# Patient Record
Sex: MALE | Race: ASIAN | NOT HISPANIC OR LATINO | URBAN - METROPOLITAN AREA
[De-identification: names, ages, dates, MRNs, and addresses within clinical notes are randomized per-mention and may not be internally consistent; named-entity substitution may affect disease eponyms.]

---

## 2016-04-06 RX ORDER — METOPROLOL TARTRATE 50 MG
1 TABLET ORAL
Qty: 0 | Refills: 0 | COMMUNITY
Start: 2016-04-06 | End: 2016-05-06

## 2016-04-06 RX ORDER — CLOPIDOGREL BISULFATE 75 MG/1
1 TABLET, FILM COATED ORAL
Qty: 30 | Refills: 0 | COMMUNITY
Start: 2016-04-06 | End: 2016-05-06

## 2018-02-10 ENCOUNTER — INPATIENT (INPATIENT)
Facility: HOSPITAL | Age: 53
LOS: 3 days | Discharge: HOME | End: 2018-02-14
Attending: INTERNAL MEDICINE | Admitting: INTERNAL MEDICINE
Payer: SELF-PAY

## 2018-02-10 VITALS
HEART RATE: 77 BPM | SYSTOLIC BLOOD PRESSURE: 132 MMHG | OXYGEN SATURATION: 99 % | TEMPERATURE: 98 F | RESPIRATION RATE: 16 BRPM | DIASTOLIC BLOOD PRESSURE: 72 MMHG

## 2018-02-10 DIAGNOSIS — N17.9 ACUTE KIDNEY FAILURE, UNSPECIFIED: ICD-10-CM

## 2018-02-10 DIAGNOSIS — T82.868A THROMBOSIS DUE TO VASCULAR PROSTHETIC DEVICES, IMPLANTS AND GRAFTS, INITIAL ENCOUNTER: Chronic | ICD-10-CM

## 2018-02-10 DIAGNOSIS — I25.10 ATHEROSCLEROTIC HEART DISEASE OF NATIVE CORONARY ARTERY WITHOUT ANGINA PECTORIS: ICD-10-CM

## 2018-02-10 DIAGNOSIS — E11.9 TYPE 2 DIABETES MELLITUS WITHOUT COMPLICATIONS: ICD-10-CM

## 2018-02-10 DIAGNOSIS — R06.09 OTHER FORMS OF DYSPNEA: ICD-10-CM

## 2018-02-10 DIAGNOSIS — Z95.5 PRESENCE OF CORONARY ANGIOPLASTY IMPLANT AND GRAFT: Chronic | ICD-10-CM

## 2018-02-10 DIAGNOSIS — I10 ESSENTIAL (PRIMARY) HYPERTENSION: ICD-10-CM

## 2018-02-10 LAB
ALBUMIN SERPL ELPH-MCNC: 3.3 G/DL — SIGNIFICANT CHANGE UP (ref 3.3–5)
ALP SERPL-CCNC: 118 U/L — SIGNIFICANT CHANGE UP (ref 40–120)
ALT FLD-CCNC: 34 U/L — SIGNIFICANT CHANGE UP (ref 4–41)
APPEARANCE UR: CLEAR — SIGNIFICANT CHANGE UP
AST SERPL-CCNC: 26 U/L — SIGNIFICANT CHANGE UP (ref 4–40)
BASOPHILS # BLD AUTO: 0.05 K/UL — SIGNIFICANT CHANGE UP (ref 0–0.2)
BASOPHILS NFR BLD AUTO: 0.9 % — SIGNIFICANT CHANGE UP (ref 0–2)
BILIRUB SERPL-MCNC: 0.2 MG/DL — SIGNIFICANT CHANGE UP (ref 0.2–1.2)
BILIRUB UR-MCNC: NEGATIVE — SIGNIFICANT CHANGE UP
BLOOD UR QL VISUAL: HIGH
BUN SERPL-MCNC: 79 MG/DL — HIGH (ref 7–23)
CALCIUM SERPL-MCNC: 8.5 MG/DL — SIGNIFICANT CHANGE UP (ref 8.4–10.5)
CHLORIDE SERPL-SCNC: 104 MMOL/L — SIGNIFICANT CHANGE UP (ref 98–107)
CK SERPL-CCNC: 296 U/L — HIGH (ref 30–200)
CO2 SERPL-SCNC: 22 MMOL/L — SIGNIFICANT CHANGE UP (ref 22–31)
COLOR SPEC: SIGNIFICANT CHANGE UP
CREAT ?TM UR-MCNC: 42.36 MG/DL — SIGNIFICANT CHANGE UP
CREAT SERPL-MCNC: 5.06 MG/DL — HIGH (ref 0.5–1.3)
EOSINOPHIL # BLD AUTO: 0.18 K/UL — SIGNIFICANT CHANGE UP (ref 0–0.5)
EOSINOPHIL NFR BLD AUTO: 3.2 % — SIGNIFICANT CHANGE UP (ref 0–6)
GLUCOSE BLDC GLUCOMTR-MCNC: 110 MG/DL — HIGH (ref 70–99)
GLUCOSE BLDC GLUCOMTR-MCNC: 124 MG/DL — HIGH (ref 70–99)
GLUCOSE SERPL-MCNC: 102 MG/DL — HIGH (ref 70–99)
GLUCOSE UR-MCNC: NEGATIVE — SIGNIFICANT CHANGE UP
HCT VFR BLD CALC: 40 % — SIGNIFICANT CHANGE UP (ref 39–50)
HGB BLD-MCNC: 13 G/DL — SIGNIFICANT CHANGE UP (ref 13–17)
HIV1 AG SER QL: SIGNIFICANT CHANGE UP
HIV1+2 AB SPEC QL: SIGNIFICANT CHANGE UP
IMM GRANULOCYTES # BLD AUTO: 0.02 # — SIGNIFICANT CHANGE UP
IMM GRANULOCYTES NFR BLD AUTO: 0.4 % — SIGNIFICANT CHANGE UP (ref 0–1.5)
KETONES UR-MCNC: NEGATIVE — SIGNIFICANT CHANGE UP
LEUKOCYTE ESTERASE UR-ACNC: NEGATIVE — SIGNIFICANT CHANGE UP
LYMPHOCYTES # BLD AUTO: 0.89 K/UL — LOW (ref 1–3.3)
LYMPHOCYTES # BLD AUTO: 15.8 % — SIGNIFICANT CHANGE UP (ref 13–44)
MCHC RBC-ENTMCNC: 30.7 PG — SIGNIFICANT CHANGE UP (ref 27–34)
MCHC RBC-ENTMCNC: 32.5 % — SIGNIFICANT CHANGE UP (ref 32–36)
MCV RBC AUTO: 94.3 FL — SIGNIFICANT CHANGE UP (ref 80–100)
MONOCYTES # BLD AUTO: 0.44 K/UL — SIGNIFICANT CHANGE UP (ref 0–0.9)
MONOCYTES NFR BLD AUTO: 7.8 % — SIGNIFICANT CHANGE UP (ref 2–14)
MUCOUS THREADS # UR AUTO: SIGNIFICANT CHANGE UP
NEUTROPHILS # BLD AUTO: 4.06 K/UL — SIGNIFICANT CHANGE UP (ref 1.8–7.4)
NEUTROPHILS NFR BLD AUTO: 71.9 % — SIGNIFICANT CHANGE UP (ref 43–77)
NITRITE UR-MCNC: NEGATIVE — SIGNIFICANT CHANGE UP
NRBC # FLD: 0 — SIGNIFICANT CHANGE UP
NT-PROBNP SERPL-SCNC: SIGNIFICANT CHANGE UP PG/ML
PH UR: 6 — SIGNIFICANT CHANGE UP (ref 4.6–8)
PLATELET # BLD AUTO: 158 K/UL — SIGNIFICANT CHANGE UP (ref 150–400)
PMV BLD: 11.4 FL — SIGNIFICANT CHANGE UP (ref 7–13)
POTASSIUM SERPL-MCNC: 5.1 MMOL/L — SIGNIFICANT CHANGE UP (ref 3.5–5.3)
POTASSIUM SERPL-SCNC: 5.1 MMOL/L — SIGNIFICANT CHANGE UP (ref 3.5–5.3)
PROT SERPL-MCNC: 6 G/DL — SIGNIFICANT CHANGE UP (ref 6–8.3)
PROT UR-MCNC: 150 MG/DL — HIGH
PROT UR-MCNC: 168.7 MG/DL — SIGNIFICANT CHANGE UP
RBC # BLD: 4.24 M/UL — SIGNIFICANT CHANGE UP (ref 4.2–5.8)
RBC # FLD: 13.6 % — SIGNIFICANT CHANGE UP (ref 10.3–14.5)
RBC CASTS # UR COMP ASSIST: HIGH (ref 0–?)
SODIUM SERPL-SCNC: 141 MMOL/L — SIGNIFICANT CHANGE UP (ref 135–145)
SP GR SPEC: 1.01 — SIGNIFICANT CHANGE UP (ref 1–1.04)
SQUAMOUS # UR AUTO: SIGNIFICANT CHANGE UP
TROPONIN T SERPL-MCNC: < 0.06 NG/ML — SIGNIFICANT CHANGE UP (ref 0–0.06)
TROPONIN T SERPL-MCNC: < 0.06 NG/ML — SIGNIFICANT CHANGE UP (ref 0–0.06)
UROBILINOGEN FLD QL: NORMAL MG/DL — SIGNIFICANT CHANGE UP
WBC # BLD: 5.64 K/UL — SIGNIFICANT CHANGE UP (ref 3.8–10.5)
WBC # FLD AUTO: 5.64 K/UL — SIGNIFICANT CHANGE UP (ref 3.8–10.5)
WBC UR QL: SIGNIFICANT CHANGE UP (ref 0–?)

## 2018-02-10 PROCEDURE — 99223 1ST HOSP IP/OBS HIGH 75: CPT | Mod: GC

## 2018-02-10 PROCEDURE — 71046 X-RAY EXAM CHEST 2 VIEWS: CPT | Mod: 26

## 2018-02-10 RX ORDER — HEPARIN SODIUM 5000 [USP'U]/ML
5000 INJECTION INTRAVENOUS; SUBCUTANEOUS EVERY 8 HOURS
Qty: 0 | Refills: 0 | Status: DISCONTINUED | OUTPATIENT
Start: 2018-02-10 | End: 2018-02-14

## 2018-02-10 RX ORDER — METOPROLOL TARTRATE 50 MG
50 TABLET ORAL DAILY
Qty: 0 | Refills: 0 | Status: DISCONTINUED | OUTPATIENT
Start: 2018-02-10 | End: 2018-02-14

## 2018-02-10 RX ORDER — SODIUM CHLORIDE 9 MG/ML
1000 INJECTION, SOLUTION INTRAVENOUS
Qty: 0 | Refills: 0 | Status: DISCONTINUED | OUTPATIENT
Start: 2018-02-10 | End: 2018-02-14

## 2018-02-10 RX ORDER — ASPIRIN/CALCIUM CARB/MAGNESIUM 324 MG
81 TABLET ORAL DAILY
Qty: 0 | Refills: 0 | Status: DISCONTINUED | OUTPATIENT
Start: 2018-02-10 | End: 2018-02-12

## 2018-02-10 RX ORDER — ASPIRIN/CALCIUM CARB/MAGNESIUM 324 MG
162 TABLET ORAL ONCE
Qty: 0 | Refills: 0 | Status: COMPLETED | OUTPATIENT
Start: 2018-02-10 | End: 2018-02-10

## 2018-02-10 RX ORDER — FUROSEMIDE 40 MG
80 TABLET ORAL EVERY 12 HOURS
Qty: 0 | Refills: 0 | Status: DISCONTINUED | OUTPATIENT
Start: 2018-02-10 | End: 2018-02-14

## 2018-02-10 RX ORDER — INFLUENZA VIRUS VACCINE 15; 15; 15; 15 UG/.5ML; UG/.5ML; UG/.5ML; UG/.5ML
0.5 SUSPENSION INTRAMUSCULAR ONCE
Qty: 0 | Refills: 0 | Status: DISCONTINUED | OUTPATIENT
Start: 2018-02-10 | End: 2018-02-14

## 2018-02-10 RX ORDER — DEXTROSE 50 % IN WATER 50 %
1 SYRINGE (ML) INTRAVENOUS ONCE
Qty: 0 | Refills: 0 | Status: DISCONTINUED | OUTPATIENT
Start: 2018-02-10 | End: 2018-02-14

## 2018-02-10 RX ORDER — GLUCAGON INJECTION, SOLUTION 0.5 MG/.1ML
1 INJECTION, SOLUTION SUBCUTANEOUS ONCE
Qty: 0 | Refills: 0 | Status: DISCONTINUED | OUTPATIENT
Start: 2018-02-10 | End: 2018-02-14

## 2018-02-10 RX ORDER — DEXTROSE 50 % IN WATER 50 %
12.5 SYRINGE (ML) INTRAVENOUS ONCE
Qty: 0 | Refills: 0 | Status: DISCONTINUED | OUTPATIENT
Start: 2018-02-10 | End: 2018-02-14

## 2018-02-10 RX ORDER — DEXTROSE 50 % IN WATER 50 %
25 SYRINGE (ML) INTRAVENOUS ONCE
Qty: 0 | Refills: 0 | Status: DISCONTINUED | OUTPATIENT
Start: 2018-02-10 | End: 2018-02-14

## 2018-02-10 RX ORDER — FUROSEMIDE 40 MG
80 TABLET ORAL ONCE
Qty: 0 | Refills: 0 | Status: COMPLETED | OUTPATIENT
Start: 2018-02-10 | End: 2018-02-10

## 2018-02-10 RX ORDER — FUROSEMIDE 40 MG
80 TABLET ORAL EVERY 12 HOURS
Qty: 0 | Refills: 0 | Status: DISCONTINUED | OUTPATIENT
Start: 2018-02-10 | End: 2018-02-10

## 2018-02-10 RX ORDER — INSULIN LISPRO 100/ML
VIAL (ML) SUBCUTANEOUS
Qty: 0 | Refills: 0 | Status: DISCONTINUED | OUTPATIENT
Start: 2018-02-10 | End: 2018-02-14

## 2018-02-10 RX ADMIN — Medication 80 MILLIGRAM(S): at 21:47

## 2018-02-10 RX ADMIN — Medication 162 MILLIGRAM(S): at 10:55

## 2018-02-10 RX ADMIN — HEPARIN SODIUM 5000 UNIT(S): 5000 INJECTION INTRAVENOUS; SUBCUTANEOUS at 21:47

## 2018-02-10 RX ADMIN — Medication 80 MILLIGRAM(S): at 10:55

## 2018-02-10 NOTE — ED PROVIDER NOTE - OBJECTIVE STATEMENT
Attendinyo male presents with shortness of breath and bilateral LE edema.  Pt states he has dyspnea on exertion.  cannot walk 1/2 block without stopping to catch his breath.  has mild chest pain with this exertion.  Also with shortness of breath lying flat.  Has had these symptoms progressively worsening over the past 2 months.  No fever.  Had cough but not now.  No nausea or vomiting.  He sometimes feels 'dizzy' when he walks.  No PCP..pt does not have health insurance.

## 2018-02-10 NOTE — ED PROVIDER NOTE - PROGRESS NOTE DETAILS
Payal:  Discussed results with patient.  he does not know about any renal dysfunction.  discussed case with PA on call for Dr. Velazco, tele doc of the day.  Will admit for further cardiac evaluation, including cardiac echo and renal evaluation.  will admit to dr. sagastume Payal:  called by dr. sagastume.  would prefer for patient to be admitted to hospitalist service because of the renal failure.

## 2018-02-10 NOTE — H&P ADULT - ASSESSMENT
53 yo M c/o B/L LE edema for the last 2 months and SIERRA for the last 1 week 53 yo M c/o B/L LE edema for the last 2 months and SIERRA for the last 1 week  EKG- EKG- NSR @ 75 LAFB, Flat T waves v6, I, L

## 2018-02-10 NOTE — H&P ADULT - GEN GEN HX ROS MEA POS PC
fatigue/fever/anorexia/See HPI, admits to 100 lb weight loss since his divorce 2 years ago, h/o fatigue anna in PM time for the last 3-4 months

## 2018-02-10 NOTE — H&P ADULT - NEGATIVE ENMT SYMPTOMS
no vertigo/no sinus symptoms/no throat pain/no dysphagia/no tinnitus/no ear pain/no hearing difficulty

## 2018-02-10 NOTE — ED PROVIDER NOTE - PMH
CAD (coronary artery disease)    DM (diabetes mellitus)    HTN (hypertension)    Hyperlipidemia    Myocardial infarction  Jan 2016

## 2018-02-10 NOTE — H&P ADULT - HISTORY OF PRESENT ILLNESS
53 yo M p/w SOB & b/l LE edema. Pt has been experiencing SIERRA if trying to walk more than 1/2 block for the last 1 week or so. Pt also feels weak and like he may lose his balance when he over exerts himself. He has also been experiencing b/l LE edema to below his knees for the last 2 months. Pt also reports orthopnea for the last, sleeps with 2 pillows.   Pt also c/o intermittent mid- lower sternal CP, described as burning, 6-7/10 severity, non-radiating, lasting ~15 min intervals, for the last 2-3 months, states this CP is made worse after eating hot peppers from [facility]. No exertional, pleuritic, reproducible components to CP. This is different then when he syncopized  and was taken to the hospital and found to have had an MI, requiring stent placement.      Pt took Z-pack & cough syrup for a cold, subj fevers with dry cough he had ~1 week ago

## 2018-02-10 NOTE — H&P ADULT - PROBLEM SELECTOR PLAN 2
GFR 20%. Monitor BMP. Consider Nephrology eval GFR 20%. Monitor BMP. Check UA, Urine creat & protein. [facility]phrology [facility] called GFR 20%. Monitor BMP. Check Renal sono, UA, Urine creat & protein. Nephrology [facility] called. Call pt's PCP for last blood test results (done ~1 month ago)

## 2018-02-10 NOTE — ED ADULT NURSE NOTE - OBJECTIVE STATEMENT
Pt received a&ox3, c/o increasing dyspnea on exertion and swelling to bilateral lower extremities x1 month, pt denies hx of CHF, on Lasix at home, pt denies chest pain/sob/nvd/urinary symptoms, lower extremities noted to be swollen bilaterally, breathing even and unlabored, speaking in full sentences, pt appears comfortable, NAD, pt placed on monitor, 20 gauge IV placed in left ac, labs drawn and sent.

## 2018-02-10 NOTE — ED PROVIDER NOTE - MEDICAL DECISION MAKING DETAILS
Dyspnea with lower extremity edema.  likely CHF exacerbation.  Will get CXR, give diuretic.  will reassess.  check troponin and ekg

## 2018-02-10 NOTE — PATIENT PROFILE ADULT. - NS TRANSFER RESPONSE BELONGING
yes Consent was obtained from the patient. The risks and benefits to therapy were discussed in detail. Specifically, the risks of infection, scarring, bleeding, prolonged wound healing, incomplete removal, allergy to anesthesia, nerve injury and recurrence were addressed. Prior to the procedure, the treatment site was clearly identified and confirmed by the patient. All components of Universal Protocol/PAUSE Rule completed.

## 2018-02-10 NOTE — ED ADULT TRIAGE NOTE - CHIEF COMPLAINT QUOTE
pt states that he has had swelling in his feet bilaterally (with difficulty breathing especially at night or when walking for a while) for about three months, worsening in past week   pt states that he gets dizzy and experiences mild chest pain on occasion when walking  in nad at triage  will get ekg at triage  pt history of one cardiac stent x 2 years

## 2018-02-10 NOTE — H&P ADULT - PROBLEM SELECTOR PLAN 1
Admit to Telemetry, serial CE's, FLP, IV Lasix, check Echo, monitor daily weighs, I's & O's. F/U MD note

## 2018-02-10 NOTE — H&P ADULT - ATTENDING COMMENTS
Pt is a poor historian and has not followed closely for medical care.  Pt has been having symptoms consistent with congestive heart failure exacerbation for several days - b/l LE edema, garber, orthopnea - would monitor on tele, check echo, serial cardiac enzymes, consider heart failure eval.  Pt with no known history of ckd - would treat as acute kidney injury at this time - check ua, spot protein/creatinine ratio, renal US.  would check lipid profile and a1c.  renal eval.

## 2018-02-11 DIAGNOSIS — E87.5 HYPERKALEMIA: ICD-10-CM

## 2018-02-11 DIAGNOSIS — R79.89 OTHER SPECIFIED ABNORMAL FINDINGS OF BLOOD CHEMISTRY: ICD-10-CM

## 2018-02-11 LAB
ALBUMIN SERPL ELPH-MCNC: 3 G/DL — LOW (ref 3.3–5)
ALP SERPL-CCNC: 106 U/L — SIGNIFICANT CHANGE UP (ref 40–120)
ALT FLD-CCNC: 27 U/L — SIGNIFICANT CHANGE UP (ref 4–41)
AST SERPL-CCNC: 20 U/L — SIGNIFICANT CHANGE UP (ref 4–40)
BASOPHILS # BLD AUTO: 0.04 K/UL — SIGNIFICANT CHANGE UP (ref 0–0.2)
BASOPHILS NFR BLD AUTO: 0.6 % — SIGNIFICANT CHANGE UP (ref 0–2)
BILIRUB SERPL-MCNC: 0.5 MG/DL — SIGNIFICANT CHANGE UP (ref 0.2–1.2)
BUN SERPL-MCNC: 81 MG/DL — HIGH (ref 7–23)
BUN SERPL-MCNC: 84 MG/DL — HIGH (ref 7–23)
C3 SERPL-MCNC: 99.5 MG/DL — SIGNIFICANT CHANGE UP (ref 90–180)
C4 SERPL-MCNC: 27.7 MG/DL — SIGNIFICANT CHANGE UP (ref 10–40)
CALCIUM SERPL-MCNC: 7.6 MG/DL — LOW (ref 8.4–10.5)
CALCIUM SERPL-MCNC: 8.1 MG/DL — LOW (ref 8.4–10.5)
CHLORIDE SERPL-SCNC: 102 MMOL/L — SIGNIFICANT CHANGE UP (ref 98–107)
CHLORIDE SERPL-SCNC: 103 MMOL/L — SIGNIFICANT CHANGE UP (ref 98–107)
CHOLEST SERPL-MCNC: 165 MG/DL — SIGNIFICANT CHANGE UP (ref 120–199)
CO2 SERPL-SCNC: 18 MMOL/L — LOW (ref 22–31)
CO2 SERPL-SCNC: 21 MMOL/L — LOW (ref 22–31)
CREAT SERPL-MCNC: 4.26 MG/DL — HIGH (ref 0.5–1.3)
CREAT SERPL-MCNC: 4.74 MG/DL — HIGH (ref 0.5–1.3)
EOSINOPHIL # BLD AUTO: 0.19 K/UL — SIGNIFICANT CHANGE UP (ref 0–0.5)
EOSINOPHIL NFR BLD AUTO: 2.9 % — SIGNIFICANT CHANGE UP (ref 0–6)
GLUCOSE BLDC GLUCOMTR-MCNC: 100 MG/DL — HIGH (ref 70–99)
GLUCOSE BLDC GLUCOMTR-MCNC: 144 MG/DL — HIGH (ref 70–99)
GLUCOSE BLDC GLUCOMTR-MCNC: 153 MG/DL — HIGH (ref 70–99)
GLUCOSE BLDC GLUCOMTR-MCNC: 80 MG/DL — SIGNIFICANT CHANGE UP (ref 70–99)
GLUCOSE SERPL-MCNC: 138 MG/DL — HIGH (ref 70–99)
GLUCOSE SERPL-MCNC: 75 MG/DL — SIGNIFICANT CHANGE UP (ref 70–99)
HBA1C BLD-MCNC: 6.2 % — HIGH (ref 4–5.6)
HBV SURFACE AG SER-ACNC: NEGATIVE — SIGNIFICANT CHANGE UP
HCT VFR BLD CALC: 39.8 % — SIGNIFICANT CHANGE UP (ref 39–50)
HCV AB S/CO SERPL IA: 0.06 S/CO — SIGNIFICANT CHANGE UP
HCV AB SERPL-IMP: SIGNIFICANT CHANGE UP
HDLC SERPL-MCNC: 47 MG/DL — SIGNIFICANT CHANGE UP (ref 35–55)
HGB BLD-MCNC: 12.7 G/DL — LOW (ref 13–17)
IMM GRANULOCYTES # BLD AUTO: 0.01 # — SIGNIFICANT CHANGE UP
IMM GRANULOCYTES NFR BLD AUTO: 0.2 % — SIGNIFICANT CHANGE UP (ref 0–1.5)
LIPID PNL WITH DIRECT LDL SERPL: 115 MG/DL — SIGNIFICANT CHANGE UP
LYMPHOCYTES # BLD AUTO: 0.69 K/UL — LOW (ref 1–3.3)
LYMPHOCYTES # BLD AUTO: 10.7 % — LOW (ref 13–44)
MAGNESIUM SERPL-MCNC: 2 MG/DL — SIGNIFICANT CHANGE UP (ref 1.6–2.6)
MCHC RBC-ENTMCNC: 29.6 PG — SIGNIFICANT CHANGE UP (ref 27–34)
MCHC RBC-ENTMCNC: 31.9 % — LOW (ref 32–36)
MCV RBC AUTO: 92.8 FL — SIGNIFICANT CHANGE UP (ref 80–100)
MONOCYTES # BLD AUTO: 0.55 K/UL — SIGNIFICANT CHANGE UP (ref 0–0.9)
MONOCYTES NFR BLD AUTO: 8.5 % — SIGNIFICANT CHANGE UP (ref 2–14)
NEUTROPHILS # BLD AUTO: 4.97 K/UL — SIGNIFICANT CHANGE UP (ref 1.8–7.4)
NEUTROPHILS NFR BLD AUTO: 77.1 % — HIGH (ref 43–77)
NRBC # FLD: 0 — SIGNIFICANT CHANGE UP
PHOSPHATE SERPL-MCNC: 5.7 MG/DL — HIGH (ref 2.5–4.5)
PLATELET # BLD AUTO: 149 K/UL — LOW (ref 150–400)
PMV BLD: 11.4 FL — SIGNIFICANT CHANGE UP (ref 7–13)
POTASSIUM SERPL-MCNC: 4.8 MMOL/L — SIGNIFICANT CHANGE UP (ref 3.5–5.3)
POTASSIUM SERPL-MCNC: 5.7 MMOL/L — HIGH (ref 3.5–5.3)
POTASSIUM SERPL-SCNC: 4.8 MMOL/L — SIGNIFICANT CHANGE UP (ref 3.5–5.3)
POTASSIUM SERPL-SCNC: 5.7 MMOL/L — HIGH (ref 3.5–5.3)
PROT SERPL-MCNC: 5.9 G/DL — LOW (ref 6–8.3)
RBC # BLD: 4.29 M/UL — SIGNIFICANT CHANGE UP (ref 4.2–5.8)
RBC # FLD: 13.4 % — SIGNIFICANT CHANGE UP (ref 10.3–14.5)
SODIUM SERPL-SCNC: 137 MMOL/L — SIGNIFICANT CHANGE UP (ref 135–145)
SODIUM SERPL-SCNC: 141 MMOL/L — SIGNIFICANT CHANGE UP (ref 135–145)
TRIGL SERPL-MCNC: 80 MG/DL — SIGNIFICANT CHANGE UP (ref 10–149)
WBC # BLD: 6.45 K/UL — SIGNIFICANT CHANGE UP (ref 3.8–10.5)
WBC # FLD AUTO: 6.45 K/UL — SIGNIFICANT CHANGE UP (ref 3.8–10.5)

## 2018-02-11 PROCEDURE — 99254 IP/OBS CNSLTJ NEW/EST MOD 60: CPT | Mod: GC

## 2018-02-11 PROCEDURE — 86334 IMMUNOFIX E-PHORESIS SERUM: CPT | Mod: 26

## 2018-02-11 RX ORDER — SODIUM POLYSTYRENE SULFONATE 4.1 MEQ/G
30 POWDER, FOR SUSPENSION ORAL ONCE
Qty: 0 | Refills: 0 | Status: COMPLETED | OUTPATIENT
Start: 2018-02-11 | End: 2018-02-11

## 2018-02-11 RX ADMIN — HEPARIN SODIUM 5000 UNIT(S): 5000 INJECTION INTRAVENOUS; SUBCUTANEOUS at 21:59

## 2018-02-11 RX ADMIN — Medication 80 MILLIGRAM(S): at 17:26

## 2018-02-11 RX ADMIN — Medication 1: at 18:06

## 2018-02-11 RX ADMIN — HEPARIN SODIUM 5000 UNIT(S): 5000 INJECTION INTRAVENOUS; SUBCUTANEOUS at 12:16

## 2018-02-11 RX ADMIN — Medication 81 MILLIGRAM(S): at 12:16

## 2018-02-11 RX ADMIN — Medication 80 MILLIGRAM(S): at 05:33

## 2018-02-11 RX ADMIN — Medication 50 MILLIGRAM(S): at 05:33

## 2018-02-11 RX ADMIN — HEPARIN SODIUM 5000 UNIT(S): 5000 INJECTION INTRAVENOUS; SUBCUTANEOUS at 05:33

## 2018-02-11 RX ADMIN — SODIUM POLYSTYRENE SULFONATE 30 GRAM(S): 4.1 POWDER, FOR SUSPENSION ORAL at 12:15

## 2018-02-11 NOTE — CONSULT NOTE ADULT - PROBLEM SELECTOR RECOMMENDATION 9
Elevated Scr of unknown duration/etiology. Pt. with no known history of kidney disease in the past. ? SMITA on CKD. CKD in the setting DM/HTN. SMITA from low cardiac output/CHF/ACE-I  vs ?acute GN process. Pt with ? nephrotic syndrome (LE swelling, serum albumin low at 3, pt. with blood, RBCs and significant proteinuria on U/A. Spot TP/CR was elevated at 3.9). Check renal sonogram. Check serological work up including hepatits b surface antigen, hep c surface antibody, CELSO, dsDNA, complement C3/C4, ANCA, anti-GBM and serum PETROS. As per review on labs from [place], pt. with elevated SCr of 1.6 seen in 4/2016. Scr on admission was elevated at 5 and is 4.7 today. Advise to continue with lasix 80 mg IV BID. Will follow up serological work up. Pt. may need CT-guided kidney biopsy by IR to determine etiology/underlying cause of kidney disease. Discussed with pt. the benefits and risks of kidney biopsy. Pt. states he is willing to under-go kidney biopsy if needed. Monitor BMP, urine output, I/Os. Avoid nephrotoxins, RCA, NSAIDs and ACE-I/ARBs

## 2018-02-11 NOTE — CONSULT NOTE ADULT - ASSESSMENT
This is a 52 year old male with a PMHx of long-standing DM (~10years), HTN (~10 years) CAD with stenting, HLD is admitted for SOB and LE swelling. Pt. was found to have SMITA

## 2018-02-11 NOTE — CONSULT NOTE ADULT - ATTENDING COMMENTS
SMITA/CKD  and nephrotic range proteienuria and/ or Progressive ckd secondary to HTn/DM. on metformin as outpatient as well. urine microscopy done today and I personally examined the specimen with the fellow. there are lots of RBCs and some dysmorphic raising suspicion of GN related process. serologies sims as above and if renal sonogram does not show small kidneys then will move on with biopsy. a call was made to outpatient doctor for available baseline kidney function.,

## 2018-02-11 NOTE — PROGRESS NOTE ADULT - SUBJECTIVE AND OBJECTIVE BOX
Patient is a 52y old  Male who presents with a chief complaint of SIERRA (10 Feb 2018 14:34)      SUBJECTIVE / OVERNIGHT EVENTS:   Feels better.  Denies CP/SOB/Palpitation/HA.    MEDICATIONS  (STANDING):  aspirin enteric coated 81 milliGRAM(s) Oral daily  dextrose 5%. 1000 milliLiter(s) (50 mL/Hr) IV Continuous <Continuous>  dextrose 50% Injectable 12.5 Gram(s) IV Push once  dextrose 50% Injectable 25 Gram(s) IV Push once  dextrose 50% Injectable 25 Gram(s) IV Push once  furosemide   Injectable 80 milliGRAM(s) IV Push every 12 hours  heparin  Injectable 5000 Unit(s) SubCutaneous every 8 hours  influenza   Vaccine 0.5 milliLiter(s) IntraMuscular once  insulin lispro (HumaLOG) corrective regimen sliding scale   SubCutaneous three times a day before meals  metoprolol succinate ER 50 milliGRAM(s) Oral daily    MEDICATIONS  (PRN):  dextrose Gel 1 Dose(s) Oral once PRN Blood Glucose LESS THAN 70 milliGRAM(s)/deciliter  glucagon  Injectable 1 milliGRAM(s) IntraMuscular once PRN Glucose LESS THAN 70 milligrams/deciliter        CAPILLARY BLOOD GLUCOSE      POCT Blood Glucose.: 100 mg/dL (2018 22:03)  POCT Blood Glucose.: 153 mg/dL (2018 17:40)  POCT Blood Glucose.: 144 mg/dL (2018 12:58)  POCT Blood Glucose.: 80 mg/dL (2018 08:50)    I&O's Summary    10 Feb 2018 07:  -  2018 07:00  --------------------------------------------------------  IN: 250 mL / OUT: 1000 mL / NET: -750 mL    2018 07:  -  2018 23:04  --------------------------------------------------------  IN: 460 mL / OUT: 2000 mL / NET: -1540 mL        PHYSICAL EXAM:  GENERAL: NAD, well-developed  HEAD:  Atraumatic, Normocephalic  NECK: Supple, No JVD  CHEST/LUNG: Clear to auscultation bilaterally; No wheezing.  HEART: Regular rate and rhythm; No murmurs, rubs, or gallops  ABDOMEN: Soft, Nontender, Nondistended; Bowel sounds present  EXTREMITIES:   No clubbing, cyanosis, or edema  NEUROLOGY: AAO X 3  SKIN: No rashes    LABS:                        12.7   6.45  )-----------( 149      ( 2018 05:20 )             39.8     02-11    141  |  103  |  81<H>  ----------------------------<  138<H>  4.8   |  21<L>  |  4.26<H>    Ca    7.6<L>      2018 15:00  Phos  5.7     02-  Mg     2.0         TPro  5.9<L>  /  Alb  3.0<L>  /  TBili  0.5  /  DBili  x   /  AST  20  /  ALT  27  /  AlkPhos  106  02-11      CARDIAC MARKERS ( 10 Feb 2018 17:25 )  x     / < 0.06 ng/mL / 296 u/L / x     / x      CARDIAC MARKERS ( 10 Feb 2018 11:37 )  x     / < 0.06 ng/mL / x     / x     / x          Urinalysis Basic - ( 10 Feb 2018 22:08 )    Color: PLYEL / Appearance: CLEAR / S.011 / pH: 6.0  Gluc: NEGATIVE / Ketone: NEGATIVE  / Bili: NEGATIVE / Urobili: NORMAL mg/dL   Blood: TRACE / Protein: 150 mg/dL / Nitrite: NEGATIVE   Leuk Esterase: NEGATIVE / RBC: 5-10 / WBC 0-2   Sq Epi: OCC / Non Sq Epi: x / Bacteria: x      CAPILLARY BLOOD GLUCOSE      POCT Blood Glucose.: 100 mg/dL (2018 22:03)  POCT Blood Glucose.: 153 mg/dL (2018 17:40)  POCT Blood Glucose.: 144 mg/dL (2018 12:58)  POCT Blood Glucose.: 80 mg/dL (2018 08:50)                RADIOLOGY & ADDITIONAL TESTS:    Imaging Personally Reviewed:    Consultant(s) Notes Reviewed:      Care Discussed with Consultants/Other Providers:

## 2018-02-11 NOTE — CONSULT NOTE ADULT - SUBJECTIVE AND OBJECTIVE BOX
[facility] DIVISION OF KIDNEY DISEASES AND HYPERTENSION -- INITIAL CONSULT NOTE  --------------------------------------------------------------------------------  HPI: This is a 52 year old male with a PMHx of long-standing DM (~10years), HTN (~10 years) CAD with stenting, HLD is admitted for SOB and LE swelling. As per pt., he was never told he had a problem with his kidneys. As per review on labs on [place], pt. with elevated Scr of 1.5 seen in 4/2016. Scr on admission (2/10) was elevated at 5 and is 4.7 today. Pt. denies any family history of kidney disease. Pt. denies the use of IV drugs, herbal medications of NSAIDs. Pt. states he takes lisinopril for management of his BP at home. Pt. denies any fever, chills, hematuria, CP, N,V. Pt. c/o SIERRA and worsening LE edema over the past few days.       PAST HISTORY  --------------------------------------------------------------------------------  PAST MEDICAL & SURGICAL HISTORY:  Myocardial infarction: Jan 2016  Hyperlipidemia  CAD (coronary artery disease)  DM (diabetes mellitus)  HTN (hypertension)  Stented coronary artery: x2  Arterial stent thrombosis    FAMILY HISTORY:  Family history of diabetes mellitus (DM)  Family history of MI (myocardial infarction)    PAST SOCIAL HISTORY: No history of alcohol, drugs or tobacco use.     ALLERGIES & MEDICATIONS  --------------------------------------------------------------------------------  Allergies    No Known Allergies    Intolerances      Standing Inpatient Medications  aspirin enteric coated 81 milliGRAM(s) Oral daily  dextrose 5%. 1000 milliLiter(s) IV Continuous <Continuous>  dextrose 50% Injectable 12.5 Gram(s) IV Push once  dextrose 50% Injectable 25 Gram(s) IV Push once  dextrose 50% Injectable 25 Gram(s) IV Push once  furosemide   Injectable 80 milliGRAM(s) IV Push every 12 hours  heparin  Injectable 5000 Unit(s) SubCutaneous every 8 hours  influenza   Vaccine 0.5 milliLiter(s) IntraMuscular once  insulin lispro (HumaLOG) corrective regimen sliding scale   SubCutaneous three times a day before meals  metoprolol succinate ER 50 milliGRAM(s) Oral daily    PRN Inpatient Medications  dextrose Gel 1 Dose(s) Oral once PRN  glucagon  Injectable 1 milliGRAM(s) IntraMuscular once PRN      REVIEW OF SYSTEMS  --------------------------------------------------------------------------------  Gen: No fevers/chills,   Skin: No rashes  Head/Eyes/Ears/Mouth: No headache;   Respiratory: + dyspnea and SOB   CV: No chest pain,  GI: No abdominal pain,   : No dysuria,   MSK: No edema      All other systems were reviewed and are negative, except as noted.    VITALS/PHYSICAL EXAM  --------------------------------------------------------------------------------  T(C): 36.9 (02-11-18 @ 05:31), Max: 36.9 (02-11-18 @ 05:31)  HR: 74 (02-11-18 @ 05:31) (68 - 74)  BP: 111/73 (02-11-18 @ 05:31) (104/60 - 155/99)  RR: 17 (02-11-18 @ 05:31) (16 - 68)  SpO2: 97% (02-11-18 @ 05:31) (97% - 100%)  Wt(kg): --  Height (cm): 165.1 (02-10-18 @ 15:22)  Weight (kg): 95 (02-10-18 @ 15:22)  BMI (kg/m2): 34.9 (02-10-18 @ 15:22)  BSA (m2): 2.02 (02-10-18 @ 15:22)      02-10-18 @ 07:01  -  02-11-18 @ 07:00  --------------------------------------------------------  IN: 250 mL / OUT: 1000 mL / NET: -750 mL      Physical Exam:  	Gen: NAD,   	HEENT:  supple neck,   	Pulm: CTA B/L  	CV: RRR,   	Abd: soft,  	: No suprapubic tenderness  	LE: Warm, b/l LE edema  	Neuro: Awake and alert   	Psych: Normal affect and mood  	Skin: Warm, without rashes  	    LABS/STUDIES  --------------------------------------------------------------------------------              12.7   6.45  >-----------<  149      [02-11-18 @ 05:20]              39.8     137  |  102  |  84  ----------------------------<  75      [02-11-18 @ 05:20]  5.7   |  18  |  4.74        Ca     8.1     [02-11-18 @ 05:20]      Mg     2.0     [02-11-18 @ 05:20]      Phos  5.7     [02-11-18 @ 05:20]    TPro  5.9  /  Alb  3.0  /  TBili  0.5  /  DBili  x   /  AST  20  /  ALT  27  /  AlkPhos  106  [02-11-18 @ 05:20]        Troponin < 0.06      [02-10-18 @ 17:25]        [02-10-18 @ 17:25]    Creatinine Trend:  SCr 4.74 [02-11 @ 05:20]  SCr 5.06 [02-10 @ 11:37]    Urinalysis - [02-10-18 @ 22:08]      Color PLYEL / Appearance CLEAR / SG 1.011 / pH 6.0      Gluc NEGATIVE / Ketone NEGATIVE  / Bili NEGATIVE / Urobili NORMAL       Blood TRACE / Protein 150 / Leuk Est NEGATIVE / Nitrite NEGATIVE      RBC 5-10 / WBC 0-2 / Hyaline  / Gran  / Sq Epi OCC / Non Sq Epi  / Bacteria     Urine Creatinine 42.36      [02-10-18 @ 22:08]  Urine Protein 168.7      [02-10-18 @ 22:08]    HbA1c 6.2      [02-11-18 @ 05:20]  Lipid: chol 165, TG 80, HDL 47,       [02-11-18 @ 05:20] [facility] DIVISION OF KIDNEY DISEASES AND HYPERTENSION -- INITIAL CONSULT NOTE  --------------------------------------------------------------------------------  HPI: This is a 52 year old male with a PMHx of long-standing DM1 since age 21, HTN (~10 years) CAD with stenting, HLD is admitted for SOB and LE swelling. Pt. states he started to have notice LE swelling over the past month. As per pt., he was never told he had a problem with his kidneys. As per review on labs on [place], pt. with elevated Scr of 1.5 seen in 4/2016. Scr on admission (2/10) was elevated at 5 and is 4.7 today. Pt. denies any family history of kidney disease. Pt. denies the use of IV drugs, herbal medications of NSAIDs. Pt. states he takes lisinopril for management of his BP at home. Pt. states he does not follow up regularly with a doctor due to insurance issues, however saw an urgent care PCP a few times over the last month. Pt. states he was recently prescribed lasix last week due to his swelling by that PCP and was advised to go to the ED for further evaluation. Pt. denies any fever, chills, hematuria, CP, N,V. Pt. c/o SIERRA and worsening LE edema over the past few days.       PAST HISTORY  --------------------------------------------------------------------------------  PAST MEDICAL & SURGICAL HISTORY:  Myocardial infarction: Jan 2016  Hyperlipidemia  CAD (coronary artery disease)  DM (diabetes mellitus)  HTN (hypertension)  Stented coronary artery: x2  Arterial stent thrombosis    FAMILY HISTORY:  Family history of diabetes mellitus (DM)  Family history of MI (myocardial infarction)    PAST SOCIAL HISTORY: No history of alcohol, drugs or tobacco use.     ALLERGIES & MEDICATIONS  --------------------------------------------------------------------------------  Allergies    No Known Allergies    Intolerances      Standing Inpatient Medications  aspirin enteric coated 81 milliGRAM(s) Oral daily  dextrose 5%. 1000 milliLiter(s) IV Continuous <Continuous>  dextrose 50% Injectable 12.5 Gram(s) IV Push once  dextrose 50% Injectable 25 Gram(s) IV Push once  dextrose 50% Injectable 25 Gram(s) IV Push once  furosemide   Injectable 80 milliGRAM(s) IV Push every 12 hours  heparin  Injectable 5000 Unit(s) SubCutaneous every 8 hours  influenza   Vaccine 0.5 milliLiter(s) IntraMuscular once  insulin lispro (HumaLOG) corrective regimen sliding scale   SubCutaneous three times a day before meals  metoprolol succinate ER 50 milliGRAM(s) Oral daily    PRN Inpatient Medications  dextrose Gel 1 Dose(s) Oral once PRN  glucagon  Injectable 1 milliGRAM(s) IntraMuscular once PRN      REVIEW OF SYSTEMS  --------------------------------------------------------------------------------  Gen: No fevers/chills,   Skin: No rashes  Head/Eyes/Ears/Mouth: No headache;   Respiratory: + dyspnea and SOB   CV: No chest pain,  GI: No abdominal pain,   : No dysuria,   MSK: b/l LE edema       All other systems were reviewed and are negative, except as noted.    VITALS/PHYSICAL EXAM  --------------------------------------------------------------------------------  T(C): 36.9 (02-11-18 @ 05:31), Max: 36.9 (02-11-18 @ 05:31)  HR: 74 (02-11-18 @ 05:31) (68 - 74)  BP: 111/73 (02-11-18 @ 05:31) (104/60 - 155/99)  RR: 17 (02-11-18 @ 05:31) (16 - 68)  SpO2: 97% (02-11-18 @ 05:31) (97% - 100%)  Wt(kg): --  Height (cm): 165.1 (02-10-18 @ 15:22)  Weight (kg): 95 (02-10-18 @ 15:22)  BMI (kg/m2): 34.9 (02-10-18 @ 15:22)  BSA (m2): 2.02 (02-10-18 @ 15:22)      02-10-18 @ 07:01  -  02-11-18 @ 07:00  --------------------------------------------------------  IN: 250 mL / OUT: 1000 mL / NET: -750 mL      Physical Exam:  	Gen: NAD,   	HEENT:  supple neck,   	Pulm: CTA B/L  	CV: RRR,   	Abd: soft,  	: No suprapubic tenderness  	LE: Warm, significant b/l LE edema  	Neuro: Awake and alert   	Psych: Normal affect and mood  	Skin: Warm, without rashes  	    LABS/STUDIES  --------------------------------------------------------------------------------              12.7   6.45  >-----------<  149      [02-11-18 @ 05:20]              39.8     137  |  102  |  84  ----------------------------<  75      [02-11-18 @ 05:20]  5.7   |  18  |  4.74        Ca     8.1     [02-11-18 @ 05:20]      Mg     2.0     [02-11-18 @ 05:20]      Phos  5.7     [02-11-18 @ 05:20]    TPro  5.9  /  Alb  3.0  /  TBili  0.5  /  DBili  x   /  AST  20  /  ALT  27  /  AlkPhos  106  [02-11-18 @ 05:20]        Troponin < 0.06      [02-10-18 @ 17:25]        [02-10-18 @ 17:25]    Creatinine Trend:  SCr 4.74 [02-11 @ 05:20]  SCr 5.06 [02-10 @ 11:37]    Urinalysis - [02-10-18 @ 22:08]      Color PLYEL / Appearance CLEAR / SG 1.011 / pH 6.0      Gluc NEGATIVE / Ketone NEGATIVE  / Bili NEGATIVE / Urobili NORMAL       Blood TRACE / Protein 150 / Leuk Est NEGATIVE / Nitrite NEGATIVE      RBC 5-10 / WBC 0-2 / Hyaline  / Gran  / Sq Epi OCC / Non Sq Epi  / Bacteria     Urine Creatinine 42.36      [02-10-18 @ 22:08]  Urine Protein 168.7      [02-10-18 @ 22:08]    HbA1c 6.2      [02-11-18 @ 05:20]  Lipid: chol 165, TG 80, HDL 47,       [02-11-18 @ 05:20] [facility] DIVISION OF KIDNEY DISEASES AND HYPERTENSION -- INITIAL CONSULT NOTE  --------------------------------------------------------------------------------  HPI: This is a 52 year old male with a PMHx of long-standing DM1 since age 21, HTN (~10 years) CAD with stenting, HLD is admitted for SOB and LE swelling. Pt. states he started to have notice LE swelling over the past 2 months. As per pt., he was never told he had a problem with his kidneys. As per review on labs on [place], pt. with elevated Scr of 1.5 seen in 4/2016. Scr on admission (2/10) was elevated at 5 and is 4.7 today. Pt. denies any family history of kidney disease. Pt. denies the use of IV drugs, herbal medications of NSAIDs. Pt. states he takes lisinopril for management of his BP at home. Pt. states he does not follow up regularly with a doctor due to insurance issues, however saw an urgent care PCP a few times over the last month. Pt. states he was recently prescribed lasix last week due to his swelling by that PCP and was advised to go to the ED for further evaluation. Pt. denies any fever, chills, hematuria, CP, N,V. Pt. c/o SIERRA and worsening LE edema over the past few days.       PAST HISTORY  --------------------------------------------------------------------------------  PAST MEDICAL & SURGICAL HISTORY:  Myocardial infarction: Jan 2016  Hyperlipidemia  CAD (coronary artery disease)  DM (diabetes mellitus)  HTN (hypertension)  Stented coronary artery: x2  Arterial stent thrombosis    FAMILY HISTORY:  Family history of diabetes mellitus (DM)  Family history of MI (myocardial infarction)    PAST SOCIAL HISTORY: No history of alcohol, drugs or tobacco use.     ALLERGIES & MEDICATIONS  --------------------------------------------------------------------------------  Allergies    No Known Allergies    Intolerances      Standing Inpatient Medications  aspirin enteric coated 81 milliGRAM(s) Oral daily  dextrose 5%. 1000 milliLiter(s) IV Continuous <Continuous>  dextrose 50% Injectable 12.5 Gram(s) IV Push once  dextrose 50% Injectable 25 Gram(s) IV Push once  dextrose 50% Injectable 25 Gram(s) IV Push once  furosemide   Injectable 80 milliGRAM(s) IV Push every 12 hours  heparin  Injectable 5000 Unit(s) SubCutaneous every 8 hours  influenza   Vaccine 0.5 milliLiter(s) IntraMuscular once  insulin lispro (HumaLOG) corrective regimen sliding scale   SubCutaneous three times a day before meals  metoprolol succinate ER 50 milliGRAM(s) Oral daily    PRN Inpatient Medications  dextrose Gel 1 Dose(s) Oral once PRN  glucagon  Injectable 1 milliGRAM(s) IntraMuscular once PRN      REVIEW OF SYSTEMS  --------------------------------------------------------------------------------  Gen: No fevers/chills,   Skin: No rashes  Head/Eyes/Ears/Mouth: No headache;   Respiratory: + dyspnea and SOB   CV: No chest pain,  GI: No abdominal pain,   : No dysuria,   MSK: b/l LE edema       All other systems were reviewed and are negative, except as noted.    VITALS/PHYSICAL EXAM  --------------------------------------------------------------------------------  T(C): 36.9 (02-11-18 @ 05:31), Max: 36.9 (02-11-18 @ 05:31)  HR: 74 (02-11-18 @ 05:31) (68 - 74)  BP: 111/73 (02-11-18 @ 05:31) (104/60 - 155/99)  RR: 17 (02-11-18 @ 05:31) (16 - 68)  SpO2: 97% (02-11-18 @ 05:31) (97% - 100%)  Wt(kg): --  Height (cm): 165.1 (02-10-18 @ 15:22)  Weight (kg): 95 (02-10-18 @ 15:22)  BMI (kg/m2): 34.9 (02-10-18 @ 15:22)  BSA (m2): 2.02 (02-10-18 @ 15:22)      02-10-18 @ 07:01  -  02-11-18 @ 07:00  --------------------------------------------------------  IN: 250 mL / OUT: 1000 mL / NET: -750 mL      Physical Exam:  	Gen: NAD,   	HEENT:  supple neck,   	Pulm: CTA B/L  	CV: RRR,   	Abd: soft,  	: No suprapubic tenderness  	LE: Warm, significant b/l LE edema  	Neuro: Awake and alert   	Psych: Normal affect and mood  	Skin: Warm, without rashes  	    LABS/STUDIES  --------------------------------------------------------------------------------              12.7   6.45  >-----------<  149      [02-11-18 @ 05:20]              39.8     137  |  102  |  84  ----------------------------<  75      [02-11-18 @ 05:20]  5.7   |  18  |  4.74        Ca     8.1     [02-11-18 @ 05:20]      Mg     2.0     [02-11-18 @ 05:20]      Phos  5.7     [02-11-18 @ 05:20]    TPro  5.9  /  Alb  3.0  /  TBili  0.5  /  DBili  x   /  AST  20  /  ALT  27  /  AlkPhos  106  [02-11-18 @ 05:20]        Troponin < 0.06      [02-10-18 @ 17:25]        [02-10-18 @ 17:25]    Creatinine Trend:  SCr 4.74 [02-11 @ 05:20]  SCr 5.06 [02-10 @ 11:37]    Urinalysis - [02-10-18 @ 22:08]      Color PLYEL / Appearance CLEAR / SG 1.011 / pH 6.0      Gluc NEGATIVE / Ketone NEGATIVE  / Bili NEGATIVE / Urobili NORMAL       Blood TRACE / Protein 150 / Leuk Est NEGATIVE / Nitrite NEGATIVE      RBC 5-10 / WBC 0-2 / Hyaline  / Gran  / Sq Epi OCC / Non Sq Epi  / Bacteria     Urine Creatinine 42.36      [02-10-18 @ 22:08]  Urine Protein 168.7      [02-10-18 @ 22:08]    HbA1c 6.2      [02-11-18 @ 05:20]  Lipid: chol 165, TG 80, HDL 47,       [02-11-18 @ 05:20]

## 2018-02-12 LAB
ANA TITR SER: NEGATIVE — SIGNIFICANT CHANGE UP
B PERT DNA SPEC QL NAA+PROBE: SIGNIFICANT CHANGE UP
BUN SERPL-MCNC: 86 MG/DL — HIGH (ref 7–23)
C PNEUM DNA SPEC QL NAA+PROBE: NOT DETECTED — SIGNIFICANT CHANGE UP
CALCIUM SERPL-MCNC: 8.1 MG/DL — LOW (ref 8.4–10.5)
CHLORIDE SERPL-SCNC: 99 MMOL/L — SIGNIFICANT CHANGE UP (ref 98–107)
CO2 SERPL-SCNC: 23 MMOL/L — SIGNIFICANT CHANGE UP (ref 22–31)
CREAT SERPL-MCNC: 4.74 MG/DL — HIGH (ref 0.5–1.3)
FLUAV H1 2009 PAND RNA SPEC QL NAA+PROBE: NOT DETECTED — SIGNIFICANT CHANGE UP
FLUAV H1 RNA SPEC QL NAA+PROBE: NOT DETECTED — SIGNIFICANT CHANGE UP
FLUAV H3 RNA SPEC QL NAA+PROBE: NOT DETECTED — SIGNIFICANT CHANGE UP
FLUAV SUBTYP SPEC NAA+PROBE: SIGNIFICANT CHANGE UP
FLUBV RNA SPEC QL NAA+PROBE: NOT DETECTED — SIGNIFICANT CHANGE UP
GBM IGG SER-ACNC: <0.2 U — SIGNIFICANT CHANGE UP
GLUCOSE BLDC GLUCOMTR-MCNC: 120 MG/DL — HIGH (ref 70–99)
GLUCOSE BLDC GLUCOMTR-MCNC: 138 MG/DL — HIGH (ref 70–99)
GLUCOSE BLDC GLUCOMTR-MCNC: 170 MG/DL — HIGH (ref 70–99)
GLUCOSE BLDC GLUCOMTR-MCNC: 98 MG/DL — SIGNIFICANT CHANGE UP (ref 70–99)
GLUCOSE SERPL-MCNC: 140 MG/DL — HIGH (ref 70–99)
HADV DNA SPEC QL NAA+PROBE: NOT DETECTED — SIGNIFICANT CHANGE UP
HCOV 229E RNA SPEC QL NAA+PROBE: NOT DETECTED — SIGNIFICANT CHANGE UP
HCOV HKU1 RNA SPEC QL NAA+PROBE: NOT DETECTED — SIGNIFICANT CHANGE UP
HCOV NL63 RNA SPEC QL NAA+PROBE: NOT DETECTED — SIGNIFICANT CHANGE UP
HCOV OC43 RNA SPEC QL NAA+PROBE: NOT DETECTED — SIGNIFICANT CHANGE UP
HCT VFR BLD CALC: 39.3 % — SIGNIFICANT CHANGE UP (ref 39–50)
HGB BLD-MCNC: 12.7 G/DL — LOW (ref 13–17)
HMPV RNA SPEC QL NAA+PROBE: NOT DETECTED — SIGNIFICANT CHANGE UP
HPIV1 RNA SPEC QL NAA+PROBE: NOT DETECTED — SIGNIFICANT CHANGE UP
HPIV2 RNA SPEC QL NAA+PROBE: NOT DETECTED — SIGNIFICANT CHANGE UP
HPIV3 RNA SPEC QL NAA+PROBE: NOT DETECTED — SIGNIFICANT CHANGE UP
HPIV4 RNA SPEC QL NAA+PROBE: NOT DETECTED — SIGNIFICANT CHANGE UP
M PNEUMO DNA SPEC QL NAA+PROBE: NOT DETECTED — SIGNIFICANT CHANGE UP
MCHC RBC-ENTMCNC: 29.5 PG — SIGNIFICANT CHANGE UP (ref 27–34)
MCHC RBC-ENTMCNC: 32.3 % — SIGNIFICANT CHANGE UP (ref 32–36)
MCV RBC AUTO: 91.4 FL — SIGNIFICANT CHANGE UP (ref 80–100)
NRBC # FLD: 0 — SIGNIFICANT CHANGE UP
PLATELET # BLD AUTO: 128 K/UL — LOW (ref 150–400)
PMV BLD: 10.9 FL — SIGNIFICANT CHANGE UP (ref 7–13)
POTASSIUM SERPL-MCNC: 4.8 MMOL/L — SIGNIFICANT CHANGE UP (ref 3.5–5.3)
POTASSIUM SERPL-SCNC: 4.8 MMOL/L — SIGNIFICANT CHANGE UP (ref 3.5–5.3)
PROTEINASE3 AB FLD-ACNC: <5 UNITS — SIGNIFICANT CHANGE UP
RBC # BLD: 4.3 M/UL — SIGNIFICANT CHANGE UP (ref 4.2–5.8)
RBC # FLD: 13.3 % — SIGNIFICANT CHANGE UP (ref 10.3–14.5)
RSV RNA SPEC QL NAA+PROBE: NOT DETECTED — SIGNIFICANT CHANGE UP
RV+EV RNA SPEC QL NAA+PROBE: NOT DETECTED — SIGNIFICANT CHANGE UP
SODIUM SERPL-SCNC: 137 MMOL/L — SIGNIFICANT CHANGE UP (ref 135–145)
WBC # BLD: 4 K/UL — SIGNIFICANT CHANGE UP (ref 3.8–10.5)
WBC # FLD AUTO: 4 K/UL — SIGNIFICANT CHANGE UP (ref 3.8–10.5)

## 2018-02-12 PROCEDURE — 76775 US EXAM ABDO BACK WALL LIM: CPT | Mod: 26

## 2018-02-12 PROCEDURE — 99233 SBSQ HOSP IP/OBS HIGH 50: CPT | Mod: GC

## 2018-02-12 RX ADMIN — HEPARIN SODIUM 5000 UNIT(S): 5000 INJECTION INTRAVENOUS; SUBCUTANEOUS at 05:34

## 2018-02-12 RX ADMIN — HEPARIN SODIUM 5000 UNIT(S): 5000 INJECTION INTRAVENOUS; SUBCUTANEOUS at 21:52

## 2018-02-12 RX ADMIN — HEPARIN SODIUM 5000 UNIT(S): 5000 INJECTION INTRAVENOUS; SUBCUTANEOUS at 13:23

## 2018-02-12 RX ADMIN — Medication 80 MILLIGRAM(S): at 05:33

## 2018-02-12 RX ADMIN — Medication 81 MILLIGRAM(S): at 11:28

## 2018-02-12 RX ADMIN — Medication 80 MILLIGRAM(S): at 18:19

## 2018-02-12 RX ADMIN — Medication 50 MILLIGRAM(S): at 05:34

## 2018-02-12 NOTE — DIETITIAN INITIAL EVALUATION ADULT. - PROBLEM SELECTOR PLAN 2
GFR 20%. Monitor BMP. Check Renal sono, UA, Urine creat & protein. Nephrology c/s House called. Call pt's PCP for last blood test results (done ~1 month ago)

## 2018-02-12 NOTE — PROGRESS NOTE ADULT - PROBLEM SELECTOR PLAN 1
Elevated Scr of unknown duration/etiology. Pt. with no known history of kidney disease in the past. ? SMITA on CKD. CKD in the setting DM/HTN. SMITA from low cardiac output/CHF/ACE-I  vs ?acute GN process. Pt with ? nephrotic syndrome (LE swelling, serum albumin low at 3, pt. with blood, RBCs and significant proteinuria on U/A and spot TP/CR was elevated at 3.9). Pt. with many dysmorphic RBCs and casts seen on urine microscopy. Check renal sonogram. Serological work up including hepatits b surface antigen, hep c surface antibody, HIV and complement C3/C4 were negative/non-reactive. Follow up  CELSO, dsDNA, ANCA, anti-GBM and serum PETROS. As per review on labs from [place], pt. with elevated SCr of 1.6 seen in 4/2016. Scr on admission was elevated at 5 and is 4.26 today. Advise to continue with lasix 80 mg IV BID. Pt. may need CT-guided kidney biopsy by IR to determine etiology/underlying cause of kidney disease. Discussed with pt. the benefits and risks of kidney biopsy. Pt. states he is willing to under-go kidney biopsy if needed. Monitor BMP, urine output, I/Os. Avoid nephrotoxins, RCA, NSAIDs and ACE-I/ARBs

## 2018-02-12 NOTE — PROGRESS NOTE ADULT - PROBLEM SELECTOR PLAN 3
Hyperkalemia in the setting of SMITA. Serum potassium improved. Monitor serum potassium
continue ASA, BB

## 2018-02-12 NOTE — PROGRESS NOTE ADULT - SUBJECTIVE AND OBJECTIVE BOX
Patient is a 52y old  Male who presents with a chief complaint of SIERRA (10 Feb 2018 14:34)      SUBJECTIVE / OVERNIGHT EVENTS:   Feels better.  Denies CP/SOB/Palpitation/HA.    MEDICATIONS  (STANDING):  dextrose 5%. 1000 milliLiter(s) (50 mL/Hr) IV Continuous <Continuous>  dextrose 50% Injectable 12.5 Gram(s) IV Push once  dextrose 50% Injectable 25 Gram(s) IV Push once  dextrose 50% Injectable 25 Gram(s) IV Push once  furosemide   Injectable 80 milliGRAM(s) IV Push every 12 hours  heparin  Injectable 5000 Unit(s) SubCutaneous every 8 hours  influenza   Vaccine 0.5 milliLiter(s) IntraMuscular once  insulin lispro (HumaLOG) corrective regimen sliding scale   SubCutaneous three times a day before meals  metoprolol succinate ER 50 milliGRAM(s) Oral daily    MEDICATIONS  (PRN):  dextrose Gel 1 Dose(s) Oral once PRN Blood Glucose LESS THAN 70 milliGRAM(s)/deciliter  glucagon  Injectable 1 milliGRAM(s) IntraMuscular once PRN Glucose LESS THAN 70 milligrams/deciliter        CAPILLARY BLOOD GLUCOSE      POCT Blood Glucose.: 138 mg/dL (12 Feb 2018 21:23)  POCT Blood Glucose.: 120 mg/dL (12 Feb 2018 17:26)  POCT Blood Glucose.: 170 mg/dL (12 Feb 2018 12:50)  POCT Blood Glucose.: 98 mg/dL (12 Feb 2018 09:58)    I&O's Summary    11 Feb 2018 07:01  -  12 Feb 2018 07:00  --------------------------------------------------------  IN: 860 mL / OUT: 2900 mL / NET: -2040 mL    12 Feb 2018 07:01  -  12 Feb 2018 23:38  --------------------------------------------------------  IN: 240 mL / OUT: 600 mL / NET: -360 mL        PHYSICAL EXAM:  GENERAL: NAD, well-developed  HEAD:  Atraumatic, Normocephalic  NECK: Supple, No JVD  CHEST/LUNG: Clear to auscultation bilaterally; No wheezing.  HEART: Regular rate and rhythm; No murmurs, rubs, or gallops  ABDOMEN: Soft, Nontender, Nondistended; Bowel sounds present  EXTREMITIES:   No clubbing, cyanosis, or edema  NEUROLOGY: AAO X 3  SKIN: No rashes    LABS:                        12.7   4.00  )-----------( 128      ( 12 Feb 2018 10:30 )             39.3     02-12    137  |  99  |  86<H>  ----------------------------<  140<H>  4.8   |  23  |  4.74<H>    Ca    8.1<L>      12 Feb 2018 10:30  Phos  5.7     02-11  Mg     2.0     02-11    TPro  5.9<L>  /  Alb  3.0<L>  /  TBili  0.5  /  DBili  x   /  AST  20  /  ALT  27  /  AlkPhos  106  02-11            CAPILLARY BLOOD GLUCOSE      POCT Blood Glucose.: 138 mg/dL (12 Feb 2018 21:23)  POCT Blood Glucose.: 120 mg/dL (12 Feb 2018 17:26)  POCT Blood Glucose.: 170 mg/dL (12 Feb 2018 12:50)  POCT Blood Glucose.: 98 mg/dL (12 Feb 2018 09:58)                RADIOLOGY & ADDITIONAL TESTS:    Imaging Personally Reviewed:    Consultant(s) Notes Reviewed:      Care Discussed with Consultants/Other Providers:

## 2018-02-12 NOTE — DIETITIAN INITIAL EVALUATION ADULT. - OTHER INFO
Nutrition consult X Registered Dietitian. Pt 53 yo male appears alert, oriented. Per Pt his appetite good now; No chew/swallow problem voiced; no nausea/vomiting/diarrhea reported @ present. Per Pt his current body weight: ~200#; Pt also stated he lost weight: ~65# in 1 year 2/2 stress. Pt's diet order includes Consistent Carbohydrate, Low Na, no concentrated potassium. Prescribed diet discussed with Pt including better food choices, foods to avoid; written materials on diet also provided. RDN remains available, Pt made aware. Nutrition consult X Registered Dietitian. Pt 51 yo male appears alert, oriented. Per Pt his appetite good now; No chew/swallow problem voiced; no nausea/vomiting/diarrhea reported @ present. Per Pt his current body weight: ~200#; Pt also stated he lost weight: ~65# in 1 year 2/2 stress. Food preferences discussed with Pt. Pt's diet order includes Consistent Carbohydrate, Low Na, no concentrated potassium. Prescribed diet discussed with Pt including better food choices, foods to avoid; written materials on diet also provided. RDN remains available, Pt made aware.

## 2018-02-12 NOTE — DIETITIAN INITIAL EVALUATION ADULT. - NS AS NUTRI INTERV MEALS SNACK
1. Suggest: Continue current PO diet order: Regular, Consistent Carbohydrate (no snacks), No Concentrated Potassium, Low Sodium;                    2. Monitor PO diet tolerance;               3. Monitor labs, weights, hydration status;/Other (specify)

## 2018-02-12 NOTE — PROGRESS NOTE ADULT - SUBJECTIVE AND OBJECTIVE BOX
[facility] DIVISION OF KIDNEY DISEASES AND HYPERTENSION -- FOLLOW UP NOTE  --------------------------------------------------------------------------------  HPI: This is a 53 y/o M with a PMHx of long-standing DM1 since age 21, HTN (~10 years) CAD with stenting, HLD is admitted for SOB and LE swelling. Pt. states he started to have notice LE swelling over the past 2 months. As per pt, he was never told he had a problem with his kidneys. As per review on labs on [place], pt. with elevated Scr of 1.5 seen in 4/2016. Scr on admission (2/10) was elevated at 5 and is 4.2 today. Pt. denies any family history of kidney disease. Pt. denies the use of IV drugs, herbal medications of NSAIDs. Pt. states he takes lisinopril for management of his BP at home. Pt. states he does not follow up regularly with a doctor due to insurance issues, however saw an urgent care PCP a few times over the last month. Pt. states he was recently prescribed lasix last week due to his swelling by that PCP and was advised to go to the ED for further evaluation. Pt. denies any fever, chills, hematuria, CP, N,V. Pt. states SOB and edema have improved.         PAST HISTORY  --------------------------------------------------------------------------------  No significant changes to PMH, PSH, FHx, SHx, unless otherwise noted    ALLERGIES & MEDICATIONS  --------------------------------------------------------------------------------  Allergies    No Known Allergies    Intolerances      Standing Inpatient Medications  aspirin enteric coated 81 milliGRAM(s) Oral daily  dextrose 5%. 1000 milliLiter(s) IV Continuous <Continuous>  dextrose 50% Injectable 12.5 Gram(s) IV Push once  dextrose 50% Injectable 25 Gram(s) IV Push once  dextrose 50% Injectable 25 Gram(s) IV Push once  furosemide   Injectable 80 milliGRAM(s) IV Push every 12 hours  heparin  Injectable 5000 Unit(s) SubCutaneous every 8 hours  influenza   Vaccine 0.5 milliLiter(s) IntraMuscular once  insulin lispro (HumaLOG) corrective regimen sliding scale   SubCutaneous three times a day before meals  metoprolol succinate ER 50 milliGRAM(s) Oral daily    PRN Inpatient Medications  dextrose Gel 1 Dose(s) Oral once PRN  glucagon  Injectable 1 milliGRAM(s) IntraMuscular once PRN      REVIEW OF SYSTEMS  --------------------------------------------------------------------------------  Gen: No fevers/chills,   Skin: No rashes  Head/Eyes/Ears/Mouth: No headache;   Respiratory: + dyspnea and SOB   CV: No chest pain,  GI: No abdominal pain,   : No dysuria,   MSK: b/l LE edema -improved      All other systems were reviewed and are negative, except as noted.  VITALS/PHYSICAL EXAM  --------------------------------------------------------------------------------  T(C): 37.1 (02-12-18 @ 05:25), Max: 37.4 (02-11-18 @ 21:55)  HR: 84 (02-12-18 @ 05:25) (75 - 86)  BP: 140/98 (02-12-18 @ 05:25) (120/71 - 151/96)  RR: 17 (02-12-18 @ 05:25) (16 - 18)  SpO2: 98% (02-12-18 @ 05:25) (98% - 99%)  Wt(kg): --  Height (cm): 165.1 (02-10-18 @ 15:22)  Weight (kg): 95 (02-10-18 @ 15:22)  BMI (kg/m2): 34.9 (02-10-18 @ 15:22)  BSA (m2): 2.02 (02-10-18 @ 15:22)      02-11-18 @ 07:01  -  02-12-18 @ 07:00  --------------------------------------------------------  IN: 860 mL / OUT: 2900 mL / NET: -2040 mL      Physical Exam:  	Gen: NAD,   	HEENT:  supple neck,   	Pulm: CTA B/L  	CV: RRR,   	Abd: soft,  	: No suprapubic tenderness  	LE: Warm, significant b/l LE edema  	Neuro: Awake and alert   	Psych: Normal affect and mood  	Skin: Warm, without rashes    LABS/STUDIES  --------------------------------------------------------------------------------              12.7   6.45  >-----------<  149      [02-11-18 @ 05:20]              39.8     141  |  103  |  81  ----------------------------<  138      [02-11-18 @ 15:00]  4.8   |  21  |  4.26        Ca     7.6     [02-11-18 @ 15:00]      Mg     2.0     [02-11-18 @ 05:20]      Phos  5.7     [02-11-18 @ 05:20]    TPro  5.9  /  Alb  3.0  /  TBili  0.5  /  DBili  x   /  AST  20  /  ALT  27  /  AlkPhos  106  [02-11-18 @ 05:20]        Troponin < 0.06      [02-10-18 @ 17:25]        [02-10-18 @ 17:25]    Creatinine Trend:  SCr 4.26 [02-11 @ 15:00]  SCr 4.74 [02-11 @ 05:20]  SCr 5.06 [02-10 @ 11:37]    Urinalysis - [02-10-18 @ 22:08]      Color PLYEL / Appearance CLEAR / SG 1.011 / pH 6.0      Gluc NEGATIVE / Ketone NEGATIVE  / Bili NEGATIVE / Urobili NORMAL       Blood TRACE / Protein 150 / Leuk Est NEGATIVE / Nitrite NEGATIVE      RBC 5-10 / WBC 0-2 / Hyaline  / Gran  / Sq Epi OCC / Non Sq Epi  / Bacteria     Urine Creatinine 42.36      [02-10-18 @ 22:08]  Urine Protein 168.7      [02-10-18 @ 22:08]    HbA1c 6.2      [02-11-18 @ 05:20]  Lipid: chol 165, TG 80, HDL 47,       [02-11-18 @ 05:20]    HBsAg NEGATIVE      [02-11-18 @ 11:40]  HCV 0.06, Nonreactive Hepatitis C AB  S/CO Ratio                        Interpretation  < 1.0                                     Non-Reactive  1.0 - 4.9                           Weakly-Reactive  > 5.0                                 Reactive  Non-Reactive: Aperson with a non-reactive HCV antibody  result is considered uninfected.  No further action is  needed unless recent infection is suspected.  In these  cases, consider repeat testing later to detect  seroconversion..  Weakly-Reactive: HCV antibody test is abnormal, HCV RNA  Qualitative test will follow.  Reactive: HCV antibody test is abnormal, HCV RNA  Qualitative test will follow.  Note: HCV antibody testing is performed on the Abbott   system.      [02-11-18 @ 11:40]    C3 Complement 99.5      [02-11-18 @ 11:40]  C4 Complement 27.7      [02-11-18 @ 11:40]

## 2018-02-13 LAB
APTT BLD: 38.1 SEC — HIGH (ref 27.5–37.4)
BUN SERPL-MCNC: 84 MG/DL — HIGH (ref 7–23)
CALCIUM SERPL-MCNC: 7.9 MG/DL — LOW (ref 8.4–10.5)
CHLORIDE SERPL-SCNC: 97 MMOL/L — LOW (ref 98–107)
CO2 SERPL-SCNC: 17 MMOL/L — LOW (ref 22–31)
CREAT SERPL-MCNC: 4.49 MG/DL — HIGH (ref 0.5–1.3)
DSDNA AB SER-ACNC: <12 IU/ML — SIGNIFICANT CHANGE UP
GAS PNL BLDMV: SIGNIFICANT CHANGE UP
GLUCOSE BLDC GLUCOMTR-MCNC: 171 MG/DL — HIGH (ref 70–99)
GLUCOSE BLDC GLUCOMTR-MCNC: 175 MG/DL — HIGH (ref 70–99)
GLUCOSE BLDC GLUCOMTR-MCNC: 214 MG/DL — HIGH (ref 70–99)
GLUCOSE BLDC GLUCOMTR-MCNC: 87 MG/DL — SIGNIFICANT CHANGE UP (ref 70–99)
GLUCOSE SERPL-MCNC: 113 MG/DL — HIGH (ref 70–99)
INR BLD: 1.12 — SIGNIFICANT CHANGE UP (ref 0.88–1.17)
POTASSIUM SERPL-MCNC: 4.7 MMOL/L — SIGNIFICANT CHANGE UP (ref 3.5–5.3)
POTASSIUM SERPL-SCNC: 4.7 MMOL/L — SIGNIFICANT CHANGE UP (ref 3.5–5.3)
PROTHROM AB SERPL-ACNC: 12.9 SEC — SIGNIFICANT CHANGE UP (ref 9.8–13.1)
SODIUM SERPL-SCNC: 134 MMOL/L — LOW (ref 135–145)

## 2018-02-13 PROCEDURE — 93306 TTE W/DOPPLER COMPLETE: CPT | Mod: 26

## 2018-02-13 PROCEDURE — 99233 SBSQ HOSP IP/OBS HIGH 50: CPT | Mod: GC

## 2018-02-13 RX ADMIN — Medication 100 MILLIGRAM(S): at 17:57

## 2018-02-13 RX ADMIN — Medication 80 MILLIGRAM(S): at 17:57

## 2018-02-13 RX ADMIN — Medication 2: at 15:21

## 2018-02-13 RX ADMIN — Medication 80 MILLIGRAM(S): at 05:05

## 2018-02-13 RX ADMIN — Medication 50 MILLIGRAM(S): at 05:04

## 2018-02-13 RX ADMIN — Medication 1: at 17:57

## 2018-02-13 RX ADMIN — HEPARIN SODIUM 5000 UNIT(S): 5000 INJECTION INTRAVENOUS; SUBCUTANEOUS at 15:22

## 2018-02-13 RX ADMIN — HEPARIN SODIUM 5000 UNIT(S): 5000 INJECTION INTRAVENOUS; SUBCUTANEOUS at 05:04

## 2018-02-13 RX ADMIN — HEPARIN SODIUM 5000 UNIT(S): 5000 INJECTION INTRAVENOUS; SUBCUTANEOUS at 21:20

## 2018-02-13 NOTE — PROGRESS NOTE ADULT - PROBLEM SELECTOR PLAN 1
Elevated Scr of unknown duration/etiology. Pt. with no known history of kidney disease in the past. ? SMITA on CKD. CKD in the setting DM/HTN. SMITA from low cardiac output/CHF/ACE-I  vs ?acute GN process. Pt with ? nephrotic syndrome (LE swelling, serum albumin low at 3, pt. with blood, RBCs and significant proteinuria on U/A and spot TP/CR was elevated at 3.9). Pt. with many dysmorphic RBCs and casts seen on urine microscopy. Renal sonogram without evidence of obstruction/hydronephrosis. Serological work up including hepatits b surface antigen, hep c surface antibody, HIV, anti-GBM, CELSO were negative/non-reactive. Complement C3/C4 were not low. Follow up ANCA and serum PETROS. As per review on labs from [place], pt. with elevated SCr of 1.6 seen in 4/2016. Scr on admission was elevated at 5 and is 4.4 today. Advise to continue with lasix 80 mg IV BID. Pt. pending  CT-guided kidney biopsy by IR to determine etiology/underlying cause of kidney disease. Discussed with pt. the benefits and risks of kidney biopsy. Pt. agreeable to under-go kidney biopsy if needed. Monitor BMP, urine output, I/Os. Avoid nephrotoxins, RCA, NSAIDs and ACE-I/ARBs

## 2018-02-13 NOTE — PROGRESS NOTE ADULT - SUBJECTIVE AND OBJECTIVE BOX
[facility] DIVISION OF KIDNEY DISEASES AND HYPERTENSION -- FOLLOW UP NOTE  --------------------------------------------------------------------------------  HPI: This is a 53 y/o M with a PMHx of long-standing DM1 since age 21, HTN (~10 years) CAD with stenting, HLD is admitted for SOB and LE swelling. Pt. states he started to have notice LE swelling over the past 2 months. As per pt, he was never told he had a problem with his kidneys. As per review on labs on [place], pt. with elevated Scr of 1.5 seen in 4/2016. Scr on admission (2/10) was elevated at 5 and is 4.4 today. Pt. denies any family history of kidney disease. Pt. denies the use of IV drugs, herbal medications of NSAIDs. Pt. states he takes lisinopril for management of his BP at home. Pt. denies any fever, chills, hematuria, CP, N,V. Pt. states SOB and edema have improved.       PAST HISTORY  --------------------------------------------------------------------------------  No significant changes to PMH, PSH, FHx, SHx, unless otherwise noted    ALLERGIES & MEDICATIONS  --------------------------------------------------------------------------------  Allergies    No Known Allergies    Intolerances      Standing Inpatient Medications  dextrose 5%. 1000 milliLiter(s) IV Continuous <Continuous>  dextrose 50% Injectable 12.5 Gram(s) IV Push once  dextrose 50% Injectable 25 Gram(s) IV Push once  dextrose 50% Injectable 25 Gram(s) IV Push once  furosemide   Injectable 80 milliGRAM(s) IV Push every 12 hours  heparin  Injectable 5000 Unit(s) SubCutaneous every 8 hours  influenza   Vaccine 0.5 milliLiter(s) IntraMuscular once  insulin lispro (HumaLOG) corrective regimen sliding scale   SubCutaneous three times a day before meals  metoprolol succinate ER 50 milliGRAM(s) Oral daily    PRN Inpatient Medications  dextrose Gel 1 Dose(s) Oral once PRN  glucagon  Injectable 1 milliGRAM(s) IntraMuscular once PRN      REVIEW OF SYSTEMS  --------------------------------------------------------------------------------  Gen: No fevers/chills,   Skin: No rashes  Head/Eyes/Ears/Mouth: No headache;   Respiratory: SOB improved  CV: No chest pain,  GI: No abdominal pain,   : No dysuria,   MSK: b/l LE edema -improved      All other systems were reviewed and are negative, except as noted.    VITALS/PHYSICAL EXAM  --------------------------------------------------------------------------------  T(C): 37.4 (02-13-18 @ 05:03), Max: 37.4 (02-12-18 @ 21:51)  HR: 69 (02-13-18 @ 05:03) (69 - 78)  BP: 130/87 (02-13-18 @ 05:03) (118/75 - 146/97)  RR: 16 (02-13-18 @ 05:03) (16 - 19)  SpO2: 100% (02-13-18 @ 05:03) (100% - 100%)  Wt(kg): --        02-12-18 @ 07:01  -  02-13-18 @ 07:00  --------------------------------------------------------  IN: 440 mL / OUT: 600 mL / NET: -160 mL      Physical Exam:  	Gen: NAD,   	HEENT:  supple neck,   	Pulm: CTA B/L  	CV: RRR,   	Abd: soft,  	: No suprapubic tenderness  	LE: Warm, significant b/l LE edema  	Neuro: Awake and alert   	Psych: Normal affect and mood  	Skin: Warm, without rashes    LABS/STUDIES  --------------------------------------------------------------------------------              12.7   4.00  >-----------<  128      [02-12-18 @ 10:30]              39.3     134  |  97  |  84  ----------------------------<  113      [02-13-18 @ 06:50]  4.7   |  17  |  4.49        Ca     7.9     [02-13-18 @ 06:50]      PT/INR: PT 12.9 , INR 1.12       [02-13-18 @ 06:50]  PTT: 38.1       [02-13-18 @ 06:50]      Creatinine Trend:  SCr 4.49 [02-13 @ 06:50]  SCr 4.74 [02-12 @ 10:30]  SCr 4.26 [02-11 @ 15:00]  SCr 4.74 [02-11 @ 05:20]  SCr 5.06 [02-10 @ 11:37]    Urinalysis - [02-10-18 @ 22:08]      Color PLYEL / Appearance CLEAR / SG 1.011 / pH 6.0      Gluc NEGATIVE / Ketone NEGATIVE  / Bili NEGATIVE / Urobili NORMAL       Blood TRACE / Protein 150 / Leuk Est NEGATIVE / Nitrite NEGATIVE      RBC 5-10 / WBC 0-2 / Hyaline  / Gran  / Sq Epi OCC / Non Sq Epi  / Bacteria     Urine Creatinine 42.36      [02-10-18 @ 22:08]  Urine Protein 168.7      [02-10-18 @ 22:08]    HbA1c 6.2      [02-11-18 @ 05:20]  Lipid: chol 165, TG 80, HDL 47,       [02-11-18 @ 05:20]    HBsAg NEGATIVE      [02-11-18 @ 11:40]  HCV 0.06, Nonreactive Hepatitis C AB  S/CO Ratio                        Interpretation  < 1.0                                     Non-Reactive  1.0 - 4.9                           Weakly-Reactive  > 5.0                                 Reactive  Non-Reactive: Aperson with a non-reactive HCV antibody  result is considered uninfected.  No further action is  needed unless recent infection is suspected.  In these  cases, consider repeat testing later to detect  seroconversion..  Weakly-Reactive: HCV antibody test is abnormal, HCV RNA  Qualitative test will follow.  Reactive: HCV antibody test is abnormal, HCV RNA  Qualitative test will follow.  Note: HCV antibody testing is performed on the Abbott   system.      [02-11-18 @ 11:40]    CELSO: titer Negative, pattern --      [02-11-18 @ 11:40]  C3 Complement 99.5      [02-11-18 @ 11:40]  C4 Complement 27.7      [02-11-18 @ 11:40]  PR3-ANCA <5.0, interpretation: --      [02-11-18 @ 11:47]  anti-GBM <0.2      [02-11-18 @ 11:40]

## 2018-02-13 NOTE — PROGRESS NOTE ADULT - SUBJECTIVE AND OBJECTIVE BOX
Patient is a 52y old  Male who presents with a chief complaint of [place] (10 Feb 2018 14:34)      SUBJECTIVE / OVERNIGHT EVENTS:   Feels better.  Denies CP/SOB/Palpitation/HA.    MEDICATIONS  (STANDING):  dextrose 5%. 1000 milliLiter(s) (50 mL/Hr) IV Continuous <Continuous>  dextrose 50% Injectable 12.5 Gram(s) IV Push once  dextrose 50% Injectable 25 Gram(s) IV Push once  dextrose 50% Injectable 25 Gram(s) IV Push once  furosemide   Injectable 80 milliGRAM(s) IV Push every 12 hours  heparin  Injectable 5000 Unit(s) SubCutaneous every 8 hours  influenza   Vaccine 0.5 milliLiter(s) IntraMuscular once  insulin lispro (HumaLOG) corrective regimen sliding scale   SubCutaneous three times a day before meals  metoprolol succinate ER 50 milliGRAM(s) Oral daily    MEDICATIONS  (PRN):  dextrose Gel 1 Dose(s) Oral once PRN Blood Glucose LESS THAN 70 milliGRAM(s)/deciliter  glucagon  Injectable 1 milliGRAM(s) IntraMuscular once PRN Glucose LESS THAN 70 milligrams/deciliter        CAPILLARY BLOOD GLUCOSE      POCT Blood Glucose.: 87 mg/dL (13 Feb 2018 09:10)  POCT Blood Glucose.: 138 mg/dL (12 Feb 2018 21:23)  POCT Blood Glucose.: 120 mg/dL (12 Feb 2018 17:26)  POCT Blood Glucose.: 170 mg/dL (12 Feb 2018 12:50)    I&O's Summary    12 Feb 2018 07:01  -  13 Feb 2018 07:00  --------------------------------------------------------  IN: 440 mL / OUT: 600 mL / NET: -160 mL        PHYSICAL EXAM:  GENERAL: NAD, well-developed  HEAD:  Atraumatic, Normocephalic  NECK: Supple, No JVD  CHEST/LUNG: Clear to auscultation bilaterally; No wheezing.  HEART: Regular rate and rhythm; No murmurs, rubs, or gallops  ABDOMEN: Soft, Nontender, Nondistended; Bowel sounds present  EXTREMITIES:   No clubbing, cyanosis, or edema  NEUROLOGY: AAO X 3  SKIN: No rashes    LABS:                        12.7   4.00  )-----------( 128      ( 12 Feb 2018 10:30 )             39.3     02-13    134<L>  |  97<L>  |  84<H>  ----------------------------<  113<H>  4.7   |  17<L>  |  4.49<H>    Ca    7.9<L>      13 Feb 2018 06:50      PT/INR - ( 13 Feb 2018 06:50 )   PT: 12.9 SEC;   INR: 1.12          PTT - ( 13 Feb 2018 06:50 )  PTT:38.1 SEC        CAPILLARY BLOOD GLUCOSE      POCT Blood Glucose.: 87 mg/dL (13 Feb 2018 09:10)  POCT Blood Glucose.: 138 mg/dL (12 Feb 2018 21:23)  POCT Blood Glucose.: 120 mg/dL (12 Feb 2018 17:26)  POCT Blood Glucose.: 170 mg/dL (12 Feb 2018 12:50)                RADIOLOGY & ADDITIONAL TESTS:    Imaging Personally Reviewed:    Consultant(s) Notes Reviewed:      Care Discussed with Consultants/Other Providers:

## 2018-02-14 ENCOUNTER — TRANSCRIPTION ENCOUNTER (OUTPATIENT)
Age: 53
End: 2018-02-14

## 2018-02-14 VITALS — WEIGHT: 190.7 LBS

## 2018-02-14 LAB
BUN SERPL-MCNC: 84 MG/DL — HIGH (ref 7–23)
CALCIUM SERPL-MCNC: 8 MG/DL — LOW (ref 8.4–10.5)
CHLORIDE SERPL-SCNC: 97 MMOL/L — LOW (ref 98–107)
CO2 SERPL-SCNC: 20 MMOL/L — LOW (ref 22–31)
CREAT SERPL-MCNC: 4.43 MG/DL — HIGH (ref 0.5–1.3)
GLUCOSE BLDC GLUCOMTR-MCNC: 114 MG/DL — HIGH (ref 70–99)
GLUCOSE BLDC GLUCOMTR-MCNC: 126 MG/DL — HIGH (ref 70–99)
GLUCOSE BLDC GLUCOMTR-MCNC: 140 MG/DL — HIGH (ref 70–99)
GLUCOSE SERPL-MCNC: 87 MG/DL — SIGNIFICANT CHANGE UP (ref 70–99)
HCT VFR BLD CALC: 40.2 % — SIGNIFICANT CHANGE UP (ref 39–50)
HGB BLD-MCNC: 13.6 G/DL — SIGNIFICANT CHANGE UP (ref 13–17)
MAGNESIUM SERPL-MCNC: 1.9 MG/DL — SIGNIFICANT CHANGE UP (ref 1.6–2.6)
MCHC RBC-ENTMCNC: 30.9 PG — SIGNIFICANT CHANGE UP (ref 27–34)
MCHC RBC-ENTMCNC: 33.8 % — SIGNIFICANT CHANGE UP (ref 32–36)
MCV RBC AUTO: 91.4 FL — SIGNIFICANT CHANGE UP (ref 80–100)
NRBC # FLD: 0 — SIGNIFICANT CHANGE UP
PLATELET # BLD AUTO: 123 K/UL — LOW (ref 150–400)
PMV BLD: 11.1 FL — SIGNIFICANT CHANGE UP (ref 7–13)
POTASSIUM SERPL-MCNC: 4.3 MMOL/L — SIGNIFICANT CHANGE UP (ref 3.5–5.3)
POTASSIUM SERPL-SCNC: 4.3 MMOL/L — SIGNIFICANT CHANGE UP (ref 3.5–5.3)
RBC # BLD: 4.4 M/UL — SIGNIFICANT CHANGE UP (ref 4.2–5.8)
RBC # FLD: 12.9 % — SIGNIFICANT CHANGE UP (ref 10.3–14.5)
SODIUM SERPL-SCNC: 138 MMOL/L — SIGNIFICANT CHANGE UP (ref 135–145)
WBC # BLD: 4.66 K/UL — SIGNIFICANT CHANGE UP (ref 3.8–10.5)
WBC # FLD AUTO: 4.66 K/UL — SIGNIFICANT CHANGE UP (ref 3.8–10.5)

## 2018-02-14 PROCEDURE — 99233 SBSQ HOSP IP/OBS HIGH 50: CPT | Mod: GC

## 2018-02-14 RX ORDER — METOPROLOL TARTRATE 50 MG
1 TABLET ORAL
Qty: 0 | Refills: 0 | COMMUNITY
Start: 2018-02-14

## 2018-02-14 RX ORDER — FUROSEMIDE 40 MG
1 TABLET ORAL
Qty: 60 | Refills: 0 | OUTPATIENT
Start: 2018-02-14 | End: 2018-03-15

## 2018-02-14 RX ORDER — FUROSEMIDE 40 MG
80 TABLET ORAL EVERY 12 HOURS
Qty: 0 | Refills: 0 | Status: DISCONTINUED | OUTPATIENT
Start: 2018-02-14 | End: 2018-02-14

## 2018-02-14 RX ORDER — LISINOPRIL 2.5 MG/1
1 TABLET ORAL
Qty: 0 | Refills: 0 | COMMUNITY

## 2018-02-14 RX ADMIN — Medication 80 MILLIGRAM(S): at 17:17

## 2018-02-14 RX ADMIN — Medication 80 MILLIGRAM(S): at 05:57

## 2018-02-14 RX ADMIN — Medication 50 MILLIGRAM(S): at 05:57

## 2018-02-14 RX ADMIN — HEPARIN SODIUM 5000 UNIT(S): 5000 INJECTION INTRAVENOUS; SUBCUTANEOUS at 05:57

## 2018-02-14 RX ADMIN — HEPARIN SODIUM 5000 UNIT(S): 5000 INJECTION INTRAVENOUS; SUBCUTANEOUS at 13:15

## 2018-02-14 NOTE — PROGRESS NOTE ADULT - ATTENDING COMMENTS
he can not have the biopsy for few days as was on ASA. discussed with the primary team to Dc on Lasix 80 mg p BID and have him come back for a biopsy next week
patient with improving dyspnea and edema. stable cr. needs biopsy but high risk given that he had been on ASA. plan to Dc pt and have him f/u with renal clinic and biopsy as outpatient.
nephrotic and nephritic picture. needs to r/o MPGN( secondary to hep c, paraprotein, C3 nephropathy). otherwise can happen with DM but this much hematuria. needs a kidney biopsy to determine etiology and chronicity. continue with diuretics..

## 2018-02-14 NOTE — DISCHARGE NOTE ADULT - PLAN OF CARE
Your potassium level was high  Avoid high potassium food s such as bananas and citris fruit Warm soaks to right eye every hour for 1 minutes at a time breath comfortably continue medications as prescribed  Seek medical attention for chest pain, shortness of breath, swelling or palpitations Your kidney function has gotten worse  Seek medical attention for shortness of breath, swelling or palpitations or fatigue out of the ordinary  Follow up with Nephrology Clinic on Wednesday, 2/21 at 9:00 AM with Dr. Rodriguez Continue medications and monitor you blood sugars  Follow up with your Endocrinologist/PMD as scheduled

## 2018-02-14 NOTE — DISCHARGE NOTE ADULT - MEDICATION SUMMARY - MEDICATIONS TO STOP TAKING
I will STOP taking the medications listed below when I get home from the hospital:    ramipril  -- 2.5 milligram(s) by mouth once a day    aspirin 81 mg oral delayed release tablet  -- 1 tab(s) by mouth once a day    atorvastatin 80 mg oral tablet  -- 1 tab(s) by mouth once a day (at bedtime)    amLODIPine 5 mg oral tablet  -- 1 tab(s) by mouth once a day    clopidogrel 75 mg oral tablet  -- 1 tab(s) by mouth once a day    Lyrica 75 mg oral capsule  -- 1 cap(s) by mouth 2 times a day, As Needed    lisinopril 10 mg oral tablet  -- 1 tab(s) by mouth once a day

## 2018-02-14 NOTE — PROGRESS NOTE ADULT - SUBJECTIVE AND OBJECTIVE BOX
[facility] DIVISION OF KIDNEY DISEASES AND HYPERTENSION -- FOLLOW UP NOTE  --------------------------------------------------------------------------------  HPI: This is a 53 y/o M with a PMHx of long-standing DM1 since age 21, HTN (~10 years) CAD with stenting, HLD is admitted for SOB and LE swelling. Pt. states he started to have notice LE swelling over the past 2 months. As per pt, he was never told he had a problem with his kidneys. As per review on labs on [place], pt. with elevated Scr of 1.5 seen in 4/2016. Scr on admission (2/10) was elevated at 5 and was 4.4 yesterday. Pt. denies any family history of kidney disease. Pt. denies the use of IV drugs, herbal medications of NSAIDs.  Pt. denies any fever, chills, hematuria, CP, N,V. Pt. states SOB and edema have improved.       PAST HISTORY  --------------------------------------------------------------------------------  No significant changes to PMH, PSH, FHx, SHx, unless otherwise noted    ALLERGIES & MEDICATIONS  --------------------------------------------------------------------------------  Allergies    No Known Allergies    Intolerances      Standing Inpatient Medications  dextrose 5%. 1000 milliLiter(s) IV Continuous <Continuous>  dextrose 50% Injectable 12.5 Gram(s) IV Push once  dextrose 50% Injectable 25 Gram(s) IV Push once  dextrose 50% Injectable 25 Gram(s) IV Push once  furosemide   Injectable 80 milliGRAM(s) IV Push every 12 hours  heparin  Injectable 5000 Unit(s) SubCutaneous every 8 hours  influenza   Vaccine 0.5 milliLiter(s) IntraMuscular once  insulin lispro (HumaLOG) corrective regimen sliding scale   SubCutaneous three times a day before meals  metoprolol succinate ER 50 milliGRAM(s) Oral daily    PRN Inpatient Medications  dextrose Gel 1 Dose(s) Oral once PRN  glucagon  Injectable 1 milliGRAM(s) IntraMuscular once PRN  guaiFENesin   Syrup  (Sugar-Free) 100 milliGRAM(s) Oral every 6 hours PRN      REVIEW OF SYSTEMS  --------------------------------------------------------------------------------  Gen: No fevers/chills,   Skin: No rashes  Head/Eyes/Ears/Mouth: No headache;   Respiratory: No SOB   CV: No chest pain,  GI: No abdominal pain,   : No dysuria,   MSK: b/l LE edema -improved      All other systems were reviewed and are negative, except as noted.    VITALS/PHYSICAL EXAM  --------------------------------------------------------------------------------  T(C): 36.8 (02-14-18 @ 05:56), Max: 37.4 (02-13-18 @ 21:19)  HR: 72 (02-14-18 @ 05:56) (72 - 80)  BP: 128/88 (02-14-18 @ 05:56) (110/72 - 137/95)  RR: 18 (02-14-18 @ 05:56) (18 - 18)  SpO2: 100% (02-14-18 @ 05:56) (97% - 100%)  Wt(kg): --        02-13-18 @ 07:01  -  02-14-18 @ 07:00  --------------------------------------------------------  IN: 300 mL / OUT: 0 mL / NET: 300 mL      Physical Exam:  	Gen: NAD,   	HEENT:  supple neck,   	Pulm: CTA B/L  	CV: RRR,   	Abd: soft,  	: No suprapubic tenderness  	LE: Warm, significant b/l LE edema  	Neuro: Awake and alert   	Psych: Normal affect and mood  	Skin: Warm, without rashes      LABS/STUDIES  --------------------------------------------------------------------------------              12.7   4.00  >-----------<  128      [02-12-18 @ 10:30]              39.3     134  |  97  |  84  ----------------------------<  113      [02-13-18 @ 06:50]  4.7   |  17  |  4.49        Ca     7.9     [02-13-18 @ 06:50]      PT/INR: PT 12.9 , INR 1.12       [02-13-18 @ 06:50]  PTT: 38.1       [02-13-18 @ 06:50]      Creatinine Trend:  SCr 4.49 [02-13 @ 06:50]  SCr 4.74 [02-12 @ 10:30]  SCr 4.26 [02-11 @ 15:00]  SCr 4.74 [02-11 @ 05:20]  SCr 5.06 [02-10 @ 11:37]    Urinalysis - [02-10-18 @ 22:08]      Color PLYEL / Appearance CLEAR / SG 1.011 / pH 6.0      Gluc NEGATIVE / Ketone NEGATIVE  / Bili NEGATIVE / Urobili NORMAL       Blood TRACE / Protein 150 / Leuk Est NEGATIVE / Nitrite NEGATIVE      RBC 5-10 / WBC 0-2 / Hyaline  / Gran  / Sq Epi OCC / Non Sq Epi  / Bacteria     Urine Creatinine 42.36      [02-10-18 @ 22:08]  Urine Protein 168.7      [02-10-18 @ 22:08]    HbA1c 6.2      [02-11-18 @ 05:20]  Lipid: chol 165, TG 80, HDL 47,       [02-11-18 @ 05:20]    HBsAg NEGATIVE      [02-11-18 @ 11:40]  HCV 0.06, Nonreactive Hepatitis C AB  S/CO Ratio                        Interpretation  < 1.0                                     Non-Reactive  1.0 - 4.9                           Weakly-Reactive  > 5.0                                 Reactive  Non-Reactive: Aperson with a non-reactive HCV antibody  result is considered uninfected.  No further action is  needed unless recent infection is suspected.  In these  cases, consider repeat testing later to detect  seroconversion..  Weakly-Reactive: HCV antibody test is abnormal, HCV RNA  Qualitative test will follow.  Reactive: HCV antibody test is abnormal, HCV RNA  Qualitative test will follow.  Note: HCV antibody testing is performed on the [employer] system.      [02-11-18 @ 11:40]    CELSO: titer Negative, pattern --      [02-11-18 @ 11:40]  dsDNA <12      [02-11-18 @ 11:40]  C3 Complement 99.5      [02-11-18 @ 11:40]  C4 Complement 27.7      [02-11-18 @ 11:40]  PR3-ANCA <5.0, interpretation: --      [02-11-18 @ 11:47]  anti-GBM <0.2      [02-11-18 @ 11:40] [facility] DIVISION OF KIDNEY DISEASES AND HYPERTENSION -- FOLLOW UP NOTE  --------------------------------------------------------------------------------  HPI: This is a 51 y/o M with a PMHx of long-standing DM1 since age 21, HTN (~10 years) CAD with stenting, HLD is admitted for SOB and LE swelling. Pt. states he started to have notice LE swelling over the past 2 months. As per pt, he was never told he had a problem with his kidneys. As per review on labs on [place], pt. with elevated Scr of 1.5 seen in 4/2016. Scr on admission (2/10) was elevated at 5 and was 4.4 yesterday. Pt. denies any family history of kidney disease. Pt. denies the use of IV drugs, herbal medications of NSAIDs.  Pt. denies any fever, chills, hematuria, CP, N,V. Pt. states SOB and edema have improved.       PAST HISTORY  --------------------------------------------------------------------------------  No significant changes to PMH, PSH, FHx, SHx, unless otherwise noted    ALLERGIES & MEDICATIONS  --------------------------------------------------------------------------------  Allergies    No Known Allergies    Intolerances      Standing Inpatient Medications  dextrose 5%. 1000 milliLiter(s) IV Continuous <Continuous>  dextrose 50% Injectable 12.5 Gram(s) IV Push once  dextrose 50% Injectable 25 Gram(s) IV Push once  dextrose 50% Injectable 25 Gram(s) IV Push once  furosemide   Injectable 80 milliGRAM(s) IV Push every 12 hours  heparin  Injectable 5000 Unit(s) SubCutaneous every 8 hours  influenza   Vaccine 0.5 milliLiter(s) IntraMuscular once  insulin lispro (HumaLOG) corrective regimen sliding scale   SubCutaneous three times a day before meals  metoprolol succinate ER 50 milliGRAM(s) Oral daily    PRN Inpatient Medications  dextrose Gel 1 Dose(s) Oral once PRN  glucagon  Injectable 1 milliGRAM(s) IntraMuscular once PRN  guaiFENesin   Syrup  (Sugar-Free) 100 milliGRAM(s) Oral every 6 hours PRN      REVIEW OF SYSTEMS  --------------------------------------------------------------------------------Skin: No rashes  Respiratory: No SOB   CV: No chest pain,  GI: No abdominal pain,   : No dysuria,   MSK: b/l LE edema -improved      All other systems were reviewed and are negative, except as noted.    VITALS/PHYSICAL EXAM  --------------------------------------------------------------------------------  T(C): 36.8 (02-14-18 @ 05:56), Max: 37.4 (02-13-18 @ 21:19)  HR: 72 (02-14-18 @ 05:56) (72 - 80)  BP: 128/88 (02-14-18 @ 05:56) (110/72 - 137/95)  RR: 18 (02-14-18 @ 05:56) (18 - 18)  SpO2: 100% (02-14-18 @ 05:56) (97% - 100%)  Wt(kg): --        02-13-18 @ 07:01  -  02-14-18 @ 07:00  --------------------------------------------------------  IN: 300 mL / OUT: 0 mL / NET: 300 mL      Physical Exam:  	Gen: NAD,   	HEENT:  supple neck, no JVD  	Pulm: CTA B/L  	CV: RRR,   	Abd: soft,  	: No suprapubic tenderness  	LE: Warm, significant b/l LE edema  	Neuro: Awake and alert   	Psych: Normal affect and mood  	Skin: Warm, without rashes      LABS/STUDIES  --------------------------------------------------------------------------------              12.7   4.00  >-----------<  128      [02-12-18 @ 10:30]              39.3     134  |  97  |  84  ----------------------------<  113      [02-13-18 @ 06:50]  4.7   |  17  |  4.49        Ca     7.9     [02-13-18 @ 06:50]      PT/INR: PT 12.9 , INR 1.12       [02-13-18 @ 06:50]  PTT: 38.1       [02-13-18 @ 06:50]      Creatinine Trend:  SCr 4.49 [02-13 @ 06:50]  SCr 4.74 [02-12 @ 10:30]  SCr 4.26 [02-11 @ 15:00]  SCr 4.74 [02-11 @ 05:20]  SCr 5.06 [02-10 @ 11:37]    Urinalysis - [02-10-18 @ 22:08]      Color PLYEL / Appearance CLEAR / SG 1.011 / pH 6.0      Gluc NEGATIVE / Ketone NEGATIVE  / Bili NEGATIVE / Urobili NORMAL       Blood TRACE / Protein 150 / Leuk Est NEGATIVE / Nitrite NEGATIVE      RBC 5-10 / WBC 0-2 / Hyaline  / Gran  / Sq Epi OCC / Non Sq Epi  / Bacteria     Urine Creatinine 42.36      [02-10-18 @ 22:08]  Urine Protein 168.7      [02-10-18 @ 22:08]    HbA1c 6.2      [02-11-18 @ 05:20]  Lipid: chol 165, TG 80, HDL 47,       [02-11-18 @ 05:20]    HBsAg NEGATIVE      [02-11-18 @ 11:40]  HCV 0.06, Nonreactive Hepatitis C AB  S/CO Ratio                        Interpretation  < 1.0                                     Non-Reactive  1.0 - 4.9                           Weakly-Reactive  > 5.0                                 Reactive  Non-Reactive: Aperson with a non-reactive HCV antibody  result is considered uninfected.  No further action is  needed unless recent infection is suspected.  In these  cases, consider repeat testing later to detect  seroconversion..  Weakly-Reactive: HCV antibody test is abnormal, HCV RNA  Qualitative test will follow.  Reactive: HCV antibody test is abnormal, HCV RNA  Qualitative test will follow.  Note: HCV antibody testing is performed on the [employer]   system.      [02-11-18 @ 11:40]    CELSO: titer Negative, pattern --      [02-11-18 @ 11:40]  dsDNA <12      [02-11-18 @ 11:40]  C3 Complement 99.5      [02-11-18 @ 11:40]  C4 Complement 27.7      [02-11-18 @ 11:40]  PR3-ANCA <5.0, interpretation: --      [02-11-18 @ 11:47]  anti-GBM <0.2      [02-11-18 @ 11:40]

## 2018-02-14 NOTE — DISCHARGE NOTE ADULT - PATIENT PORTAL LINK FT
You can access the [facility] Patient Portal, offered by [facility], by registering with the following website: [email or web address]

## 2018-02-14 NOTE — CHART NOTE - NSCHARTNOTEFT_GEN_A_CORE
Pt complains of right eye pain on upper eye lid, denies difficult y with vision    EXAM: Swelling noted on upper eyelid at middle of lash line, no erythema, tender to touch, sclera white, pupil round, EOMI, eyelid moving appropriately    Plan: Probable Stye  Continue warm soaks to eye, monitor for worsening condition, Opthalmology consult if condition worsens and is needed

## 2018-02-14 NOTE — DISCHARGE NOTE ADULT - PROVIDER TOKENS
FREE:[LAST:[Follow up],PHONE:[(   )    -],FAX:[(   )    -],ADDRESS:[Follow up with Nephrology Clinic on Wednesday, 2/21 at 9:00 AM with Dr. Rodriguez]],FREE:[LAST:[Follow up],PHONE:[(   )    -],FAX:[(   )    -],ADDRESS:[with Cardiology and  Endocrinology]],TOKEN:'[id number]'

## 2018-02-14 NOTE — PROGRESS NOTE ADULT - ASSESSMENT
51 yo M c/o B/L LE edema for the last 2 months and SIERRA for the last 1 week  EKG- EKG- NSR @ 75 LAFB, Flat T waves v6, I, L    Acute on chronic sys HF:    IV Lasix     Problem/Plan - 1:  ·  Problem: Dyspnea on exertion.  Plan: TELE  IV Lasix.      Problem/Plan - 2:  ·  Problem: Acute on chronic renal failure.  Plan: Nephro f/up noted.  BMP. For Kidney biopsy.     Problem/Plan - 3:  ·  Problem: CAD (coronary artery disease).  Plan: continue ASA, BB.      Problem/Plan - 4:  ·  Problem: DM (diabetes mellitus).  Plan: FSSS.      Problem/Plan - 5:  ·  Problem: HTN (hypertension).  Plan: continue metoprolol.
53 yo M c/o B/L LE edema for the last 2 months and SIERRA for the last 1 week  EKG- EKG- NSR @ 75 LAFB, Flat T waves v6, I, L    Acute on chronic sys HF:    IV Lasix     Problem/Plan - 1:  ·  Problem: Dyspnea on exertion.  Plan: TELE  IV Lasix.      Problem/Plan - 2:  ·  Problem: Acute on chronic renal failure.  Plan: Nephro f/up noted.  BMP. For Kidney biopsy.     Problem/Plan - 3:  ·  Problem: CAD (coronary artery disease).  Plan: continue ASA, BB.      Problem/Plan - 4:  ·  Problem: DM (diabetes mellitus).  Plan: FSSS.      Problem/Plan - 5:  ·  Problem: HTN (hypertension).  Plan: continue metoprolol.
This is a 52 year old male with a PMHx of long-standing DM (~10years), HTN (~10 years) CAD with stenting, HLD is admitted for SOB and LE swelling. Pt. was found to have SMITA
This is a 52 year old male with a PMHx of long-standing DM (~10years), HTN (~10 years) CAD with stenting, HLD is admitted for SOB and LE swelling. Pt. was found to have SMITA
53 yo M c/o B/L LE edema for the last 2 months and SIERRA for the last 1 week  EKG- EKG- NSR @ 75 LAFB, Flat T waves v6, I, L    Acute on chronic sys HF:    IV Lasix
This is a 52 year old male with a PMHx of long-standing DM (~10years), HTN (~10 years) CAD with stenting, HLD is admitted for SOB and LE swelling. Pt. was found to have SMITA

## 2018-02-14 NOTE — DISCHARGE NOTE ADULT - CARE PROVIDER_API CALL
Follow up,   Follow up with Nephrology Clinic on Wednesday, 2/21 at 9:00 AM with Dr. Rodriguez  Phone: (   )    -  Fax: (   )    -    Follow up,   with Cardiology and  Endocrinology  Phone: (   )    -  Fax: (   )    -    Randal Dotson), Internal Medicine  [address] [place] [address]  Phone: [phone]  Fax: [phone]

## 2018-02-14 NOTE — DISCHARGE NOTE ADULT - MEDICATION SUMMARY - MEDICATIONS TO TAKE
I will START or STAY ON the medications listed below when I get home from the hospital:    metFORMIN 500 mg oral tablet, extended release  -- 1 tab(s) by mouth once a day  -- Indication: For Diabetes    metoprolol succinate 50 mg oral tablet, extended release  -- 1 tab(s) by mouth once a day  -- Indication: For Heart rate I will START or STAY ON the medications listed below when I get home from the hospital:    metFORMIN 500 mg oral tablet, extended release  -- 1 tab(s) by mouth once a day  -- Indication: For Diabetes    metoprolol succinate 50 mg oral tablet, extended release  -- 1 tab(s) by mouth once a day  -- Indication: For Heart rate    furosemide 80 mg oral tablet  -- 1 tab(s) by mouth every 12 hours  -- Indication: For fluid balance

## 2018-02-14 NOTE — DISCHARGE NOTE ADULT - CARE PROVIDERS DIRECT ADDRESSES
,DirectAddress_Unknown,DirectAddress_Unknown,[email or web address]

## 2018-02-14 NOTE — PROGRESS NOTE ADULT - PROBLEM SELECTOR PLAN 2
BP stable. Continue with current meds and monitor BP. Low salt diet advised
Nephro f/up noted.  BMP

## 2018-02-14 NOTE — DISCHARGE NOTE ADULT - CARE PLAN
Principal Discharge DX:	Dyspnea on exertion  Goal:	breath comfortably  Assessment and plan of treatment:	continue medications as prescribed  Seek medical attention for chest pain, shortness of breath, swelling or palpitations  Secondary Diagnosis:	Acute on chronic renal failure  Assessment and plan of treatment:	Your kidney function has gotten worse  Seek medical attention for shortness of breath, swelling or palpitations or fatigue out of the ordinary  Follow up with Nephrology Clinic on Wednesday, 2/21 at 9:00 AM with Dr. Rodriguez  Secondary Diagnosis:	DM (diabetes mellitus)  Assessment and plan of treatment:	Continue medications and monitor you blood sugars  Follow up with your Endocrinologist/PMD as scheduled  Secondary Diagnosis:	Hyperkalemia  Assessment and plan of treatment:	Your potassium level was high  Avoid high potassium food s such as bananas and citris fruit  Secondary Diagnosis:	Stye external  Assessment and plan of treatment:	Warm soaks to right eye every hour for 1 minutes at a time

## 2018-02-14 NOTE — DISCHARGE NOTE ADULT - HOSPITAL COURSE
53 yo M p/w SIERRA x 1week & LE edema x 2 months and intermittent CP after eating hot peppers for 2-3 mos.      He was treated for:  +Acute on chronic CHF - on IV Lasix and transitioned to oral dosing  +SMITA/CKD - unknown duration, +nephritic/nephrotic syndrome with continued management and possible biopsy recommended  +T 99.3, cough, aches - RPV neg  +R eye pain - probably a  Stye and treated with local warm soaks  +Hyperkalemia, monitored and low potassium diet recommended    Testing showed:  EKG- NSR @ 75 LAFB, Flat T waves v6, I, L  CXR- (prelim) Increased size of the cardiac silhouette which may be   secondary to a pericardial effusion  CE neg x 2 BNP- 17,339  BUN/ Creat- 79/5.06  Renal sono: left pleural effusion   2/13 Echo: EF 26%  1. Mitral annular calcification, otherwise normal mitral valve. Moderate-severe mitral regurgitation with an eccentric, posteriorly directed jet.  2. Mildly dilated left atrium.  LA volume index = 35 cc/m2.  3. Moderate left ventricular enlargement.  4. Endocardium not well visualized; grossly severe global left ventricular systolic dysfunction.  Endocardial visualization enhanced with intravenous injection of echo contrast (Definity).  5. Right ventricular enlargement with decreased right ventricular systolic function.  6. Estimated right ventricular systolic pressure equals 59mm Hg, assuming right atrial pressure equals 10 mm Hg, consistent with moderate pulmonary hypertension.    Pt. may need CT-guided kidney biopsy by IR to determine etiology/underlying cause of kidney disease. However he was taking Aspirin which would need to be stopped for 5 days prior to a biopsy being performed.  Aspirin was stopped on 2/13/18.    Lasix 80 mg IV BID changed to Lasix 80mg PO BID on discharge.      Discharge home with follow up for monitoring of kidney function in Nephrology clinic with Dr. Rodriguez Wednesday, 2/21 at 09:00.

## 2018-02-14 NOTE — PROGRESS NOTE ADULT - PROBLEM SELECTOR PLAN 1
Elevated Scr of unknown duration/etiology. Pt. with no known history of kidney disease in the past. ? SMITA on CKD. CKD in the setting DM/HTN. SMITA from low cardiac output/CHF/ACE-I  vs ?acute GN process. Pt with ? nephrotic syndrome (LE swelling, serum albumin low at 3, pt. with blood, RBCs and significant proteinuria on U/A and spot TP/CR was elevated at 3.9). Pt. with many dysmorphic RBCs and casts seen on urine microscopy. Renal sonogram without evidence of obstruction/hydronephrosis. Serological work up including hepatits b surface antigen, hep c surface antibody, HIV, anti-GBM, CELSO were negative/non-reactive. Complement C3/C4 were not low. Serum PETROS without evidence of monoclonal gammopathy. Follow up ANCA. As per review on labs from [place], pt. with elevated SCr of 1.6 seen in 4/2016. Scr on admission was elevated at 5 and was 4.4 yesterday. Advise to continue with lasix 80 mg IV BID. Pt. pending  CT-guided kidney biopsy by IR to determine etiology/underlying cause of kidney disease. Discussed with pt. the benefits and risks of kidney biopsy. Pt. agreeable to under-go kidney biopsy if needed. Pt. ok from nephrology stand point to be discharge and re-admitted for kidney biopsy.  Monitor BMP, urine output, I/Os. Avoid nephrotoxins, RCA, NSAIDs and ACE-I/ARBs Elevated Scr of unknown duration/etiology. Pt. with no known history of kidney disease in the past. ? SMITA on CKD. CKD in the setting DM/HTN. SMITA from low cardiac output/CHF/ACE-I  . Pt with  nephrotic syndrome (LE swelling, serum albumin low at 3, pt. with blood, RBCs and significant proteinuria on U/A and spot TP/CR was elevated at 3.9). Pt. with spme dysmorphic RBCs and waxy casts seen on urine microscopy. Renal sonogram without evidence of obstruction/hydronephrosis. Serological work up including hepatits b surface antigen, hep c surface antibody, HIV, anti-GBM, CELSO were negative/non-reactive. Complement C3/C4 were not low. Serum PETROS without evidence of monoclonal gammopathy. Follow up ANCA. As per review on labs from [place], pt. with elevated SCr of 1.6 seen in 4/2016. Scr on admission was elevated at 5 and was 4.4 yesterday. Advise to continue with lasix 80 mg IV BID. Pt. pending  CT-guided kidney biopsy by IR to determine etiology/underlying cause of kidney disease. Discussed with pt. the benefits and risks of kidney biopsy. Pt. agreeable to under-go kidney biopsy if needed. Pt. ok from nephrology stand point to be discharge and re-admitted for kidney biopsy.  Monitor BMP, urine output, I/Os. Avoid nephrotoxins, RCA, NSAIDs and ACE-I/ARBs

## 2018-02-21 ENCOUNTER — APPOINTMENT (OUTPATIENT)
Dept: NEPHROLOGY | Facility: HOSPITAL | Age: 53
End: 2018-02-21

## 2018-03-14 ENCOUNTER — LABORATORY RESULT (OUTPATIENT)
Age: 53
End: 2018-03-14

## 2018-03-14 ENCOUNTER — APPOINTMENT (OUTPATIENT)
Dept: NEPHROLOGY | Facility: HOSPITAL | Age: 53
End: 2018-03-14
Payer: MEDICAID

## 2018-03-14 ENCOUNTER — OUTPATIENT (OUTPATIENT)
Dept: OUTPATIENT SERVICES | Facility: HOSPITAL | Age: 53
LOS: 1 days | End: 2018-03-14

## 2018-03-14 VITALS
SYSTOLIC BLOOD PRESSURE: 107 MMHG | DIASTOLIC BLOOD PRESSURE: 66 MMHG | HEART RATE: 59 BPM | WEIGHT: 168 LBS | BODY MASS INDEX: 25.46 KG/M2 | HEIGHT: 68 IN

## 2018-03-14 VITALS — SYSTOLIC BLOOD PRESSURE: 138 MMHG | DIASTOLIC BLOOD PRESSURE: 92 MMHG

## 2018-03-14 DIAGNOSIS — T82.868A THROMBOSIS DUE TO VASCULAR PROSTHETIC DEVICES, IMPLANTS AND GRAFTS, INITIAL ENCOUNTER: Chronic | ICD-10-CM

## 2018-03-14 DIAGNOSIS — Z95.5 PRESENCE OF CORONARY ANGIOPLASTY IMPLANT AND GRAFT: Chronic | ICD-10-CM

## 2018-03-14 DIAGNOSIS — Z86.79 PERSONAL HISTORY OF OTHER DISEASES OF THE CIRCULATORY SYSTEM: ICD-10-CM

## 2018-03-14 LAB
ALBUMIN SERPL ELPH-MCNC: 3.5 G/DL — SIGNIFICANT CHANGE UP (ref 3.3–5)
BUN SERPL-MCNC: 62 MG/DL — HIGH (ref 7–23)
CALCIUM SERPL-MCNC: 8.7 MG/DL — SIGNIFICANT CHANGE UP (ref 8.4–10.5)
CHLORIDE SERPL-SCNC: 102 MMOL/L — SIGNIFICANT CHANGE UP (ref 98–107)
CO2 SERPL-SCNC: 24 MMOL/L — SIGNIFICANT CHANGE UP (ref 22–31)
CREAT SERPL-MCNC: 4.07 MG/DL — HIGH (ref 0.5–1.3)
GLUCOSE SERPL-MCNC: 89 MG/DL — SIGNIFICANT CHANGE UP (ref 70–99)
PHOSPHATE SERPL-MCNC: 5.3 MG/DL — HIGH (ref 2.5–4.5)
POTASSIUM SERPL-MCNC: 5.4 MMOL/L — HIGH (ref 3.5–5.3)
POTASSIUM SERPL-SCNC: 5.4 MMOL/L — HIGH (ref 3.5–5.3)
SODIUM SERPL-SCNC: 137 MMOL/L — SIGNIFICANT CHANGE UP (ref 135–145)

## 2018-03-14 PROCEDURE — 99214 OFFICE O/P EST MOD 30 MIN: CPT | Mod: GC

## 2018-03-14 RX ORDER — METOPROLOL TARTRATE 50 MG/1
50 TABLET, FILM COATED ORAL
Qty: 60 | Refills: 0 | Status: DISCONTINUED | COMMUNITY
Start: 2016-12-23

## 2018-03-16 DIAGNOSIS — N18.9 CHRONIC KIDNEY DISEASE, UNSPECIFIED: ICD-10-CM

## 2018-03-16 DIAGNOSIS — R60.0 LOCALIZED EDEMA: ICD-10-CM

## 2018-03-16 DIAGNOSIS — I10 ESSENTIAL (PRIMARY) HYPERTENSION: ICD-10-CM

## 2018-03-19 ENCOUNTER — LABORATORY RESULT (OUTPATIENT)
Age: 53
End: 2018-03-19

## 2018-03-19 LAB — CREAT ?TM UR-MCNC: 88.42 MG/DL — SIGNIFICANT CHANGE UP

## 2018-03-22 LAB — MISCELLANEOUS - CHEM: SIGNIFICANT CHANGE UP

## 2018-04-03 ENCOUNTER — MED ADMIN CHARGE (OUTPATIENT)
Age: 53
End: 2018-04-03

## 2018-04-03 ENCOUNTER — LABORATORY RESULT (OUTPATIENT)
Age: 53
End: 2018-04-03

## 2018-04-03 ENCOUNTER — APPOINTMENT (OUTPATIENT)
Dept: INTERNAL MEDICINE | Facility: HOSPITAL | Age: 53
End: 2018-04-03

## 2018-04-03 ENCOUNTER — OUTPATIENT (OUTPATIENT)
Dept: OUTPATIENT SERVICES | Facility: HOSPITAL | Age: 53
LOS: 1 days | End: 2018-04-03

## 2018-04-03 ENCOUNTER — OTHER (OUTPATIENT)
Age: 53
End: 2018-04-03

## 2018-04-03 VITALS
SYSTOLIC BLOOD PRESSURE: 116 MMHG | HEART RATE: 57 BPM | HEIGHT: 68 IN | BODY MASS INDEX: 28.07 KG/M2 | DIASTOLIC BLOOD PRESSURE: 81 MMHG | WEIGHT: 185.19 LBS

## 2018-04-03 DIAGNOSIS — Z82.49 FAMILY HISTORY OF ISCHEMIC HEART DISEASE AND OTHER DISEASES OF THE CIRCULATORY SYSTEM: ICD-10-CM

## 2018-04-03 DIAGNOSIS — Z83.3 FAMILY HISTORY OF DIABETES MELLITUS: ICD-10-CM

## 2018-04-03 DIAGNOSIS — Z87.891 PERSONAL HISTORY OF NICOTINE DEPENDENCE: ICD-10-CM

## 2018-04-03 DIAGNOSIS — Z78.9 OTHER SPECIFIED HEALTH STATUS: ICD-10-CM

## 2018-04-03 DIAGNOSIS — Z95.5 PRESENCE OF CORONARY ANGIOPLASTY IMPLANT AND GRAFT: Chronic | ICD-10-CM

## 2018-04-03 DIAGNOSIS — Z00.00 ENCOUNTER FOR GENERAL ADULT MEDICAL EXAMINATION W/OUT ABNORMAL FINDINGS: ICD-10-CM

## 2018-04-03 DIAGNOSIS — T82.868A THROMBOSIS DUE TO VASCULAR PROSTHETIC DEVICES, IMPLANTS AND GRAFTS, INITIAL ENCOUNTER: Chronic | ICD-10-CM

## 2018-04-03 DIAGNOSIS — Z23 ENCOUNTER FOR IMMUNIZATION: ICD-10-CM

## 2018-04-03 DIAGNOSIS — Z83.49 FAMILY HISTORY OF OTHER ENDOCRINE, NUTRITIONAL AND METABOLIC DISEASES: ICD-10-CM

## 2018-04-03 LAB
BUN SERPL-MCNC: 57 MG/DL — HIGH (ref 7–23)
CALCIUM SERPL-MCNC: 8.2 MG/DL — LOW (ref 8.4–10.5)
CHLORIDE SERPL-SCNC: 107 MMOL/L — SIGNIFICANT CHANGE UP (ref 98–107)
CO2 SERPL-SCNC: 23 MMOL/L — SIGNIFICANT CHANGE UP (ref 22–31)
CREAT SERPL-MCNC: 3.89 MG/DL — HIGH (ref 0.5–1.3)
GLUCOSE SERPL-MCNC: 97 MG/DL — SIGNIFICANT CHANGE UP (ref 70–99)
POTASSIUM SERPL-MCNC: 5.4 MMOL/L — HIGH (ref 3.5–5.3)
POTASSIUM SERPL-SCNC: 5.4 MMOL/L — HIGH (ref 3.5–5.3)
SODIUM SERPL-SCNC: 139 MMOL/L — SIGNIFICANT CHANGE UP (ref 135–145)

## 2018-04-03 RX ORDER — CLOPIDOGREL BISULFATE 75 MG/1
75 TABLET, FILM COATED ORAL
Qty: 30 | Refills: 0 | Status: DISCONTINUED | COMMUNITY
Start: 2016-12-23 | End: 2018-04-03

## 2018-04-04 DIAGNOSIS — E78.5 HYPERLIPIDEMIA, UNSPECIFIED: ICD-10-CM

## 2018-04-04 DIAGNOSIS — E11.9 TYPE 2 DIABETES MELLITUS WITHOUT COMPLICATIONS: ICD-10-CM

## 2018-04-04 DIAGNOSIS — N18.9 CHRONIC KIDNEY DISEASE, UNSPECIFIED: ICD-10-CM

## 2018-04-04 DIAGNOSIS — I25.10 ATHEROSCLEROTIC HEART DISEASE OF NATIVE CORONARY ARTERY WITHOUT ANGINA PECTORIS: ICD-10-CM

## 2018-04-04 DIAGNOSIS — I50.9 HEART FAILURE, UNSPECIFIED: ICD-10-CM

## 2018-04-04 DIAGNOSIS — R60.0 LOCALIZED EDEMA: ICD-10-CM

## 2018-04-04 DIAGNOSIS — I10 ESSENTIAL (PRIMARY) HYPERTENSION: ICD-10-CM

## 2018-04-04 LAB — GLUCOSE BLDC GLUCOMTR-MCNC: 102

## 2018-04-18 ENCOUNTER — APPOINTMENT (OUTPATIENT)
Dept: NEPHROLOGY | Facility: HOSPITAL | Age: 53
End: 2018-04-18

## 2018-04-20 ENCOUNTER — APPOINTMENT (OUTPATIENT)
Dept: ENDOCRINOLOGY | Facility: HOSPITAL | Age: 53
End: 2018-04-20

## 2018-05-30 ENCOUNTER — APPOINTMENT (OUTPATIENT)
Dept: CARDIOLOGY | Facility: HOSPITAL | Age: 53
End: 2018-05-30

## 2018-05-30 VITALS
BODY MASS INDEX: 26.76 KG/M2 | SYSTOLIC BLOOD PRESSURE: 99 MMHG | DIASTOLIC BLOOD PRESSURE: 54 MMHG | RESPIRATION RATE: 14 BRPM | HEART RATE: 57 BPM | WEIGHT: 176 LBS | OXYGEN SATURATION: 100 %

## 2018-05-30 RX ORDER — AZITHROMYCIN 250 MG/1
250 TABLET, FILM COATED ORAL
Qty: 6 | Refills: 0 | Status: COMPLETED | COMMUNITY
Start: 2018-02-05

## 2018-05-30 RX ORDER — FUROSEMIDE 80 MG/1
80 TABLET ORAL
Qty: 60 | Refills: 0 | Status: COMPLETED | COMMUNITY
Start: 2018-02-14

## 2018-05-30 RX ORDER — METFORMIN ER 500 MG 500 MG/1
500 TABLET ORAL
Qty: 30 | Refills: 0 | Status: COMPLETED | COMMUNITY
Start: 2017-08-26

## 2018-05-30 RX ORDER — PROMETHAZINE HYDROCHLORIDE AND CODEINE PHOSPHATE 6.25; 1 MG/5ML; MG/5ML
6.25-1 SOLUTION ORAL
Qty: 70 | Refills: 0 | Status: COMPLETED | COMMUNITY
Start: 2018-02-05

## 2018-05-30 RX ORDER — LISINOPRIL 10 MG/1
10 TABLET ORAL
Qty: 30 | Refills: 0 | Status: COMPLETED | COMMUNITY
Start: 2017-08-26

## 2018-05-31 ENCOUNTER — APPOINTMENT (OUTPATIENT)
Age: 53
End: 2018-05-31

## 2018-06-13 ENCOUNTER — APPOINTMENT (OUTPATIENT)
Dept: PODIATRY | Facility: HOSPITAL | Age: 53
End: 2018-06-13

## 2018-06-18 ENCOUNTER — APPOINTMENT (OUTPATIENT)
Dept: CV DIAGNOSITCS | Facility: HOSPITAL | Age: 53
End: 2018-06-18

## 2018-06-20 ENCOUNTER — NON-APPOINTMENT (OUTPATIENT)
Age: 53
End: 2018-06-20

## 2018-07-17 ENCOUNTER — APPOINTMENT (OUTPATIENT)
Dept: INTERNAL MEDICINE | Facility: HOSPITAL | Age: 53
End: 2018-07-17

## 2018-07-18 ENCOUNTER — APPOINTMENT (OUTPATIENT)
Dept: CARDIOLOGY | Facility: HOSPITAL | Age: 53
End: 2018-07-18

## 2018-07-18 VITALS
WEIGHT: 190 LBS | SYSTOLIC BLOOD PRESSURE: 111 MMHG | HEART RATE: 56 BPM | DIASTOLIC BLOOD PRESSURE: 67 MMHG | BODY MASS INDEX: 28.89 KG/M2 | OXYGEN SATURATION: 100 % | RESPIRATION RATE: 14 BRPM

## 2018-07-18 DIAGNOSIS — I34.0 NONRHEUMATIC MITRAL (VALVE) INSUFFICIENCY: ICD-10-CM

## 2018-07-20 ENCOUNTER — APPOINTMENT (OUTPATIENT)
Dept: ENDOCRINOLOGY | Facility: HOSPITAL | Age: 53
End: 2018-07-20

## 2018-07-23 ENCOUNTER — INPATIENT (INPATIENT)
Facility: HOSPITAL | Age: 53
LOS: 5 days | Discharge: HOME | DRG: 291 | End: 2018-07-29
Attending: HOSPITALIST | Admitting: HOSPITALIST
Payer: MEDICAID

## 2018-07-23 VITALS
OXYGEN SATURATION: 100 % | HEIGHT: 65 IN | SYSTOLIC BLOOD PRESSURE: 153 MMHG | RESPIRATION RATE: 18 BRPM | HEART RATE: 68 BPM | DIASTOLIC BLOOD PRESSURE: 94 MMHG | TEMPERATURE: 98 F | WEIGHT: 188.72 LBS

## 2018-07-23 DIAGNOSIS — Z29.9 ENCOUNTER FOR PROPHYLACTIC MEASURES, UNSPECIFIED: ICD-10-CM

## 2018-07-23 DIAGNOSIS — I25.10 ATHEROSCLEROTIC HEART DISEASE OF NATIVE CORONARY ARTERY WITHOUT ANGINA PECTORIS: ICD-10-CM

## 2018-07-23 DIAGNOSIS — E11.9 TYPE 2 DIABETES MELLITUS WITHOUT COMPLICATIONS: ICD-10-CM

## 2018-07-23 DIAGNOSIS — I10 ESSENTIAL (PRIMARY) HYPERTENSION: ICD-10-CM

## 2018-07-23 DIAGNOSIS — E78.5 HYPERLIPIDEMIA, UNSPECIFIED: ICD-10-CM

## 2018-07-23 DIAGNOSIS — N18.4 CHRONIC KIDNEY DISEASE, STAGE 4 (SEVERE): ICD-10-CM

## 2018-07-23 DIAGNOSIS — Z95.5 PRESENCE OF CORONARY ANGIOPLASTY IMPLANT AND GRAFT: Chronic | ICD-10-CM

## 2018-07-23 DIAGNOSIS — T82.868A THROMBOSIS DUE TO VASCULAR PROSTHETIC DEVICES, IMPLANTS AND GRAFTS, INITIAL ENCOUNTER: Chronic | ICD-10-CM

## 2018-07-23 DIAGNOSIS — I50.20 UNSPECIFIED SYSTOLIC (CONGESTIVE) HEART FAILURE: ICD-10-CM

## 2018-07-23 DIAGNOSIS — I50.22 CHRONIC SYSTOLIC (CONGESTIVE) HEART FAILURE: ICD-10-CM

## 2018-07-23 LAB
ALBUMIN SERPL ELPH-MCNC: 3.4 G/DL — SIGNIFICANT CHANGE UP (ref 3.3–5)
ALP SERPL-CCNC: 130 U/L — HIGH (ref 40–120)
ALT FLD-CCNC: 35 U/L — SIGNIFICANT CHANGE UP (ref 10–45)
ANION GAP SERPL CALC-SCNC: 13 MMOL/L — SIGNIFICANT CHANGE UP (ref 5–17)
AST SERPL-CCNC: 26 U/L — SIGNIFICANT CHANGE UP (ref 10–40)
BASOPHILS # BLD AUTO: 0 K/UL — SIGNIFICANT CHANGE UP (ref 0–0.2)
BASOPHILS NFR BLD AUTO: 0.4 % — SIGNIFICANT CHANGE UP (ref 0–2)
BILIRUB SERPL-MCNC: 0.3 MG/DL — SIGNIFICANT CHANGE UP (ref 0.2–1.2)
BUN SERPL-MCNC: 75 MG/DL — HIGH (ref 7–23)
CALCIUM SERPL-MCNC: 8.5 MG/DL — SIGNIFICANT CHANGE UP (ref 8.4–10.5)
CHLORIDE SERPL-SCNC: 106 MMOL/L — SIGNIFICANT CHANGE UP (ref 96–108)
CK MB BLD-MCNC: 2.6 % — SIGNIFICANT CHANGE UP (ref 0–3.5)
CK MB CFR SERPL CALC: 5.8 NG/ML — SIGNIFICANT CHANGE UP (ref 0–6.7)
CK SERPL-CCNC: 227 U/L — HIGH (ref 30–200)
CO2 SERPL-SCNC: 23 MMOL/L — SIGNIFICANT CHANGE UP (ref 22–31)
CREAT SERPL-MCNC: 4.74 MG/DL — HIGH (ref 0.5–1.3)
EOSINOPHIL # BLD AUTO: 0.2 K/UL — SIGNIFICANT CHANGE UP (ref 0–0.5)
EOSINOPHIL NFR BLD AUTO: 3.7 % — SIGNIFICANT CHANGE UP (ref 0–6)
GLUCOSE SERPL-MCNC: 115 MG/DL — HIGH (ref 70–99)
HCT VFR BLD CALC: 32.8 % — LOW (ref 39–50)
HGB BLD-MCNC: 10.8 G/DL — LOW (ref 13–17)
LYMPHOCYTES # BLD AUTO: 1.2 K/UL — SIGNIFICANT CHANGE UP (ref 1–3.3)
LYMPHOCYTES # BLD AUTO: 20.5 % — SIGNIFICANT CHANGE UP (ref 13–44)
MCHC RBC-ENTMCNC: 30 PG — SIGNIFICANT CHANGE UP (ref 27–34)
MCHC RBC-ENTMCNC: 32.9 GM/DL — SIGNIFICANT CHANGE UP (ref 32–36)
MCV RBC AUTO: 91.4 FL — SIGNIFICANT CHANGE UP (ref 80–100)
MONOCYTES # BLD AUTO: 0.5 K/UL — SIGNIFICANT CHANGE UP (ref 0–0.9)
MONOCYTES NFR BLD AUTO: 8.2 % — SIGNIFICANT CHANGE UP (ref 2–14)
NEUTROPHILS # BLD AUTO: 3.8 K/UL — SIGNIFICANT CHANGE UP (ref 1.8–7.4)
NEUTROPHILS NFR BLD AUTO: 67.2 % — SIGNIFICANT CHANGE UP (ref 43–77)
NT-PROBNP SERPL-SCNC: HIGH PG/ML (ref 0–300)
PLATELET # BLD AUTO: 141 K/UL — LOW (ref 150–400)
POTASSIUM SERPL-MCNC: 4.5 MMOL/L — SIGNIFICANT CHANGE UP (ref 3.5–5.3)
POTASSIUM SERPL-SCNC: 4.5 MMOL/L — SIGNIFICANT CHANGE UP (ref 3.5–5.3)
PROT SERPL-MCNC: 6.5 G/DL — SIGNIFICANT CHANGE UP (ref 6–8.3)
RBC # BLD: 3.59 M/UL — LOW (ref 4.2–5.8)
RBC # FLD: 13.4 % — SIGNIFICANT CHANGE UP (ref 10.3–14.5)
SODIUM SERPL-SCNC: 142 MMOL/L — SIGNIFICANT CHANGE UP (ref 135–145)
TROPONIN T, HIGH SENSITIVITY RESULT: 79 NG/L — HIGH (ref 0–51)
WBC # BLD: 5.7 K/UL — SIGNIFICANT CHANGE UP (ref 3.8–10.5)
WBC # FLD AUTO: 5.7 K/UL — SIGNIFICANT CHANGE UP (ref 3.8–10.5)

## 2018-07-23 PROCEDURE — 71045 X-RAY EXAM CHEST 1 VIEW: CPT | Mod: 26

## 2018-07-23 PROCEDURE — 93010 ELECTROCARDIOGRAM REPORT: CPT

## 2018-07-23 PROCEDURE — 99223 1ST HOSP IP/OBS HIGH 75: CPT | Mod: GC

## 2018-07-23 RX ORDER — ATORVASTATIN CALCIUM 80 MG/1
80 TABLET, FILM COATED ORAL AT BEDTIME
Qty: 0 | Refills: 0 | Status: DISCONTINUED | OUTPATIENT
Start: 2018-07-23 | End: 2018-07-29

## 2018-07-23 RX ORDER — HEPARIN SODIUM 5000 [USP'U]/ML
5000 INJECTION INTRAVENOUS; SUBCUTANEOUS EVERY 8 HOURS
Qty: 0 | Refills: 0 | Status: DISCONTINUED | OUTPATIENT
Start: 2018-07-23 | End: 2018-07-29

## 2018-07-23 RX ORDER — METFORMIN HYDROCHLORIDE 850 MG/1
1 TABLET ORAL
Qty: 0 | Refills: 0 | COMMUNITY

## 2018-07-23 RX ORDER — BUMETANIDE 0.25 MG/ML
1 INJECTION INTRAMUSCULAR; INTRAVENOUS DAILY
Qty: 0 | Refills: 0 | Status: DISCONTINUED | OUTPATIENT
Start: 2018-07-24 | End: 2018-07-24

## 2018-07-23 RX ORDER — HYDRALAZINE HCL 50 MG
10 TABLET ORAL THREE TIMES A DAY
Qty: 0 | Refills: 0 | Status: DISCONTINUED | OUTPATIENT
Start: 2018-07-23 | End: 2018-07-24

## 2018-07-23 RX ORDER — METOPROLOL TARTRATE 50 MG
25 TABLET ORAL DAILY
Qty: 0 | Refills: 0 | Status: DISCONTINUED | OUTPATIENT
Start: 2018-07-24 | End: 2018-07-28

## 2018-07-23 RX ORDER — ASPIRIN/CALCIUM CARB/MAGNESIUM 324 MG
81 TABLET ORAL DAILY
Qty: 0 | Refills: 0 | Status: DISCONTINUED | OUTPATIENT
Start: 2018-07-23 | End: 2018-07-29

## 2018-07-23 RX ADMIN — Medication 10 MILLIGRAM(S): at 23:13

## 2018-07-23 RX ADMIN — ATORVASTATIN CALCIUM 80 MILLIGRAM(S): 80 TABLET, FILM COATED ORAL at 23:13

## 2018-07-23 RX ADMIN — HEPARIN SODIUM 5000 UNIT(S): 5000 INJECTION INTRAVENOUS; SUBCUTANEOUS at 23:14

## 2018-07-23 RX ADMIN — Medication 81 MILLIGRAM(S): at 23:13

## 2018-07-23 NOTE — H&P ADULT - PMH
CAD (coronary artery disease)    DM (diabetes mellitus)    HFrEF (heart failure with reduced ejection fraction)    HTN (hypertension)    Hyperlipidemia    Myocardial infarction  Jan 2016

## 2018-07-23 NOTE — H&P ADULT - PROBLEM SELECTOR PLAN 5
- s/p stent in 2016 with unknown coronary intervention  - c/w Toprol, lipitor and ASA   - Troponemia on labs likely in the setting of advanced CKD, with an otherwise mildly elevated CK. Will trend one more set to ensure no ischemic process is going on, although low suspicion

## 2018-07-23 NOTE — H&P ADULT - PROBLEM SELECTOR PLAN 1
- EF 25% by ECHO in 2/2018 with severe global LV dsyfunction on LUCIANO in June   - documented CAD with stent placed in 2016 at [facility] (unknown anatomy)  - elevated BNP at 33K <--27K at outside lab work   - clinically does not appear to be decompensated. Not on ACEi 2/2 advanced kidney disease, c/w hydralazine 10 mg TID as per home regimen and with BB  - has room to go up on hydralazine and can add isordil (A-HEFT) for preload and afterload reduction.   - continue with Bumex 2 mg PO QD in the interim as patient appears to be responding. Will get strict I/Os and monitor UOP. daily weights   - Cardiology consult in the morning - EF 25% by ECHO in 2/2018 with severe global LV dsyfunction on LUCIANO in June with severe MR noted   - documented CAD with stent placed in 2016 at [facility] (unknown anatomy)  - elevated BNP at 33K <--27K at outside lab work   - clinically does not appear to be decompensated. Not on ACEi 2/2 advanced kidney disease, c/w hydralazine 10 mg TID as per home regimen and with BB  - has room to go up on hydralazine and can add isordil (A-HEFT) for preload and afterload reduction.   - continue with Bumex 2 mg PO QD in the interim as patient appears to be responding. Will get strict I/Os and monitor UOP. daily weights   - Cardiology consult in the morning - EF 25% by ECHO in 2/2018 with severe global LV dysfunction on LUCIANO in June with severe MR noted   - documented CAD with stent placed in 2016 at [facility] (unknown anatomy)  - elevated BNP at 33K <--27K at outside lab work   - clinically does not appear to be decompensated. Not on ACEi 2/2 advanced kidney disease, c/w hydralazine 10 mg TID as per home regimen and with BB  - has room to go up on hydralazine and can add isordil (A-HEFT) for preload and afterload reduction.   - continue with Bumex 2 mg PO QD in the interim as patient appears to be responding. Will get strict I/Os and monitor UOP. daily weights   - Cardiology consult in the morning

## 2018-07-23 NOTE — H&P ADULT - ASSESSMENT
53 year old male with hx of HTN, HLD, CAD (s/p stent in 2016), T2DM c/b diabetic neuropathy, HFrEF (LVEF 25%) who presents 1 week later for decompensated heart failure

## 2018-07-23 NOTE — H&P ADULT - PROBLEM SELECTOR PLAN 3
- previously reported weight was 260 lbs with FS in the 400s-500s, was on metformin which was discontinued 2/2 kidney function  - reported 100 lb weight loss in the past 7 to 8 months with FS in the  range  - AIC 5.6, does not require any insulin coverage at this time

## 2018-07-23 NOTE — H&P ADULT - ATTENDING COMMENTS
53 year old male hx of T2DM with compounding neuropathy, HTN, CAD (s/p stent in [place] in 2016, unknown coronary anatomy), CHF (EF 25%), severe MR present per direction of cardiology in setting of worsening LE edema. Patient states that when he was on a higher dose of Lasix in the past he has had LE edema, as Lasix dose was tapered down, increased LE swelling occurred. Feels that Bumex may have helped with LE swelling. Denies orthopnea or increased amount of pillows utilized at night. Patient is compliant with medication. However patient struggles with diet and sometimes have to order take out. Confirmed  remainder of HPI and ROS with patient.   Vitals reviewed and physically examined patient. Agree with resident's findings above. GEN: NAD, well appearing. Heart: RRR + S1 S2 + JVD Lung: respirations non-labored, CTAB. Extremities +2LE edema up to knees Abd: Soft, NTND. Skin: warm and dry  Labs, imaging and EKG personally reviewed.   Acute systolic heart failure: Patient with elevated BNP, LE edema and JVD without acute respiratory symptoms. Continue with Bumex, Monitor I/O daily weights. Monitor on tele. Trend CE. Cardiology/Heart failure consult in AM  Remainder of plan as detailed above.  Patient previously unknown to me and I was assigned to precept this case with housestaff resident, Dr. Gimenez. Primary day team to assume care in AM.

## 2018-07-23 NOTE — H&P ADULT - HISTORY OF PRESENT ILLNESS
53 year old male hx of T2DM with compounding neuropathy, HTN, CAD (s/p stent in [place] in 2016, unknown coronary anatomy), CHF (EF 25%) who presents per cardiologist's recommendations. Patient was last seen in Cardiology Clinic on 7/18 -- with worsening leg swelling reported with recommendation to go to the hospital for volume diuresis, however patient declined. Was switched over from lasix 40 mg QD to bumex 2 mg QD with reported medication compliance. Reported that he has been urinating (10-12 times during the day and 4 to 5 time during the night with reported large volume quantities) and reportedly has been noting improvement in his LEs. Currently not endorsing any shortness of breath, chest pain, palpitations, nausea/vomiting, abdominal fullness. Does have some back pain with walking (with reported pain in the lower extremities), denies any weakness, urinary or stool incontinence.     In the ED:  Afebrile, /94 RR 18 O2: 100 53 year old male hx of T2DM with compounding neuropathy, HTN, CAD (s/p stent in [place] in 2016, unknown coronary anatomy), CHF (EF 25%) who presents per cardiologist's recommendations. Patient was last seen in Cardiology Clinic on 7/18 -- with worsening leg swelling reported with recommendation to go to the hospital for volume diuresis, however patient declined. Was switched over from lasix 40 mg QD to bumex 2 mg QD with reported medication compliance. Reported that he has been urinating (10-12 times during the day and 4 to 5 time during the night with reported large volume quantities) and reportedly has been noting improvement in his LEs. Currently not endorsing any shortness of breath, chest pain, palpitations, nausea/vomiting, abdominal fullness. Does have some back pain with walking (with reported pain in the lower extremities), denies any weakness, urinary or stool incontinence.     Of note, patient was in the hospital in February with worsening leg swelling and concern for advanced kidney injury which was worked up and attributed to be due to diabetic nephropathy. Pt has since then been following with cardiology for management and optimization of his heart failure.     In the ED:  Afebrile, /94 RR 18 O2: 100

## 2018-07-23 NOTE — H&P ADULT - FAMILY HISTORY
Father  Still living? Unknown  Family history of MI (myocardial infarction), Age at diagnosis: Age Unknown  Family history of diabetes mellitus (DM), Age at diagnosis: Age Unknown     Mother  Still living? Unknown  Family history of MI (myocardial infarction), Age at diagnosis: Age Unknown  Family history of diabetes mellitus (DM), Age at diagnosis: Age Unknown     Sibling  Still living? Unknown  Family history of MI (myocardial infarction), Age at diagnosis: Age Unknown  Family history of diabetes mellitus (DM), Age at diagnosis: Age Unknown

## 2018-07-23 NOTE — H&P ADULT - NSHPPHYSICALEXAM_GEN_ALL_CORE
General: middle aged male, NAD, speaking in full sentences   HEENT: PERRL, EOMI, mucous membranes appear dry  Neck: Supple, JVD to middle of the neck with bed at 30 degress  Cardiac: RRR, normal s1 and s2, no murmurs  Lungs: CTAB, no wheezes, rales or rhonchi   Abdomen: Soft, nondistended, not TTP. no organomegaly, no CVA tenderness   Extremities: 2+ pitting edema with 1+ to the mid shin. No cyanosis  Neuro: AAO x 3, Cr II - XII intact, 5/5 strength in UE and LE. decreased sensation in both lower extremities   Psych: Denies SI/HI Vital Signs Last 24 Hrs  T(C): 36.9 (23 Jul 2018 19:45), Max: 36.9 (23 Jul 2018 19:45)  T(F): 98.4 (23 Jul 2018 19:45), Max: 98.4 (23 Jul 2018 19:45)  HR: 70 (23 Jul 2018 22:40) (68 - 70)  BP: 145/84 (23 Jul 2018 22:40) (145/84 - 153/94)  BP(mean): --  RR: 18 (23 Jul 2018 19:45) (18 - 18)  SpO2: 100% (23 Jul 2018 19:45) (100% - 100%)    On Tele: SR 60s-80s    General: middle aged male, NAD, speaking in full sentences   HEENT: PERRL, EOMI, mucous membranes appear dry  Neck: Supple, JVD to middle of the neck with bed at 30 degrees  Cardiac: RRR, normal s1 and s2, no murmurs  Lungs: CTAB, no wheezes, rales or rhonchi   Abdomen: Soft, nondistended, not TTP. no organomegaly, no CVA tenderness   Extremities: 2+ pitting edema with 1+ to the mid shin. No cyanosis  Neuro: AAO x 3, Cr II - XII intact, 5/5 strength in UE and LE. decreased sensation in both lower extremities   Psych: appropriate affect

## 2018-07-23 NOTE — H&P ADULT - NSHPLABSRESULTS_GEN_ALL_CORE
Results, Imaging and EKG reviewed by me   EKG:                         10.8   5.7   )-----------( 141      ( 23 Jul 2018 21:08 )             32.8     07-23    142  |  106  |  75<H>  ----------------------------<  115<H>  4.5   |  23  |  4.74<H>    Ca    8.5      23 Jul 2018 21:08    TPro  6.5  /  Alb  3.4  /  TBili  0.3  /  DBili  x   /  AST  26  /  ALT  35  /  AlkPhos  130<H>  07-23    A1c 5.7  BNP 04667 <---87160 before   Lipid Panel: cholesterol well managed Results, Imaging and EKG reviewed by me   EKG:                         10.8   5.7   )-----------( 141      ( 23 Jul 2018 21:08 )             32.8     07-23    142  |  106  |  75<H>  ----------------------------<  115<H>  4.5   |  23  |  4.74<H>    Ca    8.5      23 Jul 2018 21:08    TPro  6.5  /  Alb  3.4  /  TBili  0.3  /  DBili  x   /  AST  26  /  ALT  35  /  AlkPhos  130<H>  07-23    A1c 5.7  BNP 43520 <---82888 before   Lipid Panel: cholesterol well managed    < from: [facility] w/o TTE (w/3D Echo) (06.18.18 @ 07:54) >    CONCLUSIONS:  1. Normal mitral valve with tethered leaflets. Severe  mitral regurgitation.  Vena contracta width about  1 cm.  Effective regurgitant orifice area (EROA) about  0.46 cm2  (by the PISA method).  Estimated regurgitant volume > 100  mL (by the volumetric method).  Systolic reversal of flow  seen in the right superior and right inferior pulmonary  veins.  2. Normal trileaflet aortic valve. No aortic valve  regurgitation seen.  3. Normal aortic root.  Mild non-mobile atherosclerotic  plaque in the aortic arch and descending thoracic aorta.  4. Normal left atrium.  No left atrium or left atrial  appendage thrombus.  5. Severe global left ventricular systolic dysfunction.  6. Right ventricular enlargement with decreased right  ventricular systolic function.  7. Contrast injection demonstrates no evidence of a patent  foramen ovale.    < end of copied text > Results, Imaging and EKG reviewed by me   EKG: NSR, no ST - T wave changes, compared to prior EKG no significant change                        10.8   5.7   )-----------( 141      ( 23 Jul 2018 21:08 )             32.8     07-23    142  |  106  |  75<H>  ----------------------------<  115<H>  4.5   |  23  |  4.74<H>    Ca    8.5      23 Jul 2018 21:08    TPro  6.5  /  Alb  3.4  /  TBili  0.3  /  DBili  x   /  AST  26  /  ALT  35  /  AlkPhos  130<H>  07-23    A1c 5.7  BNP 34341 <---47176 before   Lipid Panel: cholesterol well managed    < from: [facility] w/o TTE (w/3D Echo) (06.18.18 @ 07:54) >    CONCLUSIONS:  1. Normal mitral valve with tethered leaflets. Severe  mitral regurgitation.  Vena contracta width about  1 cm.  Effective regurgitant orifice area (EROA) about  0.46 cm2  (by the PISA method).  Estimated regurgitant volume > 100  mL (by the volumetric method).  Systolic reversal of flow  seen in the right superior and right inferior pulmonary  veins.  2. Normal trileaflet aortic valve. No aortic valve  regurgitation seen.  3. Normal aortic root.  Mild non-mobile atherosclerotic  plaque in the aortic arch and descending thoracic aorta.  4. Normal left atrium.  No left atrium or left atrial  appendage thrombus.  5. Severe global left ventricular systolic dysfunction.  6. Right ventricular enlargement with decreased right  ventricular systolic function.  7. Contrast injection demonstrates no evidence of a patent  foramen ovale.    < end of copied text > Results, Imaging and EKG reviewed by me   EKG: NSR, no ST - T wave changes, compared to prior EKG no significant change                        10.8   5.7   )-----------( 141      ( 23 Jul 2018 21:08 )             32.8     07-23    142  |  106  |  75<H>  ----------------------------<  115<H>  4.5   |  23  |  4.74<H>    Ca    8.5      23 Jul 2018 21:08    TPro  6.5  /  Alb  3.4  /  TBili  0.3  /  DBili  x   /  AST  26  /  ALT  35  /  AlkPhos  130<H>  07-23    CARDIAC MARKERS ( 23 Jul 2018 21:08 )  x     / x     / 227 U/L / x     / 5.8 ng/mL    Troponin T: 79  A1c 5.7  BNP 33.5K<--28K  Lipid Panel: cholesterol well managed    < from: [facility] w/o TTE (w/3D Echo) (06.18.18 @ 07:54) >    CONCLUSIONS:  1. Normal mitral valve with tethered leaflets. Severe  mitral regurgitation.  Vena contracta width about  1 cm.  Effective regurgitant orifice area (EROA) about  0.46 cm2  (by the PISA method).  Estimated regurgitant volume > 100  mL (by the volumetric method).  Systolic reversal of flow  seen in the right superior and right inferior pulmonary  veins.  2. Normal trileaflet aortic valve. No aortic valve  regurgitation seen.  3. Normal aortic root.  Mild non-mobile atherosclerotic  plaque in the aortic arch and descending thoracic aorta.  4. Normal left atrium.  No left atrium or left atrial  appendage thrombus.  5. Severe global left ventricular systolic dysfunction.  6. Right ventricular enlargement with decreased right  ventricular systolic function.  7. Contrast injection demonstrates no evidence of a patent  foramen ovale.    < end of copied text > Results, Imaging and EKG reviewed by me     CXR: no appreciable focal consolidations or pulm vascular congestion    EKG: NSR, no ST - T wave changes, compared to prior EKG no significant change                        10.8   5.7   )-----------( 141      ( 23 Jul 2018 21:08 )             32.8     07-23    142  |  106  |  75<H>  ----------------------------<  115<H>  4.5   |  23  |  4.74<H>    Ca    8.5      23 Jul 2018 21:08    TPro  6.5  /  Alb  3.4  /  TBili  0.3  /  DBili  x   /  AST  26  /  ALT  35  /  AlkPhos  130<H>  07-23    CARDIAC MARKERS ( 23 Jul 2018 21:08 )  x     / x     / 227 U/L / x     / 5.8 ng/mL    Troponin T: 79  A1c 5.7  BNP 33.5K<--28K  Lipid Panel: cholesterol well managed    < from: [facility] w/o TTE (w/3D Echo) (06.18.18 @ 07:54) >    CONCLUSIONS:  1. Normal mitral valve with tethered leaflets. Severe  mitral regurgitation.  Vena contracta width about  1 cm.  Effective regurgitant orifice area (EROA) about  0.46 cm2  (by the PISA method).  Estimated regurgitant volume > 100  mL (by the volumetric method).  Systolic reversal of flow  seen in the right superior and right inferior pulmonary  veins.  2. Normal trileaflet aortic valve. No aortic valve  regurgitation seen.  3. Normal aortic root.  Mild non-mobile atherosclerotic  plaque in the aortic arch and descending thoracic aorta.  4. Normal left atrium.  No left atrium or left atrial  appendage thrombus.  5. Severe global left ventricular systolic dysfunction.  6. Right ventricular enlargement with decreased right  ventricular systolic function.  7. Contrast injection demonstrates no evidence of a patent  foramen ovale.    < end of copied text >

## 2018-07-23 NOTE — H&P ADULT - PROBLEM SELECTOR PLAN 2
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 daily weights and avoid nephrotoxic agents   - on prior admission, concern for nephrotic syndrome with work up unremarkable (HIV, HCV negative) dsDna, anti-GBM, PR3 ANCA unremarkable, C3, c4 unremarkable. Attributed to long standing diabetic nephropathy and enrolled into CKD program. Discuss by o/p nephrology that long term RRT might be necessary

## 2018-07-23 NOTE — H&P ADULT - NSHPSOCIALHISTORY_GEN_ALL_CORE
Lives at home by himself. Smokes cigarettes but reportedly has been cutting back (1 pack lasts two to three weeks down from 2 packs a day) Has not had a drink in 5 months. Denies recreational drug use

## 2018-07-23 NOTE — H&P ADULT - NSHPREVIEWOFSYSTEMS_GEN_ALL_CORE
General: denies fevers, chills (reported 100 lb weight loss in the past 6 to 7 months with decreased appetite since being in the hospital)   HEENT: denies any changes in vision, hearing, trouble or pain with swallowing  Cardiac: denies any chest pain, palpitations, shortness of breath   Lungs: denies any cough, pleuritic chest pain, hemoptysis, + some shortness of breath on exertion, denies shortness of breath at rest  Abdomen: denies abdominal fullness, constipation or diarrhea  Extremities: + b/l lower extremity weakness (2/2 weight in lower extremities), + numbness (chronic), no focal pain or tenderness  Skin: denies any new skin lesions, rashes or bruises  Neuro: denies any episodes of confusion, focal weakness, new numbness or tingling   Psych: denies SI/HI General: denies fevers, or chills. (reported 100 lb weight loss in the past 6 to 7 months with decreased appetite since being in the hospital)   HEENT: denies any changes in vision, hearing, trouble or pain with swallowing  Cardiac: denies any chest pain, palpitations, shortness of breath   Lungs: denies any cough, pleuritic chest pain, hemoptysis, + some shortness of breath on exertion, denies shortness of breath at rest  Abdomen: denies abdominal fullness, constipation or diarrhea  Extremities: + b/l lower extremity weakness (2/2 weight in lower extremities), + numbness (chronic), no focal pain or tenderness  Skin: denies any new skin lesions, rashes or bruises  Neuro: denies any episodes of confusion, focal weakness, new numbness or tingling   Psych: denies SI/HI

## 2018-07-24 ENCOUNTER — TRANSCRIPTION ENCOUNTER (OUTPATIENT)
Age: 53
End: 2018-07-24

## 2018-07-24 DIAGNOSIS — I50.23 ACUTE ON CHRONIC SYSTOLIC (CONGESTIVE) HEART FAILURE: ICD-10-CM

## 2018-07-24 DIAGNOSIS — E11.22 TYPE 2 DIABETES MELLITUS WITH DIABETIC CHRONIC KIDNEY DISEASE: ICD-10-CM

## 2018-07-24 DIAGNOSIS — I50.21 ACUTE SYSTOLIC (CONGESTIVE) HEART FAILURE: ICD-10-CM

## 2018-07-24 DIAGNOSIS — N18.5 CHRONIC KIDNEY DISEASE, STAGE 5: ICD-10-CM

## 2018-07-24 DIAGNOSIS — N17.9 ACUTE KIDNEY FAILURE, UNSPECIFIED: ICD-10-CM

## 2018-07-24 LAB
ANION GAP SERPL CALC-SCNC: 13 MMOL/L — SIGNIFICANT CHANGE UP (ref 5–17)
APPEARANCE UR: CLEAR — SIGNIFICANT CHANGE UP
BILIRUB UR-MCNC: NEGATIVE — SIGNIFICANT CHANGE UP
BUN SERPL-MCNC: 74 MG/DL — HIGH (ref 7–23)
CALCIUM SERPL-MCNC: 8.3 MG/DL — LOW (ref 8.4–10.5)
CHLORIDE SERPL-SCNC: 108 MMOL/L — SIGNIFICANT CHANGE UP (ref 96–108)
CHLORIDE UR-SCNC: 64 MMOL/L — SIGNIFICANT CHANGE UP
CK MB BLD-MCNC: 2.8 % — SIGNIFICANT CHANGE UP (ref 0–3.5)
CK MB CFR SERPL CALC: 5.4 NG/ML — SIGNIFICANT CHANGE UP (ref 0–6.7)
CK SERPL-CCNC: 193 U/L — SIGNIFICANT CHANGE UP (ref 30–200)
CO2 SERPL-SCNC: 21 MMOL/L — LOW (ref 22–31)
COLOR SPEC: SIGNIFICANT CHANGE UP
CREAT SERPL-MCNC: 4.47 MG/DL — HIGH (ref 0.5–1.3)
DIFF PNL FLD: ABNORMAL
EPI CELLS # UR: SIGNIFICANT CHANGE UP /HPF
GLUCOSE BLDC GLUCOMTR-MCNC: 113 MG/DL — HIGH (ref 70–99)
GLUCOSE SERPL-MCNC: 104 MG/DL — HIGH (ref 70–99)
GLUCOSE UR QL: ABNORMAL
HCT VFR BLD CALC: 30.2 % — LOW (ref 39–50)
HGB BLD-MCNC: 10.1 G/DL — LOW (ref 13–17)
HYALINE CASTS # UR AUTO: ABNORMAL
KETONES UR-MCNC: NEGATIVE — SIGNIFICANT CHANGE UP
LEUKOCYTE ESTERASE UR-ACNC: NEGATIVE — SIGNIFICANT CHANGE UP
MAGNESIUM SERPL-MCNC: 1.9 MG/DL — SIGNIFICANT CHANGE UP (ref 1.6–2.6)
MCHC RBC-ENTMCNC: 29.9 PG — SIGNIFICANT CHANGE UP (ref 27–34)
MCHC RBC-ENTMCNC: 33.4 GM/DL — SIGNIFICANT CHANGE UP (ref 32–36)
MCV RBC AUTO: 89.3 FL — SIGNIFICANT CHANGE UP (ref 80–100)
NITRITE UR-MCNC: NEGATIVE — SIGNIFICANT CHANGE UP
PH UR: 6 — SIGNIFICANT CHANGE UP (ref 5–8)
PHOSPHATE SERPL-MCNC: 4.9 MG/DL — HIGH (ref 2.5–4.5)
PLATELET # BLD AUTO: 150 K/UL — SIGNIFICANT CHANGE UP (ref 150–400)
POTASSIUM SERPL-MCNC: 4.4 MMOL/L — SIGNIFICANT CHANGE UP (ref 3.5–5.3)
POTASSIUM SERPL-SCNC: 4.4 MMOL/L — SIGNIFICANT CHANGE UP (ref 3.5–5.3)
PROT UR-MCNC: 150 MG/DL
RBC # BLD: 3.38 M/UL — LOW (ref 4.2–5.8)
RBC # FLD: 14.7 % — HIGH (ref 10.3–14.5)
RBC CASTS # UR COMP ASSIST: SIGNIFICANT CHANGE UP /HPF (ref 0–2)
SODIUM SERPL-SCNC: 142 MMOL/L — SIGNIFICANT CHANGE UP (ref 135–145)
SODIUM UR-SCNC: 79 MMOL/L — SIGNIFICANT CHANGE UP
SP GR SPEC: 1.01 — SIGNIFICANT CHANGE UP (ref 1.01–1.02)
TROPONIN T, HIGH SENSITIVITY RESULT: 76 NG/L — HIGH (ref 0–51)
UROBILINOGEN FLD QL: NEGATIVE — SIGNIFICANT CHANGE UP
UUN UR-MCNC: 409 MG/DL — SIGNIFICANT CHANGE UP
WBC # BLD: 4.8 K/UL — SIGNIFICANT CHANGE UP (ref 3.8–10.5)
WBC # FLD AUTO: 4.8 K/UL — SIGNIFICANT CHANGE UP (ref 3.8–10.5)
WBC UR QL: SIGNIFICANT CHANGE UP /HPF (ref 0–5)

## 2018-07-24 PROCEDURE — 99222 1ST HOSP IP/OBS MODERATE 55: CPT | Mod: GC

## 2018-07-24 PROCEDURE — 99233 SBSQ HOSP IP/OBS HIGH 50: CPT

## 2018-07-24 RX ORDER — FUROSEMIDE 40 MG
80 TABLET ORAL ONCE
Qty: 0 | Refills: 0 | Status: COMPLETED | OUTPATIENT
Start: 2018-07-24 | End: 2018-07-24

## 2018-07-24 RX ORDER — FUROSEMIDE 40 MG
80 TABLET ORAL ONCE
Qty: 0 | Refills: 0 | Status: DISCONTINUED | OUTPATIENT
Start: 2018-07-24 | End: 2018-07-24

## 2018-07-24 RX ORDER — HYDRALAZINE HCL 50 MG
25 TABLET ORAL EVERY 8 HOURS
Qty: 0 | Refills: 0 | Status: DISCONTINUED | OUTPATIENT
Start: 2018-07-24 | End: 2018-07-25

## 2018-07-24 RX ORDER — FUROSEMIDE 40 MG
10 TABLET ORAL
Qty: 500 | Refills: 0 | Status: DISCONTINUED | OUTPATIENT
Start: 2018-07-24 | End: 2018-07-26

## 2018-07-24 RX ADMIN — ATORVASTATIN CALCIUM 80 MILLIGRAM(S): 80 TABLET, FILM COATED ORAL at 22:57

## 2018-07-24 RX ADMIN — Medication 10 MILLIGRAM(S): at 13:28

## 2018-07-24 RX ADMIN — Medication 5 MG/HR: at 18:42

## 2018-07-24 RX ADMIN — Medication 80 MILLIGRAM(S): at 17:40

## 2018-07-24 RX ADMIN — Medication 25 MILLIGRAM(S): at 05:31

## 2018-07-24 RX ADMIN — HEPARIN SODIUM 5000 UNIT(S): 5000 INJECTION INTRAVENOUS; SUBCUTANEOUS at 05:31

## 2018-07-24 RX ADMIN — Medication 81 MILLIGRAM(S): at 13:28

## 2018-07-24 RX ADMIN — BUMETANIDE 1 MILLIGRAM(S): 0.25 INJECTION INTRAMUSCULAR; INTRAVENOUS at 05:31

## 2018-07-24 RX ADMIN — Medication 25 MILLIGRAM(S): at 22:57

## 2018-07-24 RX ADMIN — Medication 10 MILLIGRAM(S): at 05:31

## 2018-07-24 RX ADMIN — HEPARIN SODIUM 5000 UNIT(S): 5000 INJECTION INTRAVENOUS; SUBCUTANEOUS at 22:57

## 2018-07-24 RX ADMIN — HEPARIN SODIUM 5000 UNIT(S): 5000 INJECTION INTRAVENOUS; SUBCUTANEOUS at 13:28

## 2018-07-24 NOTE — DISCHARGE NOTE ADULT - MEDICATION SUMMARY - MEDICATIONS TO CHANGE
I will SWITCH the dose or number of times a day I take the medications listed below when I get home from the hospital:    bumetanide 2 mg oral tablet  -- 1 tab(s) by mouth once a day    hydrALAZINE 10 mg oral tablet  -- 1 tab(s) by mouth 3 times a day

## 2018-07-24 NOTE — CONSULT NOTE ADULT - SUBJECTIVE AND OBJECTIVE BOX
[facility] DIVISION OF KIDNEY DISEASES AND HYPERTENSION -- INITIAL CONSULT NOTE  --------------------------------------------------------------------------------  HPI:  53-yo M with PMH of Stage 5 CKD, CAD s/p stent (2016), DM2 c/b neuropathy, and HFrEF (LVEF 25%), presenting 1 week later by cardiologist referral for decompensated heart failure, admitted for decompensated HF, found to have SMITA on CKD.       Subjective:  Patient was seen and examined at bedside. Awake, alert, pleasant. No acute issues overnight. Leg swelling improved compared to yesterday, although they still feel swollen. Denies SOB, chest pain, headache, dizziness, palpitation, or diaphoresis. Otherwise ROS is as below.    PAST HISTORY  --------------------------------------------------------------------------------  PAST MEDICAL & SURGICAL HISTORY:  HFrEF (heart failure with reduced ejection fraction)  Myocardial infarction: Jan 2016  Hyperlipidemia  CAD (coronary artery disease)  DM (diabetes mellitus)  HTN (hypertension)  Stented coronary artery: x2  Arterial stent thrombosis    FAMILY HISTORY:  Family history of diabetes mellitus (DM) (Father, Mother, Sibling)  Family history of MI (myocardial infarction) (Father, Mother, Sibling)    PAST SOCIAL HISTORY:    ALLERGIES & MEDICATIONS  --------------------------------------------------------------------------------  Allergies    No Known Allergies    Intolerances      Standing Inpatient Medications  aspirin  chewable 81 milliGRAM(s) Oral daily  atorvastatin 80 milliGRAM(s) Oral at bedtime  buMETAnide 1 milliGRAM(s) Oral daily  heparin  Injectable 5000 Unit(s) SubCutaneous every 8 hours  hydrALAZINE 10 milliGRAM(s) Oral three times a day  metoprolol succinate ER 25 milliGRAM(s) Oral daily    PRN Inpatient Medications      REVIEW OF SYSTEMS  --------------------------------------------------------------------------------  Gen: No fatigue, fevers/chills, weakness  Skin: Several patches of work-related burn over bilateral anterior LE.  Head/Eyes/Ears/Mouth: No headache; Normal hearing; Normal vision w/o blurriness; No sinus pain/discomfort, sore throat  Respiratory: No dyspnea, cough, wheezing  CV: No chest pain, PND, orthopnea  GI: No abdominal pain, diarrhea, constipation, nausea, vomiting  : No increased frequency, dysuria, hematuria, nocturia  Neuro: No dizziness/lightheadedness or weakness; Numbness of both legs; Legs feel heavy upon exertion  Heme: No easy bruising or bleeding  Endo: No heat/cold intolerance  Psych: No significant nervousness, anxiety, stress, depression    All other systems were reviewed and are negative, except as noted.    VITALS/PHYSICAL EXAM  --------------------------------------------------------------------------------  T(C): 36.5 (07-24-18 @ 05:24), Max: 36.9 (07-23-18 @ 19:45)  HR: 62 (07-24-18 @ 05:24) (62 - 70)  BP: 150/85 (07-24-18 @ 05:24) (145/84 - 153/94)  RR: 18 (07-24-18 @ 05:24) (18 - 18)  SpO2: 98% (07-24-18 @ 05:24) (98% - 100%)  Wt(kg): --  Height (cm): 165.1 (07-23-18 @ 19:45)  Weight (kg): 85.6 (07-23-18 @ 19:45)  BMI (kg/m2): 31.4 (07-23-18 @ 19:45)  BSA (m2): 1.93 (07-23-18 @ 19:45)      07-23-18 @ 07:01  -  07-24-18 @ 07:00  --------------------------------------------------------  IN: 300 mL / OUT: 975 mL / NET: -675 mL      Physical Exam:  	Gen: NAD, well-appearing, well-developed  	HEENT: PERRL, EOMI, supple neck, clear oropharynx  	Pulm: CTA B/L; no wheezes or crackles  	CV: RRR, S1S2; no rub, gallops, or murmurs  	Back: No spinal or CVA tenderness; no sacral edema  	Abd: +BS, soft, nontender/nondistended  	: No suprapubic tenderness  	Extremities: No clubbing, cyanosis; Nontender bilateral LE 3+ pitting edema; faint dorsalis pedis pulses present bilaterally; feet cold bilaterally  	Neuro: Sharp sensation intact plantar feet bilaterally.  	Psych: Normal affect and mood; A&O x3  	Skin: Warm, without rashes    LABS/STUDIES  --------------------------------------------------------------------------------              10.1   4.80  >-----------<  150      [07-24-18 @ 08:28]              30.2     142  |  108  |  74  ----------------------------<  104      [07-24-18 @ 07:10]  4.4   |  21  |  4.47        Ca     8.3     [07-24-18 @ 07:10]      Mg     1.9     [07-24-18 @ 07:10]      Phos  4.9     [07-24-18 @ 07:10]    TPro  6.5  /  Alb  3.4  /  TBili  0.3  /  DBili  x   /  AST  26  /  ALT  35  /  AlkPhos  130  [07-23-18 @ 21:08]              [07-24-18 @ 00:03]    Creatinine Trend:  SCr 4.47 [07-24 @ 07:10]  SCr 4.74 [07-23 @ 21:08]  SCr 4.48 [07-18 @ 20:46]    Urinalysis - [07-24-18 @ 01:10]      Color PL Yellow / Appearance Clear / SG 1.010 / pH 6.0      Gluc Trace / Ketone Negative  / Bili Negative / Urobili Negative       Blood Small / Protein 150 / Leuk Est Negative / Nitrite Negative      RBC 3-5 / WBC 0-2 / Hyaline 2-5 / Gran  / Sq Epi  / Non Sq Epi OCC / Bacteria     Urine Sodium 79      [07-24-18 @ 01:10]  Urine Chloride 64      [07-24-18 @ 01:10]    HbA1c 5.7      [07-18-18 @ 20:46]  Lipid: chol 109, TG 64, HDL 48, LDL 53      [07-18-18 @ 20:46]    HBsAg NEGATIVE      [02-11-18 @ 11:40]  HCV 0.06, Nonreactive Hepatitis C AB  S/CO Ratio                        Interpretation  < 1.0                                     Non-Reactive  1.0 - 4.9                           Weakly-Reactive  > 5.0                                 Reactive  Non-Reactive: Aperson with a non-reactive HCV antibody  result is considered uninfected.  No further action is  needed unless recent infection is suspected.  In these  cases, consider repeat testing later to detect  seroconversion..  Weakly-Reactive: HCV antibody test is abnormal, HCV RNA  Qualitative test will follow.  Reactive: HCV antibody test is abnormal, HCV RNA  Qualitative test will follow.  Note: HCV antibody testing is performed on the Abbott   system.      [02-11-18 @ 11:40]    CELSO: titer Negative, pattern --      [02-11-18 @ 11:40]  dsDNA <12      [02-11-18 @ 11:40]  C3 Complement 99.5      [02-11-18 @ 11:40]  C4 Complement 27.7      [02-11-18 @ 11:40]  PR3-ANCA <5.0, interpretation: --      [02-11-18 @ 11:47]  anti-GBM <0.2      [02-11-18 @ 11:40] [facility] DIVISION OF KIDNEY DISEASES AND HYPERTENSION -- INITIAL CONSULT NOTE  --------------------------------------------------------------------------------  HPI:  53-yo M with PMH of Stage 5 CKD, CAD s/p stent (2016), DM2 c/b neuropathy, and HFrEF (LVEF 25%), presenting 1 week later by cardiologist referral for decompensated heart failure, admitted for decompensated HF, found to have SMITA on CKD. He presented to cardiology clinic on 7/18 with leg swelling exacerbation that interrupted with ambulation. Cardiologist referred patient to go to the hospital for volume diuresis. Patient decided to postpone his ER presentation until this Monday (7/23). He was switched over from Lasix 40 mg PO QD to Bumex 2 mg PO QD.     In ED, he was found to be afebrile, /94, RR 18, and O2 sat 100%. He was started on hydralazine 10mg PO TID, metoprolol 25mg PO QD, Bumex 1mg PO QD, and atorvastatin 80 mg PO QHS. He was transferred to medicine floor further care.    Subjective:  Patient was seen and examined at bedside. Awake, alert, pleasant. No acute issues overnight. Leg swelling improved compared to yesterday, although they still feel swollen. Denies SOB, chest pain, headache, dizziness, palpitation, dysuria, or diaphoresis. Otherwise ROS is as below.    PAST HISTORY  --------------------------------------------------------------------------------  PAST MEDICAL & SURGICAL HISTORY:  HFrEF (heart failure with reduced ejection fraction)  Myocardial infarction: Jan 2016  Hyperlipidemia  CAD (coronary artery disease)  DM (diabetes mellitus)  HTN (hypertension)  Stented coronary artery: x2  Arterial stent thrombosis    FAMILY HISTORY:  Family history of diabetes mellitus (DM) (Father, Mother, Sibling)  Family history of MI (myocardial infarction) (Father, Mother, Sibling)    PAST SOCIAL HISTORY:    ALLERGIES & MEDICATIONS  --------------------------------------------------------------------------------  Allergies    No Known Allergies    Intolerances      Standing Inpatient Medications  aspirin  chewable 81 milliGRAM(s) Oral daily  atorvastatin 80 milliGRAM(s) Oral at bedtime  buMETAnide 1 milliGRAM(s) Oral daily  heparin  Injectable 5000 Unit(s) SubCutaneous every 8 hours  hydrALAZINE 10 milliGRAM(s) Oral three times a day  metoprolol succinate ER 25 milliGRAM(s) Oral daily    PRN Inpatient Medications      REVIEW OF SYSTEMS  --------------------------------------------------------------------------------  Gen: No fatigue, fevers/chills, weakness  Skin: Several patches of work-related burn over bilateral anterior LE.  Head/Eyes/Ears/Mouth: No headache; Normal hearing; Normal vision w/o blurriness; No sinus pain/discomfort, sore throat  Respiratory: No dyspnea, cough, wheezing  CV: No chest pain, PND, orthopnea  GI: No abdominal pain, diarrhea, constipation, nausea, vomiting  : No increased frequency, dysuria, hematuria, nocturia  Neuro: No dizziness/lightheadedness or weakness; Numbness of both legs; Legs feel heavy upon exertion  Heme: No easy bruising or bleeding  Endo: No heat/cold intolerance  Psych: No significant nervousness, anxiety, stress, depression    All other systems were reviewed and are negative, except as noted.    VITALS/PHYSICAL EXAM  --------------------------------------------------------------------------------  T(C): 36.5 (07-24-18 @ 05:24), Max: 36.9 (07-23-18 @ 19:45)  HR: 62 (07-24-18 @ 05:24) (62 - 70)  BP: 150/85 (07-24-18 @ 05:24) (145/84 - 153/94)  RR: 18 (07-24-18 @ 05:24) (18 - 18)  SpO2: 98% (07-24-18 @ 05:24) (98% - 100%)  Wt(kg): --  Height (cm): 165.1 (07-23-18 @ 19:45)  Weight (kg): 85.6 (07-23-18 @ 19:45)  BMI (kg/m2): 31.4 (07-23-18 @ 19:45)  BSA (m2): 1.93 (07-23-18 @ 19:45)      07-23-18 @ 07:01  -  07-24-18 @ 07:00  --------------------------------------------------------  IN: 300 mL / OUT: 975 mL / NET: -675 mL      Physical Exam:  	Gen: NAD, well-appearing, well-developed  	HEENT: PERRL, EOMI, supple neck, clear oropharynx  	Pulm: CTA B/L; no wheezes or crackles  	CV: RRR, S1S2; no rub, gallops, or murmurs  	Back: No spinal or CVA tenderness; no sacral edema  	Abd: +BS, soft, nontender/nondistended  	: No suprapubic tenderness  	Extremities: No clubbing, cyanosis; Nontender bilateral LE 3+ pitting edema; faint dorsalis pedis pulses present bilaterally; feet cold bilaterally  	Neuro: Sharp sensation intact plantar feet bilaterally.  	Psych: Normal affect and mood; A&O x3  	Skin: Warm, without rashes    LABS/STUDIES  --------------------------------------------------------------------------------              10.1   4.80  >-----------<  150      [07-24-18 @ 08:28]              30.2     142  |  108  |  74  ----------------------------<  104      [07-24-18 @ 07:10]  4.4   |  21  |  4.47        Ca     8.3     [07-24-18 @ 07:10]      Mg     1.9     [07-24-18 @ 07:10]      Phos  4.9     [07-24-18 @ 07:10]    TPro  6.5  /  Alb  3.4  /  TBili  0.3  /  DBili  x   /  AST  26  /  ALT  35  /  AlkPhos  130  [07-23-18 @ 21:08]              [07-24-18 @ 00:03]    Creatinine Trend:  SCr 4.47 [07-24 @ 07:10]  SCr 4.74 [07-23 @ 21:08]  SCr 4.48 [07-18 @ 20:46]    Urinalysis - [07-24-18 @ 01:10]      Color PL Yellow / Appearance Clear / SG 1.010 / pH 6.0      Gluc Trace / Ketone Negative  / Bili Negative / Urobili Negative       Blood Small / Protein 150 / Leuk Est Negative / Nitrite Negative      RBC 3-5 / WBC 0-2 / Hyaline 2-5 / Gran  / Sq Epi  / Non Sq Epi OCC / Bacteria     Urine Sodium 79      [07-24-18 @ 01:10]  Urine Chloride 64      [07-24-18 @ 01:10]    HbA1c 5.7      [07-18-18 @ 20:46]  Lipid: chol 109, TG 64, HDL 48, LDL 53      [07-18-18 @ 20:46]    HBsAg NEGATIVE      [02-11-18 @ 11:40]  HCV 0.06, Nonreactive Hepatitis C AB  S/CO Ratio                        Interpretation  < 1.0                                     Non-Reactive  1.0 - 4.9                           Weakly-Reactive  > 5.0                                 Reactive  Non-Reactive: Aperson with a non-reactive HCV antibody  result is considered uninfected.  No further action is  needed unless recent infection is suspected.  In these  cases, consider repeat testing later to detect  seroconversion..  Weakly-Reactive: HCV antibody test is abnormal, HCV RNA  Qualitative test will follow.  Reactive: HCV antibody test is abnormal, HCV RNA  Qualitative test will follow.  Note: HCV antibody testing is performed on the Abbott   system.      [02-11-18 @ 11:40]    CELSO: titer Negative, pattern --      [02-11-18 @ 11:40]  dsDNA <12      [02-11-18 @ 11:40]  C3 Complement 99.5      [02-11-18 @ 11:40]  C4 Complement 27.7      [02-11-18 @ 11:40]  PR3-ANCA <5.0, interpretation: --      [02-11-18 @ 11:47]  anti-GBM <0.2      [02-11-18 @ 11:40] [facility] DIVISION OF KIDNEY DISEASES AND HYPERTENSION -- INITIAL CONSULT NOTE  --------------------------------------------------------------------------------  HPI:  53-yo M with PMH of Stage 5 CKD, CAD s/p stent (2016), DM2 c/b neuropathy, and HFrEF (LVEF 25%), presenting 1 week later by cardiologist referral for decompensated heart failure, admitted for decompensated HF, found to have SMITA on CKD. He presented to cardiology clinic on 7/18 with leg swelling exacerbation that interrupted with ambulation. Cardiologist referred patient to go to the hospital for volume diuresis. Patient decided to postpone his ER presentation until this Monday (7/23). He was switched over from Lasix 40 mg PO QD to Bumex 2 mg PO QD.     In ED, he was found to be afebrile, /94, RR 18, and O2 sat 100%. He was started on hydralazine 10mg PO TID, metoprolol 25mg PO QD, Bumex 1mg PO QD, and atorvastatin 80 mg PO QHS. He was transferred to medicine floor for further care.    Subjective:  Patient was seen and examined at bedside. Awake, alert, pleasant. No acute issues overnight. Leg swelling improved compared to yesterday, although they still feel swollen. Denies SOB, chest pain, headache, dizziness, palpitation, dysuria, or diaphoresis. Otherwise ROS is as below.    PAST HISTORY  --------------------------------------------------------------------------------  PAST MEDICAL & SURGICAL HISTORY:  HFrEF (heart failure with reduced ejection fraction)  Myocardial infarction: Jan 2016  Hyperlipidemia  CAD (coronary artery disease)  DM (diabetes mellitus)  HTN (hypertension)  Stented coronary artery: x2  Arterial stent thrombosis    FAMILY HISTORY:  Family history of diabetes mellitus (DM) (Father, Mother, Sibling)  Family history of MI (myocardial infarction) (Father, Mother, Sibling)    PAST SOCIAL HISTORY:    ALLERGIES & MEDICATIONS  --------------------------------------------------------------------------------  Allergies    No Known Allergies    Intolerances      Standing Inpatient Medications  aspirin  chewable 81 milliGRAM(s) Oral daily  atorvastatin 80 milliGRAM(s) Oral at bedtime  buMETAnide 1 milliGRAM(s) Oral daily  heparin  Injectable 5000 Unit(s) SubCutaneous every 8 hours  hydrALAZINE 10 milliGRAM(s) Oral three times a day  metoprolol succinate ER 25 milliGRAM(s) Oral daily    PRN Inpatient Medications      REVIEW OF SYSTEMS  --------------------------------------------------------------------------------  Gen: No fatigue, fevers/chills, weakness  Skin: Several patches of work-related burn over bilateral anterior LE.  Head/Eyes/Ears/Mouth: No headache; Normal hearing; Normal vision w/o blurriness; No sinus pain/discomfort, sore throat  Respiratory: No dyspnea, cough, wheezing  CV: No chest pain, PND, orthopnea  GI: No abdominal pain, diarrhea, constipation, nausea, vomiting  : No increased frequency, dysuria, hematuria, nocturia  Neuro: No dizziness/lightheadedness or weakness; Numbness of both legs; Legs feel heavy upon exertion  Heme: No easy bruising or bleeding  Endo: No heat/cold intolerance  Psych: No significant nervousness, anxiety, stress, depression    All other systems were reviewed and are negative, except as noted.    VITALS/PHYSICAL EXAM  --------------------------------------------------------------------------------  T(C): 36.5 (07-24-18 @ 05:24), Max: 36.9 (07-23-18 @ 19:45)  HR: 62 (07-24-18 @ 05:24) (62 - 70)  BP: 150/85 (07-24-18 @ 05:24) (145/84 - 153/94)  RR: 18 (07-24-18 @ 05:24) (18 - 18)  SpO2: 98% (07-24-18 @ 05:24) (98% - 100%)  Wt(kg): --  Height (cm): 165.1 (07-23-18 @ 19:45)  Weight (kg): 85.6 (07-23-18 @ 19:45)  BMI (kg/m2): 31.4 (07-23-18 @ 19:45)  BSA (m2): 1.93 (07-23-18 @ 19:45)      07-23-18 @ 07:01  -  07-24-18 @ 07:00  --------------------------------------------------------  IN: 300 mL / OUT: 975 mL / NET: -675 mL      Physical Exam:  	Gen: NAD, well-appearing, well-developed  	HEENT: PERRL, EOMI, supple neck, clear oropharynx  	Pulm: CTA B/L; no wheezes or crackles  	CV: RRR, S1S2; no rub, gallops, or murmurs  	Back: No spinal or CVA tenderness; no sacral edema  	Abd: +BS, soft, nontender/nondistended  	: No suprapubic tenderness  	Extremities: No clubbing, cyanosis; Nontender bilateral LE 3+ pitting edema; faint dorsalis pedis pulses present bilaterally; feet cold bilaterally  	Neuro: Sharp sensation intact plantar feet bilaterally.  	Psych: Normal affect and mood; A&O x3  	Skin: Warm, without rashes    LABS/STUDIES  --------------------------------------------------------------------------------              10.1   4.80  >-----------<  150      [07-24-18 @ 08:28]              30.2     142  |  108  |  74  ----------------------------<  104      [07-24-18 @ 07:10]  4.4   |  21  |  4.47        Ca     8.3     [07-24-18 @ 07:10]      Mg     1.9     [07-24-18 @ 07:10]      Phos  4.9     [07-24-18 @ 07:10]    TPro  6.5  /  Alb  3.4  /  TBili  0.3  /  DBili  x   /  AST  26  /  ALT  35  /  AlkPhos  130  [07-23-18 @ 21:08]              [07-24-18 @ 00:03]    Creatinine Trend:  SCr 4.47 [07-24 @ 07:10]  SCr 4.74 [07-23 @ 21:08]  SCr 4.48 [07-18 @ 20:46]    Urinalysis - [07-24-18 @ 01:10]      Color PL Yellow / Appearance Clear / SG 1.010 / pH 6.0      Gluc Trace / Ketone Negative  / Bili Negative / Urobili Negative       Blood Small / Protein 150 / Leuk Est Negative / Nitrite Negative      RBC 3-5 / WBC 0-2 / Hyaline 2-5 / Gran  / Sq Epi  / Non Sq Epi OCC / Bacteria     Urine Sodium 79      [07-24-18 @ 01:10]  Urine Chloride 64      [07-24-18 @ 01:10]    HbA1c 5.7      [07-18-18 @ 20:46]  Lipid: chol 109, TG 64, HDL 48, LDL 53      [07-18-18 @ 20:46]    HBsAg NEGATIVE      [02-11-18 @ 11:40]  HCV 0.06, Nonreactive Hepatitis C AB  S/CO Ratio                        Interpretation  < 1.0                                     Non-Reactive  1.0 - 4.9                           Weakly-Reactive  > 5.0                                 Reactive  Non-Reactive: Aperson with a non-reactive HCV antibody  result is considered uninfected.  No further action is  needed unless recent infection is suspected.  In these  cases, consider repeat testing later to detect  seroconversion..  Weakly-Reactive: HCV antibody test is abnormal, HCV RNA  Qualitative test will follow.  Reactive: HCV antibody test is abnormal, HCV RNA  Qualitative test will follow.  Note: HCV antibody testing is performed on the Abbott   system.      [02-11-18 @ 11:40]    CELSO: titer Negative, pattern --      [02-11-18 @ 11:40]  dsDNA <12      [02-11-18 @ 11:40]  C3 Complement 99.5      [02-11-18 @ 11:40]  C4 Complement 27.7      [02-11-18 @ 11:40]  PR3-ANCA <5.0, interpretation: --      [02-11-18 @ 11:47]  anti-GBM <0.2      [02-11-18 @ 11:40] [facility] DIVISION OF KIDNEY DISEASES AND HYPERTENSION -- INITIAL CONSULT NOTE  --------------------------------------------------------------------------------  HPI:  53-yo M with PMH of Stage 5 CKD, CAD s/p stent (2016), DM2 c/b neuropathy, and HFrEF (LVEF 25%), presenting 1 week later by cardiologist referral for decompensated heart failure, admitted for decompensated HF, found to have SMITA on CKD. He presented to cardiology clinic on 7/18 with leg swelling exacerbation that interrupted with ambulation. Cardiologist referred patient to go to the hospital for volume diuresis. Patient decided to postpone his ER presentation until this Monday (7/23). He was switched over from Lasix 40 mg PO QD to Bumex 2 mg PO QD.     In ED, he was found to be afebrile, /94, RR 18, and O2 sat 100%. He was found to have BUN/Cr 75/4.74 and eGFR 13 in the ED.He was started on hydralazine 10mg PO TID, metoprolol 25mg PO QD, Bumex 1mg PO QD, and atorvastatin 80 mg PO QHS. He was transferred to medicine floor for further care.    Subjective:  Patient was seen and examined at bedside. Awake, alert, pleasant. No acute issues overnight. Leg swelling improved compared to yesterday, although they still feel swollen. Denies SOB, chest pain, headache, dizziness, palpitation, dysuria, or diaphoresis. Otherwise ROS is as below.    PAST HISTORY  --------------------------------------------------------------------------------  PAST MEDICAL & SURGICAL HISTORY:  HFrEF (heart failure with reduced ejection fraction)  Myocardial infarction: Jan 2016  Hyperlipidemia  CAD (coronary artery disease)  DM (diabetes mellitus)  HTN (hypertension)  Stented coronary artery: x2  Arterial stent thrombosis    FAMILY HISTORY:  Family history of diabetes mellitus (DM) (Father, Mother, Sibling)  Family history of MI (myocardial infarction) (Father, Mother, Sibling)    PAST SOCIAL HISTORY:    ALLERGIES & MEDICATIONS  --------------------------------------------------------------------------------  Allergies    No Known Allergies    Intolerances      Standing Inpatient Medications  aspirin  chewable 81 milliGRAM(s) Oral daily  atorvastatin 80 milliGRAM(s) Oral at bedtime  buMETAnide 1 milliGRAM(s) Oral daily  heparin  Injectable 5000 Unit(s) SubCutaneous every 8 hours  hydrALAZINE 10 milliGRAM(s) Oral three times a day  metoprolol succinate ER 25 milliGRAM(s) Oral daily    PRN Inpatient Medications      REVIEW OF SYSTEMS  --------------------------------------------------------------------------------  Gen: No fatigue, fevers/chills, weakness  Skin: Several patches of work-related burn over bilateral anterior LE.  Head/Eyes/Ears/Mouth: No headache; Normal hearing; Normal vision w/o blurriness; No sinus pain/discomfort, sore throat  Respiratory: No dyspnea, cough, wheezing  CV: No chest pain, PND, orthopnea  GI: No abdominal pain, diarrhea, constipation, nausea, vomiting  : No increased frequency, dysuria, hematuria, nocturia  Neuro: No dizziness/lightheadedness or weakness; Numbness of both legs; Legs feel heavy upon exertion  Heme: No easy bruising or bleeding  Endo: No heat/cold intolerance  Psych: No significant nervousness, anxiety, stress, depression    All other systems were reviewed and are negative, except as noted.    VITALS/PHYSICAL EXAM  --------------------------------------------------------------------------------  T(C): 36.5 (07-24-18 @ 05:24), Max: 36.9 (07-23-18 @ 19:45)  HR: 62 (07-24-18 @ 05:24) (62 - 70)  BP: 150/85 (07-24-18 @ 05:24) (145/84 - 153/94)  RR: 18 (07-24-18 @ 05:24) (18 - 18)  SpO2: 98% (07-24-18 @ 05:24) (98% - 100%)  Wt(kg): --  Height (cm): 165.1 (07-23-18 @ 19:45)  Weight (kg): 85.6 (07-23-18 @ 19:45)  BMI (kg/m2): 31.4 (07-23-18 @ 19:45)  BSA (m2): 1.93 (07-23-18 @ 19:45)      07-23-18 @ 07:01  -  07-24-18 @ 07:00  --------------------------------------------------------  IN: 300 mL / OUT: 975 mL / NET: -675 mL      Physical Exam:  	Gen: NAD, well-appearing, well-developed  	HEENT: PERRL, EOMI, supple neck, clear oropharynx  	Pulm: CTA B/L; no wheezes or crackles  	CV: RRR, S1S2; no rub, gallops, or murmurs  	Back: No spinal or CVA tenderness; no sacral edema  	Abd: +BS, soft, nontender/nondistended  	: No suprapubic tenderness  	Extremities: No clubbing, cyanosis; Nontender bilateral LE 3+ pitting edema; faint dorsalis pedis pulses present bilaterally; feet cold bilaterally  	Neuro: Sharp sensation intact plantar feet bilaterally.  	Psych: Normal affect and mood; A&O x3  	Skin: Warm, without rashes    LABS/STUDIES  --------------------------------------------------------------------------------              10.1   4.80  >-----------<  150      [07-24-18 @ 08:28]              30.2     142  |  108  |  74  ----------------------------<  104      [07-24-18 @ 07:10]  4.4   |  21  |  4.47        Ca     8.3     [07-24-18 @ 07:10]      Mg     1.9     [07-24-18 @ 07:10]      Phos  4.9     [07-24-18 @ 07:10]    TPro  6.5  /  Alb  3.4  /  TBili  0.3  /  DBili  x   /  AST  26  /  ALT  35  /  AlkPhos  130  [07-23-18 @ 21:08]              [07-24-18 @ 00:03]    Creatinine Trend:  SCr 4.47 [07-24 @ 07:10]  SCr 4.74 [07-23 @ 21:08]  SCr 4.48 [07-18 @ 20:46]    Urinalysis - [07-24-18 @ 01:10]      Color PL Yellow / Appearance Clear / SG 1.010 / pH 6.0      Gluc Trace / Ketone Negative  / Bili Negative / Urobili Negative       Blood Small / Protein 150 / Leuk Est Negative / Nitrite Negative      RBC 3-5 / WBC 0-2 / Hyaline 2-5 / Gran  / Sq Epi  / Non Sq Epi OCC / Bacteria     Urine Sodium 79      [07-24-18 @ 01:10]  Urine Chloride 64      [07-24-18 @ 01:10]    HbA1c 5.7      [07-18-18 @ 20:46]  Lipid: chol 109, TG 64, HDL 48, LDL 53      [07-18-18 @ 20:46]    HBsAg NEGATIVE      [02-11-18 @ 11:40]  HCV 0.06, Nonreactive Hepatitis C AB  S/CO Ratio                        Interpretation  < 1.0                                     Non-Reactive  1.0 - 4.9                           Weakly-Reactive  > 5.0                                 Reactive  Non-Reactive: Aperson with a non-reactive HCV antibody  result is considered uninfected.  No further action is  needed unless recent infection is suspected.  In these  cases, consider repeat testing later to detect  seroconversion..  Weakly-Reactive: HCV antibody test is abnormal, HCV RNA  Qualitative test will follow.  Reactive: HCV antibody test is abnormal, HCV RNA  Qualitative test will follow.  Note: HCV antibody testing is performed on the [employer]   system.      [02-11-18 @ 11:40]    CELSO: titer Negative, pattern --      [02-11-18 @ 11:40]  dsDNA <12      [02-11-18 @ 11:40]  C3 Complement 99.5      [02-11-18 @ 11:40]  C4 Complement 27.7      [02-11-18 @ 11:40]  PR3-ANCA <5.0, interpretation: --      [02-11-18 @ 11:47]  anti-GBM <0.2      [02-11-18 @ 11:40]

## 2018-07-24 NOTE — DISCHARGE NOTE ADULT - HOME CARE AGENCY
[facility] [phone]. Visiting nurse. They will call you to set up 1st appointment, requested for 7/28. [facility] [phone]. Visiting nurse. They will call you to set up 1st appointment, requested for 7/29.

## 2018-07-24 NOTE — CONSULT NOTE ADULT - PROBLEM SELECTOR RECOMMENDATION 9
eGFR <15 this admission. Stage 5 CKD  - Strict I&O, watch UOP  - Trend SCr  - Refrain from nephrotoxic meds, ACEI, or ARBs  - C/w Bumex 1 mg PO QD for diuresis  - On prior admission, unremarkable HIV, HCV, dsDNA, anti-GBM, ANCA, C3, and C4.  - Close follow-up with nephrologist after discharge. May require long term RRT.  - DASH diet eGFR <15 this admission. Stage 5 CKD  - Trend SCr  - Refrain from nephrotoxic meds, ACEI, or ARBs  - C/w Bumex 1 mg PO QD for diuresis  - On prior admission, unremarkable HIV, HCV, dsDNA, anti-GBM, ANCA, C3, and C4.  - Close follow-up with nephrologist after discharge. May require long term RRT if needs major cardiac procedure  - DASH diet

## 2018-07-24 NOTE — PROGRESS NOTE ADULT - PROBLEM SELECTOR PLAN 2
- pt appears to be on the borderline stage IV to V range  - will continue with diuresis, monitor creatinine and will obtain urine lytes  - monitor urine ouput  - on prior admission, concern for nephrotic syndrome with work up unremarkable (HIV, HCV negative) dsDna, anti-GBM, PR3 ANCA unremarkable, C3, c4 unremarkable. Attributed to long standing diabetic nephropathy and enrolled into CKD program.   - Discuss by o/p nephrology that long term RRT might be necessary, renal consulted

## 2018-07-24 NOTE — DISCHARGE NOTE ADULT - PATIENT PORTAL LINK FT
You can access the [facility] Patient Portal, offered by [facility], by registering with the following website: [email or web address]

## 2018-07-24 NOTE — PROGRESS NOTE ADULT - PROBLEM SELECTOR PLAN 1
- EF 25% by ECHO in 2/2018 with severe global LV dysfunction on LUCIANO in June with severe MR noted   - currently on Bumex 1 mg PO QD, will f/up with CHF team regarding diuretic therapy   - c/w hydralazine 10 mg TID as per home regimen and with BB  - strict I/Os, daily weights   - monitoring lytes

## 2018-07-24 NOTE — DISCHARGE NOTE ADULT - CARE PROVIDERS DIRECT ADDRESSES
[email or web address]

## 2018-07-24 NOTE — DISCHARGE NOTE ADULT - SECONDARY DIAGNOSIS.
Coronary artery disease involving native coronary artery of native heart without angina pectoris CKD (chronic kidney disease), stage IV Type 2 diabetes mellitus with stage 4 chronic kidney disease, unspecified whether long term insulin use

## 2018-07-24 NOTE — CONSULT NOTE ADULT - PROBLEM SELECTOR RECOMMENDATION 2
- BUN/Cr 74/4.47 and Mary Ann 79 on floor. BUN/CR <20 with Mary Ann >20, non-pre-renal.  - Check UCr. Needed to calculate FEurea  - Recheck urine nitrogen with UCr. Check Uosm  - Urine cast - BUN/Cr 74/4.47 and Mary Ann 79 on floor. BUN/CR <20 with Mary Ann >20, non-pre-renal.  - Check UCr. Needed to calculate FEurea  - Recheck urine nitrogen with UCr. Check Uosm  - Urine sedimentation - BUN/Cr 74/4.47 and Mary Ann 79 on floor. BUN/CR <20 with Mary Ann >20, non-pre-renal.  -cardiorenal component possible given severe MR and CHF.

## 2018-07-24 NOTE — PROGRESS NOTE ADULT - SUBJECTIVE AND OBJECTIVE BOX
Patient is a 53y old  Male who presents with a chief complaint of Patient was told to come in last week for decompensated HF when seen by cardiology in clinic , but came in today (2018 10:56)      SUBJECTIVE / OVERNIGHT EVENTS: No complaints     MEDICATIONS  (STANDING):  aspirin  chewable 81 milliGRAM(s) Oral daily  atorvastatin 80 milliGRAM(s) Oral at bedtime  buMETAnide 1 milliGRAM(s) Oral daily  heparin  Injectable 5000 Unit(s) SubCutaneous every 8 hours  hydrALAZINE 10 milliGRAM(s) Oral three times a day  metoprolol succinate ER 25 milliGRAM(s) Oral daily    MEDICATIONS  (PRN):      Vital Signs Last 24 Hrs  T(C): 36.5 (2018 05:24), Max: 36.9 (2018 19:45)  T(F): 97.7 (2018 05:24), Max: 98.4 (2018 19:45)  HR: 63 (2018 13:30) (62 - 70)  BP: 114/84 (2018 13:30) (114/84 - 153/94)  BP(mean): --  RR: 18 (2018 05:24) (18 - 18)  SpO2: 98% (2018 05:24) (98% - 100%)  CAPILLARY BLOOD GLUCOSE      POCT Blood Glucose.: 113 mg/dL (2018 11:48)  POCT Blood Glucose.: 129 mg/dL (2018 07:16)  POCT Blood Glucose.: 105 mg/dL (2018 21:55)    I&O's Summary    2018 07:01  -  2018 07:00  --------------------------------------------------------  IN: 300 mL / OUT: 975 mL / NET: -675 mL        PHYSICAL EXAM:  GENERAL: NAD, well-developed  HEAD:  Atraumatic, Normocephalic  EYES: EOMI, PERRLA, conjunctiva and sclera clear  NECK: Supple, +JVD  CHEST/LUNG: Clear to auscultation bilaterally; No wheeze  HEART: Regular rate and rhythm; No murmurs, rubs, or gallops  ABDOMEN: Soft, Nontender, Nondistended; Bowel sounds present  EXTREMITIES:  2+ Peripheral Pulses, No clubbing, cyanosis, +LE edema  PSYCH: AAOx3  NEUROLOGY: non-focal  SKIN: No rashes or lesions    LABS:                        10.1   4.80  )-----------( 150      ( 2018 08:28 )             30.2         142  |  108  |  74<H>  ----------------------------<  104<H>  4.4   |  21<L>  |  4.47<H>    Ca    8.3<L>      2018 07:10  Phos  4.9       Mg     1.9         TPro  6.5  /  Alb  3.4  /  TBili  0.3  /  DBili  x   /  AST  26  /  ALT  35  /  AlkPhos  130<H>        CARDIAC MARKERS ( 2018 00:03 )  x     / x     / 193 U/L / x     / 5.4 ng/mL  CARDIAC MARKERS ( 2018 21:08 )  x     / x     / 227 U/L / x     / 5.8 ng/mL      Urinalysis Basic - ( 2018 01:10 )    Color: PL Yellow / Appearance: Clear / S.010 / pH: x  Gluc: x / Ketone: Negative  / Bili: Negative / Urobili: Negative   Blood: x / Protein: 150 mg/dL / Nitrite: Negative   Leuk Esterase: Negative / RBC: 3-5 /HPF / WBC 0-2 /HPF   Sq Epi: x / Non Sq Epi: OCC /HPF / Bacteria: x        RADIOLOGY & ADDITIONAL TESTS:    Imaging Personally Reviewed:  Tele reviewed: SR     Consultant(s) Notes Reviewed:      Care Discussed with Consultants/Other Providers:

## 2018-07-24 NOTE — DISCHARGE NOTE ADULT - HOSPITAL COURSE
53M h/o HTN, HLD, CAD (s/p stent in 2016), T2DM c/b diabetic neuropathy, chronic HFrEF (LVEF 25%), a/w acute on chronic systolic heart failure. Pt was diuresed w/ Lasix gtt, diuresed well. Heart failure medications were optimized, metoprolol was switched to Coreg, Hydralazine was increased to 100mg q8h, and Isordil was added. Pt was transitioned to Bumex 3mg PO q12h and was relatively euvolemic at time of discharge. Pt w/ recent echo showing severe MR, likely functional, patient was diuresed and this will be followed up as outpatient. Course complicated by SMITA on CKD IV likely related to diuresis, diuretics were switched to PO and renal function improved.

## 2018-07-24 NOTE — DISCHARGE NOTE ADULT - MEDICATION SUMMARY - MEDICATIONS TO TAKE
I will START or STAY ON the medications listed below when I get home from the hospital:    aspirin 81 mg oral tablet, chewable  -- 1 tab(s) by mouth once a day  -- Indication: For Heart disease    isosorbide dinitrate 20 mg oral tablet  -- 1 tab(s) by mouth 3 times a day  -- Indication: For Heart failure    atorvastatin 80 mg oral tablet  -- 1 tab(s) by mouth once a day  -- Indication: For High cholesterol    carvedilol 6.25 mg oral tablet  -- 1 tab(s) by mouth every 12 hours  -- Indication: For Heart failure    bumetanide 1 mg oral tablet  -- 3 tab(s) by mouth 2 times a day   -- Avoid prolonged or excessive exposure to direct and/or artificial sunlight while taking this medication.  It is very important that you take or use this exactly as directed.  Do not skip doses or discontinue unless directed by your doctor.  It may be advisable to drink a full glass orange juice or eat a banana daily while taking this medication.    -- Indication: For Heart failure    calcium acetate 667 mg oral tablet  -- 1 tab(s) by mouth 3 times a day   -- Indication: For High phosphorus     hydrALAZINE 100 mg oral tablet  -- 1 tab(s) by mouth every 8 hours   -- It is very important that you take or use this exactly as directed.  Do not skip doses or discontinue unless directed by your doctor.  Some non-prescription drugs may aggravate your condition.  Read all labels carefully.  If a warning appears, check with your doctor before taking.    -- Indication: For Heart failure

## 2018-07-24 NOTE — DISCHARGE NOTE ADULT - CARE PLAN
Principal Discharge DX:	Acute on chronic systolic heart failure  Goal:	prevent fluid retention  Assessment and plan of treatment:	Continue Bumex 3mg oral two times a day, weigh yourself daily and notify your cardiologist if your weight increased rapidly by more than 3 pounds.   Continue Isordil, Hydralazine, Coreg.   Heart failure team will contact you for follow up appointment.  Secondary Diagnosis:	Coronary artery disease involving native coronary artery of native heart without angina pectoris  Assessment and plan of treatment:	Continue Aspirin and Lipitor  Secondary Diagnosis:	CKD (chronic kidney disease), stage IV  Assessment and plan of treatment:	Monitor renal function as outpatient.   Avoid nephrotoxic agents.  Secondary Diagnosis:	Type 2 diabetes mellitus with stage 4 chronic kidney disease, unspecified whether long term insulin use  Assessment and plan of treatment:	Hgb A1c 5.7   Continue diet and lifestyle changes

## 2018-07-24 NOTE — DISCHARGE NOTE ADULT - CARE PROVIDER_API CALL
Giovanny Pavon), Nephrology  [address]  [address] [place] [address]  Phone: [phone]  Fax: [phone]    Enio Pavon), Cardiovascular Disease; Internal Medicine  [address] [place] [address]  Phone: [phone]  Fax: [phone]

## 2018-07-24 NOTE — DISCHARGE NOTE ADULT - PLAN OF CARE
prevent fluid retention Continue Bumex 3mg oral two times a day, weigh yourself daily and notify your cardiologist if your weight increased rapidly by more than 3 pounds.   Continue Isordil, Hydralazine, Coreg.   Heart failure team will contact you for follow up appointment. Continue Aspirin and Lipitor Monitor renal function as outpatient.   Avoid nephrotoxic agents. Hgb A1c 5.7   Continue diet and lifestyle changes

## 2018-07-24 NOTE — DISCHARGE NOTE ADULT - REASON FOR ADMISSION
Patient was told to come in last week for decompensated HF when seen by cardiology in clinic , but came in today decompensated HF

## 2018-07-25 ENCOUNTER — APPOINTMENT (OUTPATIENT)
Dept: NEPHROLOGY | Facility: HOSPITAL | Age: 53
End: 2018-07-25

## 2018-07-25 DIAGNOSIS — I34.0 NONRHEUMATIC MITRAL (VALVE) INSUFFICIENCY: ICD-10-CM

## 2018-07-25 DIAGNOSIS — I50.23 ACUTE ON CHRONIC SYSTOLIC (CONGESTIVE) HEART FAILURE: ICD-10-CM

## 2018-07-25 LAB
ANION GAP SERPL CALC-SCNC: 15 MMOL/L — SIGNIFICANT CHANGE UP (ref 5–17)
BUN SERPL-MCNC: 78 MG/DL — HIGH (ref 7–23)
CALCIUM SERPL-MCNC: 8.4 MG/DL — SIGNIFICANT CHANGE UP (ref 8.4–10.5)
CHLORIDE SERPL-SCNC: 104 MMOL/L — SIGNIFICANT CHANGE UP (ref 96–108)
CO2 SERPL-SCNC: 21 MMOL/L — LOW (ref 22–31)
CREAT SERPL-MCNC: 4.43 MG/DL — HIGH (ref 0.5–1.3)
GLUCOSE SERPL-MCNC: 105 MG/DL — HIGH (ref 70–99)
HCT VFR BLD CALC: 33.1 % — LOW (ref 39–50)
HGB BLD-MCNC: 11 G/DL — LOW (ref 13–17)
MAGNESIUM SERPL-MCNC: 1.9 MG/DL — SIGNIFICANT CHANGE UP (ref 1.6–2.6)
MCHC RBC-ENTMCNC: 28.9 PG — SIGNIFICANT CHANGE UP (ref 27–34)
MCHC RBC-ENTMCNC: 33.2 GM/DL — SIGNIFICANT CHANGE UP (ref 32–36)
MCV RBC AUTO: 87.1 FL — SIGNIFICANT CHANGE UP (ref 80–100)
PHOSPHATE SERPL-MCNC: 5.7 MG/DL — HIGH (ref 2.5–4.5)
PLATELET # BLD AUTO: 146 K/UL — LOW (ref 150–400)
POTASSIUM SERPL-MCNC: 4.1 MMOL/L — SIGNIFICANT CHANGE UP (ref 3.5–5.3)
POTASSIUM SERPL-SCNC: 4.1 MMOL/L — SIGNIFICANT CHANGE UP (ref 3.5–5.3)
RBC # BLD: 3.8 M/UL — LOW (ref 4.2–5.8)
RBC # FLD: 14.6 % — HIGH (ref 10.3–14.5)
SODIUM SERPL-SCNC: 140 MMOL/L — SIGNIFICANT CHANGE UP (ref 135–145)
WBC # BLD: 5.37 K/UL — SIGNIFICANT CHANGE UP (ref 3.8–10.5)
WBC # FLD AUTO: 5.37 K/UL — SIGNIFICANT CHANGE UP (ref 3.8–10.5)

## 2018-07-25 PROCEDURE — 99223 1ST HOSP IP/OBS HIGH 75: CPT | Mod: GC

## 2018-07-25 PROCEDURE — 99232 SBSQ HOSP IP/OBS MODERATE 35: CPT | Mod: GC

## 2018-07-25 PROCEDURE — 99233 SBSQ HOSP IP/OBS HIGH 50: CPT

## 2018-07-25 RX ORDER — HYDRALAZINE HCL 50 MG
37.5 TABLET ORAL EVERY 8 HOURS
Qty: 0 | Refills: 0 | Status: DISCONTINUED | OUTPATIENT
Start: 2018-07-25 | End: 2018-07-25

## 2018-07-25 RX ORDER — HYDRALAZINE HCL 50 MG
50 TABLET ORAL EVERY 8 HOURS
Qty: 0 | Refills: 0 | Status: DISCONTINUED | OUTPATIENT
Start: 2018-07-25 | End: 2018-07-27

## 2018-07-25 RX ADMIN — HEPARIN SODIUM 5000 UNIT(S): 5000 INJECTION INTRAVENOUS; SUBCUTANEOUS at 05:38

## 2018-07-25 RX ADMIN — HEPARIN SODIUM 5000 UNIT(S): 5000 INJECTION INTRAVENOUS; SUBCUTANEOUS at 13:08

## 2018-07-25 RX ADMIN — Medication 25 MILLIGRAM(S): at 05:38

## 2018-07-25 RX ADMIN — Medication 81 MILLIGRAM(S): at 13:08

## 2018-07-25 RX ADMIN — Medication 50 MILLIGRAM(S): at 21:27

## 2018-07-25 RX ADMIN — HEPARIN SODIUM 5000 UNIT(S): 5000 INJECTION INTRAVENOUS; SUBCUTANEOUS at 21:27

## 2018-07-25 RX ADMIN — ATORVASTATIN CALCIUM 80 MILLIGRAM(S): 80 TABLET, FILM COATED ORAL at 21:27

## 2018-07-25 NOTE — CONSULT NOTE ADULT - ATTENDING COMMENTS
53 year old M w/ h/o IDDM c/b neuropathy, HTN, CAD (s/p stent in [place] in 2016, unknown coronary anatomy), CHF (EF 25%) who was seen in cardiology clinic 1 week prior and was notably overloaded and recommended to come to ER but he refused; lasix was switched to bumex with some improvement. Since admission, has been diuresing well to lasix gtt. Currently asymptomatic. On exam, JVD approx 16 cm, RRR, no m/r/g, no driveline site issues, 1+ pitting edema b/l. Labs reviewed - BUn/Cr 78/4.43 (improving), A1c 6.6, BNP 33k. TTE/LUCIANO reviewed - dilated LV, severe MR (possibly functional). Overall stage C HF, NYHA class IV with persistent volume overload.  - continue lasix gtt as is  - increase hydral to 50 mg q8h  - appreciate renal input; appears to be chronic renal failure  - continue other home meds
I have seen this patient with the fellow and agree with their assessment and plan. In addition, known CKD and follows with Dr Giovanny Pavon and here with decompensated CHF requiring diuresis and perhaps leading to SMITA.    For his SMITA, he needs continue diuresis and monitor. if cardiac cath is needed, ok to proceed with caution with risk of contrast nephropathy high and need for dialysis moderate. He can hold his diuretic the day before cardiac cath and no fluids needed  He might need to be evaluated by the CHF team or the structural heart team for ?mitral valve eval and potential non invasive procedures given his CKD.   If needs cardiac surgery, there is a very high chance of needing dialysis    Daan Mckeon MD  Cell   Pager   Office

## 2018-07-25 NOTE — PROGRESS NOTE ADULT - PROBLEM SELECTOR PLAN 1
- EF 25% by ECHO in 2/2018 with severe global LV dysfunction on LUCIANO in June with severe MR noted   - continue Lasix gtt at 10mg/hr   - c/w Toprol, increase Hydralazine to 50mg q8h   - strict I/Os, daily weights   - monitoring lytes

## 2018-07-25 NOTE — PROGRESS NOTE ADULT - SUBJECTIVE AND OBJECTIVE BOX
[facility] DIVISION OF KIDNEY DISEASES AND HYPERTENSION -- FOLLOW UP NOTE  --------------------------------------------------------------------------------  Chief Complaint: SMITA on CKD    24 hour events/subjective:  Patient was seen and examined at bedside. Sitting in bed comfortably. No acute event noted overnight. Frequent urination in large volumes overnight. Feeling well. No fever, chills, chest pain, SOB, or abdominal discomfort. Otherwise ROS as below.      PAST HISTORY  --------------------------------------------------------------------------------  No significant changes to PMH, PSH, FHx, SHx, unless otherwise noted    ALLERGIES & MEDICATIONS  --------------------------------------------------------------------------------  Allergies    No Known Allergies    Intolerances      Standing Inpatient Medications  aspirin  chewable 81 milliGRAM(s) Oral daily  atorvastatin 80 milliGRAM(s) Oral at bedtime  furosemide Infusion 10 mG/Hr IV Continuous <Continuous>  heparin  Injectable 5000 Unit(s) SubCutaneous every 8 hours  hydrALAZINE 25 milliGRAM(s) Oral every 8 hours  metoprolol succinate ER 25 milliGRAM(s) Oral daily    PRN Inpatient Medications      REVIEW OF SYSTEMS  --------------------------------------------------------------------------------  Gen: No fatigue, fevers/chills, weakness  Skin: Several patches of work-related burn over bilateral anterior LE.  Head/Eyes/Ears/Mouth: No headache; Normal hearing; Normal vision w/o blurriness; No sinus pain/discomfort, sore throat  Respiratory: No dyspnea, cough, wheezing  CV: No chest pain, PND, orthopnea  GI: No abdominal pain, diarrhea, constipation, nausea, vomiting  : No increased frequency, dysuria, hematuria, nocturia  Neuro: No dizziness/lightheadedness or weakness; Numbness of both legs; Legs feel heavy upon exertion  Heme: No easy bruising or bleeding  Endo: No heat/cold intolerance  Psych: No significant nervousness, anxiety, stress, depression    All other systems were reviewed and are negative, except as noted.    VITALS/PHYSICAL EXAM  --------------------------------------------------------------------------------  T(C): 36.4 (07-25-18 @ 05:29), Max: 36.8 (07-24-18 @ 22:31)  HR: 56 (07-25-18 @ 05:29) (56 - 68)  BP: 149/88 (07-25-18 @ 05:29) (114/84 - 161/97)  RR: 18 (07-25-18 @ 05:29) (18 - 18)  SpO2: 97% (07-25-18 @ 05:29) (97% - 97%)  Wt(kg): --  Height (cm): 165.1 (07-23-18 @ 19:45)  Weight (kg): 85.6 (07-23-18 @ 19:45)  BMI (kg/m2): 31.4 (07-23-18 @ 19:45)  BSA (m2): 1.93 (07-23-18 @ 19:45)      07-24-18 @ 07:01  -  07-25-18 @ 07:00  --------------------------------------------------------  IN: 360 mL / OUT: 1500 mL / NET: -1140 mL    07-25-18 @ 07:01  -  07-25-18 @ 09:13  --------------------------------------------------------  IN: 0 mL / OUT: 900 mL / NET: -900 mL      Physical Exam:  	Gen: NAD, well-appearing, well-developed  	HEENT: PERRL, EOMI, supple neck, clear oropharynx  	Pulm: CTA B/L; no wheezes or crackles  	CV: RRR, S1S2; no rub, gallops, or murmurs  	Back: No spinal or CVA tenderness; no sacral edema  	Abd: +BS, soft, nontender/nondistended  	: No suprapubic tenderness  	Extremities: No clubbing, cyanosis; Nontender minimal bilateral LE pitting edema significantly improved from yesterday; faint dorsalis pedis pulses present bilaterally; feet cold bilaterally  	Neuro: Sharp sensation intact plantar feet bilaterally.  	Psych: Normal affect and mood; A&O x3  	Skin: Warm, without rashes    LABS/STUDIES  --------------------------------------------------------------------------------              11.0   5.37  >-----------<  146      [07-25-18 @ 07:58]              33.1     140  |  104  |  78  ----------------------------<  105      [07-25-18 @ 07:06]  4.1   |  21  |  4.43        Ca     8.4     [07-25-18 @ 07:06]      Mg     1.9     [07-25-18 @ 07:06]      Phos  5.7     [07-25-18 @ 07:06]    TPro  6.5  /  Alb  3.4  /  TBili  0.3  /  DBili  x   /  AST  26  /  ALT  35  /  AlkPhos  130  [07-23-18 @ 21:08]              [07-24-18 @ 00:03]    Creatinine Trend:  SCr 4.43 [07-25 @ 07:06]  SCr 4.47 [07-24 @ 07:10]  SCr 4.74 [07-23 @ 21:08]  SCr 4.48 [07-18 @ 20:46]    Urinalysis - [07-24-18 @ 01:10]      Color PL Yellow / Appearance Clear / SG 1.010 / pH 6.0      Gluc Trace / Ketone Negative  / Bili Negative / Urobili Negative       Blood Small / Protein 150 / Leuk Est Negative / Nitrite Negative      RBC 3-5 / WBC 0-2 / Hyaline 2-5 / Gran  / Sq Epi  / Non Sq Epi OCC / Bacteria     Urine Sodium 79      [07-24-18 @ 01:10]  Urine Urea Nitrogen 409      [07-24-18 @ 06:33]  Urine Chloride 64      [07-24-18 @ 01:10]    HbA1c 5.7      [07-18-18 @ 20:46]  Lipid: chol 109, TG 64, HDL 48, LDL 53      [07-18-18 @ 20:46] [facility] DIVISION OF KIDNEY DISEASES AND HYPERTENSION -- FOLLOW UP NOTE  --------------------------------------------------------------------------------  Chief Complaint: Stage 5 CKD    24 hour events/subjective:  Patient was seen and examined at bedside. Sitting in bed comfortably. No acute event noted overnight. Frequent urination in large volumes overnight. Feeling well. No fever, chills, chest pain, SOB, or abdominal discomfort. Otherwise ROS as below.      PAST HISTORY  --------------------------------------------------------------------------------  No significant changes to PMH, PSH, FHx, SHx, unless otherwise noted    ALLERGIES & MEDICATIONS  --------------------------------------------------------------------------------  Allergies    No Known Allergies    Intolerances      Standing Inpatient Medications  aspirin  chewable 81 milliGRAM(s) Oral daily  atorvastatin 80 milliGRAM(s) Oral at bedtime  furosemide Infusion 10 mG/Hr IV Continuous <Continuous>  heparin  Injectable 5000 Unit(s) SubCutaneous every 8 hours  hydrALAZINE 25 milliGRAM(s) Oral every 8 hours  metoprolol succinate ER 25 milliGRAM(s) Oral daily    PRN Inpatient Medications      REVIEW OF SYSTEMS  --------------------------------------------------------------------------------  Gen: No fatigue, fevers/chills, weakness  Skin: Several patches of work-related burn over bilateral anterior LE.  Head/Eyes/Ears/Mouth: No headache; Normal hearing; Normal vision w/o blurriness; No sinus pain/discomfort, sore throat  Respiratory: No dyspnea, cough, wheezing  CV: No chest pain, PND, orthopnea  GI: No abdominal pain, diarrhea, constipation, nausea, vomiting  : No increased frequency, dysuria, hematuria, nocturia  Neuro: No dizziness/lightheadedness or weakness; Numbness of both legs; Legs feel heavy upon exertion  Heme: No easy bruising or bleeding  Endo: No heat/cold intolerance  Psych: No significant nervousness, anxiety, stress, depression    All other systems were reviewed and are negative, except as noted.    VITALS/PHYSICAL EXAM  --------------------------------------------------------------------------------  T(C): 36.4 (07-25-18 @ 05:29), Max: 36.8 (07-24-18 @ 22:31)  HR: 56 (07-25-18 @ 05:29) (56 - 68)  BP: 149/88 (07-25-18 @ 05:29) (114/84 - 161/97)  RR: 18 (07-25-18 @ 05:29) (18 - 18)  SpO2: 97% (07-25-18 @ 05:29) (97% - 97%)  Wt(kg): --  Height (cm): 165.1 (07-23-18 @ 19:45)  Weight (kg): 85.6 (07-23-18 @ 19:45)  BMI (kg/m2): 31.4 (07-23-18 @ 19:45)  BSA (m2): 1.93 (07-23-18 @ 19:45)      07-24-18 @ 07:01  -  07-25-18 @ 07:00  --------------------------------------------------------  IN: 360 mL / OUT: 1500 mL / NET: -1140 mL    07-25-18 @ 07:01  -  07-25-18 @ 09:13  --------------------------------------------------------  IN: 0 mL / OUT: 900 mL / NET: -900 mL      Physical Exam:  	Gen: NAD, well-appearing, well-developed  	HEENT: PERRL, EOMI, supple neck, clear oropharynx  	Pulm: CTA B/L; no wheezes or crackles  	CV: RRR, S1S2; no rub, gallops, or murmurs  	Back: No spinal or CVA tenderness; no sacral edema  	Abd: +BS, soft, nontender/nondistended  	: No suprapubic tenderness  	Extremities: No clubbing, cyanosis; Nontender minimal bilateral LE pitting edema significantly improved from yesterday; faint dorsalis pedis pulses present bilaterally; feet cold bilaterally  	Neuro: Sharp sensation intact plantar feet bilaterally.  	Psych: Normal affect and mood; A&O x3  	Skin: Warm, without rashes    LABS/STUDIES  --------------------------------------------------------------------------------              11.0   5.37  >-----------<  146      [07-25-18 @ 07:58]              33.1     140  |  104  |  78  ----------------------------<  105      [07-25-18 @ 07:06]  4.1   |  21  |  4.43        Ca     8.4     [07-25-18 @ 07:06]      Mg     1.9     [07-25-18 @ 07:06]      Phos  5.7     [07-25-18 @ 07:06]    TPro  6.5  /  Alb  3.4  /  TBili  0.3  /  DBili  x   /  AST  26  /  ALT  35  /  AlkPhos  130  [07-23-18 @ 21:08]              [07-24-18 @ 00:03]    Creatinine Trend:  SCr 4.43 [07-25 @ 07:06]  SCr 4.47 [07-24 @ 07:10]  SCr 4.74 [07-23 @ 21:08]  SCr 4.48 [07-18 @ 20:46]    Urinalysis - [07-24-18 @ 01:10]      Color PL Yellow / Appearance Clear / SG 1.010 / pH 6.0      Gluc Trace / Ketone Negative  / Bili Negative / Urobili Negative       Blood Small / Protein 150 / Leuk Est Negative / Nitrite Negative      RBC 3-5 / WBC 0-2 / Hyaline 2-5 / Gran  / Sq Epi  / Non Sq Epi OCC / Bacteria     Urine Sodium 79      [07-24-18 @ 01:10]  Urine Urea Nitrogen 409      [07-24-18 @ 06:33]  Urine Chloride 64      [07-24-18 @ 01:10]    HbA1c 5.7      [07-18-18 @ 20:46]  Lipid: chol 109, TG 64, HDL 48, LDL 53      [07-18-18 @ 20:46] [facility] DIVISION OF KIDNEY DISEASES AND HYPERTENSION -- FOLLOW UP NOTE  --------------------------------------------------------------------------------  Chief Complaint: SMITA on Stage 5 CKD    24 hour events/subjective:  Patient was seen and examined at bedside. Sitting in bed comfortably. No acute event noted overnight. Frequent urination in large volumes overnight. Feeling well. No fever, chills, chest pain, SOB, or abdominal discomfort. Otherwise ROS as below.      PAST HISTORY  --------------------------------------------------------------------------------  No significant changes to PMH, PSH, FHx, SHx, unless otherwise noted    ALLERGIES & MEDICATIONS  --------------------------------------------------------------------------------  Allergies    No Known Allergies    Intolerances      Standing Inpatient Medications  aspirin  chewable 81 milliGRAM(s) Oral daily  atorvastatin 80 milliGRAM(s) Oral at bedtime  furosemide Infusion 10 mG/Hr IV Continuous <Continuous>  heparin  Injectable 5000 Unit(s) SubCutaneous every 8 hours  hydrALAZINE 25 milliGRAM(s) Oral every 8 hours  metoprolol succinate ER 25 milliGRAM(s) Oral daily    PRN Inpatient Medications      REVIEW OF SYSTEMS  --------------------------------------------------------------------------------  Gen: No fatigue, fevers/chills, weakness  Skin: Several patches of work-related burn over bilateral anterior LE.  Head/Eyes/Ears/Mouth: No headache; Normal hearing; Normal vision w/o blurriness; No sinus pain/discomfort, sore throat  Respiratory: No dyspnea, cough, wheezing  CV: No chest pain, PND, orthopnea  GI: No abdominal pain, diarrhea, constipation, nausea, vomiting  : No increased frequency, dysuria, hematuria, nocturia  Neuro: No dizziness/lightheadedness or weakness; Numbness of both legs; Legs feel heavy upon exertion  Heme: No easy bruising or bleeding  Endo: No heat/cold intolerance  Psych: No significant nervousness, anxiety, stress, depression    All other systems were reviewed and are negative, except as noted.    VITALS/PHYSICAL EXAM  --------------------------------------------------------------------------------  T(C): 36.4 (07-25-18 @ 05:29), Max: 36.8 (07-24-18 @ 22:31)  HR: 56 (07-25-18 @ 05:29) (56 - 68)  BP: 149/88 (07-25-18 @ 05:29) (114/84 - 161/97)  RR: 18 (07-25-18 @ 05:29) (18 - 18)  SpO2: 97% (07-25-18 @ 05:29) (97% - 97%)  Wt(kg): --  Height (cm): 165.1 (07-23-18 @ 19:45)  Weight (kg): 85.6 (07-23-18 @ 19:45)  BMI (kg/m2): 31.4 (07-23-18 @ 19:45)  BSA (m2): 1.93 (07-23-18 @ 19:45)      07-24-18 @ 07:01  -  07-25-18 @ 07:00  --------------------------------------------------------  IN: 360 mL / OUT: 1500 mL / NET: -1140 mL    07-25-18 @ 07:01  -  07-25-18 @ 09:13  --------------------------------------------------------  IN: 0 mL / OUT: 900 mL / NET: -900 mL      Physical Exam:  	Gen: NAD, well-appearing, well-developed  	HEENT: PERRL, EOMI, supple neck, clear oropharynx  	Pulm: CTA B/L; no wheezes or crackles  	CV: RRR, S1S2; no rub, gallops, or murmurs  	Back: No spinal or CVA tenderness; no sacral edema  	Abd: +BS, soft, nontender/nondistended  	: No suprapubic tenderness  	Extremities: No clubbing, cyanosis; Nontender minimal bilateral LE pitting edema significantly improved from yesterday; faint dorsalis pedis pulses present bilaterally; feet cold bilaterally  	Neuro: Sharp sensation intact plantar feet bilaterally.  	Psych: Normal affect and mood; A&O x3  	Skin: Warm, without rashes    LABS/STUDIES  --------------------------------------------------------------------------------              11.0   5.37  >-----------<  146      [07-25-18 @ 07:58]              33.1     140  |  104  |  78  ----------------------------<  105      [07-25-18 @ 07:06]  4.1   |  21  |  4.43        Ca     8.4     [07-25-18 @ 07:06]      Mg     1.9     [07-25-18 @ 07:06]      Phos  5.7     [07-25-18 @ 07:06]    TPro  6.5  /  Alb  3.4  /  TBili  0.3  /  DBili  x   /  AST  26  /  ALT  35  /  AlkPhos  130  [07-23-18 @ 21:08]              [07-24-18 @ 00:03]    Creatinine Trend:  SCr 4.43 [07-25 @ 07:06]  SCr 4.47 [07-24 @ 07:10]  SCr 4.74 [07-23 @ 21:08]  SCr 4.48 [07-18 @ 20:46]    Urinalysis - [07-24-18 @ 01:10]      Color PL Yellow / Appearance Clear / SG 1.010 / pH 6.0      Gluc Trace / Ketone Negative  / Bili Negative / Urobili Negative       Blood Small / Protein 150 / Leuk Est Negative / Nitrite Negative      RBC 3-5 / WBC 0-2 / Hyaline 2-5 / Gran  / Sq Epi  / Non Sq Epi OCC / Bacteria     Urine Sodium 79      [07-24-18 @ 01:10]  Urine Urea Nitrogen 409      [07-24-18 @ 06:33]  Urine Chloride 64      [07-24-18 @ 01:10]    HbA1c 5.7      [07-18-18 @ 20:46]  Lipid: chol 109, TG 64, HDL 48, LDL 53      [07-18-18 @ 20:46]

## 2018-07-25 NOTE — PROGRESS NOTE ADULT - PROBLEM SELECTOR PLAN 2
- BUN/Cr 74/4.47 and Mary Ann 79 on floor. BUN/CR <20 with Mary Ann >20, non-pre-renal.  - Refrain from nephrotoxic meds, ACEI, or ARBs  - Cardiorenal component possible given severe MR and CHF.

## 2018-07-25 NOTE — CONSULT NOTE ADULT - ASSESSMENT
53 year old male with hx of HTN, HLD, CAD (s/p stent in 2016), T2DM c/b diabetic neuropathy, HFrEF (LVEF 25%) presenting with acute decompensated heart failure. Overall, stage C NYHA III-IV
53-yo M with PMH of Stage 5 CKD, CAD s/p stent (2016), DM2 c/b neuropathy, and HFrEF (LVEF 25%), presenting 1 week later by cardiologist referral for decompensated heart failure, admitted for decompensated HF, found to have SMITA on CKD.

## 2018-07-25 NOTE — PROGRESS NOTE ADULT - SUBJECTIVE AND OBJECTIVE BOX
Patient is a 53y old  Male who presents with a chief complaint of Patient was told to come in last week for decompensated HF when seen by cardiology in clinic , but came in today (2018 10:56)      SUBJECTIVE / OVERNIGHT EVENTS: No complaints.     MEDICATIONS  (STANDING):  aspirin  chewable 81 milliGRAM(s) Oral daily  atorvastatin 80 milliGRAM(s) Oral at bedtime  furosemide Infusion 10 mG/Hr (5 mL/Hr) IV Continuous <Continuous>  heparin  Injectable 5000 Unit(s) SubCutaneous every 8 hours  hydrALAZINE 50 milliGRAM(s) Oral every 8 hours  metoprolol succinate ER 25 milliGRAM(s) Oral daily    MEDICATIONS  (PRN):      Vital Signs Last 24 Hrs  T(C): 36.5 (2018 13:50), Max: 36.8 (2018 22:31)  T(F): 97.7 (2018 13:50), Max: 98.3 (2018 22:31)  HR: 60 (2018 13:50) (56 - 68)  BP: 142/90 (2018 13:50) (142/90 - 161/97)  BP(mean): --  RR: 18 (2018 13:50) (18 - 18)  SpO2: 99% (2018 13:50) (97% - 99%)  CAPILLARY BLOOD GLUCOSE        I&O's Summary    2018 07:01  -  2018 07:00  --------------------------------------------------------  IN: 360 mL / OUT: 1500 mL / NET: -1140 mL    2018 07:01  -  2018 14:25  --------------------------------------------------------  IN: 480 mL / OUT: 1850 mL / NET: -1370 mL        PHYSICAL EXAM:  GENERAL: NAD, well-developed  HEAD:  Atraumatic, Normocephalic  EYES: EOMI, PERRLA, conjunctiva and sclera clear  NECK: Supple, +JVD  CHEST/LUNG: Clear to auscultation bilaterally; No wheeze  HEART: Regular rate and rhythm; No murmurs, rubs, or gallops  ABDOMEN: Soft, Nontender, Nondistended; Bowel sounds present  EXTREMITIES:  2+ Peripheral Pulses, No clubbing, cyanosis, +LE edema  PSYCH: AAOx3  NEUROLOGY: non-focal  SKIN: No rashes or lesions    LABS:                        11.0   5.37  )-----------( 146      ( 2018 07:58 )             33.1     07-    140  |  104  |  78<H>  ----------------------------<  105<H>  4.1   |  21<L>  |  4.43<H>    Ca    8.4      2018 07:06  Phos  5.7     07  Mg     1.9         TPro  6.5  /  Alb  3.4  /  TBili  0.3  /  DBili  x   /  AST  26  /  ALT  35  /  AlkPhos  130<H>  07-23      CARDIAC MARKERS ( 2018 00:03 )  x     / x     / 193 U/L / x     / 5.4 ng/mL  CARDIAC MARKERS ( 2018 21:08 )  x     / x     / 227 U/L / x     / 5.8 ng/mL      Urinalysis Basic - ( 2018 01:10 )    Color: PL Yellow / Appearance: Clear / S.010 / pH: x  Gluc: x / Ketone: Negative  / Bili: Negative / Urobili: Negative   Blood: x / Protein: 150 mg/dL / Nitrite: Negative   Leuk Esterase: Negative / RBC: 3-5 /HPF / WBC 0-2 /HPF   Sq Epi: x / Non Sq Epi: OCC /HPF / Bacteria: x        RADIOLOGY & ADDITIONAL TESTS:    Imaging Personally Reviewed:  Tele reviewed: ROGER ALEJANDRA's     Consultant(s) Notes Reviewed:      Care Discussed with Consultants/Other Providers:

## 2018-07-25 NOTE — CONSULT NOTE ADULT - PROBLEM SELECTOR RECOMMENDATION 9
Remains fluid overloaded w/ good response to lasix gtt.   - c/w lasix gtt, toprol at current doses  - increase hydralazine to 50mg TID

## 2018-07-25 NOTE — CONSULT NOTE ADULT - PROBLEM SELECTOR RECOMMENDATION 2
Previously evaluated on LUCIANO and is severe.  - cont with diuresis for now  - will review images to guide further management (unlikely to be a surgical candidate).

## 2018-07-25 NOTE — PROGRESS NOTE ADULT - ASSESSMENT
53-yo M with PMH of Stage 5 CKD, CAD s/p stent (2016), DM2 c/b neuropathy, and HFrEF (LVEF 25%), presenting 1 week later by cardiologist referral for decompensated heart failure, admitted for decompensated HF, found to have SMITA on CKD.

## 2018-07-25 NOTE — PROGRESS NOTE ADULT - PROBLEM SELECTOR PLAN 2
- pt appears to be on the borderline stage IV to V range  - monitor urine output   - avoid nephrotoxic agents   - continue w/ diuresis   - monitor Cr

## 2018-07-25 NOTE — PROGRESS NOTE ADULT - ATTENDING COMMENTS
I have seen this patient with the fellow and agree with their assessment and plan. In addition, SMITA on CKD and acute crs with need for diuresis for renal venous congestion.   Cont IV drip lasix  Monitor wt daily and JVP, lung US for B lines and assess volume  Allow for crt to increase up to 5-6 and Hct and C02 as tolerated  if pt goes for surgery at this state, high risk for needing dialysis    Dana Mckeon MD  Cell   Pager   Office

## 2018-07-25 NOTE — CONSULT NOTE ADULT - SUBJECTIVE AND OBJECTIVE BOX
Patient seen and evaluated @ 2 [facility]  Chief Complaint: Lower Ext Swelling    HPI:  53 year old male hx of T2DM with compounding neuropathy, HTN, CAD (s/p stent in [place] in 2016, unknown coronary anatomy), CHF (EF 25%) who presents per cardiologist's recommendations. Patient was last seen in Cardiology Clinic on  -- with worsening leg swelling reported with recommendation to go to the hospital for volume diuresis, however patient declined. Was switched over from lasix 40 mg QD to bumex 2 mg QD with reported medication compliance. Reported that he has been urinating (10-12 times during the day and 4 to 5 time during the night with reported large volume quantities) and reportedly has been noting improvement in his LEs. Currently not endorsing any shortness of breath, chest pain, palpitations, nausea/vomiting, abdominal fullness. Does have some back pain with walking (with reported pain in the lower extremities), denies any weakness, urinary or stool incontinence.     Of note, patient was in the hospital in February with worsening leg swelling and concern for advanced kidney injury which was worked up and attributed to be due to diabetic nephropathy. Pt has since then been following with cardiology for management and optimization of his heart failure.     In the ED:  Afebrile, /94 RR 18 O2: 100 (2018 21:36)    PMH:   HFrEF (heart failure with reduced ejection fraction)  Congestive heart failure (CHF)  Myocardial infarction  Hyperlipidemia  CAD (coronary artery disease)  DM (diabetes mellitus)  HTN (hypertension)    PSH:   Stented coronary artery  Arterial stent thrombosis    Medications:   aspirin  chewable 81 milliGRAM(s) Oral daily  atorvastatin 80 milliGRAM(s) Oral at bedtime  furosemide Infusion 10 mG/Hr IV Continuous <Continuous>  heparin  Injectable 5000 Unit(s) SubCutaneous every 8 hours  hydrALAZINE 50 milliGRAM(s) Oral every 8 hours  metoprolol succinate ER 25 milliGRAM(s) Oral daily    Allergies:  No Known Allergies    FAMILY HISTORY:  Family history of diabetes mellitus (DM) (Father, Mother, Sibling)  Family history of MI (myocardial infarction) (Father, Mother, Sibling)    Social History:  Smoking: Former  Alcohol: Social  Drugs: no    Review of Systems:  REVIEW OF SYSTEMS:    CONSTITUTIONAL: No weakness, fevers or chills  EYES/ENT: No visual changes;  No dysphagia  NECK: No pain or stiffness  RESPIRATORY: No cough, wheezing, hemoptysis; No shortness of breath  CARDIOVASCULAR: No chest pain or palpitations; No lower extremity edema  GASTROINTESTINAL: No abdominal or epigastric pain. No nausea, vomiting, or hematemesis; No diarrhea or constipation. No melena or hematochezia.  BACK: No back pain  GENITOURINARY: No dysuria, frequency or hematuria  NEUROLOGICAL: No numbness or weakness  SKIN: No itching, burning, rashes, or lesions   All other review of systems is negative unless indicated above.  [ ] 10 point review of systems is otherwise negative except as mentioned above            [ ]Unable to obtain    Physical Exam:  T(C): 36.5 (18 @ 13:50), Max: 36.8 (18 @ 22:31)  HR: 60 (18 @ 13:50) (56 - 68)  BP: 142/90 (18 @ 13:50) (142/90 - 161/97)  RR: 18 (18 @ 13:50) (18 - 18)  SpO2: 99% (18 @ 13:50) (97% - 99%)  Wt(kg): --  GENERAL: No acute distress, well-developed  HEAD:  Atraumatic, Normocephalic  ENT: EOMI, PERRLA, conjunctiva and sclera clear, Neck supple, + JVD, moist mucosa  CHEST/LUNG: Clear to auscultation bilaterally; No wheeze, equal breath sounds bilaterally   BACK: No spinal tenderness  HEART: Regular rate and rhythm; No murmurs, rubs, or gallops  ABDOMEN: Soft, Nontender, Nondistended; Bowel sounds present  EXTREMITIES: + edema  PSYCH: Nl behavior, nl affect  NEUROLOGY: AAOx3, non-focal, cranial nerves intact  SKIN: Normal color, No rashes or lesions       @ 07:  -   @ 07:00  --------------------------------------------------------  IN: 360 mL / OUT: 1500 mL / NET: -1140 mL     @ :  -   @ 15:12  --------------------------------------------------------  IN: 480 mL / OUT: 1850 mL / NET: -1370 mL      Daily     Daily Weight in k.7 (2018 10:26)    Cardiovascular Diagnostic Testing:  ECG:    < from: TTE with Doppler (w/Cont) (18 @ 14:28) >  atient name: DEMETRIO VILLALTA  YOB: 1965   Age: 52 (M)   MR#: [id number]  Study Date: 2018  [facility] Fore[facility]  Study quality: Technically Fair  Referring Physician: Sean Regalado MD  Blood Pressure: 124/81 mmHg  Height: 165 cm  Weight: 95 kg  BSA: 2 m2  ------------------------------------------------------------------------  PROCEDURE: Transthoracic echocardiogram with 2-D, M-Mode  and complete spectral and color flow Doppler.  Verbal consentwas obtained for injection of echo contrast.  Following  intravenous injection of contrast, harmonic  imaging was performed.  INDICATION: Heart failure, unspecified (I50.9)  ------------------------------------------------------------------------  DIMENSIONS:  Dimensions:     Normal Values:  LA:     3.8 cm    2.0 - 4.0 cm  Ao:     3.0 cm    2.0 - 3.8 cm  SEPTUM: 0.8 cm    0.6 - 1.2 cm  PWT:    0.8 cm    0.6 - 1.1 cm  LVIDd:  6.6 cm    3.0 - 5.6 cm  LVIDs:  5.8 cm    1.8 - 4.0 cm  Derived Variables:  LVMI: 109 g/m2  RWT: 0.24  Fractional short: 12 %  Ejection Fraction (Teicholtz): 26 %  ------------------------------------------------------------------------  OBSERVATIONS:  Mitral Valve: Mitral annular calcification, otherwise  normal mitral valve. Moderate-severe mitral regurgitation  with an eccentric, posteriorly directed jet.  Aortic Root: Normal aortic root.  Aortic Valve: Calcified trileaflet aortic valve with normal  opening.  Left Atrium: Mildly dilated left atrium.  LA volume index =  35 cc/m2.  Left Ventricle: Endocardium not well visualized; grossly  severe global left ventricular systolic dysfunction.  Endocardial visualization enhanced with intravenous  injection of echo contrast (Definity). Moderate left  ventricular enlargement.  Right Heart: Normal right atrium. Right ventricular  enlargement with decreased right ventricular systolic  function. Normal tricuspid valve.  Mild tricuspid  regurgitation. Normal pulmonic valve.  Pericardium/PleuraSmall pericardial effusion superior to  the right atrium.  Hemodynamic: Estimated right ventricular systolic pressure  equals 59 mm Hg, assuming right atrial pressure equals 10  mm Hg, consistent with moderate pulmonary hypertension.  ------------------------------------------------------------------------  CONCLUSIONS:  1. Mitral annular calcification, otherwise normal mitral  valve. Moderate-severe mitral regurgitation with an  eccentric, posteriorly directed jet.  2. Mildly dilated left atrium.  LA volume index = 35 cc/m2.  3. Moderate left ventricular enlargement.  4. Endocardium not well visualized; grossly severe global  left ventricular systolic dysfunction.  Endocardial  visualization enhanced with intravenous injection of echo  contrast (Definity).  5. Right ventricular enlargement with decreased right  ventricular systolic function.  6. Estimated right ventricular systolic pressure equals 59  mm Hg, assuming right atrial pressure equals 10 mm Hg,  consistent with moderate pulmonary hypertension.  ------------------------------------------------------------------------  Confirmed on  2018 - 16:01:45 by Maximiliano Monzon M.D.  ------------------------------------------------------------------------    < end of copied text >    Echo:< from: LUCIANO w/o TTE (w/3D Echo) (18 @ 07:54) >  Patient name: DEMETRIO VILLALTA  YOB: 1965   Age: 53 (M)   MR#: [id number]  Study Date: 2018  Location: [facility]ographer: Patel Kenney M.D.  Study quality: Technically good  Referring Physician: KUMAR CONNELLY MD  Blood Pressure: 144/81 mmHg  Height: 165 cm  Weight: 77 kg  BSA: 1.9 m2  ------------------------------------------------------------------------  PROCEDURE: Transesophageal (LUCIANO) was performed in the  echocardiography laboratory.  Informed consent was first  obtained for LUCIANO.  The patient was sedated by Anesthesia.  The procedure was monitored with automatic blood pressure  monitoring, ECG tracings and pulse oximetry.  Gag reflex  was abolished with topical Cetacaine and the  transesophageal probe was placed in theesophagus posterior  to the heart without complications.  The patient tolerated  the procedure well.   Real-time and reconstructed  3-dimensional imaging was performed.  Color Doppler  analysis was carried out using both 2D and 3D mapping.  Patient was injected with 10 cc's of aerosolized saline.  Patient was injected with 10 cc's of aerosolized saline.  INDICATION: Nonrheumatic mitral (valve) insufficiency  (I34.0)  ------------------------------------------------------------------------  OBSERVATIONS:  Mitral Valve: Normal mitral valve with tethered leaflets.  Severe mitral regurgitation.  Vena contracta width about  1  cm.  Effective regurgitant orifice area (EROA) about  0.46  cm2 (by the PISA method).  Estimated regurgitant volume >  100mL (by the volumetric method).  Systolic reversal of  flow seen in the right superior and right inferior  pulmonary veins.  Aortic Root: Normal aortic root.  Mild non-mobile  atherosclerotic plaque in the aortic arch and descending  thoracic aorta.  Aortic Valve: Normal trileaflet aortic valve. No aortic  valve regurgitation seen.  Left Atrium: Normal left atrium.  No left atrium or left  atrial appendage thrombus.  Left Ventricle: Severe global left ventricular systolic  dysfunction.  Right Heart: Normal right atrium. Right ventricular  enlargement with decreased right ventricular systolic  function. Normal tricuspid valve.  Mild tricuspid  regurgitation. Normal pulmonic valve.  Minimal pulmonic  regurgitation.  Pericardium/PleuraNormal pericardium with no pericardial  effusion.  ------------------------------------------------------------------------  CONCLUSIONS:  1. Normal mitral valve with tethered leaflets. Severe  mitral regurgitation.  Vena contracta width about  1 cm.  Effective regurgitant orifice area (EROA) about  0.46 cm2  (by the PISA method).  Estimated regurgitant volume > 100  mL (by the volumetric method).  Systolic reversal of flow  seen in the right superior and right inferior pulmonary  veins.  2. Normal trileaflet aortic valve. No aortic valve  regurgitation seen.  3. Normal aortic root.  Mild non-mobile atherosclerotic  plaque in the aortic arch and descending thoracic aorta.  4. Normal left atrium.  No left atrium or left atrial  appendage thrombus.  5. Severe global left ventricular systolic dysfunction.  6. Right ventricular enlargement with decreased right  ventricular systolic function.  7. Contrast injection demonstrates no evidence of a patent  foramen ovale.  ------------------------------------------------------------------------  Confirmed on  2018 - 10:57:31 by Maximiliano Monzon M.D.  ------------------------------------------------------------------------    < end of copied text >      Stress Testing:    Cath:    Interpretation of Telemetry:    Imaging:    Labs:                        11.0   5.37  )-----------( 146      ( 2018 07:58 )             33.1         140  |  104  |  78<H>  ----------------------------<  105<H>  4.1   |  21<L>  |  4.43<H>    Ca    8.4      2018 07:06  Phos  5.7       Mg     1.9         TPro  6.5  /  Alb  3.4  /  TBili  0.3  /  DBili  x   /  AST  26  /  ALT  35  /  AlkPhos  130<H>        CARDIAC MARKERS ( 2018 00:03 )  x     / x     / 193 U/L / x     / 5.4 ng/mL  CARDIAC MARKERS ( 2018 21:08 )  x     / x     / 227 U/L / x     / 5.8 ng/mL      Serum Pro-Brain Natriuretic Peptide: 63130 pg/mL ( @ 21:08)  Serum Pro-Brain Natriuretic Peptide: 22755 pg/mL ( @ 20:46)      Hemoglobin A1C, Whole Blood: 5.7 % ( @ 20:46)

## 2018-07-26 LAB
ANION GAP SERPL CALC-SCNC: 15 MMOL/L — SIGNIFICANT CHANGE UP (ref 5–17)
BUN SERPL-MCNC: 83 MG/DL — HIGH (ref 7–23)
CALCIUM SERPL-MCNC: 8.2 MG/DL — LOW (ref 8.4–10.5)
CHLORIDE SERPL-SCNC: 101 MMOL/L — SIGNIFICANT CHANGE UP (ref 96–108)
CO2 SERPL-SCNC: 24 MMOL/L — SIGNIFICANT CHANGE UP (ref 22–31)
CREAT SERPL-MCNC: 4.63 MG/DL — HIGH (ref 0.5–1.3)
GLUCOSE SERPL-MCNC: 134 MG/DL — HIGH (ref 70–99)
HCT VFR BLD CALC: 32.7 % — LOW (ref 39–50)
HGB BLD-MCNC: 10.8 G/DL — LOW (ref 13–17)
MAGNESIUM SERPL-MCNC: 1.8 MG/DL — SIGNIFICANT CHANGE UP (ref 1.6–2.6)
MCHC RBC-ENTMCNC: 29.2 PG — SIGNIFICANT CHANGE UP (ref 27–34)
MCHC RBC-ENTMCNC: 33 GM/DL — SIGNIFICANT CHANGE UP (ref 32–36)
MCV RBC AUTO: 88.4 FL — SIGNIFICANT CHANGE UP (ref 80–100)
PHOSPHATE SERPL-MCNC: 6.1 MG/DL — HIGH (ref 2.5–4.5)
PLATELET # BLD AUTO: 174 K/UL — SIGNIFICANT CHANGE UP (ref 150–400)
POTASSIUM SERPL-MCNC: 4.3 MMOL/L — SIGNIFICANT CHANGE UP (ref 3.5–5.3)
POTASSIUM SERPL-SCNC: 4.3 MMOL/L — SIGNIFICANT CHANGE UP (ref 3.5–5.3)
RBC # BLD: 3.7 M/UL — LOW (ref 4.2–5.8)
RBC # FLD: 14.3 % — SIGNIFICANT CHANGE UP (ref 10.3–14.5)
SODIUM SERPL-SCNC: 140 MMOL/L — SIGNIFICANT CHANGE UP (ref 135–145)
WBC # BLD: 5.5 K/UL — SIGNIFICANT CHANGE UP (ref 3.8–10.5)
WBC # FLD AUTO: 5.5 K/UL — SIGNIFICANT CHANGE UP (ref 3.8–10.5)

## 2018-07-26 PROCEDURE — 99233 SBSQ HOSP IP/OBS HIGH 50: CPT | Mod: GC

## 2018-07-26 PROCEDURE — 99232 SBSQ HOSP IP/OBS MODERATE 35: CPT

## 2018-07-26 PROCEDURE — 99233 SBSQ HOSP IP/OBS HIGH 50: CPT

## 2018-07-26 RX ORDER — CALCIUM ACETATE 667 MG
667 TABLET ORAL
Qty: 0 | Refills: 0 | Status: DISCONTINUED | OUTPATIENT
Start: 2018-07-26 | End: 2018-07-29

## 2018-07-26 RX ORDER — ISOSORBIDE DINITRATE 5 MG/1
10 TABLET ORAL THREE TIMES A DAY
Qty: 0 | Refills: 0 | Status: DISCONTINUED | OUTPATIENT
Start: 2018-07-26 | End: 2018-07-29

## 2018-07-26 RX ADMIN — Medication 5 MG/HR: at 12:00

## 2018-07-26 RX ADMIN — HEPARIN SODIUM 5000 UNIT(S): 5000 INJECTION INTRAVENOUS; SUBCUTANEOUS at 14:43

## 2018-07-26 RX ADMIN — ISOSORBIDE DINITRATE 10 MILLIGRAM(S): 5 TABLET ORAL at 14:43

## 2018-07-26 RX ADMIN — Medication 667 MILLIGRAM(S): at 18:41

## 2018-07-26 RX ADMIN — Medication 5 MG/HR: at 14:45

## 2018-07-26 RX ADMIN — Medication 81 MILLIGRAM(S): at 12:00

## 2018-07-26 RX ADMIN — HEPARIN SODIUM 5000 UNIT(S): 5000 INJECTION INTRAVENOUS; SUBCUTANEOUS at 20:59

## 2018-07-26 RX ADMIN — Medication 50 MILLIGRAM(S): at 05:01

## 2018-07-26 RX ADMIN — Medication 25 MILLIGRAM(S): at 05:01

## 2018-07-26 RX ADMIN — Medication 50 MILLIGRAM(S): at 20:58

## 2018-07-26 RX ADMIN — Medication 50 MILLIGRAM(S): at 14:45

## 2018-07-26 RX ADMIN — ISOSORBIDE DINITRATE 10 MILLIGRAM(S): 5 TABLET ORAL at 20:59

## 2018-07-26 RX ADMIN — ATORVASTATIN CALCIUM 80 MILLIGRAM(S): 80 TABLET, FILM COATED ORAL at 20:59

## 2018-07-26 NOTE — PROGRESS NOTE ADULT - PROBLEM SELECTOR PLAN 1
ICM w/ dilated LV, severe MR (possibly functional). Remains volume overloaded.  Currently asymptomatic. C  - continue lasix gtt as is  - increase hydral to 50 mg q8h  - appreciate renal input; appears to be chronic renal failure  - continue other home meds ICM w/ dilated LV, severe MR - likely functional 2/2 to dilated LV. Clinically remains volume overloaded. JVP improved but still remains elevated. + HJR. minimal pretibial edema. Currently asymptomatic. Blood pressure allowing for further medication titration.  - continue lasix gtt as is  - c/w hydralazine 50mg TID. Start Isordil 10mg TID  - appreciate renal input  - continue other home meds ICM w/ dilated LV, severe MR - likely functional 2/2 to dilated LV. Clinically remains volume overloaded. JVP improved but still remains elevated. + HJR. minimal pretibial edema. Standing weight has improved: 182 down to 177lbs. Currently asymptomatic. Blood pressure allowing for further medication titration.  - continue lasix gtt as is  - c/w hydralazine 50mg TID. Start Isordil 10mg TID  - appreciate renal input  - continue other home meds ICM w/ dilated LV, severe MR - likely functional 2/2 to dilated LV. Clinically remains volume overloaded. JVP improved but still remains elevated. + HJR. minimal pretibial edema. Standing weight has improved: 182 down to 177lbs. Currently asymptomatic. Blood pressure allowing for further medication titration.  - d/c Lasix gtt for now. Will re-evaluate tomorrow. JVD / HJR significantly improved.  - c/w hydralazine 50mg TID. Start Isordil 10mg TID  - appreciate renal input  - continue other home meds

## 2018-07-26 NOTE — PROGRESS NOTE ADULT - PROBLEM SELECTOR PLAN 1
- EF 25% by ECHO in 2/2018 with severe global LV dysfunction on LUCIANO in June with severe MR noted   - continue Lasix gtt at 10mg/hr   - c/w Toprol/Hydralazine   - strict I/Os, daily weights   - monitoring lytes

## 2018-07-26 NOTE — PROGRESS NOTE ADULT - SUBJECTIVE AND OBJECTIVE BOX
Patient is a 53y old  Male who presents with a chief complaint of Patient was told to come in last week for decompensated HF when seen by cardiology in clinic , but came in today (24 Jul 2018 10:56)      SUBJECTIVE / OVERNIGHT EVENTS: No new complaints.     MEDICATIONS  (STANDING):  aspirin  chewable 81 milliGRAM(s) Oral daily  atorvastatin 80 milliGRAM(s) Oral at bedtime  furosemide Infusion 10 mG/Hr (5 mL/Hr) IV Continuous <Continuous>  heparin  Injectable 5000 Unit(s) SubCutaneous every 8 hours  hydrALAZINE 50 milliGRAM(s) Oral every 8 hours  metoprolol succinate ER 25 milliGRAM(s) Oral daily    MEDICATIONS  (PRN):      Vital Signs Last 24 Hrs  T(C): 36.4 (26 Jul 2018 04:34), Max: 36.5 (25 Jul 2018 13:50)  T(F): 97.6 (26 Jul 2018 04:34), Max: 97.7 (25 Jul 2018 13:50)  HR: 61 (26 Jul 2018 04:34) (60 - 61)  BP: 146/85 (26 Jul 2018 04:34) (142/90 - 147/83)  BP(mean): --  RR: 20 (26 Jul 2018 04:34) (18 - 20)  SpO2: 99% (26 Jul 2018 04:34) (97% - 99%)  CAPILLARY BLOOD GLUCOSE        I&O's Summary    25 Jul 2018 07:01  -  26 Jul 2018 07:00  --------------------------------------------------------  IN: 780 mL / OUT: 4050 mL / NET: -3270 mL    26 Jul 2018 07:01  -  26 Jul 2018 11:18  --------------------------------------------------------  IN: 240 mL / OUT: 0 mL / NET: 240 mL        PHYSICAL EXAM:  GENERAL: NAD, well-developed  HEAD:  Atraumatic, Normocephalic  EYES: EOMI, PERRLA, conjunctiva and sclera clear  NECK: Supple, +JVD  CHEST/LUNG: Clear to auscultation bilaterally; No wheeze  HEART: Regular rate and rhythm; No murmurs, rubs, or gallops  ABDOMEN: Soft, Nontender, Nondistended; Bowel sounds present  EXTREMITIES:  2+ Peripheral Pulses, No clubbing, cyanosis, +LE edema  PSYCH: AAOx3  NEUROLOGY: non-focal  SKIN: No rashes or lesions    LABS:                        10.8   5.50  )-----------( 174      ( 26 Jul 2018 08:01 )             32.7     07-26    140  |  101  |  83<H>  ----------------------------<  134<H>  4.3   |  24  |  4.63<H>    Ca    8.2<L>      26 Jul 2018 06:19  Phos  6.1     07-26  Mg     1.8     07-26                RADIOLOGY & ADDITIONAL TESTS:    Imaging Personally Reviewed:  Tele reviewed: SR    Consultant(s) Notes Reviewed:      Care Discussed with Consultants/Other Providers:

## 2018-07-26 NOTE — PROGRESS NOTE ADULT - ATTENDING COMMENTS
53 year old M w/ h/o IDDM c/b neuropathy, HTN, CAD (s/p stent in [place] in 2016, unknown coronary anatomy), CHF (EF 25%) who was seen in cardiology clinic 1 week prior and was notably overloaded and recommended to come to ER but he refused; lasix was switched to bumex with some improvement. Since admission, has been diuresing well to lasix gtt. Currently asymptomatic. On exam, JVD approx 16 cm, RRR, no m/r/g, no driveline site issues, 1+ pitting edema b/l. Labs reviewed - BUn/Cr 78/4.43 (improving), A1c 6.6, BNP 33k. TTE/LUCIANO reviewed - dilated LV, severe MR (possibly functional). Overall stage C HF, NYHA class IV with persistent volume overload.  - continue lasix gtt as is  - increase hydral to 50 mg q8h  - appreciate renal input; appears to be chronic renal failure  - continue other home meds 53 year old M w/ h/o IDDM c/b neuropathy, HTN, CAD (s/p stent in [place] in 2016, unknown coronary anatomy), CHF (EF 25%) who was seen in cardiology clinic 1 week prior and was notably overloaded and recommended to come to ER but he refused; lasix was switched to bumex with some improvement. Since admission, has been diuresing well to lasix gtt. Currently asymptomatic. Improved significantly to lasix gtt. On exam, JVD approx 8 cm, RRR, no m/r/g, no driveline site issues, trace pitting edema b/l. Labs reviewed - BUN/Cr 83/4.63 (uptrend), A1c 6.6, BNP 33k. TTE/LUCIANO reviewed - dilated LV, severe MR (possibly functional). Overall stage C HF, NYHA class IV with persistent volume overload.  - d/c lasix gtt; will start on bumex 4 q12 tomorrow possibly  - hypertensive; increase hydral to 75 mg q8h  - appreciate renal input; appears to be chronic renal failure  - continue other home meds

## 2018-07-26 NOTE — PROGRESS NOTE ADULT - SUBJECTIVE AND OBJECTIVE BOX
[facility] DIVISION OF KIDNEY DISEASES AND HYPERTENSION -- FOLLOW UP NOTE  --------------------------------------------------------------------------------  Chief Complaint: SMITA on Stage 5 CKD    24 hour events/subjective:        PAST HISTORY  --------------------------------------------------------------------------------  No significant changes to PMH, PSH, FHx, SHx, unless otherwise noted    ALLERGIES & MEDICATIONS  --------------------------------------------------------------------------------  Allergies    No Known Allergies    Intolerances      Standing Inpatient Medications  aspirin  chewable 81 milliGRAM(s) Oral daily  atorvastatin 80 milliGRAM(s) Oral at bedtime  furosemide Infusion 10 mG/Hr IV Continuous <Continuous>  heparin  Injectable 5000 Unit(s) SubCutaneous every 8 hours  hydrALAZINE 50 milliGRAM(s) Oral every 8 hours  metoprolol succinate ER 25 milliGRAM(s) Oral daily    PRN Inpatient Medications    REVIEW OF SYSTEMS  --------------------------------------------------------------------------------  Gen: No fatigue, fevers/chills, weakness  Skin: Several patches of work-related burn over bilateral anterior LE.  Head/Eyes/Ears/Mouth: No headache; Normal hearing; Normal vision w/o blurriness; No sinus pain/discomfort, sore throat  Respiratory: No dyspnea, cough, wheezing  CV: No chest pain, PND, orthopnea  GI: No abdominal pain, diarrhea, constipation, nausea, vomiting  : No increased frequency, dysuria, hematuria, nocturia  Neuro: No dizziness/lightheadedness or weakness; Numbness of both legs; Legs feel heavy upon exertion  Heme: No easy bruising or bleeding  Endo: No heat/cold intolerance  Psych: No significant nervousness, anxiety, stress, depression    All other systems were reviewed and are negative, except as noted.    VITALS/PHYSICAL EXAM  --------------------------------------------------------------------------------  T(C): 36.4 (07-26-18 @ 04:34), Max: 36.5 (07-25-18 @ 13:50)  HR: 61 (07-26-18 @ 04:34) (60 - 61)  BP: 146/85 (07-26-18 @ 04:34) (142/90 - 147/83)  RR: 20 (07-26-18 @ 04:34) (18 - 20)  SpO2: 99% (07-26-18 @ 04:34) (97% - 99%)  Wt(kg): --        07-25-18 @ 07:01  -  07-26-18 @ 07:00  --------------------------------------------------------  IN: 780 mL / OUT: 4050 mL / NET: -3270 mL      Physical Exam:  	Gen: NAD, well-appearing, well-developed  	HEENT: PERRL, EOMI, supple neck, clear oropharynx  	Pulm: CTA B/L; no wheezes or crackles  	CV: RRR, S1S2; no rub, gallops, or murmurs  	Back: No spinal or CVA tenderness; no sacral edema  	Abd: +BS, soft, nontender/nondistended  	: No suprapubic tenderness  	Extremities: No clubbing, cyanosis; Nontender minimal bilateral LE pitting edema significantly improved from initial presentation; faint dorsalis pedis pulses present bilaterally; feet cold bilaterally  	Neuro: Sharp sensation intact plantar feet bilaterally.  	Psych: Normal affect and mood; A&O x3  	Skin: Warm, without rashes    LABS/STUDIES  --------------------------------------------------------------------------------              10.8   5.50  >-----------<  174      [07-26-18 @ 08:01]              32.7     140  |  101  |  83  ----------------------------<  134      [07-26-18 @ 06:19]  4.3   |  24  |  4.63        Ca     8.2     [07-26-18 @ 06:19]      Mg     1.8     [07-26-18 @ 06:19]      Phos  6.1     [07-26-18 @ 06:19]            Creatinine Trend:  SCr 4.63 [07-26 @ 06:19]  SCr 4.43 [07-25 @ 07:06]  SCr 4.47 [07-24 @ 07:10]  SCr 4.74 [07-23 @ 21:08]  SCr 4.48 [07-18 @ 20:46]    Urinalysis - [07-24-18 @ 01:10]      Color PL Yellow / Appearance Clear / SG 1.010 / pH 6.0      Gluc Trace / Ketone Negative  / Bili Negative / Urobili Negative       Blood Small / Protein 150 / Leuk Est Negative / Nitrite Negative      RBC 3-5 / WBC 0-2 / Hyaline 2-5 / Gran  / Sq Epi  / Non Sq Epi OCC / Bacteria     Urine Sodium 79      [07-24-18 @ 01:10]  Urine Urea Nitrogen 409      [07-24-18 @ 06:33]  Urine Chloride 64      [07-24-18 @ 01:10]    HbA1c 5.7      [07-18-18 @ 20:46]  Lipid: chol 109, TG 64, HDL 48, LDL 53      [07-18-18 @ 20:46] [facility] DIVISION OF KIDNEY DISEASES AND HYPERTENSION -- FOLLOW UP NOTE  --------------------------------------------------------------------------------  Chief Complaint: SMITA on Stage 5 CKD    24 hour events/subjective:  Patient was seen and examined at bedside. Sitting in bed comfortably. Pleasant and conversant. No acute event overnight. States he was able to ambulate without discomfort from LE edema. Frequent urination in large volumes overnight again with diuretics. Denies fevers, nausea, vomiting, abdominal discomfort, SOB, or headache. ROS as below otherwise.      PAST HISTORY  --------------------------------------------------------------------------------  No significant changes to PMH, PSH, FHx, SHx, unless otherwise noted    ALLERGIES & MEDICATIONS  --------------------------------------------------------------------------------  Allergies    No Known Allergies    Intolerances      Standing Inpatient Medications  aspirin  chewable 81 milliGRAM(s) Oral daily  atorvastatin 80 milliGRAM(s) Oral at bedtime  furosemide Infusion 10 mG/Hr IV Continuous <Continuous>  heparin  Injectable 5000 Unit(s) SubCutaneous every 8 hours  hydrALAZINE 50 milliGRAM(s) Oral every 8 hours  metoprolol succinate ER 25 milliGRAM(s) Oral daily    PRN Inpatient Medications    REVIEW OF SYSTEMS  --------------------------------------------------------------------------------  Gen: No fatigue, fevers/chills, weakness  Skin: Several patches of work-related burn over bilateral anterior LE.  Head/Eyes/Ears/Mouth: No headache; Normal hearing; Normal vision w/o blurriness; No sinus pain/discomfort, sore throat  Respiratory: No dyspnea, cough, wheezing  CV: No chest pain, PND, orthopnea  GI: No abdominal pain, diarrhea, constipation, nausea, vomiting  : No increased frequency, dysuria, hematuria, nocturia  Neuro: No dizziness/lightheadedness or weakness; Numbness of both legs  Heme: No easy bruising or bleeding  Endo: No heat/cold intolerance  Psych: No significant nervousness, anxiety, stress, depression    All other systems were reviewed and are negative, except as noted.    VITALS/PHYSICAL EXAM  --------------------------------------------------------------------------------  T(C): 36.4 (07-26-18 @ 04:34), Max: 36.5 (07-25-18 @ 13:50)  HR: 61 (07-26-18 @ 04:34) (60 - 61)  BP: 146/85 (07-26-18 @ 04:34) (142/90 - 147/83)  RR: 20 (07-26-18 @ 04:34) (18 - 20)  SpO2: 99% (07-26-18 @ 04:34) (97% - 99%)  Wt(kg): --        07-25-18 @ 07:01  -  07-26-18 @ 07:00  --------------------------------------------------------  IN: 780 mL / OUT: 4050 mL / NET: -3270 mL      Physical Exam:  	Gen: NAD, well-appearing, well-developed  	HEENT: PERRL, EOMI, supple neck, clear oropharynx  	Pulm: CTA B/L; no wheezes or crackles  	CV: RRR, S1S2; no rub, gallops, or murmurs  	Back: No spinal or CVA tenderness; no sacral edema  	Abd: +BS, soft, nontender/nondistended  	: No suprapubic tenderness  	Extremities: No clubbing, cyanosis; Nontender minimal bilateral LE pitting edema significantly improved from initial presentation  	Neuro: Sharp sensation intact plantar feet bilaterally.  	Psych: Normal affect and mood; A&O x3  	Skin: Warm, without rashes    LABS/STUDIES  --------------------------------------------------------------------------------              10.8   5.50  >-----------<  174      [07-26-18 @ 08:01]              32.7     140  |  101  |  83  ----------------------------<  134      [07-26-18 @ 06:19]  4.3   |  24  |  4.63        Ca     8.2     [07-26-18 @ 06:19]      Mg     1.8     [07-26-18 @ 06:19]      Phos  6.1     [07-26-18 @ 06:19]            Creatinine Trend:  SCr 4.63 [07-26 @ 06:19]  SCr 4.43 [07-25 @ 07:06]  SCr 4.47 [07-24 @ 07:10]  SCr 4.74 [07-23 @ 21:08]  SCr 4.48 [07-18 @ 20:46]    Urinalysis - [07-24-18 @ 01:10]      Color PL Yellow / Appearance Clear / SG 1.010 / pH 6.0      Gluc Trace / Ketone Negative  / Bili Negative / Urobili Negative       Blood Small / Protein 150 / Leuk Est Negative / Nitrite Negative      RBC 3-5 / WBC 0-2 / Hyaline 2-5 / Gran  / Sq Epi  / Non Sq Epi OCC / Bacteria     Urine Sodium 79      [07-24-18 @ 01:10]  Urine Urea Nitrogen 409      [07-24-18 @ 06:33]  Urine Chloride 64      [07-24-18 @ 01:10]    HbA1c 5.7      [07-18-18 @ 20:46]  Lipid: chol 109, TG 64, HDL 48, LDL 53      [07-18-18 @ 20:46]

## 2018-07-26 NOTE — PROGRESS NOTE ADULT - ATTENDING COMMENTS
I have seen this patient with the fellow and agree with their assessment and plan.    Dana Mckeon MD  Cell   Pager   Office

## 2018-07-26 NOTE — PROGRESS NOTE ADULT - ASSESSMENT
53 year old male with hx of HTN, HLD, CAD (s/p stent in 2016), T2DM c/b diabetic neuropathy, HFrEF (LVEF 25%) presenting with acute decompensated heart failure. Overall, stage C NYHA III-IV

## 2018-07-26 NOTE — PROGRESS NOTE ADULT - SUBJECTIVE AND OBJECTIVE BOX
Interval History: No acute issues overnight    Review Of Systems:  Constitutional: [ ] Fever [ ] Chills [ ] Fatigue [ ] Weight change   HEENT: [ ] Blurred vision [ ] Eye Pain [ ] Headache [ ] Runny nose [ ] Sore Throat   Respiratory: [ ] Cough [ ] Wheezing [ ] Shortness of breath  Cardiovascular: [ ] Chest Pain [ ] Palpitations [ ] SIERRA [ ] PND [ ] Orthopnea  Gastrointestinal: [ ] Abdominal Pain [ ] Diarrhea [ ] Constipation [ ] Hemorrhoids [ ] Nausea [ ] Vomiting  Genitourinary: [ ] Nocturia [ ] Dysuria [ ] Incontinence  Extremities: [ ] Swelling [ ] Joint Pain  Neurologic: [ ] Focal deficit [ ] Paresthesias [ ] Syncope  Lymphatic: [ ] Swelling [ ] Lymphadenopathy   Skin: [ ] Rash [ ] Ecchymoses [ ] Wounds [ ] Lesions  Psychiatry: [ ] Depression [ ] Suicidal/Homicidal Ideation [ ] Anxiety [ ] Sleep Disturbances  [x] 10 point review of systems is otherwise negative except as mentioned above            [ ]Unable to obtain    Medications:  aspirin  chewable 81 milliGRAM(s) Oral daily  atorvastatin 80 milliGRAM(s) Oral at bedtime  calcium acetate 667 milliGRAM(s) Oral three times a day with meals  furosemide Infusion 10 mG/Hr IV Continuous <Continuous>  heparin  Injectable 5000 Unit(s) SubCutaneous every 8 hours  hydrALAZINE 50 milliGRAM(s) Oral every 8 hours  metoprolol succinate ER 25 milliGRAM(s) Oral daily      Vitals:  ICU Vital Signs Last 24 Hrs  T(C): 36.6 (2018 13:02), Max: 36.6 (2018 13:02)  T(F): 97.8 (2018 13:02), Max: 97.8 (2018 13:02)  HR: 58 (2018 13:02) (58 - 61)  BP: 120/71 (2018 13:02) (120/71 - 147/83)  BP(mean): --  ABP: --  ABP(mean): --  RR: 19 (2018 13:02) (18 - 20)  SpO2: 100% (2018 13:02) (97% - 100%)    Daily     Daily Weight in k.5 (2018 08:54)  I&O's Summary    2018 07:01  -  2018 07:00  --------------------------------------------------------  IN: 780 mL / OUT: 4050 mL / NET: -3270 mL    2018 07:01  -  2018 13:19  --------------------------------------------------------  IN: 240 mL / OUT: 340 mL / NET: -100 mL        Physical Exam:  Appearance:  NAD [  ] Intubated [  ] Sedated  HENT: NC/AT  Cardiovascular: S1, S2, [ x ] LE Edema Present, [  ] JVD Present  Respiratory: Clear to auscultation bilaterally,  [   ] Transmitted Breath Sounds From Vent/Trach  Gastrointestinal: Soft, Non-tender, Non-distended, BS+  Psychiatry: [  ] AAOx3  [   ] Follows Commands  [   ] Unable to assess  Skin: Intact,  [  ] Line Sites CDI, [  ] Wound sites CDI    Labs:                        10.8   5.50  )-----------( 174      ( 2018 08:01 )             32.7     07-26    140  |  101  |  83<H>  ----------------------------<  134<H>  4.3   |  24  |  4.63<H>    Ca    8.2<L>      2018 06:19  Phos  6.1       Mg     1.8           Serum Pro-Brain Natriuretic Peptide: [id number] pg/mL ( @ 21:08)    Interpretation of Telemetry: Interval History: No acute issues overnight    Review Of Systems:  Constitutional: [ ] Fever [ ] Chills [ ] Fatigue [ ] Weight change   HEENT: [ ] Blurred vision [ ] Eye Pain [ ] Headache [ ] Runny nose [ ] Sore Throat   Respiratory: [ ] Cough [ ] Wheezing [ ] Shortness of breath  Cardiovascular: [ ] Chest Pain [ ] Palpitations [ ] SIERRA [ ] PND [ ] Orthopnea  Gastrointestinal: [ ] Abdominal Pain [ ] Diarrhea [ ] Constipation [ ] Hemorrhoids [ ] Nausea [ ] Vomiting  Genitourinary: [ ] Nocturia [ ] Dysuria [ ] Incontinence  Extremities: [ ] Swelling [ ] Joint Pain  Neurologic: [ ] Focal deficit [ ] Paresthesias [ ] Syncope  Lymphatic: [ ] Swelling [ ] Lymphadenopathy   Skin: [ ] Rash [ ] Ecchymoses [ ] Wounds [ ] Lesions  Psychiatry: [ ] Depression [ ] Suicidal/Homicidal Ideation [ ] Anxiety [ ] Sleep Disturbances  [x] 10 point review of systems is otherwise negative except as mentioned above            [ ]Unable to obtain    Medications:  aspirin  chewable 81 milliGRAM(s) Oral daily  atorvastatin 80 milliGRAM(s) Oral at bedtime  calcium acetate 667 milliGRAM(s) Oral three times a day with meals  furosemide Infusion 10 mG/Hr IV Continuous <Continuous>  heparin  Injectable 5000 Unit(s) SubCutaneous every 8 hours  hydrALAZINE 50 milliGRAM(s) Oral every 8 hours  metoprolol succinate ER 25 milliGRAM(s) Oral daily      Vitals:  ICU Vital Signs Last 24 Hrs  T(C): 36.6 (2018 13:02), Max: 36.6 (2018 13:02)  T(F): 97.8 (2018 13:02), Max: 97.8 (2018 13:02)  HR: 58 (2018 13:02) (58 - 61)  BP: 120/71 (2018 13:02) (120/71 - 147/83)  BP(mean): --  ABP: --  ABP(mean): --  RR: 19 (2018 13:02) (18 - 20)  SpO2: 100% (2018 13:02) (97% - 100%)    Daily     Daily Weight in k.5 (2018 08:54)  I&O's Summary    2018 07:01  -  2018 07:00  --------------------------------------------------------  IN: 780 mL / OUT: 4050 mL / NET: -3270 mL    2018 07:01  -  2018 13:19  --------------------------------------------------------  IN: 240 mL / OUT: 340 mL / NET: -100 mL        Physical Exam:  Appearance:  NAD [  ] Intubated [  ] Sedated  HENT: NC/AT  Cardiovascular: +S1, S2, [ x ] LE Edema Present, [ x ] JVD Present  Respiratory: Clear to auscultation bilaterally,  [   ] Transmitted Breath Sounds From Vent/Trach  Gastrointestinal: Soft, Non-tender, Non-distended, BS+  Psychiatry: [ x ] AAOx3  [ x  ] Follows Commands  [   ] Unable to assess  Skin: Intact,  [  ] Line Sites CDI, [  ] Wound sites CDI    Labs:                        10.8   5.50  )-----------( 174      ( 2018 08:01 )             32.7     -26    140  |  101  |  83<H>  ----------------------------<  134<H>  4.3   |  24  |  4.63<H>    Ca    8.2<L>      2018 06:19  Phos  6.1       Mg     1.8           Serum Pro-Brain Natriuretic Peptide: 79148 pg/mL ( @ 21:08)    Interpretation of Telemetry:

## 2018-07-26 NOTE — PROGRESS NOTE ADULT - PROBLEM SELECTOR PLAN 1
eGFR <15 this admission. Stage 5 CKD  - Trend SCr, currently stable at low 4s.  - Currently on Lasix 500 mg IV drip. To be continued for now in light of multiple B lines upon point-of-care lung sono, allowing SCr of 5.5-6 as necessary.  - On prior admission, unremarkable HIV, HCV, dsDNA, anti-GBM, ANCA, C3, and C4.  - Close follow-up with nephrologist after discharge. May require long term RRT if needs major cardiac procedure  - DASH diet eGFR <15 this admission. Stage 5 CKD  - Trend SCr, currently stable at low 4s.  - Currently on Lasix  IV drip. To be continued for now in light of multiple B lines upon point-of-care lung sono, allowing SCr of 5.5-6 as necessary.  - On prior admission, unremarkable HIV, HCV, dsDNA, anti-GBM, ANCA, C3, and C4.  - Close follow-up with nephrologist after discharge. May require long term RRT if needs major cardiac procedure

## 2018-07-26 NOTE — PROGRESS NOTE ADULT - ASSESSMENT
53-yo M with PMH of Stage 5 CKD, CAD s/p stent (2016), DM2 c/b neuropathy, and HFrEF (LVEF 25%), presenting 1 week later by cardiologist referral for decompensated heart failure, admitted for decompensated HF, found to have SMITA on CKD, whose edema now resolving.

## 2018-07-26 NOTE — PROGRESS NOTE ADULT - PROBLEM SELECTOR PLAN 2
Continue with diuresis. Hopeful for some reduction in MR as volume status improves. With diuresis, hopeful, for some reduction in MR as volume status improved.  - outpatient follow up for further management.   - appreciate renal input

## 2018-07-27 LAB
ANION GAP SERPL CALC-SCNC: 15 MMOL/L — SIGNIFICANT CHANGE UP (ref 5–17)
BUN SERPL-MCNC: 92 MG/DL — HIGH (ref 7–23)
CALCIUM SERPL-MCNC: 8.5 MG/DL — SIGNIFICANT CHANGE UP (ref 8.4–10.5)
CHLORIDE SERPL-SCNC: 98 MMOL/L — SIGNIFICANT CHANGE UP (ref 96–108)
CO2 SERPL-SCNC: 26 MMOL/L — SIGNIFICANT CHANGE UP (ref 22–31)
CREAT SERPL-MCNC: 5.34 MG/DL — HIGH (ref 0.5–1.3)
GLUCOSE SERPL-MCNC: 113 MG/DL — HIGH (ref 70–99)
MAGNESIUM SERPL-MCNC: 1.8 MG/DL — SIGNIFICANT CHANGE UP (ref 1.6–2.6)
POTASSIUM SERPL-MCNC: 4.3 MMOL/L — SIGNIFICANT CHANGE UP (ref 3.5–5.3)
POTASSIUM SERPL-SCNC: 4.3 MMOL/L — SIGNIFICANT CHANGE UP (ref 3.5–5.3)
SODIUM SERPL-SCNC: 139 MMOL/L — SIGNIFICANT CHANGE UP (ref 135–145)

## 2018-07-27 PROCEDURE — 99233 SBSQ HOSP IP/OBS HIGH 50: CPT

## 2018-07-27 PROCEDURE — 99233 SBSQ HOSP IP/OBS HIGH 50: CPT | Mod: GC

## 2018-07-27 PROCEDURE — 99232 SBSQ HOSP IP/OBS MODERATE 35: CPT | Mod: GC

## 2018-07-27 RX ORDER — BUMETANIDE 0.25 MG/ML
3 INJECTION INTRAMUSCULAR; INTRAVENOUS ONCE
Qty: 0 | Refills: 0 | Status: COMPLETED | OUTPATIENT
Start: 2018-07-27 | End: 2018-07-27

## 2018-07-27 RX ORDER — BUMETANIDE 0.25 MG/ML
3 INJECTION INTRAMUSCULAR; INTRAVENOUS
Qty: 0 | Refills: 0 | Status: DISCONTINUED | OUTPATIENT
Start: 2018-07-28 | End: 2018-07-29

## 2018-07-27 RX ORDER — HYDRALAZINE HCL 50 MG
75 TABLET ORAL EVERY 8 HOURS
Qty: 0 | Refills: 0 | Status: DISCONTINUED | OUTPATIENT
Start: 2018-07-27 | End: 2018-07-28

## 2018-07-27 RX ADMIN — ISOSORBIDE DINITRATE 10 MILLIGRAM(S): 5 TABLET ORAL at 21:38

## 2018-07-27 RX ADMIN — HEPARIN SODIUM 5000 UNIT(S): 5000 INJECTION INTRAVENOUS; SUBCUTANEOUS at 05:04

## 2018-07-27 RX ADMIN — Medication 75 MILLIGRAM(S): at 21:38

## 2018-07-27 RX ADMIN — HEPARIN SODIUM 5000 UNIT(S): 5000 INJECTION INTRAVENOUS; SUBCUTANEOUS at 21:38

## 2018-07-27 RX ADMIN — Medication 25 MILLIGRAM(S): at 05:04

## 2018-07-27 RX ADMIN — Medication 667 MILLIGRAM(S): at 12:02

## 2018-07-27 RX ADMIN — BUMETANIDE 3 MILLIGRAM(S): 0.25 INJECTION INTRAMUSCULAR; INTRAVENOUS at 15:30

## 2018-07-27 RX ADMIN — Medication 667 MILLIGRAM(S): at 08:45

## 2018-07-27 RX ADMIN — HEPARIN SODIUM 5000 UNIT(S): 5000 INJECTION INTRAVENOUS; SUBCUTANEOUS at 15:31

## 2018-07-27 RX ADMIN — ATORVASTATIN CALCIUM 80 MILLIGRAM(S): 80 TABLET, FILM COATED ORAL at 21:38

## 2018-07-27 RX ADMIN — Medication 75 MILLIGRAM(S): at 15:33

## 2018-07-27 RX ADMIN — Medication 81 MILLIGRAM(S): at 12:02

## 2018-07-27 RX ADMIN — ISOSORBIDE DINITRATE 10 MILLIGRAM(S): 5 TABLET ORAL at 15:32

## 2018-07-27 RX ADMIN — ISOSORBIDE DINITRATE 10 MILLIGRAM(S): 5 TABLET ORAL at 05:04

## 2018-07-27 RX ADMIN — Medication 50 MILLIGRAM(S): at 05:04

## 2018-07-27 RX ADMIN — Medication 667 MILLIGRAM(S): at 18:32

## 2018-07-27 NOTE — PROGRESS NOTE ADULT - ATTENDING COMMENTS
53 year old M w/ h/o IDDM c/b neuropathy, HTN, CAD (s/p stent in [place] in 2016, unknown coronary anatomy), CHF (EF 25%) who was seen in cardiology clinic 1 week prior and was notably overloaded and recommended to come to ER but he refused; lasix was switched to bumex with some improvement. Since admission, has been diuresing well to lasix gtt. Currently asymptomatic. Improved significantly to lasix gtt. On exam, JVD approx 8 cm with HJR to 12 cm, RRR, no m/r/g, no pitting edema b/l. Labs reviewed - BUN/Cr 92/5.3 (uptrend), A1c 6.6, BNP 33k. TTE/LUCIANO reviewed - dilated LV, severe MR (possibly functional). Overall stage C HF, NYHA class IV with persistent volume overload.  - start bumex 3 mg PO q12; if tolerates well, possibly discharge Sunday after observing response and renal function  - hypertensive; increase hydral to 75 mg q8h; continue isordil 10 mg q8h  - check BNP  - appreciate renal input; appears to be chronic renal failure and will likely need HD in near future  - continue other home meds

## 2018-07-27 NOTE — PROGRESS NOTE ADULT - SUBJECTIVE AND OBJECTIVE BOX
[facility] DIVISION OF KIDNEY DISEASES AND HYPERTENSION -- FOLLOW UP NOTE  --------------------------------------------------------------------------------  Chief Complaint: SMITA on Stage 5 CKD    24 hour events/subjective:        PAST HISTORY  --------------------------------------------------------------------------------  No significant changes to PMH, PSH, FHx, SHx, unless otherwise noted    ALLERGIES & MEDICATIONS  --------------------------------------------------------------------------------  Allergies    No Known Allergies    Intolerances      Standing Inpatient Medications  aspirin  chewable 81 milliGRAM(s) Oral daily  atorvastatin 80 milliGRAM(s) Oral at bedtime  calcium acetate 667 milliGRAM(s) Oral three times a day with meals  heparin  Injectable 5000 Unit(s) SubCutaneous every 8 hours  hydrALAZINE 50 milliGRAM(s) Oral every 8 hours  isosorbide   dinitrate Tablet (ISORDIL) 10 milliGRAM(s) Oral three times a day  metoprolol succinate ER 25 milliGRAM(s) Oral daily    PRN Inpatient Medications      REVIEW OF SYSTEMS  --------------------------------------------------------------------------------  Gen: No fatigue, fevers/chills, weakness  Skin: Several patches of work-related burn over bilateral anterior LE.  Head/Eyes/Ears/Mouth: No headache; Normal hearing; Normal vision w/o blurriness; No sinus pain/discomfort, sore throat  Respiratory: No dyspnea, cough, wheezing  CV: No chest pain, PND, orthopnea  GI: No abdominal pain, diarrhea, constipation, nausea, vomiting  : No increased frequency, dysuria, hematuria, nocturia  Neuro: No dizziness/lightheadedness or weakness; Numbness of both legs  Heme: No easy bruising or bleeding  Endo: No heat/cold intolerance  Psych: No significant nervousness, anxiety, stress, depression    All other systems were reviewed and are negative, except as noted.    VITALS/PHYSICAL EXAM  --------------------------------------------------------------------------------  T(C): 36.4 (07-27-18 @ 04:20), Max: 36.9 (07-26-18 @ 20:41)  HR: 58 (07-27-18 @ 04:20) (58 - 61)  BP: 135/75 (07-27-18 @ 04:20) (120/71 - 150/85)  RR: 18 (07-27-18 @ 04:20) (18 - 99)  SpO2: 100% (07-27-18 @ 04:20) (100% - 100%)  Wt(kg): --        07-26-18 @ 07:01  -  07-27-18 @ 07:00  --------------------------------------------------------  IN: 1020 mL / OUT: 2600 mL / NET: -1580 mL      Physical Exam:  	Gen: NAD, well-appearing, well-developed  	HEENT: PERRL, EOMI, supple neck, clear oropharynx  	Pulm: CTA B/L; no wheezes or crackles  	CV: RRR, S1S2; no rub, gallops, or murmurs  	Back: No spinal or CVA tenderness; no sacral edema  	Abd: +BS, soft, nontender/nondistended  	: No suprapubic tenderness  	Extremities: No clubbing, cyanosis; Nontender minimal bilateral LE pitting edema significantly improved from initial presentation  	Neuro: Sharp sensation intact plantar feet bilaterally.  	Psych: Normal affect and mood; A&O x3  	Skin: Warm, without rashes    LABS/STUDIES  --------------------------------------------------------------------------------              10.8   5.50  >-----------<  174      [07-26-18 @ 08:01]              32.7     139  |  98  |  92  ----------------------------<  113      [07-27-18 @ 06:10]  4.3   |  26  |  5.34        Ca     8.5     [07-27-18 @ 06:10]      Mg     1.8     [07-27-18 @ 06:10]      Phos  6.1     [07-26-18 @ 06:19]            Creatinine Trend:  SCr 5.34 [07-27 @ 06:10]  SCr 4.63 [07-26 @ 06:19]  SCr 4.43 [07-25 @ 07:06]  SCr 4.47 [07-24 @ 07:10]  SCr 4.74 [07-23 @ 21:08]    Urinalysis - [07-24-18 @ 01:10]      Color PL Yellow / Appearance Clear / SG 1.010 / pH 6.0      Gluc Trace / Ketone Negative  / Bili Negative / Urobili Negative       Blood Small / Protein 150 / Leuk Est Negative / Nitrite Negative      RBC 3-5 / WBC 0-2 / Hyaline 2-5 / Gran  / Sq Epi  / Non Sq Epi OCC / Bacteria     Urine Sodium 79      [07-24-18 @ 01:10]  Urine Urea Nitrogen 409      [07-24-18 @ 06:33]  Urine Chloride 64      [07-24-18 @ 01:10]    HbA1c 5.7      [07-18-18 @ 20:46]  Lipid: chol 109, TG 64, HDL 48, LDL 53      [07-18-18 @ 20:46] [facility] DIVISION OF KIDNEY DISEASES AND HYPERTENSION -- FOLLOW UP NOTE  --------------------------------------------------------------------------------  Chief Complaint: SMITA on Stage 5 CKD    24 hour events/subjective: Patient was seen and examined at bedside. Pleasantly awake and alert. No acute events overnight. Urinating well s/p d/c Lasix. Denies dysuria, fever, chills, SOB, headache, or chest pain.        PAST HISTORY  --------------------------------------------------------------------------------  No significant changes to PMH, PSH, FHx, SHx, unless otherwise noted    ALLERGIES & MEDICATIONS  --------------------------------------------------------------------------------  Allergies    No Known Allergies    Intolerances      Standing Inpatient Medications  aspirin  chewable 81 milliGRAM(s) Oral daily  atorvastatin 80 milliGRAM(s) Oral at bedtime  calcium acetate 667 milliGRAM(s) Oral three times a day with meals  heparin  Injectable 5000 Unit(s) SubCutaneous every 8 hours  hydrALAZINE 50 milliGRAM(s) Oral every 8 hours  isosorbide   dinitrate Tablet (ISORDIL) 10 milliGRAM(s) Oral three times a day  metoprolol succinate ER 25 milliGRAM(s) Oral daily    PRN Inpatient Medications      REVIEW OF SYSTEMS  --------------------------------------------------------------------------------  Gen: No fatigue, fevers/chills, weakness  Skin: Several patches of work-related burn over bilateral anterior LE.  Head/Eyes/Ears/Mouth: No headache; Normal hearing; Normal vision w/o blurriness; No sinus pain/discomfort, sore throat  Respiratory: No dyspnea, cough, wheezing  CV: No chest pain, PND, orthopnea  GI: No abdominal pain, diarrhea, constipation, nausea, vomiting  : No increased frequency, dysuria, hematuria, nocturia  Neuro: No dizziness/lightheadedness or weakness; Numbness of both legs  Heme: No easy bruising or bleeding  Endo: No heat/cold intolerance  Psych: No significant nervousness, anxiety, stress, depression    All other systems were reviewed and are negative, except as noted.    VITALS/PHYSICAL EXAM  --------------------------------------------------------------------------------  T(C): 36.4 (07-27-18 @ 04:20), Max: 36.9 (07-26-18 @ 20:41)  HR: 58 (07-27-18 @ 04:20) (58 - 61)  BP: 135/75 (07-27-18 @ 04:20) (120/71 - 150/85)  RR: 18 (07-27-18 @ 04:20) (18 - 99)  SpO2: 100% (07-27-18 @ 04:20) (100% - 100%)  Wt(kg): --        07-26-18 @ 07:01  -  07-27-18 @ 07:00  --------------------------------------------------------  IN: 1020 mL / OUT: 2600 mL / NET: -1580 mL      Physical Exam:  	Gen: NAD, well-appearing, well-developed  	HEENT: PERRL, EOMI, supple neck, clear oropharynx  	Pulm: CTA B/L; no wheezes or crackles  	CV: RRR, S1S2; no rub, gallops, or murmurs  	Back: No spinal or CVA tenderness; no sacral edema  	Abd: +BS, soft, nontender/nondistended  	: No suprapubic tenderness  	Extremities: No clubbing, cyanosis; No LE edema bilaterally  	Neuro: Sharp sensation intact plantar feet bilaterally.  	Psych: Normal affect and mood; A&O x3  	Skin: Warm, without rashes    LABS/STUDIES  --------------------------------------------------------------------------------              10.8   5.50  >-----------<  174      [07-26-18 @ 08:01]              32.7     139  |  98  |  92  ----------------------------<  113      [07-27-18 @ 06:10]  4.3   |  26  |  5.34        Ca     8.5     [07-27-18 @ 06:10]      Mg     1.8     [07-27-18 @ 06:10]      Phos  6.1     [07-26-18 @ 06:19]            Creatinine Trend:  SCr 5.34 [07-27 @ 06:10]  SCr 4.63 [07-26 @ 06:19]  SCr 4.43 [07-25 @ 07:06]  SCr 4.47 [07-24 @ 07:10]  SCr 4.74 [07-23 @ 21:08]    Urinalysis - [07-24-18 @ 01:10]      Color PL Yellow / Appearance Clear / SG 1.010 / pH 6.0      Gluc Trace / Ketone Negative  / Bili Negative / Urobili Negative       Blood Small / Protein 150 / Leuk Est Negative / Nitrite Negative      RBC 3-5 / WBC 0-2 / Hyaline 2-5 / Gran  / Sq Epi  / Non Sq Epi OCC / Bacteria     Urine Sodium 79      [07-24-18 @ 01:10]  Urine Urea Nitrogen 409      [07-24-18 @ 06:33]  Urine Chloride 64      [07-24-18 @ 01:10]    HbA1c 5.7      [07-18-18 @ 20:46]  Lipid: chol 109, TG 64, HDL 48, LDL 53      [07-18-18 @ 20:46]

## 2018-07-27 NOTE — PROGRESS NOTE ADULT - ATTENDING COMMENTS
I have seen this patient with the fellow and agree with their assessment and plan. In addition, he has CKD stage v and CHF with cyclical cardio-renal syndrome with SMITA and CHF. He gets diruesis and then renal function worsens and stopping diuresis can worsen his CHF.     For now, ok to start bumex 3mg BID and even TID is okay and monitor volume exam  Would monitor for 48 hours on PO diurectics and see which direction his renal function goes. it is possible that he might get worse despite holding diuretics or get worse due to cardio-renal syndrome. He might be best placed on dialysis if this continues to allow for better volume control given his CKD is advanced anyway. Pt agrees on this and will monitor for weekend.    Hyperphosphatemia- cont meds but increase to 2 tabs TID with each meal    Dana Mckeon MD  Cell   Pager   Office     For weekend coverage, call Dr Evelyn Mejia( Fellow) and Dr. Yadira Sage( attending)

## 2018-07-27 NOTE — PROGRESS NOTE ADULT - PROBLEM SELECTOR PLAN 2
With diuresis, hopeful, for some reduction in MR as volume status improved.  - outpatient follow up for further management.   - appreciate renal input

## 2018-07-27 NOTE — PROGRESS NOTE ADULT - PROBLEM SELECTOR PLAN 1
- EF 25% by ECHO in 2/2018 with severe global LV dysfunction on LUCIANO in June with severe MR noted   - s/p Lasix gtt, now off   - c/w Toprol/Hydralazine/Isordil    - strict I/Os, daily weights   - monitoring lytes - EF 25% by ECHO in 2/2018 with severe global LV dysfunction on LUCIANO in June with severe MR noted   - s/p Lasix gtt, start Bumex 3mg PO q12h   - c/w Toprol/Hydralazine/Isordil    - strict I/Os, daily weights   - monitoring lytes

## 2018-07-27 NOTE — PROGRESS NOTE ADULT - PROBLEM SELECTOR PLAN 1
eGFR <15 this admission. Stage 5 CKD  - Trend SCr, currently at 5.34.  - S/p Lasix gtt.  - On prior admission, unremarkable HIV, HCV, dsDNA, anti-GBM, ANCA, C3, and C4.  - Close follow-up with nephrologist after discharge. May require long term RRT if needs major cardiac procedure eGFR <15 this admission. Stage 5 CKD  - Trend SCr, currently at 5.34.  - S/p Lasix gtt. LE edema resolved.  - On prior admission, unremarkable HIV, HCV, dsDNA, anti-GBM, ANCA, C3, and C4.  - Close follow-up with nephrologist after discharge. May require long term RRT if needs major cardiac procedure eGFR <15 this admission. Stage 5 CKD  - Trend SCr, currently at 5.34.  - S/p Lasix gtt. LE edema resolved.

## 2018-07-27 NOTE — PROGRESS NOTE ADULT - PROBLEM SELECTOR PLAN 2
- SMITA on CKD IV   - monitor urine output   - avoid nephrotoxic agents   - monitor Cr - SMITA on CKD IV   - monitor urine output   - avoid nephrotoxic agents   - decreased diuretics   - monitor Cr

## 2018-07-27 NOTE — PROGRESS NOTE ADULT - PROBLEM SELECTOR PLAN 2
- BUN/Cr 74/4.47 and Mary Ann 79 on floor admission. BUN/CR <20 with Mary Ann >20, non-pre-renal.  - Refrain from nephrotoxic meds, ACEI, or ARBs  - Cardiorenal component possible given severe MR and CHF.  - Allow SCr to be within 5 to 5.5 range - BUN/Cr 74/4.47 and Mary Ann 79 on floor admission. BUN/CR <20 with Mary Ann >20, non-pre-renal.  - Cardiorenal component possible given severe MR and CHF.  - Allow SCr to be within 5 to 5.5 range

## 2018-07-27 NOTE — PROGRESS NOTE ADULT - SUBJECTIVE AND OBJECTIVE BOX
Interval History: Denies any chest pain, palpitations, dyspnea overnight.     Review Of Systems:  Constitutional: [ ] Fever [ ] Chills [ ] Fatigue [ ] Weight change   HEENT: [ ] Blurred vision [ ] Eye Pain [ ] Headache [ ] Runny nose [ ] Sore Throat   Respiratory: [ ] Cough [ ] Wheezing [ ] Shortness of breath  Cardiovascular: [ ] Chest Pain [ ] Palpitations [ ] SIERRA [ ] PND [ ] Orthopnea  Gastrointestinal: [ ] Abdominal Pain [ ] Diarrhea [ ] Constipation [ ] Hemorrhoids [ ] Nausea [ ] Vomiting  Genitourinary: [ ] Nocturia [ ] Dysuria [ ] Incontinence  Extremities: [ ] Swelling [ ] Joint Pain  Neurologic: [ ] Focal deficit [ ] Paresthesias [ ] Syncope  Lymphatic: [ ] Swelling [ ] Lymphadenopathy   Skin: [ ] Rash [ ] Ecchymoses [ ] Wounds [ ] Lesions  Psychiatry: [ ] Depression [ ] Suicidal/Homicidal Ideation [ ] Anxiety [ ] Sleep Disturbances  [ x] 10 point review of systems is otherwise negative except as mentioned above            [ ]Unable to obtain    Medications:  aspirin  chewable 81 milliGRAM(s) Oral daily  atorvastatin 80 milliGRAM(s) Oral at bedtime  buMETAnide 3 milliGRAM(s) Oral once  calcium acetate 667 milliGRAM(s) Oral three times a day with meals  heparin  Injectable 5000 Unit(s) SubCutaneous every 8 hours  hydrALAZINE 75 milliGRAM(s) Oral every 8 hours  isosorbide   dinitrate Tablet (ISORDIL) 10 milliGRAM(s) Oral three times a day  metoprolol succinate ER 25 milliGRAM(s) Oral daily      Vitals:  ICU Vital Signs Last 24 Hrs  T(C): 36.7 (2018 13:14), Max: 36.9 (2018 20:41)  T(F): 98 (2018 13:14), Max: 98.4 (2018 20:41)  HR: 66 (2018 13:14) (58 - 66)  BP: 135/75 (2018 13:14) (135/75 - 150/85)  BP(mean): --  ABP: --  ABP(mean): --  RR: 18 (2018 13:14) (18 - 99)  SpO2: 99% (2018 13:14) (99% - 100%)    Daily     Daily Weight in k.2 (2018 07:38)  I&O's Summary    2018 07:  -  2018 07:00  --------------------------------------------------------  IN: 1020 mL / OUT: 2600 mL / NET: -1580 mL    2018 07:  -  2018 14:12  --------------------------------------------------------  IN: 480 mL / OUT: 200 mL / NET: 280 mL        Physical Exam:  Appearance:  NAD [  ] Intubated [  ] Sedated  HENT: NC/AT  Cardiovascular: S1, S2, [  ] LE Edema Present, [  ] JVD Present  Respiratory: Clear to auscultation bilaterally,  [   ] Transmitted Breath Sounds From Vent/Trach  Gastrointestinal: Soft, Non-tender, Non-distended, BS+  Psychiatry: [  ] AAOx3  [   ] Follows Commands  [   ] Unable to assess  Skin: Intact,  [  ] Line Sites CDI, [  ] Wound sites CDI    Labs:                        10.8   5.50  )-----------( 174      ( 2018 08:01 )             32.7     07    139  |  98  |  92<H>  ----------------------------<  113<H>  4.3   |  26  |  5.34<H>    Ca    8.5      2018 06:10  Phos  6.1     -  Mg     1.8           Serum Pro-Brain Natriuretic Peptide: [id number] pg/mL ( @ 21:08)  Interpretation of Telemetry: SR 50-70's

## 2018-07-27 NOTE — PROGRESS NOTE ADULT - SUBJECTIVE AND OBJECTIVE BOX
Patient is a 53y old  Male who presents with a chief complaint of Patient was told to come in last week for decompensated HF when seen by cardiology in clinic , but came in today (24 Jul 2018 10:56)    SUBJECTIVE / OVERNIGHT EVENTS: No complaints.     MEDICATIONS  (STANDING):  aspirin  chewable 81 milliGRAM(s) Oral daily  atorvastatin 80 milliGRAM(s) Oral at bedtime  buMETAnide 3 milliGRAM(s) Oral once  calcium acetate 667 milliGRAM(s) Oral three times a day with meals  heparin  Injectable 5000 Unit(s) SubCutaneous every 8 hours  hydrALAZINE 75 milliGRAM(s) Oral every 8 hours  isosorbide   dinitrate Tablet (ISORDIL) 10 milliGRAM(s) Oral three times a day  metoprolol succinate ER 25 milliGRAM(s) Oral daily    MEDICATIONS  (PRN):      Vital Signs Last 24 Hrs  T(C): 36.4 (27 Jul 2018 04:20), Max: 36.9 (26 Jul 2018 20:41)  T(F): 97.6 (27 Jul 2018 04:20), Max: 98.4 (26 Jul 2018 20:41)  HR: 58 (27 Jul 2018 04:20) (58 - 61)  BP: 135/75 (27 Jul 2018 04:20) (120/71 - 150/85)  BP(mean): --  RR: 18 (27 Jul 2018 04:20) (18 - 99)  SpO2: 100% (27 Jul 2018 04:20) (100% - 100%)  CAPILLARY BLOOD GLUCOSE        I&O's Summary    26 Jul 2018 07:01  -  27 Jul 2018 07:00  --------------------------------------------------------  IN: 1020 mL / OUT: 2600 mL / NET: -1580 mL        PHYSICAL EXAM:  GENERAL: NAD, well-developed  HEAD:  Atraumatic, Normocephalic  EYES: EOMI, PERRLA, conjunctiva and sclera clear  NECK: Supple, no JVD  CHEST/LUNG: Clear to auscultation bilaterally; No wheeze  HEART: Regular rate and rhythm; No murmurs, rubs, or gallops  ABDOMEN: Soft, Nontender, Nondistended; Bowel sounds present  EXTREMITIES:  2+ Peripheral Pulses, No clubbing, cyanosis, trace edema  PSYCH: AAOx3  NEUROLOGY: non-focal  SKIN: No rashes or lesions      LABS:                        10.8   5.50  )-----------( 174      ( 26 Jul 2018 08:01 )             32.7     07-27    139  |  98  |  92<H>  ----------------------------<  113<H>  4.3   |  26  |  5.34<H>    Ca    8.5      27 Jul 2018 06:10  Phos  6.1     07-26  Mg     1.8     07-27                RADIOLOGY & ADDITIONAL TESTS:    Imaging Personally Reviewed:    Consultant(s) Notes Reviewed:      Care Discussed with Consultants/Other Providers:

## 2018-07-27 NOTE — PROGRESS NOTE ADULT - PROBLEM SELECTOR PLAN 1
ICM w/ dilated LV, severe MR - likely functional 2/2 to dilated LV. Clinically improving - less JVD today. Standing weight has improved: 182 down to 172lbs. Currently asymptomatic. Blood pressure allowing for further medication titration.  - Please start Bumex 3mg BID.  - increase hydralazine to 75mg TID  - c/w Isordil 10mg TID  - appreciate renal input  - no objections to discharge tomorrow (PLEASE CHECK A PRO-BNP LEVEL PRIOR WITH AM LABS)

## 2018-07-28 LAB
ANION GAP SERPL CALC-SCNC: 16 MMOL/L — SIGNIFICANT CHANGE UP (ref 5–17)
BUN SERPL-MCNC: 94 MG/DL — HIGH (ref 7–23)
CALCIUM SERPL-MCNC: 8.7 MG/DL — SIGNIFICANT CHANGE UP (ref 8.4–10.5)
CHLORIDE SERPL-SCNC: 99 MMOL/L — SIGNIFICANT CHANGE UP (ref 96–108)
CO2 SERPL-SCNC: 23 MMOL/L — SIGNIFICANT CHANGE UP (ref 22–31)
CREAT SERPL-MCNC: 5.12 MG/DL — HIGH (ref 0.5–1.3)
GLUCOSE SERPL-MCNC: 105 MG/DL — HIGH (ref 70–99)
HCT VFR BLD CALC: 35 % — LOW (ref 39–50)
HGB BLD-MCNC: 11.9 G/DL — LOW (ref 13–17)
MCHC RBC-ENTMCNC: 29.6 PG — SIGNIFICANT CHANGE UP (ref 27–34)
MCHC RBC-ENTMCNC: 34 GM/DL — SIGNIFICANT CHANGE UP (ref 32–36)
MCV RBC AUTO: 87.1 FL — SIGNIFICANT CHANGE UP (ref 80–100)
NT-PROBNP SERPL-SCNC: HIGH PG/ML (ref 0–300)
PLATELET # BLD AUTO: 193 K/UL — SIGNIFICANT CHANGE UP (ref 150–400)
POTASSIUM SERPL-MCNC: 4.3 MMOL/L — SIGNIFICANT CHANGE UP (ref 3.5–5.3)
POTASSIUM SERPL-SCNC: 4.3 MMOL/L — SIGNIFICANT CHANGE UP (ref 3.5–5.3)
RBC # BLD: 4.02 M/UL — LOW (ref 4.2–5.8)
RBC # FLD: 14.1 % — SIGNIFICANT CHANGE UP (ref 10.3–14.5)
SODIUM SERPL-SCNC: 138 MMOL/L — SIGNIFICANT CHANGE UP (ref 135–145)
WBC # BLD: 6.66 K/UL — SIGNIFICANT CHANGE UP (ref 3.8–10.5)
WBC # FLD AUTO: 6.66 K/UL — SIGNIFICANT CHANGE UP (ref 3.8–10.5)

## 2018-07-28 PROCEDURE — 99233 SBSQ HOSP IP/OBS HIGH 50: CPT

## 2018-07-28 RX ORDER — HYDRALAZINE HCL 50 MG
100 TABLET ORAL EVERY 8 HOURS
Qty: 0 | Refills: 0 | Status: DISCONTINUED | OUTPATIENT
Start: 2018-07-28 | End: 2018-07-29

## 2018-07-28 RX ORDER — CARVEDILOL PHOSPHATE 80 MG/1
6.25 CAPSULE, EXTENDED RELEASE ORAL EVERY 12 HOURS
Qty: 0 | Refills: 0 | Status: DISCONTINUED | OUTPATIENT
Start: 2018-07-28 | End: 2018-07-29

## 2018-07-28 RX ADMIN — ATORVASTATIN CALCIUM 80 MILLIGRAM(S): 80 TABLET, FILM COATED ORAL at 21:26

## 2018-07-28 RX ADMIN — Medication 75 MILLIGRAM(S): at 05:29

## 2018-07-28 RX ADMIN — BUMETANIDE 3 MILLIGRAM(S): 0.25 INJECTION INTRAMUSCULAR; INTRAVENOUS at 05:29

## 2018-07-28 RX ADMIN — Medication 25 MILLIGRAM(S): at 05:29

## 2018-07-28 RX ADMIN — HEPARIN SODIUM 5000 UNIT(S): 5000 INJECTION INTRAVENOUS; SUBCUTANEOUS at 12:01

## 2018-07-28 RX ADMIN — Medication 75 MILLIGRAM(S): at 14:11

## 2018-07-28 RX ADMIN — ISOSORBIDE DINITRATE 10 MILLIGRAM(S): 5 TABLET ORAL at 21:26

## 2018-07-28 RX ADMIN — ISOSORBIDE DINITRATE 10 MILLIGRAM(S): 5 TABLET ORAL at 14:11

## 2018-07-28 RX ADMIN — HEPARIN SODIUM 5000 UNIT(S): 5000 INJECTION INTRAVENOUS; SUBCUTANEOUS at 05:29

## 2018-07-28 RX ADMIN — Medication 667 MILLIGRAM(S): at 12:01

## 2018-07-28 RX ADMIN — ISOSORBIDE DINITRATE 10 MILLIGRAM(S): 5 TABLET ORAL at 05:29

## 2018-07-28 RX ADMIN — HEPARIN SODIUM 5000 UNIT(S): 5000 INJECTION INTRAVENOUS; SUBCUTANEOUS at 21:26

## 2018-07-28 RX ADMIN — CARVEDILOL PHOSPHATE 6.25 MILLIGRAM(S): 80 CAPSULE, EXTENDED RELEASE ORAL at 17:19

## 2018-07-28 RX ADMIN — Medication 667 MILLIGRAM(S): at 17:17

## 2018-07-28 RX ADMIN — Medication 81 MILLIGRAM(S): at 12:01

## 2018-07-28 RX ADMIN — BUMETANIDE 3 MILLIGRAM(S): 0.25 INJECTION INTRAMUSCULAR; INTRAVENOUS at 17:17

## 2018-07-28 RX ADMIN — Medication 667 MILLIGRAM(S): at 08:18

## 2018-07-28 RX ADMIN — Medication 100 MILLIGRAM(S): at 21:26

## 2018-07-28 NOTE — PROGRESS NOTE ADULT - PROBLEM SELECTOR PLAN 2
- BUN/Cr 74/4.47 and Mary Ann 79 on floor admission. BUN/CR <20 with Mary Ann >20, non-pre-renal.  - Cardiorenal component possible given severe MR and CHF.  - Allow SCr to be within 5 to 5.5 range

## 2018-07-28 NOTE — PROGRESS NOTE ADULT - PROBLEM SELECTOR PLAN 2
- SMITA on CKD IV d/t diuresis, Cr trending down   - monitor urine output   - avoid nephrotoxic agents   - decreased diuretics   - monitor Cr

## 2018-07-28 NOTE — PROGRESS NOTE ADULT - ASSESSMENT
53 year old M w/ h/o IDDM c/b neuropathy, HTN, CAD (s/p stent in [place] in 2016, unknown coronary anatomy), CHF (EF 25%) who was seen in cardiology clinic 1 week prior and was notably overloaded and recommended to come to ER but he refused; lasix was switched to bumex with some improvement. Since admission, has been diuresing well to lasix gtt, now switched to bumex. Currently asymptomatic. On exam, JVD approx 8 cm with HJR to 10 cm, RRR, no m/r/g, no pitting edema b/l. Labs reviewed - BUN/Cr 94/5.1 (stable), A1c 6.6, BNP 15k from 33k. TTE/LUCIANO reviewed - dilated LV, severe MR (possibly functional). Overall stage C HF, NYHA class IV with inital hypervolemia now improved  - contiue bumex 3 mg PO q12; if tolerates well, possibly discharge Sunday after observing response and renal function  - hypertensive; increase hydral to 100 mg q8h; increase isordil 20 mg q8h  - appreciate renal input; appears to be chronic renal failure and will likely need HD in near future  - continue other home meds 53 year old M w/ h/o IDDM c/b neuropathy, HTN, CAD (s/p stent in [place] in 2016, unknown coronary anatomy), CHF (EF 25%) who was seen in cardiology clinic 1 week prior and was notably overloaded and recommended to come to ER but he refused; lasix was switched to bumex with some improvement. Since admission, has been diuresing well to lasix gtt, now switched to bumex. Currently asymptomatic. On exam, JVD approx 8 cm with HJR to 10 cm, RRR, no m/r/g, no pitting edema b/l. Labs reviewed - BUN/Cr 94/5.1 (stable), A1c 6.6, BNP 15k from 33k. TTE/LUCIANO reviewed - dilated LV, severe MR (possibly functional). Overall stage C HF, NYHA class IV with inital hypervolemia now improved  - contiue bumex 3 mg PO q12; if tolerates well, possibly discharge Sunday after observing response and renal function  - hypertensive; increase hydral to 100 mg q8h; on isordil 10 mg q8h  - switch toprol to coreg 6.25 mg twice daily   - appreciate renal input; appears to be chronic renal failure and will likely need HD in near future  - continue other home meds

## 2018-07-28 NOTE — PROGRESS NOTE ADULT - PROBLEM SELECTOR PLAN 1
- EF 25% by ECHO in 2/2018 with severe global LV dysfunction on LUCIANO in June with severe MR noted   - continue Bumex 3mg PO q12h   - c/w Coreg/Hydralazine/Isordil    - strict I/Os, daily weights   - monitoring lytes

## 2018-07-28 NOTE — PROGRESS NOTE ADULT - SUBJECTIVE AND OBJECTIVE BOX
[facility] DIVISION OF KIDNEY DISEASES AND HYPERTENSION -- FOLLOW UP NOTE  --------------------------------------------------------------------------------  Chief Complaint:    24 hour events/subjective:        PAST HISTORY  --------------------------------------------------------------------------------  No significant changes to PMH, PSH, FHx, SHx, unless otherwise noted    ALLERGIES & MEDICATIONS  --------------------------------------------------------------------------------  Allergies    No Known Allergies    Intolerances      Standing Inpatient Medications  aspirin  chewable 81 milliGRAM(s) Oral daily  atorvastatin 80 milliGRAM(s) Oral at bedtime  buMETAnide 3 milliGRAM(s) Oral two times a day  calcium acetate 667 milliGRAM(s) Oral three times a day with meals  heparin  Injectable 5000 Unit(s) SubCutaneous every 8 hours  hydrALAZINE 75 milliGRAM(s) Oral every 8 hours  isosorbide   dinitrate Tablet (ISORDIL) 10 milliGRAM(s) Oral three times a day  metoprolol succinate ER 25 milliGRAM(s) Oral daily    PRN Inpatient Medications      REVIEW OF SYSTEMS  --------------------------------------------------------------------------------  Gen: No weight changes, fatigue, fevers/chills, weakness  Skin: No rashes  Head/Eyes/Ears/Mouth: No headache; Normal hearing; Normal vision w/o blurriness; No sinus pain/discomfort, sore throat  Respiratory: No dyspnea, cough, wheezing, hemoptysis  CV: No chest pain, PND, orthopnea  GI: No abdominal pain, diarrhea, constipation, nausea, vomiting, melena, hematochezia  : No increased frequency, dysuria, hematuria, nocturia  MSK: No joint pain/swelling; no back pain; no edema  Neuro: No dizziness/lightheadedness, weakness, seizures, numbness, tingling  Heme: No easy bruising or bleeding  Endo: No heat/cold intolerance  Psych: No significant nervousness, anxiety, stress, depression    All other systems were reviewed and are negative, except as noted.    VITALS/PHYSICAL EXAM  --------------------------------------------------------------------------------  T(C): 36.7 (07-28-18 @ 04:36), Max: 36.8 (07-27-18 @ 20:16)  HR: 69 (07-28-18 @ 04:36) (65 - 69)  BP: 128/68 (07-28-18 @ 04:36) (128/68 - 152/90)  RR: 18 (07-28-18 @ 04:36) (18 - 18)  SpO2: 99% (07-28-18 @ 04:36) (99% - 100%)  Wt(kg): --    Weight (kg): 78.2 (07-27-18 @ 13:03)      07-26-18 @ 07:01  -  07-27-18 @ 07:00  --------------------------------------------------------  IN: 1020 mL / OUT: 2600 mL / NET: -1580 mL    07-27-18 @ 07:01  -  07-28-18 @ 06:57  --------------------------------------------------------  IN: 1140 mL / OUT: 2200 mL / NET: -1060 mL      Physical Exam:  	Gen: NAD, well-appearing  	HEENT: PERRL, supple neck, clear oropharynx  	Pulm: CTA B/L  	CV: RRR, S1S2; no rub  	Back: No spinal or CVA tenderness; no sacral edema  	Abd: +BS, soft, nontender/nondistended  	: No suprapubic tenderness  	UE: Warm, FROM, no clubbing, intact strength; no edema; no asterixis  	LE: Warm, FROM, no clubbing, intact strength; no edema  	Neuro: No focal deficits, intact gait  	Psych: Normal affect and mood  	Skin: Warm, without rashes  	Vascular access:    LABS/STUDIES  --------------------------------------------------------------------------------              10.8   5.50  >-----------<  174      [07-26-18 @ 08:01]              32.7     138  |  99  |  94  ----------------------------<  105      [07-28-18 @ 05:57]  4.3   |  23  |  5.12        Ca     8.7     [07-28-18 @ 05:57]      Mg     1.8     [07-27-18 @ 06:10]            Creatinine Trend:  SCr 5.12 [07-28 @ 05:57]  SCr 5.34 [07-27 @ 06:10]  SCr 4.63 [07-26 @ 06:19]  SCr 4.43 [07-25 @ 07:06]  SCr 4.47 [07-24 @ 07:10]    Urinalysis - [07-24-18 @ 01:10]      Color PL Yellow / Appearance Clear / SG 1.010 / pH 6.0      Gluc Trace / Ketone Negative  / Bili Negative / Urobili Negative       Blood Small / Protein 150 / Leuk Est Negative / Nitrite Negative      RBC 3-5 / WBC 0-2 / Hyaline 2-5 / Gran  / Sq Epi  / Non Sq Epi OCC / Bacteria     Urine Sodium 79      [07-24-18 @ 01:10]  Urine Urea Nitrogen 409      [07-24-18 @ 06:33]  Urine Chloride 64      [07-24-18 @ 01:10]    HbA1c 5.7      [07-18-18 @ 20:46]  Lipid: chol 109, TG 64, HDL 48, LDL 53      [07-18-18 @ 20:46] [facility] DIVISION OF KIDNEY DISEASES AND HYPERTENSION -- FOLLOW UP NOTE  --------------------------------------------------------------------------------  Chief Complaint: SMITA on CKD    24 hour events/subjective:  No acute events.  Started on Bumex 3 mg bid  UOP ~ 2.2 L over 24 hours  no acute complaint. states he feels good.      PAST HISTORY  --------------------------------------------------------------------------------  No significant changes to PMH, PSH, FHx, SHx, unless otherwise noted    ALLERGIES & MEDICATIONS  --------------------------------------------------------------------------------  Allergies    No Known Allergies    Intolerances      Standing Inpatient Medications  aspirin  chewable 81 milliGRAM(s) Oral daily  atorvastatin 80 milliGRAM(s) Oral at bedtime  buMETAnide 3 milliGRAM(s) Oral two times a day  calcium acetate 667 milliGRAM(s) Oral three times a day with meals  heparin  Injectable 5000 Unit(s) SubCutaneous every 8 hours  hydrALAZINE 75 milliGRAM(s) Oral every 8 hours  isosorbide   dinitrate Tablet (ISORDIL) 10 milliGRAM(s) Oral three times a day  metoprolol succinate ER 25 milliGRAM(s) Oral daily    PRN Inpatient Medications      REVIEW OF SYSTEMS  --------------------------------------------------------------------------------  Gen: No fatigue, fevers/chills,  Respiratory: No dyspnea, cough, wheezing, hemoptysis  CV: No chest pain, PND, orthopnea  GI: No abdominal pain, diarrhea, constipation, nausea, vomiting  : No dysuria, hematuria, nocturia  MSK: No  edema    VITALS/PHYSICAL EXAM  --------------------------------------------------------------------------------  T(C): 36.7 (07-28-18 @ 04:36), Max: 36.8 (07-27-18 @ 20:16)  HR: 69 (07-28-18 @ 04:36) (65 - 69)  BP: 128/68 (07-28-18 @ 04:36) (128/68 - 152/90)  RR: 18 (07-28-18 @ 04:36) (18 - 18)  SpO2: 99% (07-28-18 @ 04:36) (99% - 100%)  Wt(kg): --    Weight (kg): 78.2 (07-27-18 @ 13:03)      07-26-18 @ 07:01  -  07-27-18 @ 07:00  --------------------------------------------------------  IN: 1020 mL / OUT: 2600 mL / NET: -1580 mL    07-27-18 @ 07:01  -  07-28-18 @ 06:57  --------------------------------------------------------  IN: 1140 mL / OUT: 2200 mL / NET: -1060 mL      Physical Exam:  	Gen: NAD, well-appearing  	Pulm: CTA B/L  	CV: RRR, S1S2; no rub  	Abd: +BS, soft, nontender/nondistended  	: No suprapubic tenderness  	UE: Warm, no edema; no asterixis  	LE: Warm,  no edema  	  LABS/STUDIES  --------------------------------------------------------------------------------              10.8   5.50  >-----------<  174      [07-26-18 @ 08:01]              32.7     138  |  99  |  94  ----------------------------<  105      [07-28-18 @ 05:57]  4.3   |  23  |  5.12        Ca     8.7     [07-28-18 @ 05:57]      Mg     1.8     [07-27-18 @ 06:10]            Creatinine Trend:  SCr 5.12 [07-28 @ 05:57]  SCr 5.34 [07-27 @ 06:10]  SCr 4.63 [07-26 @ 06:19]  SCr 4.43 [07-25 @ 07:06]  SCr 4.47 [07-24 @ 07:10]    Urinalysis - [07-24-18 @ 01:10]      Color PL Yellow / Appearance Clear / SG 1.010 / pH 6.0      Gluc Trace / Ketone Negative  / Bili Negative / Urobili Negative       Blood Small / Protein 150 / Leuk Est Negative / Nitrite Negative      RBC 3-5 / WBC 0-2 / Hyaline 2-5 / Gran  / Sq Epi  / Non Sq Epi OCC / Bacteria     Urine Sodium 79      [07-24-18 @ 01:10]  Urine Urea Nitrogen 409      [07-24-18 @ 06:33]  Urine Chloride 64      [07-24-18 @ 01:10]    HbA1c 5.7      [07-18-18 @ 20:46]  Lipid: chol 109, TG 64, HDL 48, LDL 53      [07-18-18 @ 20:46]

## 2018-07-28 NOTE — PROGRESS NOTE ADULT - SUBJECTIVE AND OBJECTIVE BOX
Interval History:  feels well  reports urinated well to bumex    Medications:  aspirin  chewable 81 milliGRAM(s) Oral daily  atorvastatin 80 milliGRAM(s) Oral at bedtime  buMETAnide 3 milliGRAM(s) Oral two times a day  calcium acetate 667 milliGRAM(s) Oral three times a day with meals  carvedilol 6.25 milliGRAM(s) Oral every 12 hours  heparin  Injectable 5000 Unit(s) SubCutaneous every 8 hours  hydrALAZINE 75 milliGRAM(s) Oral every 8 hours  isosorbide   dinitrate Tablet (ISORDIL) 10 milliGRAM(s) Oral three times a day    Vitals:  T(C): 36.5 (18 @ 12:48), Max: 36.8 (18 @ 20:16)  HR: 71 (18 @ 17:14) (59 - 71)  BP: 143/78 (18 @ 17:14) (128/68 - 154/88)  BP(mean): --  RR: 18 (18 @ 12:48) (18 - 18)  SpO2: 100% (18 @ 12:48) (99% - 100%)    Daily     Daily Weight in k.4 (2018 09:00)    Weight (kg): 78.2 ( @ 13:03)    I&O's Summary    2018 07:  -  2018 07:00  --------------------------------------------------------  IN: 1140 mL / OUT: 2200 mL / NET: -1060 mL    2018 07:  -  2018 17:48  --------------------------------------------------------  IN: 240 mL / OUT: 0 mL / NET: 240 mL    Physical Exam:  Appearance: No Acute Distress  HEENT: PERRL  Neck: JVD approx 6-8 cm  Cardiovascular: Normal S1 S2, No murmurs/rubs/gallops  Respiratory: Clear to auscultation bilaterally  Gastrointestinal: Soft, Non-tender	  Skin: No cyanosis	  Neurologic: Non-focal  Extremities: No LE edema  Psychiatry: A & O x 3, Mood & affect appropriate    Labs:                        11.9   6.66  )-----------( 193      ( 2018 08:35 )             35.0     07-    138  |  99  |  94<H>  ----------------------------<  105<H>  4.3   |  23  |  5.12<H>    Ca    8.7      2018 05:57  Mg     1.8     07-27

## 2018-07-28 NOTE — PROGRESS NOTE ADULT - SUBJECTIVE AND OBJECTIVE BOX
Patient is a 53y old  Male who presents with a chief complaint of Patient was told to come in last week for decompensated HF when seen by cardiology in clinic , but came in today (24 Jul 2018 10:56)      SUBJECTIVE / OVERNIGHT EVENTS: No complaints.     MEDICATIONS  (STANDING):  aspirin  chewable 81 milliGRAM(s) Oral daily  atorvastatin 80 milliGRAM(s) Oral at bedtime  buMETAnide 3 milliGRAM(s) Oral two times a day  calcium acetate 667 milliGRAM(s) Oral three times a day with meals  carvedilol 6.25 milliGRAM(s) Oral every 12 hours  heparin  Injectable 5000 Unit(s) SubCutaneous every 8 hours  hydrALAZINE 75 milliGRAM(s) Oral every 8 hours  isosorbide   dinitrate Tablet (ISORDIL) 10 milliGRAM(s) Oral three times a day    MEDICATIONS  (PRN):      Vital Signs Last 24 Hrs  T(C): 36.7 (28 Jul 2018 04:36), Max: 36.8 (27 Jul 2018 20:16)  T(F): 98 (28 Jul 2018 04:36), Max: 98.3 (27 Jul 2018 20:16)  HR: 69 (28 Jul 2018 04:36) (65 - 69)  BP: 128/68 (28 Jul 2018 04:36) (128/68 - 152/90)  BP(mean): --  RR: 18 (28 Jul 2018 04:36) (18 - 18)  SpO2: 99% (28 Jul 2018 04:36) (99% - 100%)  CAPILLARY BLOOD GLUCOSE        I&O's Summary    27 Jul 2018 07:01  -  28 Jul 2018 07:00  --------------------------------------------------------  IN: 1140 mL / OUT: 2200 mL / NET: -1060 mL    28 Jul 2018 07:01  -  28 Jul 2018 12:27  --------------------------------------------------------  IN: 240 mL / OUT: 0 mL / NET: 240 mL        PHYSICAL EXAM:  GENERAL: NAD, well-developed  HEAD:  Atraumatic, Normocephalic  EYES: EOMI, PERRLA, conjunctiva and sclera clear  NECK: Supple  CHEST/LUNG: Clear to auscultation bilaterally; No wheeze  HEART: Regular rate and rhythm; No murmurs, rubs, or gallops  ABDOMEN: Soft, Nontender, Nondistended; Bowel sounds present  EXTREMITIES:  2+ Peripheral Pulses, No clubbing, cyanosis, or edema  PSYCH: AAOx3  NEUROLOGY: non-focal  SKIN: No rashes or lesions    LABS:                        11.9   6.66  )-----------( 193      ( 28 Jul 2018 08:35 )             35.0     07-28    138  |  99  |  94<H>  ----------------------------<  105<H>  4.3   |  23  |  5.12<H>    Ca    8.7      28 Jul 2018 05:57  Mg     1.8     07-27                RADIOLOGY & ADDITIONAL TESTS:    Imaging Personally Reviewed:  Tele reviewed: SR    Consultant(s) Notes Reviewed:      Care Discussed with Consultants/Other Providers:

## 2018-07-29 ENCOUNTER — INBOUND DOCUMENT (OUTPATIENT)
Age: 53
End: 2018-07-29

## 2018-07-29 VITALS — WEIGHT: 169.09 LBS

## 2018-07-29 LAB
ANION GAP SERPL CALC-SCNC: 15 MMOL/L — SIGNIFICANT CHANGE UP (ref 5–17)
BUN SERPL-MCNC: 92 MG/DL — HIGH (ref 7–23)
CALCIUM SERPL-MCNC: 8.8 MG/DL — SIGNIFICANT CHANGE UP (ref 8.4–10.5)
CHLORIDE SERPL-SCNC: 98 MMOL/L — SIGNIFICANT CHANGE UP (ref 96–108)
CO2 SERPL-SCNC: 23 MMOL/L — SIGNIFICANT CHANGE UP (ref 22–31)
CREAT SERPL-MCNC: 5.04 MG/DL — HIGH (ref 0.5–1.3)
GLUCOSE SERPL-MCNC: 112 MG/DL — HIGH (ref 70–99)
POTASSIUM SERPL-MCNC: 4.1 MMOL/L — SIGNIFICANT CHANGE UP (ref 3.5–5.3)
POTASSIUM SERPL-SCNC: 4.1 MMOL/L — SIGNIFICANT CHANGE UP (ref 3.5–5.3)
SODIUM SERPL-SCNC: 136 MMOL/L — SIGNIFICANT CHANGE UP (ref 135–145)

## 2018-07-29 PROCEDURE — 84540 ASSAY OF URINE/UREA-N: CPT

## 2018-07-29 PROCEDURE — 99233 SBSQ HOSP IP/OBS HIGH 50: CPT

## 2018-07-29 PROCEDURE — 81001 URINALYSIS AUTO W/SCOPE: CPT

## 2018-07-29 PROCEDURE — 85027 COMPLETE CBC AUTOMATED: CPT

## 2018-07-29 PROCEDURE — 99239 HOSP IP/OBS DSCHRG MGMT >30: CPT

## 2018-07-29 PROCEDURE — 82550 ASSAY OF CK (CPK): CPT

## 2018-07-29 PROCEDURE — 82436 ASSAY OF URINE CHLORIDE: CPT

## 2018-07-29 PROCEDURE — 83880 ASSAY OF NATRIURETIC PEPTIDE: CPT

## 2018-07-29 PROCEDURE — 99232 SBSQ HOSP IP/OBS MODERATE 35: CPT | Mod: GC

## 2018-07-29 PROCEDURE — 84484 ASSAY OF TROPONIN QUANT: CPT

## 2018-07-29 PROCEDURE — 80048 BASIC METABOLIC PNL TOTAL CA: CPT

## 2018-07-29 PROCEDURE — 84100 ASSAY OF PHOSPHORUS: CPT

## 2018-07-29 PROCEDURE — 83735 ASSAY OF MAGNESIUM: CPT

## 2018-07-29 PROCEDURE — 82553 CREATINE MB FRACTION: CPT

## 2018-07-29 PROCEDURE — 80053 COMPREHEN METABOLIC PANEL: CPT

## 2018-07-29 PROCEDURE — 93005 ELECTROCARDIOGRAM TRACING: CPT

## 2018-07-29 PROCEDURE — 71045 X-RAY EXAM CHEST 1 VIEW: CPT

## 2018-07-29 PROCEDURE — 82962 GLUCOSE BLOOD TEST: CPT

## 2018-07-29 PROCEDURE — 84300 ASSAY OF URINE SODIUM: CPT

## 2018-07-29 RX ORDER — CALCIUM ACETATE 667 MG
1 TABLET ORAL
Qty: 180 | Refills: 0 | OUTPATIENT
Start: 2018-07-29

## 2018-07-29 RX ORDER — HYDRALAZINE HCL 50 MG
1 TABLET ORAL
Qty: 0 | Refills: 0 | COMMUNITY

## 2018-07-29 RX ORDER — BUMETANIDE 0.25 MG/ML
1 INJECTION INTRAMUSCULAR; INTRAVENOUS
Qty: 0 | Refills: 0 | COMMUNITY

## 2018-07-29 RX ORDER — BUMETANIDE 0.25 MG/ML
1 INJECTION INTRAMUSCULAR; INTRAVENOUS
Qty: 0 | Refills: 0 | COMMUNITY
Start: 2018-07-29

## 2018-07-29 RX ORDER — ISOSORBIDE DINITRATE 5 MG/1
20 TABLET ORAL THREE TIMES A DAY
Qty: 0 | Refills: 0 | Status: DISCONTINUED | OUTPATIENT
Start: 2018-07-29 | End: 2018-07-29

## 2018-07-29 RX ORDER — BUMETANIDE 0.25 MG/ML
2 INJECTION INTRAMUSCULAR; INTRAVENOUS
Qty: 0 | Refills: 0 | COMMUNITY
Start: 2018-07-29

## 2018-07-29 RX ORDER — BUMETANIDE 0.25 MG/ML
3 INJECTION INTRAMUSCULAR; INTRAVENOUS
Qty: 200 | Refills: 0 | OUTPATIENT
Start: 2018-07-29

## 2018-07-29 RX ORDER — ISOSORBIDE DINITRATE 5 MG/1
1 TABLET ORAL
Qty: 180 | Refills: 0 | OUTPATIENT
Start: 2018-07-29

## 2018-07-29 RX ORDER — HYDRALAZINE HCL 50 MG
1 TABLET ORAL
Qty: 180 | Refills: 0 | OUTPATIENT
Start: 2018-07-29

## 2018-07-29 RX ORDER — CARVEDILOL PHOSPHATE 80 MG/1
1 CAPSULE, EXTENDED RELEASE ORAL
Qty: 120 | Refills: 0 | OUTPATIENT
Start: 2018-07-29

## 2018-07-29 RX ADMIN — BUMETANIDE 3 MILLIGRAM(S): 0.25 INJECTION INTRAMUSCULAR; INTRAVENOUS at 05:17

## 2018-07-29 RX ADMIN — Medication 667 MILLIGRAM(S): at 08:28

## 2018-07-29 RX ADMIN — HEPARIN SODIUM 5000 UNIT(S): 5000 INJECTION INTRAVENOUS; SUBCUTANEOUS at 05:18

## 2018-07-29 RX ADMIN — Medication 667 MILLIGRAM(S): at 11:45

## 2018-07-29 RX ADMIN — Medication 100 MILLIGRAM(S): at 05:18

## 2018-07-29 RX ADMIN — CARVEDILOL PHOSPHATE 6.25 MILLIGRAM(S): 80 CAPSULE, EXTENDED RELEASE ORAL at 05:18

## 2018-07-29 RX ADMIN — Medication 81 MILLIGRAM(S): at 11:45

## 2018-07-29 RX ADMIN — ISOSORBIDE DINITRATE 10 MILLIGRAM(S): 5 TABLET ORAL at 05:18

## 2018-07-29 NOTE — PROGRESS NOTE ADULT - SUBJECTIVE AND OBJECTIVE BOX
Patient is a 53y old  Male who presents with a chief complaint of Patient was told to come in last week for decompensated HF when seen by cardiology in clinic , but came in today (24 Jul 2018 10:56)      SUBJECTIVE / OVERNIGHT EVENTS: No complaints.     MEDICATIONS  (STANDING):  aspirin  chewable 81 milliGRAM(s) Oral daily  atorvastatin 80 milliGRAM(s) Oral at bedtime  buMETAnide 3 milliGRAM(s) Oral two times a day  calcium acetate 667 milliGRAM(s) Oral three times a day with meals  carvedilol 6.25 milliGRAM(s) Oral every 12 hours  heparin  Injectable 5000 Unit(s) SubCutaneous every 8 hours  hydrALAZINE 100 milliGRAM(s) Oral every 8 hours  isosorbide   dinitrate Tablet (ISORDIL) 10 milliGRAM(s) Oral three times a day    MEDICATIONS  (PRN):      Vital Signs Last 24 Hrs  T(C): 36.5 (29 Jul 2018 04:56), Max: 36.7 (28 Jul 2018 20:54)  T(F): 97.7 (29 Jul 2018 04:56), Max: 98.1 (28 Jul 2018 20:54)  HR: 68 (29 Jul 2018 04:56) (59 - 71)  BP: 133/69 (29 Jul 2018 04:56) (123/69 - 154/88)  BP(mean): --  RR: 18 (29 Jul 2018 04:56) (18 - 18)  SpO2: 100% (29 Jul 2018 04:56) (99% - 100%)  CAPILLARY BLOOD GLUCOSE        I&O's Summary    28 Jul 2018 07:01  -  29 Jul 2018 07:00  --------------------------------------------------------  IN: 950 mL / OUT: 800 mL / NET: 150 mL    29 Jul 2018 07:01  -  29 Jul 2018 11:40  --------------------------------------------------------  IN: 360 mL / OUT: 0 mL / NET: 360 mL        PHYSICAL EXAM:  GENERAL: NAD, well-developed  HEAD:  Atraumatic, Normocephalic  EYES: EOMI, PERRLA, conjunctiva and sclera clear  NECK: Supple, No JVD  CHEST/LUNG: Clear to auscultation bilaterally; No wheeze  HEART: Regular rate and rhythm; No murmurs, rubs, or gallops  ABDOMEN: Soft, Nontender, Nondistended; Bowel sounds present  EXTREMITIES:  2+ Peripheral Pulses, No clubbing, cyanosis, or edema  PSYCH: AAOx3  NEUROLOGY: non-focal  SKIN: No rashes or lesions    LABS:                        11.9   6.66  )-----------( 193      ( 28 Jul 2018 08:35 )             35.0     07-29    136  |  98  |  92<H>  ----------------------------<  112<H>  4.1   |  23  |  5.04<H>    Ca    8.8      29 Jul 2018 06:06                RADIOLOGY & ADDITIONAL TESTS:    Imaging Personally Reviewed:  Tele reviewed: SR     Consultant(s) Notes Reviewed:      Care Discussed with Consultants/Other Providers:

## 2018-07-29 NOTE — PROGRESS NOTE ADULT - PROBLEM SELECTOR PLAN 5
- Hgb A1c 5.6, was previously on metformin which was discontinued 2/2 kidney function  - does not require any insulin coverage at this time
- s/p stent in 2016   - c/w ASA, Coreg, Lipitor   - elevated troponin likely in setting of CKD
- s/p stent in 2016   - c/w ASA, Toprol, Lipitor   - elevated troponin likely in setting of CKD
- s/p stent in 2016   - c/w ASA, Coreg, Lipitor   - elevated troponin likely in setting of CKD

## 2018-07-29 NOTE — PROGRESS NOTE ADULT - PROBLEM SELECTOR PLAN 3
- probably functional, continue diuresis as above   - reassess once euvolemic, can be done as outpatient
- c/w Toprol, hydralazine and bumetanide  - monitor BP
- diuresis as above   - repeat echo once more euvolemic
- probably functional, continue diuresis as above   - reassess once euvolemic
- probably functional, continue diuresis as above   - repeat echo once euvolemic
- probably functional, continue diuresis as above   - reassess once euvolemic, can be done as outpatient

## 2018-07-29 NOTE — PROGRESS NOTE ADULT - PROBLEM SELECTOR PLAN 8
DVT PPx: HSQ  Diet: Dash/TLC     Rosalind Gimenez  PGY2  [phone]
DVT PPx: HSQ  Diet: Dash/TLC     Rosalind Gimenez  PGY2  [phone]
DVT PPx: HSQ  Diet: Dash/TLC     Rosalind Gimenez  PGY2  [phone]
DVT PPx: HSQ  Diet: Dash/TLC     Rosalind Gimenez  PGY2  [phone]
DVT PPx: HSQ  Diet: Dash/TLC     Rosalind Gimenez  PGY2  [phone]

## 2018-07-29 NOTE — PROGRESS NOTE ADULT - PROBLEM SELECTOR PLAN 7
- c/w lipitor 80mg QHS
DVT PPx: HSQ  Diet: Dash/TLC     Rosalind Gimenez  PGY2  [phone]

## 2018-07-29 NOTE — PROGRESS NOTE ADULT - PROBLEM SELECTOR PROBLEM 6
Hyperlipidemia
Type 2 diabetes mellitus with stage 4 chronic kidney disease, unspecified whether long term insulin use

## 2018-07-29 NOTE — PROGRESS NOTE ADULT - PROBLEM SELECTOR PROBLEM 2
SMITA (acute kidney injury)
Mitral valve insufficiency, unspecified etiology
SMITA (acute kidney injury)
SMITA (acute kidney injury)
CKD (chronic kidney disease), stage IV
Mitral valve insufficiency, unspecified etiology
SMITA (acute kidney injury)

## 2018-07-29 NOTE — PROGRESS NOTE ADULT - PROBLEM SELECTOR PROBLEM 3
Mitral valve insufficiency, unspecified etiology
HTN (hypertension)
Mitral valve insufficiency, unspecified etiology

## 2018-07-29 NOTE — PROGRESS NOTE ADULT - SUBJECTIVE AND OBJECTIVE BOX
Interval History:  doing well  denies complaints    Medications:  aspirin  chewable 81 milliGRAM(s) Oral daily  atorvastatin 80 milliGRAM(s) Oral at bedtime  buMETAnide 3 milliGRAM(s) Oral two times a day  calcium acetate 667 milliGRAM(s) Oral three times a day with meals  carvedilol 6.25 milliGRAM(s) Oral every 12 hours  heparin  Injectable 5000 Unit(s) SubCutaneous every 8 hours  hydrALAZINE 100 milliGRAM(s) Oral every 8 hours  isosorbide   dinitrate Tablet (ISORDIL) 20 milliGRAM(s) Oral three times a day    Vitals:  T(C): 36.5 (18 @ 04:56), Max: 36.7 (18 @ 20:54)  HR: 68 (18 @ 04:56) (66 - 68)  BP: 133/69 (18 @ 04:56) (123/69 - 133/69)  BP(mean): --  RR: 18 (18 @ 04:56) (18 - 18)  SpO2: 100% (18 @ 04:56) (99% - 100%)    Daily     Daily Weight in k.7 (2018 07:50)        I&O's Summary    2018 07:  -  2018 07:00  --------------------------------------------------------  IN: 950 mL / OUT: 800 mL / NET: 150 mL    2018 07:  -  2018 18:22  --------------------------------------------------------  IN: 700 mL / OUT: 300 mL / NET: 400 mL    Physical Exam:  Appearance: No Acute Distress  HEENT: PERRL  Neck: JVD approx 8 cm  Cardiovascular: Normal S1 S2, No murmurs/rubs/gallops  Respiratory: Clear to auscultation bilaterally  Gastrointestinal: Soft, Non-tender	  Skin: No cyanosis	  Neurologic: Non-focal  Extremities: No LE edema  Psychiatry: A & O x 3, Mood & affect appropriate    Labs:                        11.9   6.66  )-----------( 193      ( 2018 08:35 )             35.0     07-29    136  |  98  |  92<H>  ----------------------------<  112<H>  4.1   |  23  |  5.04<H>    Ca    8.8      2018 06:06    TELEMETRY:  no events

## 2018-07-29 NOTE — PROGRESS NOTE ADULT - PROBLEM SELECTOR PROBLEM 1
Acute on chronic systolic congestive heart failure
CKD (chronic kidney disease), stage V
CKD (chronic kidney disease), stage V
Acute on chronic systolic congestive heart failure
Acute on chronic systolic heart failure
CKD (chronic kidney disease), stage V
CKD (chronic kidney disease), stage V
Acute on chronic systolic heart failure

## 2018-07-29 NOTE — PROGRESS NOTE ADULT - PROBLEM SELECTOR PLAN 6
- Hgb A1c 5.6, was previously on metformin which was discontinued 2/2 kidney function  - does not require any insulin coverage at this time
- c/w lipitor 80mg QHS

## 2018-07-29 NOTE — PROGRESS NOTE ADULT - PROBLEM SELECTOR PROBLEM 5
CAD (coronary artery disease)
Type 2 diabetes mellitus with stage 4 chronic kidney disease, unspecified whether long term insulin use
CAD (coronary artery disease)

## 2018-07-29 NOTE — PROGRESS NOTE ADULT - ASSESSMENT
53 year old M w/ h/o IDDM c/b neuropathy, HTN, CAD (s/p stent in [place] in 2016, unknown coronary anatomy), CHF (EF 25%) who was seen in cardiology clinic 1 week prior and was notably overloaded and recommended to come to ER but he refused; lasix was switched to bumex with some improvement. Since admission, has been diuresing well to lasix gtt, now switched to bumex. Currently asymptomatic. On exam, JVD approx 8 cm with HJR to 10 cm, RRR, no m/r/g, no pitting edema b/l. Labs reviewed - BUN/Cr 92/5.04 (stable), A1c 6.6, BNP 15k from 33k. TTE/LUCIANO reviewed - dilated LV, severe MR (possibly functional). Overall stage C HF, NYHA class IV with inital hypervolemia now improved  - contiue bumex 3 mg PO q12; can be d/c'ed today  - hypertensive; continue hydral 100 mg q8h; increase isordil 20 mg q8h  - continue coreg 6.25 mg twice daily   - appreciate renal input; appears to be chronic renal failure and will likely need HD in near future  - continue other home meds  - will arrange outpt f/u with me

## 2018-07-29 NOTE — PROGRESS NOTE ADULT - PROVIDER SPECIALTY LIST ADULT
Heart Failure
Heart Failure
Hospitalist
Nephrology
Nephrology
Heart Failure
Heart Failure
Nephrology
Nephrology
Hospitalist

## 2018-07-29 NOTE — PROGRESS NOTE ADULT - PROBLEM SELECTOR PLAN 4
- c/w Coreg, hydralazine and Isordil   - monitor BP
- c/w Toprol, hydralazine and Isordil   - monitor BP
- c/w Toprol, hydralazine and diuretic   - monitor BP
- c/w Toprol, hydralazine and diuretic   - monitor BP
- s/p stent in 2016   - c/w ASA, Toprol, Lipitor   - elevated troponin likely in setting of CKD
- c/w Coreg, hydralazine and Isordil   - monitor BP

## 2018-08-01 NOTE — DISCUSSION/SUMMARY
[Home] : patient was discharged to home [FreeTextEntry1] : The patient was admitted for decompensated HF. Pt states feeling well and states adhering to medication regimen. He denies changes in weight and has an appointment with Dr. Pavon on 8/8/18.

## 2018-08-08 ENCOUNTER — APPOINTMENT (OUTPATIENT)
Dept: CARDIOLOGY | Facility: CLINIC | Age: 53
End: 2018-08-08
Payer: MEDICAID

## 2018-08-08 VITALS
HEIGHT: 68 IN | RESPIRATION RATE: 14 BRPM | SYSTOLIC BLOOD PRESSURE: 122 MMHG | OXYGEN SATURATION: 100 % | WEIGHT: 190 LBS | DIASTOLIC BLOOD PRESSURE: 74 MMHG | BODY MASS INDEX: 28.79 KG/M2 | HEART RATE: 62 BPM

## 2018-08-08 VITALS — BODY MASS INDEX: 26.61 KG/M2 | WEIGHT: 175 LBS

## 2018-08-08 PROCEDURE — 36415 COLL VENOUS BLD VENIPUNCTURE: CPT

## 2018-08-08 RX ORDER — METOPROLOL SUCCINATE 50 MG/1
50 TABLET, EXTENDED RELEASE ORAL
Qty: 30 | Refills: 0 | Status: DISCONTINUED | COMMUNITY
Start: 2018-04-03 | End: 2018-08-08

## 2018-08-08 RX ORDER — HYDRALAZINE HYDROCHLORIDE 100 MG/1
100 TABLET ORAL
Qty: 90 | Refills: 0 | Status: DISCONTINUED | COMMUNITY
Start: 2018-07-29 | End: 2018-08-08

## 2018-08-08 RX ORDER — FUROSEMIDE 40 MG/1
40 TABLET ORAL DAILY
Qty: 90 | Refills: 3 | Status: DISCONTINUED | COMMUNITY
Start: 2018-03-14 | End: 2018-08-08

## 2018-08-08 RX ORDER — BUMETANIDE 2 MG/1
2 TABLET ORAL DAILY
Qty: 10 | Refills: 0 | Status: DISCONTINUED | COMMUNITY
Start: 2018-07-18 | End: 2018-08-08

## 2018-08-08 RX ORDER — METOPROLOL SUCCINATE 25 MG/1
25 TABLET, EXTENDED RELEASE ORAL DAILY
Qty: 90 | Refills: 3 | Status: DISCONTINUED | COMMUNITY
Start: 2018-03-14 | End: 2018-08-08

## 2018-08-08 RX ORDER — ISOSORBIDE DINITRATE 20 MG/1
20 TABLET ORAL
Qty: 90 | Refills: 0 | Status: DISCONTINUED | COMMUNITY
Start: 2018-07-29 | End: 2018-08-08

## 2018-08-09 ENCOUNTER — NON-APPOINTMENT (OUTPATIENT)
Age: 53
End: 2018-08-09

## 2018-08-10 LAB
ALBUMIN SERPL ELPH-MCNC: 3.6 G/DL
ALP BLD-CCNC: 94 U/L
ALT SERPL-CCNC: 18 U/L
ANION GAP SERPL CALC-SCNC: 22 MMOL/L
AST SERPL-CCNC: 19 U/L
BASOPHILS # BLD AUTO: 0.03 K/UL
BASOPHILS NFR BLD AUTO: 0.5 %
BILIRUB SERPL-MCNC: 0.3 MG/DL
BUN SERPL-MCNC: 96 MG/DL
CALCIUM SERPL-MCNC: 8.8 MG/DL
CHLORIDE SERPL-SCNC: 100 MMOL/L
CO2 SERPL-SCNC: 23 MMOL/L
CREAT SERPL-MCNC: 6.25 MG/DL
EOSINOPHIL # BLD AUTO: 0.23 K/UL
EOSINOPHIL NFR BLD AUTO: 3.9 %
GLUCOSE SERPL-MCNC: 66 MG/DL
HBA1C MFR BLD HPLC: 6.2 %
HCT VFR BLD CALC: 33.2 %
HGB BLD-MCNC: 10.5 G/DL
IMM GRANULOCYTES NFR BLD AUTO: 0.2 %
LYMPHOCYTES # BLD AUTO: 1.13 K/UL
LYMPHOCYTES NFR BLD AUTO: 19.3 %
MAN DIFF?: NORMAL
MCHC RBC-ENTMCNC: 28.7 PG
MCHC RBC-ENTMCNC: 31.6 GM/DL
MCV RBC AUTO: 90.7 FL
MONOCYTES # BLD AUTO: 0.53 K/UL
MONOCYTES NFR BLD AUTO: 9.1 %
NEUTROPHILS # BLD AUTO: 3.91 K/UL
NEUTROPHILS NFR BLD AUTO: 67 %
NT-PROBNP SERPL-MCNC: ABNORMAL PG/ML
PLATELET # BLD AUTO: 213 K/UL
POTASSIUM SERPL-SCNC: 4.8 MMOL/L
PROT SERPL-MCNC: 6.9 G/DL
RBC # BLD: 3.66 M/UL
RBC # FLD: 14.3 %
SODIUM SERPL-SCNC: 145 MMOL/L
WBC # FLD AUTO: 5.84 K/UL

## 2018-08-15 ENCOUNTER — LABORATORY RESULT (OUTPATIENT)
Age: 53
End: 2018-08-15

## 2018-08-15 ENCOUNTER — APPOINTMENT (OUTPATIENT)
Dept: NEPHROLOGY | Facility: HOSPITAL | Age: 53
End: 2018-08-15
Payer: MEDICAID

## 2018-08-15 ENCOUNTER — OUTPATIENT (OUTPATIENT)
Dept: OUTPATIENT SERVICES | Facility: HOSPITAL | Age: 53
LOS: 1 days | End: 2018-08-15

## 2018-08-15 VITALS
SYSTOLIC BLOOD PRESSURE: 123 MMHG | DIASTOLIC BLOOD PRESSURE: 87 MMHG | HEART RATE: 56 BPM | HEIGHT: 64 IN | WEIGHT: 181 LBS | BODY MASS INDEX: 30.9 KG/M2

## 2018-08-15 DIAGNOSIS — N18.5 CHRONIC KIDNEY DISEASE, STAGE 5: ICD-10-CM

## 2018-08-15 DIAGNOSIS — R60.0 LOCALIZED EDEMA: ICD-10-CM

## 2018-08-15 DIAGNOSIS — T82.868A THROMBOSIS DUE TO VASCULAR PROSTHETIC DEVICES, IMPLANTS AND GRAFTS, INITIAL ENCOUNTER: Chronic | ICD-10-CM

## 2018-08-15 DIAGNOSIS — I10 ESSENTIAL (PRIMARY) HYPERTENSION: ICD-10-CM

## 2018-08-15 DIAGNOSIS — Z95.5 PRESENCE OF CORONARY ANGIOPLASTY IMPLANT AND GRAFT: Chronic | ICD-10-CM

## 2018-08-15 LAB
ALBUMIN SERPL ELPH-MCNC: 3.9 G/DL — SIGNIFICANT CHANGE UP (ref 3.3–5)
BUN SERPL-MCNC: 99 MG/DL — HIGH (ref 7–23)
CALCIUM SERPL-MCNC: 9 MG/DL — SIGNIFICANT CHANGE UP (ref 8.4–10.5)
CHLORIDE SERPL-SCNC: 103 MMOL/L — SIGNIFICANT CHANGE UP (ref 98–107)
CO2 SERPL-SCNC: 22 MMOL/L — SIGNIFICANT CHANGE UP (ref 22–31)
CREAT SERPL-MCNC: 5.36 MG/DL — HIGH (ref 0.5–1.3)
GLUCOSE SERPL-MCNC: 113 MG/DL — HIGH (ref 70–99)
PHOSPHATE SERPL-MCNC: 6.4 MG/DL — HIGH (ref 2.5–4.5)
POTASSIUM SERPL-MCNC: 5.4 MMOL/L — HIGH (ref 3.5–5.3)
POTASSIUM SERPL-SCNC: 5.4 MMOL/L — HIGH (ref 3.5–5.3)
SODIUM SERPL-SCNC: 141 MMOL/L — SIGNIFICANT CHANGE UP (ref 135–145)

## 2018-08-15 PROCEDURE — 99214 OFFICE O/P EST MOD 30 MIN: CPT | Mod: GC

## 2018-08-16 ENCOUNTER — RESULT REVIEW (OUTPATIENT)
Age: 53
End: 2018-08-16

## 2018-08-16 ENCOUNTER — APPOINTMENT (OUTPATIENT)
Dept: NEPHROLOGY | Facility: CLINIC | Age: 53
End: 2018-08-16

## 2018-09-05 ENCOUNTER — LABORATORY RESULT (OUTPATIENT)
Age: 53
End: 2018-09-05

## 2018-09-05 ENCOUNTER — APPOINTMENT (OUTPATIENT)
Dept: CARDIOLOGY | Facility: CLINIC | Age: 53
End: 2018-09-05
Payer: MEDICAID

## 2018-09-05 ENCOUNTER — APPOINTMENT (OUTPATIENT)
Dept: NEPHROLOGY | Facility: HOSPITAL | Age: 53
End: 2018-09-05
Payer: MEDICAID

## 2018-09-05 ENCOUNTER — NON-APPOINTMENT (OUTPATIENT)
Age: 53
End: 2018-09-05

## 2018-09-05 ENCOUNTER — OUTPATIENT (OUTPATIENT)
Dept: OUTPATIENT SERVICES | Facility: HOSPITAL | Age: 53
LOS: 1 days | End: 2018-09-05

## 2018-09-05 VITALS
DIASTOLIC BLOOD PRESSURE: 70 MMHG | WEIGHT: 181 LBS | HEART RATE: 62 BPM | BODY MASS INDEX: 31.07 KG/M2 | SYSTOLIC BLOOD PRESSURE: 125 MMHG

## 2018-09-05 VITALS
HEIGHT: 64 IN | BODY MASS INDEX: 30.9 KG/M2 | HEART RATE: 57 BPM | OXYGEN SATURATION: 100 % | SYSTOLIC BLOOD PRESSURE: 145 MMHG | DIASTOLIC BLOOD PRESSURE: 91 MMHG | RESPIRATION RATE: 14 BRPM | WEIGHT: 181 LBS

## 2018-09-05 DIAGNOSIS — Z95.5 PRESENCE OF CORONARY ANGIOPLASTY IMPLANT AND GRAFT: Chronic | ICD-10-CM

## 2018-09-05 DIAGNOSIS — T82.868A THROMBOSIS DUE TO VASCULAR PROSTHETIC DEVICES, IMPLANTS AND GRAFTS, INITIAL ENCOUNTER: Chronic | ICD-10-CM

## 2018-09-05 LAB
ALBUMIN SERPL ELPH-MCNC: 3.5 G/DL — SIGNIFICANT CHANGE UP (ref 3.3–5)
BUN SERPL-MCNC: 54 MG/DL — HIGH (ref 7–23)
CALCIUM SERPL-MCNC: 8.9 MG/DL — SIGNIFICANT CHANGE UP (ref 8.4–10.5)
CHLORIDE SERPL-SCNC: 105 MMOL/L — SIGNIFICANT CHANGE UP (ref 98–107)
CO2 SERPL-SCNC: 23 MMOL/L — SIGNIFICANT CHANGE UP (ref 22–31)
CREAT SERPL-MCNC: 4.73 MG/DL — HIGH (ref 0.5–1.3)
GLUCOSE SERPL-MCNC: 96 MG/DL — SIGNIFICANT CHANGE UP (ref 70–99)
PHOSPHATE SERPL-MCNC: 4.8 MG/DL — HIGH (ref 2.5–4.5)
POTASSIUM SERPL-MCNC: 5.5 MMOL/L — HIGH (ref 3.5–5.3)
POTASSIUM SERPL-SCNC: 5.5 MMOL/L — HIGH (ref 3.5–5.3)
SODIUM SERPL-SCNC: 140 MMOL/L — SIGNIFICANT CHANGE UP (ref 135–145)

## 2018-09-05 PROCEDURE — 99215 OFFICE O/P EST HI 40 MIN: CPT

## 2018-09-05 PROCEDURE — 99214 OFFICE O/P EST MOD 30 MIN: CPT | Mod: GC

## 2018-09-06 ENCOUNTER — RESULT REVIEW (OUTPATIENT)
Age: 53
End: 2018-09-06

## 2018-09-06 DIAGNOSIS — N18.5 CHRONIC KIDNEY DISEASE, STAGE 5: ICD-10-CM

## 2018-09-06 DIAGNOSIS — R60.0 LOCALIZED EDEMA: ICD-10-CM

## 2018-09-06 DIAGNOSIS — I10 ESSENTIAL (PRIMARY) HYPERTENSION: ICD-10-CM

## 2018-09-13 ENCOUNTER — APPOINTMENT (OUTPATIENT)
Dept: VASCULAR SURGERY | Facility: CLINIC | Age: 53
End: 2018-09-13
Payer: MEDICAID

## 2018-09-13 VITALS
WEIGHT: 181 LBS | TEMPERATURE: 98 F | BODY MASS INDEX: 30.9 KG/M2 | HEIGHT: 64 IN | SYSTOLIC BLOOD PRESSURE: 118 MMHG | DIASTOLIC BLOOD PRESSURE: 69 MMHG | HEART RATE: 55 BPM

## 2018-09-13 PROCEDURE — G0365: CPT

## 2018-09-13 PROCEDURE — 99204 OFFICE O/P NEW MOD 45 MIN: CPT

## 2018-10-10 ENCOUNTER — LABORATORY RESULT (OUTPATIENT)
Age: 53
End: 2018-10-10

## 2018-10-10 ENCOUNTER — OUTPATIENT (OUTPATIENT)
Dept: OUTPATIENT SERVICES | Facility: HOSPITAL | Age: 53
LOS: 1 days | End: 2018-10-10

## 2018-10-10 ENCOUNTER — APPOINTMENT (OUTPATIENT)
Dept: NEPHROLOGY | Facility: HOSPITAL | Age: 53
End: 2018-10-10
Payer: MEDICAID

## 2018-10-10 VITALS
WEIGHT: 179.5 LBS | DIASTOLIC BLOOD PRESSURE: 86 MMHG | HEIGHT: 65 IN | HEART RATE: 75 BPM | SYSTOLIC BLOOD PRESSURE: 147 MMHG | BODY MASS INDEX: 29.91 KG/M2

## 2018-10-10 VITALS — SYSTOLIC BLOOD PRESSURE: 135 MMHG | DIASTOLIC BLOOD PRESSURE: 80 MMHG

## 2018-10-10 DIAGNOSIS — Z95.5 PRESENCE OF CORONARY ANGIOPLASTY IMPLANT AND GRAFT: Chronic | ICD-10-CM

## 2018-10-10 DIAGNOSIS — T82.868A THROMBOSIS DUE TO VASCULAR PROSTHETIC DEVICES, IMPLANTS AND GRAFTS, INITIAL ENCOUNTER: Chronic | ICD-10-CM

## 2018-10-10 LAB
ALBUMIN SERPL ELPH-MCNC: 3.5 G/DL — SIGNIFICANT CHANGE UP (ref 3.3–5)
BUN SERPL-MCNC: 78 MG/DL — HIGH (ref 7–23)
CALCIUM SERPL-MCNC: 7.9 MG/DL — LOW (ref 8.4–10.5)
CHLORIDE SERPL-SCNC: 99 MMOL/L — SIGNIFICANT CHANGE UP (ref 98–107)
CO2 SERPL-SCNC: 24 MMOL/L — SIGNIFICANT CHANGE UP (ref 22–31)
CREAT SERPL-MCNC: 5.45 MG/DL — HIGH (ref 0.5–1.3)
GLUCOSE SERPL-MCNC: 181 MG/DL — HIGH (ref 70–99)
PHOSPHATE SERPL-MCNC: 6 MG/DL — HIGH (ref 2.5–4.5)
POTASSIUM SERPL-MCNC: 4.8 MMOL/L — SIGNIFICANT CHANGE UP (ref 3.5–5.3)
POTASSIUM SERPL-SCNC: 4.8 MMOL/L — SIGNIFICANT CHANGE UP (ref 3.5–5.3)
SODIUM SERPL-SCNC: 139 MMOL/L — SIGNIFICANT CHANGE UP (ref 135–145)

## 2018-10-10 PROCEDURE — 99214 OFFICE O/P EST MOD 30 MIN: CPT | Mod: GC

## 2018-10-11 ENCOUNTER — RESULT REVIEW (OUTPATIENT)
Age: 53
End: 2018-10-11

## 2018-10-11 DIAGNOSIS — E87.5 HYPERKALEMIA: ICD-10-CM

## 2018-10-11 DIAGNOSIS — N18.5 CHRONIC KIDNEY DISEASE, STAGE 5: ICD-10-CM

## 2018-10-11 DIAGNOSIS — R60.0 LOCALIZED EDEMA: ICD-10-CM

## 2018-10-11 DIAGNOSIS — I10 ESSENTIAL (PRIMARY) HYPERTENSION: ICD-10-CM

## 2018-10-12 ENCOUNTER — APPOINTMENT (OUTPATIENT)
Dept: CARDIOLOGY | Facility: CLINIC | Age: 53
End: 2018-10-12
Payer: MEDICAID

## 2018-10-12 ENCOUNTER — NON-APPOINTMENT (OUTPATIENT)
Age: 53
End: 2018-10-12

## 2018-10-12 VITALS
BODY MASS INDEX: 29.16 KG/M2 | WEIGHT: 175 LBS | DIASTOLIC BLOOD PRESSURE: 70 MMHG | SYSTOLIC BLOOD PRESSURE: 136 MMHG | HEIGHT: 65 IN | HEART RATE: 78 BPM | OXYGEN SATURATION: 96 %

## 2018-10-12 VITALS — TEMPERATURE: 97.8 F

## 2018-10-12 PROCEDURE — 36415 COLL VENOUS BLD VENIPUNCTURE: CPT

## 2018-10-12 PROCEDURE — 99215 OFFICE O/P EST HI 40 MIN: CPT

## 2018-10-12 PROCEDURE — 93000 ELECTROCARDIOGRAM COMPLETE: CPT

## 2018-10-30 ENCOUNTER — APPOINTMENT (OUTPATIENT)
Dept: TRANSPLANT | Facility: CLINIC | Age: 53
End: 2018-10-30

## 2018-10-30 ENCOUNTER — APPOINTMENT (OUTPATIENT)
Dept: NEPHROLOGY | Facility: CLINIC | Age: 53
End: 2018-10-30

## 2018-11-02 ENCOUNTER — APPOINTMENT (OUTPATIENT)
Dept: CV DIAGNOSITCS | Facility: HOSPITAL | Age: 53
End: 2018-11-02

## 2018-11-02 ENCOUNTER — APPOINTMENT (OUTPATIENT)
Dept: CV DIAGNOSTICS | Facility: HOSPITAL | Age: 53
End: 2018-11-02

## 2018-11-14 ENCOUNTER — OUTPATIENT (OUTPATIENT)
Dept: OUTPATIENT SERVICES | Facility: HOSPITAL | Age: 53
LOS: 1 days | End: 2018-11-14
Payer: MEDICAID

## 2018-11-14 VITALS
HEART RATE: 75 BPM | RESPIRATION RATE: 16 BRPM | DIASTOLIC BLOOD PRESSURE: 82 MMHG | OXYGEN SATURATION: 100 % | HEIGHT: 65 IN | SYSTOLIC BLOOD PRESSURE: 138 MMHG | TEMPERATURE: 98 F | WEIGHT: 180.78 LBS

## 2018-11-14 DIAGNOSIS — N18.5 CHRONIC KIDNEY DISEASE, STAGE 5: ICD-10-CM

## 2018-11-14 DIAGNOSIS — T82.868A THROMBOSIS DUE TO VASCULAR PROSTHETIC DEVICES, IMPLANTS AND GRAFTS, INITIAL ENCOUNTER: Chronic | ICD-10-CM

## 2018-11-14 DIAGNOSIS — Z95.5 PRESENCE OF CORONARY ANGIOPLASTY IMPLANT AND GRAFT: Chronic | ICD-10-CM

## 2018-11-14 DIAGNOSIS — I50.20 UNSPECIFIED SYSTOLIC (CONGESTIVE) HEART FAILURE: ICD-10-CM

## 2018-11-14 DIAGNOSIS — Z01.818 ENCOUNTER FOR OTHER PREPROCEDURAL EXAMINATION: ICD-10-CM

## 2018-11-14 LAB
ANION GAP SERPL CALC-SCNC: 16 MMOL/L — SIGNIFICANT CHANGE UP (ref 5–17)
BUN SERPL-MCNC: 64 MG/DL — HIGH (ref 7–23)
CALCIUM SERPL-MCNC: 9 MG/DL — SIGNIFICANT CHANGE UP (ref 8.4–10.5)
CHLORIDE SERPL-SCNC: 101 MMOL/L — SIGNIFICANT CHANGE UP (ref 96–108)
CO2 SERPL-SCNC: 22 MMOL/L — SIGNIFICANT CHANGE UP (ref 22–31)
CREAT SERPL-MCNC: 4.77 MG/DL — HIGH (ref 0.5–1.3)
GLUCOSE SERPL-MCNC: 225 MG/DL — HIGH (ref 70–99)
HCT VFR BLD CALC: 31.5 % — LOW (ref 39–50)
HGB BLD-MCNC: 10.4 G/DL — LOW (ref 13–17)
MCHC RBC-ENTMCNC: 29 PG — SIGNIFICANT CHANGE UP (ref 27–34)
MCHC RBC-ENTMCNC: 33 GM/DL — SIGNIFICANT CHANGE UP (ref 32–36)
MCV RBC AUTO: 87.7 FL — SIGNIFICANT CHANGE UP (ref 80–100)
PLATELET # BLD AUTO: 158 K/UL — SIGNIFICANT CHANGE UP (ref 150–400)
POTASSIUM SERPL-MCNC: 5 MMOL/L — SIGNIFICANT CHANGE UP (ref 3.5–5.3)
POTASSIUM SERPL-SCNC: 5 MMOL/L — SIGNIFICANT CHANGE UP (ref 3.5–5.3)
RBC # BLD: 3.59 M/UL — LOW (ref 4.2–5.8)
RBC # FLD: 16.4 % — HIGH (ref 10.3–14.5)
SODIUM SERPL-SCNC: 139 MMOL/L — SIGNIFICANT CHANGE UP (ref 135–145)
WBC # BLD: 5.37 K/UL — SIGNIFICANT CHANGE UP (ref 3.8–10.5)
WBC # FLD AUTO: 5.37 K/UL — SIGNIFICANT CHANGE UP (ref 3.8–10.5)

## 2018-11-14 PROCEDURE — 85027 COMPLETE CBC AUTOMATED: CPT

## 2018-11-14 PROCEDURE — G0463: CPT

## 2018-11-14 PROCEDURE — 80048 BASIC METABOLIC PNL TOTAL CA: CPT

## 2018-11-14 NOTE — H&P PST ADULT - PROBLEM SELECTOR PLAN 4
- c/w Toprol, hydralazine and bumetanide Instructed to continue meds &  take with sips of water in AM the day of surgery.

## 2018-11-14 NOTE — H&P PST ADULT - PMH
CAD (coronary artery disease)  2016, coronary artery stentX1  CKD (chronic kidney disease), stage V    DM (diabetes mellitus)  2  HFrEF (heart failure with reduced ejection fraction)  EF 24%  HTN (hypertension)    Hyperlipidemia    Myocardial infarction  Jan 2016 Abnormal echocardiogram  06/2018severe MR  Severe global left ventricular systolic dysfunction.  6. Right ventricular enlargement with decreased right  ventricular systolic function, moderate RV dysfunction  CAD (coronary artery disease)  2016, coronary artery stentX1  CKD (chronic kidney disease), stage V    DM (diabetes mellitus)  2  HFrEF (heart failure with reduced ejection fraction)  EF 24%  HTN (hypertension)    Hyperlipidemia    Myocardial infarction  Jan 2016

## 2018-11-14 NOTE — H&P PST ADULT - PROBLEM SELECTOR PLAN 3
- previously reported weight was 260 lbs with FS in the 400s-500s, was on metformin which was discontinued 2/2 kidney function  - reported 100 lb weight loss in the past 7 to 8 months with FS in the  range  - AIC 5.6, does not require any insulin coverage at this time - s/p stent in 2016 with unknown coronary intervention  - c/w  ASA 81 mg to OR

## 2018-11-14 NOTE — H&P PST ADULT - NSANTHOSAYNRD_GEN_A_CORE
No. KATELIN screening performed.  STOP BANG Legend: 0-2 = LOW Risk; 3-4 = INTERMEDIATE Risk; 5-8 = HIGH Risk

## 2018-11-14 NOTE — H&P PST ADULT - LAST STRESS TEST
11/2018 11/2018-no ischaemia 11/2/2018, Abnormal Study  * There are large, moderate to severe defects in anterior  and apical, inferior walls that are predominantly fixed,  suggestive of infarct with mild ischemia in the mid  anterior wall.  * Post-stress gated wall motion analysis was performed  (LVEF = 29 %;LVEDV = 285 ml.), revealing severe  hypokinesis.

## 2018-11-14 NOTE — H&P PST ADULT - PROBLEM/PLAN-4
Next Visit Date:  Future Appointments  Date Time Provider Olivia Deng   1/24/2018 11:15 AM Geremias Munguia MD [facility] None   2/15/2018 10:00 AM Krishna Hernandez MD [facility]       Health Maintenance   Topic Date Due    DTaP/Tdap/Td vaccine (1 - Tdap) 05/16/1945    Zostavax vaccine  05/16/1988    Pneumococcal high/highest risk (1 of 2 - PCV13) 05/16/1993    Flu vaccine (1) 09/01/2017       Hemoglobin A1C (%)   Date Value   06/16/2017 6.0 (H)   04/01/2017 7.4 (H)             ( goal A1C is < 7)   Microalb/Crt.  Ratio (mcg/mg creat)   Date Value   06/16/2017 55 (H)     LDL Cholesterol (mg/dL)   Date Value   10/24/2016 73       (goal LDL is <100)   AST (U/L)   Date Value   06/23/2017 13     ALT (U/L)   Date Value   06/23/2017 11     BUN (mg/dL)   Date Value   10/02/2017 30 (H)     BP Readings from Last 3 Encounters:   11/07/17 (!) 157/86   09/27/17 (!) 163/81   08/10/17 (!) 146/70          (goal 120/80)    All Future Testing planned in [facility]  Lab Frequency Next Occurrence   Comprehensive Metabolic Panel Once 28/93/1820   CBC Auto Differential Once 05/11/2017   Urinalysis Once 05/11/2017   Microalbumin, Ur Once 05/11/2017   CBC Auto Differential Once 05/03/2017   Comprehensive Metabolic Panel Once 82/90/1973   CBC Auto Differential Once 09/19/2017   Urinalysis Once 09/19/2017   Microalbumin, Ur Once 12/27/2017               Patient Active Problem List:     Diastolic CHF ([facility] [address]     Asthma     Status post coronary artery stent placement     CKD (chronic kidney disease) stage 3, GFR 30-59 ml/min     Renal artery stenosis (HCC)     Essential hypertension     Renovascular hypertension     COPD (chronic obstructive pulmonary disease) (HCC)     Ex-smoker     Atrial fibrillation with rapid ventricular response (HCC)     Panlobular emphysema ([facility] [address]     Acute respiratory failure with hypoxemia (HCC)     COPD with acute exacerbation ([address]
DISPLAY PLAN FREE TEXT

## 2018-11-14 NOTE — H&P PST ADULT - PROBLEM SELECTOR PLAN 2
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 daily weights and avoid nephrotoxic agents   - on prior admission, concern for nephrotic syndrome with work up unremarkable (HIV, HCV negative) dsDna, anti-GBM, PR3 ANCA unremarkable, C3, c4 unremarkable. Attributed to long standing diabetic nephropathy and enrolled into CKD program. Discuss by o/p nephrology that long term RRT might be necessary - c/w med

## 2018-11-14 NOTE — H&P PST ADULT - HISTORY OF PRESENT ILLNESS
53 year old male hx of T2DM with compounding neuropathy, HTN, CAD (s/p stent in [place] in 2016, unknown coronary anatomy), CHF (EF 25%) who presents per cardiologist's recommendations. Patient was last seen in Cardiology Clinic on 7/18 -- with worsening leg swelling reported with recommendation to go to the hospital for volume diuresis, however patient declined. Was switched over from lasix 40 mg QD to bumex 2 mg QD with reported medication compliance. Reported that he has been urinating (10-12 times during the day and 4 to 5 time during the night with reported large volume quantities) and reportedly has been noting improvement in his LEs. Currently not endorsing any shortness of breath, chest pain, palpitations, nausea/vomiting, abdominal fullness. Does have some back pain with walking (with reported pain in the lower extremities), denies any weakness, urinary or stool incontinence.     Of note, patient was in the hospital in February with worsening leg swelling and concern for advanced kidney injury which was worked up and attributed to be due to diabetic nephropathy. Pt has since then been following with cardiology for management and optimization of his heart failure.     In the ED:  Afebrile, /94 RR 18 O2: 100 53 year old male hx of T2DM with diabetic retinopathy,  diabetic nephropathy, CKD 5,HTN, CAD (s/p stent in [place] in 2016, unknown coronary anatomy) s/p hospitalization for decompensating CHF ( EF 24%) who presents with declining renal function, scheduled for  for AV fistula on 11/21/18. 53 year old male Hx  of T2DM(currently not on meds) with diabetic nephropathy, retinopathy, CKD 5, HTN, CAD (s/p stent in [place] in 2016, unknown coronary anatomy) s/p hospitalization for decompensating CHF ( EF 24%) who presents with declining renal function, scheduled  for AV fistula on 11/21/18.

## 2018-11-14 NOTE — H&P PST ADULT - HEALTH CARE MAINTENANCE
Flu vaccine in 2018  On regular physicals every 6 weeks  Last colonoscopy - so far not myles Flu vaccine in 2018  On regular physicals every 6 weeks  Last colonoscopy - so far not done

## 2018-11-14 NOTE — H&P PST ADULT - NEUROLOGICAL DETAILS
responds to verbal commands/sensation intact/cranial nerves intact/normal strength/responds to pain/alert and oriented x 3

## 2018-11-14 NOTE — H&P PST ADULT - MUSCULOSKELETAL
details… detailed exam no joint warmth/no calf tenderness/ROM intact/normal strength/no joint erythema

## 2018-11-14 NOTE — H&P PST ADULT - REASON FOR ADMISSION
Patient was told to come in last week for decompensated HF when seen by cardiology in clinic , but came in today CKD 5

## 2018-11-14 NOTE — H&P PST ADULT - LAST ECHOCARDIOGRAM
06/2018 06/2018 severe MR, SEV LV dysfunction, moderate RV dysfunction, EF 24% 06/2018 severe MR, severe LV dysfunction, moderate RV dysfunction, EF 24%

## 2018-11-14 NOTE — H&P PST ADULT - PROBLEM SELECTOR PLAN 1
- EF 25% by ECHO in 2/2018 with severe global LV dysfunction on LUCIANO in June with severe MR noted   - documented CAD with stent placed in 2016 at [facility] (unknown anatomy)  - elevated BNP at 33K <--27K at outside lab work   - clinically does not appear to be decompensated. Not on ACEi 2/2 advanced kidney disease, c/w hydralazine 10 mg TID as per home regimen and with BB  - has room to go up on hydralazine and can add isordil (A-HEFT) for preload and afterload reduction.   - continue with Bumex 2 mg PO QD in the interim as patient appears to be responding. Will get strict I/Os and monitor UOP. daily weights   - Cardiology consult in the morning LEFT UPPER EXTREMITY RADIOCEPHALIC FISTULA.

## 2018-11-21 ENCOUNTER — APPOINTMENT (OUTPATIENT)
Dept: VASCULAR SURGERY | Facility: HOSPITAL | Age: 53
End: 2018-11-21

## 2018-11-28 ENCOUNTER — OUTPATIENT (OUTPATIENT)
Dept: OUTPATIENT SERVICES | Facility: HOSPITAL | Age: 53
LOS: 1 days | End: 2018-11-28

## 2018-11-28 ENCOUNTER — APPOINTMENT (OUTPATIENT)
Dept: NEPHROLOGY | Facility: HOSPITAL | Age: 53
End: 2018-11-28
Payer: MEDICAID

## 2018-11-28 VITALS
SYSTOLIC BLOOD PRESSURE: 123 MMHG | WEIGHT: 184.13 LBS | BODY MASS INDEX: 30.68 KG/M2 | DIASTOLIC BLOOD PRESSURE: 78 MMHG | HEART RATE: 57 BPM | HEIGHT: 65 IN

## 2018-11-28 DIAGNOSIS — E87.5 HYPERKALEMIA: ICD-10-CM

## 2018-11-28 DIAGNOSIS — Z95.5 PRESENCE OF CORONARY ANGIOPLASTY IMPLANT AND GRAFT: Chronic | ICD-10-CM

## 2018-11-28 DIAGNOSIS — R60.0 LOCALIZED EDEMA: ICD-10-CM

## 2018-11-28 DIAGNOSIS — T82.868A THROMBOSIS DUE TO VASCULAR PROSTHETIC DEVICES, IMPLANTS AND GRAFTS, INITIAL ENCOUNTER: Chronic | ICD-10-CM

## 2018-11-28 PROCEDURE — 99214 OFFICE O/P EST MOD 30 MIN: CPT | Mod: GC

## 2018-11-29 DIAGNOSIS — I10 ESSENTIAL (PRIMARY) HYPERTENSION: ICD-10-CM

## 2018-11-29 DIAGNOSIS — E87.5 HYPERKALEMIA: ICD-10-CM

## 2018-11-29 DIAGNOSIS — N18.5 CHRONIC KIDNEY DISEASE, STAGE 5: ICD-10-CM

## 2018-11-29 DIAGNOSIS — R60.0 LOCALIZED EDEMA: ICD-10-CM

## 2018-12-01 ENCOUNTER — OUTPATIENT (OUTPATIENT)
Dept: OUTPATIENT SERVICES | Facility: HOSPITAL | Age: 53
LOS: 1 days | End: 2018-12-01
Payer: MEDICAID

## 2018-12-01 DIAGNOSIS — T82.868A THROMBOSIS DUE TO VASCULAR PROSTHETIC DEVICES, IMPLANTS AND GRAFTS, INITIAL ENCOUNTER: Chronic | ICD-10-CM

## 2018-12-01 DIAGNOSIS — Z95.5 PRESENCE OF CORONARY ANGIOPLASTY IMPLANT AND GRAFT: Chronic | ICD-10-CM

## 2018-12-04 ENCOUNTER — APPOINTMENT (OUTPATIENT)
Dept: INTERNAL MEDICINE | Facility: HOSPITAL | Age: 53
End: 2018-12-04
Payer: MEDICAID

## 2018-12-04 ENCOUNTER — LABORATORY RESULT (OUTPATIENT)
Age: 53
End: 2018-12-04

## 2018-12-04 ENCOUNTER — OUTPATIENT (OUTPATIENT)
Dept: OUTPATIENT SERVICES | Facility: HOSPITAL | Age: 53
LOS: 1 days | End: 2018-12-04

## 2018-12-04 ENCOUNTER — APPOINTMENT (OUTPATIENT)
Dept: VASCULAR SURGERY | Facility: CLINIC | Age: 53
End: 2018-12-04

## 2018-12-04 VITALS
SYSTOLIC BLOOD PRESSURE: 137 MMHG | HEIGHT: 65 IN | BODY MASS INDEX: 29.24 KG/M2 | WEIGHT: 175.49 LBS | HEART RATE: 71 BPM | DIASTOLIC BLOOD PRESSURE: 76 MMHG

## 2018-12-04 DIAGNOSIS — F32.0 MAJOR DEPRESSIVE DISORDER, SINGLE EPISODE, MILD: ICD-10-CM

## 2018-12-04 DIAGNOSIS — Z95.5 PRESENCE OF CORONARY ANGIOPLASTY IMPLANT AND GRAFT: Chronic | ICD-10-CM

## 2018-12-04 DIAGNOSIS — T82.868A THROMBOSIS DUE TO VASCULAR PROSTHETIC DEVICES, IMPLANTS AND GRAFTS, INITIAL ENCOUNTER: Chronic | ICD-10-CM

## 2018-12-04 LAB
ALBUMIN SERPL ELPH-MCNC: 4.1 G/DL — SIGNIFICANT CHANGE UP (ref 3.3–5)
ALP SERPL-CCNC: 115 U/L — SIGNIFICANT CHANGE UP (ref 40–120)
ALT FLD-CCNC: 19 U/L — SIGNIFICANT CHANGE UP (ref 4–41)
AST SERPL-CCNC: 15 U/L — SIGNIFICANT CHANGE UP (ref 4–40)
BASOPHILS # BLD AUTO: 0.04 K/UL — SIGNIFICANT CHANGE UP (ref 0–0.2)
BASOPHILS NFR BLD AUTO: 0.7 % — SIGNIFICANT CHANGE UP (ref 0–2)
BILIRUB SERPL-MCNC: 0.4 MG/DL — SIGNIFICANT CHANGE UP (ref 0.2–1.2)
BUN SERPL-MCNC: 81 MG/DL — HIGH (ref 7–23)
CALCIUM SERPL-MCNC: 9.1 MG/DL — SIGNIFICANT CHANGE UP (ref 8.4–10.5)
CHLORIDE SERPL-SCNC: 99 MMOL/L — SIGNIFICANT CHANGE UP (ref 98–107)
CO2 SERPL-SCNC: 27 MMOL/L — SIGNIFICANT CHANGE UP (ref 22–31)
CREAT SERPL-MCNC: 5.55 MG/DL — HIGH (ref 0.5–1.3)
EOSINOPHIL # BLD AUTO: 0.29 K/UL — SIGNIFICANT CHANGE UP (ref 0–0.5)
EOSINOPHIL NFR BLD AUTO: 4.8 % — SIGNIFICANT CHANGE UP (ref 0–6)
GLUCOSE SERPL-MCNC: 88 MG/DL — SIGNIFICANT CHANGE UP (ref 70–99)
HBA1C BLD-MCNC: 5.7 % — HIGH (ref 4–5.6)
HCT VFR BLD CALC: 35.5 % — LOW (ref 39–50)
HGB BLD-MCNC: 11.4 G/DL — LOW (ref 13–17)
IMM GRANULOCYTES # BLD AUTO: 0.02 # — SIGNIFICANT CHANGE UP
IMM GRANULOCYTES NFR BLD AUTO: 0.3 % — SIGNIFICANT CHANGE UP (ref 0–1.5)
LYMPHOCYTES # BLD AUTO: 1 K/UL — SIGNIFICANT CHANGE UP (ref 1–3.3)
LYMPHOCYTES # BLD AUTO: 16.6 % — SIGNIFICANT CHANGE UP (ref 13–44)
MAGNESIUM SERPL-MCNC: 2.2 MG/DL — SIGNIFICANT CHANGE UP (ref 1.6–2.6)
MCHC RBC-ENTMCNC: 28.8 PG — SIGNIFICANT CHANGE UP (ref 27–34)
MCHC RBC-ENTMCNC: 32.1 % — SIGNIFICANT CHANGE UP (ref 32–36)
MCV RBC AUTO: 89.6 FL — SIGNIFICANT CHANGE UP (ref 80–100)
MONOCYTES # BLD AUTO: 0.64 K/UL — SIGNIFICANT CHANGE UP (ref 0–0.9)
MONOCYTES NFR BLD AUTO: 10.6 % — SIGNIFICANT CHANGE UP (ref 2–14)
NEUTROPHILS # BLD AUTO: 4.02 K/UL — SIGNIFICANT CHANGE UP (ref 1.8–7.4)
NEUTROPHILS NFR BLD AUTO: 67 % — SIGNIFICANT CHANGE UP (ref 43–77)
NRBC # FLD: 0 — SIGNIFICANT CHANGE UP
PHOSPHATE SERPL-MCNC: 6 MG/DL — HIGH (ref 2.5–4.5)
PLATELET # BLD AUTO: 210 K/UL — SIGNIFICANT CHANGE UP (ref 150–400)
PMV BLD: 10.8 FL — SIGNIFICANT CHANGE UP (ref 7–13)
POTASSIUM SERPL-MCNC: 4.4 MMOL/L — SIGNIFICANT CHANGE UP (ref 3.5–5.3)
POTASSIUM SERPL-SCNC: 4.4 MMOL/L — SIGNIFICANT CHANGE UP (ref 3.5–5.3)
PROT SERPL-MCNC: 7.6 G/DL — SIGNIFICANT CHANGE UP (ref 6–8.3)
RBC # BLD: 3.96 M/UL — LOW (ref 4.2–5.8)
RBC # FLD: 15.3 % — HIGH (ref 10.3–14.5)
SODIUM SERPL-SCNC: 141 MMOL/L — SIGNIFICANT CHANGE UP (ref 135–145)
WBC # BLD: 6.01 K/UL — SIGNIFICANT CHANGE UP (ref 3.8–10.5)
WBC # FLD AUTO: 6.01 K/UL — SIGNIFICANT CHANGE UP (ref 3.8–10.5)

## 2018-12-04 PROCEDURE — 99214 OFFICE O/P EST MOD 30 MIN: CPT | Mod: GC

## 2018-12-04 RX ORDER — BLOOD-GLUCOSE METER
W/DEVICE KIT MISCELLANEOUS
Qty: 1 | Refills: 0 | Status: DISCONTINUED | COMMUNITY
Start: 2018-04-03 | End: 2018-12-04

## 2018-12-04 RX ORDER — LANCETS 33 GAUGE
EACH MISCELLANEOUS
Qty: 1 | Refills: 2 | Status: DISCONTINUED | COMMUNITY
Start: 2018-04-03 | End: 2018-12-04

## 2018-12-04 RX ORDER — BLOOD-GLUCOSE METER
KIT MISCELLANEOUS DAILY
Qty: 1 | Refills: 3 | Status: DISCONTINUED | COMMUNITY
Start: 2018-04-03 | End: 2018-12-04

## 2018-12-05 DIAGNOSIS — Z23 ENCOUNTER FOR IMMUNIZATION: ICD-10-CM

## 2018-12-05 NOTE — PHYSICAL EXAM
[No Acute Distress] : no acute distress [Well Nourished] : well nourished [Well Developed] : well developed [Well-Appearing] : well-appearing [PERRL] : pupils equal round and reactive to light [EOMI] : extraocular movements intact [Normal Outer Ear/Nose] : the outer ears and nose were normal in appearance [No JVD] : no jugular venous distention [Supple] : supple [Clear to Auscultation] : lungs were clear to auscultation bilaterally [Normal Rate] : normal rate  [Regular Rhythm] : with a regular rhythm [Soft] : abdomen soft [Non Tender] : non-tender [Non-distended] : non-distended [No Masses] : no abdominal mass palpated [No HSM] : no HSM [Normal Bowel Sounds] : normal bowel sounds [No Spinal Tenderness] : no spinal tenderness [No Joint Swelling] : no joint swelling [Grossly Normal Strength/Tone] : grossly normal strength/tone [No Rash] : no rash [Normal Gait] : normal gait [Coordination Grossly Intact] : coordination grossly intact [Normal Affect] : the affect was normal [Normal Insight/Judgement] : insight and judgment were intact [de-identified] : 1+ RLE pitting edema, and 2+ LLE pitting edema

## 2018-12-05 NOTE — ASSESSMENT
[FreeTextEntry1] : Patient is a 53-year-old male with long standing history of DM2 (~30 years), HTN, diabetic retinopathy (s/p laser eye surgery), diabetic neuropathy, HLD, CAD (s/p stent placement in 4/2016) and HFrEF (EF of 25%), presenting for follow-up visit\par \par #HErEF\par - EF of 25% on LUCIANO from June 2018\par - Follows with HF. As per HF, continue Metolazone 5mg qAM, Bumez 4mg BID, Coreg 6.25 BID\par - current kevan is 175 lbs \par - Will decrease Bumex to 2mg BID once weight is at baseline 170 \par - advised to follow a low Na diet, weigh himself daily and call if he gains >1 lbs per day or >3 lbs per week\par \par #CAD\par - Nuclear stress test in Nov 2018 showing large, moderate to severe defects in anteriorand apical, inferior walls that are predominantly fixed, suggestive of infarct with mild ischemia in the mid anterior wall\par - continue with ASA and Atorvastatin\par - f/u Cards recs\par \par #HTN\par - As per HF, c/w hydralazine 100 mg TID and isordil 40 mg TID\par \par #CKD stage 5\par - 2/2 diabetic nephropathy vs uncontrolled HTN\par - planned AVF creation in Nov cancelled due to positive nuclear stress test\par - f/u Renal recs\par - advised to follow a low K diet\par - will check serum lytes today\par \par #DM2\par - most recent A1c of 6.2 in August 2018\par - will recheck A1C today\par - patient counselled on consistent carb diet\par - will resend glucometer, strips and lancets\par \par #Depression\par - PHQ9 score of 9, patient reporting significant stress due to his chronic medical conditions \par - will hold off starting an SSRI at this time due to his severe CKD\par - if patients symptoms worsen, will consider starting escitalopram \par \par #Brandyn care maintenance \par - flu shot today \par - Pneumovax and Tdap received on 4/18\par - will discuss colon CA screening at next visit\par \par RTC in 10 weeks\par \par D/W Dr. Bowens

## 2018-12-05 NOTE — REVIEW OF SYSTEMS
[Lower Ext Edema] : lower extremity edema [Orthopena] : orthopnea [Dyspnea on Exertion] : dyspnea on exertion [Negative] : Heme/Lymph [Chest Pain] : no chest pain [Palpitations] : no palpitations [Paroysmal Nocturnal Dyspnea] : no paroysmal nocturnal dyspnea [Shortness Of Breath] : no shortness of breath

## 2018-12-05 NOTE — HISTORY OF PRESENT ILLNESS
[FreeTextEntry1] : Patient is a 53-year-old male with long standing history of DM2 (~30 years), HTN, diabetic retinopathy (s/p laser eye surgery), diabetic neuropathy, HLD, CAD (s/p stent placement in 4/2016) and HFrEF (EF of 25%), presenting for follow-up visit [de-identified] : HF: Patient endorses b/l LE edema. Patient reports being able to walk only half a block before becoming significantly SOB. Patient reports using 2 pillows at home to sleep. Patient endorses a low Na/K/carb diet\par \par DM: Patient reports he checks his FS 2 times/day approx 30 mins after eating usually around 120s. Does not take meds after he lost approximately 100 lbs. Eats small portions of rice with each meal

## 2018-12-06 LAB — GLUCOSE BLDC GLUCOMTR-MCNC: 223

## 2018-12-10 ENCOUNTER — INPATIENT (INPATIENT)
Facility: HOSPITAL | Age: 53
LOS: 3 days | Discharge: HOME | End: 2018-12-14
Attending: HOSPITALIST | Admitting: HOSPITALIST
Payer: MEDICAID

## 2018-12-10 VITALS
HEART RATE: 70 BPM | OXYGEN SATURATION: 100 % | RESPIRATION RATE: 18 BRPM | SYSTOLIC BLOOD PRESSURE: 140 MMHG | TEMPERATURE: 98 F | DIASTOLIC BLOOD PRESSURE: 79 MMHG

## 2018-12-10 DIAGNOSIS — K92.1 MELENA: ICD-10-CM

## 2018-12-10 DIAGNOSIS — I10 ESSENTIAL (PRIMARY) HYPERTENSION: ICD-10-CM

## 2018-12-10 DIAGNOSIS — T82.868A THROMBOSIS DUE TO VASCULAR PROSTHETIC DEVICES, IMPLANTS AND GRAFTS, INITIAL ENCOUNTER: Chronic | ICD-10-CM

## 2018-12-10 DIAGNOSIS — R06.00 DYSPNEA, UNSPECIFIED: ICD-10-CM

## 2018-12-10 DIAGNOSIS — E87.70 FLUID OVERLOAD, UNSPECIFIED: ICD-10-CM

## 2018-12-10 DIAGNOSIS — N18.5 CHRONIC KIDNEY DISEASE, STAGE 5: ICD-10-CM

## 2018-12-10 DIAGNOSIS — I50.23 ACUTE ON CHRONIC SYSTOLIC (CONGESTIVE) HEART FAILURE: ICD-10-CM

## 2018-12-10 DIAGNOSIS — Z00.00 ENCOUNTER FOR GENERAL ADULT MEDICAL EXAMINATION WITHOUT ABNORMAL FINDINGS: ICD-10-CM

## 2018-12-10 DIAGNOSIS — E11.9 TYPE 2 DIABETES MELLITUS WITHOUT COMPLICATIONS: ICD-10-CM

## 2018-12-10 DIAGNOSIS — Z95.5 PRESENCE OF CORONARY ANGIOPLASTY IMPLANT AND GRAFT: Chronic | ICD-10-CM

## 2018-12-10 DIAGNOSIS — N17.9 ACUTE KIDNEY FAILURE, UNSPECIFIED: ICD-10-CM

## 2018-12-10 DIAGNOSIS — D64.9 ANEMIA, UNSPECIFIED: ICD-10-CM

## 2018-12-10 DIAGNOSIS — E78.5 HYPERLIPIDEMIA, UNSPECIFIED: ICD-10-CM

## 2018-12-10 LAB
ALBUMIN SERPL ELPH-MCNC: 3.5 G/DL — SIGNIFICANT CHANGE UP (ref 3.3–5)
ALP SERPL-CCNC: 110 U/L — SIGNIFICANT CHANGE UP (ref 40–120)
ALT FLD-CCNC: 35 U/L — SIGNIFICANT CHANGE UP (ref 4–41)
APTT BLD: 32.2 SEC — SIGNIFICANT CHANGE UP (ref 27.5–36.3)
AST SERPL-CCNC: 27 U/L — SIGNIFICANT CHANGE UP (ref 4–40)
BASE EXCESS BLDV CALC-SCNC: -0.3 MMOL/L — SIGNIFICANT CHANGE UP
BASOPHILS # BLD AUTO: 0.03 K/UL — SIGNIFICANT CHANGE UP (ref 0–0.2)
BASOPHILS NFR BLD AUTO: 0.5 % — SIGNIFICANT CHANGE UP (ref 0–2)
BILIRUB SERPL-MCNC: 0.3 MG/DL — SIGNIFICANT CHANGE UP (ref 0.2–1.2)
BLD GP AB SCN SERPL QL: NEGATIVE — SIGNIFICANT CHANGE UP
BLOOD GAS VENOUS - CREATININE: 6.41 MG/DL — HIGH (ref 0.5–1.3)
BUN SERPL-MCNC: 93 MG/DL — HIGH (ref 7–23)
BUN SERPL-MCNC: 94 MG/DL — HIGH (ref 7–23)
CALCIUM SERPL-MCNC: 8 MG/DL — LOW (ref 8.4–10.5)
CALCIUM SERPL-MCNC: 8.5 MG/DL — SIGNIFICANT CHANGE UP (ref 8.4–10.5)
CHLORIDE BLDV-SCNC: 103 MMOL/L — SIGNIFICANT CHANGE UP (ref 96–108)
CHLORIDE SERPL-SCNC: 100 MMOL/L — SIGNIFICANT CHANGE UP (ref 98–107)
CHLORIDE SERPL-SCNC: 102 MMOL/L — SIGNIFICANT CHANGE UP (ref 98–107)
CO2 SERPL-SCNC: 21 MMOL/L — LOW (ref 22–31)
CO2 SERPL-SCNC: 23 MMOL/L — SIGNIFICANT CHANGE UP (ref 22–31)
CREAT SERPL-MCNC: 5.74 MG/DL — HIGH (ref 0.5–1.3)
CREAT SERPL-MCNC: 5.78 MG/DL — HIGH (ref 0.5–1.3)
EOSINOPHIL # BLD AUTO: 0.16 K/UL — SIGNIFICANT CHANGE UP (ref 0–0.5)
EOSINOPHIL NFR BLD AUTO: 2.9 % — SIGNIFICANT CHANGE UP (ref 0–6)
FOLATE SERPL-MCNC: 10.9 NG/ML — SIGNIFICANT CHANGE UP (ref 4.7–20)
GAS PNL BLDV: 139 MMOL/L — SIGNIFICANT CHANGE UP (ref 136–146)
GLUCOSE BLDV-MCNC: 151 — HIGH (ref 70–99)
GLUCOSE SERPL-MCNC: 139 MG/DL — HIGH (ref 70–99)
GLUCOSE SERPL-MCNC: 199 MG/DL — HIGH (ref 70–99)
HCO3 BLDV-SCNC: 23 MMOL/L — SIGNIFICANT CHANGE UP (ref 20–27)
HCT VFR BLD CALC: 24.8 % — LOW (ref 39–50)
HCT VFR BLD CALC: 25.1 % — LOW (ref 39–50)
HCT VFR BLD CALC: 27.1 % — LOW (ref 39–50)
HCT VFR BLDV CALC: 26.6 % — LOW (ref 39–51)
HGB BLD-MCNC: 7.9 G/DL — LOW (ref 13–17)
HGB BLD-MCNC: 8 G/DL — LOW (ref 13–17)
HGB BLD-MCNC: 8.4 G/DL — LOW (ref 13–17)
HGB BLDV-MCNC: 8.6 G/DL — LOW (ref 13–17)
IMM GRANULOCYTES # BLD AUTO: 0.02 # — SIGNIFICANT CHANGE UP
IMM GRANULOCYTES NFR BLD AUTO: 0.4 % — SIGNIFICANT CHANGE UP (ref 0–1.5)
INR BLD: 1.11 — SIGNIFICANT CHANGE UP (ref 0.88–1.17)
IRON SATN MFR SERPL: 29 UG/DL — LOW (ref 45–165)
LACTATE BLDV-MCNC: 0.7 MMOL/L — SIGNIFICANT CHANGE UP (ref 0.5–2)
LDH SERPL L TO P-CCNC: 210 U/L — SIGNIFICANT CHANGE UP (ref 135–225)
LYMPHOCYTES # BLD AUTO: 0.82 K/UL — LOW (ref 1–3.3)
LYMPHOCYTES # BLD AUTO: 14.7 % — SIGNIFICANT CHANGE UP (ref 13–44)
MAGNESIUM SERPL-MCNC: 2.1 MG/DL — SIGNIFICANT CHANGE UP (ref 1.6–2.6)
MCHC RBC-ENTMCNC: 28.1 PG — SIGNIFICANT CHANGE UP (ref 27–34)
MCHC RBC-ENTMCNC: 28.7 PG — SIGNIFICANT CHANGE UP (ref 27–34)
MCHC RBC-ENTMCNC: 28.8 PG — SIGNIFICANT CHANGE UP (ref 27–34)
MCHC RBC-ENTMCNC: 31 % — LOW (ref 32–36)
MCHC RBC-ENTMCNC: 31.9 % — LOW (ref 32–36)
MCHC RBC-ENTMCNC: 31.9 % — LOW (ref 32–36)
MCV RBC AUTO: 88.1 FL — SIGNIFICANT CHANGE UP (ref 80–100)
MCV RBC AUTO: 90.5 FL — SIGNIFICANT CHANGE UP (ref 80–100)
MCV RBC AUTO: 92.5 FL — SIGNIFICANT CHANGE UP (ref 80–100)
MONOCYTES # BLD AUTO: 0.41 K/UL — SIGNIFICANT CHANGE UP (ref 0–0.9)
MONOCYTES NFR BLD AUTO: 7.4 % — SIGNIFICANT CHANGE UP (ref 2–14)
NEUTROPHILS # BLD AUTO: 4.13 K/UL — SIGNIFICANT CHANGE UP (ref 1.8–7.4)
NEUTROPHILS NFR BLD AUTO: 74.1 % — SIGNIFICANT CHANGE UP (ref 43–77)
NRBC # FLD: 0 — SIGNIFICANT CHANGE UP
NT-PROBNP SERPL-SCNC: SIGNIFICANT CHANGE UP PG/ML
OB PNL STL: POSITIVE — SIGNIFICANT CHANGE UP
PCO2 BLDV: 49 MMHG — SIGNIFICANT CHANGE UP (ref 41–51)
PH BLDV: 7.32 PH — SIGNIFICANT CHANGE UP (ref 7.32–7.43)
PHOSPHATE SERPL-MCNC: 6.6 MG/DL — HIGH (ref 2.5–4.5)
PLATELET # BLD AUTO: 126 K/UL — LOW (ref 150–400)
PLATELET # BLD AUTO: 126 K/UL — LOW (ref 150–400)
PLATELET # BLD AUTO: 138 K/UL — LOW (ref 150–400)
PMV BLD: 10.6 FL — SIGNIFICANT CHANGE UP (ref 7–13)
PMV BLD: 11.5 FL — SIGNIFICANT CHANGE UP (ref 7–13)
PMV BLD: 11.5 FL — SIGNIFICANT CHANGE UP (ref 7–13)
PO2 BLDV: < 24 MMHG — LOW (ref 35–40)
POTASSIUM BLDV-SCNC: 4.6 MMOL/L — HIGH (ref 3.4–4.5)
POTASSIUM SERPL-MCNC: 4.2 MMOL/L — SIGNIFICANT CHANGE UP (ref 3.5–5.3)
POTASSIUM SERPL-MCNC: 4.8 MMOL/L — SIGNIFICANT CHANGE UP (ref 3.5–5.3)
POTASSIUM SERPL-SCNC: 4.2 MMOL/L — SIGNIFICANT CHANGE UP (ref 3.5–5.3)
POTASSIUM SERPL-SCNC: 4.8 MMOL/L — SIGNIFICANT CHANGE UP (ref 3.5–5.3)
PROT SERPL-MCNC: 6.7 G/DL — SIGNIFICANT CHANGE UP (ref 6–8.3)
PROTHROM AB SERPL-ACNC: 12.4 SEC — SIGNIFICANT CHANGE UP (ref 9.8–13.1)
RBC # BLD: 2.74 M/UL — LOW (ref 4.2–5.8)
RBC # BLD: 2.85 M/UL — LOW (ref 4.2–5.8)
RBC # BLD: 2.93 M/UL — LOW (ref 4.2–5.8)
RBC # FLD: 15.4 % — HIGH (ref 10.3–14.5)
RBC # FLD: 15.6 % — HIGH (ref 10.3–14.5)
RBC # FLD: 15.6 % — HIGH (ref 10.3–14.5)
RETICS #: 61 K/UL — SIGNIFICANT CHANGE UP (ref 25–125)
RETICS/RBC NFR: 2 % — SIGNIFICANT CHANGE UP (ref 0.5–2.5)
RH IG SCN BLD-IMP: POSITIVE — SIGNIFICANT CHANGE UP
RH IG SCN BLD-IMP: POSITIVE — SIGNIFICANT CHANGE UP
SAO2 % BLDV: 20.2 % — LOW (ref 60–85)
SODIUM SERPL-SCNC: 139 MMOL/L — SIGNIFICANT CHANGE UP (ref 135–145)
SODIUM SERPL-SCNC: 140 MMOL/L — SIGNIFICANT CHANGE UP (ref 135–145)
TROPONIN T, HIGH SENSITIVITY: 106 NG/L — CRITICAL HIGH (ref ?–14)
TROPONIN T, HIGH SENSITIVITY: 120 NG/L — CRITICAL HIGH (ref ?–14)
TROPONIN T, HIGH SENSITIVITY: 121 NG/L — CRITICAL HIGH (ref ?–14)
VIT B12 SERPL-MCNC: 540 PG/ML — SIGNIFICANT CHANGE UP (ref 200–900)
WBC # BLD: 5.35 K/UL — SIGNIFICANT CHANGE UP (ref 3.8–10.5)
WBC # BLD: 5.57 K/UL — SIGNIFICANT CHANGE UP (ref 3.8–10.5)
WBC # BLD: 6.08 K/UL — SIGNIFICANT CHANGE UP (ref 3.8–10.5)
WBC # FLD AUTO: 5.35 K/UL — SIGNIFICANT CHANGE UP (ref 3.8–10.5)
WBC # FLD AUTO: 5.57 K/UL — SIGNIFICANT CHANGE UP (ref 3.8–10.5)
WBC # FLD AUTO: 6.08 K/UL — SIGNIFICANT CHANGE UP (ref 3.8–10.5)

## 2018-12-10 PROCEDURE — 71046 X-RAY EXAM CHEST 2 VIEWS: CPT | Mod: 26

## 2018-12-10 PROCEDURE — 93010 ELECTROCARDIOGRAM REPORT: CPT

## 2018-12-10 PROCEDURE — 99222 1ST HOSP IP/OBS MODERATE 55: CPT | Mod: GC

## 2018-12-10 PROCEDURE — 99223 1ST HOSP IP/OBS HIGH 75: CPT | Mod: GC

## 2018-12-10 PROCEDURE — 99223 1ST HOSP IP/OBS HIGH 75: CPT

## 2018-12-10 RX ORDER — BUMETANIDE 0.25 MG/ML
2 INJECTION INTRAMUSCULAR; INTRAVENOUS
Qty: 0 | Refills: 0 | Status: DISCONTINUED | OUTPATIENT
Start: 2018-12-10 | End: 2018-12-10

## 2018-12-10 RX ORDER — PANTOPRAZOLE SODIUM 20 MG/1
8 TABLET, DELAYED RELEASE ORAL
Qty: 80 | Refills: 0 | Status: DISCONTINUED | OUTPATIENT
Start: 2018-12-10 | End: 2018-12-14

## 2018-12-10 RX ORDER — BUMETANIDE 0.25 MG/ML
1 INJECTION INTRAMUSCULAR; INTRAVENOUS
Qty: 0 | Refills: 0 | Status: DISCONTINUED | OUTPATIENT
Start: 2018-12-10 | End: 2018-12-10

## 2018-12-10 RX ORDER — ISOSORBIDE DINITRATE 5 MG/1
40 TABLET ORAL THREE TIMES A DAY
Qty: 0 | Refills: 0 | Status: DISCONTINUED | OUTPATIENT
Start: 2018-12-10 | End: 2018-12-14

## 2018-12-10 RX ORDER — PANTOPRAZOLE SODIUM 20 MG/1
80 TABLET, DELAYED RELEASE ORAL ONCE
Qty: 0 | Refills: 0 | Status: COMPLETED | OUTPATIENT
Start: 2018-12-10 | End: 2018-12-10

## 2018-12-10 RX ORDER — INSULIN LISPRO 100/ML
VIAL (ML) SUBCUTANEOUS
Qty: 0 | Refills: 0 | Status: DISCONTINUED | OUTPATIENT
Start: 2018-12-10 | End: 2018-12-14

## 2018-12-10 RX ORDER — DEXTROSE 50 % IN WATER 50 %
25 SYRINGE (ML) INTRAVENOUS ONCE
Qty: 0 | Refills: 0 | Status: DISCONTINUED | OUTPATIENT
Start: 2018-12-10 | End: 2018-12-14

## 2018-12-10 RX ORDER — CALCIUM ACETATE 667 MG
667 TABLET ORAL
Qty: 0 | Refills: 0 | Status: DISCONTINUED | OUTPATIENT
Start: 2018-12-10 | End: 2018-12-14

## 2018-12-10 RX ORDER — DEXTROSE 50 % IN WATER 50 %
15 SYRINGE (ML) INTRAVENOUS ONCE
Qty: 0 | Refills: 0 | Status: DISCONTINUED | OUTPATIENT
Start: 2018-12-10 | End: 2018-12-14

## 2018-12-10 RX ORDER — ASPIRIN/CALCIUM CARB/MAGNESIUM 324 MG
1 TABLET ORAL
Qty: 0 | Refills: 0 | COMMUNITY

## 2018-12-10 RX ORDER — CARVEDILOL PHOSPHATE 80 MG/1
6.25 CAPSULE, EXTENDED RELEASE ORAL EVERY 12 HOURS
Qty: 0 | Refills: 0 | Status: DISCONTINUED | OUTPATIENT
Start: 2018-12-10 | End: 2018-12-14

## 2018-12-10 RX ORDER — ISOSORBIDE DINITRATE 5 MG/1
20 TABLET ORAL THREE TIMES A DAY
Qty: 0 | Refills: 0 | Status: DISCONTINUED | OUTPATIENT
Start: 2018-12-10 | End: 2018-12-10

## 2018-12-10 RX ORDER — ASPIRIN/CALCIUM CARB/MAGNESIUM 324 MG
81 TABLET ORAL DAILY
Qty: 0 | Refills: 0 | Status: DISCONTINUED | OUTPATIENT
Start: 2018-12-10 | End: 2018-12-10

## 2018-12-10 RX ORDER — BUMETANIDE 0.25 MG/ML
1 INJECTION INTRAMUSCULAR; INTRAVENOUS
Qty: 20 | Refills: 0 | Status: DISCONTINUED | OUTPATIENT
Start: 2018-12-10 | End: 2018-12-13

## 2018-12-10 RX ORDER — GLUCAGON INJECTION, SOLUTION 0.5 MG/.1ML
1 INJECTION, SOLUTION SUBCUTANEOUS ONCE
Qty: 0 | Refills: 0 | Status: DISCONTINUED | OUTPATIENT
Start: 2018-12-10 | End: 2018-12-14

## 2018-12-10 RX ORDER — DEXTROSE 50 % IN WATER 50 %
12.5 SYRINGE (ML) INTRAVENOUS ONCE
Qty: 0 | Refills: 0 | Status: DISCONTINUED | OUTPATIENT
Start: 2018-12-10 | End: 2018-12-14

## 2018-12-10 RX ORDER — SODIUM CHLORIDE 9 MG/ML
1000 INJECTION, SOLUTION INTRAVENOUS
Qty: 0 | Refills: 0 | Status: DISCONTINUED | OUTPATIENT
Start: 2018-12-10 | End: 2018-12-14

## 2018-12-10 RX ORDER — HYDRALAZINE HCL 50 MG
100 TABLET ORAL THREE TIMES A DAY
Qty: 0 | Refills: 0 | Status: DISCONTINUED | OUTPATIENT
Start: 2018-12-10 | End: 2018-12-14

## 2018-12-10 RX ADMIN — CARVEDILOL PHOSPHATE 6.25 MILLIGRAM(S): 80 CAPSULE, EXTENDED RELEASE ORAL at 17:22

## 2018-12-10 RX ADMIN — ISOSORBIDE DINITRATE 20 MILLIGRAM(S): 5 TABLET ORAL at 15:34

## 2018-12-10 RX ADMIN — PANTOPRAZOLE SODIUM 10 MG/HR: 20 TABLET, DELAYED RELEASE ORAL at 18:52

## 2018-12-10 RX ADMIN — ISOSORBIDE DINITRATE 40 MILLIGRAM(S): 5 TABLET ORAL at 23:04

## 2018-12-10 RX ADMIN — Medication 667 MILLIGRAM(S): at 12:45

## 2018-12-10 RX ADMIN — Medication 100 MILLIGRAM(S): at 23:04

## 2018-12-10 RX ADMIN — Medication 667 MILLIGRAM(S): at 17:24

## 2018-12-10 RX ADMIN — PANTOPRAZOLE SODIUM 80 MILLIGRAM(S): 20 TABLET, DELAYED RELEASE ORAL at 11:01

## 2018-12-10 RX ADMIN — BUMETANIDE 5 MG/HR: 0.25 INJECTION INTRAMUSCULAR; INTRAVENOUS at 18:52

## 2018-12-10 RX ADMIN — Medication 100 MILLIGRAM(S): at 15:34

## 2018-12-10 NOTE — CONSULT NOTE ADULT - PROBLEM SELECTOR RECOMMENDATION 9
Patient with known history of advanced CKD in setting of longstanding history of DM and HTN. On review of previous labs, baseline Scr. appears to between 4.7-5.4. Latest Scr. noted to be elevated at 5.74 today. Patient with hemodynamically mediated SMITA due to fluid overload? Patient currently on bumex drip as per heart failure team. Continue with IV diuretics. Monitor Scr, I/O, and electrolytes. No acute indication for HD at this time.  Avoid NSAIDs, RCAs, ACE-I, or ARBs at this time.

## 2018-12-10 NOTE — CONSULT NOTE ADULT - SUBJECTIVE AND OBJECTIVE BOX
Date of Admission: 12/10/18    CHIEF COMPLAINT:    HISTORY OF PRESENT ILLNESS:          Allergies    No Known Allergies      PAST MEDICAL & SURGICAL HISTORY:  Abnormal echocardiogram: 06/2018severe MR  Severe global left ventricular systolic dysfunction.  6. Right ventricular enlargement with decreased right  ventricular systolic function, moderate RV dysfunction  CKD (chronic kidney disease), stage V  HFrEF (heart failure with reduced ejection fraction): EF 24%  Myocardial infarction: Jan 2016  Hyperlipidemia  CAD (coronary artery disease): 2016, coronary artery stentX1  DM (diabetes mellitus): 2  HTN (hypertension)  Stented coronary artery: ? 1  Arterial stent thrombosis      FAMILY HISTORY:  Family history of diabetes mellitus (DM) (Father, Mother, Sibling)  Family history of MI (myocardial infarction) (Father, Mother, Sibling)      SOCIAL HISTORY:    [ ] Non-smoker  [ ] Smoker  [ ] Alcohol      REVIEW OF SYSTEMS:  CONSTITUTIONAL: No fever, weight loss, or fatigue  EYES: No eye pain, visual disturbances, or discharge  ENMT:  No difficulty hearing, tinnitus, vertigo; No sinus or throat pain  NECK: No pain or stiffness  RESPIRATORY: No cough, wheezing, chills or hemoptysis; No Shortness of Breath  CARDIOVASCULAR: No chest pain, palpitations, passing out, dizziness, or leg swelling  GASTROINTESTINAL: No abdominal or epigastric pain. No nausea, vomiting, or hematemesis; No diarrhea or constipation. No melena or hematochezia.  GENITOURINARY: No dysuria, frequency, hematuria, or incontinence  NEUROLOGICAL: No headaches, memory loss, loss of strength, numbness, or tremors  SKIN: No itching, burning, rashes, or lesions   LYMPH Nodes: No enlarged glands  ENDOCRINE: No heat or cold intolerance; No hair loss  MUSCULOSKELETAL: No joint pain or swelling; No muscle, back, or extremity pain  PSYCHIATRIC: No depression, anxiety, mood swings, or difficulty sleeping  HEME/LYMPH: No easy bruising, or bleeding gums  ALLERY AND IMMUNOLOGIC: No hives or eczema	    [ ] All others negative	  [ ] Unable to obtain    PHYSICAL EXAM:  T(C): 36.8 (12-10-18 @ 08:56), Max: 36.8 (12-10-18 @ 08:56)  HR: 70 (12-10-18 @ 08:56) (70 - 70)  BP: 140/79 (12-10-18 @ 08:56) (140/79 - 140/79)  RR: 18 (12-10-18 @ 08:56) (18 - 18)  SpO2: 100% (12-10-18 @ 08:56) (100% - 100%)  Wt(kg): --  I&O's Summary      Appearance: Normal	  HEENT:   Normal oral mucosa, PERRL, EOMI	  Lymphatic: No lymphadenopathy  Cardiovascular: Normal S1 S2, No JVD, No murmurs, No edema  Respiratory: Lungs clear to auscultation	  Psychiatry: A & O x 3, Mood & affect appropriate  Gastrointestinal:  Soft, Non-tender, + BS	  Skin: No rashes, No ecchymoses, No cyanosis	  Neurologic: Non-focal  Extremities: Normal range of motion, No clubbing, cyanosis or edema  Vascular: Peripheral pulses palpable 2+ bilaterally        LABS:	 	    CBC Full  -  ( 10 Dec 2018 09:08 )  WBC Count : 5.57 K/uL  Hemoglobin : 8.4 g/dL  Hematocrit : 27.1 %  Platelet Count - Automated : 126 K/uL  Mean Cell Volume : 92.5 fL  Mean Cell Hemoglobin : 28.7 pg  Mean Cell Hemoglobin Concentration : 31.0 %  Auto Neutrophil # : 4.13 K/uL  Auto Lymphocyte # : 0.82 K/uL  Auto Monocyte # : 0.41 K/uL  Auto Eosinophil # : 0.16 K/uL  Auto Basophil # : 0.03 K/uL  Auto Neutrophil % : 74.1 %  Auto Lymphocyte % : 14.7 %  Auto Monocyte % : 7.4 %  Auto Eosinophil % : 2.9 %  Auto Basophil % : 0.5 %    < from: Transthoracic Echocardiogram (11.02.18 @ 08:47) >  DIMENSIONS:  Dimensions:     Normal Values:  LA:     4.2 cm    2.0 - 4.0 cm  Ao:     3.2 cm    2.0 - 3.8 cm  SEPTUM: 0.8 cm    0.6 - 1.2 cm  PWT:    0.8 cm    0.6 - 1.1 cm  LVIDd:  7.0 cm    3.0 - 5.6 cm  LVIDs:  6.2 cm    1.8 - 4.0 cm  Derived Variables:  LVMI: 134 g/m2  RWT: 0.22  Fractional short: 11 %  Ejection Fraction (Teicholtz): 24 %  ------------------------------------------------------------------------  OBSERVATIONS:  Mitral Valve: Mitral annular calcification, otherwise  normal mitral valve. Severe mitral regurgitation with an  eccentric, posteriorly directed jet.  Aortic Root: Normal aortic root.  Aortic Valve: Calcified trileaflet aortic valve with  decreased opening.  Left Atrium: Severely dilated left atrium.  LA volume index  = 52 cc/m2.  Left Ventricle: Severe global left ventricular systolic  dysfunction. Severe left ventricular enlargement. (DT:106  ms).  Right Heart: Normal right atrium. Right ventricular  enlargement with decreased right ventricular systolic  function. Normal tricuspid valve.  Mild-moderate tricuspid  regurgitation. Normal pulmonic valve. Minimal pulmonic  regurgitation.  Pericardium/PleuraSmall pericardial effusion superior to  the right atrium.  Hemodynamic: Estimated right ventricular systolic pressure  equals 71 mm Hg, assuming right atrial pressure equals 10  mm Hg, consistent with severepulmonary hypertension.    < end of copied text >    < from: Nuclear Stress Test-Pharmacologic (11.02.18 @ 11:32) >  IMPRESSIONS:Abnormal Study  * There are large, moderate to severe defects in anterior  and apical, inferior walls that are predominantly fixed,  suggestive of infarct with mild ischemia in the mid  anterior wall.  * Post-stress gated wall motion analysiswas performed  (LVEF = 29 %;LVEDV = 285 ml.), revealing severe  hypokinesis.  ------------------------------------------------------------------------    < end of copied text > Date of Admission: 12/10/18    CHIEF COMPLAINT: Sent in by Dr. Enio Pavon    HISTORY OF PRESENT ILLNESS:    53 year old M w/ h/o IDDM c/b neuropathy (A1c 6.2 8/8/18) , HTN, CAD (s/p stent in [place] in 2016, unknown coronary anatomy), HFrEF (EF 25%, LVEDD 6.6 cm), severe MR (functional), stage V CKD (b/l Cr 4-5) was sent in to ED to be evaluated for volume overload in setting of CKD stage V.  Patient's nephrologist is Dr. Giovanny Pavon and had plans for fistula with Dr. Gonzalez 10/31 per record. Prior to coming to ED diuretics had been adjusted to Bumex 4 mg po BID with metolazone (dose unclear) daily (done for 2 days prior to admission). Currently endorses fatigue/ SIERRA with ambulation after 1/2 blocks; also reports dyspnea on exertion with climbing stairs.  Uses 1 pillow at night. States w/ addition of metolazone and increase in Bumex he did void a significant amount. He is requesting to only be evaluated by Dr. Enio Pavon. H/H lower than prior admission, occult positive, GI consulted.     Allergies    No Known Allergies      PAST MEDICAL & SURGICAL HISTORY:  Abnormal echocardiogram: 06/2018severe MR  Severe global left ventricular systolic dysfunction.  6. Right ventricular enlargement with decreased right  ventricular systolic function, moderate RV dysfunction  CKD (chronic kidney disease), stage V  HFrEF (heart failure with reduced ejection fraction): EF 24%  Myocardial infarction: Jan 2016  Hyperlipidemia  CAD (coronary artery disease): 2016, coronary artery stentX1  DM (diabetes mellitus): 2  HTN (hypertension)  Stented coronary artery: ? 1  Arterial stent thrombosis      FAMILY HISTORY:  Family history of diabetes mellitus (DM) (Father, Mother, Sibling)  Family history of MI (myocardial infarction) (Father, Mother, Sibling)      SOCIAL HISTORY:    No ETOh      REVIEW OF SYSTEMS:  CONSTITUTIONAL: No fever, weight loss, admits to fatigue  EYES: No eye pain, visual disturbances, or discharge  ENMT:  No difficulty hearing, tinnitus, vertigo; No sinus or throat pain  NECK: No pain or stiffness  RESPIRATORY: No cough, wheezing, chills or hemoptysis; admits to Shortness of Breath  CARDIOVASCULAR: No chest pain, palpitations, passing out, dizziness, admits to leg swelling  GASTROINTESTINAL: No abdominal or epigastric pain. No nausea, vomiting, or hematemesis; No diarrhea or constipation. No melena or hematochezia.  GENITOURINARY: No dysuria, frequency, hematuria, or incontinence  NEUROLOGICAL: No headaches, memory loss, loss of strength, numbness, or tremors  SKIN: No itching, burning, rashes, or lesions   LYMPH Nodes: No enlarged glands  ENDOCRINE: No heat or cold intolerance; No hair loss  MUSCULOSKELETAL: No joint pain or swelling; No muscle, back, or extremity pain  PSYCHIATRIC: No depression, anxiety, mood swings, or difficulty sleeping  HEME/LYMPH: No easy bruising, or bleeding gums  ALLERY AND IMMUNOLOGIC: No hives or eczema	      PHYSICAL EXAM:  T(C): 36.8 (12-10-18 @ 08:56), Max: 36.8 (12-10-18 @ 08:56)  HR: 70 (12-10-18 @ 08:56) (70 - 70)  BP: 140/79 (12-10-18 @ 08:56) (140/79 - 140/79)  RR: 18 (12-10-18 @ 08:56) (18 - 18)  SpO2: 100% (12-10-18 @ 08:56) (100% - 100%)        Appearance: Normal	  HEENT:   Normal oral mucosa, PERRL, EOMI	  Lymphatic: No lymphadenopathy  Cardiovascular: Normal S1 S2, JVP about 18 cm, No murmurs, trace b/l LE edema, warm b/l.   Respiratory: Lungs clear to auscultation	  Psychiatry: A & O x 3, Mood & affect appropriate  Gastrointestinal:  Soft, Non-tender, + BS	  Skin: No rashes, No ecchymoses, No cyanosis	  Neurologic: Non-focal  Extremities: Normal range of motion, No clubbing, cyanosis   Vascular: Peripheral pulses palpable 2+ bilaterally        LABS:	 	    CBC Full  -  ( 10 Dec 2018 09:08 )  WBC Count : 5.57 K/uL  Hemoglobin : 8.4 g/dL  Hematocrit : 27.1 %  Platelet Count - Automated : 126 K/uL  Mean Cell Volume : 92.5 fL  Mean Cell Hemoglobin : 28.7 pg  Mean Cell Hemoglobin Concentration : 31.0 %  Auto Neutrophil # : 4.13 K/uL  Auto Lymphocyte # : 0.82 K/uL  Auto Monocyte # : 0.41 K/uL  Auto Eosinophil # : 0.16 K/uL  Auto Basophil # : 0.03 K/uL  Auto Neutrophil % : 74.1 %  Auto Lymphocyte % : 14.7 %  Auto Monocyte % : 7.4 %  Auto Eosinophil % : 2.9 %  Auto Basophil % : 0.5 %    < from: Transthoracic Echocardiogram (11.02.18 @ 08:47) >  DIMENSIONS:  Dimensions:     Normal Values:  LA:     4.2 cm    2.0 - 4.0 cm  Ao:     3.2 cm    2.0 - 3.8 cm  SEPTUM: 0.8 cm    0.6 - 1.2 cm  PWT:    0.8 cm    0.6 - 1.1 cm  LVIDd:  7.0 cm    3.0 - 5.6 cm  LVIDs:  6.2 cm    1.8 - 4.0 cm  Derived Variables:  LVMI: 134 g/m2  RWT: 0.22  Fractional short: 11 %  Ejection Fraction (Teicholtz): 24 %  ------------------------------------------------------------------------  OBSERVATIONS:  Mitral Valve: Mitral annular calcification, otherwise  normal mitral valve. Severe mitral regurgitation with an  eccentric, posteriorly directed jet.  Aortic Root: Normal aortic root.  Aortic Valve: Calcified trileaflet aortic valve with  decreased opening.  Left Atrium: Severely dilated left atrium.  LA volume index  = 52 cc/m2.  Left Ventricle: Severe global left ventricular systolic  dysfunction. Severe left ventricular enlargement. (DT:106  ms).  Right Heart: Normal right atrium. Right ventricular  enlargement with decreased right ventricular systolic  function. Normal tricuspid valve.  Mild-moderate tricuspid  regurgitation. Normal pulmonic valve. Minimal pulmonic  regurgitation.  Pericardium/PleuraSmall pericardial effusion superior to  the right atrium.  Hemodynamic: Estimated right ventricular systolic pressure  equals 71 mm Hg, assuming right atrial pressure equals 10  mm Hg, consistent with severepulmonary hypertension.    < end of copied text >    < from: Nuclear Stress Test-Pharmacologic (11.02.18 @ 11:32) >  IMPRESSIONS:Abnormal Study  * There are large, moderate to severe defects in anterior  and apical, inferior walls that are predominantly fixed,  suggestive of infarct with mild ischemia in the mid  anterior wall.  * Post-stress gated wall motion analysiswas performed  (LVEF = 29 %;LVEDV = 285 ml.), revealing severe  hypokinesis.  ------------------------------------------------------------------------    < end of copied text >

## 2018-12-10 NOTE — ED PROVIDER NOTE - MUSCULOSKELETAL, MLM
Spine appears normal, range of motion is not limited, no muscle or joint tenderness (-) pedal edema of LE.

## 2018-12-10 NOTE — ED PROVIDER NOTE - PMH
Abnormal echocardiogram  06/2018severe MR  Severe global left ventricular systolic dysfunction.  6. Right ventricular enlargement with decreased right  ventricular systolic function, moderate RV dysfunction  CAD (coronary artery disease)  2016, coronary artery stentX1  CKD (chronic kidney disease), stage V    DM (diabetes mellitus)  2  HFrEF (heart failure with reduced ejection fraction)  EF 24%  HTN (hypertension)    Hyperlipidemia    Myocardial infarction  Jan 2016

## 2018-12-10 NOTE — H&P ADULT - PROBLEM SELECTOR PLAN 6
check occult stool   consider GI eval if +   protonix ordered check occult stool   GI eval  protonix ordered

## 2018-12-10 NOTE — CONSULT NOTE ADULT - SUBJECTIVE AND OBJECTIVE BOX
[facility] Division of Kidney Diseases & Hypertension  INITIAL CONSULT NOTE  [phone]--------------------------------------------------------------------------------    HPI: 53 year old male with h/o CKD stage V, CHFrEF, DM, HTN, CAD presented with complains of SOB for the past 3 weeks. SOB is present mainly on exertion and is worse on going up stairs. Patient is unable to lie down flat in bed and has to use 1 pillow to sleep at night. Patient was advised to take bumex 4 mg BID and metolazone daily by his cardiologist and SOB mildly improved. Due to persistent symptoms, patient was told by his cardiologist to come to [facility] for further treatement.     Nephrology was consulted due to history of CKD. Patient has history of CKD stage V from longstanding history of DM and HTN.  On review of previous records in [place],Scr. was elevated at 1.5 on 4/4/16 and 1.6 on 4/6/16. Patient was hospitalized in [facility] from 2/10/18 to 2/14/18 during Scr was elevated at 5 on 2/10/18 which then decreased to 4.43 on 2/14/18. Patient was subsequently seen in [facility] renal clinic in March 2018 and has been following up with Dr. Giovanny Pavon since then. Patient was scheduled for AVF creation in November 2018 but was cancelled due to abnormal nuclear stress test. Last Scr. before admission was 5.55 on 12/4/18. Labs done during this admission showed Scr. of 5.74 on 12/10/18.     Currently patient has SOB on lying down flat in bed but notes improvement in symptoms after starting on bumex and metolazone as outpatient. Patient endorses increase in urine output after starting PO bumex and metolazone. Patient denies c/o CP, abdominal pain, nausea, vomiting, decrease in appetite, or itching.         PAST HISTORY  --------------------------------------------------------------------------------  PAST MEDICAL & SURGICAL HISTORY:  Abnormal echocardiogram: 06/2018severe MR  Severe global left ventricular systolic dysfunction.  6. Right ventricular enlargement with decreased right  ventricular systolic function, moderate RV dysfunction  CKD (chronic kidney disease), stage V  HFrEF (heart failure with reduced ejection fraction): EF 24%  Myocardial infarction: Jan 2016  Hyperlipidemia  CAD (coronary artery disease): 2016, coronary artery stentX1  DM (diabetes mellitus): 2  HTN (hypertension)  Stented coronary artery: ? 1  Arterial stent thrombosis    FAMILY HISTORY:  Family history of diabetes mellitus (DM) (Sibling)  Family history of MI (myocardial infarction) (Sibling)    PAST SOCIAL HISTORY:    ALLERGIES & MEDICATIONS  --------------------------------------------------------------------------------  Allergies    No Known Allergies    Intolerances      Standing Inpatient Medications  buMETAnide Infusion 1 mG/Hr IV Continuous <Continuous>  calcium acetate 667 milliGRAM(s) Oral three times a day with meals  carvedilol 6.25 milliGRAM(s) Oral every 12 hours  dextrose 5%. 1000 milliLiter(s) IV Continuous <Continuous>  dextrose 50% Injectable 12.5 Gram(s) IV Push once  dextrose 50% Injectable 25 Gram(s) IV Push once  dextrose 50% Injectable 25 Gram(s) IV Push once  hydrALAZINE 100 milliGRAM(s) Oral three times a day  insulin lispro (HumaLOG) corrective regimen sliding scale   SubCutaneous three times a day before meals  isosorbide   dinitrate Tablet (ISORDIL) 40 milliGRAM(s) Oral three times a day  pantoprazole Infusion 8 mG/Hr IV Continuous <Continuous>    PRN Inpatient Medications  dextrose 40% Gel 15 Gram(s) Oral once PRN  glucagon  Injectable 1 milliGRAM(s) IntraMuscular once PRN      REVIEW OF SYSTEMS  --------------------------------------------------------------------------------  Gen: No fever/chills.   Head/Eyes/Ears/Mouth: No headache  Respiratory: SOB on exertion +  CV: Orthopnea +  GI: No abdominal pain, diarrhea, nausea, vomiting  : No dysuria, hematuria, nocturia  MSK: Edema +  Neuro: No dizziness/lightheadedness    All other systems were reviewed and are negative, except as noted.    VITALS/PHYSICAL EXAM  --------------------------------------------------------------------------------  T(C): 36.8 (12-10-18 @ 18:34), Max: 36.8 (12-10-18 @ 08:56)  HR: 72 (12-10-18 @ 18:34) (67 - 72)  BP: 136/82 (12-10-18 @ 18:34) (130/80 - 151/92)  RR: 18 (12-10-18 @ 18:34) (18 - 18)  SpO2: 98% (12-10-18 @ 18:34) (98% - 100%)  Wt(kg): --  Height (cm): 165.1 (12-10-18 @ 15:21)  Weight (kg): 84.4 (12-10-18 @ 15:21)  BMI (kg/m2): 31 (12-10-18 @ 15:21)  BSA (m2): 1.92 (12-10-18 @ 15:21)      Physical Exam:  	Gen: NAD, well-appearing  	HEENT:  supple neck  	Pulm: CTA B/L  	CV:  S1S2  	Abd: +BS, soft   	Ext: Trace B/L Lower ext edema  	Neuro: No focal deficits  	Skin: Warm, without rashes  	Vascular access: none     LABS/STUDIES  --------------------------------------------------------------------------------              8.0    6.08  >-----------<  138      [12-10-18 @ 16:30]              25.1     140  |  102  |  93  ----------------------------<  139      [12-10-18 @ 09:08]  4.8   |  23  |  5.74        Ca     8.5     [12-10-18 @ 09:08]    TPro  6.7  /  Alb  3.5  /  TBili  0.3  /  DBili  x   /  AST  27  /  ALT  35  /  AlkPhos  110  [12-10-18 @ 09:08]    PT/INR: PT 12.4 , INR 1.11       [12-10-18 @ 09:08]  PTT: 32.2       [12-10-18 @ 09:08]          [12-10-18 @ 15:05]    Creatinine Trend:  SCr 5.74 [12-10 @ 09:08]  SCr 5.55 [12-04 @ 16:00]  SCr 4.77 [11-14 @ 17:06]        Iron 29, TIBC --, %sat --      [12-10-18 @ 15:05]  HbA1c 5.7      [12-04-18 @ 16:00] [facility] Division of Kidney Diseases & Hypertension  INITIAL CONSULT NOTE  [phone]--------------------------------------------------------------------------------    HPI: 53 year old male with h/o CKD stage V, CHFrEF, DM, HTN, CAD presented with complains of SOB for the past 3 weeks. SOB is present mainly on exertion and is worse on going up stairs. Patient is unable to lie down flat in bed and has to use 1 pillow to sleep at night. Patient was advised to take bumex 4 mg BID and metolazone daily by his cardiologist and SOB mildly improved. Due to persistent symptoms, patient was told by his cardiologist to come to [facility] for further treatement.     Nephrology was consulted due to history of CKD. Patient has history of CKD stage V from longstanding history of DM and HTN.  On review of previous records in [place],Scr. was elevated at 1.5 on 4/4/16 and 1.6 on 4/6/16. Patient was hospitalized in [facility] from 2/10/18 to 2/14/18 during Scr was elevated at 5 on 2/10/18 which then decreased to 4.43 on 2/14/18. Patient was subsequently seen in [facility] renal clinic in March 2018 and has been following up with Dr. Giovanny Pavon since then. Patient was scheduled for AVF creation in November 2018 but was cancelled due to abnormal nuclear stress test. Last Scr. before admission was 5.55 on 12/4/18. Labs done during this admission showed Scr. of 5.74 on 12/10/18.     Currently patient has SOB on lying down flat in bed but notes improvement in symptoms after starting on bumex and metolazone as outpatient. Patient endorses increase in urine output after starting PO bumex and metolazone. Patient denies c/o CP, abdominal pain, nausea, vomiting, decrease in appetite, or itching.         PAST HISTORY  --------------------------------------------------------------------------------  PAST MEDICAL & SURGICAL HISTORY:  Abnormal echocardiogram: 06/2018severe MR  Severe global left ventricular systolic dysfunction.  6. Right ventricular enlargement with decreased right  ventricular systolic function, moderate RV dysfunction  CKD (chronic kidney disease), stage V  HFrEF (heart failure with reduced ejection fraction): EF 24%  Myocardial infarction: Jan 2016  Hyperlipidemia  CAD (coronary artery disease): 2016, coronary artery stentX1  DM (diabetes mellitus): 2  HTN (hypertension)  Stented coronary artery: ? 1  Arterial stent thrombosis    FAMILY HISTORY:  Family history of diabetes mellitus (DM) (Sibling)  Family history of MI (myocardial infarction) (Sibling)    PAST SOCIAL HISTORY: No smoking, drugs or alcohol.    ALLERGIES & MEDICATIONS  --------------------------------------------------------------------------------  Allergies    No Known Allergies    Intolerances      Standing Inpatient Medications  buMETAnide Infusion 1 mG/Hr IV Continuous <Continuous>  calcium acetate 667 milliGRAM(s) Oral three times a day with meals  carvedilol 6.25 milliGRAM(s) Oral every 12 hours  dextrose 5%. 1000 milliLiter(s) IV Continuous <Continuous>  dextrose 50% Injectable 12.5 Gram(s) IV Push once  dextrose 50% Injectable 25 Gram(s) IV Push once  dextrose 50% Injectable 25 Gram(s) IV Push once  hydrALAZINE 100 milliGRAM(s) Oral three times a day  insulin lispro (HumaLOG) corrective regimen sliding scale   SubCutaneous three times a day before meals  isosorbide   dinitrate Tablet (ISORDIL) 40 milliGRAM(s) Oral three times a day  pantoprazole Infusion 8 mG/Hr IV Continuous <Continuous>    PRN Inpatient Medications  dextrose 40% Gel 15 Gram(s) Oral once PRN  glucagon  Injectable 1 milliGRAM(s) IntraMuscular once PRN      REVIEW OF SYSTEMS  --------------------------------------------------------------------------------  Gen: No fever/chills.   Head/Eyes/Ears/Mouth: No headache  Respiratory: SOB on exertion +  CV: Orthopnea +  GI: No abdominal pain, diarrhea, nausea, vomiting  : No dysuria, hematuria, nocturia  MSK: Edema +  Neuro: No dizziness/lightheadedness    All other systems were reviewed and are negative, except as noted.    VITALS/PHYSICAL EXAM  --------------------------------------------------------------------------------  T(C): 36.8 (12-10-18 @ 18:34), Max: 36.8 (12-10-18 @ 08:56)  HR: 72 (12-10-18 @ 18:34) (67 - 72)  BP: 136/82 (12-10-18 @ 18:34) (130/80 - 151/92)  RR: 18 (12-10-18 @ 18:34) (18 - 18)  SpO2: 98% (12-10-18 @ 18:34) (98% - 100%)  Wt(kg): --  Height (cm): 165.1 (12-10-18 @ 15:21)  Weight (kg): 84.4 (12-10-18 @ 15:21)  BMI (kg/m2): 31 (12-10-18 @ 15:21)  BSA (m2): 1.92 (12-10-18 @ 15:21)      Physical Exam:  	Gen: NAD, well-appearing  	HEENT:  supple neck  	Pulm: CTA B/L  	CV:  S1S2  	Abd: +BS, soft   	Ext: Trace B/L Lower ext edema  	Neuro: No focal deficits  	Skin: Warm, without rashes  	Vascular access: none     LABS/STUDIES  --------------------------------------------------------------------------------              8.0    6.08  >-----------<  138      [12-10-18 @ 16:30]              25.1     140  |  102  |  93  ----------------------------<  139      [12-10-18 @ 09:08]  4.8   |  23  |  5.74        Ca     8.5     [12-10-18 @ 09:08]    TPro  6.7  /  Alb  3.5  /  TBili  0.3  /  DBili  x   /  AST  27  /  ALT  35  /  AlkPhos  110  [12-10-18 @ 09:08]    PT/INR: PT 12.4 , INR 1.11       [12-10-18 @ 09:08]  PTT: 32.2       [12-10-18 @ 09:08]          [12-10-18 @ 15:05]    Creatinine Trend:  SCr 5.74 [12-10 @ 09:08]  SCr 5.55 [12-04 @ 16:00]  SCr 4.77 [11-14 @ 17:06]        Iron 29, TIBC --, %sat --      [12-10-18 @ 15:05]  HbA1c 5.7      [12-04-18 @ 16:00]

## 2018-12-10 NOTE — ED PROVIDER NOTE - PROGRESS NOTE DETAILS
Zaki LEES Fellow: Call received from HF nurse, requests admission to hospitalist for permacath for dialysis. Hb noted 8.3 from 11. Patient reporting (+) melena over the past 4 days. Refuses rectal exam for stool guaiac. Zaki LEES Fellow: Case discussed with admitting hospitalist, discussed abnormalities and evaluation for permacath and likely dialysis. She accepts the patient to her service. Patient treated with 80mg protonix bolus for possible GI Bleed with history of melena, and symptomatic anemia. Hb 8.3, does not need transfusion at this time as Hb not < 7. Will obtain T&S. Patient is hemodynamically stable, mentating well, and ready for admission to telemetry.

## 2018-12-10 NOTE — ED ADULT NURSE NOTE - OBJECTIVE STATEMENT
Pt received a&ox3, c/o intermittent left sided non radiating cp/dyspnea on exertion x 3 days, pt denies sob/cp at present time, appears comfortable, hx of 1 stent that presented with different symptoms, pt appears to be in NAD, breathing even and unlabored, MD christian performed, pt placed on monitor, 20 gauge IV placed in right ac, labs drawn and sent, will continue to monitor.

## 2018-12-10 NOTE — CONSULT NOTE ADULT - ASSESSMENT
53 year old male with h/o CKD stage V, CHFrEF found to have SMITA on CKD in setting of fluid overload.
54 yo M HFrEF (28%), CAD s/p PCI, severe MR, CKD stage V, DM2, HTN, HLD. Reports increasing SOB as outpt, here for pre-op CHF optimization prior to AVF later this month w/ Dr. Gonzalez, consulted for 2d h/o melena w/ hgb drop.
53 year old M w/ h/o IDDM c/b neuropathy (A1c 6.2 8/8/18) , HTN, CAD (s/p stent in [place] in 2016, unknown coronary anatomy), HFrEF (EF 25%, LVEDD 6.6 cm), severe MR (functional), stage V CKD (b/l Cr 4-5) was sent in to ED to be evaluated for volume overload in setting of CKD stage V.  Patient's nephrologist is Dr. Giovanny Pavon and had plans for fistula with Dr. Gonzalez 10/31 per record. Prior to coming to ED diuretics had been adjusted to Bumex 4 mg po BID with metolazone (dose unclear) daily (done for 2 days prior to admission). Currently endorses fatigue/ SIERRA with ambulation after 1/2 blocks; also reports dyspnea on exertion with climbing stairs.  Uses 1 pillow at night. States w/ addition of metolazone and increase in Bumex he did void a significant amount. He is requesting to only be evaluated by Dr. Enio Pavon. H/H lower than prior admission, occult positive, GI consulted.     Hypervolemic.

## 2018-12-10 NOTE — CONSULT NOTE ADULT - PROBLEM SELECTOR RECOMMENDATION 2
Patient with hypervolemia in setting of CHF and renal failure. Patient currently on bumex drip as per HF team. Continue with IV diuretics. Monitor UOP and I/O daily.
Diuretics as above.  Appreciate renal team notes.
54yo M w/ smoking history and ?fam hx colon cancer before age 62, pt needs colonoscopy for cancer screening, however patient currently adamantly refusing.   - recommend outpatient colonoscopy if amenable    Will d/w fellow and attending.     Hieu Mondragon MD PGY-1  [phone]

## 2018-12-10 NOTE — H&P ADULT - ATTENDING COMMENTS
54 yo M HFrEF (28%), CAD s/p PCI, severe MR, CKD stage V, DM2, HTN, HLD admitted with for pre-op CHF optimization fth hgb drop in setting of reporting dark stools 2 days prior to admission. Pt reports he has persistent SIERRA, which has improved over the past several weeks with diuretics, however remains severe, limiting his ambulation to less than half a block. Pt was told to come in to the hospital by Dr. Pavon for pre-op optimization for planned fistula placement. Pt was fth hgb drop from ~11 to 8.4. Upon questioning he notes he had dark stools 2 days ago, describes them as black however could not describe their consistency.  - HF c/s for diuretic management  - renal c/s for HD set up  - GI c/s given positive occult and reported melena with hgb drop, can c/w asa given pt does not appear to have ongoing GIB with known sig CAD  - check retic, hapto, ldh, iron, b12, folate to r/o other cuases  - otherwise c/w home meds 52 yo M HFrEF (28%), CAD s/p PCI, severe MR, CKD stage V, DM2, HTN, HLD admitted with for pre-op CHF optimization fth hgb drop in setting of reporting dark stools 2 days prior to admission. Pt reports he has persistent SIERRA, which has improved over the past several weeks with diuretics, however remains severe, limiting his ambulation to less than half a block. Pt was told to come in to the hospital by Dr. Pavon for pre-op optimization for planned fistula placement. Pt was fth hgb drop from ~11 to 8.4. Upon questioning he notes he had dark stools 2 days ago, describes them as black however could not describe their consistency.  - HF c/s for diuretic management  - renal c/s for HD set up  - GI c/s given positive occult and reported melena with hgb drop, GI c/s, monitor CBC q8hr  - check retic, hapto, ldh, iron, b12, folate to r/o other cuases  - otherwise c/w home meds 52 yo M HFrEF (28%), CAD s/p PCI, severe MR, CKD stage V, DM2, HTN, HLD admitted with for pre-op CHF optimization fth hgb drop in setting of reporting dark stools 2 days prior to admission. Pt reports he has persistent SIERRA, which has improved over the past several weeks with diuretics, however remains severe, limiting his ambulation to less than half a block. Pt was told to come in to the hospital by Dr. Pavon for pre-op optimization for planned fistula placement. Pt was fth hgb drop from ~11 to 8.4. Upon questioning he notes he had dark stools 2 days ago, describes them as black however could not describe their consistency.  - HF c/s for diuretic management  - renal c/s for HD set up?  - GI c/s given positive occult and reported melena with hgb drop, monitor CBC q8hr, holding anti-plt/dvt ppx, ensure 2 IVs, T&S  - check retic, hapto, ldh, iron, b12, folate to r/o other cuases  - otherwise c/w home meds

## 2018-12-10 NOTE — CONSULT NOTE ADULT - PROBLEM SELECTOR RECOMMENDATION 9
4-5 black BM in last 24hrs. No s/s hypotension, but HGB 2-3 points lower than baseline of 10-12. Most likely UGIB vs LGIB, unlikely to be pill induced gastritis given no h/o NSAID use or PUD given no s/s dyspepsia.   - spoke w/ pt about possible colonoscopy, pt adamantly refused colonoscopy and also refused rectal exam  - T&S, CBC Q8H, transfuse if < 7  - hold antiplatelet/anticoag    Will d/w fellow and attending.     Hieu Mondragon MD PGY-1  [phone] 4-5 black BM in last 24hrs. No s/s hypotension, HD stable, but HGB 2-3 points lower than baseline of 10-12. Most likely UGIB, unlikely LGIB, unlikely to be pill induced gastritis given no h/o NSAID use or PUD given no s/s dyspepsia.  - spoke w/ pt about possible colonoscopy, pt adamantly refused colonoscopy and also refused rectal exam, recommend outpt colonoscopy  - T&S, CBC Q8H, transfuse if < 8 (d/t extensive cardiac history)  - hold antiplatelet/anticoag  - cardiology following, appreciate recs  - CLD, NPO @ [place]  - protonix 40 IV BID  - EGD tomorrow    Will d/w fellow and attending.     Hieu Mondragon MD PGY-1  [phone] 4-5 black BM in last 24hrs. No s/s hypotension, HD stable, but HGB 2-3 points lower than baseline of 10-12. Most likely UGIB, unlikely LGIB, unlikely to be pill induced gastritis given no h/o NSAID use or PUD given no s/s dyspepsia.  - spoke w/ pt about possible colonoscopy, pt refusing  - T&S, CBC Q8H, transfuse if < 8 (d/t extensive cardiac history)  - hold antiplatelet/anticoag  - cardiology following, appreciate recs  - CLD, NPO @ [place]  - PPI drip  - EGD tomorrow 4-5 black BM in last 24hrs. No s/s hypotension, HD stable, but HGB 2-3 points lower than baseline of 10-12. Most likely UGIB, unlikely LGIB, unlikely to be pill induced gastritis given no h/o NSAID use or PUD given no s/s dyspepsia.  - spoke w/ pt about possible colonoscopy, pt refusing  - T&S, CBC Q8H, transfuse if < 8 (d/t extensive cardiac history)  - hold antiplatelet/anticoag  - cardiology following, appreciate recs, awaiting clearance for EGD  - CLD  - PPI drip  - EGD if cleared by cardiology

## 2018-12-10 NOTE — H&P ADULT - PROBLEM SELECTOR PLAN 1
IV lasix   tele  strict I & O's  heart failure c/s c/w home bumex PO   tele  strict I & O's  heart failure c/s

## 2018-12-10 NOTE — ED ADULT NURSE NOTE - NSIMPLEMENTINTERV_GEN_ALL_ED
Implemented All Universal Safety Interventions:  [place] to call system. Call bell, personal items and telephone within reach. Instruct patient to call for assistance. Room bathroom lighting operational. Non-slip footwear when patient is off stretcher. Physically safe environment: no spills, clutter or unnecessary equipment. Stretcher in lowest position, wheels locked, appropriate side rails in place.

## 2018-12-10 NOTE — ED PROVIDER NOTE - MEDICAL DECISION MAKING DETAILS
53M PMHx of CHF low EF, CAD x stent, DM, CKD presenting with SOB/chest pain x few days. Exertional component. DDx Decompensated HF, ACS, pneumonia. will get CXR/BNP/troponin/cmp/cbc/vbg. Contact cardiologist. admit Pavon: 53M PMHx of CHF low EF, CAD x stent, DM, CKD presenting with SOB/chest pain x few days. Exertional component. DDx Decompensated HF, ACS, pneumonia. will get CXR/BNP/troponin/cmp/cbc/vbg. Contact cardiologist. admit.  Sent in by his cardiologist for likely start of dialysis, will need permacath placement as per Heart failure NP, will require admission. Pavon: 53M PMHx of CHF low EF, CAD x stent, DM, CKD presenting with SOB/chest pain x few days. Exertional component. DDx anemia, Gi bleed, Decompensated HF, ACS, pneumonia. will get CXR/BNP/troponin/cmp/cbc/vbg. Contact cardiologist. admit.  Sent in by his cardiologist for likely start of dialysis, will need permacath placement as per Heart failure NP, will require admission.

## 2018-12-10 NOTE — H&P ADULT - ASSESSMENT
52 yo M admitted with  acute on chronic CHF exacerbation 54 yo M HFrEF (28%), CAD s/p PCI, severe MR, CKD stage V, DM2, HTN, HLD admitted with for pre-op CHF optimization fth hgb drop in setting of reporting dark stools 2 days prior to admission.

## 2018-12-10 NOTE — CONSULT NOTE ADULT - PROBLEM SELECTOR RECOMMENDATION 9
Hypervolemic.   Recommend Bumex gtt 1 mg/hr.  Continue hydral 100 mg po TID.   Increase Isordil to home dose of 40 mg po TID.  Continue Coreg 6.25 mg po BID.  Strict I/O and daily standing weights.  Keep K 4.0-4.5 and mag >2.0. Hypervolemic. In ADHF. JVP 18 cm. NYHA class III-VI symptoms.  Recommend Bumex gtt 1 mg/hr.  Continue hydral 100 mg po TID.   Increase Isordil to home dose of 40 mg po TID.  Continue Coreg 6.25 mg po BID.  Strict I/O and daily standing weights.  Keep K 4.0-4.5 and mag >2.0.

## 2018-12-10 NOTE — ED PROVIDER NOTE - OBJECTIVE STATEMENT
53M PMHx of CHF (EF 25%), CAD w/ stenting x 1, DM, HTN, CKD, presenting with chest pain and difficulty breathing x few days. Reports left sided chest pressure associated with difficulty breathing. He is only able to walk a half block before becoming symptomatic. He sleeps with 1 pillow. Denies any abdominal pain, n/v, fevers, cough, recent illness, recent travel, hx of DVT/PE, recent surgery. He called his Cardiologist Dr. Anand Pavon who advised him to come to the ED for evaluation. 53M PMHx of CHF (EF 25%), CAD w/ stenting x 1, DM, HTN, CKD, presenting with chest pain and difficulty breathing x few days. Reports left sided chest pressure associated with difficulty breathing. He is only able to walk a half block before becoming symptomatic. He sleeps with 1 pillow. Denies any abdominal pain, n/v, fevers, cough, recent illness, recent travel, hx of DVT/PE, recent surgery. He called his Cardiologist Dr. Enio Pavon who advised him to come to the ED for evaluation.    Cardiologist: Dr Enio Pavon

## 2018-12-10 NOTE — H&P ADULT - FAMILY HISTORY
Sibling  Still living? Unknown  Family history of MI (myocardial infarction), Age at diagnosis: Age Unknown  Family history of diabetes mellitus (DM), Age at diagnosis: Age Unknown

## 2018-12-10 NOTE — CONSULT NOTE ADULT - SUBJECTIVE AND OBJECTIVE BOX
Chief Complaint:  Patient is a 53y old  Male who presents with a chief complaint of SOB (10 Dec 2018 12:11)      HPI: 54 yo M HFrEF (28%), CAD s/p PCI, severe MR, CKD stage V, DM2, HTN, HLD. Reports increasing SOB as outpt, here for pre-op CHF optimization prior to AVF later this month w/ Dr. Gonzalez. Pt reports that began to have dark stools 2 days ago. Denies taking iron supplements or pepto bismol. Pt has never had GI issues in the past or seen by GI. Never had EGD or colonoscopy. Denies rectal, abd pain, n/v/d/constipation, dizziness, hematemesis. 3 weeks ago took 2 Aleve tabs d/t headache but otherwise denies NSAID use. Had 4-5 BMs yesterday, all of which were black and watery, but not smelly or sticky, no bright red blood.      Allergies:  No Known Allergies      Home Medications: No NSAID use, no GI meds    Hospital Medications:  aspirin  chewable 81 milliGRAM(s) Oral daily  buMETAnide 2 milliGRAM(s) Oral two times a day  calcium acetate 667 milliGRAM(s) Oral three times a day with meals  carvedilol 6.25 milliGRAM(s) Oral every 12 hours  dextrose 40% Gel 15 Gram(s) Oral once PRN  dextrose 5%. 1000 milliLiter(s) IV Continuous <Continuous>  dextrose 50% Injectable 12.5 Gram(s) IV Push once  dextrose 50% Injectable 25 Gram(s) IV Push once  dextrose 50% Injectable 25 Gram(s) IV Push once  glucagon  Injectable 1 milliGRAM(s) IntraMuscular once PRN  hydrALAZINE 100 milliGRAM(s) Oral three times a day  insulin lispro (HumaLOG) corrective regimen sliding scale   SubCutaneous three times a day before meals  isosorbide   dinitrate Tablet (ISORDIL) 20 milliGRAM(s) Oral three times a day      PMHX/PSHX:  Abnormal echocardiogram  CKD (chronic kidney disease), stage V  HFrEF (heart failure with reduced ejection fraction)  Congestive heart failure (CHF)  Myocardial infarction  Hyperlipidemia  CAD (coronary artery disease)  DM (diabetes mellitus)  HTN (hypertension)  Stented coronary artery  Arterial stent thrombosis      Family history:  Sister with colon cancer few years ago (alive)    Social History: Recently unemployed since April d/t weakness/SIERRA, 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 Quit smoking 4-5 months ago, had 0.5 ppd x 35 years smoking history. Used to drink socially, quit drinking 4-5 years ago. Denies IVDU    ROS:     General:  No wt loss, fevers, chills, night sweats, fatigue,   Eyes:  Good vision, no reported pain  ENT:  No sore throat, pain, runny nose, dysphagia  CV:  No pain, palpitations, hypo/hypertension  Resp:  +SOB, +SIERRA, no cough, no hemoptysis  GI:  No pain, No nausea, No vomiting, No diarrhea, No constipation, No weight loss, No fever, No pruritis, No rectal bleeding. +black stool  :  No pain, bleeding, incontinence, nocturia  Muscle:  No pain, weakness  Neuro:  No weakness, tingling, memory problems  Psych:  No fatigue, insomnia, mood problems, depression  Endocrine:  No polyuria, polydipsia, cold/heat intolerance  Heme:  No petechiae, ecchymosis, easy bruisability  Skin:  No rash, tattoos, scars, edema      PHYSICAL EXAM:     GENERAL:  well appearing, laying in bed, NAD  HEENT:  NC/AT,  conjunctivae clear and pink, no thyromegaly, nodules, adenopathy, no JVD, sclera -anicteric  CHEST:  Full & symmetric excursion, no increased effort, breath sounds clear  HEART:  Regular rhythm, S1, S2, no murmur/rub/S3/S4, no abdominal bruit, no edema  ABDOMEN:  Obese, Soft, non-tender, non-distended, normoactive bowel sounds, no masses, no hepato-splenomegaly, no signs of chronic liver disease  EXTEREMITIES:  no cyanosis,clubbing. 1+ pitting edema b/l  SKIN:  No rash/erythema/ecchymoses/petechiae/wounds/abscess/warm/dry  NEURO:  Alert, oriented, no asterixis, no tremor, no encephalopathy  RECTAL: Pt refused rectal exam    Vital Signs:  Vital Signs Last 24 Hrs  T(C): 36.8 (10 Dec 2018 08:56), Max: 36.8 (10 Dec 2018 08:56)  T(F): 98.3 (10 Dec 2018 08:56), Max: 98.3 (10 Dec 2018 08:56)  HR: 70 (10 Dec 2018 08:56) (70 - 70)  BP: 140/79 (10 Dec 2018 08:56) (140/79 - 140/79)  BP(mean): --  RR: 18 (10 Dec 2018 08:56) (18 - 18)  SpO2: 100% (10 Dec 2018 08:56) (100% - 100%)  Daily     Daily     LABS:                        8.4    5.57  )-----------( 126      ( 10 Dec 2018 09:08 )             27.1     Mean Cell Volume: 92.5 fL (12-10-18 @ 09:08)    12-10    140  |  102  |  93<H>  ----------------------------<  139<H>  4.8   |  23  |  5.74<H>    Ca    8.5      10 Dec 2018 09:08    TPro  6.7  /  Alb  3.5  /  TBili  0.3  /  DBili  x   /  AST  27  /  ALT  35  /  AlkPhos  110  12-10    LIVER FUNCTIONS - ( 10 Dec 2018 09:08 )  Alb: 3.5 g/dL / Pro: 6.7 g/dL / ALK PHOS: 110 u/L / ALT: 35 u/L / AST: 27 u/L / GGT: x           PT/INR - ( 10 Dec 2018 09:08 )   PT: 12.4 SEC;   INR: 1.11          PTT - ( 10 Dec 2018 09:08 )  PTT:32.2 SEC                            8.4    5.57  )-----------( 126      ( 10 Dec 2018 09:08 )             27.1     Imaging:  Xray Chest 2 Views PA/Lat (12.10.18 @ 09:37)  INTERPRETATION:   The lungs are clear. The heart is enlarged but there are no effusions or   congestion to indicate CHF.  COMPARISON:  July 23, 2018  IMPRESSION:  Cardiomegaly with clear lungs.

## 2018-12-10 NOTE — H&P ADULT - HISTORY OF PRESENT ILLNESS
This is a 52 yo M with a PMhx CKD, CAD with stent , HTN, CKD, HLD. admitted with SOB. Patient has SIERRA he cannot walk up the 13 steps without taking 2 breaks to cath chis breath. He feels palpitations when he becomes SOB. Patient has no CP. Patient also feels SOB when walking . The SOB started 3 weeks ago Dr Pavon his cardiologist had him take bumex 4mg BID , in conjunction with metalozone.  Patient saw is nephrologist Dr Pavon who also adjusted the metalozone dose.  He  had pedal edema and gained 10 pounds he went from 172-186 lbs, after taking the medications last week he was back to his normal weight of 172. Dr Pavon called him today and recommended he come to ED for admission for management of CHF, since it has been 3 weeks now with SOB. No fever chills, no chest pain , No N/V/D or abdominal pain . No dysuria . He did have 2 episodes of dark stool 2 days ago , has never had a EGD or colonoscopy.

## 2018-12-10 NOTE — ED ADULT NURSE NOTE - CAS EDN DISCHARGE ASSESSMENT
Alert and oriented to person, place and time/pt sent upstairs in NAD, comfortable, without complaints

## 2018-12-10 NOTE — ED PROVIDER NOTE - ENMT, MLM
Airway patent, Nasal mucosa clear. Mouth with normal mucosa. Throat has no vesicles, no oropharyngeal exudates and uvula is midline. (+) JVD

## 2018-12-10 NOTE — H&P ADULT - NSHPLABSRESULTS_GEN_ALL_CORE
8.4    5.57  )-----------( 126      ( 10 Dec 2018 09:08 )             27.1   12-10    140  |  102  |  93<H>  ----------------------------<  139<H>  4.8   |  23  |  5.74<H>    Ca    8.5      10 Dec 2018 09:08    TPro  6.7  /  Alb  3.5  /  TBili  0.3  /  DBili  x   /  AST  27  /  ALT  35  /  AlkPhos  110  12-10  PT/INR - ( 10 Dec 2018 09:08 )   PT: 12.4 SEC;   INR: 1.11          PTT - ( 10 Dec 2018 09:08 )  PTT:32.2 SEC

## 2018-12-10 NOTE — CONSULT NOTE ADULT - ATTENDING COMMENTS
SMITA on CKD 4/5  Acute on Chronic sys CHF exacerbation  Fluid overload
As above.    Impression:    #1.  Melena / GI bleed, hemodynamically stable.  #2.  Blood loss anemia  #3.  CAD/CHF with severely reduced LVEF  #4.  Renal insufficiency.    Recommendations:    #1.  Upper endoscopy 12/11/18  #2.  PPI gtt  #3.  If negative source of anemia by upper endoscopy, will recommend colonoscopy, but patient currently refusing.
53 year old  male in adhf    Active issues  ADHF  ICM, HFrEF, STAge C EF 25%, LVEDD 6.6cm  severe functional MR  CAD s/p PCI in 2016  CKD V  DM    JVP 20, + HJR, 1+ edema LE  FOBT +    volume up, will optimize prior to AVF, likely will require temp access for HD during this admission  given + FOBT would advise to optimize from cardiac perspective prior to diagnostic testing    - bumex infusion  - ppi infusion  - continue home meds

## 2018-12-10 NOTE — ED PROVIDER NOTE - CARE PLAN
Principal Discharge DX:	Dyspnea due to congestive heart failure Principal Discharge DX:	Dyspnea due to congestive heart failure  Secondary Diagnosis:	Anemia  Secondary Diagnosis:	Gastrointestinal hemorrhage, unspecified gastrointestinal hemorrhage type

## 2018-12-11 DIAGNOSIS — I25.10 ATHEROSCLEROTIC HEART DISEASE OF NATIVE CORONARY ARTERY WITHOUT ANGINA PECTORIS: ICD-10-CM

## 2018-12-11 LAB
BUN SERPL-MCNC: 94 MG/DL — HIGH (ref 7–23)
BUN SERPL-MCNC: 94 MG/DL — HIGH (ref 7–23)
CALCIUM SERPL-MCNC: 7.9 MG/DL — LOW (ref 8.4–10.5)
CALCIUM SERPL-MCNC: 8.1 MG/DL — LOW (ref 8.4–10.5)
CHLORIDE SERPL-SCNC: 98 MMOL/L — SIGNIFICANT CHANGE UP (ref 98–107)
CHLORIDE SERPL-SCNC: 99 MMOL/L — SIGNIFICANT CHANGE UP (ref 98–107)
CO2 SERPL-SCNC: 22 MMOL/L — SIGNIFICANT CHANGE UP (ref 22–31)
CO2 SERPL-SCNC: 23 MMOL/L — SIGNIFICANT CHANGE UP (ref 22–31)
CREAT SERPL-MCNC: 5.89 MG/DL — HIGH (ref 0.5–1.3)
CREAT SERPL-MCNC: 6.03 MG/DL — HIGH (ref 0.5–1.3)
GLUCOSE BLDC GLUCOMTR-MCNC: 171 MG/DL — HIGH (ref 70–99)
GLUCOSE BLDC GLUCOMTR-MCNC: 200 MG/DL — HIGH (ref 70–99)
GLUCOSE BLDC GLUCOMTR-MCNC: 241 MG/DL — HIGH (ref 70–99)
GLUCOSE SERPL-MCNC: 156 MG/DL — HIGH (ref 70–99)
GLUCOSE SERPL-MCNC: 99 MG/DL — SIGNIFICANT CHANGE UP (ref 70–99)
HAPTOGLOB SERPL-MCNC: 103 MG/DL — SIGNIFICANT CHANGE UP (ref 34–200)
HCT VFR BLD CALC: 22.4 % — LOW (ref 39–50)
HCT VFR BLD CALC: 25 % — LOW (ref 39–50)
HGB BLD-MCNC: 7.3 G/DL — LOW (ref 13–17)
HGB BLD-MCNC: 8 G/DL — LOW (ref 13–17)
IRON SATN MFR SERPL: 302 UG/DL — SIGNIFICANT CHANGE UP (ref 155–535)
IRON SATN MFR SERPL: 31 UG/DL — LOW (ref 45–165)
MAGNESIUM SERPL-MCNC: 2 MG/DL — SIGNIFICANT CHANGE UP (ref 1.6–2.6)
MAGNESIUM SERPL-MCNC: 2.1 MG/DL — SIGNIFICANT CHANGE UP (ref 1.6–2.6)
MCHC RBC-ENTMCNC: 28.5 PG — SIGNIFICANT CHANGE UP (ref 27–34)
MCHC RBC-ENTMCNC: 28.7 PG — SIGNIFICANT CHANGE UP (ref 27–34)
MCHC RBC-ENTMCNC: 32 % — SIGNIFICANT CHANGE UP (ref 32–36)
MCHC RBC-ENTMCNC: 32.6 % — SIGNIFICANT CHANGE UP (ref 32–36)
MCV RBC AUTO: 87.5 FL — SIGNIFICANT CHANGE UP (ref 80–100)
MCV RBC AUTO: 89.6 FL — SIGNIFICANT CHANGE UP (ref 80–100)
NRBC # FLD: 0 — SIGNIFICANT CHANGE UP
NRBC # FLD: 0 — SIGNIFICANT CHANGE UP
PLATELET # BLD AUTO: 114 K/UL — LOW (ref 150–400)
PLATELET # BLD AUTO: 117 K/UL — LOW (ref 150–400)
PMV BLD: 11.5 FL — SIGNIFICANT CHANGE UP (ref 7–13)
PMV BLD: 11.8 FL — SIGNIFICANT CHANGE UP (ref 7–13)
POTASSIUM SERPL-MCNC: 4 MMOL/L — SIGNIFICANT CHANGE UP (ref 3.5–5.3)
POTASSIUM SERPL-MCNC: 4.1 MMOL/L — SIGNIFICANT CHANGE UP (ref 3.5–5.3)
POTASSIUM SERPL-SCNC: 4 MMOL/L — SIGNIFICANT CHANGE UP (ref 3.5–5.3)
POTASSIUM SERPL-SCNC: 4.1 MMOL/L — SIGNIFICANT CHANGE UP (ref 3.5–5.3)
RBC # BLD: 2.56 M/UL — LOW (ref 4.2–5.8)
RBC # BLD: 2.79 M/UL — LOW (ref 4.2–5.8)
RBC # FLD: 15 % — HIGH (ref 10.3–14.5)
RBC # FLD: 15.4 % — HIGH (ref 10.3–14.5)
SODIUM SERPL-SCNC: 138 MMOL/L — SIGNIFICANT CHANGE UP (ref 135–145)
SODIUM SERPL-SCNC: 139 MMOL/L — SIGNIFICANT CHANGE UP (ref 135–145)
UIBC SERPL-MCNC: 271.3 UG/DL — SIGNIFICANT CHANGE UP (ref 110–370)
VIT B12 SERPL-MCNC: 488 PG/ML — SIGNIFICANT CHANGE UP (ref 200–900)
WBC # BLD: 5.33 K/UL — SIGNIFICANT CHANGE UP (ref 3.8–10.5)
WBC # BLD: 5.78 K/UL — SIGNIFICANT CHANGE UP (ref 3.8–10.5)
WBC # FLD AUTO: 5.33 K/UL — SIGNIFICANT CHANGE UP (ref 3.8–10.5)
WBC # FLD AUTO: 5.78 K/UL — SIGNIFICANT CHANGE UP (ref 3.8–10.5)

## 2018-12-11 PROCEDURE — 99233 SBSQ HOSP IP/OBS HIGH 50: CPT

## 2018-12-11 PROCEDURE — 99232 SBSQ HOSP IP/OBS MODERATE 35: CPT | Mod: GC

## 2018-12-11 PROCEDURE — 99233 SBSQ HOSP IP/OBS HIGH 50: CPT | Mod: GC

## 2018-12-11 RX ADMIN — ISOSORBIDE DINITRATE 40 MILLIGRAM(S): 5 TABLET ORAL at 06:32

## 2018-12-11 RX ADMIN — ISOSORBIDE DINITRATE 40 MILLIGRAM(S): 5 TABLET ORAL at 13:10

## 2018-12-11 RX ADMIN — Medication 100 MILLIGRAM(S): at 06:32

## 2018-12-11 RX ADMIN — Medication 100 MILLIGRAM(S): at 21:53

## 2018-12-11 RX ADMIN — CARVEDILOL PHOSPHATE 6.25 MILLIGRAM(S): 80 CAPSULE, EXTENDED RELEASE ORAL at 18:09

## 2018-12-11 RX ADMIN — ISOSORBIDE DINITRATE 40 MILLIGRAM(S): 5 TABLET ORAL at 21:53

## 2018-12-11 RX ADMIN — CARVEDILOL PHOSPHATE 6.25 MILLIGRAM(S): 80 CAPSULE, EXTENDED RELEASE ORAL at 05:38

## 2018-12-11 RX ADMIN — Medication 667 MILLIGRAM(S): at 09:28

## 2018-12-11 RX ADMIN — Medication 4: at 13:08

## 2018-12-11 RX ADMIN — Medication 100 MILLIGRAM(S): at 13:09

## 2018-12-11 RX ADMIN — Medication 2: at 18:10

## 2018-12-11 RX ADMIN — Medication 667 MILLIGRAM(S): at 18:09

## 2018-12-11 RX ADMIN — Medication 667 MILLIGRAM(S): at 13:09

## 2018-12-11 NOTE — PROGRESS NOTE ADULT - ASSESSMENT
53 year old M w/ h/o IDDM c/b neuropathy (A1c 6.2 8/8/18) , HTN, CAD (s/p stent in [place] in 2016, unknown coronary anatomy), HFrEF (EF 25%, LVEDD 6.6 cm), severe MR (functional), stage V CKD (b/l Cr 4-5) was sent in to ED to be evaluated for volume overload in setting of CKD stage V.  Patient's nephrologist is Dr. Giovanny Pavon and had plans for fistula with Dr. Gonzalez 10/31 per record. Prior to coming to ED diuretics had been adjusted to Bumex 4 mg po BID with metolazone (dose unclear) daily (done for 2 days prior to admission). Currently endorses fatigue/ SIERRA with ambulation after 1/2 blocks; also reports dyspnea on exertion with climbing stairs.  Uses 1 pillow at night. States w/ addition of metolazone and increase in Bumex he did void a significant amount. He is requesting to only be evaluated by Dr. Enio Pavon. H/H lower than prior admission, occult positive, GI consulted.     Hypervolemic.

## 2018-12-11 NOTE — PROGRESS NOTE ADULT - SUBJECTIVE AND OBJECTIVE BOX
[facility] Division of Kidney Diseases & Hypertension  FOLLOW UP NOTE  [phone]--------------------------------------------------------------------------------    HPI: 53 year old male with h/o CKD stage V, CHFrEF, DM, HTN, CAD was admitted for management of fluid overload. Nephrology was consulted due to history of CKD. Patient has history of CKD stage V from longstanding history of DM and HTN.  On review of previous records in [facility],Scr. was elevated at 1.5 on 4/4/16 and 1.6 on 4/6/16. Patient was hospitalized in [facility] from 2/10/18 to 2/14/18 during Scr was elevated at 5 on 2/10/18 which then decreased to 4.43 on 2/14/18. Patient was subsequently seen in [facility] renal clinic in March 2018 and has been following up with Dr. Giovanny Pavon since then. Last Scr. before admission was 5.55 on 12/4/18. Labs done during this admission showed Scr. of 5.74 on 12/10/18. Patient was started on IV bumex drip.    Patient seen and examined. Patient notes improvement in symptoms and has increased urine output as well. Patient is still unable to lie down flat in bed. Denies c/o CP, abdominal pain, nausea, vomiting, or loss of appetite.       PAST HISTORY  --------------------------------------------------------------------------------  No significant changes to PMH, PSH, FHx, SHx, unless otherwise noted    ALLERGIES & MEDICATIONS  --------------------------------------------------------------------------------  Allergies    No Known Allergies    Intolerances      Standing Inpatient Medications  buMETAnide Infusion 1 mG/Hr IV Continuous <Continuous>  calcium acetate 667 milliGRAM(s) Oral three times a day with meals  carvedilol 6.25 milliGRAM(s) Oral every 12 hours  dextrose 5%. 1000 milliLiter(s) IV Continuous <Continuous>  dextrose 50% Injectable 12.5 Gram(s) IV Push once  dextrose 50% Injectable 25 Gram(s) IV Push once  dextrose 50% Injectable 25 Gram(s) IV Push once  hydrALAZINE 100 milliGRAM(s) Oral three times a day  insulin lispro (HumaLOG) corrective regimen sliding scale   SubCutaneous three times a day before meals  isosorbide   dinitrate Tablet (ISORDIL) 40 milliGRAM(s) Oral three times a day  pantoprazole Infusion 8 mG/Hr IV Continuous <Continuous>    PRN Inpatient Medications  dextrose 40% Gel 15 Gram(s) Oral once PRN  glucagon  Injectable 1 milliGRAM(s) IntraMuscular once PRN      REVIEW OF SYSTEMS  --------------------------------------------------------------------------------  Gen: No  fevers/chills  Skin: No rashes  Head/Eyes/Ears/Mouth: No headache  Respiratory: No dyspnea  CV: Orthopnea +  GI: No abdominal pain, diarrhea, nausea, vomiting  MSK:  no edema  Neuro: No dizziness/lightheadedness      All other systems were reviewed and are negative, except as noted.    VITALS/PHYSICAL EXAM  --------------------------------------------------------------------------------  T(C): 36.4 (12-11-18 @ 14:17), Max: 36.8 (12-10-18 @ 18:34)  HR: 69 (12-11-18 @ 14:17) (63 - 72)  BP: 126/73 (12-11-18 @ 14:17) (114/65 - 136/82)  RR: 18 (12-11-18 @ 14:17) (18 - 18)  SpO2: 100% (12-11-18 @ 14:17) (98% - 100%)  Wt(kg): --  Height (cm): 165.1 (12-10-18 @ 15:21)  Weight (kg): 84.4 (12-10-18 @ 15:21)  BMI (kg/m2): 31 (12-10-18 @ 15:21)  BSA (m2): 1.92 (12-10-18 @ 15:21)      12-10-18 @ 07:01  -  12-11-18 @ 07:00  --------------------------------------------------------  IN: 330 mL / OUT: 850 mL / NET: -520 mL    12-11-18 @ 07:01  -  12-11-18 @ 15:39  --------------------------------------------------------  IN: 300 mL / OUT: 800 mL / NET: -500 mL      Physical Exam:  	Gen: NAD              HEENT: JVD +  	Pulm: CTA B/L  	CV: RRR, S1S2  	Abd: +BS, soft, nontender/nondistended  	: No suprapubic tenderness               Extremities: trace bilateral LE edema noted.                Neuro: No focal deficits  	Skin: Warm and dry     LABS/STUDIES  --------------------------------------------------------------------------------              7.3    5.78  >-----------<  117      [12-11-18 @ 06:30]              22.4     138  |  98  |  94  ----------------------------<  99      [12-11-18 @ 06:30]  4.1   |  22  |  6.03        Ca     8.1     [12-11-18 @ 06:30]      Mg     2.1     [12-11-18 @ 06:30]      Phos  6.6     [12-10-18 @ 21:28]    TPro  6.7  /  Alb  3.5  /  TBili  0.3  /  DBili  x   /  AST  27  /  ALT  35  /  AlkPhos  110  [12-10-18 @ 09:08]    PT/INR: PT 12.4 , INR 1.11       [12-10-18 @ 09:08]  PTT: 32.2       [12-10-18 @ 09:08]          [12-10-18 @ 15:05]    Creatinine Trend:  SCr 6.03 [12-11 @ 06:30]  SCr 5.78 [12-10 @ 21:28]  SCr 5.74 [12-10 @ 09:08]  SCr 5.55 [12-04 @ 16:00]  SCr 4.77 [11-14 @ 17:06]        Iron 31, TIBC 302, %sat --      [12-11-18 @ 06:30]  HbA1c 5.7      [12-04-18 @ 16:00]

## 2018-12-11 NOTE — PROGRESS NOTE ADULT - ATTENDING COMMENTS
As above.    Impression:    #1.  Melena / GI bleed, resolved now with brown stool per patient.  Hemodynamically stable.  #2.  Blood loss anemia, mild decline in hemoglobin  #3.  CAD/CHF with severely reduced LVEF, on aspirin.  #4.  Renal insufficiency.    Recommendations:    #1.  Follow CBC  #2.  Patient will have upper endoscopy after further treatment of CHF.  #3.  If negative source of anemia by upper endoscopy, will recommend colonoscopy, but patient currently refusing.    #4.  May continue aspirin 81 mg for CAD.  #5.  PPI gtt As above.    Impression:    #1.  Melena / GI bleed, resolved now with brown stool per patient.  Hemodynamically stable.  #2.  Blood loss anemia, mild decline in hemoglobin  #3.  CAD/CHF with severely reduced LVEF, on aspirin.  #4.  Renal insufficiency.    Recommendations:    #1.  Follow CBC, may decrease to daily checks.  #2.  Patient will have upper endoscopy after further treatment of CHF.  #3.  If negative source of anemia by upper endoscopy, will recommend colonoscopy, but patient currently refusing.    #4.  May continue aspirin 81 mg for CAD.  #5.  PPI gtt

## 2018-12-11 NOTE — PROGRESS NOTE ADULT - PROBLEM SELECTOR PLAN 2
Patient with progressive decline in Hb/Hct. Likely multifactorial. However, given reports of black stool, concern for acute blood loss 2/2 GI bleed. Studies consistent with iron deficiency (transferrin saturation ~10%) and likely component of anemia due renal disease Patient with progressive decline in Hb/Hct. Likely multifactorial. However, given reports of black stool, concern for acute blood loss 2/2 GI bleed. Studies consistent with iron deficiency (transferrin saturation ~10%) and likely component of anemia due progressive renal disease  - transfuse 1 unit pRBC today; maintain Hb 8 or above  - monitor blood counts  - GI consulted, recs reviewed and appreciated. Plan for EGD later on this week when pt is euvolemic

## 2018-12-11 NOTE — PROGRESS NOTE ADULT - PROBLEM SELECTOR PLAN 1
Patient with SMITA on CKD in setting of fluid overload. On review of previous labs, baseline Scr. appears to between 4.7-5.4. Scr.  on admission(12/10/18) was 5.78 which has increased to 6.03 today. Patient is non oliguric and is on IV bumex drip. Patient with hemodynamically mediated SMITA from volume overload? Continue with IV diuretics. Monitor Scr, I/O, and electrolytes. No acute indication for HD at this time. Avoid NSAIDs, RCAs, and other nephrotoxins.

## 2018-12-11 NOTE — PROGRESS NOTE ADULT - PROBLEM SELECTOR PLAN 3
Occult positive.   Would hold off on EGD till he is euvolemic, unless emergent. D/w tele PA.   Ok to give blood today. Continue diuretics.   He should be optimized by Thursday for EGD.

## 2018-12-11 NOTE — PROGRESS NOTE ADULT - SUBJECTIVE AND OBJECTIVE BOX
Chief Complaint:  Patient is a 53y old  Male who presents with a chief complaint of SOB (10 Dec 2018 18:58)      Interval Events: NAEON, NSR w/ artifact overnight. Feels well this morning. Has no moved bowels since yesterday morning. No hematemesis, hematochezia, melena noted, denies abd pain, n/v.   ROS: All 12 point system except listed above were otherwise negative.    Hospital Medications:  buMETAnide Infusion 1 mG/Hr IV Continuous <Continuous>  calcium acetate 667 milliGRAM(s) Oral three times a day with meals  carvedilol 6.25 milliGRAM(s) Oral every 12 hours  dextrose 40% Gel 15 Gram(s) Oral once PRN  dextrose 5%. 1000 milliLiter(s) IV Continuous <Continuous>  dextrose 50% Injectable 12.5 Gram(s) IV Push once  dextrose 50% Injectable 25 Gram(s) IV Push once  dextrose 50% Injectable 25 Gram(s) IV Push once  glucagon  Injectable 1 milliGRAM(s) IntraMuscular once PRN  hydrALAZINE 100 milliGRAM(s) Oral three times a day  insulin lispro (HumaLOG) corrective regimen sliding scale   SubCutaneous three times a day before meals  isosorbide   dinitrate Tablet (ISORDIL) 40 milliGRAM(s) Oral three times a day  pantoprazole Infusion 8 mG/Hr IV Continuous <Continuous>      PHYSICAL EXAM:   Vital Signs:  Vital Signs Last 24 Hrs  T(C): 36.5 (11 Dec 2018 05:36), Max: 36.8 (10 Dec 2018 15:21)  T(F): 97.7 (11 Dec 2018 05:36), Max: 98.2 (10 Dec 2018 15:21)  HR: 66 (11 Dec 2018 05:36) (63 - 72)  BP: 125/77 (11 Dec 2018 05:36) (114/65 - 151/92)  BP(mean): --  RR: 18 (11 Dec 2018 05:36) (18 - 18)  SpO2: 100% (11 Dec 2018 05:36) (98% - 100%)  Daily Height in cm: 165.1 (10 Dec 2018 15:21)    Daily Weight in k.5 (11 Dec 2018 06:40)    GENERAL:  sitting in bed comfortably in NAD  CHEST:  CTAB  HEART:  RRR, no r/m/g  ABDOMEN:  obese, soft, nontender, +BS  EXTEREMITIES:  trace pitting edema  NEURO:  AAO x3    LABS:                        7.3    5.78  )-----------( 117      ( 11 Dec 2018 06:30 )             22.4     Mean Cell Volume: 87.5 fL (18 @ 06:30)        138  |  98  |  94<H>  ----------------------------<  99  4.1   |  22  |  6.03<H>    Ca    8.1<L>      11 Dec 2018 06:30  Phos  6.6     12-10  Mg     2.1         TPro  6.7  /  Alb  3.5  /  TBili  0.3  /  DBili  x   /  AST  27  /  ALT  35  /  AlkPhos  110  12-10    LIVER FUNCTIONS - ( 10 Dec 2018 09:08 )  Alb: 3.5 g/dL / Pro: 6.7 g/dL / ALK PHOS: 110 u/L / ALT: 35 u/L / AST: 27 u/L / GGT: x           PT/INR - ( 10 Dec 2018 09:08 )   PT: 12.4 SEC;   INR: 1.11          PTT - ( 10 Dec 2018 09:08 )  PTT:32.2 SEC                            7.3    5.78  )-----------( 117      ( 11 Dec 2018 06:30 )             22.4                         7.9    5.35  )-----------( 126      ( 10 Dec 2018 21:28 )             24.8                         8.0    6.08  )-----------( 138      ( 10 Dec 2018 16:30 )             25.1                         8.4    5.57  )-----------( 126      ( 10 Dec 2018 09:08 )             27.1

## 2018-12-11 NOTE — PROGRESS NOTE ADULT - ASSESSMENT
Mr. Lorenzo is a 52 yo M with hx of chronic systolic heart failure/HFrEF (28%), CAD c/b prior MI w/ ischemic cardiomyopathy s/p PCI, severe MR, CKD stage V, DM2, HTN, and HLD admitted for pre-op CHF optimization for upcoming AVF placement, but found to have progressive anemia with concern for upper GI bleed given reports of black stools.

## 2018-12-11 NOTE — PROGRESS NOTE ADULT - PROBLEM SELECTOR PLAN 2
52yo M w/ smoking history and ?fam hx colon cancer before age 60, pt needs colonoscopy for cancer screening, however patient currently adamantly refusing.   - recommend outpatient colonoscopy if amenable    Will d/w fellow and attending.     Hieu Mondragon MD PGY-1  [phone].

## 2018-12-11 NOTE — PROGRESS NOTE ADULT - PROBLEM SELECTOR PLAN 2
Patient with hypervolemic in setting of CHF and advanced renal failure. Patient appears to be hypervolemic on examination. Currently on IV bumex drip. Management as per heart failure team. Monitor daily weights and UOP.

## 2018-12-11 NOTE — PROGRESS NOTE ADULT - PROBLEM SELECTOR PLAN 3
Patient missed prior appt scheduled for AVF on 11/21 as recommended by his cardiology. Pt is to follow-up with Dr. Gonzalez for AVF placement. Patient with noted hyperphosphatemia.  - calcium acetate 667mg TID with meals  - do not use left arm for blood draws or IV  - will f/u with vascular surgery to assess if pt to receive AVF on this admission

## 2018-12-11 NOTE — PROGRESS NOTE ADULT - SUBJECTIVE AND OBJECTIVE BOX
Patient is a 53y old  Male who presents with a chief complaint of SOB (11 Dec 2018 09:16)    SUBJECTIVE / OVERNIGHT EVENTS:  Patient eating chinese food, fried rice with soy sauce, this morning. Patient stated breathing is much improved while on Bumex gtt. H  has been urinating a lot, but no BMs since yesterday morning. He commented that his stool was black yesterday and never had black stools before. Patient w/o fever, chills, nausea or abdominal pain. Patient has intermittent LE swelling, but not currently.     MEDICATIONS  (STANDING):  buMETAnide Infusion 1 mG/Hr (5 mL/Hr) IV Continuous <Continuous>  calcium acetate 667 milliGRAM(s) Oral three times a day with meals  carvedilol 6.25 milliGRAM(s) Oral every 12 hours  dextrose 5%. 1000 milliLiter(s) (50 mL/Hr) IV Continuous <Continuous>  dextrose 50% Injectable 12.5 Gram(s) IV Push once  dextrose 50% Injectable 25 Gram(s) IV Push once  dextrose 50% Injectable 25 Gram(s) IV Push once  hydrALAZINE 100 milliGRAM(s) Oral three times a day  insulin lispro (HumaLOG) corrective regimen sliding scale   SubCutaneous three times a day before meals  isosorbide   dinitrate Tablet (ISORDIL) 40 milliGRAM(s) Oral three times a day  pantoprazole Infusion 8 mG/Hr (10 mL/Hr) IV Continuous <Continuous>    MEDICATIONS  (PRN):  dextrose 40% Gel 15 Gram(s) Oral once PRN Blood Glucose LESS THAN 70 milliGRAM(s)/deciliter  glucagon  Injectable 1 milliGRAM(s) IntraMuscular once PRN Glucose LESS THAN 70 milligrams/deciliter      Vital Signs Last 24 Hrs  T(C): 36.4 (11 Dec 2018 11:30), Max: 36.8 (10 Dec 2018 15:21)  T(F): 97.6 (11 Dec 2018 11:30), Max: 98.2 (10 Dec 2018 15:21)  HR: 70 (11 Dec 2018 11:30) (63 - 72)  BP: 116/72 (11 Dec 2018 11:30) (114/65 - 151/92)  BP(mean): --  RR: 18 (11 Dec 2018 11:30) (18 - 18)  SpO2: 98% (11 Dec 2018 11:30) (98% - 100%)      CAPILLARY BLOOD GLUCOSE  POCT Blood Glucose.: 115 mg/dL (11 Dec 2018 08:50)  POCT Blood Glucose.: 213 mg/dL (10 Dec 2018 21:59)  POCT Blood Glucose.: 125 mg/dL (10 Dec 2018 16:34)  POCT Blood Glucose.: 196 mg/dL (10 Dec 2018 12:57)    I&O's Summary    10 Dec 2018 07:01  -  11 Dec 2018 07:00  --------------------------------------------------------  IN: 330 mL / OUT: 850 mL / NET: -520 mL          PHYSICAL EXAM  GENERAL: Well appearing in NAD, well-developed  CHEST/LUNG: Clear to auscultation bilaterally; No wheeze  HEART: Regular rate and rhythm; No murmurs, rubs, or gallops  ABDOMEN: Soft, Nontender, Nondistended; Bowel sounds present  EXTREMITIES:  2+ Peripheral Pulses, No clubbing, cyanosis, or edema  PSYCH: AAOx3      LABS:                        7.3    5.78  )-----------( 117      ( 11 Dec 2018 06:30 )             22.4     12-11    138  |  98  |  94<H>  ----------------------------<  99  4.1   |  22  |  6.03<H>    Ca    8.1<L>      11 Dec 2018 06:30  Phos  6.6     12-10  Mg     2.1     12-11    TPro  6.7  /  Alb  3.5  /  TBili  0.3  /  DBili  x   /  AST  27  /  ALT  35  /  AlkPhos  110  12-10    PT/INR - ( 10 Dec 2018 09:08 )   PT: 12.4 SEC;   INR: 1.11          PTT - ( 10 Dec 2018 09:08 )  PTT:32.2 SEC      Consultant(s) Notes Reviewed:    Care Discussed with Consultants/Other Providers: Patient is a 53y old  Male who presents with a chief complaint of SOB (11 Dec 2018 09:16)    SUBJECTIVE / OVERNIGHT EVENTS:  Patient eating chinese food, fried rice with soy sauce, this morning. Patient stated breathing is much improved while on Bumex gtt. H  has been urinating a lot, but no BMs since yesterday morning. He commented that his stool was black yesterday and never had black stools before. Patient w/o fever, chills, nausea or abdominal pain. Patient has intermittent LE swelling, but not currently.     MEDICATIONS  (STANDING):  buMETAnide Infusion 1 mG/Hr (5 mL/Hr) IV Continuous <Continuous>  calcium acetate 667 milliGRAM(s) Oral three times a day with meals  carvedilol 6.25 milliGRAM(s) Oral every 12 hours  dextrose 5%. 1000 milliLiter(s) (50 mL/Hr) IV Continuous <Continuous>  dextrose 50% Injectable 12.5 Gram(s) IV Push once  dextrose 50% Injectable 25 Gram(s) IV Push once  dextrose 50% Injectable 25 Gram(s) IV Push once  hydrALAZINE 100 milliGRAM(s) Oral three times a day  insulin lispro (HumaLOG) corrective regimen sliding scale   SubCutaneous three times a day before meals  isosorbide   dinitrate Tablet (ISORDIL) 40 milliGRAM(s) Oral three times a day  pantoprazole Infusion 8 mG/Hr (10 mL/Hr) IV Continuous <Continuous>    MEDICATIONS  (PRN):  dextrose 40% Gel 15 Gram(s) Oral once PRN Blood Glucose LESS THAN 70 milliGRAM(s)/deciliter  glucagon  Injectable 1 milliGRAM(s) IntraMuscular once PRN Glucose LESS THAN 70 milligrams/deciliter      Vital Signs Last 24 Hrs  T(C): 36.4 (11 Dec 2018 11:30), Max: 36.8 (10 Dec 2018 15:21)  T(F): 97.6 (11 Dec 2018 11:30), Max: 98.2 (10 Dec 2018 15:21)  HR: 70 (11 Dec 2018 11:30) (63 - 72)  BP: 116/72 (11 Dec 2018 11:30) (114/65 - 151/92)  BP(mean): --  RR: 18 (11 Dec 2018 11:30) (18 - 18)  SpO2: 98% (11 Dec 2018 11:30) (98% - 100%)      CAPILLARY BLOOD GLUCOSE  POCT Blood Glucose.: 115 mg/dL (11 Dec 2018 08:50)  POCT Blood Glucose.: 213 mg/dL (10 Dec 2018 21:59)  POCT Blood Glucose.: 125 mg/dL (10 Dec 2018 16:34)  POCT Blood Glucose.: 196 mg/dL (10 Dec 2018 12:57)    I&O's Summary    10 Dec 2018 07:01  -  11 Dec 2018 07:00  --------------------------------------------------------  IN: 330 mL / OUT: 850 mL / NET: -520 mL          PHYSICAL EXAM  GENERAL: Well appearing in NAD, well-developed  CHEST/LUNG: Clear to auscultation bilaterally; No wheeze  HEART: Regular rate and rhythm; No murmurs, rubs, or gallops  ABDOMEN: Soft, Nontender, Nondistended; Bowel sounds present  EXTREMITIES:  2+ Peripheral Pulses, No clubbing, cyanosis, or edema  PSYCH: AAOx3      LABS:                        7.3    5.78  )-----------( 117      ( 11 Dec 2018 06:30 )             22.4     12-11    138  |  98  |  94<H>  ----------------------------<  99  4.1   |  22  |  6.03<H>    Ca    8.1<L>      11 Dec 2018 06:30  Phos  6.6     12-10  Mg     2.1     12-11    TPro  6.7  /  Alb  3.5  /  TBili  0.3  /  DBili  x   /  AST  27  /  ALT  35  /  AlkPhos  110  12-10    PT/INR - ( 10 Dec 2018 09:08 )   PT: 12.4 SEC;   INR: 1.11          PTT - ( 10 Dec 2018 09:08 )  PTT:32.2 SEC      Consultant(s) Notes Reviewed:  yes  Care Discussed with Consultants/Other Providers:

## 2018-12-11 NOTE — PROGRESS NOTE ADULT - ATTENDING COMMENTS
53 year old  male in adhf    Active issues  ADHF  ICM, HFrEF, STAge C EF 25%, LVEDD 6.6cm  severe functional MR  CAD s/p PCI in 2016  CKD V  DM    JVP 15,  1+ edema LE    continue to optimize volume today and tomorrow  once euvolemic can safely undergo EGD if needed  renal following

## 2018-12-11 NOTE — PROGRESS NOTE ADULT - ASSESSMENT
53 year old male with h/o CKD stage V, CHFrEF found to have SMITA on CKD in setting of fluid overload.

## 2018-12-11 NOTE — PROGRESS NOTE ADULT - PROBLEM SELECTOR PLAN 1
Increase in proBNP compared to prior readings. However, worsening renal function may be contributing to decreased excretion. Patient if followed by cardiology/HF team, Dr. Enio Pavon as an outpatient. Suspect dietary indiscretion contributing to decompensated HF. Transitioned to Bumex gtt inpatient with adequate symptomatic response.  - HF team following patient, recs appreciated  - continue Bumex gtt at 1mg/hr for now  - monitor I/Os  - diet education  - daily weights  - BP at acceptable range, will continue carvedilol 6.25mg BID  - Hydralazine 100mg TID  - isosorbide dinitrate 40mg TID

## 2018-12-11 NOTE — PROGRESS NOTE ADULT - PROBLEM SELECTOR PLAN 1
No BM in last 24hrs. No s/s hypotension, HD stable, HGB dropping steadily. Most likely UGIB, unlikely LGIB, unlikely to be pill induced gastritis given no h/o NSAID use or PUD given no s/s dyspepsia.  - spoke w/ pt about possible colonoscopy, pt refusing  - T&S, CBC Q8H, transfuse if < 8 (d/t extensive cardiac history)  - hold antiplatelet/anticoag  - cardiology following, appreciate recs, awaiting clearance for EGD  - c/w CLD  - c/w PPI drip  - EGD once pt euvolemic/cleared by cardiology

## 2018-12-11 NOTE — PROGRESS NOTE ADULT - SUBJECTIVE AND OBJECTIVE BOX
Patient seen and examined. He is feeling much better, less         Medications:  buMETAnide Infusion 1 mG/Hr IV Continuous <Continuous>  calcium acetate 667 milliGRAM(s) Oral three times a day with meals  carvedilol 6.25 milliGRAM(s) Oral every 12 hours  dextrose 40% Gel 15 Gram(s) Oral once PRN  dextrose 5%. 1000 milliLiter(s) IV Continuous <Continuous>  dextrose 50% Injectable 12.5 Gram(s) IV Push once  dextrose 50% Injectable 25 Gram(s) IV Push once  dextrose 50% Injectable 25 Gram(s) IV Push once  glucagon  Injectable 1 milliGRAM(s) IntraMuscular once PRN  hydrALAZINE 100 milliGRAM(s) Oral three times a day  insulin lispro (HumaLOG) corrective regimen sliding scale   SubCutaneous three times a day before meals  isosorbide   dinitrate Tablet (ISORDIL) 40 milliGRAM(s) Oral three times a day  pantoprazole Infusion 8 mG/Hr IV Continuous <Continuous>      Vitals:  Vital Signs Last 24 Hrs  T(C): 36.7 (11 Dec 2018 12:30), Max: 36.8 (10 Dec 2018 15:21)  T(F): 98 (11 Dec 2018 12:30), Max: 98.2 (10 Dec 2018 15:21)  HR: 70 (11 Dec 2018 12:30) (63 - 72)  BP: 122/76 (11 Dec 2018 12:30) (114/65 - 151/92)  BP(mean): --  RR: 18 (11 Dec 2018 12:30) (18 - 18)  SpO2: 98% (11 Dec 2018 12:30) (98% - 100%)    Daily Height in cm: 165.1 (10 Dec 2018 15:21)    Daily Weight in k.1 (11 Dec 2018 12:30)    I&O's Detail    10 Dec 2018 07:01  -  11 Dec 2018 07:00  --------------------------------------------------------  IN:    bumetanide Infusion: 60 mL    Oral Fluid: 150 mL    pantoprazole Infusion: 120 mL  Total IN: 330 mL    OUT:    Voided: 850 mL  Total OUT: 850 mL    Total NET: -520 mL          Physical Exam:     General: No distress. Comfortable.  HEENT: EOM intact.  Neck: Neck supple. JVP not elevated. No masses  Chest: Clear to auscultation bilaterally  CV: Normal S1 and S2. No murmurs, rub, or gallops. Radial pulses normal.  Abdomen: Soft, non-distended, non-tender  Skin: No rashes or skin breakdown  Neurology: Alert and oriented times three. Sensation intact  Psych: Affect normal    Labs:                        7.3    5.78  )-----------( 117      ( 11 Dec 2018 06:30 )             22.4     12-11    138  |  98  |  94<H>  ----------------------------<  99  4.1   |  22  |  6.03<H>    Ca    8.1<L>      11 Dec 2018 06:30  Phos  6.6     12-10  Mg     2.1     12-11    TPro  6.7  /  Alb  3.5  /  TBili  0.3  /  DBili  x   /  AST  27  /  ALT  35  /  AlkPhos  110  12-10    PT/INR - ( 10 Dec 2018 09:08 )   PT: 12.4 SEC;   INR: 1.11          PTT - ( 10 Dec 2018 09:08 )  PTT:32.2 SEC Patient seen and examined. He is feeling much better, less dyspnic today. Voiding well to bumex gtt.   No SOB at rest, orthopnea, PND, CP, palpitations, dizziness.         Medications:  buMETAnide Infusion 1 mG/Hr IV Continuous <Continuous>  calcium acetate 667 milliGRAM(s) Oral three times a day with meals  carvedilol 6.25 milliGRAM(s) Oral every 12 hours  dextrose 40% Gel 15 Gram(s) Oral once PRN  dextrose 5%. 1000 milliLiter(s) IV Continuous <Continuous>  dextrose 50% Injectable 12.5 Gram(s) IV Push once  dextrose 50% Injectable 25 Gram(s) IV Push once  dextrose 50% Injectable 25 Gram(s) IV Push once  glucagon  Injectable 1 milliGRAM(s) IntraMuscular once PRN  hydrALAZINE 100 milliGRAM(s) Oral three times a day  insulin lispro (HumaLOG) corrective regimen sliding scale   SubCutaneous three times a day before meals  isosorbide   dinitrate Tablet (ISORDIL) 40 milliGRAM(s) Oral three times a day  pantoprazole Infusion 8 mG/Hr IV Continuous <Continuous>      Vitals:  Vital Signs Last 24 Hrs  T(C): 36.7 (11 Dec 2018 12:30), Max: 36.8 (10 Dec 2018 15:21)  T(F): 98 (11 Dec 2018 12:30), Max: 98.2 (10 Dec 2018 15:21)  HR: 70 (11 Dec 2018 12:30) (63 - 72)  BP: 122/76 (11 Dec 2018 12:30) (114/65 - 151/92)  BP(mean): --  RR: 18 (11 Dec 2018 12:30) (18 - 18)  SpO2: 98% (11 Dec 2018 12:30) (98% - 100%)    Daily Height in cm: 165.1 (10 Dec 2018 15:21)    Daily Weight in k.1 (11 Dec 2018 12:30)    I&O's Detail    10 Dec 2018 07:01  -  11 Dec 2018 07:00  --------------------------------------------------------  IN:    bumetanide Infusion: 60 mL    Oral Fluid: 150 mL    pantoprazole Infusion: 120 mL  Total IN: 330 mL    OUT:    Voided: 850 mL  Total OUT: 850 mL    Total NET: -520 mL          Physical Exam:     General: No distress. Comfortable.  HEENT: EOM intact.  Neck: Neck supple. JVP about 15 cm. No masses  Chest: Crackles on b/l bases  CV: Normal S1 and S2. No murmurs, rub, or gallops. Radial pulses normal. No LE edema b/l, warm b/l.   Abdomen: Soft, non-distended, non-tender  Skin: No rashes or skin breakdown  Neurology: Alert and oriented times three. Sensation intact  Psych: Affect normal    Labs:                        7.3    5.78  )-----------( 117      ( 11 Dec 2018 06:30 )             22.4     12-11    138  |  98  |  94<H>  ----------------------------<  99  4.1   |  22  |  6.03<H>    Ca    8.1<L>      11 Dec 2018 06:30  Phos  6.6     12-10  Mg     2.1     12-11    TPro  6.7  /  Alb  3.5  /  TBili  0.3  /  DBili  x   /  AST  27  /  ALT  35  /  AlkPhos  110  12-10    PT/INR - ( 10 Dec 2018 09:08 )   PT: 12.4 SEC;   INR: 1.11          PTT - ( 10 Dec 2018 09:08 )  PTT:32.2 SEC    < from: Transthoracic Echocardiogram (18 @ 08:47) >  DIMENSIONS:  Dimensions:     Normal Values:  LA:     4.2 cm    2.0 - 4.0 cm  Ao:     3.2 cm    2.0 - 3.8 cm  SEPTUM: 0.8 cm    0.6 - 1.2 cm  PWT:    0.8 cm    0.6 - 1.1 cm  LVIDd:  7.0 cm    3.0 - 5.6 cm  LVIDs:  6.2 cm    1.8 - 4.0 cm  Derived Variables:  LVMI: 134 g/m2  RWT: 0.22  Fractional short: 11 %  Ejection Fraction (Teicholtz): 24 %  ------------------------------------------------------------------------  OBSERVATIONS:  Mitral Valve: Mitral annular calcification, otherwise  normal mitral valve. Severe mitral regurgitation with an  eccentric, posteriorly directed jet.  Aortic Root: Normal aortic root.  Aortic Valve: Calcified trileaflet aortic valve with  decreased opening.  Left Atrium: Severely dilated left atrium.  LA volume index  = 52 cc/m2.  Left Ventricle: Severe global left ventricular systolic  dysfunction. Severe left ventricular enlargement. (DT:106  ms).  Right Heart: Normal right atrium. Right ventricular  enlargement with decreased right ventricular systolic  function. Normal tricuspid valve.  Mild-moderate tricuspid  regurgitation. Normal pulmonic valve. Minimal pulmonic  regurgitation.  Pericardium/PleuraSmall pericardial effusion superior to  the right atrium.  Hemodynamic: Estimated right ventricular systolic pressure  equals 71 mm Hg, assuming right atrial pressure equals 10  mm Hg, consistent with severepulmonary hypertension.    < end of copied text >

## 2018-12-11 NOTE — PROGRESS NOTE ADULT - PROBLEM SELECTOR PLAN 1
Hypervolemic. In ADHF. JVP 15 cm.   Continue Bumex gtt 1 mg/hr.  Continue hydral 100 mg po TID.   Continue isordil 40 mg po TID.  Continue Coreg 6.25 mg po BID  Strict I/O and daily standing weights.

## 2018-12-11 NOTE — PROGRESS NOTE ADULT - ASSESSMENT
52 yo M HFrEF (28%), CAD s/p PCI, severe MR, CKD stage V, DM2, HTN, HLD. Reports increasing SOB as outpt, here for pre-op CHF optimization prior to AVF later this month w/ Dr. Gonzalez, consulted for 2d h/o melena w/ hgb drop.

## 2018-12-12 ENCOUNTER — RX RENEWAL (OUTPATIENT)
Age: 53
End: 2018-12-12

## 2018-12-12 DIAGNOSIS — Z71.89 OTHER SPECIFIED COUNSELING: ICD-10-CM

## 2018-12-12 LAB
BUN SERPL-MCNC: 85 MG/DL — HIGH (ref 7–23)
BUN SERPL-MCNC: 95 MG/DL — HIGH (ref 7–23)
CALCIUM SERPL-MCNC: 6.9 MG/DL — LOW (ref 8.4–10.5)
CALCIUM SERPL-MCNC: 8 MG/DL — LOW (ref 8.4–10.5)
CHLORIDE SERPL-SCNC: 102 MMOL/L — SIGNIFICANT CHANGE UP (ref 98–107)
CHLORIDE SERPL-SCNC: 99 MMOL/L — SIGNIFICANT CHANGE UP (ref 98–107)
CO2 SERPL-SCNC: 19 MMOL/L — LOW (ref 22–31)
CO2 SERPL-SCNC: 22 MMOL/L — SIGNIFICANT CHANGE UP (ref 22–31)
CREAT SERPL-MCNC: 5.11 MG/DL — HIGH (ref 0.5–1.3)
CREAT SERPL-MCNC: 5.86 MG/DL — HIGH (ref 0.5–1.3)
GLUCOSE BLDC GLUCOMTR-MCNC: 110 MG/DL — HIGH (ref 70–99)
GLUCOSE BLDC GLUCOMTR-MCNC: 123 MG/DL — HIGH (ref 70–99)
GLUCOSE BLDC GLUCOMTR-MCNC: 125 MG/DL — HIGH (ref 70–99)
GLUCOSE BLDC GLUCOMTR-MCNC: 189 MG/DL — HIGH (ref 70–99)
GLUCOSE SERPL-MCNC: 100 MG/DL — HIGH (ref 70–99)
GLUCOSE SERPL-MCNC: 139 MG/DL — HIGH (ref 70–99)
HCT VFR BLD CALC: 22.1 % — LOW (ref 39–50)
HCT VFR BLD CALC: 25.2 % — LOW (ref 39–50)
HGB BLD-MCNC: 7.2 G/DL — LOW (ref 13–17)
HGB BLD-MCNC: 8.2 G/DL — LOW (ref 13–17)
MAGNESIUM SERPL-MCNC: 1.9 MG/DL — SIGNIFICANT CHANGE UP (ref 1.6–2.6)
MCHC RBC-ENTMCNC: 28.4 PG — SIGNIFICANT CHANGE UP (ref 27–34)
MCHC RBC-ENTMCNC: 28.8 PG — SIGNIFICANT CHANGE UP (ref 27–34)
MCHC RBC-ENTMCNC: 32.5 % — SIGNIFICANT CHANGE UP (ref 32–36)
MCHC RBC-ENTMCNC: 32.6 % — SIGNIFICANT CHANGE UP (ref 32–36)
MCV RBC AUTO: 87.2 FL — SIGNIFICANT CHANGE UP (ref 80–100)
MCV RBC AUTO: 88.4 FL — SIGNIFICANT CHANGE UP (ref 80–100)
NRBC # FLD: 0 — SIGNIFICANT CHANGE UP
NRBC # FLD: 0.02 — SIGNIFICANT CHANGE UP
PLATELET # BLD AUTO: 120 K/UL — LOW (ref 150–400)
PLATELET # BLD AUTO: 99 K/UL — LOW (ref 150–400)
PMV BLD: 11.4 FL — SIGNIFICANT CHANGE UP (ref 7–13)
PMV BLD: 11.7 FL — SIGNIFICANT CHANGE UP (ref 7–13)
POTASSIUM SERPL-MCNC: 3.5 MMOL/L — SIGNIFICANT CHANGE UP (ref 3.5–5.3)
POTASSIUM SERPL-MCNC: 3.9 MMOL/L — SIGNIFICANT CHANGE UP (ref 3.5–5.3)
POTASSIUM SERPL-SCNC: 3.5 MMOL/L — SIGNIFICANT CHANGE UP (ref 3.5–5.3)
POTASSIUM SERPL-SCNC: 3.9 MMOL/L — SIGNIFICANT CHANGE UP (ref 3.5–5.3)
RBC # BLD: 2.5 M/UL — LOW (ref 4.2–5.8)
RBC # BLD: 2.89 M/UL — LOW (ref 4.2–5.8)
RBC # FLD: 15.3 % — HIGH (ref 10.3–14.5)
RBC # FLD: 15.4 % — HIGH (ref 10.3–14.5)
SODIUM SERPL-SCNC: 139 MMOL/L — SIGNIFICANT CHANGE UP (ref 135–145)
SODIUM SERPL-SCNC: 139 MMOL/L — SIGNIFICANT CHANGE UP (ref 135–145)
WBC # BLD: 5.07 K/UL — SIGNIFICANT CHANGE UP (ref 3.8–10.5)
WBC # BLD: 5.15 K/UL — SIGNIFICANT CHANGE UP (ref 3.8–10.5)
WBC # FLD AUTO: 5.07 K/UL — SIGNIFICANT CHANGE UP (ref 3.8–10.5)
WBC # FLD AUTO: 5.15 K/UL — SIGNIFICANT CHANGE UP (ref 3.8–10.5)

## 2018-12-12 PROCEDURE — 99233 SBSQ HOSP IP/OBS HIGH 50: CPT | Mod: GC

## 2018-12-12 PROCEDURE — 99233 SBSQ HOSP IP/OBS HIGH 50: CPT

## 2018-12-12 PROCEDURE — 99232 SBSQ HOSP IP/OBS MODERATE 35: CPT | Mod: GC

## 2018-12-12 RX ADMIN — BUMETANIDE 5 MG/HR: 0.25 INJECTION INTRAMUSCULAR; INTRAVENOUS at 06:49

## 2018-12-12 RX ADMIN — Medication 100 MILLIGRAM(S): at 22:31

## 2018-12-12 RX ADMIN — ISOSORBIDE DINITRATE 40 MILLIGRAM(S): 5 TABLET ORAL at 22:31

## 2018-12-12 RX ADMIN — CARVEDILOL PHOSPHATE 6.25 MILLIGRAM(S): 80 CAPSULE, EXTENDED RELEASE ORAL at 06:02

## 2018-12-12 RX ADMIN — Medication 667 MILLIGRAM(S): at 14:39

## 2018-12-12 RX ADMIN — Medication 667 MILLIGRAM(S): at 09:49

## 2018-12-12 RX ADMIN — Medication 100 MILLIGRAM(S): at 14:39

## 2018-12-12 RX ADMIN — ISOSORBIDE DINITRATE 40 MILLIGRAM(S): 5 TABLET ORAL at 06:02

## 2018-12-12 RX ADMIN — BUMETANIDE 5 MG/HR: 0.25 INJECTION INTRAMUSCULAR; INTRAVENOUS at 22:32

## 2018-12-12 RX ADMIN — PANTOPRAZOLE SODIUM 10 MG/HR: 20 TABLET, DELAYED RELEASE ORAL at 06:50

## 2018-12-12 RX ADMIN — Medication 100 MILLIGRAM(S): at 06:01

## 2018-12-12 RX ADMIN — PANTOPRAZOLE SODIUM 10 MG/HR: 20 TABLET, DELAYED RELEASE ORAL at 20:29

## 2018-12-12 RX ADMIN — CARVEDILOL PHOSPHATE 6.25 MILLIGRAM(S): 80 CAPSULE, EXTENDED RELEASE ORAL at 18:19

## 2018-12-12 RX ADMIN — ISOSORBIDE DINITRATE 40 MILLIGRAM(S): 5 TABLET ORAL at 14:39

## 2018-12-12 RX ADMIN — Medication 667 MILLIGRAM(S): at 18:50

## 2018-12-12 NOTE — PROGRESS NOTE ADULT - PROBLEM SELECTOR PLAN 1
Hypervolemic, but improving. Still in ADHF. JVP about 12 cm today.  Continue Bumex gtt 1 mg/hr, will switch to PO Bumex tomorrow.  Continue hydral 100 mg po TID.   Continue isordil 40 mg po TID.  Continue Coreg 6.25 mg po BID  Strict I/O and daily standing weights. Hypervolemic, but improving. Still in ADHF. JVP about 12 cm today.  Diuresed -1.9 L in 24 hrs, standing weight is 176 lbs, will monitor.  Continue Bumex gtt 1 mg/hr, will switch to PO Bumex tomorrow.  Continue hydral 100 mg po TID.   Continue isordil 40 mg po TID.  Continue Coreg 6.25 mg po BID  Strict I/O and daily standing weights.

## 2018-12-12 NOTE — PROGRESS NOTE ADULT - ASSESSMENT
53 year old M w/ h/o IDDM c/b neuropathy (A1c 6.2 8/8/18) , HTN, CAD (s/p stent in [place] in 2016, unknown coronary anatomy), HFrEF (EF 25%, LVEDD 6.6 cm), severe MR (functional), stage V CKD (b/l Cr 4-5) was sent in to ED to be evaluated for volume overload in setting of CKD stage V.  Patient's nephrologist is Dr. Giovanny Pavon and had plans for fistula with Dr. Gonzalez 10/31 per record. Prior to coming to ED diuretics had been adjusted to Bumex 4 mg po BID with metolazone (dose unclear) daily (done for 2 days prior to admission). Currently endorses fatigue/ SIERRA with ambulation after 1/2 blocks; also reports dyspnea on exertion with climbing stairs.  Uses 1 pillow at night. States w/ addition of metolazone and increase in Bumex he did void a significant amount. He is requesting to only be evaluated by Dr. Enio Pavon. H/H lower than prior admission, occult positive, GI consulted.     Hypervolemic, but improving.

## 2018-12-12 NOTE — PROGRESS NOTE ADULT - SUBJECTIVE AND OBJECTIVE BOX
[facility] Division of Kidney Diseases & Hypertension  FOLLOW UP NOTE  [phone]--------------------------------------------------------------------------------    HPI: 53 year old male with h/o CKD stage V, CHFrEF, DM, HTN, CAD was admitted for management of fluid overload. Nephrology was consulted due to history of CKD. Patient has history of CKD stage V from longstanding history of DM and HTN.  On review of previous records in [place],Scr. was elevated at 1.5 on 4/4/16 and 1.6 on 4/6/16. Patient was hospitalized in [facility] from 2/10/18 to 2/14/18 during Scr was elevated at 5 on 2/10/18 which then decreased to 4.43 on 2/14/18. Patient was subsequently seen in [facility] renal clinic in March 2018 and has been following up with Dr. Giovanny Pavon since then. Last Scr. before admission was 5.55 on 12/4/18. Labs done during this admission showed Scr. of 5.74 on 12/10/18. Patient was started on IV bumex drip.    Patient seen and examined. Patient notes improvement in symptoms and has increased urine output as well. Denies c/o CP, abdominal pain, nausea, vomiting, or loss of appetite.       PAST HISTORY  --------------------------------------------------------------------------------  No significant changes to PMH, PSH, FHx, SHx, unless otherwise noted    ALLERGIES & MEDICATIONS  --------------------------------------------------------------------------------  Allergies    No Known Allergies    Intolerances      Standing Inpatient Medications  buMETAnide Infusion 1 mG/Hr IV Continuous <Continuous>  calcium acetate 667 milliGRAM(s) Oral three times a day with meals  carvedilol 6.25 milliGRAM(s) Oral every 12 hours  dextrose 5%. 1000 milliLiter(s) IV Continuous <Continuous>  dextrose 50% Injectable 12.5 Gram(s) IV Push once  dextrose 50% Injectable 25 Gram(s) IV Push once  dextrose 50% Injectable 25 Gram(s) IV Push once  hydrALAZINE 100 milliGRAM(s) Oral three times a day  insulin lispro (HumaLOG) corrective regimen sliding scale   SubCutaneous three times a day before meals  isosorbide   dinitrate Tablet (ISORDIL) 40 milliGRAM(s) Oral three times a day  pantoprazole Infusion 8 mG/Hr IV Continuous <Continuous>    PRN Inpatient Medications  dextrose 40% Gel 15 Gram(s) Oral once PRN  glucagon  Injectable 1 milliGRAM(s) IntraMuscular once PRN      REVIEW OF SYSTEMS  --------------------------------------------------------------------------------    Gen: No  fevers/chills  Skin: No rashes  Head/Eyes/Ears/Mouth: No headache  Respiratory: No dyspnea  CV: Orthopnea +  GI: No abdominal pain, diarrhea, nausea, vomiting  MSK:  no edema  Neuro: No dizziness/lightheadedness      All other systems were reviewed and are negative, except as noted.    VITALS/PHYSICAL EXAM  --------------------------------------------------------------------------------  T(C): 36.3 (12-12-18 @ 13:50), Max: 37.1 (12-12-18 @ 01:49)  HR: 64 (12-12-18 @ 13:50) (62 - 72)  BP: 140/82 (12-12-18 @ 13:50) (119/62 - 140/82)  RR: 18 (12-12-18 @ 13:50) (18 - 19)  SpO2: 99% (12-12-18 @ 13:50) (98% - 100%)  Wt(kg): --  Height (cm): 165.1 (12-10-18 @ 15:21)  Weight (kg): 80 (12-12-18 @ 07:24)  BMI (kg/m2): 29.3 (12-12-18 @ 07:24)  BSA (m2): 1.88 (12-12-18 @ 07:24)      12-11-18 @ 07:01  -  12-12-18 @ 07:00  --------------------------------------------------------  IN: 900 mL / OUT: 2825 mL / NET: -1925 mL    12-12-18 @ 07:01  -  12-12-18 @ 14:58  --------------------------------------------------------  IN: 0 mL / OUT: 900 mL / NET: -900 mL      Physical Exam:  	  Gen: NAD  	Pulm: CTA B/L  	CV: RRR, S1S2  	Abd: +BS, soft, nontender/nondistended  	: No suprapubic tenderness               Extremities: trace bilateral LE edema noted.                Neuro: No focal deficits  	Skin: Warm and dry     LABS/STUDIES  --------------------------------------------------------------------------------              7.2    5.15  >-----------<  99       [12-12-18 @ 05:30]              22.1     139  |  99  |  95  ----------------------------<  100      [12-12-18 @ 05:30]  3.9   |  22  |  5.86        Ca     8.0     [12-12-18 @ 05:30]      Mg     1.9     [12-12-18 @ 05:30]      Phos  6.6     [12-10-18 @ 21:28]                [12-10-18 @ 15:05]    Creatinine Trend:  SCr 5.86 [12-12 @ 05:30]  SCr 5.89 [12-11 @ 18:04]  SCr 6.03 [12-11 @ 06:30]  SCr 5.78 [12-10 @ 21:28]  SCr 5.74 [12-10 @ 09:08]        Iron 31, TIBC 302, %sat --      [12-11-18 @ 06:30]

## 2018-12-12 NOTE — PROGRESS NOTE ADULT - SUBJECTIVE AND OBJECTIVE BOX
Chief Complaint:  Patient is a 53y old  Male who presents with a chief complaint of SOB (11 Dec 2018 15:39)      Interval Events: Pt had 1 BM yesterday that was brown. Pt denies nausea, vomiting, abdominal pain, shortness of breath.  Hgb 7.2 from 8.0 (after 1u prbc)  ROS: All 12 point system except listed above were otherwise negative.    Allergies:  No Known Allergies        Hospital Medications:  buMETAnide Infusion 1 mG/Hr IV Continuous <Continuous>  calcium acetate 667 milliGRAM(s) Oral three times a day with meals  carvedilol 6.25 milliGRAM(s) Oral every 12 hours  dextrose 40% Gel 15 Gram(s) Oral once PRN  dextrose 5%. 1000 milliLiter(s) IV Continuous <Continuous>  dextrose 50% Injectable 12.5 Gram(s) IV Push once  dextrose 50% Injectable 25 Gram(s) IV Push once  dextrose 50% Injectable 25 Gram(s) IV Push once  glucagon  Injectable 1 milliGRAM(s) IntraMuscular once PRN  hydrALAZINE 100 milliGRAM(s) Oral three times a day  insulin lispro (HumaLOG) corrective regimen sliding scale   SubCutaneous three times a day before meals  isosorbide   dinitrate Tablet (ISORDIL) 40 milliGRAM(s) Oral three times a day  pantoprazole Infusion 8 mG/Hr IV Continuous <Continuous>      PMHX/PSHX:  Abnormal echocardiogram  CKD (chronic kidney disease), stage V  HFrEF (heart failure with reduced ejection fraction)  Congestive heart failure (CHF)  Myocardial infarction  Hyperlipidemia  CAD (coronary artery disease)  DM (diabetes mellitus)  HTN (hypertension)  Stented coronary artery  Arterial stent thrombosis      Family history:  Family history of diabetes mellitus (DM) (Sibling)  Family history of MI (myocardial infarction) (Sibling)    There is no family history of peptic ulcer disease, gastric cancer, colon polyps, colon cancer, celiac disease, biliary, hepatic, or pancreatic disease.  None of the female relatives have breast, uterine, or ovarian cancer.     PHYSICAL EXAM:   Vital Signs:  Vital Signs Last 24 Hrs  T(C): 36.9 (12 Dec 2018 05:49), Max: 37.1 (12 Dec 2018 01:49)  T(F): 98.4 (12 Dec 2018 05:49), Max: 98.8 (12 Dec 2018 01:49)  HR: 62 (12 Dec 2018 05:49) (62 - 76)  BP: 130/71 (12 Dec 2018 05:49) (116/72 - 134/76)  BP(mean): --  RR: 18 (12 Dec 2018 05:49) (18 - 19)  SpO2: 100% (12 Dec 2018 05:49) (98% - 100%)  Daily     Daily Weight in k.1 (11 Dec 2018 12:30)    GENERAL:  sitting in bed comfortably in NAD  CHEST:  CTAB  HEART:  RRR, no r/m/g  ABDOMEN:  obese, soft, nontender, +BS  EXTEREMITIES:  trace pitting edema  NEURO:  AAO x3    LABS:                        7.2    5.15  )-----------( 99       ( 12 Dec 2018 05:30 )             22.1     Mean Cell Volume: 88.4 fL (- @ 05:30)        139  |  99  |  95<H>  ----------------------------<  100<H>  3.9   |  22  |  5.86<H>    Ca    8.0<L>      12 Dec 2018 05:30  Phos  6.6     12-10  Mg     1.9         TPro  6.7  /  Alb  3.5  /  TBili  0.3  /  DBili  x   /  AST  27  /  ALT  35  /  AlkPhos  110  12-10    LIVER FUNCTIONS - ( 10 Dec 2018 09:08 )  Alb: 3.5 g/dL / Pro: 6.7 g/dL / ALK PHOS: 110 u/L / ALT: 35 u/L / AST: 27 u/L / GGT: x           PT/INR - ( 10 Dec 2018 09:08 )   PT: 12.4 SEC;   INR: 1.11          PTT - ( 10 Dec 2018 09:08 )  PTT:32.2 SEC                            7.2    5.15  )-----------( 99       ( 12 Dec 2018 05:30 )             22.1                         8.0    5.33  )-----------( 114      ( 11 Dec 2018 18:04 )             25.0                         7.3    5.78  )-----------( 117      ( 11 Dec 2018 06:30 )             22.4                         7.9    5.35  )-----------( 126      ( 10 Dec 2018 21:28 )             24.8                         8.0    6.08  )-----------( 138      ( 10 Dec 2018 16:30 )             25.1     Imaging:

## 2018-12-12 NOTE — PROGRESS NOTE ADULT - PROBLEM SELECTOR PLAN 2
Patient with hypervolemic in setting of CHF and advanced renal failure. Patient appears to be hypervolemic on examination but has improved since admission. Patient is non oliguric with UOP of 2.8 L over past 24 hours. Currently on IV bumex drip. Management as per heart failure team. Monitor daily weights and UOP.

## 2018-12-12 NOTE — PROGRESS NOTE ADULT - ASSESSMENT
Impression:  1) Melena with acute drop in hemoglobin- -Differential diagnosis includes PUD, erosive gastropathy/esophagitis,  angiectasia, gastric cancer (lymphoma), Dieulafoy's lesion  2) CHF- Currently on Bumex gtt  3) CKD  4) Type 2 DM    Recommendations:  -Will plan for EGD once patient is medically optimized and off Bumex gtt  -Monitor BM  -Serial CBC, transfuse PRN  -Rest of care per primary team

## 2018-12-12 NOTE — PROGRESS NOTE ADULT - PROBLEM SELECTOR PLAN 1
Increase in proBNP compared to prior readings. However, worsening renal function may be contributing to decreased excretion. Patient is followed by cardiology/HF team, Dr. Enio Pavon as an outpatient. Suspect dietary indiscretion contributing to decompensated HF as well. Transitioned to Bumex gtt inpatient with adequate symptomatic response.  - HF team following patient, recs reveiwed and appreciated  - continue Bumex gtt at 1mg/hr for now, plan to transition to oral Bumex tomorrow as per HF team  - monitor I/Os  - diet education  - daily weights  - BP at acceptable range, will continue carvedilol 6.25mg BID  - Hydralazine 100mg TID  - isosorbide dinitrate 40mg TID

## 2018-12-12 NOTE — PROGRESS NOTE ADULT - SUBJECTIVE AND OBJECTIVE BOX
Patient is a 53y old  Male who presents with a chief complaint of SOB (12 Dec 2018 11:39)    SUBJECTIVE / OVERNIGHT EVENTS:  No events overnight. Patient had BM and states stool is now brown. No blood noted. Patient complaining of hunger and expressed he wanted to leave because he can't eat. Patient otherwise has no chest pain or SOB.         MEDICATIONS  (STANDING):  buMETAnide Infusion 1 mG/Hr (5 mL/Hr) IV Continuous <Continuous>  calcium acetate 667 milliGRAM(s) Oral three times a day with meals  carvedilol 6.25 milliGRAM(s) Oral every 12 hours  dextrose 5%. 1000 milliLiter(s) (50 mL/Hr) IV Continuous <Continuous>  dextrose 50% Injectable 12.5 Gram(s) IV Push once  dextrose 50% Injectable 25 Gram(s) IV Push once  dextrose 50% Injectable 25 Gram(s) IV Push once  hydrALAZINE 100 milliGRAM(s) Oral three times a day  insulin lispro (HumaLOG) corrective regimen sliding scale   SubCutaneous three times a day before meals  isosorbide   dinitrate Tablet (ISORDIL) 40 milliGRAM(s) Oral three times a day  pantoprazole Infusion 8 mG/Hr (10 mL/Hr) IV Continuous <Continuous>    MEDICATIONS  (PRN):  dextrose 40% Gel 15 Gram(s) Oral once PRN Blood Glucose LESS THAN 70 milliGRAM(s)/deciliter  glucagon  Injectable 1 milliGRAM(s) IntraMuscular once PRN Glucose LESS THAN 70 milligrams/deciliter      Vital Signs Last 24 Hrs  T(C): 36.6 (12 Dec 2018 09:49), Max: 37.1 (12 Dec 2018 01:49)  T(F): 97.8 (12 Dec 2018 09:49), Max: 98.8 (12 Dec 2018 01:49)  HR: 64 (12 Dec 2018 09:49) (62 - 72)  BP: 122/66 (12 Dec 2018 09:49) (119/62 - 134/76)  BP(mean): --  RR: 18 (12 Dec 2018 09:49) (18 - 19)  SpO2: 100% (12 Dec 2018 09:49) (98% - 100%)      CAPILLARY BLOOD GLUCOSE  POCT Blood Glucose.: 125 mg/dL (12 Dec 2018 12:40)  POCT Blood Glucose.: 123 mg/dL (12 Dec 2018 08:58)  POCT Blood Glucose.: 200 mg/dL (11 Dec 2018 21:35)  POCT Blood Glucose.: 171 mg/dL (11 Dec 2018 17:58)    I&O's Summary    11 Dec 2018 07:01  -  12 Dec 2018 07:00  --------------------------------------------------------  IN: 900 mL / OUT: 2825 mL / NET: -1925 mL    12 Dec 2018 07:01  -  12 Dec 2018 12:49  --------------------------------------------------------  IN: 0 mL / OUT: 900 mL / NET: -900 mL          PHYSICAL EXAM  GENERAL: Well appearing in NAD, well-developed  CHEST/LUNG: Clear to auscultation bilaterally; No wheeze  HEART: Regular rate and rhythm; No murmurs, rubs, or gallops  ABDOMEN: Soft, Nontender, Nondistended; Bowel sounds present  EXTREMITIES:  2+ Peripheral Pulses, No clubbing, cyanosis, or edema  PSYCH: AAOx3        LABS:                        7.2    5.15  )-----------( 99       ( 12 Dec 2018 05:30 )             22.1     12-12    139  |  99  |  95<H>  ----------------------------<  100<H>  3.9   |  22  |  5.86<H>    Ca    8.0<L>      12 Dec 2018 05:30  Phos  6.6     12-10  Mg     1.9     12-12      Consultant(s) Notes Reviewed:  yes  Care Discussed with Consultants/Other Providers:

## 2018-12-12 NOTE — PROGRESS NOTE ADULT - SUBJECTIVE AND OBJECTIVE BOX
Patient seen and examined. He feels better- no SIERRA. No CP, palpitations, dizziness.   Diuresing well. Denies dark stool or visible blood in stool.       Medications:  buMETAnide Infusion 1 mG/Hr IV Continuous <Continuous>  calcium acetate 667 milliGRAM(s) Oral three times a day with meals  carvedilol 6.25 milliGRAM(s) Oral every 12 hours  dextrose 40% Gel 15 Gram(s) Oral once PRN  dextrose 5%. 1000 milliLiter(s) IV Continuous <Continuous>  dextrose 50% Injectable 12.5 Gram(s) IV Push once  dextrose 50% Injectable 25 Gram(s) IV Push once  dextrose 50% Injectable 25 Gram(s) IV Push once  glucagon  Injectable 1 milliGRAM(s) IntraMuscular once PRN  hydrALAZINE 100 milliGRAM(s) Oral three times a day  insulin lispro (HumaLOG) corrective regimen sliding scale   SubCutaneous three times a day before meals  isosorbide   dinitrate Tablet (ISORDIL) 40 milliGRAM(s) Oral three times a day  pantoprazole Infusion 8 mG/Hr IV Continuous <Continuous>      Vitals:  Vital Signs Last 24 Hrs  T(C): 36.6 (12 Dec 2018 09:49), Max: 37.1 (12 Dec 2018 01:49)  T(F): 97.8 (12 Dec 2018 09:49), Max: 98.8 (12 Dec 2018 01:49)  HR: 64 (12 Dec 2018 09:49) (62 - 72)  BP: 122/66 (12 Dec 2018 09:49) (119/62 - 134/76)  BP(mean): --  RR: 18 (12 Dec 2018 09:49) (18 - 19)  SpO2: 100% (12 Dec 2018 09:49) (98% - 100%)    Daily     Daily Weight in k.1 (11 Dec 2018 12:30)    I&O's Detail    11 Dec 2018 07:01  -  12 Dec 2018 07:00  --------------------------------------------------------  IN:    Oral Fluid: 900 mL  Total IN: 900 mL    OUT:    Voided: 2825 mL  Total OUT: 2825 mL    Total NET: -1925 mL      12 Dec 2018 07:01  -  12 Dec 2018 11:40  --------------------------------------------------------  IN:  Total IN: 0 mL    OUT:    Voided: 900 mL  Total OUT: 900 mL    Total NET: -900 mL          Physical Exam:     General: No distress. Comfortable.  HEENT: EOM intact.  Neck: Neck supple. JVP about 12 cm today  Chest: Clear to auscultation bilaterally  CV: Normal S1 and S2. No murmurs, rub, or gallops. Radial pulses normal. No LE edema b/l, warm b/l.   Abdomen: Soft, non-distended, non-tender  Skin: No rashes or skin breakdown  Neurology: Alert and oriented times three. Sensation intact  Psych: Affect normal    Labs:                        7.2    5.15  )-----------( 99       ( 12 Dec 2018 05:30 )             22.1     12-12    139  |  99  |  95<H>  ----------------------------<  100<H>  3.9   |  22  |  5.86<H>    Ca    8.0<L>      12 Dec 2018 05:30  Phos  6.6     12-10  Mg     1.9     12      < from: Transthoracic Echocardiogram (18 @ 08:47) >  DIMENSIONS:  Dimensions:     Normal Values:  LA:     4.2 cm    2.0 - 4.0 cm  Ao:     3.2 cm    2.0 - 3.8 cm  SEPTUM: 0.8 cm    0.6 - 1.2 cm  PWT:    0.8 cm    0.6 - 1.1 cm  LVIDd:  7.0 cm    3.0 - 5.6 cm  LVIDs:  6.2 cm    1.8 - 4.0 cm  Derived Variables:  LVMI: 134 g/m2  RWT: 0.22  Fractional short: 11 %  Ejection Fraction (Dionicioicholtz): 24 %  ------------------------------------------------------------------------  OBSERVATIONS:  Mitral Valve: Mitral annular calcification, otherwise  normal mitral valve. Severe mitral regurgitation with an  eccentric, posteriorly directed jet.  Aortic Root: Normal aortic root.  Aortic Valve: Calcified trileaflet aortic valve with  decreased opening.  Left Atrium: Severely dilated left atrium.  LA volume index  = 52 cc/m2.  Left Ventricle: Severe global left ventricular systolic  dysfunction. Severe left ventricular enlargement. (DT:106  ms).  Right Heart: Normal right atrium. Right ventricular  enlargement with decreased right ventricular systolic  function. Normal tricuspid valve.  Mild-moderate tricuspid  regurgitation. Normal pulmonic valve. Minimal pulmonic  regurgitation.  Pericardium/PleuraSmall pericardial effusion superior to  the right atrium.  Hemodynamic: Estimated right ventricular systolic pressure  equals 71 mm Hg, assuming right atrial pressure equals 10  mm Hg, consistent with severepulmonary hypertension.    < end of copied text >

## 2018-12-12 NOTE — PROGRESS NOTE ADULT - ATTENDING COMMENTS
As above.    Impression:    #1.  Resolved melena.  #2.  Blood loss anemia, stable  #3.  CHF, undergoing diuresis.    Recommendations:    #1.  May advance diet to solid food  #2.  Plan nonurgent EGD on Friday 12/14/18  #3.  Continue diuresis per cardiology.

## 2018-12-12 NOTE — PROGRESS NOTE ADULT - ASSESSMENT
Mr. Lorenzo is a 54 yo M with hx of chronic systolic heart failure/HFrEF (28%), CAD c/b prior MI w/ ischemic cardiomyopathy s/p PCI, severe MR, CKD stage V, DM2, HTN, and HLD admitted for pre-op CHF optimization for upcoming AVF placement, but found to have progressive anemia with concern for upper GI bleed given reports of black stools.

## 2018-12-12 NOTE — PROGRESS NOTE ADULT - PROBLEM SELECTOR PLAN 1
Patient with SMITA on CKD in setting of fluid overload. On review of previous labs, baseline Scr. appears to between 4.7-5.4. Scr.  on admission(12/10/18) was 5.78 which has increased to 6.03 on 12/11/18. Latest Scr. is stable at 5.86 today. Patient is non oliguric and is on IV bumex drip. Patient with hemodynamically mediated SMITA from volume overload? Continue with IV diuretics. Monitor Scr, I/O, and electrolytes. No acute indication for HD at this time. Avoid NSAIDs, RCAs, and other nephrotoxins.

## 2018-12-12 NOTE — PROGRESS NOTE ADULT - PROBLEM SELECTOR PLAN 2
Patient with progressive decline in Hb/Hct. Likely multifactorial. However, given reports of black stool, concern for acute blood loss 2/2 GI bleed. Studies consistent with iron deficiency (transferrin saturation ~10%) and likely component of anemia due progressive renal disease. S/p 1 unit of pRBC on 12/11 with appropriate response, but Hb now 7.2 from 8.0. However, all blood lines are down on AM labs  - repeat CBC  - will consider transfusing another pRBC today pending repeat CBC results; maintain Hb 8 or above  - monitor blood counts  - GI consulted, recs reviewed and appreciated. Plan for EGD possibly tomorrow if pt off Bumex gtt  - maintain clear liquid diet for now  - continue pantoprazole gtt

## 2018-12-12 NOTE — PROGRESS NOTE ADULT - PROBLEM SELECTOR PLAN 3
Occult positive.   Would hold off on EGD till he is euvolemic, unless emergent. D/w tele PA.   S/p blood transfusion yesterday.  He should be optimized by Thursday for EGD.

## 2018-12-13 LAB
BUN SERPL-MCNC: 103 MG/DL — HIGH (ref 7–23)
CALCIUM SERPL-MCNC: 8.1 MG/DL — LOW (ref 8.4–10.5)
CHLORIDE SERPL-SCNC: 97 MMOL/L — LOW (ref 98–107)
CO2 SERPL-SCNC: 24 MMOL/L — SIGNIFICANT CHANGE UP (ref 22–31)
CREAT SERPL-MCNC: 6.38 MG/DL — HIGH (ref 0.5–1.3)
GLUCOSE BLDC GLUCOMTR-MCNC: 108 MG/DL — HIGH (ref 70–99)
GLUCOSE BLDC GLUCOMTR-MCNC: 118 MG/DL — HIGH (ref 70–99)
GLUCOSE BLDC GLUCOMTR-MCNC: 146 MG/DL — HIGH (ref 70–99)
GLUCOSE BLDC GLUCOMTR-MCNC: 176 MG/DL — HIGH (ref 70–99)
GLUCOSE SERPL-MCNC: 115 MG/DL — HIGH (ref 70–99)
HCT VFR BLD CALC: 27 % — LOW (ref 39–50)
HGB BLD-MCNC: 8.7 G/DL — LOW (ref 13–17)
MAGNESIUM SERPL-MCNC: 2 MG/DL — SIGNIFICANT CHANGE UP (ref 1.6–2.6)
MCHC RBC-ENTMCNC: 28.9 PG — SIGNIFICANT CHANGE UP (ref 27–34)
MCHC RBC-ENTMCNC: 32.2 % — SIGNIFICANT CHANGE UP (ref 32–36)
MCV RBC AUTO: 89.7 FL — SIGNIFICANT CHANGE UP (ref 80–100)
NRBC # FLD: 0 — SIGNIFICANT CHANGE UP
PLATELET # BLD AUTO: 134 K/UL — LOW (ref 150–400)
PMV BLD: 12.4 FL — SIGNIFICANT CHANGE UP (ref 7–13)
POTASSIUM SERPL-MCNC: 4.2 MMOL/L — SIGNIFICANT CHANGE UP (ref 3.5–5.3)
POTASSIUM SERPL-SCNC: 4.2 MMOL/L — SIGNIFICANT CHANGE UP (ref 3.5–5.3)
RBC # BLD: 3.01 M/UL — LOW (ref 4.2–5.8)
RBC # FLD: 15.3 % — HIGH (ref 10.3–14.5)
SODIUM SERPL-SCNC: 140 MMOL/L — SIGNIFICANT CHANGE UP (ref 135–145)
WBC # BLD: 5.15 K/UL — SIGNIFICANT CHANGE UP (ref 3.8–10.5)
WBC # FLD AUTO: 5.15 K/UL — SIGNIFICANT CHANGE UP (ref 3.8–10.5)

## 2018-12-13 PROCEDURE — 99233 SBSQ HOSP IP/OBS HIGH 50: CPT

## 2018-12-13 PROCEDURE — 99232 SBSQ HOSP IP/OBS MODERATE 35: CPT | Mod: GC

## 2018-12-13 PROCEDURE — 99233 SBSQ HOSP IP/OBS HIGH 50: CPT | Mod: GC

## 2018-12-13 RX ORDER — BUMETANIDE 0.25 MG/ML
1 INJECTION INTRAMUSCULAR; INTRAVENOUS EVERY 12 HOURS
Qty: 0 | Refills: 0 | Status: DISCONTINUED | OUTPATIENT
Start: 2018-12-13 | End: 2018-12-13

## 2018-12-13 RX ORDER — BUMETANIDE 0.25 MG/ML
4 INJECTION INTRAMUSCULAR; INTRAVENOUS EVERY 12 HOURS
Qty: 0 | Refills: 0 | Status: DISCONTINUED | OUTPATIENT
Start: 2018-12-13 | End: 2018-12-14

## 2018-12-13 RX ADMIN — Medication 100 MILLIGRAM(S): at 13:05

## 2018-12-13 RX ADMIN — PANTOPRAZOLE SODIUM 10 MG/HR: 20 TABLET, DELAYED RELEASE ORAL at 06:27

## 2018-12-13 RX ADMIN — BUMETANIDE 5 MG/HR: 0.25 INJECTION INTRAMUSCULAR; INTRAVENOUS at 06:28

## 2018-12-13 RX ADMIN — ISOSORBIDE DINITRATE 40 MILLIGRAM(S): 5 TABLET ORAL at 22:25

## 2018-12-13 RX ADMIN — ISOSORBIDE DINITRATE 40 MILLIGRAM(S): 5 TABLET ORAL at 13:05

## 2018-12-13 RX ADMIN — CARVEDILOL PHOSPHATE 6.25 MILLIGRAM(S): 80 CAPSULE, EXTENDED RELEASE ORAL at 18:25

## 2018-12-13 RX ADMIN — ISOSORBIDE DINITRATE 40 MILLIGRAM(S): 5 TABLET ORAL at 06:27

## 2018-12-13 RX ADMIN — Medication 100 MILLIGRAM(S): at 06:27

## 2018-12-13 RX ADMIN — Medication 100 MILLIGRAM(S): at 22:25

## 2018-12-13 RX ADMIN — Medication 667 MILLIGRAM(S): at 13:05

## 2018-12-13 RX ADMIN — Medication 667 MILLIGRAM(S): at 10:02

## 2018-12-13 RX ADMIN — BUMETANIDE 4 MILLIGRAM(S): 0.25 INJECTION INTRAMUSCULAR; INTRAVENOUS at 18:25

## 2018-12-13 RX ADMIN — Medication 667 MILLIGRAM(S): at 18:25

## 2018-12-13 RX ADMIN — CARVEDILOL PHOSPHATE 6.25 MILLIGRAM(S): 80 CAPSULE, EXTENDED RELEASE ORAL at 06:27

## 2018-12-13 NOTE — PROGRESS NOTE ADULT - ASSESSMENT
53 year old M w/ h/o IDDM c/b neuropathy (A1c 6.2 8/8/18) , HTN, CAD (s/p stent in [place] in 2016, unknown coronary anatomy), HFrEF (EF 25%, LVEDD 6.6 cm), severe MR (functional), stage V CKD (b/l Cr 4-5) was sent in to ED to be evaluated for volume overload in setting of CKD stage V.  Patient's nephrologist is Dr. Giovanny Pavon and had plans for fistula with Dr. Gonzalez 10/31 per record. Prior to coming to ED diuretics had been adjusted to Bumex 4 mg po BID with metolazone (dose unclear) daily (done for 2 days prior to admission). Currently endorses fatigue/ SIERRA with ambulation after 1/2 blocks; also reports dyspnea on exertion with climbing stairs.  Uses 1 pillow at night. States w/ addition of metolazone and increase in Bumex he did void a significant amount. He is requesting to only be evaluated by Dr. Enio Pavon. H/H lower than prior admission, occult positive, GI consulted.

## 2018-12-13 NOTE — PROGRESS NOTE ADULT - PROBLEM SELECTOR PLAN 2
Patient with progressive decline in Hb/Hct. Likely multifactorial. However, given reports of black stool, concern for acute blood loss 2/2 GI bleed. Studies consistent with iron deficiency (transferrin saturation ~10%) and likely component of anemia due progressive renal disease. S/p 1 unit of pRBC on 12/11 with appropriate response, but Hb now 7.2 from 8.0. However, all blood lines are down on AM labs  - repeat CBC  - will consider transfusing another pRBC today pending repeat CBC results; maintain Hb 8 or above  - monitor blood counts  - GI consulted, recs reviewed and appreciated. Plan for EGD possibly tomorrow if pt off Bumex gtt  - maintain clear liquid diet for now  - continue pantoprazole gtt Patient with progressive decline in Hb/Hct. Likely multifactorial. However, given reports of black stool, concern for acute blood loss 2/2 GI bleed. Studies consistent with iron deficiency (transferrin saturation ~10%) and likely component of anemia due progressive renal disease. S/p 1 unit of pRBC on 12/11 with appropriate response. Hb/Hct stable.  - maintain Hb 8 or above  - monitor blood counts  - GI consulted, recs reviewed and appreciated. Plan for EGD tomorrow. NPO at midnight  - continue pantoprazole gtt

## 2018-12-13 NOTE — PROGRESS NOTE ADULT - ASSESSMENT
Impression:  1) Melena with acute drop in hemoglobin- -Differential diagnosis includes PUD, erosive gastropathy/esophagitis,  angiectasia, gastric cancer (lymphoma), Dieulafoy's lesion  2) CHF- Currently on Bumex gtt  3) CKD  4) Type 2 DM    Recommendations:  -Will plan for EGD once patient is medically optimized and off Bumex gtt, likely Friday  -Monitor BM  -Serial CBC, transfuse PRN  -Rest of care per primary team Impression:  1) Melena with acute drop in hemoglobin- -Differential diagnosis includes PUD, erosive gastropathy/esophagitis,  angiectasia, gastric cancer (lymphoma), Dieulafoy's lesion  2) CHF- Currently on Bumex gtt  3) CKD  4) Type 2 DM    Recommendations:  -Will plan for EGD once patient is medically optimized and off Bumex gtt, likely Friday  -Monitor BM  -Serial CBC, transfuse PRN  -Rest of care per primary team  -NPO Midnight

## 2018-12-13 NOTE — DIETITIAN INITIAL EVALUATION ADULT. - ENERGY NEEDS
Ht: 65 inches Wt: 171 pounds  BMI: 28.4 kg/m2 IBW:  130 pounds (+/-10%) %IBW: 131%    Edema: + 1 edema- dependent.  Skin: intact, no pressure injuries noted

## 2018-12-13 NOTE — PROGRESS NOTE ADULT - SUBJECTIVE AND OBJECTIVE BOX
Patient seen and examined. He is feeling a lot better s/o decrease in weight. No SIERRA, orthopnea, PND. No CP, palpitations.         Medications:  buMETAnide 4 milliGRAM(s) Oral every 12 hours  calcium acetate 667 milliGRAM(s) Oral three times a day with meals  carvedilol 6.25 milliGRAM(s) Oral every 12 hours  dextrose 40% Gel 15 Gram(s) Oral once PRN  dextrose 5%. 1000 milliLiter(s) IV Continuous <Continuous>  dextrose 50% Injectable 12.5 Gram(s) IV Push once  dextrose 50% Injectable 25 Gram(s) IV Push once  dextrose 50% Injectable 25 Gram(s) IV Push once  glucagon  Injectable 1 milliGRAM(s) IntraMuscular once PRN  hydrALAZINE 100 milliGRAM(s) Oral three times a day  insulin lispro (HumaLOG) corrective regimen sliding scale   SubCutaneous three times a day before meals  isosorbide   dinitrate Tablet (ISORDIL) 40 milliGRAM(s) Oral three times a day  pantoprazole Infusion 8 mG/Hr IV Continuous <Continuous>      Vitals:  Vital Signs Last 24 Hrs  T(C): 36.6 (13 Dec 2018 12:45), Max: 36.8 (12 Dec 2018 21:03)  T(F): 97.8 (13 Dec 2018 12:45), Max: 98.2 (12 Dec 2018 21:03)  HR: 65 (13 Dec 2018 12:45) (64 - 70)  BP: 137/65 (13 Dec 2018 12:45) (112/56 - 137/65)  BP(mean): --  RR: 18 (13 Dec 2018 12:45) (18 - 18)  SpO2: 100% (13 Dec 2018 12:45) (99% - 100%)    Daily     Daily Weight in k.9 (13 Dec 2018 11:19)    I&O's Detail    12 Dec 2018 07:01  -  13 Dec 2018 07:00  --------------------------------------------------------  IN:  Total IN: 0 mL    OUT:    Voided: 3500 mL  Total OUT: 3500 mL    Total NET: -3500 mL      Physical Exam:     General: No distress. Comfortable.  HEENT: EOM intact.  Neck: Neck supple. JVP about 10 cm.  No masses  Chest: Clear to auscultation bilaterally  CV: Normal S1 and S2. No murmurs, rub, or gallops. Radial pulses normal. No LE edema b/l, warm b/l.   Abdomen: Soft, non-distended, non-tender  Skin: No rashes or skin breakdown  Neurology: Alert and oriented times three. Sensation intact  Psych: Affect normal    Labs:                        8.7    5.15  )-----------( 134      ( 13 Dec 2018 05:40 )             27.0     12-13    140  |  97<L>  |  103<H>  ----------------------------<  115<H>  4.2   |  24  |  6.38<H>    Ca    8.1<L>      13 Dec 2018 05:40  Mg     2.0           < from: Transthoracic Echocardiogram (18 @ 08:47) >  DIMENSIONS:  Dimensions:     Normal Values:  LA:     4.2 cm    2.0 - 4.0 cm  Ao:     3.2 cm    2.0 - 3.8 cm  SEPTUM: 0.8 cm    0.6 - 1.2 cm  PWT:    0.8 cm    0.6 - 1.1 cm  LVIDd:  7.0 cm    3.0 - 5.6 cm  LVIDs:  6.2 cm    1.8 - 4.0 cm  Derived Variables:  LVMI: 134 g/m2  RWT: 0.22  Fractional short: 11 %  Ejection Fraction (Teicholtz): 24 %  ------------------------------------------------------------------------  OBSERVATIONS:  Mitral Valve: Mitral annular calcification, otherwise  normal mitral valve. Severe mitral regurgitation with an  eccentric, posteriorly directed jet.  Aortic Root: Normal aortic root.  Aortic Valve: Calcified trileaflet aortic valve with  decreased opening.  Left Atrium: Severely dilated left atrium.  LA volume index  = 52 cc/m2.  Left Ventricle: Severe global left ventricular systolic  dysfunction. Severe left ventricular enlargement. (DT:106  ms).  Right Heart: Normal right atrium. Right ventricular  enlargement with decreased right ventricular systolic  function. Normal tricuspid valve.  Mild-moderate tricuspid  regurgitation. Normal pulmonic valve. Minimal pulmonic  regurgitation.  Pericardium/PleuraSmall pericardial effusion superior to  the right atrium.  Hemodynamic: Estimated right ventricular systolic pressure  equals 71 mm Hg, assuming right atrial pressure equals 10  mm Hg, consistent with severepulmonary hypertension.    < end of copied text >

## 2018-12-13 NOTE — DIETITIAN INITIAL EVALUATION ADULT. - PERTINENT LABORATORY DATA
12-13 Na140 mmol/L Glu 115 mg/dL<H> K+ 4.2 mmol/L Cr  6.38 mg/dL<H>  mg/dL<H> 12-10 Phos 6.6 mg/dL<H> 12-10 Alb 3.5 g/dL 12-04 [id number] 5.7 %<H>. POCT: 118, 189, 110, 125 mg/dL

## 2018-12-13 NOTE — PROGRESS NOTE ADULT - ASSESSMENT
Mr. Lorenzo is a 52 yo M with hx of chronic systolic heart failure/HFrEF (28%), CAD c/b prior MI w/ ischemic cardiomyopathy s/p PCI, severe MR, CKD stage V, DM2, HTN, and HLD admitted for pre-op CHF optimization for upcoming AVF placement, but found to have progressive anemia with concern for upper GI bleed given reports of black stools. Mr. Lorenzo is a 52 yo M with hx of chronic systolic heart failure/HFrEF (28%), CAD c/b prior MI w/ ischemic cardiomyopathy s/p PCI, severe MR, CKD stage V, DM2, HTN, and HLD admitted for pre-op CHF optimization for upcoming AVF placement, but found to have progressive anemia with concern for upper GI bleed given reports of black stools.  Hb/Hct now stable with no signs of ongoing bleeding currently

## 2018-12-13 NOTE — DIETITIAN INITIAL EVALUATION ADULT. - ORAL INTAKE PTA
good/Patient reported good appetite- reports he consumes small frequent meals throughout the day. Avoids Dairy.

## 2018-12-13 NOTE — DIETITIAN INITIAL EVALUATION ADULT. - OTHER INFO
Patient seen for nutrition consult for Nutrition Services- Assessment education. Per chart: Patient is a 53 year old male with history of CHF, CAD, DM, HTN, CKD. Patient reported having a fair appetite- does not like the food in house, reported his family will bring food from home for him. Patient denies any nausea/vomiting/diarrhea/constipation or difficulty chewing and swallowing. Patient reports no food allergies or intolerances.- however does not eat dairy. Reported about year ago weight was 270 pounds and has been losing weight:  Usual weight for the past 6 months 165 to 170 pounds. Current weight: 171 pounds. Patient noted with +1 edema.

## 2018-12-13 NOTE — PROGRESS NOTE ADULT - SUBJECTIVE AND OBJECTIVE BOX
Patient is a 53y old  Male who presents with a chief complaint of SOB (13 Dec 2018 16:52)    SUBJECTIVE / OVERNIGHT EVENTS:  Patient seen and examined this afternoon. Patient ate a little bit because he does not like the hospital food. He denies reports of blood in stool. Patient is without chest pain or SOB. He otherwise feels. well    MEDICATIONS  (STANDING):  buMETAnide 4 milliGRAM(s) Oral every 12 hours  calcium acetate 667 milliGRAM(s) Oral three times a day with meals  carvedilol 6.25 milliGRAM(s) Oral every 12 hours  dextrose 5%. 1000 milliLiter(s) (50 mL/Hr) IV Continuous <Continuous>  dextrose 50% Injectable 12.5 Gram(s) IV Push once  dextrose 50% Injectable 25 Gram(s) IV Push once  dextrose 50% Injectable 25 Gram(s) IV Push once  hydrALAZINE 100 milliGRAM(s) Oral three times a day  insulin lispro (HumaLOG) corrective regimen sliding scale   SubCutaneous three times a day before meals  isosorbide   dinitrate Tablet (ISORDIL) 40 milliGRAM(s) Oral three times a day  pantoprazole Infusion 8 mG/Hr (10 mL/Hr) IV Continuous <Continuous>    MEDICATIONS  (PRN):  dextrose 40% Gel 15 Gram(s) Oral once PRN Blood Glucose LESS THAN 70 milliGRAM(s)/deciliter  glucagon  Injectable 1 milliGRAM(s) IntraMuscular once PRN Glucose LESS THAN 70 milligrams/deciliter      Vital Signs Last 24 Hrs  T(C): 36.6 (13 Dec 2018 12:45), Max: 36.8 (12 Dec 2018 21:03)  T(F): 97.8 (13 Dec 2018 12:45), Max: 98.2 (12 Dec 2018 21:03)  HR: 65 (13 Dec 2018 12:45) (64 - 70)  BP: 137/65 (13 Dec 2018 12:45) (112/56 - 137/65)  BP(mean): --  RR: 18 (13 Dec 2018 12:45) (18 - 18)  SpO2: 100% (13 Dec 2018 12:45) (99% - 100%)  CAPILLARY BLOOD GLUCOSE      POCT Blood Glucose.: 146 mg/dL (13 Dec 2018 12:42)  POCT Blood Glucose.: 118 mg/dL (13 Dec 2018 09:04)  POCT Blood Glucose.: 189 mg/dL (12 Dec 2018 22:44)  POCT Blood Glucose.: 110 mg/dL (12 Dec 2018 18:15)    I&O's Summary    12 Dec 2018 07:01  -  13 Dec 2018 07:00  --------------------------------------------------------  IN: 0 mL / OUT: 3500 mL / NET: -3500 mL    13 Dec 2018 07:01  -  13 Dec 2018 17:06  --------------------------------------------------------  IN: 0 mL / OUT: 1000 mL / NET: -1000 mL          PHYSICAL EXAM  GENERAL: NAD, well-developed  HEAD:  Atraumatic, Normocephalic  EYES: EOMI, PERRLA, conjunctiva and sclera clear  NECK: Supple, No JVD  CHEST/LUNG: Clear to auscultation bilaterally; No wheeze  HEART: Regular rate and rhythm; No murmurs, rubs, or gallops  ABDOMEN: Soft, Nontender, Nondistended; Bowel sounds present  EXTREMITIES:  2+ Peripheral Pulses, No clubbing, cyanosis, or edema  PSYCH: AAOx3  SKIN: No rashes or lesions    LABS:                        8.7    5.15  )-----------( 134      ( 13 Dec 2018 05:40 )             27.0     12-13    140  |  97<L>  |  103<H>  ----------------------------<  115<H>  4.2   |  24  |  6.38<H>    Ca    8.1<L>      13 Dec 2018 05:40  Mg     2.0     12-13                RADIOLOGY & ADDITIONAL TESTS:    Imaging Personally Reviewed:  Consultant(s) Notes Reviewed:    Care Discussed with Consultants/Other Providers: Patient is a 53y old  Male who presents with a chief complaint of SOB (13 Dec 2018 16:52)    SUBJECTIVE / OVERNIGHT EVENTS:  Patient seen and examined this afternoon. Patient ate a little bit because he does not like the hospital food. He denies reports of blood in stool. Patient is without chest pain or SOB. He otherwise feels. well    MEDICATIONS  (STANDING):  buMETAnide 4 milliGRAM(s) Oral every 12 hours  calcium acetate 667 milliGRAM(s) Oral three times a day with meals  carvedilol 6.25 milliGRAM(s) Oral every 12 hours  dextrose 5%. 1000 milliLiter(s) (50 mL/Hr) IV Continuous <Continuous>  dextrose 50% Injectable 12.5 Gram(s) IV Push once  dextrose 50% Injectable 25 Gram(s) IV Push once  dextrose 50% Injectable 25 Gram(s) IV Push once  hydrALAZINE 100 milliGRAM(s) Oral three times a day  insulin lispro (HumaLOG) corrective regimen sliding scale   SubCutaneous three times a day before meals  isosorbide   dinitrate Tablet (ISORDIL) 40 milliGRAM(s) Oral three times a day  pantoprazole Infusion 8 mG/Hr (10 mL/Hr) IV Continuous <Continuous>    MEDICATIONS  (PRN):  dextrose 40% Gel 15 Gram(s) Oral once PRN Blood Glucose LESS THAN 70 milliGRAM(s)/deciliter  glucagon  Injectable 1 milliGRAM(s) IntraMuscular once PRN Glucose LESS THAN 70 milligrams/deciliter      Vital Signs Last 24 Hrs  T(C): 36.6 (13 Dec 2018 12:45), Max: 36.8 (12 Dec 2018 21:03)  T(F): 97.8 (13 Dec 2018 12:45), Max: 98.2 (12 Dec 2018 21:03)  HR: 65 (13 Dec 2018 12:45) (64 - 70)  BP: 137/65 (13 Dec 2018 12:45) (112/56 - 137/65)  BP(mean): --  RR: 18 (13 Dec 2018 12:45) (18 - 18)  SpO2: 100% (13 Dec 2018 12:45) (99% - 100%)  CAPILLARY BLOOD GLUCOSE      POCT Blood Glucose.: 146 mg/dL (13 Dec 2018 12:42)  POCT Blood Glucose.: 118 mg/dL (13 Dec 2018 09:04)  POCT Blood Glucose.: 189 mg/dL (12 Dec 2018 22:44)  POCT Blood Glucose.: 110 mg/dL (12 Dec 2018 18:15)    I&O's Summary    12 Dec 2018 07:01  -  13 Dec 2018 07:00  --------------------------------------------------------  IN: 0 mL / OUT: 3500 mL / NET: -3500 mL    13 Dec 2018 07:01  -  13 Dec 2018 17:06  --------------------------------------------------------  IN: 0 mL / OUT: 1000 mL / NET: -1000 mL          PHYSICAL EXAM  GENERAL: Well appearing in NAD, well-developed  CHEST/LUNG: Clear to auscultation bilaterally; No wheeze  HEART: Regular rate and rhythm; No murmurs, rubs, or gallops  ABDOMEN: Soft, Nontender, Nondistended; Bowel sounds present  EXTREMITIES:  2+ Peripheral Pulses, No clubbing, cyanosis, or edema  PSYCH: AAOx3      LABS:                        8.7    5.15  )-----------( 134      ( 13 Dec 2018 05:40 )             27.0     12-13    140  |  97<L>  |  103<H>  ----------------------------<  115<H>  4.2   |  24  |  6.38<H>    Ca    8.1<L>      13 Dec 2018 05:40  Mg     2.0     12-13        Consultant(s) Notes Reviewed:  Yes  Care Discussed with Consultants/Other Providers: Yes, Dr. Enio Pavon (cardiology)

## 2018-12-13 NOTE — PROGRESS NOTE ADULT - PROBLEM SELECTOR PLAN 1
Patient with SMITA on CKD in setting of fluid overload. On review of previous labs, baseline Scr. appears to between 4.7-5.4. Latest Scr. is stable at 6.38 today. Patient is non oliguric and is responsive to bumex. Patient with hemodynamically mediated SMITA from volume overload. Continue with IV diuretics. Monitor Scr, I/O, and electrolytes. No acute indication for HD at this time. Avoid NSAIDs, RCAs, and other nephrotoxins. Would call vascular consult for evaluation for AVF placement while patient is in  hospital. No need for permcath at this time.

## 2018-12-13 NOTE — PROGRESS NOTE ADULT - PROBLEM SELECTOR PLAN 1
Lost 5 lbs in 24 hrs, 171 lbs today, diuresing well. JVP about 10 cm today.  Will d/c Bumex gtt, started Bumex 4 mg po BID.  Continue hydral 100 mg po TID.   Continue isordil 40 mg po TID.  Continue Coreg 6.25 mg po BID  Strict I/O and daily standing weights.

## 2018-12-13 NOTE — DIETITIAN INITIAL EVALUATION ADULT. - NS AS NUTRI INTERV ED CONTENT
RDN reviewed DM + Heart healthy diet education; including, carb counting, label reading, meal planning, pre-prandial and post-prandial finger stick goals, and HbA1c goal. Patient was also made aware of the physiological implications of poor glycemic control. The Patient was provided with Heart Healthy diet education (low sodium, low cholesterol, "good fats" vs "bad fats," fiber, label reading, meal planning, and daily weight monitoring).

## 2018-12-13 NOTE — DIETITIAN INITIAL EVALUATION ADULT. - ADHERENCE
Follows an overall healthy balance diet. Monitors sodium intake and carbohydrate intake. Does not monitor blood sugar at this time (patient reported he does not have a glucometer, however is getting one). Patient reported he would monitor blood sugars twice a day- usual blood sugar range: [phone] mg/dL. 12/4: HBa1c: 6.7%

## 2018-12-13 NOTE — PROGRESS NOTE ADULT - PROBLEM SELECTOR PLAN 2
Patient with hypervolemic in setting of CHF and advanced renal failure. Patient appears to be improved volume wise on examination. Patient is non oliguric with UOP of 3.5 L over past 24 hours. Monitor daily weights and UOP.

## 2018-12-13 NOTE — PROGRESS NOTE ADULT - SUBJECTIVE AND OBJECTIVE BOX
[facility] DIVISION OF KIDNEY DISEASES AND HYPERTENSION -- FOLLOW UP NOTE  --------------------------------------------------------------------------------  HPI: 53 year old male with h/o CKD stage V, CHFrEF, DM, HTN, CAD was admitted for management of fluid overload. Nephrology was consulted due to history of CKD. Patient has history of CKD stage V from longstanding history of DM and HTN.  [facility] [place]Scr. was elevated at 1.5 on 4/4/16 and 1.6 on 4/6/16. Patient was hospitalized in [facility] from 2/10/18 to 2/14/18 during Scr was elevated at 5 on 2/10/18 which then decreased to 4.43 on 2/14/18. Patient was subsequently seen in [facility] renal clinic in March 2018 and has been following up with Dr. Giovanny Pavon since then. Last Scr. before admission was 5.55 on 12/4/18. Labs done during this admission showed Scr. of 5.74 on 12/10/18. Patient was started on IV bumex drip however stopped today.    Patient seen and examined. Patient notes improvement in symptoms and has increased urine output as well. Denies c/o CP, abdominal pain, nausea, vomiting, or loss of appetite.         PAST HISTORY  --------------------------------------------------------------------------------  No significant changes to PMH, PSH, FHx, SHx, unless otherwise noted    ALLERGIES & MEDICATIONS  --------------------------------------------------------------------------------  Allergies    No Known Allergies    Intolerances      Standing Inpatient Medications  buMETAnide 4 milliGRAM(s) Oral every 12 hours  calcium acetate 667 milliGRAM(s) Oral three times a day with meals  carvedilol 6.25 milliGRAM(s) Oral every 12 hours  dextrose 5%. 1000 milliLiter(s) IV Continuous <Continuous>  dextrose 50% Injectable 12.5 Gram(s) IV Push once  dextrose 50% Injectable 25 Gram(s) IV Push once  dextrose 50% Injectable 25 Gram(s) IV Push once  hydrALAZINE 100 milliGRAM(s) Oral three times a day  insulin lispro (HumaLOG) corrective regimen sliding scale   SubCutaneous three times a day before meals  isosorbide   dinitrate Tablet (ISORDIL) 40 milliGRAM(s) Oral three times a day  pantoprazole Infusion 8 mG/Hr IV Continuous <Continuous>    PRN Inpatient Medications  dextrose 40% Gel 15 Gram(s) Oral once PRN  glucagon  Injectable 1 milliGRAM(s) IntraMuscular once PRN      REVIEW OF SYSTEMS  --------------------------------------------------------------------------------  Gen: No weight changes, fatigue, fevers/chills, weakness  Skin: No rashes  Head/Eyes/Ears/Mouth: No headache; Normal hearing; Normal vision w/o blurriness; No sinus pain/discomfort, sore throat  Respiratory: No dyspnea, cough, wheezing, hemoptysis  CV: No chest pain, PND, orthopnea  GI: No abdominal pain, diarrhea, constipation, nausea, vomiting, melena, hematochezia  : No increased frequency, dysuria, hematuria, nocturia  MSK: No joint pain/swelling; no back pain; no edema  Neuro: No dizziness/lightheadedness, weakness, seizures, numbness, tingling  Heme: No easy bruising or bleeding  Endo: No heat/cold intolerance  Psych: No significant nervousness, anxiety, stress, depression    All other systems were reviewed and are negative, except as noted.    VITALS/PHYSICAL EXAM  --------------------------------------------------------------------------------  T(C): 36.6 (12-13-18 @ 12:45), Max: 36.8 (12-12-18 @ 21:03)  HR: 65 (12-13-18 @ 12:45) (64 - 70)  BP: 137/65 (12-13-18 @ 12:45) (112/56 - 137/65)  RR: 18 (12-13-18 @ 12:45) (18 - 18)  SpO2: 100% (12-13-18 @ 12:45) (99% - 100%)  Wt(kg): --    Weight (kg): 80 (12-12-18 @ 07:24)      12-12-18 @ 07:01  -  12-13-18 @ 07:00  --------------------------------------------------------  IN: 0 mL / OUT: 3500 mL / NET: -3500 mL    12-13-18 @ 07:01  -  12-13-18 @ 16:52  --------------------------------------------------------  IN: 0 mL / OUT: 1000 mL / NET: -1000 mL      Physical Exam:  	Gen: NAD, well-appearing  	HEENT: PERRL, supple neck, clear oropharynx  	Pulm: CTA B/L  	CV: RRR, S1S2; no rub  	Abd: +BS, soft, nontender/nondistended  	UE: Warm, intact strength; no edema; no asterixis  	LE: Warm,  intact strength; no edema  	Neuro: No focal deficits, intact gait  	Psych: Normal affect and mood  	Skin: Warm, without rashes  	Vascular access:  none    LABS/STUDIES  --------------------------------------------------------------------------------              8.7    5.15  >-----------<  134      [12-13-18 @ 05:40]              27.0     140  |  97  |  103  ----------------------------<  115      [12-13-18 @ 05:40]  4.2   |  24  |  6.38        Ca     8.1     [12-13-18 @ 05:40]      Mg     2.0     [12-13-18 @ 05:40]            Creatinine Trend:  SCr 6.38 [12-13 @ 05:40]  SCr 5.11 [12-12 @ 14:58]  SCr 5.86 [12-12 @ 05:30]  SCr 5.89 [12-11 @ 18:04]  SCr 6.03 [12-11 @ 06:30]        Iron 31, TIBC 302, %sat --      [12-11-18 @ 06:30]  HbA1c 5.7      [12-04-18 @ 16:00]  Lipid: chol 109, TG 64, HDL 48, LDL 53      [07-18-18 @ 20:46]

## 2018-12-13 NOTE — PROGRESS NOTE ADULT - PROBLEM SELECTOR PLAN 3
Patient missed prior appt scheduled for AVF on 11/21 as recommended by his cardiology. Pt is to follow-up with Dr. Gonzalez for AVF placement. Patient with noted hyperphosphatemia.  - calcium acetate 667mg TID with meals  - do not use left arm for blood draws or IV  - will f/u with vascular surgery to assess if pt to receive AVF on this admission Patient missed prior appt scheduled for AVF on 11/21 as recommended by his cardiology. Pt is to follow-up with Dr. Gonzalez for AVF placement. Patient with noted hyperphosphatemia.  - calcium acetate 667mg TID with meals  - do not use left arm for blood draws or IV  - will f/u with vascular surgery to assess if pt to receive AVF on this admission, which would be optimal

## 2018-12-13 NOTE — PROGRESS NOTE ADULT - SUBJECTIVE AND OBJECTIVE BOX
Chief Complaint:  Patient is a 53y old  Male who presents with a chief complaint of SOB (12 Dec 2018 14:58)      Interval Events: Pt had 1 brown BM, otherwise still on bumex gtt. Pt denies nausea, vomiting, abdominal pain.   ROS: All 12 point system except listed above were otherwise negative.    Allergies:  No Known Allergies        Hospital Medications:  buMETAnide Infusion 1 mG/Hr IV Continuous <Continuous>  calcium acetate 667 milliGRAM(s) Oral three times a day with meals  carvedilol 6.25 milliGRAM(s) Oral every 12 hours  dextrose 40% Gel 15 Gram(s) Oral once PRN  dextrose 5%. 1000 milliLiter(s) IV Continuous <Continuous>  dextrose 50% Injectable 12.5 Gram(s) IV Push once  dextrose 50% Injectable 25 Gram(s) IV Push once  dextrose 50% Injectable 25 Gram(s) IV Push once  glucagon  Injectable 1 milliGRAM(s) IntraMuscular once PRN  hydrALAZINE 100 milliGRAM(s) Oral three times a day  insulin lispro (HumaLOG) corrective regimen sliding scale   SubCutaneous three times a day before meals  isosorbide   dinitrate Tablet (ISORDIL) 40 milliGRAM(s) Oral three times a day  pantoprazole Infusion 8 mG/Hr IV Continuous <Continuous>      PMHX/PSHX:  Abnormal echocardiogram  CKD (chronic kidney disease), stage V  HFrEF (heart failure with reduced ejection fraction)  Congestive heart failure (CHF)  Myocardial infarction  Hyperlipidemia  CAD (coronary artery disease)  DM (diabetes mellitus)  HTN (hypertension)  Stented coronary artery  Arterial stent thrombosis      Family history:  Family history of diabetes mellitus (DM) (Sibling)  Family history of MI (myocardial infarction) (Sibling)    There is no family history of peptic ulcer disease, gastric cancer, colon polyps, colon cancer, celiac disease, biliary, hepatic, or pancreatic disease.  None of the female relatives have breast, uterine, or ovarian cancer.     PHYSICAL EXAM:   Vital Signs:  Vital Signs Last 24 Hrs  T(C): 36.7 (13 Dec 2018 05:00), Max: 36.8 (12 Dec 2018 21:03)  T(F): 98 (13 Dec 2018 05:00), Max: 98.2 (12 Dec 2018 21:03)  HR: 70 (13 Dec 2018 06:26) (64 - 70)  BP: 132/66 (13 Dec 2018 06:26) (112/56 - 140/82)  BP(mean): --  RR: 18 (13 Dec 2018 05:00) (18 - 18)  SpO2: 100% (13 Dec 2018 05:00) (99% - 100%)  Daily     Daily     GENERAL:  sitting in bed comfortably in NAD  CHEST:  CTAB  HEART:  RRR, no r/m/g  ABDOMEN:  obese, soft, nontender, +BS  EXTEREMITIES:  trace pitting edema  NEURO:  AAO x3    LABS:                        8.7    5.15  )-----------( 134      ( 13 Dec 2018 05:40 )             27.0     Mean Cell Volume: 89.7 fL (12-13-18 @ 05:40)    12-13    140  |  97<L>  |  103<H>  ----------------------------<  115<H>  4.2   |  24  |  6.38<H>    Ca    8.1<L>      13 Dec 2018 05:40  Mg     2.0     12-13                                    8.7    5.15  )-----------( 134      ( 13 Dec 2018 05:40 )             27.0                         8.2    5.07  )-----------( 120      ( 12 Dec 2018 14:58 )             25.2                         7.2    5.15  )-----------( 99       ( 12 Dec 2018 05:30 )             22.1                         8.0    5.33  )-----------( 114      ( 11 Dec 2018 18:04 )             25.0                         7.3    5.78  )-----------( 117      ( 11 Dec 2018 06:30 )             22.4     Imaging:

## 2018-12-13 NOTE — DIETITIAN INITIAL EVALUATION ADULT. - PERTINENT MEDS FT
Known watershed ischemia of the occiput from low flow state during admission 2/2 flu MEDICATIONS  (STANDING):  buMETAnide Infusion 1 mG/Hr (5 mL/Hr) IV Continuous <Continuous>  calcium acetate 667 milliGRAM(s) Oral three times a day with meals  carvedilol 6.25 milliGRAM(s) Oral every 12 hours  dextrose 5%. 1000 milliLiter(s) (50 mL/Hr) IV Continuous <Continuous>  dextrose 50% Injectable 12.5 Gram(s) IV Push once  dextrose 50% Injectable 25 Gram(s) IV Push once  dextrose 50% Injectable 25 Gram(s) IV Push once  hydrALAZINE 100 milliGRAM(s) Oral three times a day  insulin lispro (HumaLOG) corrective regimen sliding scale   SubCutaneous three times a day before meals  isosorbide   dinitrate Tablet (ISORDIL) 40 milliGRAM(s) Oral three times a day  pantoprazole Infusion 8 mG/Hr (10 mL/Hr) IV Continuous <Continuous>    MEDICATIONS  (PRN):  dextrose 40% Gel 15 Gram(s) Oral once PRN Blood Glucose LESS THAN 70 milliGRAM(s)/deciliter  glucagon  Injectable 1 milliGRAM(s) IntraMuscular once PRN Glucose LESS THAN 70 milligrams/deciliter

## 2018-12-13 NOTE — PROGRESS NOTE ADULT - PROBLEM SELECTOR PLAN 1
Increase in proBNP compared to prior readings. However, worsening renal function may be contributing to decreased excretion. Patient is followed by cardiology/HF team, Dr. Enio Pavon as an outpatient. Suspect dietary indiscretion contributing to decompensated HF as well. Transitioned to Bumex gtt inpatient with adequate symptomatic response.  - HF team following patient, recs reveiwed and appreciated  - continue Bumex gtt at 1mg/hr for now, plan to transition to oral Bumex tomorrow as per HF team  - monitor I/Os  - diet education  - daily weights  - BP at acceptable range, will continue carvedilol 6.25mg BID  - Hydralazine 100mg TID  - isosorbide dinitrate 40mg TID Transitioned to Bumex gtt inpatient with adequate symptomatic response.  - HF team following patient, recs reviewed and appreciated  - Bumex gtt at 1mg/hr discontinued, transition to oral Bumex 4mg q12h today  - monitor I/Os  - diet education  - daily weights  - BP at acceptable range, will continue carvedilol 6.25mg BID  - Hydralazine 100mg TID  - isosorbide dinitrate 40mg TID

## 2018-12-14 ENCOUNTER — TRANSCRIPTION ENCOUNTER (OUTPATIENT)
Age: 53
End: 2018-12-14

## 2018-12-14 ENCOUNTER — RESULT REVIEW (OUTPATIENT)
Age: 53
End: 2018-12-14

## 2018-12-14 ENCOUNTER — APPOINTMENT (OUTPATIENT)
Dept: CARDIOLOGY | Facility: CLINIC | Age: 53
End: 2018-12-14

## 2018-12-14 VITALS
DIASTOLIC BLOOD PRESSURE: 66 MMHG | RESPIRATION RATE: 18 BRPM | HEART RATE: 66 BPM | SYSTOLIC BLOOD PRESSURE: 126 MMHG | OXYGEN SATURATION: 97 %

## 2018-12-14 LAB
BUN SERPL-MCNC: 107 MG/DL — HIGH (ref 7–23)
CALCIUM SERPL-MCNC: 8.5 MG/DL — SIGNIFICANT CHANGE UP (ref 8.4–10.5)
CHLORIDE SERPL-SCNC: 93 MMOL/L — LOW (ref 98–107)
CO2 SERPL-SCNC: 24 MMOL/L — SIGNIFICANT CHANGE UP (ref 22–31)
CREAT SERPL-MCNC: 7.15 MG/DL — HIGH (ref 0.5–1.3)
GLUCOSE BLDC GLUCOMTR-MCNC: 108 MG/DL — HIGH (ref 70–99)
GLUCOSE BLDC GLUCOMTR-MCNC: 174 MG/DL — HIGH (ref 70–99)
GLUCOSE SERPL-MCNC: 99 MG/DL — SIGNIFICANT CHANGE UP (ref 70–99)
HCT VFR BLD CALC: 28.2 % — LOW (ref 39–50)
HGB BLD-MCNC: 9 G/DL — LOW (ref 13–17)
MAGNESIUM SERPL-MCNC: 2 MG/DL — SIGNIFICANT CHANGE UP (ref 1.6–2.6)
MCHC RBC-ENTMCNC: 28.5 PG — SIGNIFICANT CHANGE UP (ref 27–34)
MCHC RBC-ENTMCNC: 31.9 % — LOW (ref 32–36)
MCV RBC AUTO: 89.2 FL — SIGNIFICANT CHANGE UP (ref 80–100)
NRBC # FLD: 0 — SIGNIFICANT CHANGE UP
PLATELET # BLD AUTO: 145 K/UL — LOW (ref 150–400)
PMV BLD: 12 FL — SIGNIFICANT CHANGE UP (ref 7–13)
POTASSIUM SERPL-MCNC: 4.4 MMOL/L — SIGNIFICANT CHANGE UP (ref 3.5–5.3)
POTASSIUM SERPL-SCNC: 4.4 MMOL/L — SIGNIFICANT CHANGE UP (ref 3.5–5.3)
RBC # BLD: 3.16 M/UL — LOW (ref 4.2–5.8)
RBC # FLD: 14.9 % — HIGH (ref 10.3–14.5)
SODIUM SERPL-SCNC: 137 MMOL/L — SIGNIFICANT CHANGE UP (ref 135–145)
WBC # BLD: 5.71 K/UL — SIGNIFICANT CHANGE UP (ref 3.8–10.5)
WBC # FLD AUTO: 5.71 K/UL — SIGNIFICANT CHANGE UP (ref 3.8–10.5)

## 2018-12-14 PROCEDURE — 88305 TISSUE EXAM BY PATHOLOGIST: CPT | Mod: 26

## 2018-12-14 PROCEDURE — 88312 SPECIAL STAINS GROUP 1: CPT | Mod: 26

## 2018-12-14 PROCEDURE — 43239 EGD BIOPSY SINGLE/MULTIPLE: CPT | Mod: GC

## 2018-12-14 PROCEDURE — 99239 HOSP IP/OBS DSCHRG MGMT >30: CPT

## 2018-12-14 RX ORDER — ATORVASTATIN CALCIUM 80 MG/1
1 TABLET, FILM COATED ORAL
Qty: 0 | Refills: 0 | COMMUNITY

## 2018-12-14 RX ORDER — BUMETANIDE 0.25 MG/ML
2 INJECTION INTRAMUSCULAR; INTRAVENOUS
Qty: 120 | Refills: 0 | OUTPATIENT
Start: 2018-12-14 | End: 2019-01-12

## 2018-12-14 RX ORDER — ASPIRIN/CALCIUM CARB/MAGNESIUM 324 MG
1 TABLET ORAL
Qty: 30 | Refills: 0 | OUTPATIENT
Start: 2018-12-14 | End: 2019-01-12

## 2018-12-14 RX ORDER — ATORVASTATIN CALCIUM 80 MG/1
1 TABLET, FILM COATED ORAL
Qty: 30 | Refills: 0 | OUTPATIENT
Start: 2018-12-14 | End: 2019-01-12

## 2018-12-14 RX ORDER — BUMETANIDE 0.25 MG/ML
4 INJECTION INTRAMUSCULAR; INTRAVENOUS
Qty: 0 | Refills: 0 | COMMUNITY

## 2018-12-14 RX ORDER — ASPIRIN/CALCIUM CARB/MAGNESIUM 324 MG
1 TABLET ORAL
Qty: 0 | Refills: 0 | COMMUNITY

## 2018-12-14 RX ADMIN — CARVEDILOL PHOSPHATE 6.25 MILLIGRAM(S): 80 CAPSULE, EXTENDED RELEASE ORAL at 17:39

## 2018-12-14 RX ADMIN — ISOSORBIDE DINITRATE 40 MILLIGRAM(S): 5 TABLET ORAL at 15:09

## 2018-12-14 RX ADMIN — PANTOPRAZOLE SODIUM 10 MG/HR: 20 TABLET, DELAYED RELEASE ORAL at 08:31

## 2018-12-14 RX ADMIN — Medication 2: at 15:09

## 2018-12-14 RX ADMIN — Medication 100 MILLIGRAM(S): at 15:09

## 2018-12-14 RX ADMIN — BUMETANIDE 4 MILLIGRAM(S): 0.25 INJECTION INTRAMUSCULAR; INTRAVENOUS at 17:40

## 2018-12-14 RX ADMIN — Medication 667 MILLIGRAM(S): at 17:39

## 2018-12-14 NOTE — PROGRESS NOTE ADULT - PROBLEM SELECTOR PROBLEM 3
CKD (chronic kidney disease), stage V
Anemia, unspecified type
Anemia, unspecified type
CKD (chronic kidney disease), stage V
Anemia, unspecified type
Anemia, unspecified type

## 2018-12-14 NOTE — PROGRESS NOTE ADULT - PROBLEM SELECTOR PLAN 3
Patient missed prior appt scheduled for AVF on 11/21 as recommended by his cardiology. Pt is to follow-up with Dr. Gonzalez for AVF placement. Patient with noted hyperphosphatemia.  - calcium acetate 667mg TID with meals  - do not use left arm for blood draws or IV  - will f/u with vascular surgery as an outpatient because unable to do AVF during this admission due to unavailability in Dr. Gonzalez's schedule  - Pt scheduled to undergo AVF creation on 1/9/19 at 1:30pm (pt to [facility] at [facility] at 11:30am). Can call Yanni (surgery godinez at Carlos's office) at [phone]

## 2018-12-14 NOTE — DISCHARGE NOTE ADULT - SECONDARY DIAGNOSIS.
Anemia, unspecified type Type 2 diabetes mellitus without complication, unspecified whether long term insulin use Gastric ulcer CKD (chronic kidney disease), stage V Essential hypertension CAD (coronary artery disease)

## 2018-12-14 NOTE — PROGRESS NOTE ADULT - PROBLEM SELECTOR PROBLEM 4
Type 2 diabetes mellitus without complication, unspecified whether long term insulin use

## 2018-12-14 NOTE — PROGRESS NOTE ADULT - PROBLEM SELECTOR PLAN 5
Patient on aspirin and beta blocker at home  - resume ASA  - continue home regimen of carvedilol  - unclear reason why pt was not on a statin at home. Will need to clarify with pt's cardiologist.
Patient on aspirin and beta blocker at home  - ASA held in the setting of possible GI bleed. If Hb/Hct stable and pt not undergoing any procedures, will resume  - continue home regimen of carvedilol  - unclear reason why pt was not on a statin at home. Will need to clarify with pt's cardiologist.

## 2018-12-14 NOTE — PROGRESS NOTE ADULT - PROBLEM SELECTOR PLAN 1
Transitioned to Bumex gtt inpatient with adequate symptomatic response. Currently appears euvolemic  - HF team following patient, recs reviewed and appreciated  - now on oral Bumex 4mg q12h   - monitor I/Os  - diet education  - daily weights  - BP at acceptable range, will continue carvedilol 6.25mg BID  - Hydralazine 100mg TID  - isosorbide dinitrate 40mg TID  - pt to follow-up with HF team, Dr. Enio Pavon on 1/2/19. Appt time TBD by HF team

## 2018-12-14 NOTE — PROGRESS NOTE ADULT - SUBJECTIVE AND OBJECTIVE BOX
Patient is a 53y old  Male who presents with a chief complaint of SOB (13 Dec 2018 17:05)    SUBJECTIVE / OVERNIGHT EVENTS:  Patient seen and examined s/p EGD. Patient feels well. Patient without chest pain, nausea, vomiting, SOB, or abdominal pain.     MEDICATIONS  (STANDING):  buMETAnide 4 milliGRAM(s) Oral every 12 hours  calcium acetate 667 milliGRAM(s) Oral three times a day with meals  carvedilol 6.25 milliGRAM(s) Oral every 12 hours  dextrose 5%. 1000 milliLiter(s) (50 mL/Hr) IV Continuous <Continuous>  dextrose 50% Injectable 12.5 Gram(s) IV Push once  dextrose 50% Injectable 25 Gram(s) IV Push once  dextrose 50% Injectable 25 Gram(s) IV Push once  hydrALAZINE 100 milliGRAM(s) Oral three times a day  insulin lispro (HumaLOG) corrective regimen sliding scale   SubCutaneous three times a day before meals  isosorbide   dinitrate Tablet (ISORDIL) 40 milliGRAM(s) Oral three times a day  pantoprazole Infusion 8 mG/Hr (10 mL/Hr) IV Continuous <Continuous>    MEDICATIONS  (PRN):  dextrose 40% Gel 15 Gram(s) Oral once PRN Blood Glucose LESS THAN 70 milliGRAM(s)/deciliter  glucagon  Injectable 1 milliGRAM(s) IntraMuscular once PRN Glucose LESS THAN 70 milligrams/deciliter      Vital Signs Last 24 Hrs  T(C): 37.6 (14 Dec 2018 14:15), Max: 37.6 (14 Dec 2018 14:15)  T(F): 99.6 (14 Dec 2018 14:15), Max: 99.6 (14 Dec 2018 14:15)  HR: 71 (14 Dec 2018 14:15) (63 - 71)  BP: 137/75 (14 Dec 2018 14:15) (118/76 - 137/75)  BP(mean): --  RR: 18 (14 Dec 2018 14:15) (18 - 18)  SpO2: 98% (14 Dec 2018 14:15) (98% - 100%)      CAPILLARY BLOOD GLUCOSE  POCT Blood Glucose.: 174 mg/dL (14 Dec 2018 14:41)  POCT Blood Glucose.: 108 mg/dL (14 Dec 2018 08:49)  POCT Blood Glucose.: 176 mg/dL (13 Dec 2018 22:11)  POCT Blood Glucose.: 108 mg/dL (13 Dec 2018 18:01)    I&O's Summary    13 Dec 2018 07:01  -  14 Dec 2018 07:00  --------------------------------------------------------  IN: 0 mL / OUT: 1900 mL / NET: -1900 mL    14 Dec 2018 07:01  -  14 Dec 2018 14:58  --------------------------------------------------------  IN: 0 mL / OUT: 600 mL / NET: -600 mL          PHYSICAL EXAM  GENERAL: Well appearing in NAD, well-developed  CHEST/LUNG: Clear to auscultation bilaterally; No wheeze  HEART: Regular rate and rhythm; No murmurs, rubs, or gallops  ABDOMEN: Soft, Nontender, Nondistended; Bowel sounds present  EXTREMITIES:  2+ Peripheral Pulses, No clubbing, cyanosis, or edema  PSYCH: AAOx3      LABS:                        9.0    5.71  )-----------( 145      ( 14 Dec 2018 05:50 )             28.2     12-14    137  |  93<L>  |  107<H>  ----------------------------<  99  4.4   |  24  |  7.15<H>    Ca    8.5      14 Dec 2018 05:50  Mg     2.0     12-14      RADIOLOGY & ADDITIONAL TESTS:    UPPER ENDOSCOPY  Findings:       The Z-line was regular and was found 35 cm from the incisors.       The exam of the esophagus was otherwise normal.       A 1 cm non-bleeding cratered gastric ulcer of moderate severity with no        stigmata of bleeding was found on the incura. Biopsies were taken with a        cold forceps for histology.       A few localized, small non-bleeding erosions were found in the gastric        body. There were no stigmata of recent bleeding. Biopsies were taken        with a cold forceps for Helicobacter pylori testing.       The exam of the stomach was otherwise normal.       The duodenal bulb and 2nd part of the duodenum were normal.                                                                                   Impression:          - 1 cm clean based cratered gastric ulcer. Biopsied.                       - Non-bleeding erosive gastropathy. Biopsied.                       - Normal duodenal bulb and 2nd part of the duodenum.  Recommendation:      - Return patient to [facility] for [facility] care.                       - Resume previous diet.                       - Use Protonix (pantoprazole) 40 mg PO daily.                       - Await pathology results. Call [phone] for results                        if not obtained prior to hospital discharge.                       - Followup in GI clinic or GI office.      Imaging Personally Reviewed:  Consultant(s) Notes Reviewed:    Care Discussed with Consultants/Other Providers: Patient is a 53y old  Male who presents with a chief complaint of SOB (13 Dec 2018 17:05)    SUBJECTIVE / OVERNIGHT EVENTS:  Patient seen and examined s/p EGD. Patient feels well. Patient without chest pain, nausea, vomiting, SOB, or abdominal pain.     MEDICATIONS  (STANDING):  buMETAnide 4 milliGRAM(s) Oral every 12 hours  calcium acetate 667 milliGRAM(s) Oral three times a day with meals  carvedilol 6.25 milliGRAM(s) Oral every 12 hours  dextrose 5%. 1000 milliLiter(s) (50 mL/Hr) IV Continuous <Continuous>  dextrose 50% Injectable 12.5 Gram(s) IV Push once  dextrose 50% Injectable 25 Gram(s) IV Push once  dextrose 50% Injectable 25 Gram(s) IV Push once  hydrALAZINE 100 milliGRAM(s) Oral three times a day  insulin lispro (HumaLOG) corrective regimen sliding scale   SubCutaneous three times a day before meals  isosorbide   dinitrate Tablet (ISORDIL) 40 milliGRAM(s) Oral three times a day  pantoprazole Infusion 8 mG/Hr (10 mL/Hr) IV Continuous <Continuous>    MEDICATIONS  (PRN):  dextrose 40% Gel 15 Gram(s) Oral once PRN Blood Glucose LESS THAN 70 milliGRAM(s)/deciliter  glucagon  Injectable 1 milliGRAM(s) IntraMuscular once PRN Glucose LESS THAN 70 milligrams/deciliter      Vital Signs Last 24 Hrs  T(C): 37.6 (14 Dec 2018 14:15), Max: 37.6 (14 Dec 2018 14:15)  T(F): 99.6 (14 Dec 2018 14:15), Max: 99.6 (14 Dec 2018 14:15)  HR: 71 (14 Dec 2018 14:15) (63 - 71)  BP: 137/75 (14 Dec 2018 14:15) (118/76 - 137/75)  BP(mean): --  RR: 18 (14 Dec 2018 14:15) (18 - 18)  SpO2: 98% (14 Dec 2018 14:15) (98% - 100%)      CAPILLARY BLOOD GLUCOSE  POCT Blood Glucose.: 174 mg/dL (14 Dec 2018 14:41)  POCT Blood Glucose.: 108 mg/dL (14 Dec 2018 08:49)  POCT Blood Glucose.: 176 mg/dL (13 Dec 2018 22:11)  POCT Blood Glucose.: 108 mg/dL (13 Dec 2018 18:01)    I&O's Summary    13 Dec 2018 07:01  -  14 Dec 2018 07:00  --------------------------------------------------------  IN: 0 mL / OUT: 1900 mL / NET: -1900 mL    14 Dec 2018 07:01  -  14 Dec 2018 14:58  --------------------------------------------------------  IN: 0 mL / OUT: 600 mL / NET: -600 mL          PHYSICAL EXAM  GENERAL: Well appearing in NAD, well-developed  CHEST/LUNG: Clear to auscultation bilaterally; No wheeze  HEART: Regular rate and rhythm; No murmurs, rubs, or gallops  ABDOMEN: Soft, Nontender, Nondistended; Bowel sounds present  EXTREMITIES:  2+ Peripheral Pulses, No clubbing, cyanosis, or edema  PSYCH: AAOx3      LABS:                        9.0    5.71  )-----------( 145      ( 14 Dec 2018 05:50 )             28.2     12-14    137  |  93<L>  |  107<H>  ----------------------------<  99  4.4   |  24  |  7.15<H>    Ca    8.5      14 Dec 2018 05:50  Mg     2.0     12-14      RADIOLOGY & ADDITIONAL TESTS:    UPPER ENDOSCOPY  Findings:       The Z-line was regular and was found 35 cm from the incisors.       The exam of the esophagus was otherwise normal.       A 1 cm non-bleeding cratered gastric ulcer of moderate severity with no        stigmata of bleeding was found on the incura. Biopsies were taken with a        cold forceps for histology.       A few localized, small non-bleeding erosions were found in the gastric        body. There were no stigmata of recent bleeding. Biopsies were taken        with a cold forceps for Helicobacter pylori testing.       The exam of the stomach was otherwise normal.       The duodenal bulb and 2nd part of the duodenum were normal.                                                                                   Impression:    - 1 cm clean based cratered gastric ulcer. Biopsied.                       - Non-bleeding erosive gastropathy. Biopsied.                       - Normal duodenal bulb and 2nd part of the duodenum.  Recommendation:      - Return patient to [facility] for ongoing care.                       - Resume previous diet.                       - Use Protonix (pantoprazole) 40 mg PO daily.                       - Await pathology results.                        if not obtained prior to hospital discharge.                       - Followup in GI clinic or GI office.        Consultant(s) Notes Reviewed:  yes  Care Discussed with Consultants/Other Providers:

## 2018-12-14 NOTE — DISCHARGE NOTE ADULT - MEDICATION SUMMARY - MEDICATIONS TO TAKE
I will START or STAY ON the medications listed below when I get home from the hospital:    Aspirin Enteric Coated 81 mg oral delayed release tablet  -- 1 tab(s) by mouth once a day  -- Indication: For CAD (coronary artery disease)    atorvastatin 80 mg oral tablet  -- 1 tab(s) by mouth once a day  -- Indication: For CAD (coronary artery disease)    carvedilol 6.25 mg oral tablet  -- 1 tab(s) by mouth every 12 hours  -- Indication: For HTN    Bumex  -- 4 milligram(s) by mouth 2 times a day  -- Indication: For HTN    calcium acetate 667 mg oral tablet  -- 1 tab(s) by mouth 3 times a day (per pharmacy, last filled in 9/2018 for a 1 month supply, not refilled since)   -- Indication: For Supplement    hydrALAZINE 100 mg oral tablet  -- 1 tab(s) by mouth every 8 hours   -- It is very important that you take or use this exactly as directed.  Do not skip doses or discontinue unless directed by your doctor.  Some non-prescription drugs may aggravate your condition.  Read all labels carefully.  If a warning appears, check with your doctor before taking.    -- Indication: For HTN

## 2018-12-14 NOTE — PROGRESS NOTE ADULT - PROBLEM SELECTOR PROBLEM 2
Anemia, unspecified type
CKD (chronic kidney disease), stage V
Anemia, unspecified type
CKD (chronic kidney disease), stage V
Hypervolemia
Preventative health care
CKD (chronic kidney disease), stage V
CKD (chronic kidney disease), stage V

## 2018-12-14 NOTE — DISCHARGE NOTE ADULT - CARE PROVIDER_API CALL
Joe cooper  Heart Failure   1/2/19 at 1: 45pm   [address]  [facility] [address]  Phone: (   )    -  Fax: (   )    -

## 2018-12-14 NOTE — PROGRESS NOTE ADULT - PROBLEM SELECTOR PLAN 6
BP within appropriate range  - continue isordil, coreg and bumex as noted above    DISPO: Patient is medically stable to be discharged home today with close cardiology, renal and vascular surgery follow-up. Spent 50 min coordinating discharge plan, counseling pt on his condition and care, and speaking with his outpatient providers.
BP within appropriate range  - continue isordil, coreg and bumex as noted above

## 2018-12-14 NOTE — DISCHARGE NOTE ADULT - PROVIDER TOKENS
FREE:[LAST:[kenneth],FIRST:[Joe],PHONE:[(   )    -],FAX:[(   )    -],ADDRESS:[Heart Failure   1/2/19 at 1: 45pm   [phone] [place] [id number]]]

## 2018-12-14 NOTE — PROGRESS NOTE ADULT - SUBJECTIVE AND OBJECTIVE BOX
Patient seen and examined. He is s/p EGD, was confirmed to have a gastric ulcer. Currently he says he feels excellent.   Medications:  buMETAnide 4 milliGRAM(s) Oral every 12 hours  calcium acetate 667 milliGRAM(s) Oral three times a day with meals  carvedilol 6.25 milliGRAM(s) Oral every 12 hours  dextrose 40% Gel 15 Gram(s) Oral once PRN  dextrose 5%. 1000 milliLiter(s) IV Continuous <Continuous>  dextrose 50% Injectable 12.5 Gram(s) IV Push once  dextrose 50% Injectable 25 Gram(s) IV Push once  dextrose 50% Injectable 25 Gram(s) IV Push once  glucagon  Injectable 1 milliGRAM(s) IntraMuscular once PRN  hydrALAZINE 100 milliGRAM(s) Oral three times a day  insulin lispro (HumaLOG) corrective regimen sliding scale   SubCutaneous three times a day before meals  isosorbide   dinitrate Tablet (ISORDIL) 40 milliGRAM(s) Oral three times a day  pantoprazole Infusion 8 mG/Hr IV Continuous <Continuous>      Vitals:  Vital Signs Last 24 Hrs  T(C): 37.6 (14 Dec 2018 14:15), Max: 37.6 (14 Dec 2018 14:15)  T(F): 99.6 (14 Dec 2018 14:15), Max: 99.6 (14 Dec 2018 14:15)  HR: 71 (14 Dec 2018 14:15) (63 - 71)  BP: 137/75 (14 Dec 2018 14:15) (118/76 - 137/75)  BP(mean): --  RR: 18 (14 Dec 2018 14:15) (18 - 18)  SpO2: 98% (14 Dec 2018 14:15) (98% - 100%)    Daily     Daily Weight in k.6 (14 Dec 2018 06:45)    I&O's Detail    13 Dec 2018 07:01  -  14 Dec 2018 07:00  --------------------------------------------------------  IN:  Total IN: 0 mL    OUT:    Voided: 1900 mL  Total OUT: 1900 mL    Total NET: -1900 mL      14 Dec 2018 07:01  -  14 Dec 2018 15:06  --------------------------------------------------------  IN:  Total IN: 0 mL    OUT:    Voided: 600 mL  Total OUT: 600 mL    Total NET: -600 mL          Physical Exam:     General: No distress. Comfortable.  HEENT: EOM intact.  Neck: Neck supple. JVP not elevated. No masses  Chest: Clear to auscultation bilaterally  CV: Normal S1 and S2. No murmurs, rub, or gallops. Radial pulses normal.  Abdomen: Soft, non-distended, non-tender  Skin: No rashes or skin breakdown  Neurology: Alert and oriented times three. Sensation intact  Psych: Affect normal    Labs:                        9.0    5.71  )-----------( 145      ( 14 Dec 2018 05:50 )             28.2         137  |  93<L>  |  107<H>  ----------------------------<  99  4.4   |  24  |  7.15<H>    Ca    8.5      14 Dec 2018 05:50  Mg     2.0      Patient seen and examined. He is s/p EGD, was confirmed to have a gastric ulcer. Currently he says he feels excellent.   No acute SOB, orthopnea, PND, CP, palpitations, dizziness.     Medications:  buMETAnide 4 milliGRAM(s) Oral every 12 hours  calcium acetate 667 milliGRAM(s) Oral three times a day with meals  carvedilol 6.25 milliGRAM(s) Oral every 12 hours  dextrose 40% Gel 15 Gram(s) Oral once PRN  dextrose 5%. 1000 milliLiter(s) IV Continuous <Continuous>  dextrose 50% Injectable 12.5 Gram(s) IV Push once  dextrose 50% Injectable 25 Gram(s) IV Push once  dextrose 50% Injectable 25 Gram(s) IV Push once  glucagon  Injectable 1 milliGRAM(s) IntraMuscular once PRN  hydrALAZINE 100 milliGRAM(s) Oral three times a day  insulin lispro (HumaLOG) corrective regimen sliding scale   SubCutaneous three times a day before meals  isosorbide   dinitrate Tablet (ISORDIL) 40 milliGRAM(s) Oral three times a day  pantoprazole Infusion 8 mG/Hr IV Continuous <Continuous>      Vitals:  Vital Signs Last 24 Hrs  T(C): 37.6 (14 Dec 2018 14:15), Max: 37.6 (14 Dec 2018 14:15)  T(F): 99.6 (14 Dec 2018 14:15), Max: 99.6 (14 Dec 2018 14:15)  HR: 71 (14 Dec 2018 14:15) (63 - 71)  BP: 137/75 (14 Dec 2018 14:15) (118/76 - 137/75)  BP(mean): --  RR: 18 (14 Dec 2018 14:15) (18 - 18)  SpO2: 98% (14 Dec 2018 14:15) (98% - 100%)    Daily     Daily Weight in k.6 (14 Dec 2018 06:45)    I&O's Detail    13 Dec 2018 07:01  -  14 Dec 2018 07:00  --------------------------------------------------------  IN:  Total IN: 0 mL    OUT:    Voided: 1900 mL  Total OUT: 1900 mL    Total NET: -1900 mL      14 Dec 2018 07:01  -  14 Dec 2018 15:06  --------------------------------------------------------  IN:  Total IN: 0 mL    OUT:    Voided: 600 mL  Total OUT: 600 mL    Total NET: -600 mL          Physical Exam:     General: No distress. Comfortable.  HEENT: EOM intact.  Neck: Neck supple. JVP normal. No masses  Chest: Clear to auscultation bilaterally  CV: Normal S1 and S2. No murmurs, rub, or gallops. Radial pulses normal. No LE edema b/l.   Abdomen: Soft, non-distended, non-tender  Skin: No rashes or skin breakdown  Neurology: Alert and oriented times three. Sensation intact  Psych: Affect normal    Labs:                        9.0    5.71  )-----------( 145      ( 14 Dec 2018 05:50 )             28.2         137  |  93<L>  |  107<H>  ----------------------------<  99  4.4   |  24  |  7.15<H>    Ca    8.5      14 Dec 2018 05:50  Mg     2.0

## 2018-12-14 NOTE — DISCHARGE NOTE ADULT - CARE PLAN
Principal Discharge DX:	Acute on chronic systolic congestive heart failure  Goal:	Medication and dietary adherence; Stable weight without symptoms.  Assessment and plan of treatment:	Improved. Please take all of your medications as prescribed. Please follow-up with the heart failure team, Dr. Enio Pavon on 1/2/19  Secondary Diagnosis:	Anemia, unspecified type  Goal:	Hemoglobin level of 8 or above  Assessment and plan of treatment:	Blood tests revealed you have iron deficiency and that your anemia is also due to progressive renal disease. You received 1 unit of blood on 12/11 with appropriate response. Your hemoglobin in now stable. Please follow-up with your PMD and nephrologist regarding starting iron supplements.  Secondary Diagnosis:	Type 2 diabetes mellitus without complication, unspecified whether long term insulin use  Assessment and plan of treatment:	Your hemoglobin A1c is 5.7%. You do not need to take any medication for your diabetes but it is recommended you have a consistent carbohydrate diet and exercise daily as tolerated.  Secondary Diagnosis:	Gastric ulcer  Assessment and plan of treatment:	Your EGD revealed a 1cm gastric ulcer which was biopsied and non-bleeding gastric erosions. You are recommend to start taking pantoprazole 40mg daily to help decrease acid production in your stomach. You need to call [phone] for the results of your stomach biopsy and H. pylori testing.  Secondary Diagnosis:	CKD (chronic kidney disease), stage V  Assessment and plan of treatment:	. Pt is to follow-up with Dr. Gonzalez for AVF placement. Patient with noted hyperphosphatemia.  - calcium acetate 667mg TID with meals  - do not use left arm for blood draws or IV  - will f/u with vascular surgery as an outpatient because unable to do AVF during this admission due to unavailability in Dr. Gonzalez's schedule  - Pt scheduled to undergo AVF creation on 1/9/19 at 1:30pm (pt to arrive at [facility] at 11:30am). Can call Yanni (surgery godinez at [facility] office) at [phone].  Secondary Diagnosis:	Essential hypertension  Goal:	BP <140/90  Assessment and plan of treatment:	Monitor your blood pressure routinely. Continue to take your medications as prescribed.  Secondary Diagnosis:	CAD (coronary artery disease)  Assessment and plan of treatment:	Continue to take your medication as prescribed. Principal Discharge DX:	Acute on chronic systolic congestive heart failure  Goal:	Medication and dietary adherence; Stable weight without symptoms.  Assessment and plan of treatment:	Improved. Please take all of your medications as prescribed. Please follow-up with the heart failure team, Dr. Enio Pavon on 1/2/19 at 1:45pm  Secondary Diagnosis:	Anemia, unspecified type  Goal:	Hemoglobin level of 8 or above  Assessment and plan of treatment:	Blood tests revealed you have iron deficiency and that your anemia is also due to progressive renal disease. You received 1 unit of blood on 12/11 with appropriate response. Your hemoglobin in now stable. Please follow-up with your PMD and nephrologist regarding starting iron supplements.  Secondary Diagnosis:	Type 2 diabetes mellitus without complication, unspecified whether long term insulin use  Assessment and plan of treatment:	Your hemoglobin A1c is 5.7%. You do not need to take any medication for your diabetes but it is recommended you have a consistent carbohydrate diet and exercise daily as tolerated.  Secondary Diagnosis:	Gastric ulcer  Assessment and plan of treatment:	Your EGD revealed a 1cm gastric ulcer which was biopsied and non-bleeding gastric erosions. You are recommend to start taking pantoprazole 40mg daily to help decrease acid production in your stomach. You need to call [phone] for the results of your stomach biopsy and H. pylori testing.  Secondary Diagnosis:	CKD (chronic kidney disease), stage V  Assessment and plan of treatment:	. Pt is to follow-up with Dr. Gonzalez for AVF placement. Patient with noted hyperphosphatemia.  - calcium acetate 667mg TID with meals  - do not use left arm for blood draws or IV  - will f/u with vascular surgery as an outpatient because unable to do AVF during this admission due to unavailability in Dr. Gonzalez's schedule  - Pt scheduled to undergo AVF creation on 1/9/19 at 1:30pm (pt to arrive at [facility] at 11:30am). Can call Yanni (surgery godinez at [facility] office) at [phone].  Secondary Diagnosis:	Essential hypertension  Goal:	BP <140/90  Assessment and plan of treatment:	Monitor your blood pressure routinely. Continue to take your medications as prescribed.  Secondary Diagnosis:	CAD (coronary artery disease)  Assessment and plan of treatment:	Continue to take your medication as prescribed.

## 2018-12-14 NOTE — PROGRESS NOTE ADULT - ASSESSMENT
53 year old M w/ h/o IDDM c/b neuropathy (A1c 6.2 8/8/18) , HTN, CAD (s/p stent in [place] in 2016, unknown coronary anatomy), HFrEF (EF 25%, LVEDD 6.6 cm), severe MR (functional), stage V CKD (b/l Cr 4-5) was sent in to ED to be evaluated for volume overload in setting of CKD stage V.  Patient's nephrologist is Dr. Giovanny Pavon and had plans for fistula with Dr. Gonzalez 10/31 per record. Prior to coming to ED diuretics had been adjusted to Bumex 4 mg po BID with metolazone (dose unclear) daily (done for 2 days prior to admission). Currently endorses fatigue/ SIERRA with ambulation after 1/2 blocks; also reports dyspnea on exertion with climbing stairs.  Uses 1 pillow at night. States w/ addition of metolazone and increase in Bumex he did void a significant amount. He is requesting to only be evaluated by Dr. Enio Pavon. H/H lower than prior admission, occult positive, GI consulted.

## 2018-12-14 NOTE — PROGRESS NOTE ADULT - PROBLEM SELECTOR PLAN 2
Patient with progressive decline in Hb/Hct. Likely multifactorial. However, given reports of black stool, concern for acute blood loss 2/2 GI bleed. Studies consistent with iron deficiency (transferrin saturation ~10%) and likely component of anemia due progressive renal disease. S/p 1 unit of pRBC on 12/11 with appropriate response. Hb/Hct stable. EGD revealed 1cm gastric ulcer which was biopsied and non-bleeding gastric erosions.  - maintain Hb 8 or above  - monitor blood counts  - GI consulted, recs reviewed and appreciated. F/u gastric bx results and H. pylori testing. Call [phone] for results   - discontinue pantoprazole gtt; start pantoprazole 40mg daily

## 2018-12-14 NOTE — PROGRESS NOTE ADULT - PROBLEM SELECTOR PROBLEM 1
Acute on chronic systolic congestive heart failure
Genesis
SMITA (acute kidney injury)
Acute on chronic systolic congestive heart failure

## 2018-12-14 NOTE — DISCHARGE NOTE ADULT - PLAN OF CARE
Medication and dietary adherence; Stable weight without symptoms. Improved. Please take all of your medications as prescribed. Please follow-up with the heart failure team, Dr. Enio Pavon on 1/2/19 Hemoglobin level of 8 or above Blood tests revealed you have iron deficiency and that your anemia is also due to progressive renal disease. You received 1 unit of blood on 12/11 with appropriate response. Your hemoglobin in now stable. Please follow-up with your PMD and nephrologist regarding starting iron supplements. Your hemoglobin A1c is 5.7%. You do not need to take any medication for your diabetes but it is recommended you have a consistent carbohydrate diet and exercise daily as tolerated. Your EGD revealed a 1cm gastric ulcer which was biopsied and non-bleeding gastric erosions. You are recommend to start taking pantoprazole 40mg daily to help decrease acid production in your stomach. You need to call [phone] for the results of your stomach biopsy and H. pylori testing. . Pt is to follow-up with Dr. Gonzalez for AVF placement. Patient with noted hyperphosphatemia.  - calcium acetate 667mg TID with meals  - do not use left arm for blood draws or IV  - will f/u with vascular surgery as an outpatient because unable to do AVF during this admission due to unavailability in Dr. Gonzalez's schedule  - Pt scheduled to undergo AVF creation on 1/9/19 at 1:30pm (pt to [facility] at [facility] at 11:30am). Can call Yanni (surgery godinez at Carlos's office) at [phone]. BP <140/90 Monitor your blood pressure routinely. Continue to take your medications as prescribed. Continue to take your medication as prescribed. Improved. Please take all of your medications as prescribed. Please follow-up with the heart failure team, Dr. Enio Pavon on 1/2/19 at 1:45pm

## 2018-12-14 NOTE — PROGRESS NOTE ADULT - PROVIDER SPECIALTY LIST ADULT
Gastroenterology
Gastroenterology
Heart Failure
Hospitalist
Nephrology
Gastroenterology
Heart Failure

## 2018-12-14 NOTE — DISCHARGE NOTE ADULT - HOSPITAL COURSE
Mr. Lorenzo was admitted to the telemetry PA service for further management of decompensated heart failure in addition to work-up and management of progressive anemia, concerning for upper GI bleed given reports of black stools. The heart failure and GI teams were both consulted. Patient was started on a Bumex gtt with adequate diuresis as well as a pantoprazole gtt. He was eventually transitioned to Bumex 4mg PO BID by day 3 of hospitalization. Once optimized from a cardiac standpoint, he was recommended to undergo an EGD for further evaluation of worsening anemia. He did received 1 unit of pRBC on 12/11/18 with appropriate Hb/Hct response. Patient was also noted to be iron deficient based on iron studies. He underwent EGD on 12/14 which revealed 1cm gastric ulcer that was biopsied and non-bleeding gastric erosions. H. pylori testing was also done during EGD. Patient remained hemodynamically stable throughout his entire hospital course. Mr. Lorenzo was admitted to the telemetry PA service for further management of decompensated heart failure in addition to work-up and management of progressive anemia, concerning for upper GI bleed given reports of black stools. The heart failure and GI teams were both consulted. Patient was started on a Bumex gtt with adequate diuresis as well as a pantoprazole gtt. He was eventually transitioned to Bumex 4mg PO BID by day 3 of hospitalization. Once optimized from a cardiac standpoint, he was recommended to undergo an EGD for further evaluation of worsening anemia. He did received 1 unit of pRBC on 12/11/18 with appropriate Hb/Hct response. Patient was also noted to be iron deficient based on iron studies. He underwent EGD on 12/14 which revealed 1cm gastric ulcer that was biopsied and non-bleeding gastric erosions. H. pylori testing was also done during EGD. Patient remained hemodynamically stable throughout his entire hospital course.    12/14: Patient stable for discharge as per Dr. Toledo

## 2018-12-14 NOTE — DISCHARGE NOTE ADULT - OTHER SIGNIFICANT FINDINGS
UPPER ENDOSCOPY  Findings:       The Z-line was regular and was found 35 cm from the incisors.       The exam of the esophagus was otherwise normal.       A 1 cm non-bleeding cratered gastric ulcer of moderate severity with no        stigmata of bleeding was found on the incura. Biopsies were taken with a        cold forceps for histology.       A few localized, small non-bleeding erosions were found in the gastric        body. There were no stigmata of recent bleeding. Biopsies were taken        with a cold forceps for Helicobacter pylori testing.       The exam of the stomach was otherwise normal.       The duodenal bulb and 2nd part of the duodenum were normal.                                                                                   Impression:    - 1 cm clean based cratered gastric ulcer. Biopsied.                       - Non-bleeding erosive gastropathy. Biopsied.                       - Normal duodenal bulb and 2nd part of the duodenum.

## 2018-12-14 NOTE — DISCHARGE NOTE ADULT - PATIENT PORTAL LINK FT
You can access the [facility] Patient Portal, offered by [facility], by registering with the following website: [email or web address]

## 2018-12-17 NOTE — DISCUSSION/SUMMARY
[FreeTextEntry1] : ADHF (systolic), UGIB\par \par  Patient was started on a Bumex gtt with adequate diuresis as well as a pantoprazole gtt. He was eventually transitioned to Bumex 4mg PO BID by day 3 of hospitalization. He underwent EGD for evaluation of worsening anemia s/p 1 u PRBCs. EGD demonstrated 1cm gastric ulcer that was biopsied and non-bleeding gastric erosions. H. pylori testing sent and is pending. \par \par Pt was called and VM left requestion for him to make appt to be seen in clinic.  tasked to call for appt. \par  [ED] : a call from ED

## 2018-12-18 DIAGNOSIS — Z76.89 PERSONS ENCOUNTERING HEALTH SERVICES IN OTHER SPECIFIED CIRCUMSTANCES: ICD-10-CM

## 2018-12-19 ENCOUNTER — MEDICATION RENEWAL (OUTPATIENT)
Age: 53
End: 2018-12-19

## 2018-12-19 ENCOUNTER — APPOINTMENT (OUTPATIENT)
Dept: NEPHROLOGY | Facility: HOSPITAL | Age: 53
End: 2018-12-19

## 2019-01-02 ENCOUNTER — APPOINTMENT (OUTPATIENT)
Dept: CARDIOLOGY | Facility: CLINIC | Age: 54
End: 2019-01-02
Payer: MEDICAID

## 2019-01-02 ENCOUNTER — NON-APPOINTMENT (OUTPATIENT)
Age: 54
End: 2019-01-02

## 2019-01-02 VITALS
WEIGHT: 171 LBS | OXYGEN SATURATION: 99 % | DIASTOLIC BLOOD PRESSURE: 71 MMHG | HEIGHT: 65 IN | BODY MASS INDEX: 28.49 KG/M2 | SYSTOLIC BLOOD PRESSURE: 109 MMHG | HEART RATE: 59 BPM

## 2019-01-02 PROCEDURE — 99215 OFFICE O/P EST HI 40 MIN: CPT

## 2019-01-02 PROCEDURE — 36415 COLL VENOUS BLD VENIPUNCTURE: CPT

## 2019-01-02 PROCEDURE — 93000 ELECTROCARDIOGRAM COMPLETE: CPT

## 2019-01-02 RX ORDER — ISOSORBIDE DINITRATE 20 MG/1
20 TABLET ORAL
Qty: 180 | Refills: 3 | Status: DISCONTINUED | COMMUNITY
Start: 2018-12-20 | End: 2019-01-02

## 2019-01-02 RX ORDER — ISOSORBIDE DINITRATE 40 MG/1
40 TABLET ORAL EVERY 8 HOURS
Qty: 270 | Refills: 2 | Status: DISCONTINUED | COMMUNITY
Start: 2018-08-08 | End: 2019-01-02

## 2019-01-02 NOTE — PHYSICAL EXAM
[General Appearance - Well Developed] : well developed [Normal Appearance] : normal appearance [General Appearance - Well Nourished] : well nourished [Normal Conjunctiva] : the conjunctiva exhibited no abnormalities [Normal Oral Mucosa] : normal oral mucosa [Normal Oropharynx] : normal oropharynx [] : no respiratory distress [Respiration, Rhythm And Depth] : normal respiratory rhythm and effort [Auscultation Breath Sounds / Voice Sounds] : lungs were clear to auscultation bilaterally [Heart Rate And Rhythm] : heart rate and rhythm were normal [Heart Sounds] : normal S1 and S2 [Bowel Sounds] : normal bowel sounds [Abdomen Soft] : soft [Abdomen Tenderness] : non-tender [Abnormal Walk] : normal gait [Nail Clubbing] : no clubbing of the fingernails [Cyanosis, Localized] : no localized cyanosis [Skin Color & Pigmentation] : normal skin color and pigmentation [Oriented To Time, Place, And Person] : oriented to person, place, and time [Impaired Insight] : insight and judgment were intact [FreeTextEntry1] : no pedal edema bilaterally

## 2019-01-02 NOTE — ASSESSMENT
[FreeTextEntry1] : 53 year old M w/ h/o IDDM c/b neuropathy (A1c 6.2 8/8/18) , HTN, CAD (s/p stent in [place] in 2016, unknown coronary anatomy), HFrEF (EF 25%, LVEDD 6.6 cm), severe MR (functional), stage V CKD (b/l Cr 4-5) who is here for follow up, feeling well with class I-II symptoms after recent hospitalization for GI bleed and decompensated heart failure. Currently appears mildly overloaded but much improved and fairly optimized for AV fistula surgery on 1/9. Currently pt has RCRI score of 2 (CHF, CKD) for an intermediate risk surgery and requires no further cardiovascular testing and is optimized to undergo surgery. Had recent nuclear stress test which did not show significant ischemia and patient is able to perform >4 METs w/o chest pain. \par \par 1. HFrEF - unclear etiology; prior known CAD\par - pt instructed to take bumex 4 mg three times/day for 2 days, then reduce to 4 mg twice daily\par - patient should continue coreg 6.25 mg twice daily, hydral 100 mg three times/day and isordil to 40 mg three times/day perioperatively\par - labs done today in anticipation of pre-surgical testing; will review with patient\par \par 2. CKD - declining renal function; may require HD if declines further\par - appreciate nephrology f/u\par - instructed to follow low potassium diet\par \par 3. GI bleed - found to have ulcer which was H. pylori positive\par - patient should review with internal medicine provider on 1/15 for initiation of triple-therapy\par \par RTC 6 weeks

## 2019-01-02 NOTE — HISTORY OF PRESENT ILLNESS
[FreeTextEntry1] : 53 year old M w/ h/o IDDM c/b neuropathy (A1c 5.7 12/18) , HTN, CAD (s/p stent in [place] in 2016, unknown coronary anatomy), HFrEF (EF 25%, LVEDD 6.6 cm), severe MR (functional), stage V CKD (b/l Cr 4-5), recent GIB 2/2 ulcer who is here for follow up after hospitalization from 12/10-12/14 for GIB and acute on chronic decompensated HF. Last seen 10/12/18 where he was volume overloaded. \par \par He had gone to [facility] late October/early November and had become volume overloaded. Was seen by Dr. Giovanny Pavon 11/28 and was placed on metolazone 5 mg prior to bumex 4 mg twice daily however he remained volume overloaded so he was admitted on 12/10 as he had SIERRA and orthopnea. During admission, noted to be anemic (Hb 8.4 from 11.4) so underwent EGD and was found to have 1 cm gastric ulcer s/p biopsy and non-bleeding erosive gastropathy. Path was negative for malignancy but was H. pylori associated gastritis. He has not been started on triple therapy and hasn't had GI f/u since. \par \par His discharge weight was 168 pounds (admission 186 pounds) and he had been well. Was discharged on bumex 4 mg twice daily. Has appt for AVF on 1/9. Renal did not think he required a permacath at this time as he was non-oliguric to diuretics. \par \par Since discharge, has been doing well. Weight 168-171. Adherent to low sodium and fluid restriction. Able to ambulate 1-2 blocks limited by b/l feet numbness. Able to climb 11 stairs w/o CP/SOB. Denies orthopnea/PND. \par

## 2019-01-03 ENCOUNTER — OUTPATIENT (OUTPATIENT)
Dept: OUTPATIENT SERVICES | Facility: HOSPITAL | Age: 54
LOS: 1 days | End: 2019-01-03
Payer: MEDICAID

## 2019-01-03 VITALS
HEIGHT: 65 IN | OXYGEN SATURATION: 100 % | HEART RATE: 61 BPM | DIASTOLIC BLOOD PRESSURE: 73 MMHG | RESPIRATION RATE: 15 BRPM | WEIGHT: 175.05 LBS | SYSTOLIC BLOOD PRESSURE: 124 MMHG | TEMPERATURE: 98 F

## 2019-01-03 DIAGNOSIS — E11.9 TYPE 2 DIABETES MELLITUS WITHOUT COMPLICATIONS: ICD-10-CM

## 2019-01-03 DIAGNOSIS — Z95.5 PRESENCE OF CORONARY ANGIOPLASTY IMPLANT AND GRAFT: Chronic | ICD-10-CM

## 2019-01-03 DIAGNOSIS — Z01.818 ENCOUNTER FOR OTHER PREPROCEDURAL EXAMINATION: ICD-10-CM

## 2019-01-03 DIAGNOSIS — T82.868A THROMBOSIS DUE TO VASCULAR PROSTHETIC DEVICES, IMPLANTS AND GRAFTS, INITIAL ENCOUNTER: Chronic | ICD-10-CM

## 2019-01-03 DIAGNOSIS — I10 ESSENTIAL (PRIMARY) HYPERTENSION: ICD-10-CM

## 2019-01-03 DIAGNOSIS — N18.5 CHRONIC KIDNEY DISEASE, STAGE 5: ICD-10-CM

## 2019-01-03 DIAGNOSIS — I25.10 ATHEROSCLEROTIC HEART DISEASE OF NATIVE CORONARY ARTERY WITHOUT ANGINA PECTORIS: ICD-10-CM

## 2019-01-03 PROCEDURE — G0463: CPT

## 2019-01-03 NOTE — H&P PST ADULT - PMH
Abnormal echocardiogram  06/2018severe MR  Severe global left ventricular systolic dysfunction.  6. Right ventricular enlargement with decreased right  ventricular systolic function, moderate RV dysfunction  CAD (coronary artery disease)  2016, coronary artery stentX1  CKD (chronic kidney disease), stage V    DM (diabetes mellitus)  2  not taking any medication lat4st HgA1c 5.7  GIB (gastrointestinal bleeding)  was treated  with H pyelori stable since Dec 10, 2018  HFrEF (heart failure with reduced ejection fraction)  EF 20-24%  HTN (hypertension)    Hyperlipidemia    Myocardial infarction  Jan 2016

## 2019-01-03 NOTE — H&P PST ADULT - HISTORY OF PRESENT ILLNESS
52 y/o male with hx of IDDM with neuropathy latest HgA1c 5.7 12/2018 not on any diabetic medications , CAD 1 stent implant 2016  Ef 20% on latest echo Nov 2018, Stage V CKD creat 4.5 . Recent hospitalization due to GIB Dec 2018   with anemia and stated received blood transfusion. with chronic decompensated HF. Patient came in for PSt today for left radiocephalic AV fistula, Patient has hx of stage 5 CKD and he will need dialysis treatment .

## 2019-01-04 PROBLEM — K92.2 GASTROINTESTINAL HEMORRHAGE, UNSPECIFIED: Chronic | Status: ACTIVE | Noted: 2019-01-03

## 2019-01-04 PROBLEM — I50.20 UNSPECIFIED SYSTOLIC (CONGESTIVE) HEART FAILURE: Chronic | Status: ACTIVE | Noted: 2018-07-23

## 2019-01-07 LAB
ALBUMIN SERPL ELPH-MCNC: 3.5 G/DL
ALP BLD-CCNC: 121 U/L
ALT SERPL-CCNC: 26 U/L
ANION GAP SERPL CALC-SCNC: 23 MMOL/L
AST SERPL-CCNC: 15 U/L
BASOPHILS # BLD AUTO: 0.01 K/UL
BASOPHILS NFR BLD AUTO: 0.2 %
BILIRUB SERPL-MCNC: 0.4 MG/DL
BUN SERPL-MCNC: 148 MG/DL
CALCIUM SERPL-MCNC: 7.6 MG/DL
CHLORIDE SERPL-SCNC: 88 MMOL/L
CO2 SERPL-SCNC: 25 MMOL/L
CREAT SERPL-MCNC: 7.47 MG/DL
EOSINOPHIL # BLD AUTO: 0.1 K/UL
EOSINOPHIL NFR BLD AUTO: 1.5 %
GLUCOSE SERPL-MCNC: 213 MG/DL
HCT VFR BLD CALC: 25.5 %
HGB BLD-MCNC: 8.5 G/DL
IMM GRANULOCYTES NFR BLD AUTO: 0.2 %
INR PPP: 1.16 RATIO
LYMPHOCYTES # BLD AUTO: 0.53 K/UL
LYMPHOCYTES NFR BLD AUTO: 8.1 %
MAN DIFF?: NORMAL
MCHC RBC-ENTMCNC: 28.3 PG
MCHC RBC-ENTMCNC: 33.3 GM/DL
MCV RBC AUTO: 85 FL
MONOCYTES # BLD AUTO: 0.54 K/UL
MONOCYTES NFR BLD AUTO: 8.3 %
NEUTROPHILS # BLD AUTO: 5.35 K/UL
NEUTROPHILS NFR BLD AUTO: 81.7 %
NT-PROBNP SERPL-MCNC: ABNORMAL PG/ML
PLATELET # BLD AUTO: 145 K/UL
POTASSIUM SERPL-SCNC: 3.1 MMOL/L
PROT SERPL-MCNC: 7.1 G/DL
PT BLD: 13.1 SEC
RBC # BLD: 3 M/UL
RBC # FLD: 14.7 %
SODIUM SERPL-SCNC: 136 MMOL/L
WBC # FLD AUTO: 6.54 K/UL

## 2019-01-09 ENCOUNTER — INPATIENT (INPATIENT)
Facility: HOSPITAL | Age: 54
LOS: 5 days | Discharge: HOME HEALTH | End: 2019-01-15
Attending: HOSPITALIST | Admitting: HOSPITALIST
Payer: MEDICAID

## 2019-01-09 VITALS
TEMPERATURE: 98 F | DIASTOLIC BLOOD PRESSURE: 60 MMHG | HEART RATE: 73 BPM | RESPIRATION RATE: 16 BRPM | OXYGEN SATURATION: 100 % | SYSTOLIC BLOOD PRESSURE: 104 MMHG

## 2019-01-09 DIAGNOSIS — T82.868A THROMBOSIS DUE TO VASCULAR PROSTHETIC DEVICES, IMPLANTS AND GRAFTS, INITIAL ENCOUNTER: Chronic | ICD-10-CM

## 2019-01-09 DIAGNOSIS — E78.5 HYPERLIPIDEMIA, UNSPECIFIED: ICD-10-CM

## 2019-01-09 DIAGNOSIS — K29.70 GASTRITIS, UNSPECIFIED, WITHOUT BLEEDING: ICD-10-CM

## 2019-01-09 DIAGNOSIS — I10 ESSENTIAL (PRIMARY) HYPERTENSION: ICD-10-CM

## 2019-01-09 DIAGNOSIS — Z95.5 PRESENCE OF CORONARY ANGIOPLASTY IMPLANT AND GRAFT: Chronic | ICD-10-CM

## 2019-01-09 DIAGNOSIS — N18.5 CHRONIC KIDNEY DISEASE, STAGE 5: ICD-10-CM

## 2019-01-09 DIAGNOSIS — N19 UNSPECIFIED KIDNEY FAILURE: ICD-10-CM

## 2019-01-09 DIAGNOSIS — I25.10 ATHEROSCLEROTIC HEART DISEASE OF NATIVE CORONARY ARTERY WITHOUT ANGINA PECTORIS: ICD-10-CM

## 2019-01-09 DIAGNOSIS — I50.20 UNSPECIFIED SYSTOLIC (CONGESTIVE) HEART FAILURE: ICD-10-CM

## 2019-01-09 DIAGNOSIS — E11.9 TYPE 2 DIABETES MELLITUS WITHOUT COMPLICATIONS: ICD-10-CM

## 2019-01-09 DIAGNOSIS — Z29.9 ENCOUNTER FOR PROPHYLACTIC MEASURES, UNSPECIFIED: ICD-10-CM

## 2019-01-09 LAB
ALBUMIN SERPL ELPH-MCNC: 4 G/DL — SIGNIFICANT CHANGE UP (ref 3.3–5)
ALP SERPL-CCNC: 128 U/L — HIGH (ref 40–120)
ALT FLD-CCNC: 26 U/L — SIGNIFICANT CHANGE UP (ref 4–41)
ANION GAP SERPL CALC-SCNC: 18 MEQ/L — HIGH (ref 7–14)
APPEARANCE UR: CLEAR — SIGNIFICANT CHANGE UP
AST SERPL-CCNC: 15 U/L — SIGNIFICANT CHANGE UP (ref 4–40)
BACTERIA # UR AUTO: NEGATIVE — SIGNIFICANT CHANGE UP
BASOPHILS # BLD AUTO: 0.02 K/UL — SIGNIFICANT CHANGE UP (ref 0–0.2)
BASOPHILS NFR BLD AUTO: 0.4 % — SIGNIFICANT CHANGE UP (ref 0–2)
BILIRUB SERPL-MCNC: 0.3 MG/DL — SIGNIFICANT CHANGE UP (ref 0.2–1.2)
BILIRUB UR-MCNC: NEGATIVE — SIGNIFICANT CHANGE UP
BLOOD UR QL VISUAL: SIGNIFICANT CHANGE UP
BUN SERPL-MCNC: 125 MG/DL — HIGH (ref 7–23)
CALCIUM SERPL-MCNC: 7.7 MG/DL — LOW (ref 8.4–10.5)
CHLORIDE SERPL-SCNC: 95 MMOL/L — LOW (ref 98–107)
CO2 SERPL-SCNC: 25 MMOL/L — SIGNIFICANT CHANGE UP (ref 22–31)
COLOR SPEC: SIGNIFICANT CHANGE UP
CREAT SERPL-MCNC: 6.72 MG/DL — HIGH (ref 0.5–1.3)
EOSINOPHIL # BLD AUTO: 0.11 K/UL — SIGNIFICANT CHANGE UP (ref 0–0.5)
EOSINOPHIL NFR BLD AUTO: 2.1 % — SIGNIFICANT CHANGE UP (ref 0–6)
GLUCOSE SERPL-MCNC: 288 MG/DL — HIGH (ref 70–99)
GLUCOSE UR-MCNC: 150 — HIGH
HBV SURFACE AG SER-ACNC: NEGATIVE — SIGNIFICANT CHANGE UP
HCT VFR BLD CALC: 26.6 % — LOW (ref 39–50)
HGB BLD-MCNC: 8.6 G/DL — LOW (ref 13–17)
HYALINE CASTS # UR AUTO: NEGATIVE — SIGNIFICANT CHANGE UP
IMM GRANULOCYTES NFR BLD AUTO: 0.4 % — SIGNIFICANT CHANGE UP (ref 0–1.5)
KETONES UR-MCNC: NEGATIVE — SIGNIFICANT CHANGE UP
LEUKOCYTE ESTERASE UR-ACNC: NEGATIVE — SIGNIFICANT CHANGE UP
LYMPHOCYTES # BLD AUTO: 0.61 K/UL — LOW (ref 1–3.3)
LYMPHOCYTES # BLD AUTO: 11.7 % — LOW (ref 13–44)
MCHC RBC-ENTMCNC: 28.4 PG — SIGNIFICANT CHANGE UP (ref 27–34)
MCHC RBC-ENTMCNC: 32.3 % — SIGNIFICANT CHANGE UP (ref 32–36)
MCV RBC AUTO: 87.8 FL — SIGNIFICANT CHANGE UP (ref 80–100)
MONOCYTES # BLD AUTO: 0.39 K/UL — SIGNIFICANT CHANGE UP (ref 0–0.9)
MONOCYTES NFR BLD AUTO: 7.5 % — SIGNIFICANT CHANGE UP (ref 2–14)
NEUTROPHILS # BLD AUTO: 4.05 K/UL — SIGNIFICANT CHANGE UP (ref 1.8–7.4)
NEUTROPHILS NFR BLD AUTO: 77.9 % — HIGH (ref 43–77)
NITRITE UR-MCNC: NEGATIVE — SIGNIFICANT CHANGE UP
NRBC # FLD: 0 K/UL — LOW (ref 25–125)
PH UR: 6 — SIGNIFICANT CHANGE UP (ref 5–8)
PLATELET # BLD AUTO: 135 K/UL — LOW (ref 150–400)
PMV BLD: 11.4 FL — SIGNIFICANT CHANGE UP (ref 7–13)
POTASSIUM SERPL-MCNC: 4 MMOL/L — SIGNIFICANT CHANGE UP (ref 3.5–5.3)
POTASSIUM SERPL-SCNC: 4 MMOL/L — SIGNIFICANT CHANGE UP (ref 3.5–5.3)
PROT SERPL-MCNC: 7.6 G/DL — SIGNIFICANT CHANGE UP (ref 6–8.3)
PROT UR-MCNC: 200 — HIGH
RBC # BLD: 3.03 M/UL — LOW (ref 4.2–5.8)
RBC # FLD: 14.1 % — SIGNIFICANT CHANGE UP (ref 10.3–14.5)
RBC CASTS # UR COMP ASSIST: SIGNIFICANT CHANGE UP (ref 0–?)
SODIUM SERPL-SCNC: 138 MMOL/L — SIGNIFICANT CHANGE UP (ref 135–145)
SP GR SPEC: 1.01 — SIGNIFICANT CHANGE UP (ref 1–1.04)
SQUAMOUS # UR AUTO: SIGNIFICANT CHANGE UP
UROBILINOGEN FLD QL: NORMAL — SIGNIFICANT CHANGE UP
WBC # BLD: 5.2 K/UL — SIGNIFICANT CHANGE UP (ref 3.8–10.5)
WBC # FLD AUTO: 5.2 K/UL — SIGNIFICANT CHANGE UP (ref 3.8–10.5)
WBC UR QL: SIGNIFICANT CHANGE UP (ref 0–?)

## 2019-01-09 PROCEDURE — 99223 1ST HOSP IP/OBS HIGH 75: CPT | Mod: GC

## 2019-01-09 RX ORDER — CARVEDILOL PHOSPHATE 80 MG/1
6.25 CAPSULE, EXTENDED RELEASE ORAL EVERY 12 HOURS
Qty: 0 | Refills: 0 | Status: DISCONTINUED | OUTPATIENT
Start: 2019-01-09 | End: 2019-01-15

## 2019-01-09 RX ORDER — CLARITHROMYCIN 500 MG
500 TABLET ORAL
Qty: 0 | Refills: 0 | Status: DISCONTINUED | OUTPATIENT
Start: 2019-01-09 | End: 2019-01-15

## 2019-01-09 RX ORDER — DEXTROSE 50 % IN WATER 50 %
25 SYRINGE (ML) INTRAVENOUS ONCE
Qty: 0 | Refills: 0 | Status: DISCONTINUED | OUTPATIENT
Start: 2019-01-09 | End: 2019-01-15

## 2019-01-09 RX ORDER — BUMETANIDE 0.25 MG/ML
4 INJECTION INTRAMUSCULAR; INTRAVENOUS
Qty: 0 | Refills: 0 | Status: DISCONTINUED | OUTPATIENT
Start: 2019-01-09 | End: 2019-01-11

## 2019-01-09 RX ORDER — ATORVASTATIN CALCIUM 80 MG/1
80 TABLET, FILM COATED ORAL AT BEDTIME
Qty: 0 | Refills: 0 | Status: DISCONTINUED | OUTPATIENT
Start: 2019-01-09 | End: 2019-01-15

## 2019-01-09 RX ORDER — SODIUM CHLORIDE 9 MG/ML
1000 INJECTION, SOLUTION INTRAVENOUS
Qty: 0 | Refills: 0 | Status: DISCONTINUED | OUTPATIENT
Start: 2019-01-09 | End: 2019-01-15

## 2019-01-09 RX ORDER — DEXTROSE 50 % IN WATER 50 %
12.5 SYRINGE (ML) INTRAVENOUS ONCE
Qty: 0 | Refills: 0 | Status: DISCONTINUED | OUTPATIENT
Start: 2019-01-09 | End: 2019-01-15

## 2019-01-09 RX ORDER — INSULIN LISPRO 100/ML
VIAL (ML) SUBCUTANEOUS AT BEDTIME
Qty: 0 | Refills: 0 | Status: DISCONTINUED | OUTPATIENT
Start: 2019-01-09 | End: 2019-01-15

## 2019-01-09 RX ORDER — HYDRALAZINE HCL 50 MG
100 TABLET ORAL EVERY 8 HOURS
Qty: 0 | Refills: 0 | Status: DISCONTINUED | OUTPATIENT
Start: 2019-01-09 | End: 2019-01-15

## 2019-01-09 RX ORDER — CALCIUM ACETATE 667 MG
667 TABLET ORAL
Qty: 0 | Refills: 0 | Status: DISCONTINUED | OUTPATIENT
Start: 2019-01-09 | End: 2019-01-15

## 2019-01-09 RX ORDER — PANTOPRAZOLE SODIUM 20 MG/1
40 TABLET, DELAYED RELEASE ORAL
Qty: 0 | Refills: 0 | Status: DISCONTINUED | OUTPATIENT
Start: 2019-01-09 | End: 2019-01-15

## 2019-01-09 RX ORDER — INSULIN LISPRO 100/ML
VIAL (ML) SUBCUTANEOUS
Qty: 0 | Refills: 0 | Status: DISCONTINUED | OUTPATIENT
Start: 2019-01-09 | End: 2019-01-15

## 2019-01-09 RX ORDER — ASPIRIN/CALCIUM CARB/MAGNESIUM 324 MG
81 TABLET ORAL DAILY
Qty: 0 | Refills: 0 | Status: DISCONTINUED | OUTPATIENT
Start: 2019-01-09 | End: 2019-01-15

## 2019-01-09 RX ORDER — ONDANSETRON 8 MG/1
4 TABLET, FILM COATED ORAL ONCE
Qty: 0 | Refills: 0 | Status: DISCONTINUED | OUTPATIENT
Start: 2019-01-09 | End: 2019-01-09

## 2019-01-09 RX ORDER — AMOXICILLIN 250 MG/5ML
1000 SUSPENSION, RECONSTITUTED, ORAL (ML) ORAL
Qty: 0 | Refills: 0 | Status: DISCONTINUED | OUTPATIENT
Start: 2019-01-09 | End: 2019-01-12

## 2019-01-09 RX ORDER — HEPARIN SODIUM 5000 [USP'U]/ML
5000 INJECTION INTRAVENOUS; SUBCUTANEOUS EVERY 8 HOURS
Qty: 0 | Refills: 0 | Status: DISCONTINUED | OUTPATIENT
Start: 2019-01-09 | End: 2019-01-09

## 2019-01-09 RX ORDER — DEXTROSE 50 % IN WATER 50 %
15 SYRINGE (ML) INTRAVENOUS ONCE
Qty: 0 | Refills: 0 | Status: DISCONTINUED | OUTPATIENT
Start: 2019-01-09 | End: 2019-01-15

## 2019-01-09 RX ORDER — GLUCAGON INJECTION, SOLUTION 0.5 MG/.1ML
1 INJECTION, SOLUTION SUBCUTANEOUS ONCE
Qty: 0 | Refills: 0 | Status: DISCONTINUED | OUTPATIENT
Start: 2019-01-09 | End: 2019-01-15

## 2019-01-09 RX ADMIN — Medication 100 MILLIGRAM(S): at 21:19

## 2019-01-09 RX ADMIN — ATORVASTATIN CALCIUM 80 MILLIGRAM(S): 80 TABLET, FILM COATED ORAL at 21:19

## 2019-01-09 NOTE — PROGRESS NOTE ADULT - SUBJECTIVE AND OBJECTIVE BOX
[facility] DIVISION OF KIDNEY DISEASES AND HYPERTENSION -- [phone]  -- INITIAL CONSULT NOTE  --------------------------------------------------------------------------------  HPI: 53 year old male with h/o CKD stage V, CHFrEF, DM, HTN, CAD presented initiation of RRT hence nephrology was consulted.   Pt. well known to nephrology service, has history of CKD stage V from longstanding history of DM and HTN.  On review of previous records in [place],Scr. was elevated at 1.5 on 4/4/16 and 1.6 on 4/6/16. Patient was hospitalized in [facility] from 2/10/18 to 2/14/18 during Scr was elevated at 5 on 2/10/18 which then decreased to 4.43 on 2/14/18. Patient was subsequently seen in [facility] renal clinic in March 2018 and has been following up with Dr. Giovanny Pavon since then. Patient was scheduled for AVF creation in November 2018 but was cancelled due to abnormal nuclear stress test, discussed with cardiology and pt needs optimization of fluid status and to be started RRT prior AVF creation. Scr. before admission was 5.55 on 12/4/18 and Scr. was elevated at 7.15 on 12/14/18. Labs done during this admission showed Scr. of 6.72 on 1/9/19.     Pt was seen and examined at ER, reports no complains.     PAST HISTORY  --------------------------------------------------------------------------------  PAST MEDICAL & SURGICAL HISTORY:  GIB (gastrointestinal bleeding): was treated  with H pyelori stable since Dec 10, 2018  Abnormal echocardiogram: 06/2018severe MR  Severe global left ventricular systolic dysfunction.  6. Right ventricular enlargement with decreased right  ventricular systolic function, moderate RV dysfunction  CKD (chronic kidney disease), stage V  HFrEF (heart failure with reduced ejection fraction): EF 20-24%  Myocardial infarction: Jan 2016  Hyperlipidemia  CAD (coronary artery disease): 2016, coronary artery stentX1  DM (diabetes mellitus): 2  not taking any medication lat4st HgA1c 5.7  HTN (hypertension)  Stented coronary artery: ? 1  Arterial stent thrombosis    FAMILY HISTORY:  Family history of diabetes mellitus (DM) (Sibling)  Family history of MI (myocardial infarction) (Sibling)    PAST SOCIAL HISTORY:    ALLERGIES & MEDICATIONS  --------------------------------------------------------------------------------  Allergies    No Known Allergies    Intolerances      Standing Inpatient Medications    PRN Inpatient Medications      REVIEW OF SYSTEMS  --------------------------------------------------------------------------------  Gen: No fevers/chills  Skin: No rashes  Head/Eyes/Ears: Normal hearing,   Respiratory: No dyspnea, cough  CV: No chest pain  GI: No abdominal pain, diarrhea  : No dysuria, hematuria  MSK: No  edema  Heme: No easy bruising or bleeding  Psych: No significant depression    All other systems were reviewed and are negative, except as noted.    VITALS/PHYSICAL EXAM  --------------------------------------------------------------------------------  T(C): 36.9 (01-09-19 @ 14:41), Max: 36.9 (01-09-19 @ 14:41)  HR: 77 (01-09-19 @ 14:41) (73 - 77)  BP: 143/76 (01-09-19 @ 14:41) (104/60 - 143/76)  RR: 16 (01-09-19 @ 14:41) (16 - 16)  SpO2: 99% (01-09-19 @ 14:41) (99% - 100%)  Wt(kg): --        Physical Exam:  	Gen: NAD  	HEENT: MMM  	Pulm: CTA B/L  	CV: S1S2  	Abd: Soft, +BS   	Ext: No LE edema B/L  	Neuro: Awake  	Skin: Warm and dry    LABS/STUDIES  --------------------------------------------------------------------------------              8.6    5.20  >-----------<  135      [01-09-19 @ 12:52]              26.6     138  |  95  |  125  ----------------------------<  288      [01-09-19 @ 12:52]  4.0   |  25  |  6.72        Ca     7.7     [01-09-19 @ 12:52]    TPro  7.6  /  Alb  4.0  /  TBili  0.3  /  DBili  x   /  AST  15  /  ALT  26  /  AlkPhos  128  [01-09-19 @ 12:52]          Creatinine Trend:  SCr 6.72 [01-09 @ 12:52]  SCr 7.15 [12-14 @ 05:50]  SCr 6.38 [12-13 @ 05:40]  SCr 5.11 [12-12 @ 14:58]  SCr 5.86 [12-12 @ 05:30]

## 2019-01-09 NOTE — PROGRESS NOTE ADULT - PROBLEM SELECTOR PLAN 1
Pt. with advanced/progressive CKD stage 5 in the setting of long-standing uncontrolled DM and HTN. Scr. before admission was 5.55 on 12/4/18 and Scr. was elevated at 7.15 on 12/14/18. Labs done during this admission showed Scr. of 6.72 on 1/9/19. Pt. with most likely diabetic nephropathy. Pt. had positive nuclear stress test findings on 11/2/18 with subsequent cancellation of his AVF creation surgery, however now cleared by cardiology and schedule for AVF placement 1/16/19 as outpatient. Recommend IR for tunneled catheter placement and to start HD during this admission.   Monitor UOP and daily weights. Avoid volume depletion, NSAIDs, ARB/ACE-I. Dose medications as per eGFR. Pt. with advanced/progressive CKD stage 5 in the setting of long-standing uncontrolled DM and HTN. Scr. before admission was 5.55 on 12/4/18 and Scr. was elevated at 7.15 on 12/14/18. Labs done during this admission showed Scr. of 6.72 on 1/9/19. Pt. with most likely diabetic nephropathy. Pt. had positive nuclear stress test findings on 11/2/18 with subsequent cancellation of his AVF creation surgery, however now cleared by cardiology and schedule for AVF placement 1/16/19 as outpatient. Recommend IR for tunneled catheter placement and to start HD during this admission. Check Hep B SAg, B core, C Ab.   Monitor UOP and daily weights. Avoid volume depletion, NSAIDs, ARB/ACE-I. Dose medications as per eGFR. Pt. with advanced/progressive CKD stage 5 in the setting of long-standing uncontrolled DM and HTN. Scr. before admission was 5.55 on 12/4/18 and Scr. was elevated at 7.15 on 12/14/18. Labs done during this admission showed Scr. of 6.72 on 1/9/19. Pt. with most likely diabetic nephropathy. Pt. had positive nuclear stress test findings on 11/2/18 with subsequent cancellation of his AVF creation surgery,  Recommend IR for tunneled catheter placement and to start HD during this admission. Check Hep B SAg, B core, C Ab. Consult Vascular for AVF creation and cardiology for assessment.  Monitor UOP and daily weights. Avoid volume depletion, NSAIDs, ARB/ACE-I. Dose medications as per eGFR.

## 2019-01-09 NOTE — H&P ADULT - NSHPLABSRESULTS_GEN_ALL_CORE
EKG, Imaging and results personally reviewed by me     EKG: mild ST <1mm in II, III, AVF, V5, V6                          8.6    5.20  )-----------( 135      ( 2019 12:52 )             26.6        138  |  95<L>  |  125<H>  ----------------------------<  288<H>  4.0   |  25  |  6.72<H>    Ca    7.7<L>      2019 12:52    TPro  7.6  /  Alb  4.0  /  TBili  0.3  /  DBili  x   /  AST  15  /  ALT  26  /  AlkPhos  128<H>      Urinalysis Basic - ( 2019 12:49 )    Color: LIGHT YELLOW / Appearance: CLEAR / S.014 / pH: 6.0  Gluc: 150 / Ketone: NEGATIVE  / Bili: NEGATIVE / Urobili: NORMAL   Blood: TRACE / Protein: 200 / Nitrite: NEGATIVE   Leuk Esterase: NEGATIVE / RBC: 2-5 / WBC 0-2   Sq Epi: OCC / Non Sq Epi: x / Bacteria: NEGATIVE

## 2019-01-09 NOTE — H&P ADULT - PROBLEM SELECTOR PLAN 7
DVT PPx: Improve score of 0 DVT PPx: Improve score of 0, lower risk for DVT - - no pharmacologic ppx     Rosalind Gimenez   PGY2  [phone] - c/w lipitor

## 2019-01-09 NOTE — H&P ADULT - PROBLEM SELECTOR PLAN 5
- hx of T2DM, not on oral hypoglycemics  - will start SSI - recently diagnosed H pylori gastritis - - did not undergo any treatment   - will start on triple therapy during admission

## 2019-01-09 NOTE — H&P ADULT - ATTENDING COMMENTS
Patient seen and examined at bedside in ED. Agree with A/P as above. In brief, this is a 54 yo M with chronic HFrEF (EF=28%), CAD s/p stent, CKD V, T2DM, HTN, HLD, and recent admission for CHF optimization for AVF placement with course c/b anemia with concern for GIB presenting for initation of HD. Patient wo any CP, SOB, palpitations, dizziness. Does not appear grossly volume overloaded on exam. Per patient, he was due for AVF placement today initially but then it was rescheduled by vascular surgeon's office for 1/16/19. He reports they subsequently canceled this appointment as well. NPO after MN for permacath placement by IR. Will need to clarify further re: need for urgent initiation of HD at this time in the AM. Will also need to clarify when patient has an appt for AVF placement. Patient was seen by CHF team earlier this week, per their note he is medically optimized for AVF placement. Will consult them further in AM given they asked for patient to be started on HD per documentation.

## 2019-01-09 NOTE — H&P ADULT - PROBLEM SELECTOR PLAN 2
- hx of CAD w/ MI with subsequent ischemic cardiomyopathy: Last EF 24%   - clinically does not look volume overloaded and hemodynamically stable  - c/w home regimen of bumex, hydralazine, coreg

## 2019-01-09 NOTE — ED ADULT NURSE NOTE - OBJECTIVE STATEMENT
52 yo M with pmhx of CKD secondary to uncontrolled HTN and DM, CAD with stent, HTN and HLD presents to the ED sent in by PMD for permacath placement for dialysis. Pt also reports chronic lower extremity swelling and SIERRA, denies complaints otherwise, states he feels well.

## 2019-01-09 NOTE — H&P ADULT - NSHPPHYSICALEXAM_GEN_ALL_CORE
General: middle aged male, NAD   HEENT: left pupil slightly larger than right, both reactive to light, MMM  Neck: supple, no JVD  Cardiac: RRR, normal s1 and s2, 1/6 holosystolic murmur at the apex  Lungs: CTAB, no wheezes, rales or rhonchi   Abdomen: soft nondistended abdomen. Bowel sound present. non TTP  Extremities: no cyanosis, pitting or edema. 2+ DP pulses, bilaterally   Neuro: AAO x 4, Cr II - XII intact, 5/5 strength in UE and LE. Sensation intact   Skin: no open lesions or sores    Heme: no bleeding or bruising

## 2019-01-09 NOTE — ED PROVIDER NOTE - OBJECTIVE STATEMENT
52 yo M with pmhx of CKD secondary to uncontrolled HTN and DM, CAD with stent, HTN and HLD presents to the ED sent in by PMD for permacath placement for dialysis. No acute complaints at this time. Denies chest pain, sob, N/V/D, cough, abdominal pain, fevers, chills, dizziness, blurred vision, headaches.   Cardiology= Dr. Summer Pavon ([phone])  Vasc Surg= Dr. Gonzalez  Nephrology= Dr. Deshaun Pavon 54 yo M with pmhx of CKD secondary to uncontrolled HTN and DM, CAD with stent, HTN and HLD presents to the ED sent in by PMD for permacath placement for dialysis. No acute complaints at this time. Denies chest pain, sob, N/V/D, cough, abdominal pain, fevers, chills, dizziness, blurred vision, headaches.   Cardiology= Dr. Summer Pavon ([phone])  Vasc Surg= Dr. Gonzalez  Nephrology= Dr. Deshaun Pavon  Attending - Agree with above.  I evaluated patient myself.  52 y/o M with wife at bedside.  States had pre-surgical testing on Thursday, then called by Card Dr. Pavon on Monday and told to come to ED as requires admission for urgent dialysis.  Patient states unable to come on Monday or Tuesday, so came to ED today.  Feels well.  Denies any pain or complaint.  Denies chest pain or shortness of breathe.  No fever, cough, or recent illness.  I spoke to Dr. Pavno who states patient requires admission for permacath placement and HD.  He discussed with nephrologist Dr. Pavon.  Asked to have patient admitted to Hospitalist.

## 2019-01-09 NOTE — ED ADULT TRIAGE NOTE - CHIEF COMPLAINT QUOTE
Pt called by MD and told to go to ER for dialysis catheter placement for kidney failure.  Pt denies any pain or complaints

## 2019-01-09 NOTE — H&P ADULT - HISTORY OF PRESENT ILLNESS
53 year old male with hx of HFrEF (28%), CAD s/p stent, CKD V, T2DM, HTN, HLD, and recent admission for CHF optimization for AVF placement with course c/b anemia with concern for GIB (EGD: nonbleeding ulcers and erosions). Patient told to come in today for catheter placement to start RRT.     Patient not endorsing any complaints, concerns of symptoms     In the ED:  Afebrile, HR 73-77 -143/60-76 RR 16-18 O2:  on RA

## 2019-01-09 NOTE — H&P ADULT - ASSESSMENT
53 year old male with hx of HFrEF (28%), CAD s/p stent, CKD V, T2DM, HTN, HLD, and recent admission for CHF optimization for AVF placement with course c/b anemia, presenting for permacath placement for RRT

## 2019-01-09 NOTE — H&P ADULT - NSHPSOCIALHISTORY_GEN_ALL_CORE
Lives at home with sister. Quit drinking 3 - 4 years ago, quit smoking 6 months ago (smoked 1ppd x 33 years). Denies recreational drug use

## 2019-01-09 NOTE — ED PROVIDER NOTE - PHYSICAL EXAMINATION
ATTENDING PHYSICAL EXAM DR. Morales ***GEN - NAD; well appearing; A+O x3 ***HEAD - NC/AT ***EYES/NOSE - PERRL, EOMI, mucous membranes moist, no discharge ***THROAT: Oral cavity and pharynx normal. No inflammation, swelling, exudate, or lesions.  ***NECK: Neck supple, non-tender without lymphadenopathy, no masses, no JVD.   ***PULMONARY - CTA b/l, symmetric breath sounds. ***CARDIAC -s1s2, RRR, no M,R,G  ***ABDOMEN - +BS, ND, NT, soft, no guarding, no rebound, no masses   ***BACK - no CVA tenderness, Normal  spine ***EXTREMITIES - symmetric pulses, 2+ dp, capillary refill < 2 seconds, no clubbing, no cyanosis, no edema ***SKIN - no rash or bruising   ***NEUROLOGIC - alert, CN 2-12 intact

## 2019-01-09 NOTE — H&P ADULT - PROBLEM SELECTOR PLAN 8
DVT PPx: Improve score of 0, lower risk for DVT - - no pharmacologic ppx     Rosalind Gimenez   PGY2  [phone]

## 2019-01-09 NOTE — H&P ADULT - NSHPREVIEWOFSYSTEMS_GEN_ALL_CORE
General: denies fevers, chillls, nausea or vomiting   HEENT: denies any acute vision changes, trouble or pain with swallowing   Cardiac: denies any chest pain, pressure or palpitations   Lungs: denies cough, pleuritic chest pain  Abdomen: denies any abdominal pain, + constipation, denies any diarrhea, denies any further melena or BRBPR  Extremities: denies any focal weakness, chronic neuropathic pain in both lower extremities   Skin: denies any skin lesions, open wounds or sores  Neuro: denies any dizziness, lightheadedness, confusion .Denies any focal weakness or numbness   Psych: denies SI/HI  Heme: denies any bleeding or bruising

## 2019-01-09 NOTE — H&P ADULT - PROBLEM SELECTOR PLAN 1
- hx of CKD V secondary to longstanding diabetes and hypertension, still making adequate urine and currently no electrolyte abnormalities to warrant HD  - will undergo permacath placement with IR tomorrow   - c/w bumex 4mg BID, daily weights (dry weight roughly 165-168lbs) and monitor I/Os   - appreciate renal recs: will order hepatitis panel   - vascular surgery consult in AM for AVF placement (Dr. Gonzalez [facility] Surgeon) and will consult cardiology in AM for further recommendations

## 2019-01-10 DIAGNOSIS — N18.6 END STAGE RENAL DISEASE: ICD-10-CM

## 2019-01-10 LAB
ALBUMIN SERPL ELPH-MCNC: 3.5 G/DL — SIGNIFICANT CHANGE UP (ref 3.3–5)
ALP SERPL-CCNC: 110 U/L — SIGNIFICANT CHANGE UP (ref 40–120)
ALT FLD-CCNC: 26 U/L — SIGNIFICANT CHANGE UP (ref 4–41)
ANION GAP SERPL CALC-SCNC: 20 MEQ/L — HIGH (ref 7–14)
APTT BLD: 34.2 SEC — SIGNIFICANT CHANGE UP (ref 27.5–36.3)
AST SERPL-CCNC: 17 U/L — SIGNIFICANT CHANGE UP (ref 4–40)
BILIRUB SERPL-MCNC: 0.3 MG/DL — SIGNIFICANT CHANGE UP (ref 0.2–1.2)
BLD GP AB SCN SERPL QL: NEGATIVE — SIGNIFICANT CHANGE UP
BUN SERPL-MCNC: 120 MG/DL — HIGH (ref 7–23)
CALCIUM SERPL-MCNC: 8.2 MG/DL — LOW (ref 8.4–10.5)
CHLORIDE SERPL-SCNC: 95 MMOL/L — LOW (ref 98–107)
CO2 SERPL-SCNC: 24 MMOL/L — SIGNIFICANT CHANGE UP (ref 22–31)
CREAT SERPL-MCNC: 6.44 MG/DL — HIGH (ref 0.5–1.3)
GLUCOSE SERPL-MCNC: 113 MG/DL — HIGH (ref 70–99)
HBV CORE IGM SER-ACNC: NONREACTIVE — SIGNIFICANT CHANGE UP
HBV SURFACE AG SER-ACNC: NEGATIVE — SIGNIFICANT CHANGE UP
HCT VFR BLD CALC: 24.4 % — LOW (ref 39–50)
HCV AB S/CO SERPL IA: 0.11 S/CO — SIGNIFICANT CHANGE UP
HCV AB SERPL-IMP: SIGNIFICANT CHANGE UP
HGB BLD-MCNC: 8 G/DL — LOW (ref 13–17)
INR BLD: 1.29 — HIGH (ref 0.88–1.17)
MAGNESIUM SERPL-MCNC: 1.7 MG/DL — SIGNIFICANT CHANGE UP (ref 1.6–2.6)
MCHC RBC-ENTMCNC: 28.6 PG — SIGNIFICANT CHANGE UP (ref 27–34)
MCHC RBC-ENTMCNC: 32.8 % — SIGNIFICANT CHANGE UP (ref 32–36)
MCV RBC AUTO: 87.1 FL — SIGNIFICANT CHANGE UP (ref 80–100)
NRBC # FLD: 0 K/UL — LOW (ref 25–125)
PHOSPHATE SERPL-MCNC: 6.1 MG/DL — HIGH (ref 2.5–4.5)
PLATELET # BLD AUTO: 125 K/UL — LOW (ref 150–400)
PMV BLD: 10.7 FL — SIGNIFICANT CHANGE UP (ref 7–13)
POTASSIUM SERPL-MCNC: 3.6 MMOL/L — SIGNIFICANT CHANGE UP (ref 3.5–5.3)
POTASSIUM SERPL-SCNC: 3.6 MMOL/L — SIGNIFICANT CHANGE UP (ref 3.5–5.3)
PROT SERPL-MCNC: 6.6 G/DL — SIGNIFICANT CHANGE UP (ref 6–8.3)
PROTHROM AB SERPL-ACNC: 14.4 SEC — HIGH (ref 9.8–13.1)
RBC # BLD: 2.8 M/UL — LOW (ref 4.2–5.8)
RBC # FLD: 14.1 % — SIGNIFICANT CHANGE UP (ref 10.3–14.5)
RH IG SCN BLD-IMP: POSITIVE — SIGNIFICANT CHANGE UP
SODIUM SERPL-SCNC: 139 MMOL/L — SIGNIFICANT CHANGE UP (ref 135–145)
WBC # BLD: 4.24 K/UL — SIGNIFICANT CHANGE UP (ref 3.8–10.5)
WBC # FLD AUTO: 4.24 K/UL — SIGNIFICANT CHANGE UP (ref 3.8–10.5)

## 2019-01-10 PROCEDURE — 99222 1ST HOSP IP/OBS MODERATE 55: CPT

## 2019-01-10 PROCEDURE — 99223 1ST HOSP IP/OBS HIGH 75: CPT

## 2019-01-10 PROCEDURE — 99233 SBSQ HOSP IP/OBS HIGH 50: CPT | Mod: GC

## 2019-01-10 PROCEDURE — 76937 US GUIDE VASCULAR ACCESS: CPT | Mod: 26

## 2019-01-10 PROCEDURE — 36558 INSERT TUNNELED CV CATH: CPT | Mod: RT

## 2019-01-10 PROCEDURE — 77001 FLUOROGUIDE FOR VEIN DEVICE: CPT | Mod: 26,GC

## 2019-01-10 RX ORDER — ISOSORBIDE DINITRATE 5 MG/1
10 TABLET ORAL THREE TIMES A DAY
Qty: 0 | Refills: 0 | Status: DISCONTINUED | OUTPATIENT
Start: 2019-01-10 | End: 2019-01-15

## 2019-01-10 RX ORDER — ISOSORBIDE DINITRATE 5 MG/1
10 TABLET ORAL THREE TIMES A DAY
Qty: 0 | Refills: 0 | Status: DISCONTINUED | OUTPATIENT
Start: 2019-01-10 | End: 2019-01-10

## 2019-01-10 RX ADMIN — Medication 667 MILLIGRAM(S): at 17:24

## 2019-01-10 RX ADMIN — Medication 100 MILLIGRAM(S): at 05:52

## 2019-01-10 RX ADMIN — ATORVASTATIN CALCIUM 80 MILLIGRAM(S): 80 TABLET, FILM COATED ORAL at 23:48

## 2019-01-10 RX ADMIN — CARVEDILOL PHOSPHATE 6.25 MILLIGRAM(S): 80 CAPSULE, EXTENDED RELEASE ORAL at 05:51

## 2019-01-10 RX ADMIN — Medication 1000 MILLIGRAM(S): at 17:25

## 2019-01-10 RX ADMIN — Medication 500 MILLIGRAM(S): at 05:53

## 2019-01-10 RX ADMIN — PANTOPRAZOLE SODIUM 40 MILLIGRAM(S): 20 TABLET, DELAYED RELEASE ORAL at 05:52

## 2019-01-10 RX ADMIN — Medication 1: at 17:24

## 2019-01-10 RX ADMIN — PANTOPRAZOLE SODIUM 40 MILLIGRAM(S): 20 TABLET, DELAYED RELEASE ORAL at 17:26

## 2019-01-10 RX ADMIN — Medication 1000 MILLIGRAM(S): at 05:50

## 2019-01-10 RX ADMIN — Medication 100 MILLIGRAM(S): at 23:49

## 2019-01-10 RX ADMIN — Medication 81 MILLIGRAM(S): at 17:26

## 2019-01-10 RX ADMIN — Medication 500 MILLIGRAM(S): at 17:25

## 2019-01-10 RX ADMIN — Medication 2: at 22:32

## 2019-01-10 RX ADMIN — BUMETANIDE 4 MILLIGRAM(S): 0.25 INJECTION INTRAMUSCULAR; INTRAVENOUS at 05:51

## 2019-01-10 NOTE — PROGRESS NOTE ADULT - PROBLEM SELECTOR PLAN 5
- recently diagnosed H pylori gastritis - - did not undergo any treatment   - will start on triple therapy during admission

## 2019-01-10 NOTE — CONSULT NOTE ADULT - ASSESSMENT
53 year old M w/ h/o IDDM c/b neuropathy (A1c 5.7 12/18) , HTN, CAD (s/p stent in [place] in 2016, unknown coronary anatomy), HFrEF (EF 25%, LVEDD 6.6 cm), severe MR (functional), stage V CKD (b/l Cr 4-5), recent GIB 2/2 ulcer who is here after being instructed to come in to hospital due to worsening renal function despite being fluid overloaded in my office last week as he would require a permacath while awaiting an AVF. Currently is overloaded on exam and has labs consistent with acute on chronic renal failure. He has a RCRI score of 3 (CHF, CKD, DM) for an intermediate risk surgery but given absence of signicant ischemia on stress test and able to perform >4 METS, he does not require an ischemic workup prior to surgery. He however does need to be optimally managed from volume standpoint with dialysis prior to surgery.  - continue home meds  - would start dialysis/UF daily for goal weight of 165-167 pounds  - started on H. pylori therapy  - CAD s/p prior PCI; would continue ASA perioperatively; on lipitor 80 mg daily  - can start on isordil 10 mg three times/day

## 2019-01-10 NOTE — PROGRESS NOTE ADULT - PROBLEM SELECTOR PLAN 1
- hx of CKD V secondary to longstanding diabetes and hypertension, still making adequate urine and currently no electrolyte abnormalities to warrant HD  - will undergo permacath placement with IR today  - c/w bumex 4mg BID, daily weights (dry weight roughly 165-168lbs) and monitor I/Os   - appreciate renal recs: f/u hepatitis panel   - vascular surgery consult for AVF placement (Dr. Gonzalez [facility] Surgeon) and will consult cardiology for further recommendations - will undergo permacath placement with IR today with initiation of HD tonight  - c/w bumex 4mg BID, daily weights (dry weight roughly 165-168lbs) and monitor I/Os   - appreciate renal recs: f/u hepatitis panel   - vascular surgery consult for AVF placement (Dr. Carlos Bernstein Surgeon) and will consult heart failure team for further recommendations

## 2019-01-10 NOTE — PROGRESS NOTE ADULT - ASSESSMENT
53 year old male with hx of HFrEF (24%), CAD s/p stent, CKD V, T2DM, HTN, HLD, and recent admission for CHF optimization for AVF placement with course c/b anemia, presenting for permacath placement for RRT 53 year old male with hx of chronic HFrEF (24%), CAD s/p stent, CKD V, T2DM, HTN, HLD, and recent admission for CHF optimization for AVF placement with course c/b anemia, presenting for permacath placement for RRT as patient is now ESRD.

## 2019-01-10 NOTE — PROGRESS NOTE ADULT - PROBLEM SELECTOR PLAN 1
Pt. with advanced/progressive CKD stage 5 in the setting of long-standing uncontrolled DM and HTN. Scr. before admission was 5.55 on 12/4/18 and Scr. was elevated at 7.15 on 12/14/18. Labs done during this admission showed Scr. of 6.72 on 1/9/19. Pt. with most likely diabetic nephropathy. Plan for catheter placement today and HD start after catheter.   Consult Vascular for AVF creation and cardiology for assessment.  Monitor UOP and daily weights. Avoid volume depletion, NSAIDs, ARB/ACE-I. Dose medications as per eGFR.

## 2019-01-10 NOTE — PROGRESS NOTE ADULT - ASSESSMENT
Patient is a 53M with ESRD, permacath placement today to start HD, vasc surgery consulted for AVF  - plan for AVF on Monday  - please obtain bilateral UE vein mapping  - Left arm precautions- do not use for blood draws, IVs    BRITNEY Forrest PGY2  [id number]

## 2019-01-10 NOTE — CHART NOTE - NSCHARTNOTEFT_GEN_A_CORE
PRE-INTERVENTIONAL RADIOLOGY PROCEDURE NOTE      Patient Age: 53    Patient Gender: Male    Procedure: tunneled dialysis catheter placement    Diagnosis/Indication: CKD stage V, volume overload    Interventional Radiology Attending Physician:    Ordering Attending Physician: Dr. Cerda    Pertinent Medical History: HFrEF, CAD with MI s/p stent, HTN, CKD V, T2DM, HLD    Pertinent labs:                      8.0    4.24  )-----------( 125      ( 10 Kali 2019 06:00 )             24.4       01-10    139  |  95<L>  |  120<H>  ----------------------------<  113<H>  3.6   |  24  |  6.44<H>    Ca    8.2<L>      10 Kali 2019 05:15  Phos  6.1     01-10  Mg     1.7     01-10    TPro  6.6  /  Alb  3.5  /  TBili  0.3  /  DBili  x   /  AST  17  /  ALT  26  /  AlkPhos  110  01-10      PT/INR - ( 10 Kali 2019 05:15 )   PT: 14.4 SEC;   INR: 1.29          PTT - ( 10 Kali 2019 05:15 )  PTT:34.2 SEC    Cori Butcher MD-PGY1  Department of Internal Medicine  Pager [phone]        Patient and Family Aware ? Yes

## 2019-01-10 NOTE — CONSULT NOTE ADULT - SUBJECTIVE AND OBJECTIVE BOX
Patient is a 53y old  Male who presents with a chief complaint of told to come in for catheter placement for renal replacement therapy (10 Kali 2019 11:12)    HPI:  53 year old M w/ h/o IDDM c/b neuropathy (A1c 5.7 ) , HTN, CAD (s/p stent in [place] in , unknown coronary anatomy), HFrEF (EF 25%, LVEDD 6.6 cm), severe MR (functional), stage V CKD (b/l Cr 4-5), recent GIB 2/2 ulcer who is here after being instructed to come in to hospital due to worsening renal function despite being fluid overloaded in my office last week as he would require a permacath while awaiting a AVF. He presented yesterday without complaints and is going for permacath today. Has been taking bumex 4 mg PO q12 at home. Weight recently 172 pounds (dry weight closer to 168). During prior hospitalization he underwent EGD and was found to have a H.pylori associated ulcer (hasn't been treated yet). Currently, able to ambulate 1-2 blocks limited by b/l feet numbness. Able to climb 11 stairs w/o CP/SOB. Denies orthopnea/PND.   Recent stress test for clearance of AVF was negative for significant ischemia but notable for large infarct with mild ischemia in mid-anterior wall.   PAST MEDICAL & SURGICAL HISTORY:  GIB (gastrointestinal bleeding): was treated  with H pyelori stable since Dec 10, 2018  Abnormal echocardiogram: 2018severe MR  Severe global left ventricular systolic dysfunction.  6. Right ventricular enlargement with decreased right  ventricular systolic function, moderate RV dysfunction  CKD (chronic kidney disease), stage V  HFrEF (heart failure with reduced ejection fraction): EF 20-24%  Myocardial infarction: 2016  Hyperlipidemia  CAD (coronary artery disease): 2016, coronary artery stentX1  DM (diabetes mellitus): 2  not taking any medication lat4st HgA1c 5.7  HTN (hypertension)  Stented coronary artery: ? 1  Arterial stent thrombosis      FAMILY HISTORY:  Family history of diabetes mellitus (DM) (Sibling)  Family history of MI (myocardial infarction) (Sibling)      Home Medications:      Medications:  amoxicillin 1000 milliGRAM(s) Oral two times a day  aspirin enteric coated 81 milliGRAM(s) Oral daily  atorvastatin 80 milliGRAM(s) Oral at bedtime  buMETAnide 4 milliGRAM(s) Oral two times a day  calcium acetate 667 milliGRAM(s) Oral three times a day with meals  carvedilol 6.25 milliGRAM(s) Oral every 12 hours  clarithromycin 500 milliGRAM(s) Oral two times a day  dextrose 40% Gel 15 Gram(s) Oral once PRN  dextrose 5%. 1000 milliLiter(s) IV Continuous <Continuous>  dextrose 50% Injectable 12.5 Gram(s) IV Push once  dextrose 50% Injectable 25 Gram(s) IV Push once  dextrose 50% Injectable 25 Gram(s) IV Push once  glucagon  Injectable 1 milliGRAM(s) IntraMuscular once PRN  hydrALAZINE 100 milliGRAM(s) Oral every 8 hours  insulin lispro (HumaLOG) corrective regimen sliding scale   SubCutaneous three times a day before meals  insulin lispro (HumaLOG) corrective regimen sliding scale   SubCutaneous at bedtime  pantoprazole    Tablet 40 milliGRAM(s) Oral two times a day      Review of systems:  10 point review of systems completed and are negative unless noted in HPI    Vitals:  T(C): 36.8 (01-10-19 @ 05:47), Max: 36.9 (19 @ 16:27)  HR: 69 (01-10-19 @ 05:47) (67 - 75)  BP: 129/87 (01-10-19 @ 05:47) (127/77 - 146/93)  BP(mean): --  RR: 18 (01-10-19 @ 05:47) (18 - 18)  SpO2: 100% (01-10-19 @ 05:47) (99% - 100%)    Daily Height in cm: 165.1 (2019 21:51)    Daily Weight in k.1 (10 Kali 2019 09:32)    Weight (kg): 79.2 ( @ 21:51)    I&O's Summary      Physical Exam:  Appearance: No Acute Distress  HEENT: PERRL  Neck: JVP approx 12 cm with HJR  Cardiovascular: Normal S1 S2, grade III/VI systolic murmur in L axilla  Respiratory: Clear to auscultation bilaterally  Gastrointestinal: Soft, Non-tender	  Skin: No cyanosis	  Neurologic: Non-focal  Extremities: No LE edema  Psychiatry: A & O x 3, Mood & affect appropriate    Labs:                        8.0    4.24  )-----------( 125      ( 10 Kali 2019 06:00 )             24.4     01-10    139  |  95<L>  |  120<H>  ----------------------------<  113<H>  3.6   |  24  |  6.44<H>    Ca    8.2<L>      10 Kali 2019 05:15  Phos  6.1     01-10  Mg     1.7     01-10    TPro  6.6  /  Alb  3.5  /  TBili  0.3  /  DBili  x   /  AST  17  /  ALT  26  /  AlkPhos  110  01-10    PT/INR - ( 10 Klai 2019 05:15 )   PT: 14.4 SEC;   INR: 1.29          PTT - ( 10 Kali 2019 05:15 )  PTT:34.2 SEC

## 2019-01-10 NOTE — PROGRESS NOTE ADULT - SUBJECTIVE AND OBJECTIVE BOX
[facility] DIVISION OF KIDNEY DISEASES AND HYPERTENSION -- FOLLOW UP NOTE  --------------------------------------------------------------------------------  HPI: 53 year old male with h/o CKD stage V, CHFrEF, DM, HTN, CAD presented initiation of RRT hence nephrology was consulted.   Pt. well known to nephrology service, has history of advance CKD stage V from longstanding history of DM and HTN. Admitted for initiation of HD.     Pt was seen and examined at bedside, reports no complains, feels well, plan for catheter placement and HD today        PAST HISTORY  --------------------------------------------------------------------------------  No significant changes to PMH, PSH, FHx, SHx, unless otherwise noted    ALLERGIES & MEDICATIONS  --------------------------------------------------------------------------------  Allergies    No Known Allergies    Intolerances      Standing Inpatient Medications  amoxicillin 1000 milliGRAM(s) Oral two times a day  aspirin enteric coated 81 milliGRAM(s) Oral daily  atorvastatin 80 milliGRAM(s) Oral at bedtime  buMETAnide 4 milliGRAM(s) Oral two times a day  calcium acetate 667 milliGRAM(s) Oral three times a day with meals  carvedilol 6.25 milliGRAM(s) Oral every 12 hours  clarithromycin 500 milliGRAM(s) Oral two times a day  dextrose 5%. 1000 milliLiter(s) IV Continuous <Continuous>  dextrose 50% Injectable 12.5 Gram(s) IV Push once  dextrose 50% Injectable 25 Gram(s) IV Push once  dextrose 50% Injectable 25 Gram(s) IV Push once  hydrALAZINE 100 milliGRAM(s) Oral every 8 hours  insulin lispro (HumaLOG) corrective regimen sliding scale   SubCutaneous three times a day before meals  insulin lispro (HumaLOG) corrective regimen sliding scale   SubCutaneous at bedtime  pantoprazole    Tablet 40 milliGRAM(s) Oral two times a day    PRN Inpatient Medications  dextrose 40% Gel 15 Gram(s) Oral once PRN  glucagon  Injectable 1 milliGRAM(s) IntraMuscular once PRN      REVIEW OF SYSTEMS  --------------------------------------------------------------------------------  Gen: No fevers/chills  Skin: No rashes  Head/Eyes/Ears: Normal hearing,   Respiratory: No dyspnea, cough  CV: No chest pain  GI: No abdominal pain, diarrhea  : No dysuria, hematuria  MSK: No  edema  Heme: No easy bruising or bleeding  Psych: No significant depression      All other systems were reviewed and are negative, except as noted.    VITALS/PHYSICAL EXAM  --------------------------------------------------------------------------------  T(C): 36.8 (01-10-19 @ 05:47), Max: 36.9 (01-09-19 @ 14:41)  HR: 69 (01-10-19 @ 05:47) (67 - 77)  BP: 129/87 (01-10-19 @ 05:47) (127/77 - 146/93)  RR: 18 (01-10-19 @ 05:47) (16 - 18)  SpO2: 100% (01-10-19 @ 05:47) (99% - 100%)  Wt(kg): --  Height (cm): 165.1 (01-09-19 @ 21:51)  Weight (kg): 79.2 (01-09-19 @ 21:51)  BMI (kg/m2): 29.1 (01-09-19 @ 21:51)  BSA (m2): 1.87 (01-09-19 @ 21:51)        Physical Exam:  	Gen: NAD, pale appearance   	HEENT: MMM  	Pulm: CTA B/L  	CV: S1S2  	Abd: Soft, +BS   	Ext: No LE edema B/L  	Neuro: Awake  	Skin: Warm and dry  	  LABS/STUDIES  --------------------------------------------------------------------------------              8.0    4.24  >-----------<  125      [01-10-19 @ 06:00]              24.4     139  |  95  |  120  ----------------------------<  113      [01-10-19 @ 05:15]  3.6   |  24  |  6.44        Ca     8.2     [01-10-19 @ 05:15]      Mg     1.7     [01-10-19 @ 05:15]      Phos  6.1     [01-10-19 @ 05:15]    TPro  6.6  /  Alb  3.5  /  TBili  0.3  /  DBili  x   /  AST  17  /  ALT  26  /  AlkPhos  110  [01-10-19 @ 05:15]    PT/INR: PT 14.4 , INR 1.29       [01-10-19 @ 05:15]  PTT: 34.2       [01-10-19 @ 05:15]      Creatinine Trend:  SCr 6.44 [01-10 @ 05:15]  SCr 6.72 [01-09 @ 12:52]  SCr 7.15 [12-14 @ 05:50]  SCr 6.38 [12-13 @ 05:40]  SCr 5.11 [12-12 @ 14:58] [facility] DIVISION OF KIDNEY DISEASES AND HYPERTENSION -- FOLLOW UP NOTE  --------------------------------------------------------------------------------  HPI: 53 year old male with h/o CKD stage V, CHFrEF, DM, HTN, CAD presented initiation of RRT hence nephrology was consulted.   Pt. well known to nephrology service, has history of advance CKD stage V from longstanding history of DM and HTN. Admitted for initiation of HD.     Pt was seen and examined at bedside, reports no complains, feels well, plan for catheter placement and HD today        PAST HISTORY  --------------------------------------------------------------------------------  No significant changes to PMH, PSH, FHx, SHx, unless otherwise noted    ALLERGIES & MEDICATIONS  --------------------------------------------------------------------------------  Allergies    No Known Allergies    Intolerances      Standing Inpatient Medications  amoxicillin 1000 milliGRAM(s) Oral two times a day  aspirin enteric coated 81 milliGRAM(s) Oral daily  atorvastatin 80 milliGRAM(s) Oral at bedtime  buMETAnide 4 milliGRAM(s) Oral two times a day  calcium acetate 667 milliGRAM(s) Oral three times a day with meals  carvedilol 6.25 milliGRAM(s) Oral every 12 hours  clarithromycin 500 milliGRAM(s) Oral two times a day  dextrose 5%. 1000 milliLiter(s) IV Continuous <Continuous>  dextrose 50% Injectable 12.5 Gram(s) IV Push once  dextrose 50% Injectable 25 Gram(s) IV Push once  dextrose 50% Injectable 25 Gram(s) IV Push once  hydrALAZINE 100 milliGRAM(s) Oral every 8 hours  insulin lispro (HumaLOG) corrective regimen sliding scale   SubCutaneous three times a day before meals  insulin lispro (HumaLOG) corrective regimen sliding scale   SubCutaneous at bedtime  pantoprazole    Tablet 40 milliGRAM(s) Oral two times a day    PRN Inpatient Medications  dextrose 40% Gel 15 Gram(s) Oral once PRN  glucagon  Injectable 1 milliGRAM(s) IntraMuscular once PRN      REVIEW OF SYSTEMS  --------------------------------------------------------------------------------  Gen: No fevers/chills, + Weakness  Skin: No rashes  Head/Eyes/Ears: Normal hearing,   Respiratory: No dyspnea, cough  CV: No chest pain  GI: No abdominal pain, diarrhea, less appetite   : No dysuria, hematuria  MSK: No  edema  Heme: No easy bruising or bleeding  Psych: No significant depression      All other systems were reviewed and are negative, except as noted.    VITALS/PHYSICAL EXAM  --------------------------------------------------------------------------------  T(C): 36.8 (01-10-19 @ 05:47), Max: 36.9 (01-09-19 @ 14:41)  HR: 69 (01-10-19 @ 05:47) (67 - 77)  BP: 129/87 (01-10-19 @ 05:47) (127/77 - 146/93)  RR: 18 (01-10-19 @ 05:47) (16 - 18)  SpO2: 100% (01-10-19 @ 05:47) (99% - 100%)  Wt(kg): --  Height (cm): 165.1 (01-09-19 @ 21:51)  Weight (kg): 79.2 (01-09-19 @ 21:51)  BMI (kg/m2): 29.1 (01-09-19 @ 21:51)  BSA (m2): 1.87 (01-09-19 @ 21:51)        Physical Exam:  	Gen: NAD, pale appearance   	HEENT: MMM  	Pulm: CTA B/L  	CV: S1S2  	Abd: Soft, +BS   	Ext: No LE edema B/L  	Neuro: Awake  	Skin: Warm and dry  	  LABS/STUDIES  --------------------------------------------------------------------------------              8.0    4.24  >-----------<  125      [01-10-19 @ 06:00]              24.4     139  |  95  |  120  ----------------------------<  113      [01-10-19 @ 05:15]  3.6   |  24  |  6.44        Ca     8.2     [01-10-19 @ 05:15]      Mg     1.7     [01-10-19 @ 05:15]      Phos  6.1     [01-10-19 @ 05:15]    TPro  6.6  /  Alb  3.5  /  TBili  0.3  /  DBili  x   /  AST  17  /  ALT  26  /  AlkPhos  110  [01-10-19 @ 05:15]    PT/INR: PT 14.4 , INR 1.29       [01-10-19 @ 05:15]  PTT: 34.2       [01-10-19 @ 05:15]      Creatinine Trend:  SCr 6.44 [01-10 @ 05:15]  SCr 6.72 [01-09 @ 12:52]  SCr 7.15 [12-14 @ 05:50]  SCr 6.38 [12-13 @ 05:40]  SCr 5.11 [12-12 @ 14:58]

## 2019-01-10 NOTE — PROGRESS NOTE ADULT - PROBLEM SELECTOR PLAN 6
- hx of T2DM, not on oral hypoglycemics  - will start SSI - hx of T2DM, not on oral hypoglycemics  - c/w SSI, FS q6 while pt is NPO

## 2019-01-10 NOTE — PROGRESS NOTE ADULT - SUBJECTIVE AND OBJECTIVE BOX
CC: 53y old Male admitted with a chief complaint of told to come in for catheter placement for renal replacement therapy, now    HPI: Patient is a 53 year old male with hx of HFrEF (28%), CAD s/p stent, CKD V, T2DM, HTN, HLD, and recent admission for CHF optimization for AVF placement with course c/b anemia with concern for GIB (EGD: nonbleeding ulcers and erosions). Patient told come to the hospital for catheter placement to start HD. Patient undergoing permacath placement with IR today.  Vascular surgery consulted for AVF creation.       PMHx: GIB (gastrointestinal bleeding)  Abnormal echocardiogram  CKD (chronic kidney disease), stage V  HFrEF (heart failure with reduced ejection fraction)  Congestive heart failure (CHF)  Myocardial infarction  Hyperlipidemia  CAD (coronary artery disease)  DM (diabetes mellitus)  HTN (hypertension)    PSHx: Stented coronary artery  Arterial stent thrombosis    Medications (inpatient): amoxicillin 1000 milliGRAM(s) Oral two times a day  aspirin enteric coated 81 milliGRAM(s) Oral daily  atorvastatin 80 milliGRAM(s) Oral at bedtime  buMETAnide 4 milliGRAM(s) Oral two times a day  calcium acetate 667 milliGRAM(s) Oral three times a day with meals  carvedilol 6.25 milliGRAM(s) Oral every 12 hours  clarithromycin 500 milliGRAM(s) Oral two times a day  dextrose 5%. 1000 milliLiter(s) IV Continuous <Continuous>  dextrose 50% Injectable 12.5 Gram(s) IV Push once  dextrose 50% Injectable 25 Gram(s) IV Push once  dextrose 50% Injectable 25 Gram(s) IV Push once  hydrALAZINE 100 milliGRAM(s) Oral every 8 hours  insulin lispro (HumaLOG) corrective regimen sliding scale   SubCutaneous three times a day before meals  insulin lispro (HumaLOG) corrective regimen sliding scale   SubCutaneous at bedtime  pantoprazole    Tablet 40 milliGRAM(s) Oral two times a day    Medications (PRN):dextrose 40% Gel 15 Gram(s) Oral once PRN  glucagon  Injectable 1 milliGRAM(s) IntraMuscular once PRN    Allergies: No Known Allergies  (Intolerances: )  Social Hx:   Family Hx: Family history of diabetes mellitus (DM) (Sibling)  Family history of MI (myocardial infarction) (Sibling)      Physical Exam  T(C): 36.8  HR: 69 (67 - 75)  BP: 129/87 (127/77 - 146/93)  RR: 18 (18 - 18)  SpO2: 100% (99% - 100%)  Tmax: T(C): , Max: 36.9 (19 @ 16:27)    General: well developed, well nourished, NAD  Neuro: alert and oriented, no focal deficits, moves all extremities spontaneously  HEENT: NCAT, EOMI, anicteric, mucosa moist  Respiratory: airway patent, respirations unlabored  CVS: regular rate and rhythm  Abdomen: soft, nontender, nondistended  Extremities: no edema, sensation and movement grossly intact, bilateral radial and ulnar pulse palp  Skin: warm, dry, appropriate color    Labs:                        8.0    4.24  )-----------( 125      ( 10 Kali 2019 06:00 )             24.4     PT/INR - ( 10 Kali 2019 05:15 )   PT: 14.4 SEC;   INR: 1.29          PTT - ( 10 Kali 2019 05:15 )  PTT:34.2 SEC  01-10    139  |  95<L>  |  120<H>  ----------------------------<  113<H>  3.6   |  24  |  6.44<H>    Ca    8.2<L>      10 Kali 2019 05:15  Phos  6.1     01-10  Mg     1.7     01-10    TPro  6.6  /  Alb  3.5  /  TBili  0.3  /  DBili  x   /  AST  17  /  ALT  26  /  AlkPhos  110  01-10    Urinalysis Basic - ( 2019 12:49 )    Color: LIGHT YELLOW / Appearance: CLEAR / S.014 / pH: 6.0  Gluc: 150 / Ketone: NEGATIVE  / Bili: NEGATIVE / Urobili: NORMAL   Blood: TRACE / Protein: 200 / Nitrite: NEGATIVE   Leuk Esterase: NEGATIVE / RBC: 2-5 / WBC 0-2   Sq Epi: OCC / Non Sq Epi: x / Bacteria: NEGATIVE            Imaging and other studies: Vascular sx consult      CC: 53y old Male admitted with a chief complaint of told to come in for catheter placement for renal replacement therapy, now    HPI: Patient is a 53 year old male with hx of HFrEF (28%), CAD s/p stent, CKD V, T2DM, HTN, HLD, and recent admission for CHF optimization for AVF placement with course c/b anemia with concern for GIB (EGD: nonbleeding ulcers and erosions). Patient told come to the hospital for catheter placement to start HD. Patient undergoing permacath placement with IR today.  Vascular surgery consulted for AVF creation.     REVIEW OF SYSTEMS:  CONSTITUTIONAL: No weakness, fevers or chills  EYES/ENT: No visual changes;  No vertigo or throat pain   NECK: No pain or stiffness  RESPIRATORY: no shortness of breath  CARDIOVASCULAR: No chest pain or palpitations at present  GASTROINTESTINAL: No abdominal or epigastric pain. No nausea, vomiting, or hematemesis; No diarrhea or constipation. No melena or hematochezia.  GENITOURINARY: No dysuria, frequency, foamy urine, urinary urgency, incontinence or hematuria  NEUROLOGICAL: No numbness or weakness  SKIN: No itching, burning, rashes, or lesions   All other review of systems is negative unless indicated above.    PMHx: GIB (gastrointestinal bleeding)  Abnormal echocardiogram  CKD (chronic kidney disease), stage V  HFrEF (heart failure with reduced ejection fraction)  Congestive heart failure (CHF)  Myocardial infarction  Hyperlipidemia  CAD (coronary artery disease)  DM (diabetes mellitus)  HTN (hypertension)    PSHx: Stented coronary artery  Arterial stent thrombosis    Medications (inpatient): amoxicillin 1000 milliGRAM(s) Oral two times a day  aspirin enteric coated 81 milliGRAM(s) Oral daily  atorvastatin 80 milliGRAM(s) Oral at bedtime  buMETAnide 4 milliGRAM(s) Oral two times a day  calcium acetate 667 milliGRAM(s) Oral three times a day with meals  carvedilol 6.25 milliGRAM(s) Oral every 12 hours  clarithromycin 500 milliGRAM(s) Oral two times a day  dextrose 5%. 1000 milliLiter(s) IV Continuous <Continuous>  dextrose 50% Injectable 12.5 Gram(s) IV Push once  dextrose 50% Injectable 25 Gram(s) IV Push once  dextrose 50% Injectable 25 Gram(s) IV Push once  hydrALAZINE 100 milliGRAM(s) Oral every 8 hours  insulin lispro (HumaLOG) corrective regimen sliding scale   SubCutaneous three times a day before meals  insulin lispro (HumaLOG) corrective regimen sliding scale   SubCutaneous at bedtime  pantoprazole    Tablet 40 milliGRAM(s) Oral two times a day    Medications (PRN):dextrose 40% Gel 15 Gram(s) Oral once PRN  glucagon  Injectable 1 milliGRAM(s) IntraMuscular once PRN    Allergies: No Known Allergies  (Intolerances: )  Social Hx:   Family Hx: Family history of diabetes mellitus (DM) (Sibling)  Family history of MI (myocardial infarction) (Sibling)      Physical Exam  T(C): 36.8  HR: 69 (67 - 75)  BP: 129/87 (127/77 - 146/93)  RR: 18 (18 - 18)  SpO2: 100% (99% - 100%)  Tmax: T(C): , Max: 36.9 (19 @ 16:27)    General: well developed, well nourished, NAD  Neuro: alert and oriented, no focal deficits, moves all extremities spontaneously  HEENT: NCAT, EOMI, anicteric, mucosa moist  Respiratory: airway patent, respirations unlabored  CVS: regular rate and rhythm  Abdomen: soft, nontender, nondistended  Extremities: no edema, sensation and movement grossly intact, bilateral radial and ulnar pulse palp  Skin: warm, dry, appropriate color    Labs:                        8.0    4.24  )-----------( 125      ( 10 Kali 2019 06:00 )             24.4     PT/INR - ( 10 Kali 2019 05:15 )   PT: 14.4 SEC;   INR: 1.29          PTT - ( 10 Kali 2019 05:15 )  PTT:34.2 SEC  01-10    139  |  95<L>  |  120<H>  ----------------------------<  113<H>  3.6   |  24  |  6.44<H>    Ca    8.2<L>      10 Kali 2019 05:15  Phos  6.1     01-10  Mg     1.7     01-10    TPro  6.6  /  Alb  3.5  /  TBili  0.3  /  DBili  x   /  AST  17  /  ALT  26  /  AlkPhos  110  01-10    Urinalysis Basic - ( 2019 12:49 )    Color: LIGHT YELLOW / Appearance: CLEAR / S.014 / pH: 6.0  Gluc: 150 / Ketone: NEGATIVE  / Bili: NEGATIVE / Urobili: NORMAL   Blood: TRACE / Protein: 200 / Nitrite: NEGATIVE   Leuk Esterase: NEGATIVE / RBC: 2-5 / WBC 0-2   Sq Epi: OCC / Non Sq Epi: x / Bacteria: NEGATIVE            Imaging and other studies:

## 2019-01-11 DIAGNOSIS — D64.9 ANEMIA, UNSPECIFIED: ICD-10-CM

## 2019-01-11 LAB
ANION GAP SERPL CALC-SCNC: 16 MEQ/L — HIGH (ref 7–14)
ANISOCYTOSIS BLD QL: SLIGHT — SIGNIFICANT CHANGE UP
BASOPHILS # BLD AUTO: 0 K/UL — SIGNIFICANT CHANGE UP (ref 0–0.2)
BASOPHILS NFR BLD AUTO: 0 % — SIGNIFICANT CHANGE UP (ref 0–2)
BASOPHILS NFR SPEC: 0 % — SIGNIFICANT CHANGE UP (ref 0–2)
BLASTS # FLD: 0 % — SIGNIFICANT CHANGE UP (ref 0–0)
BUN SERPL-MCNC: 94 MG/DL — HIGH (ref 7–23)
CALCIUM SERPL-MCNC: 7.9 MG/DL — LOW (ref 8.4–10.5)
CHLORIDE SERPL-SCNC: 96 MMOL/L — LOW (ref 98–107)
CO2 SERPL-SCNC: 23 MMOL/L — SIGNIFICANT CHANGE UP (ref 22–31)
CREAT SERPL-MCNC: 5.07 MG/DL — HIGH (ref 0.5–1.3)
ELLIPTOCYTES BLD QL SMEAR: SLIGHT — SIGNIFICANT CHANGE UP
EOSINOPHIL # BLD AUTO: 0 K/UL — SIGNIFICANT CHANGE UP (ref 0–0.5)
EOSINOPHIL NFR BLD AUTO: 0 % — SIGNIFICANT CHANGE UP (ref 0–6)
EOSINOPHIL NFR FLD: 0 % — SIGNIFICANT CHANGE UP (ref 0–6)
GIANT PLATELETS BLD QL SMEAR: PRESENT — SIGNIFICANT CHANGE UP
GLUCOSE SERPL-MCNC: 433 MG/DL — HIGH (ref 70–99)
HBA1C BLD-MCNC: 6.5 % — HIGH (ref 4–5.6)
HBA1C BLD-MCNC: 6.5 % — HIGH (ref 4–5.6)
HBV SURFACE AB SER-ACNC: 42.8 MLU/ML — SIGNIFICANT CHANGE UP
HCT VFR BLD CALC: 27.3 % — LOW (ref 39–50)
HGB BLD-MCNC: 8.8 G/DL — LOW (ref 13–17)
HYPOCHROMIA BLD QL: SLIGHT — SIGNIFICANT CHANGE UP
IMM GRANULOCYTES NFR BLD AUTO: 0.4 % — SIGNIFICANT CHANGE UP (ref 0–1.5)
LYMPHOCYTES # BLD AUTO: 0.23 K/UL — LOW (ref 1–3.3)
LYMPHOCYTES # BLD AUTO: 4.5 % — LOW (ref 13–44)
LYMPHOCYTES NFR SPEC AUTO: 4.4 % — LOW (ref 13–44)
MACROCYTES BLD QL: SLIGHT — SIGNIFICANT CHANGE UP
MAGNESIUM SERPL-MCNC: 1.8 MG/DL — SIGNIFICANT CHANGE UP (ref 1.6–2.6)
MCHC RBC-ENTMCNC: 28 PG — SIGNIFICANT CHANGE UP (ref 27–34)
MCHC RBC-ENTMCNC: 32.2 % — SIGNIFICANT CHANGE UP (ref 32–36)
MCV RBC AUTO: 86.9 FL — SIGNIFICANT CHANGE UP (ref 80–100)
METAMYELOCYTES # FLD: 0 % — SIGNIFICANT CHANGE UP (ref 0–1)
MICROCYTES BLD QL: SLIGHT — SIGNIFICANT CHANGE UP
MONOCYTES # BLD AUTO: 0.17 K/UL — SIGNIFICANT CHANGE UP (ref 0–0.9)
MONOCYTES NFR BLD AUTO: 3.4 % — SIGNIFICANT CHANGE UP (ref 2–14)
MONOCYTES NFR BLD: 1.7 % — LOW (ref 2–9)
MYELOCYTES NFR BLD: 0 % — SIGNIFICANT CHANGE UP (ref 0–0)
NEUTROPHIL AB SER-ACNC: 93 % — HIGH (ref 43–77)
NEUTROPHILS # BLD AUTO: 4.65 K/UL — SIGNIFICANT CHANGE UP (ref 1.8–7.4)
NEUTROPHILS NFR BLD AUTO: 91.7 % — HIGH (ref 43–77)
NEUTS BAND # BLD: 0 % — SIGNIFICANT CHANGE UP (ref 0–6)
NRBC # FLD: 0 K/UL — LOW (ref 25–125)
OTHER - HEMATOLOGY %: 0 — SIGNIFICANT CHANGE UP
OVALOCYTES BLD QL SMEAR: SLIGHT — SIGNIFICANT CHANGE UP
PHOSPHATE SERPL-MCNC: 3.3 MG/DL — SIGNIFICANT CHANGE UP (ref 2.5–4.5)
PLATELET # BLD AUTO: 89 K/UL — LOW (ref 150–400)
PLATELET COUNT - ESTIMATE: SIGNIFICANT CHANGE UP
PMV BLD: 10.4 FL — SIGNIFICANT CHANGE UP (ref 7–13)
POIKILOCYTOSIS BLD QL AUTO: SLIGHT — SIGNIFICANT CHANGE UP
POLYCHROMASIA BLD QL SMEAR: SLIGHT — SIGNIFICANT CHANGE UP
POTASSIUM SERPL-MCNC: 4.3 MMOL/L — SIGNIFICANT CHANGE UP (ref 3.5–5.3)
POTASSIUM SERPL-SCNC: 4.3 MMOL/L — SIGNIFICANT CHANGE UP (ref 3.5–5.3)
PROMYELOCYTES # FLD: 0 % — SIGNIFICANT CHANGE UP (ref 0–0)
RBC # BLD: 3.14 M/UL — LOW (ref 4.2–5.8)
RBC # FLD: 14.2 % — SIGNIFICANT CHANGE UP (ref 10.3–14.5)
SODIUM SERPL-SCNC: 135 MMOL/L — SIGNIFICANT CHANGE UP (ref 135–145)
VARIANT LYMPHS # BLD: 0 % — SIGNIFICANT CHANGE UP
WBC # BLD: 5.07 K/UL — SIGNIFICANT CHANGE UP (ref 3.8–10.5)
WBC # FLD AUTO: 5.07 K/UL — SIGNIFICANT CHANGE UP (ref 3.8–10.5)

## 2019-01-11 PROCEDURE — 71045 X-RAY EXAM CHEST 1 VIEW: CPT | Mod: 26

## 2019-01-11 PROCEDURE — 90935 HEMODIALYSIS ONE EVALUATION: CPT | Mod: GC

## 2019-01-11 PROCEDURE — G0365: CPT | Mod: 26

## 2019-01-11 PROCEDURE — 99233 SBSQ HOSP IP/OBS HIGH 50: CPT | Mod: GC

## 2019-01-11 PROCEDURE — 99233 SBSQ HOSP IP/OBS HIGH 50: CPT

## 2019-01-11 RX ORDER — CHLORHEXIDINE GLUCONATE 213 G/1000ML
1 SOLUTION TOPICAL
Qty: 0 | Refills: 0 | Status: DISCONTINUED | OUTPATIENT
Start: 2019-01-11 | End: 2019-01-15

## 2019-01-11 RX ORDER — INSULIN GLARGINE 100 [IU]/ML
3 INJECTION, SOLUTION SUBCUTANEOUS AT BEDTIME
Qty: 0 | Refills: 0 | Status: DISCONTINUED | OUTPATIENT
Start: 2019-01-11 | End: 2019-01-11

## 2019-01-11 RX ORDER — HEPARIN SODIUM 5000 [USP'U]/ML
5000 INJECTION INTRAVENOUS; SUBCUTANEOUS EVERY 8 HOURS
Qty: 0 | Refills: 0 | Status: DISCONTINUED | OUTPATIENT
Start: 2019-01-11 | End: 2019-01-12

## 2019-01-11 RX ORDER — INSULIN LISPRO 100/ML
3 VIAL (ML) SUBCUTANEOUS
Qty: 0 | Refills: 0 | Status: DISCONTINUED | OUTPATIENT
Start: 2019-01-11 | End: 2019-01-15

## 2019-01-11 RX ORDER — BUMETANIDE 0.25 MG/ML
6 INJECTION INTRAMUSCULAR; INTRAVENOUS
Qty: 0 | Refills: 0 | Status: DISCONTINUED | OUTPATIENT
Start: 2019-01-11 | End: 2019-01-15

## 2019-01-11 RX ORDER — POLYETHYLENE GLYCOL 3350 17 G/17G
17 POWDER, FOR SOLUTION ORAL DAILY
Qty: 0 | Refills: 0 | Status: DISCONTINUED | OUTPATIENT
Start: 2019-01-11 | End: 2019-01-15

## 2019-01-11 RX ORDER — SENNA PLUS 8.6 MG/1
2 TABLET ORAL AT BEDTIME
Qty: 0 | Refills: 0 | Status: DISCONTINUED | OUTPATIENT
Start: 2019-01-11 | End: 2019-01-15

## 2019-01-11 RX ORDER — INSULIN LISPRO 100/ML
5 VIAL (ML) SUBCUTANEOUS ONCE
Qty: 0 | Refills: 0 | Status: COMPLETED | OUTPATIENT
Start: 2019-01-11 | End: 2019-01-11

## 2019-01-11 RX ORDER — ERYTHROPOIETIN 10000 [IU]/ML
4000 INJECTION, SOLUTION INTRAVENOUS; SUBCUTANEOUS ONCE
Qty: 0 | Refills: 0 | Status: COMPLETED | OUTPATIENT
Start: 2019-01-11 | End: 2019-01-11

## 2019-01-11 RX ORDER — INSULIN GLARGINE 100 [IU]/ML
5 INJECTION, SOLUTION SUBCUTANEOUS AT BEDTIME
Qty: 0 | Refills: 0 | Status: DISCONTINUED | OUTPATIENT
Start: 2019-01-11 | End: 2019-01-13

## 2019-01-11 RX ADMIN — CHLORHEXIDINE GLUCONATE 1 APPLICATION(S): 213 SOLUTION TOPICAL at 17:42

## 2019-01-11 RX ADMIN — Medication 3 UNIT(S): at 18:03

## 2019-01-11 RX ADMIN — Medication 1000 MILLIGRAM(S): at 17:40

## 2019-01-11 RX ADMIN — INSULIN GLARGINE 5 UNIT(S): 100 INJECTION, SOLUTION SUBCUTANEOUS at 22:46

## 2019-01-11 RX ADMIN — Medication 100 MILLIGRAM(S): at 22:46

## 2019-01-11 RX ADMIN — HEPARIN SODIUM 5000 UNIT(S): 5000 INJECTION INTRAVENOUS; SUBCUTANEOUS at 22:45

## 2019-01-11 RX ADMIN — PANTOPRAZOLE SODIUM 40 MILLIGRAM(S): 20 TABLET, DELAYED RELEASE ORAL at 17:40

## 2019-01-11 RX ADMIN — Medication 667 MILLIGRAM(S): at 17:40

## 2019-01-11 RX ADMIN — Medication 100 MILLIGRAM(S): at 13:09

## 2019-01-11 RX ADMIN — Medication 500 MILLIGRAM(S): at 05:59

## 2019-01-11 RX ADMIN — Medication 2: at 08:58

## 2019-01-11 RX ADMIN — HEPARIN SODIUM 5000 UNIT(S): 5000 INJECTION INTRAVENOUS; SUBCUTANEOUS at 13:09

## 2019-01-11 RX ADMIN — ISOSORBIDE DINITRATE 10 MILLIGRAM(S): 5 TABLET ORAL at 23:40

## 2019-01-11 RX ADMIN — Medication 81 MILLIGRAM(S): at 13:08

## 2019-01-11 RX ADMIN — CARVEDILOL PHOSPHATE 6.25 MILLIGRAM(S): 80 CAPSULE, EXTENDED RELEASE ORAL at 17:41

## 2019-01-11 RX ADMIN — Medication 5 UNIT(S): at 06:15

## 2019-01-11 RX ADMIN — Medication 1: at 22:47

## 2019-01-11 RX ADMIN — Medication 500 MILLIGRAM(S): at 17:41

## 2019-01-11 RX ADMIN — BUMETANIDE 6 MILLIGRAM(S): 0.25 INJECTION INTRAMUSCULAR; INTRAVENOUS at 17:41

## 2019-01-11 RX ADMIN — ISOSORBIDE DINITRATE 10 MILLIGRAM(S): 5 TABLET ORAL at 13:09

## 2019-01-11 RX ADMIN — ERYTHROPOIETIN 4000 UNIT(S): 10000 INJECTION, SOLUTION INTRAVENOUS; SUBCUTANEOUS at 08:51

## 2019-01-11 RX ADMIN — ATORVASTATIN CALCIUM 80 MILLIGRAM(S): 80 TABLET, FILM COATED ORAL at 22:46

## 2019-01-11 RX ADMIN — SENNA PLUS 2 TABLET(S): 8.6 TABLET ORAL at 22:46

## 2019-01-11 RX ADMIN — Medication 3: at 13:09

## 2019-01-11 RX ADMIN — Medication 1000 MILLIGRAM(S): at 05:58

## 2019-01-11 RX ADMIN — CHLORHEXIDINE GLUCONATE 1 APPLICATION(S): 213 SOLUTION TOPICAL at 06:01

## 2019-01-11 RX ADMIN — Medication 2: at 18:03

## 2019-01-11 RX ADMIN — Medication 667 MILLIGRAM(S): at 13:09

## 2019-01-11 RX ADMIN — PANTOPRAZOLE SODIUM 40 MILLIGRAM(S): 20 TABLET, DELAYED RELEASE ORAL at 05:58

## 2019-01-11 RX ADMIN — Medication 3 UNIT(S): at 09:15

## 2019-01-11 RX ADMIN — POLYETHYLENE GLYCOL 3350 17 GRAM(S): 17 POWDER, FOR SOLUTION ORAL at 17:49

## 2019-01-11 NOTE — PROGRESS NOTE ADULT - PROBLEM SELECTOR PLAN 2
- hx of CAD w/ MI with subsequent ischemic cardiomyopathy: Last EF 24%   - clinically does not look volume overloaded and hemodynamically stable  - c/w home regimen of bumex, hydralazine, coreg  -Per heart failure team, start on isordil 10 mg three times/day

## 2019-01-11 NOTE — PROGRESS NOTE ADULT - ASSESSMENT
Patient is a 53M with ESRD, permacath placement today to start HD, vasc surgery consulted for AVF    Plan/recommendations:  - plan for AVF on Monday  - please obtain bilateral UE vein mapping  - Left arm precautions- do not use for blood draws, IVs, or blood pressure cuffs  - Please document medical/cardiac optimization and risk stratification today  - PLease dialyze patient over the weekend (preferably on Sunday)    MISHEL Jara, PGY-2  Vascular Surgery (C Team)  p. [phone]

## 2019-01-11 NOTE — PROGRESS NOTE ADULT - ASSESSMENT
53 year old male with hx of chronic HFrEF (24%), CAD s/p stent, CKD V, T2DM, HTN, HLD, and recent admission for CHF optimization for AVF placement with course c/b anemia, s/p permacath placement for RRT, and 2 sessions of HD

## 2019-01-11 NOTE — PROGRESS NOTE ADULT - SUBJECTIVE AND OBJECTIVE BOX
Interval History:  doing well  got HD last night and today morning   no BM in 4 days    Medications:  amoxicillin 1000 milliGRAM(s) Oral two times a day  aspirin enteric coated 81 milliGRAM(s) Oral daily  atorvastatin 80 milliGRAM(s) Oral at bedtime  buMETAnide 6 milliGRAM(s) Oral two times a day  calcium acetate 667 milliGRAM(s) Oral three times a day with meals  carvedilol 6.25 milliGRAM(s) Oral every 12 hours  chlorhexidine 4% Liquid 1 Application(s) Topical two times a day  clarithromycin 500 milliGRAM(s) Oral two times a day  dextrose 40% Gel 15 Gram(s) Oral once PRN  dextrose 5%. 1000 milliLiter(s) IV Continuous <Continuous>  dextrose 50% Injectable 12.5 Gram(s) IV Push once  dextrose 50% Injectable 25 Gram(s) IV Push once  dextrose 50% Injectable 25 Gram(s) IV Push once  glucagon  Injectable 1 milliGRAM(s) IntraMuscular once PRN  heparin  Injectable 5000 Unit(s) SubCutaneous every 8 hours  hydrALAZINE 100 milliGRAM(s) Oral every 8 hours  insulin glargine Injectable (LANTUS) 5 Unit(s) SubCutaneous at bedtime  insulin lispro (HumaLOG) corrective regimen sliding scale   SubCutaneous three times a day before meals  insulin lispro (HumaLOG) corrective regimen sliding scale   SubCutaneous at bedtime  insulin lispro Injectable (HumaLOG) 3 Unit(s) SubCutaneous three times a day before meals  isosorbide   dinitrate Tablet (ISORDIL) 10 milliGRAM(s) Oral three times a day  pantoprazole    Tablet 40 milliGRAM(s) Oral two times a day  polyethylene glycol 3350 17 Gram(s) Oral daily  senna 2 Tablet(s) Oral at bedtime      Vitals:  T(C): 36.6 (19 @ 13:12), Max: 36.7 (01-10-19 @ 17:17)  HR: 74 (19 @ 13:12) (74 - 83)  BP: 124/74 (19 @ 13:12) (112/71 - 152/94)  BP(mean): --  RR: 18 (19 @ 13:12) (17 - 18)  SpO2: 98% (19 @ 13:12) (98% - 100%)    Daily Height in cm: 165.1 (2019 09:10)    Daily Weight in k.1 (2019 09:40)    Weight (kg): 79.2 ( @ 09:10)    I&O's Summary    10 Kali 2019 07:  -  2019 07:00  --------------------------------------------------------  IN: 400 mL / OUT: 1000 mL / NET: -600 mL    2019 07:  -  2019 15:39  --------------------------------------------------------  IN: 500 mL / OUT: 1400 mL / NET: -900 mL    Physical Exam:  Appearance: No Acute Distress  HEENT: PERRL  Neck: JVD approx 10 cm with HJR to 12-14  Cardiovascular: Normal S1 S2, No murmurs/rubs/gallops  Respiratory: Clear to auscultation bilaterally  Gastrointestinal: Soft, Non-tender	  Skin: No cyanosis	  Neurologic: Non-focal  Extremities: No LE edema  Psychiatry: A & O x 3, Mood & affect appropriate    Labs:                        8.8    5.07  )-----------( 89       ( 2019 05:25 )             27.3     11    135  |  96<L>  |  94<H>  ----------------------------<  433<H>  4.3   |  23  |  5.07<H>    Ca    7.9<L>      2019 05:25  Phos  3.3       Mg     1.8         TPro  6.6  /  Alb  3.5  /  TBili  0.3  /  DBili  x   /  AST  17  /  ALT  26  /  AlkPhos  110  10    PT/INR - ( 10 Kali 2019 05:15 )   PT: 14.4 SEC;   INR: 1.29          PTT - ( 10 Kali 2019 05:15 )  PTT:34.2 SEC

## 2019-01-11 NOTE — PROGRESS NOTE ADULT - SUBJECTIVE AND OBJECTIVE BOX
SUBJECTIVE: 52yo Man seen and examined during morning rounds. No acute events overnight. Afebrile, VS stable. Pain well controlled with current regimen. His main concern this morning was getting help filing for disability because he reports being unable to work.    OBJECTIVE:    Physical Exam:  Gen: Resting in bed, NAD, alert and oriented  Resp: respirations unlabored, no increased WOB   Ext: +radial pulses bilaterally    Vital Signs Last 24 Hrs  T(C): 36.4 (11 Jan 2019 06:35), Max: 36.7 (10 Kali 2019 17:17)  T(F): 97.5 (11 Jan 2019 06:35), Max: 98.1 (10 Kali 2019 23:15)  HR: 74 (11 Jan 2019 06:35) (74 - 83)  BP: 146/88 (11 Jan 2019 06:35) (112/71 - 146/88)  BP(mean): --  RR: 18 (11 Jan 2019 06:35) (17 - 18)  SpO2: 100% (11 Jan 2019 05:56) (100% - 100%)    I&O's Detail    10 Kali 2019 07:01  -  11 Jan 2019 07:00  --------------------------------------------------------  IN:    Other: 400 mL  Total IN: 400 mL    OUT:    Other: 900 mL    Voided: 100 mL  Total OUT: 1000 mL    Total NET: -600 mL      MEDICATIONS  (STANDING):  amoxicillin 1000 milliGRAM(s) Oral two times a day  aspirin enteric coated 81 milliGRAM(s) Oral daily  atorvastatin 80 milliGRAM(s) Oral at bedtime  buMETAnide 4 milliGRAM(s) Oral two times a day  calcium acetate 667 milliGRAM(s) Oral three times a day with meals  carvedilol 6.25 milliGRAM(s) Oral every 12 hours  chlorhexidine 4% Liquid 1 Application(s) Topical two times a day  clarithromycin 500 milliGRAM(s) Oral two times a day  dextrose 5%. 1000 milliLiter(s) (50 mL/Hr) IV Continuous <Continuous>  dextrose 50% Injectable 12.5 Gram(s) IV Push once  dextrose 50% Injectable 25 Gram(s) IV Push once  dextrose 50% Injectable 25 Gram(s) IV Push once  heparin  Injectable 5000 Unit(s) SubCutaneous every 8 hours  hydrALAZINE 100 milliGRAM(s) Oral every 8 hours  insulin glargine Injectable (LANTUS) 3 Unit(s) SubCutaneous at bedtime  insulin lispro (HumaLOG) corrective regimen sliding scale   SubCutaneous three times a day before meals  insulin lispro (HumaLOG) corrective regimen sliding scale   SubCutaneous at bedtime  insulin lispro Injectable (HumaLOG) 3 Unit(s) SubCutaneous three times a day before meals  isosorbide   dinitrate Tablet (ISORDIL) 10 milliGRAM(s) Oral three times a day  pantoprazole    Tablet 40 milliGRAM(s) Oral two times a day    MEDICATIONS  (PRN):  dextrose 40% Gel 15 Gram(s) Oral once PRN Blood Glucose LESS THAN 70 milliGRAM(s)/deciliter  glucagon  Injectable 1 milliGRAM(s) IntraMuscular once PRN Glucose LESS THAN 70 milligrams/deciliter      LABS:                        8.8    5.07  )-----------( 89       ( 11 Jan 2019 05:25 )             27.3       01-11    135  |  96<L>  |  94<H>  ----------------------------<  433<H>  4.3   |  23  |  5.07<H>    Ca    7.9<L>      11 Jan 2019 05:25  Phos  3.3     01-11  Mg     1.8     01-11    TPro  6.6  /  Alb  3.5  /  TBili  0.3  /  DBili  x   /  AST  17  /  ALT  26  /  AlkPhos  110  01-10          NEGATIVE 01-09-19 @ 12:49

## 2019-01-11 NOTE — PROGRESS NOTE ADULT - PROBLEM SELECTOR PLAN 1
Pt. with advanced/progressive CKD stage 5 in the setting of long-standing uncontrolled DM and HTN. Scr. before admission was 5.55 on 12/4/18 and Scr. was elevated at 7.15 on 12/14/18. Labs done during this admission showed Scr. of 6.72 on 1/9/19. Pt. with most likely diabetic nephropathy. Started HD and had tunneled catheter on 1/10/19. Tolerating HD well today, plan for HD tomorrow.   AVF creation schedule for 1/14/19.  Monitor UOP and daily weights. Avoid volume depletion, NSAIDs, ARB/ACE-I. Dose medications as per eGFR. Pt. with advanced/progressive CKD stage 5 in the setting of long-standing uncontrolled DM and HTN. Scr. before admission was 5.55 on 12/4/18 and Scr. was elevated at 7.15 on 12/14/18. Labs done during this admission showed Scr. of 6.72 on 1/9/19. Pt. with most likely diabetic nephropathy.   Started HD and had tunneled catheter on 1/10/19. Tolerating HD well today, plan for HD tomorrow.   AVF creation schedule for 1/14/19.  Monitor UOP and daily weights.   Avoid volume depletion, NSAIDs, ARB/ACE-I.   Dose medications as per eGFR.

## 2019-01-11 NOTE — PROGRESS NOTE ADULT - SUBJECTIVE AND OBJECTIVE BOX
Cori Butcher MD-PGY1  Department of Internal Medicine  Pager [phone]    OVERNIGHT EVENTS: fingerstick glucose were elevated at 382, received 5 units Lispro    SUBJECTIVE: Pt seen and examined at dialysis. Pt states he "overdid it" by eating too much after having the permacath placed because he was very hungry. Bag of croissants at bedside. Per outpt records pt was on metformin although he has CKD stage V.     Vital Signs Last 12 Hrs  T(F): 97.5 (19 @ 06:35), Max: 98.1 (01-10-19 @ 23:15)  HR: 74 (19 @ 06:35) (74 - 79)  BP: 146/88 (19 @ 06:35) (127/69 - 146/88)  BP(mean): --  RR: 18 (19 @ 06:35) (17 - 18)  SpO2: 100% (19 @ 05:56) (100% - 100%)  I&O's Summary    10 Kali 2019 07:01  -  2019 07:00  --------------------------------------------------------  IN: 400 mL / OUT: 1000 mL / NET: -600 mL        PHYSICAL EXAM:    Constitutional: NAD, AAOx3, comfortable in bed.   Respiratory: Normal rate, rhythm, depth, effort. CTAB. No w/r/r.   Cardiovascular: RRR, normal S1 and S2, no m/r/g.   Gastrointestinal: +BS, soft NTND.   Extremities: wwp, no edema.   Vascular: Pulses equal and strong throughout.   Neurological: Cranial nerves grossly intact, strength and sensation intact throughout.   Skin: No gross skin abnormalities.         LABS:                        8.8    5.07  )-----------( 89       ( 2019 05:25 )             27.3     -    135  |  96<L>  |  94<H>  ----------------------------<  433<H>  4.3   |  23  |  5.07<H>    Ca    7.9<L>      2019 05:25  Phos  3.3       Mg     1.8         TPro  6.6  /  Alb  3.5  /  TBili  0.3  /  DBili  x   /  AST  17  /  ALT  26  /  AlkPhos  110  01-10    PT/INR - ( 10 Kali 2019 05:15 )   PT: 14.4 SEC;   INR: 1.29          PTT - ( 10 Kali 2019 05:15 )  PTT:34.2 SEC  Urinalysis Basic - ( 2019 12:49 )    Color: LIGHT YELLOW / Appearance: CLEAR / S.014 / pH: 6.0  Gluc: 150 / Ketone: NEGATIVE  / Bili: NEGATIVE / Urobili: NORMAL   Blood: TRACE / Protein: 200 / Nitrite: NEGATIVE   Leuk Esterase: NEGATIVE / RBC: 2-5 / WBC 0-2   Sq Epi: OCC / Non Sq Epi: x / Bacteria: NEGATIVE      RADIOLOGY & ADDITIONAL TESTS: reviewed    MEDICATIONS  (STANDING):  amoxicillin 1000 milliGRAM(s) Oral two times a day  aspirin enteric coated 81 milliGRAM(s) Oral daily  atorvastatin 80 milliGRAM(s) Oral at bedtime  buMETAnide 4 milliGRAM(s) Oral two times a day  calcium acetate 667 milliGRAM(s) Oral three times a day with meals  carvedilol 6.25 milliGRAM(s) Oral every 12 hours  chlorhexidine 4% Liquid 1 Application(s) Topical two times a day  clarithromycin 500 milliGRAM(s) Oral two times a day  dextrose 5%. 1000 milliLiter(s) (50 mL/Hr) IV Continuous <Continuous>  dextrose 50% Injectable 12.5 Gram(s) IV Push once  dextrose 50% Injectable 25 Gram(s) IV Push once  dextrose 50% Injectable 25 Gram(s) IV Push once  heparin  Injectable 5000 Unit(s) SubCutaneous every 8 hours  hydrALAZINE 100 milliGRAM(s) Oral every 8 hours  insulin glargine Injectable (LANTUS) 3 Unit(s) SubCutaneous at bedtime  insulin lispro (HumaLOG) corrective regimen sliding scale   SubCutaneous three times a day before meals  insulin lispro (HumaLOG) corrective regimen sliding scale   SubCutaneous at bedtime  insulin lispro Injectable (HumaLOG) 3 Unit(s) SubCutaneous three times a day before meals  isosorbide   dinitrate Tablet (ISORDIL) 10 milliGRAM(s) Oral three times a day  pantoprazole    Tablet 40 milliGRAM(s) Oral two times a day    MEDICATIONS  (PRN):  dextrose 40% Gel 15 Gram(s) Oral once PRN Blood Glucose LESS THAN 70 milliGRAM(s)/deciliter  glucagon  Injectable 1 milliGRAM(s) IntraMuscular once PRN Glucose LESS THAN 70 milligrams/deciliter Cori Butcher MD-PGY1  Department of Internal Medicine  Pager [phone]    OVERNIGHT EVENTS: fingerstick glucose were elevated at 382, received 5 units Lispro    SUBJECTIVE: Pt seen and examined at dialysis. Pt states he "overdid it" by eating too much after having the permacath placed because he was very hungry. Bag of croissants at bedside. Per outpt records pt was on metformin although he has CKD stage V.     Vital Signs Last 12 Hrs  T(F): 97.5 (19 @ 06:35), Max: 98.1 (01-10-19 @ 23:15)  HR: 74 (19 @ 06:35) (74 - 79)  BP: 146/88 (19 @ 06:35) (127/69 - 146/88)  BP(mean): --  RR: 18 (19 @ 06:35) (17 - 18)  SpO2: 100% (19 @ 05:56) (100% - 100%)  I&O's Summary    10 Kali 2019 07:01  -  2019 07:00  --------------------------------------------------------  IN: 400 mL / OUT: 1000 mL / NET: -600 mL        PHYSICAL EXAM:    Constitutional: NAD, AAOx3, comfortable in bed.   Respiratory: Normal rate, rhythm, depth, effort. CTAB. No w/r/r.   Cardiovascular: RRR, normal S1 and S2, no m/r/g.   Gastrointestinal: +BS, soft NTND.   Extremities: wwp, no edema.   Vascular: Pulses equal and strong throughout.   Neurological: Cranial nerves grossly intact, strength and sensation intact throughout.   Skin: No gross skin abnormalities. , +right permacath site c/d/i        LABS:                        8.8    5.07  )-----------( 89       ( 2019 05:25 )             27.3     01-    135  |  96<L>  |  94<H>  ----------------------------<  433<H>  4.3   |  23  |  5.07<H>    Ca    7.9<L>      2019 05:25  Phos  3.3       Mg     1.8         TPro  6.6  /  Alb  3.5  /  TBili  0.3  /  DBili  x   /  AST  17  /  ALT  26  /  AlkPhos  110  -10    PT/INR - ( 10 Kali 2019 05:15 )   PT: 14.4 SEC;   INR: 1.29          PTT - ( 10 Kali 2019 05:15 )  PTT:34.2 SEC  Urinalysis Basic - ( 2019 12:49 )    Color: LIGHT YELLOW / Appearance: CLEAR / S.014 / pH: 6.0  Gluc: 150 / Ketone: NEGATIVE  / Bili: NEGATIVE / Urobili: NORMAL   Blood: TRACE / Protein: 200 / Nitrite: NEGATIVE   Leuk Esterase: NEGATIVE / RBC: 2-5 / WBC 0-2   Sq Epi: OCC / Non Sq Epi: x / Bacteria: NEGATIVE    Hepatitis B Surface Antigen: NEGATIVE (19 @ 17:40)      RADIOLOGY & ADDITIONAL TESTS: reviewed    MEDICATIONS  (STANDING):  amoxicillin 1000 milliGRAM(s) Oral two times a day  aspirin enteric coated 81 milliGRAM(s) Oral daily  atorvastatin 80 milliGRAM(s) Oral at bedtime  buMETAnide 4 milliGRAM(s) Oral two times a day  calcium acetate 667 milliGRAM(s) Oral three times a day with meals  carvedilol 6.25 milliGRAM(s) Oral every 12 hours  chlorhexidine 4% Liquid 1 Application(s) Topical two times a day  clarithromycin 500 milliGRAM(s) Oral two times a day  dextrose 5%. 1000 milliLiter(s) (50 mL/Hr) IV Continuous <Continuous>  dextrose 50% Injectable 12.5 Gram(s) IV Push once  dextrose 50% Injectable 25 Gram(s) IV Push once  dextrose 50% Injectable 25 Gram(s) IV Push once  heparin  Injectable 5000 Unit(s) SubCutaneous every 8 hours  hydrALAZINE 100 milliGRAM(s) Oral every 8 hours  insulin glargine Injectable (LANTUS) 3 Unit(s) SubCutaneous at bedtime  insulin lispro (HumaLOG) corrective regimen sliding scale   SubCutaneous three times a day before meals  insulin lispro (HumaLOG) corrective regimen sliding scale   SubCutaneous at bedtime  insulin lispro Injectable (HumaLOG) 3 Unit(s) SubCutaneous three times a day before meals  isosorbide   dinitrate Tablet (ISORDIL) 10 milliGRAM(s) Oral three times a day  pantoprazole    Tablet 40 milliGRAM(s) Oral two times a day    MEDICATIONS  (PRN):  dextrose 40% Gel 15 Gram(s) Oral once PRN Blood Glucose LESS THAN 70 milliGRAM(s)/deciliter  glucagon  Injectable 1 milliGRAM(s) IntraMuscular once PRN Glucose LESS THAN 70 milligrams/deciliter    CASE DISCUSSED WITH: Dr. Pavon (CHF Team) re: cardiac optimization  CONSULTANT NOTES REVIEWED: Vascular Surgery, Renal

## 2019-01-11 NOTE — PROGRESS NOTE ADULT - PROBLEM SELECTOR PLAN 2
Patient with anemia in the setting of ESRD. Hemoglobin below target range. Check iron studies. Will give EPO. Monitor hemoglobin.

## 2019-01-11 NOTE — PROGRESS NOTE ADULT - PROBLEM SELECTOR PLAN 6
- hx of T2DM, not on oral hypoglycemics according to pt, on metformin per outpt records  - initiate insulin regimen today

## 2019-01-11 NOTE — PROGRESS NOTE ADULT - PROBLEM SELECTOR PLAN 1
- s/p permacath placement with IR and initiation of HD yesterday, HD #2 this morning  - c/w bumex 4mg BID, daily weights (dry weight roughly 165-168lbs) and monitor I/Os   - appreciate renal recs: f/u hepatitis panel   - vascular surgery consult for AVF placement (), pending repeat vein mapping

## 2019-01-11 NOTE — PROVIDER CONTACT NOTE (OTHER) - SITUATION
Pt noted with dry blood at the bottom of catheter dressing pre HD, then dressing soaked with blood post HD

## 2019-01-11 NOTE — PROGRESS NOTE ADULT - SUBJECTIVE AND OBJECTIVE BOX
[facility] DIVISION OF KIDNEY DISEASES AND HYPERTENSION -- FOLLOW UP NOTE  --------------------------------------------------------------------------------  HPI: 53 year old male with h/o CKD stage V, CHFrEF, DM, HTN, CAD presented initiation of RRT hence nephrology was consulted.   Pt. well known to nephrology service, has history of advance CKD stage V from longstanding history of DM and HTN. Admitted for initiation of HD. Underwent tunneled catheter and started HD on 1/10/19    Pt was seen and examined during HD, tolerating well, reports no complains, BP stable, .      PAST HISTORY  --------------------------------------------------------------------------------  No significant changes to PMH, PSH, FHx, SHx, unless otherwise noted    ALLERGIES & MEDICATIONS  --------------------------------------------------------------------------------  Allergies    No Known Allergies    Intolerances      Standing Inpatient Medications  amoxicillin 1000 milliGRAM(s) Oral two times a day  aspirin enteric coated 81 milliGRAM(s) Oral daily  atorvastatin 80 milliGRAM(s) Oral at bedtime  buMETAnide 4 milliGRAM(s) Oral two times a day  calcium acetate 667 milliGRAM(s) Oral three times a day with meals  carvedilol 6.25 milliGRAM(s) Oral every 12 hours  chlorhexidine 4% Liquid 1 Application(s) Topical two times a day  clarithromycin 500 milliGRAM(s) Oral two times a day  dextrose 5%. 1000 milliLiter(s) IV Continuous <Continuous>  dextrose 50% Injectable 12.5 Gram(s) IV Push once  dextrose 50% Injectable 25 Gram(s) IV Push once  dextrose 50% Injectable 25 Gram(s) IV Push once  heparin  Injectable 5000 Unit(s) SubCutaneous every 8 hours  hydrALAZINE 100 milliGRAM(s) Oral every 8 hours  insulin glargine Injectable (LANTUS) 3 Unit(s) SubCutaneous at bedtime  insulin lispro (HumaLOG) corrective regimen sliding scale   SubCutaneous three times a day before meals  insulin lispro (HumaLOG) corrective regimen sliding scale   SubCutaneous at bedtime  insulin lispro Injectable (HumaLOG) 3 Unit(s) SubCutaneous three times a day before meals  isosorbide   dinitrate Tablet (ISORDIL) 10 milliGRAM(s) Oral three times a day  pantoprazole    Tablet 40 milliGRAM(s) Oral two times a day    PRN Inpatient Medications  dextrose 40% Gel 15 Gram(s) Oral once PRN  glucagon  Injectable 1 milliGRAM(s) IntraMuscular once PRN      REVIEW OF SYSTEMS  --------------------------------------------------------------------------------  Gen: No fevers/chills  Skin: No rashes  Head/Eyes/Ears: Normal hearing,   Respiratory: No dyspnea, cough  CV: No chest pain  GI: No abdominal pain, diarrhea  : No dysuria, hematuria  MSK: No  edema  Heme: No easy bruising or bleeding  Psych: No significant depression      All other systems were reviewed and are negative, except as noted.    VITALS/PHYSICAL EXAM  --------------------------------------------------------------------------------  T(C): 36.4 (01-11-19 @ 06:35), Max: 36.7 (01-10-19 @ 17:17)  HR: 74 (01-11-19 @ 06:35) (74 - 83)  BP: 146/88 (01-11-19 @ 06:35) (112/71 - 146/88)  RR: 18 (01-11-19 @ 06:35) (17 - 18)  SpO2: 100% (01-11-19 @ 05:56) (100% - 100%)  Wt(kg): --  Height (cm): 165.1 (01-09-19 @ 21:51)  Weight (kg): 79.2 (01-09-19 @ 21:51)  BMI (kg/m2): 29.1 (01-09-19 @ 21:51)  BSA (m2): 1.87 (01-09-19 @ 21:51)      01-10-19 @ 07:01  -  01-11-19 @ 07:00  --------------------------------------------------------  IN: 400 mL / OUT: 1000 mL / NET: -600 mL        Physical Exam:  	Gen: NAD  	HEENT: MMM  	Pulm: CTA B/L  	CV: S1S2  	Abd: Soft, +BS   	Ext: No LE edema B/L  	Neuro: Awake  	Skin: Warm and dry  	Vascular access: RIJ tunneled catheter connected to HD       LABS/STUDIES  --------------------------------------------------------------------------------              8.8    5.07  >-----------<  89       [01-11-19 @ 05:25]              27.3     135  |  96  |  94  ----------------------------<  433      [01-11-19 @ 05:25]  4.3   |  23  |  5.07        Ca     7.9     [01-11-19 @ 05:25]      Mg     1.8     [01-11-19 @ 05:25]      Phos  3.3     [01-11-19 @ 05:25]    TPro  6.6  /  Alb  3.5  /  TBili  0.3  /  DBili  x   /  AST  17  /  ALT  26  /  AlkPhos  110  [01-10-19 @ 05:15]    PT/INR: PT 14.4 , INR 1.29       [01-10-19 @ 05:15]  PTT: 34.2       [01-10-19 @ 05:15]      Creatinine Trend:  SCr 5.07 [01-11 @ 05:25]  SCr 6.44 [01-10 @ 05:15]  SCr 6.72 [01-09 @ 12:52]  SCr 7.15 [12-14 @ 05:50]  SCr 6.38 [12-13 @ 05:40]    Urinalysis - [01-09-19 @ 12:49]      Color LIGHT YELLOW / Appearance CLEAR / SG 1.014 / pH 6.0      Gluc 150 / Ketone NEGATIVE  / Bili NEGATIVE / Urobili NORMAL       Blood TRACE / Protein 200 / Leuk Est NEGATIVE / Nitrite NEGATIVE      RBC 2-5 / WBC 0-2 / Hyaline NEGATIVE / Gran  / Sq Epi OCC / Non Sq Epi  / Bacteria NEGATIVE      Iron 31, TIBC 302, %sat --      [12-11-18 @ 06:30]  HbA1c 6.5      [01-11-19 @ 06:00]  Lipid: chol 109, TG 64, HDL 48, LDL 53      [07-18-18 @ 20:46]    HBsAg NEGATIVE      [01-10-19 @ 05:15]  HCV 0.11, Nonreactive Hepatitis C AB  S/CO Ratio                        Interpretation  < 1.0                                     Non-Reactive  1.0 - 4.9                           Weakly-Reactive  > 5.0                                 Reactive  Non-Reactive: Aperson with a non-reactive HCV antibody  result is considered uninfected.  No further action is  needed unless recent infection is suspected.  In these  cases, consider repeat testing later to detect  seroconversion..  Weakly-Reactive: HCV antibody test is abnormal, HCV RNA  Qualitative test will follow.  Reactive: HCV antibody test is abnormal, HCV RNA  Qualitative test will follow.  Note: HCV antibody testing is performed on the [facility] system.      [01-10-19 @ 05:15] [facility] DIVISION OF KIDNEY DISEASES AND HYPERTENSION -- FOLLOW UP NOTE  --------------------------------------------------------------------------------  HPI: 53 year old male with h/o CKD stage V, CHFrEF, DM, HTN, CAD presented initiation of RRT hence nephrology was consulted.   Pt. well known to nephrology service, has history of advance CKD stage V from longstanding history of DM and HTN. Admitted for initiation of HD. Underwent tunneled catheter and started HD on 1/10/19    Pt was seen and examined during HD, tolerating well, reports no complains, BP stable, .      PAST HISTORY  --------------------------------------------------------------------------------  No significant changes to PMH, PSH, FHx, SHx, unless otherwise noted    ALLERGIES & MEDICATIONS  --------------------------------------------------------------------------------  Allergies    No Known Allergies    Intolerances      Standing Inpatient Medications  amoxicillin 1000 milliGRAM(s) Oral two times a day  aspirin enteric coated 81 milliGRAM(s) Oral daily  atorvastatin 80 milliGRAM(s) Oral at bedtime  buMETAnide 4 milliGRAM(s) Oral two times a day  calcium acetate 667 milliGRAM(s) Oral three times a day with meals  carvedilol 6.25 milliGRAM(s) Oral every 12 hours  chlorhexidine 4% Liquid 1 Application(s) Topical two times a day  clarithromycin 500 milliGRAM(s) Oral two times a day  dextrose 5%. 1000 milliLiter(s) IV Continuous <Continuous>  dextrose 50% Injectable 12.5 Gram(s) IV Push once  dextrose 50% Injectable 25 Gram(s) IV Push once  dextrose 50% Injectable 25 Gram(s) IV Push once  heparin  Injectable 5000 Unit(s) SubCutaneous every 8 hours  hydrALAZINE 100 milliGRAM(s) Oral every 8 hours  insulin glargine Injectable (LANTUS) 3 Unit(s) SubCutaneous at bedtime  insulin lispro (HumaLOG) corrective regimen sliding scale   SubCutaneous three times a day before meals  insulin lispro (HumaLOG) corrective regimen sliding scale   SubCutaneous at bedtime  insulin lispro Injectable (HumaLOG) 3 Unit(s) SubCutaneous three times a day before meals  isosorbide   dinitrate Tablet (ISORDIL) 10 milliGRAM(s) Oral three times a day  pantoprazole    Tablet 40 milliGRAM(s) Oral two times a day    PRN Inpatient Medications  dextrose 40% Gel 15 Gram(s) Oral once PRN  glucagon  Injectable 1 milliGRAM(s) IntraMuscular once PRN      REVIEW OF SYSTEMS  --------------------------------------------------------------------------------  Gen: + weak  Skin: No rashes  Head/Eyes/Ears: Normal hearing,   Respiratory+ dyspnea, cough  CV: No chest pain  GI: No abdominal pain  : No dysuria, hematuria  MSK: trace edema  Heme: No easy bruising or bleeding  Psych: No significant depression      All other systems were reviewed and are negative, except as noted.    VITALS/PHYSICAL EXAM  --------------------------------------------------------------------------------  T(C): 36.4 (01-11-19 @ 06:35), Max: 36.7 (01-10-19 @ 17:17)  HR: 74 (01-11-19 @ 06:35) (74 - 83)  BP: 146/88 (01-11-19 @ 06:35) (112/71 - 146/88)  RR: 18 (01-11-19 @ 06:35) (17 - 18)  SpO2: 100% (01-11-19 @ 05:56) (100% - 100%)  Wt(kg): --  Height (cm): 165.1 (01-09-19 @ 21:51)  Weight (kg): 79.2 (01-09-19 @ 21:51)  BMI (kg/m2): 29.1 (01-09-19 @ 21:51)  BSA (m2): 1.87 (01-09-19 @ 21:51)      01-10-19 @ 07:01  -  01-11-19 @ 07:00  --------------------------------------------------------  IN: 400 mL / OUT: 1000 mL / NET: -600 mL        Physical Exam:  	Gen: NAD  	HEENT: MMM  	Pulm: CTA B/L  	CV: S1S2  	Abd: Soft, +BS   	Ext: No LE edema B/L  	Neuro: Awake  	Skin: Warm and dry  	Vascular access: RIJ tunneled catheter connected to HD       LABS/STUDIES  --------------------------------------------------------------------------------              8.8    5.07  >-----------<  89       [01-11-19 @ 05:25]              27.3     135  |  96  |  94  ----------------------------<  433      [01-11-19 @ 05:25]  4.3   |  23  |  5.07        Ca     7.9     [01-11-19 @ 05:25]      Mg     1.8     [01-11-19 @ 05:25]      Phos  3.3     [01-11-19 @ 05:25]    TPro  6.6  /  Alb  3.5  /  TBili  0.3  /  DBili  x   /  AST  17  /  ALT  26  /  AlkPhos  110  [01-10-19 @ 05:15]    PT/INR: PT 14.4 , INR 1.29       [01-10-19 @ 05:15]  PTT: 34.2       [01-10-19 @ 05:15]      Creatinine Trend:  SCr 5.07 [01-11 @ 05:25]  SCr 6.44 [01-10 @ 05:15]  SCr 6.72 [01-09 @ 12:52]  SCr 7.15 [12-14 @ 05:50]  SCr 6.38 [12-13 @ 05:40]    Urinalysis - [01-09-19 @ 12:49]      Color LIGHT YELLOW / Appearance CLEAR / SG 1.014 / pH 6.0      Gluc 150 / Ketone NEGATIVE  / Bili NEGATIVE / Urobili NORMAL       Blood TRACE / Protein 200 / Leuk Est NEGATIVE / Nitrite NEGATIVE      RBC 2-5 / WBC 0-2 / Hyaline NEGATIVE / Gran  / Sq Epi OCC / Non Sq Epi  / Bacteria NEGATIVE      Iron 31, TIBC 302, %sat --      [12-11-18 @ 06:30]  HbA1c 6.5      [01-11-19 @ 06:00]  Lipid: chol 109, TG 64, HDL 48, LDL 53      [07-18-18 @ 20:46]    HBsAg NEGATIVE      [01-10-19 @ 05:15]  HCV 0.11, Nonreactive Hepatitis C AB  S/CO Ratio                        Interpretation  < 1.0                                     Non-Reactive  1.0 - 4.9                           Weakly-Reactive  > 5.0                                 Reactive  Non-Reactive: Aperson with a non-reactive HCV antibody  result is considered uninfected.  No further action is  needed unless recent infection is suspected.  In these  cases, consider repeat testing later to detect  seroconversion..  Weakly-Reactive: HCV antibody test is abnormal, HCV RNA  Qualitative test will follow.  Reactive: HCV antibody test is abnormal, HCV RNA  Qualitative test will follow.  Note: HCV antibody testing is performed on the [place] system.      [01-10-19 @ 05:15]

## 2019-01-11 NOTE — PROGRESS NOTE ADULT - ASSESSMENT
53 year old M w/ h/o IDDM c/b neuropathy (A1c 5.7 12/18) , HTN, CAD (s/p stent in [place] in 2016, unknown coronary anatomy), HFrEF (EF 25%, LVEDD 6.6 cm), severe MR (functional), stage V CKD (b/l Cr 4-5), recent GIB 2/2 ulcer who is here after being instructed to come in to hospital due to worsening renal function despite being fluid overloaded in my office last week as he would require a permacath while awaiting an AVF. Received permacath w/o incident. Currently still overloaded on exam and has labs consistent with acute on chronic renal failure. He has a RCRI score of 3 (CHF, CKD, DM) for an intermediate risk surgery but given absence of signicant ischemia on stress test and able to perform >4 METS, he does not require any further ischemic workup prior to surgery. He however does need to be optimally managed from volume standpoint with dialysis prior to surgery. Otherwise, he can proceed with AVF surgery.   - continue home meds  - continue dialysis/UF daily for goal weight of 165-167 pounds  - increase bumex to 6 mg twice daily  - standing weights daily  - started on H. pylori therapy  - CAD s/p prior PCI; would continue ASA perioperatively; on lipitor 80 mg daily  - started on isordil 10 mg three times/day  - would ensure defibrillator is in OR as patient does not have ICD in case of VT

## 2019-01-11 NOTE — PROGRESS NOTE ADULT - PROBLEM SELECTOR PLAN 5
- recently diagnosed H pylori gastritis - - did not undergo any treatment   - c/w triple therapy during admission

## 2019-01-12 DIAGNOSIS — D69.6 THROMBOCYTOPENIA, UNSPECIFIED: ICD-10-CM

## 2019-01-12 LAB
ANION GAP SERPL CALC-SCNC: 15 MEQ/L — HIGH (ref 7–14)
APTT BLD: 33.2 SEC — SIGNIFICANT CHANGE UP (ref 27.5–36.3)
BASOPHILS # BLD AUTO: 0.02 K/UL — SIGNIFICANT CHANGE UP (ref 0–0.2)
BASOPHILS NFR BLD AUTO: 0.2 % — SIGNIFICANT CHANGE UP (ref 0–2)
BILIRUB SERPL-MCNC: 0.3 MG/DL — SIGNIFICANT CHANGE UP (ref 0.2–1.2)
BLD GP AB SCN SERPL QL: NEGATIVE — SIGNIFICANT CHANGE UP
BUN SERPL-MCNC: 87 MG/DL — HIGH (ref 7–23)
CALCIUM SERPL-MCNC: 8.4 MG/DL — SIGNIFICANT CHANGE UP (ref 8.4–10.5)
CHLORIDE SERPL-SCNC: 99 MMOL/L — SIGNIFICANT CHANGE UP (ref 98–107)
CLOSURE TME COLL+EPINEP BLD: 65 K/UL — LOW (ref 150–400)
CO2 SERPL-SCNC: 23 MMOL/L — SIGNIFICANT CHANGE UP (ref 22–31)
CREAT SERPL-MCNC: 5.3 MG/DL — HIGH (ref 0.5–1.3)
D DIMER BLD IA.RAPID-MCNC: < 150 NG/ML — SIGNIFICANT CHANGE UP
DAT C3-SP REAG RBC QL: NEGATIVE — SIGNIFICANT CHANGE UP
DAT POLY-SP REAG RBC QL: NEGATIVE — SIGNIFICANT CHANGE UP
DIRECT COOMBS IGG: NEGATIVE — SIGNIFICANT CHANGE UP
EOSINOPHIL # BLD AUTO: 0.03 K/UL — SIGNIFICANT CHANGE UP (ref 0–0.5)
EOSINOPHIL NFR BLD AUTO: 0.4 % — SIGNIFICANT CHANGE UP (ref 0–6)
FERRITIN SERPL-MCNC: 28.14 NG/ML — LOW (ref 30–400)
FIBRINOGEN PPP-MCNC: 488.8 MG/DL — SIGNIFICANT CHANGE UP (ref 350–510)
GLUCOSE SERPL-MCNC: 154 MG/DL — HIGH (ref 70–99)
HAPTOGLOB SERPL-MCNC: 169 MG/DL — SIGNIFICANT CHANGE UP (ref 34–200)
HCT VFR BLD CALC: 25.3 % — LOW (ref 39–50)
HGB BLD-MCNC: 7.9 G/DL — LOW (ref 13–17)
IMM GRANULOCYTES NFR BLD AUTO: 0.5 % — SIGNIFICANT CHANGE UP (ref 0–1.5)
INR BLD: 1.26 — HIGH (ref 0.88–1.17)
IRON SATN MFR SERPL: 18 UG/DL — LOW (ref 45–165)
IRON SATN MFR SERPL: 313 UG/DL — SIGNIFICANT CHANGE UP (ref 155–535)
LDH SERPL L TO P-CCNC: 211 U/L — SIGNIFICANT CHANGE UP (ref 135–225)
LYMPHOCYTES # BLD AUTO: 0.78 K/UL — LOW (ref 1–3.3)
LYMPHOCYTES # BLD AUTO: 9.4 % — LOW (ref 13–44)
MAGNESIUM SERPL-MCNC: 1.7 MG/DL — SIGNIFICANT CHANGE UP (ref 1.6–2.6)
MCHC RBC-ENTMCNC: 27.7 PG — SIGNIFICANT CHANGE UP (ref 27–34)
MCHC RBC-ENTMCNC: 31.2 % — LOW (ref 32–36)
MCV RBC AUTO: 88.8 FL — SIGNIFICANT CHANGE UP (ref 80–100)
MONOCYTES # BLD AUTO: 0.79 K/UL — SIGNIFICANT CHANGE UP (ref 0–0.9)
MONOCYTES NFR BLD AUTO: 9.5 % — SIGNIFICANT CHANGE UP (ref 2–14)
NEUTROPHILS # BLD AUTO: 6.63 K/UL — SIGNIFICANT CHANGE UP (ref 1.8–7.4)
NEUTROPHILS NFR BLD AUTO: 80 % — HIGH (ref 43–77)
NRBC # FLD: 0 K/UL — LOW (ref 25–125)
PHOSPHATE SERPL-MCNC: 4.3 MG/DL — SIGNIFICANT CHANGE UP (ref 2.5–4.5)
PLATELET # BLD AUTO: 72 K/UL — LOW (ref 150–400)
PMV BLD: 11.6 FL — SIGNIFICANT CHANGE UP (ref 7–13)
POTASSIUM SERPL-MCNC: 4.6 MMOL/L — SIGNIFICANT CHANGE UP (ref 3.5–5.3)
POTASSIUM SERPL-SCNC: 4.6 MMOL/L — SIGNIFICANT CHANGE UP (ref 3.5–5.3)
PROTHROM AB SERPL-ACNC: 14.1 SEC — HIGH (ref 9.8–13.1)
RBC # BLD: 2.85 M/UL — LOW (ref 4.2–5.8)
RBC # FLD: 14.6 % — HIGH (ref 10.3–14.5)
RH IG SCN BLD-IMP: POSITIVE — SIGNIFICANT CHANGE UP
SODIUM SERPL-SCNC: 137 MMOL/L — SIGNIFICANT CHANGE UP (ref 135–145)
UIBC SERPL-MCNC: 295.2 UG/DL — SIGNIFICANT CHANGE UP (ref 110–370)
WBC # BLD: 8.29 K/UL — SIGNIFICANT CHANGE UP (ref 3.8–10.5)
WBC # FLD AUTO: 8.29 K/UL — SIGNIFICANT CHANGE UP (ref 3.8–10.5)

## 2019-01-12 PROCEDURE — 99223 1ST HOSP IP/OBS HIGH 75: CPT | Mod: GC

## 2019-01-12 PROCEDURE — 99233 SBSQ HOSP IP/OBS HIGH 50: CPT | Mod: GC

## 2019-01-12 PROCEDURE — 99232 SBSQ HOSP IP/OBS MODERATE 35: CPT | Mod: GC

## 2019-01-12 RX ORDER — FERROUS SULFATE 325(65) MG
325 TABLET ORAL DAILY
Qty: 0 | Refills: 0 | Status: DISCONTINUED | OUTPATIENT
Start: 2019-01-12 | End: 2019-01-15

## 2019-01-12 RX ORDER — METRONIDAZOLE 500 MG
500 TABLET ORAL EVERY 8 HOURS
Qty: 0 | Refills: 0 | Status: DISCONTINUED | OUTPATIENT
Start: 2019-01-12 | End: 2019-01-15

## 2019-01-12 RX ORDER — METRONIDAZOLE 500 MG
500 TABLET ORAL EVERY 12 HOURS
Qty: 0 | Refills: 0 | Status: DISCONTINUED | OUTPATIENT
Start: 2019-01-12 | End: 2019-01-12

## 2019-01-12 RX ORDER — ERYTHROPOIETIN 10000 [IU]/ML
4000 INJECTION, SOLUTION INTRAVENOUS; SUBCUTANEOUS
Qty: 0 | Refills: 0 | Status: DISCONTINUED | OUTPATIENT
Start: 2019-01-12 | End: 2019-01-15

## 2019-01-12 RX ORDER — IRON SUCROSE 20 MG/ML
100 INJECTION, SOLUTION INTRAVENOUS
Qty: 0 | Refills: 0 | Status: DISCONTINUED | OUTPATIENT
Start: 2019-01-12 | End: 2019-01-15

## 2019-01-12 RX ADMIN — Medication 100 MILLIGRAM(S): at 13:12

## 2019-01-12 RX ADMIN — ISOSORBIDE DINITRATE 10 MILLIGRAM(S): 5 TABLET ORAL at 13:12

## 2019-01-12 RX ADMIN — PANTOPRAZOLE SODIUM 40 MILLIGRAM(S): 20 TABLET, DELAYED RELEASE ORAL at 18:45

## 2019-01-12 RX ADMIN — Medication 325 MILLIGRAM(S): at 13:16

## 2019-01-12 RX ADMIN — Medication 3 UNIT(S): at 13:12

## 2019-01-12 RX ADMIN — Medication 100 MILLIGRAM(S): at 07:31

## 2019-01-12 RX ADMIN — ISOSORBIDE DINITRATE 10 MILLIGRAM(S): 5 TABLET ORAL at 21:56

## 2019-01-12 RX ADMIN — BUMETANIDE 6 MILLIGRAM(S): 0.25 INJECTION INTRAMUSCULAR; INTRAVENOUS at 18:45

## 2019-01-12 RX ADMIN — CARVEDILOL PHOSPHATE 6.25 MILLIGRAM(S): 80 CAPSULE, EXTENDED RELEASE ORAL at 18:45

## 2019-01-12 RX ADMIN — POLYETHYLENE GLYCOL 3350 17 GRAM(S): 17 POWDER, FOR SOLUTION ORAL at 13:12

## 2019-01-12 RX ADMIN — BUMETANIDE 6 MILLIGRAM(S): 0.25 INJECTION INTRAMUSCULAR; INTRAVENOUS at 07:31

## 2019-01-12 RX ADMIN — INSULIN GLARGINE 5 UNIT(S): 100 INJECTION, SOLUTION SUBCUTANEOUS at 22:33

## 2019-01-12 RX ADMIN — Medication 1000 MILLIGRAM(S): at 05:47

## 2019-01-12 RX ADMIN — IRON SUCROSE 210 MILLIGRAM(S): 20 INJECTION, SOLUTION INTRAVENOUS at 17:10

## 2019-01-12 RX ADMIN — ATORVASTATIN CALCIUM 80 MILLIGRAM(S): 80 TABLET, FILM COATED ORAL at 21:55

## 2019-01-12 RX ADMIN — Medication 500 MILLIGRAM(S): at 05:47

## 2019-01-12 RX ADMIN — PANTOPRAZOLE SODIUM 40 MILLIGRAM(S): 20 TABLET, DELAYED RELEASE ORAL at 05:47

## 2019-01-12 RX ADMIN — CHLORHEXIDINE GLUCONATE 1 APPLICATION(S): 213 SOLUTION TOPICAL at 18:46

## 2019-01-12 RX ADMIN — CHLORHEXIDINE GLUCONATE 1 APPLICATION(S): 213 SOLUTION TOPICAL at 07:35

## 2019-01-12 RX ADMIN — Medication 500 MILLIGRAM(S): at 18:45

## 2019-01-12 RX ADMIN — Medication 100 MILLIGRAM(S): at 21:55

## 2019-01-12 RX ADMIN — Medication 81 MILLIGRAM(S): at 13:12

## 2019-01-12 RX ADMIN — Medication 3 UNIT(S): at 17:09

## 2019-01-12 RX ADMIN — HEPARIN SODIUM 5000 UNIT(S): 5000 INJECTION INTRAVENOUS; SUBCUTANEOUS at 13:12

## 2019-01-12 RX ADMIN — ERYTHROPOIETIN 4000 UNIT(S): 10000 INJECTION, SOLUTION INTRAVENOUS; SUBCUTANEOUS at 15:53

## 2019-01-12 RX ADMIN — Medication 2: at 17:09

## 2019-01-12 RX ADMIN — HEPARIN SODIUM 5000 UNIT(S): 5000 INJECTION INTRAVENOUS; SUBCUTANEOUS at 05:47

## 2019-01-12 RX ADMIN — ISOSORBIDE DINITRATE 10 MILLIGRAM(S): 5 TABLET ORAL at 07:31

## 2019-01-12 RX ADMIN — Medication 667 MILLIGRAM(S): at 09:19

## 2019-01-12 RX ADMIN — CARVEDILOL PHOSPHATE 6.25 MILLIGRAM(S): 80 CAPSULE, EXTENDED RELEASE ORAL at 07:31

## 2019-01-12 RX ADMIN — SENNA PLUS 2 TABLET(S): 8.6 TABLET ORAL at 21:55

## 2019-01-12 RX ADMIN — Medication 667 MILLIGRAM(S): at 13:12

## 2019-01-12 RX ADMIN — Medication 3 UNIT(S): at 09:19

## 2019-01-12 NOTE — PROGRESS NOTE ADULT - PROBLEM SELECTOR PLAN 1
- s/p permacath placement with IR and initiation of HD yesterday, HD #2 this morning  - c/w bumex 4mg BID, daily weights (dry weight roughly 165-168lbs) and monitor I/Os   - appreciate renal recs: f/u hepatitis panel   - vascular surgery consult for AVF placement (), pending repeat vein mapping - s/p permacath placement with IR and 2 sessions of HD, HD planned for this afternoon  - c/w bumex 6mg BID, daily weights (dry weight roughly 165-168lbs) and monitor I/Os   - appreciate renal recs  - repeat vein mapping done, AVF placement by vascular planned for Monday

## 2019-01-12 NOTE — PROGRESS NOTE ADULT - PROBLEM SELECTOR PLAN 3
- hx of CAD w/ MI s/p stent x 1   - c/w ASA, lipitor, coreg - hx of CAD w/ MI s/p stent x 1   - c/w ASA, lipitor, coreg  - will give 1 unit PRBC with HD today as pt is planned for OR on Monday, to keep Hgb >8 - hx of CAD w/ MI with subsequent ischemic cardiomyopathy: Last EF 24%   - Per HF, increase bumex, c/w hydralazine, coreg, isosordil as pt's weight is above goal

## 2019-01-12 NOTE — PROGRESS NOTE ADULT - ASSESSMENT
53 year old male with h/o CKD stage V, CHFrEF to be started on HD Pt. with advanced/progressive CKD, initiated on long-term HD during current hospital stay.

## 2019-01-12 NOTE — PROGRESS NOTE ADULT - ASSESSMENT
53 year old male with hx of chronic HFrEF (24%), CAD s/p stent, CKD V, T2DM, HTN, HLD, and recent admission for CHF optimization for AVF placement with course c/b anemia, s/p permacath placement for RRT, and 2 sessions of HD 53 year old male with hx of chronic HFrEF (24%), CAD s/p stent, CKD V, T2DM, HTN, HLD, and recent admission for CHF optimization for AVF placement with course c/b anemia, s/p permacath placement for RRT, and 2 sessions of HD with course now c/b thrombocytopenia 2/2 ?amoxicillin.

## 2019-01-12 NOTE — PROGRESS NOTE ADULT - PROBLEM SELECTOR PLAN 1
Pt. with advanced/progressive CKD stage 5 in the setting of long-standing uncontrolled DM and HTN. Scr. was 5.55 on 12/4/18 and Scr. was elevated at 7.15 on 12/14/18. Labs done during this admission showed Scr. of 6.72 on 1/9/19. Pt. with most likely diabetic nephropathy.   Pt initiated on HD via RIJ tunneled HD catheter on 1/10/19.   Plan for HD today.   AVF creation planned by vascular surgery.   Monitor UOP and daily weights.  Avoid volume depletion, NSAIDs, ARB/ACE-I.   Dose medications as per eGFR. Pt. with advanced/progressive CKD stage 5 in the setting of long-standing uncontrolled DM and HTN. Pt. initiated on long-term HD during current hospital stay on 1/10/19. Pt. deemed ESRD. Plan for HD today. Pt. awaiting AVF creation surgery (scheduled next week). Monitor BP and labs

## 2019-01-12 NOTE — PROGRESS NOTE ADULT - PROBLEM SELECTOR PLAN 5
- recently diagnosed H pylori gastritis - - did not undergo any treatment   - c/w triple therapy during admission - recently diagnosed H pylori gastritis - did not undergo any treatment   - c/w triple therapy - c/w hydralazine, coreg as per home regimen

## 2019-01-12 NOTE — PROGRESS NOTE ADULT - SUBJECTIVE AND OBJECTIVE BOX
[facility] DIVISION OF KIDNEY DISEASES AND HYPERTENSION -- FOLLOW UP NOTE  --------------------------------------------------------------------------------    HPI: 53 year old male with h/o CKD stage V, CHFrEF, DM, HTN, CAD admitted for progressive CKD and initiation of HD.   Pt. well known to nephrology service, has history of advance CKD stage V from longstanding history of DM and HTN. Pt had Right HD tunneled catheter placement and was started HD on 1/10/19. Pt tolerated HD well. Pt planned for AVF placement by vascular surgery     Pt was seen and examined. Plan for HD today.     PAST HISTORY  --------------------------------------------------------------------------------  No significant changes to PMH, PSH, FHx, SHx, unless otherwise noted    ALLERGIES & MEDICATIONS  --------------------------------------------------------------------------------  Allergies    No Known Allergies    Intolerances      Standing Inpatient Medications  amoxicillin 1000 milliGRAM(s) Oral two times a day  aspirin enteric coated 81 milliGRAM(s) Oral daily  atorvastatin 80 milliGRAM(s) Oral at bedtime  buMETAnide 6 milliGRAM(s) Oral two times a day  calcium acetate 667 milliGRAM(s) Oral three times a day with meals  carvedilol 6.25 milliGRAM(s) Oral every 12 hours  chlorhexidine 4% Liquid 1 Application(s) Topical two times a day  clarithromycin 500 milliGRAM(s) Oral two times a day  dextrose 5%. 1000 milliLiter(s) IV Continuous <Continuous>  dextrose 50% Injectable 12.5 Gram(s) IV Push once  dextrose 50% Injectable 25 Gram(s) IV Push once  dextrose 50% Injectable 25 Gram(s) IV Push once  ferrous    sulfate 325 milliGRAM(s) Oral daily  heparin  Injectable 5000 Unit(s) SubCutaneous every 8 hours  hydrALAZINE 100 milliGRAM(s) Oral every 8 hours  insulin glargine Injectable (LANTUS) 5 Unit(s) SubCutaneous at bedtime  insulin lispro (HumaLOG) corrective regimen sliding scale   SubCutaneous three times a day before meals  insulin lispro (HumaLOG) corrective regimen sliding scale   SubCutaneous at bedtime  insulin lispro Injectable (HumaLOG) 3 Unit(s) SubCutaneous three times a day before meals  isosorbide   dinitrate Tablet (ISORDIL) 10 milliGRAM(s) Oral three times a day  pantoprazole    Tablet 40 milliGRAM(s) Oral two times a day  polyethylene glycol 3350 17 Gram(s) Oral daily  senna 2 Tablet(s) Oral at bedtime    PRN Inpatient Medications  dextrose 40% Gel 15 Gram(s) Oral once PRN  glucagon  Injectable 1 milliGRAM(s) IntraMuscular once PRN      REVIEW OF SYSTEMS  --------------------------------------------------------------------------------  Gen: + weak  Respiratory: No dyspnea  CV: No chest pain  GI: No abdominal pain  : No dysuria, hematuria  MSK: No edema    All other systems were reviewed and are negative, except as noted.    VITALS/PHYSICAL EXAM  --------------------------------------------------------------------------------  T(C): 36.7 (01-12-19 @ 05:41), Max: 36.7 (01-11-19 @ 21:24)  HR: 82 (01-12-19 @ 05:41) (74 - 82)  BP: 109/64 (01-12-19 @ 05:41) (109/64 - 124/74)  RR: 18 (01-12-19 @ 05:41) (18 - 18)  SpO2: 100% (01-12-19 @ 05:41) (98% - 100%)  Wt(kg): --  Height (cm): 165.1 (01-11-19 @ 09:10)  Weight (kg): 79.2 (01-11-19 @ 09:10)  BMI (kg/m2): 29.1 (01-11-19 @ 09:10)  BSA (m2): 1.87 (01-11-19 @ 09:10)      01-11-19 @ 07:01  -  01-12-19 @ 07:00  --------------------------------------------------------  IN: 500 mL / OUT: 2150 mL / NET: -1650 mL    Physical Exam:  	Gen: NAD  	HEENT: MMM  	Pulm: CTA B/L  	CV: S1S2  	Abd: Soft, +BS   	Ext: No LE edema B/L  	Neuro: Awake  	Skin: Warm and dry  	Vascular access: RIJ tunneled catheter    LABS/STUDIES  --------------------------------------------------------------------------------              7.9    8.29  >-----------<  72       [01-12-19 @ 05:30]              25.3     137  |  99  |  87  ----------------------------<  154      [01-12-19 @ 05:30]  4.6   |  23  |  5.30        Ca     8.4     [01-12-19 @ 05:30]      Mg     1.7     [01-12-19 @ 05:30]      Phos  4.3     [01-12-19 @ 05:30]    TPro  x   /  Alb  x   /  TBili  0.3  /  DBili  x   /  AST  x   /  ALT  x   /  AlkPhos  x   [01-12-19 @ 05:30]            [01-12-19 @ 05:30]    Creatinine Trend:  SCr 5.30 [01-12 @ 05:30]  SCr 5.07 [01-11 @ 05:25]  SCr 6.44 [01-10 @ 05:15]  SCr 6.72 [01-09 @ 12:52]  SCr 7.15 [12-14 @ 05:50]    Urinalysis - [01-09-19 @ 12:49]      Color LIGHT YELLOW / Appearance CLEAR / SG 1.014 / pH 6.0      Gluc 150 / Ketone NEGATIVE  / Bili NEGATIVE / Urobili NORMAL       Blood TRACE / Protein 200 / Leuk Est NEGATIVE / Nitrite NEGATIVE      RBC 2-5 / WBC 0-2 / Hyaline NEGATIVE / Gran  / Sq Epi OCC / Non Sq Epi  / Bacteria NEGATIVE      Iron 18, TIBC 313, %sat --      [01-12-19 @ 05:30]  Ferritin 28.14      [01-12-19 @ 05:30]  HbA1c 6.5      [01-11-19 @ 06:00]  Lipid: chol 109, TG 64, HDL 48, LDL 53      [07-18-18 @ 20:46]    HBsAb 42.8      [01-10-19 @ 21:10]  HBsAg NEGATIVE      [01-10-19 @ 05:15]  HCV 0.11, Nonreactive Hepatitis C AB  S/CO Ratio                        Interpretation  < 1.0                                     Non-Reactive  1.0 - 4.9                           Weakly-Reactive  > 5.0                                 Reactive  Non-Reactive: Aperson with a non-reactive HCV antibody  result is considered uninfected.  No further action is  needed unless recent infection is suspected.  In these  cases, consider repeat testing later to detect  seroconversion..  Weakly-Reactive: HCV antibody test is abnormal, HCV RNA  Qualitative test will follow.  Reactive: HCV antibody test is abnormal, HCV RNA  Qualitative test will follow.  Note: HCV antibody testing is performed on the [employer] system.      [01-10-19 @ 05:15] [facility] DIVISION OF KIDNEY DISEASES AND HYPERTENSION -- FOLLOW UP NOTE  --------------------------------------------------------------------------------  HPI: 53 year old male with advanced CKD stage 5, CHFrEF, DM, HTN, CAD admitted for progressive CKD and initiation of HD. Pt. with advanced CKD in setting of DM and HTN. Pt. underwent right tunneled HD catheter placement and was initiated on HD on 1/10/19. Pt. awaiting AVF creation surgery.     Pt. seen and examined today. No complaints offered. Pt. feels well. Plan for HD today.     PAST HISTORY  --------------------------------------------------------------------------------  No significant changes to PMH, PSH, FHx, SHx, unless otherwise noted    ALLERGIES & MEDICATIONS  --------------------------------------------------------------------------------  Allergies    No Known Allergies    Intolerances      Standing Inpatient Medications  amoxicillin 1000 milliGRAM(s) Oral two times a day  aspirin enteric coated 81 milliGRAM(s) Oral daily  atorvastatin 80 milliGRAM(s) Oral at bedtime  buMETAnide 6 milliGRAM(s) Oral two times a day  calcium acetate 667 milliGRAM(s) Oral three times a day with meals  carvedilol 6.25 milliGRAM(s) Oral every 12 hours  chlorhexidine 4% Liquid 1 Application(s) Topical two times a day  clarithromycin 500 milliGRAM(s) Oral two times a day  dextrose 5%. 1000 milliLiter(s) IV Continuous <Continuous>  dextrose 50% Injectable 12.5 Gram(s) IV Push once  dextrose 50% Injectable 25 Gram(s) IV Push once  dextrose 50% Injectable 25 Gram(s) IV Push once  ferrous    sulfate 325 milliGRAM(s) Oral daily  heparin  Injectable 5000 Unit(s) SubCutaneous every 8 hours  hydrALAZINE 100 milliGRAM(s) Oral every 8 hours  insulin glargine Injectable (LANTUS) 5 Unit(s) SubCutaneous at bedtime  insulin lispro (HumaLOG) corrective regimen sliding scale   SubCutaneous three times a day before meals  insulin lispro (HumaLOG) corrective regimen sliding scale   SubCutaneous at bedtime  insulin lispro Injectable (HumaLOG) 3 Unit(s) SubCutaneous three times a day before meals  isosorbide   dinitrate Tablet (ISORDIL) 10 milliGRAM(s) Oral three times a day  pantoprazole    Tablet 40 milliGRAM(s) Oral two times a day  polyethylene glycol 3350 17 Gram(s) Oral daily  senna 2 Tablet(s) Oral at bedtime    PRN Inpatient Medications  dextrose 40% Gel 15 Gram(s) Oral once PRN  glucagon  Injectable 1 milliGRAM(s) IntraMuscular once PRN      REVIEW OF SYSTEMS  --------------------------------------------------------------------------------  Gen: No fever/chills  Respiratory: No dyspnea  CV: No chest pain  GI: No abdominal pain  : No dysuria  MSK: No edema    All other systems were reviewed and are negative, except as noted.    VITALS/PHYSICAL EXAM  --------------------------------------------------------------------------------  T(C): 36.7 (01-12-19 @ 05:41), Max: 36.7 (01-11-19 @ 21:24)  HR: 82 (01-12-19 @ 05:41) (74 - 82)  BP: 109/64 (01-12-19 @ 05:41) (109/64 - 124/74)  RR: 18 (01-12-19 @ 05:41) (18 - 18)  SpO2: 100% (01-12-19 @ 05:41) (98% - 100%)  Wt(kg): --  Height (cm): 165.1 (01-11-19 @ 09:10)  Weight (kg): 79.2 (01-11-19 @ 09:10)  BMI (kg/m2): 29.1 (01-11-19 @ 09:10)  BSA (m2): 1.87 (01-11-19 @ 09:10)    01-11-19 @ 07:01  -  01-12-19 @ 07:00  --------------------------------------------------------  IN: 500 mL / OUT: 2150 mL / NET: -1650 mL    Physical Exam:  	Gen: NAD  	HEENT: No JVD  	Pulm: CTA B/L  	CV: S1S2+  	Abd: Soft, +BS   	Ext: No LE edema B/L  	Neuro: Awake  	Skin: Warm and dry  	Vascular access: RIJ tunneled HD catheter site: no bleeding    LABS/STUDIES  --------------------------------------------------------------------------------              7.9    8.29  >-----------<  72       [01-12-19 @ 05:30]              25.3     137  |  99  |  87  ----------------------------<  154      [01-12-19 @ 05:30]  4.6   |  23  |  5.30        Ca     8.4     [01-12-19 @ 05:30]      Mg     1.7     [01-12-19 @ 05:30]      Phos  4.3     [01-12-19 @ 05:30]    TPro  x   /  Alb  x   /  TBili  0.3  /  DBili  x   /  AST  x   /  ALT  x   /  AlkPhos  x   [01-12-19 @ 05:30]    Creatinine Trend:  SCr 5.30 [01-12 @ 05:30]  SCr 5.07 [01-11 @ 05:25]  SCr 6.44 [01-10 @ 05:15]  SCr 6.72 [01-09 @ 12:52]  SCr 7.15 [12-14 @ 05:50]    Iron 18, TIBC 313, %sat --      [01-12-19 @ 05:30]  Ferritin 28.14      [01-12-19 @ 05:30]  HbA1c 6.5      [01-11-19 @ 06:00]  Lipid: chol 109, TG 64, HDL 48, LDL 53      [07-18-18 @ 20:46]    HBsAb 42.8      [01-10-19 @ 21:10]  HBsAg NEGATIVE      [01-10-19 @ 05:15]  HCV 0.11, Nonreactive Hepatitis C AB  S/CO Ratio                        Interpretation  < 1.0                                     Non-Reactive  1.0 - 4.9                           Weakly-Reactive  > 5.0                                 Reactive  Non-Reactive: Aperson with a non-reactive HCV antibody  result is considered uninfected.  No further action is  needed unless recent infection is suspected.  In these  cases, consider repeat testing later to detect  seroconversion..  Weakly-Reactive: HCV antibody test is abnormal, HCV RNA  Qualitative test will follow.  Reactive: HCV antibody test is abnormal, HCV RNA  Qualitative test will follow.  Note: HCV antibody testing is performed on the [employer]   system.      [01-10-19 @ 05:15]

## 2019-01-12 NOTE — PROGRESS NOTE ADULT - PROBLEM SELECTOR PLAN 4
- c/w hydralazine, coreg as per home regimen - hx of CAD w/ MI s/p stent x 1   - c/w ASA, lipitor, coreg  - will give 1 unit PRBC with HD today as pt is planned for OR on Monday, to keep Hgb >8

## 2019-01-12 NOTE — PROGRESS NOTE ADULT - SUBJECTIVE AND OBJECTIVE BOX
Cori Butcher MD-PGY1  Department of Internal Medicine  Pager [phone]        DEMETRIO VILLALTA  53y  MRN: [id number]    Patient is a 53y old  Male who presents with a chief complaint of told to come in for catheter placement for renal replacement therapy (11 Jan 2019 15:39)      Subjective: no events ON. Denies fever, CP, SOB, abn pain, N/V. Tolerating diet.      MEDICATIONS  (STANDING):  amoxicillin 1000 milliGRAM(s) Oral two times a day  aspirin enteric coated 81 milliGRAM(s) Oral daily  atorvastatin 80 milliGRAM(s) Oral at bedtime  buMETAnide 6 milliGRAM(s) Oral two times a day  calcium acetate 667 milliGRAM(s) Oral three times a day with meals  carvedilol 6.25 milliGRAM(s) Oral every 12 hours  chlorhexidine 4% Liquid 1 Application(s) Topical two times a day  clarithromycin 500 milliGRAM(s) Oral two times a day  dextrose 5%. 1000 milliLiter(s) (50 mL/Hr) IV Continuous <Continuous>  dextrose 50% Injectable 12.5 Gram(s) IV Push once  dextrose 50% Injectable 25 Gram(s) IV Push once  dextrose 50% Injectable 25 Gram(s) IV Push once  heparin  Injectable 5000 Unit(s) SubCutaneous every 8 hours  hydrALAZINE 100 milliGRAM(s) Oral every 8 hours  insulin glargine Injectable (LANTUS) 5 Unit(s) SubCutaneous at bedtime  insulin lispro (HumaLOG) corrective regimen sliding scale   SubCutaneous three times a day before meals  insulin lispro (HumaLOG) corrective regimen sliding scale   SubCutaneous at bedtime  insulin lispro Injectable (HumaLOG) 3 Unit(s) SubCutaneous three times a day before meals  isosorbide   dinitrate Tablet (ISORDIL) 10 milliGRAM(s) Oral three times a day  pantoprazole    Tablet 40 milliGRAM(s) Oral two times a day  polyethylene glycol 3350 17 Gram(s) Oral daily  senna 2 Tablet(s) Oral at bedtime    MEDICATIONS  (PRN):  dextrose 40% Gel 15 Gram(s) Oral once PRN Blood Glucose LESS THAN 70 milliGRAM(s)/deciliter  glucagon  Injectable 1 milliGRAM(s) IntraMuscular once PRN Glucose LESS THAN 70 milligrams/deciliter      Objective:    Vitals: Vital Signs Last 24 Hrs  T(C): 36.7 (01-12-19 @ 05:41), Max: 36.7 (01-11-19 @ 21:24)  T(F): 98 (01-12-19 @ 05:41), Max: 98.1 (01-11-19 @ 21:24)  HR: 82 (01-12-19 @ 05:41) (74 - 82)  BP: 109/64 (01-12-19 @ 05:41) (109/64 - 152/94)  BP(mean): --  RR: 18 (01-12-19 @ 05:41) (18 - 18)  SpO2: 100% (01-12-19 @ 05:41) (98% - 100%)              I&O's Summary    11 Jan 2019 07:01  -  12 Jan 2019 07:00  --------------------------------------------------------  IN: 500 mL / OUT: 2150 mL / NET: -1650 mL        PHYSICAL EXAM:  GENERAL: NAD  HEAD:  Atraumatic, Normocephalic  EYES: EOMI, conjunctiva and sclera clear  CHEST/LUNG: Clear to percussion bilaterally; No rales, rhonchi, wheezing  HEART: Regular rate and rhythm; No murmurs, rubs, or gallops  ABDOMEN: Soft, Nontender, Nondistended; no rebound or guarding  SKIN: No rashes or lesions  NERVOUS SYSTEM:  Alert & Oriented X3    LABS:                        7.9    8.29  )-----------( 72       ( 12 Jan 2019 05:30 )             25.3                         8.8    5.07  )-----------( 89       ( 11 Jan 2019 05:25 )             27.3                         8.0    4.24  )-----------( 125      ( 10 Kali 2019 06:00 )             24.4     01-12    137  |  99  |  87<H>  ----------------------------<  154<H>  4.6   |  23  |  5.30<H>  01-11    135  |  96<L>  |  94<H>  ----------------------------<  433<H>  4.3   |  23  |  5.07<H>  01-10    139  |  95<L>  |  120<H>  ----------------------------<  113<H>  3.6   |  24  |  6.44<H>    Ca    8.4      12 Jan 2019 05:30  Ca    7.9<L>      11 Jan 2019 05:25  Ca    8.2<L>      10 Kali 2019 05:15  Phos  4.3     01-12  Mg     1.7     01-12    TPro  x   /  Alb  x   /  TBili  0.3  /  DBili  x   /  AST  x   /  ALT  x   /  AlkPhos  x   01-12  TPro  6.6  /  Alb  3.5  /  TBili  0.3  /  DBili  x   /  AST  17  /  ALT  26  /  AlkPhos  110  01-10  TPro  7.6  /  Alb  4.0  /  TBili  0.3  /  DBili  x   /  AST  15  /  ALT  26  /  AlkPhos  128<H>  01-09                      CAPILLARY BLOOD GLUCOSE      POCT Blood Glucose.: 280 mg/dL (11 Jan 2019 22:22)  POCT Blood Glucose.: 216 mg/dL (11 Jan 2019 17:53)  POCT Blood Glucose.: 290 mg/dL (11 Jan 2019 12:22)  POCT Blood Glucose.: 202 mg/dL (11 Jan 2019 08:55)      RADIOLOGY & ADDITIONAL TESTS:  Imaging Personally Reviewed:  [x ] YES  [ ] NO    Consultants involved in case:   Consultant(s) Notes Reviewed:  [ x] YES  [ ] NO:   Care Discussed with Consultants/Other Providers [x ] YES  [ ] NO Cori Butcher MD-PGY1  Department of Internal Medicine  Pager [phone]        DEMETRIO VILLALTA  53y  MRN: [id number]    Patient is a 53y old  Male who presents with a chief complaint of told to come in for catheter placement for renal replacement therapy (11 Jan 2019 15:39)      Subjective: no events ON. Denies fever, lightheadedness, dizziness, CP, SOB, abn pain, N/V, bleeding. Tolerating diet.      MEDICATIONS  (STANDING):  amoxicillin 1000 milliGRAM(s) Oral two times a day  aspirin enteric coated 81 milliGRAM(s) Oral daily  atorvastatin 80 milliGRAM(s) Oral at bedtime  buMETAnide 6 milliGRAM(s) Oral two times a day  calcium acetate 667 milliGRAM(s) Oral three times a day with meals  carvedilol 6.25 milliGRAM(s) Oral every 12 hours  chlorhexidine 4% Liquid 1 Application(s) Topical two times a day  clarithromycin 500 milliGRAM(s) Oral two times a day  dextrose 5%. 1000 milliLiter(s) (50 mL/Hr) IV Continuous <Continuous>  dextrose 50% Injectable 12.5 Gram(s) IV Push once  dextrose 50% Injectable 25 Gram(s) IV Push once  dextrose 50% Injectable 25 Gram(s) IV Push once  heparin  Injectable 5000 Unit(s) SubCutaneous every 8 hours  hydrALAZINE 100 milliGRAM(s) Oral every 8 hours  insulin glargine Injectable (LANTUS) 5 Unit(s) SubCutaneous at bedtime  insulin lispro (HumaLOG) corrective regimen sliding scale   SubCutaneous three times a day before meals  insulin lispro (HumaLOG) corrective regimen sliding scale   SubCutaneous at bedtime  insulin lispro Injectable (HumaLOG) 3 Unit(s) SubCutaneous three times a day before meals  isosorbide   dinitrate Tablet (ISORDIL) 10 milliGRAM(s) Oral three times a day  pantoprazole    Tablet 40 milliGRAM(s) Oral two times a day  polyethylene glycol 3350 17 Gram(s) Oral daily  senna 2 Tablet(s) Oral at bedtime    MEDICATIONS  (PRN):  dextrose 40% Gel 15 Gram(s) Oral once PRN Blood Glucose LESS THAN 70 milliGRAM(s)/deciliter  glucagon  Injectable 1 milliGRAM(s) IntraMuscular once PRN Glucose LESS THAN 70 milligrams/deciliter      Objective:    Vitals: Vital Signs Last 24 Hrs  T(C): 36.7 (01-12-19 @ 05:41), Max: 36.7 (01-11-19 @ 21:24)  T(F): 98 (01-12-19 @ 05:41), Max: 98.1 (01-11-19 @ 21:24)  HR: 82 (01-12-19 @ 05:41) (74 - 82)  BP: 109/64 (01-12-19 @ 05:41) (109/64 - 152/94)  BP(mean): --  RR: 18 (01-12-19 @ 05:41) (18 - 18)  SpO2: 100% (01-12-19 @ 05:41) (98% - 100%)              I&O's Summary    11 Jan 2019 07:01  -  12 Jan 2019 07:00  --------------------------------------------------------  IN: 500 mL / OUT: 2150 mL / NET: -1650 mL        PHYSICAL EXAM:  GENERAL: NAD  HEAD:  Atraumatic, Normocephalic  EYES: EOMI, conjunctiva and sclera clear  CHEST/LUNG: Clear to percussion bilaterally; No rales, rhonchi, wheezing  HEART: Regular rate and rhythm; No murmurs, rubs, or gallops  ABDOMEN: Soft, Nontender, Nondistended; no rebound or guarding  SKIN: No rashes or lesions  NERVOUS SYSTEM:  Alert & Oriented X3    LABS:                        7.9    8.29  )-----------( 72       ( 12 Jan 2019 05:30 )             25.3                         8.8    5.07  )-----------( 89       ( 11 Jan 2019 05:25 )             27.3                         8.0    4.24  )-----------( 125      ( 10 Kali 2019 06:00 )             24.4     01-12    137  |  99  |  87<H>  ----------------------------<  154<H>  4.6   |  23  |  5.30<H>  01-11    135  |  96<L>  |  94<H>  ----------------------------<  433<H>  4.3   |  23  |  5.07<H>  01-10    139  |  95<L>  |  120<H>  ----------------------------<  113<H>  3.6   |  24  |  6.44<H>    Ca    8.4      12 Jan 2019 05:30  Ca    7.9<L>      11 Jan 2019 05:25  Ca    8.2<L>      10 Kali 2019 05:15  Phos  4.3     01-12  Mg     1.7     01-12    TPro  x   /  Alb  x   /  TBili  0.3  /  DBili  x   /  AST  x   /  ALT  x   /  AlkPhos  x   01-12  TPro  6.6  /  Alb  3.5  /  TBili  0.3  /  DBili  x   /  AST  17  /  ALT  26  /  AlkPhos  110  01-10  TPro  7.6  /  Alb  4.0  /  TBili  0.3  /  DBili  x   /  AST  15  /  ALT  26  /  AlkPhos  128<H>  01-09                  Hepatitis B Surface AB Quantitative (01.10.19 @ 21:10)    Hepatitis B Surface AB Quantitative: 42.8:   HEPBSAB Quantitative Interpretation:  > 12.0         mIU/mL     Immune  8.0-12.0      mIU/mL     Borderline  < 8.0           mIU/mL     Non-Immune mlU/mL    Hepatitis B Core IgM Antibody (01.10.19 @ 05:15)    Hepatitis B Core IgM Antibody: Nonreactive    Hepatitis B Surface Antigen (01.10.19 @ 05:15)    Hepatitis B Surface Antigen: NEGATIVE    Hepatitis C Antibody Test (01.10.19 @ 05:15)    Hepatitis C Virus S/CO Ratio: 0.11 S/CO    Hepatitis C Virus Interpretation: Nonreactive Hepatitis C AB  S/CO Ratio                        Interpretation  < 1.0                                     Non-Reactive  1.0 - 4.9                           Weakly-Reactive  > 5.0                                 Reactive  Non-Reactive: Aperson with a non-reactive HCV antibody  result is considered uninfected.  No further action is  needed unless recent infection is suspected.  In these  cases, consider repeat testing later to detect  seroconversion..  Weakly-Reactive: HCV antibody test is abnormal, HCV RNA  Qualitative test will follow.  Reactive: HCV antibody test is abnormal, HCV RNA  Qualitative test will follow.  Note: HCV antibody testing is performed on the Abbott   system.    Hepatitis B Surface Antigen (01.09.19 @ 17:40)    Hepatitis B Surface Antigen: NEGATIVE        CAPILLARY BLOOD GLUCOSE      POCT Blood Glucose.: 280 mg/dL (11 Jan 2019 22:22)  POCT Blood Glucose.: 216 mg/dL (11 Jan 2019 17:53)  POCT Blood Glucose.: 290 mg/dL (11 Jan 2019 12:22)  POCT Blood Glucose.: 202 mg/dL (11 Jan 2019 08:55)      RADIOLOGY & ADDITIONAL TESTS:  Imaging Personally Reviewed:  [x ] YES  [ ] NO    Consultants involved in case:   Consultant(s) Notes Reviewed:  [ x] YES  [ ] NO:   Care Discussed with Consultants/Other Providers [x ] YES  [ ] NO Cori Butcher MD-PGY1  Department of Internal Medicine  Pager [phone]        DEMETRIO VILLALTA  53y  MRN: [id number]    Patient is a 53y old  Male who presents with a chief complaint of told to come in for catheter placement for renal replacement therapy (11 Jan 2019 15:39)      Subjective: no events ON. Denies fever, lightheadedness, dizziness, CP, SOB, abn pain, N/V, bleeding. Tolerating diet.      MEDICATIONS  (STANDING):  amoxicillin 1000 milliGRAM(s) Oral two times a day  aspirin enteric coated 81 milliGRAM(s) Oral daily  atorvastatin 80 milliGRAM(s) Oral at bedtime  buMETAnide 6 milliGRAM(s) Oral two times a day  calcium acetate 667 milliGRAM(s) Oral three times a day with meals  carvedilol 6.25 milliGRAM(s) Oral every 12 hours  chlorhexidine 4% Liquid 1 Application(s) Topical two times a day  clarithromycin 500 milliGRAM(s) Oral two times a day  dextrose 5%. 1000 milliLiter(s) (50 mL/Hr) IV Continuous <Continuous>  dextrose 50% Injectable 12.5 Gram(s) IV Push once  dextrose 50% Injectable 25 Gram(s) IV Push once  dextrose 50% Injectable 25 Gram(s) IV Push once  heparin  Injectable 5000 Unit(s) SubCutaneous every 8 hours  hydrALAZINE 100 milliGRAM(s) Oral every 8 hours  insulin glargine Injectable (LANTUS) 5 Unit(s) SubCutaneous at bedtime  insulin lispro (HumaLOG) corrective regimen sliding scale   SubCutaneous three times a day before meals  insulin lispro (HumaLOG) corrective regimen sliding scale   SubCutaneous at bedtime  insulin lispro Injectable (HumaLOG) 3 Unit(s) SubCutaneous three times a day before meals  isosorbide   dinitrate Tablet (ISORDIL) 10 milliGRAM(s) Oral three times a day  pantoprazole    Tablet 40 milliGRAM(s) Oral two times a day  polyethylene glycol 3350 17 Gram(s) Oral daily  senna 2 Tablet(s) Oral at bedtime    MEDICATIONS  (PRN):  dextrose 40% Gel 15 Gram(s) Oral once PRN Blood Glucose LESS THAN 70 milliGRAM(s)/deciliter  glucagon  Injectable 1 milliGRAM(s) IntraMuscular once PRN Glucose LESS THAN 70 milligrams/deciliter      Objective:    Vitals: Vital Signs Last 24 Hrs  T(C): 36.7 (01-12-19 @ 05:41), Max: 36.7 (01-11-19 @ 21:24)  T(F): 98 (01-12-19 @ 05:41), Max: 98.1 (01-11-19 @ 21:24)  HR: 82 (01-12-19 @ 05:41) (74 - 82)  BP: 109/64 (01-12-19 @ 05:41) (109/64 - 152/94)  BP(mean): --  RR: 18 (01-12-19 @ 05:41) (18 - 18)  SpO2: 100% (01-12-19 @ 05:41) (98% - 100%)              I&O's Summary    11 Jan 2019 07:01  -  12 Jan 2019 07:00  --------------------------------------------------------  IN: 500 mL / OUT: 2150 mL / NET: -1650 mL        PHYSICAL EXAM:  GENERAL: NAD  HEAD:  Atraumatic, Normocephalic  EYES: EOMI, conjunctiva and sclera clear  CHEST/LUNG: Clear to percussion bilaterally; No rales, rhonchi, wheezing  HEART: Regular rate and rhythm; No murmurs, rubs, or gallops  ABDOMEN: Soft, Nontender, Nondistended; no rebound or guarding  SKIN: No rashes or lesions, no petichiae, right permacath site is c/d/i  NERVOUS SYSTEM:  Alert & Oriented X3    LABS:                        7.9    8.29  )-----------( 72       ( 12 Jan 2019 05:30 )             25.3                         8.8    5.07  )-----------( 89       ( 11 Jan 2019 05:25 )             27.3                         8.0    4.24  )-----------( 125      ( 10 Kali 2019 06:00 )             24.4     01-12    137  |  99  |  87<H>  ----------------------------<  154<H>  4.6   |  23  |  5.30<H>  01-11    135  |  96<L>  |  94<H>  ----------------------------<  433<H>  4.3   |  23  |  5.07<H>  01-10    139  |  95<L>  |  120<H>  ----------------------------<  113<H>  3.6   |  24  |  6.44<H>    Ca    8.4      12 Jan 2019 05:30  Ca    7.9<L>      11 Jan 2019 05:25  Ca    8.2<L>      10 Kali 2019 05:15  Phos  4.3     01-12  Mg     1.7     01-12    TPro  x   /  Alb  x   /  TBili  0.3  /  DBili  x   /  AST  x   /  ALT  x   /  AlkPhos  x   01-12  TPro  6.6  /  Alb  3.5  /  TBili  0.3  /  DBili  x   /  AST  17  /  ALT  26  /  AlkPhos  110  01-10  TPro  7.6  /  Alb  4.0  /  TBili  0.3  /  DBili  x   /  AST  15  /  ALT  26  /  AlkPhos  128<H>  01-09                  Hepatitis B Surface AB Quantitative (01.10.19 @ 21:10)    Hepatitis B Surface AB Quantitative: 42.8:   HEPBSAB Quantitative Interpretation:  > 12.0         mIU/mL     Immune  8.0-12.0      mIU/mL     Borderline  < 8.0           mIU/mL     Non-Immune mlU/mL    Hepatitis B Core IgM Antibody (01.10.19 @ 05:15)    Hepatitis B Core IgM Antibody: Nonreactive    Hepatitis B Surface Antigen (01.10.19 @ 05:15)    Hepatitis B Surface Antigen: NEGATIVE    Hepatitis C Antibody Test (01.10.19 @ 05:15)    Hepatitis C Virus S/CO Ratio: 0.11 S/CO    Hepatitis C Virus Interpretation: Nonreactive Hepatitis C AB  S/CO Ratio                        Interpretation  < 1.0                                     Non-Reactive  1.0 - 4.9                           Weakly-Reactive  > 5.0                                 Reactive  Non-Reactive: Aperson with a non-reactive HCV antibody  result is considered uninfected.  No further action is  needed unless recent infection is suspected.  In these  cases, consider repeat testing later to detect  seroconversion..  Weakly-Reactive: HCV antibody test is abnormal, HCV RNA  Qualitative test will follow.  Reactive: HCV antibody test is abnormal, HCV RNA  Qualitative test will follow.  Note: HCV antibody testing is performed on the Abbott   system.    Hepatitis B Surface Antigen (01.09.19 @ 17:40)    Hepatitis B Surface Antigen: NEGATIVE        CAPILLARY BLOOD GLUCOSE      POCT Blood Glucose.: 280 mg/dL (11 Jan 2019 22:22)  POCT Blood Glucose.: 216 mg/dL (11 Jan 2019 17:53)  POCT Blood Glucose.: 290 mg/dL (11 Jan 2019 12:22)  POCT Blood Glucose.: 202 mg/dL (11 Jan 2019 08:55)      RADIOLOGY & ADDITIONAL TESTS:  Imaging Personally Reviewed:  [x ] YES  [ ] NO    Consultants involved in case:   Consultant(s) Notes Reviewed:  [ x] YES  [ ] NO:   Care Discussed with Consultants/Other Providers [x ] YES  [ ] NO Cori Butcher MD-PGY1  Department of Internal Medicine  Pager [phone]        DEMETRIO VILLALTA  53y  MRN: [id number]    Patient is a 53y old  Male who presents with a chief complaint of told to come in for catheter placement for renal replacement therapy (11 Jan 2019 15:39)      Subjective: no events ON. Denies fever, lightheadedness, dizziness, CP, SOB, abn pain, N/V, bleeding. Tolerating diet.      MEDICATIONS  (STANDING):  amoxicillin 1000 milliGRAM(s) Oral two times a day  aspirin enteric coated 81 milliGRAM(s) Oral daily  atorvastatin 80 milliGRAM(s) Oral at bedtime  buMETAnide 6 milliGRAM(s) Oral two times a day  calcium acetate 667 milliGRAM(s) Oral three times a day with meals  carvedilol 6.25 milliGRAM(s) Oral every 12 hours  chlorhexidine 4% Liquid 1 Application(s) Topical two times a day  clarithromycin 500 milliGRAM(s) Oral two times a day  dextrose 5%. 1000 milliLiter(s) (50 mL/Hr) IV Continuous <Continuous>  dextrose 50% Injectable 12.5 Gram(s) IV Push once  dextrose 50% Injectable 25 Gram(s) IV Push once  dextrose 50% Injectable 25 Gram(s) IV Push once  heparin  Injectable 5000 Unit(s) SubCutaneous every 8 hours  hydrALAZINE 100 milliGRAM(s) Oral every 8 hours  insulin glargine Injectable (LANTUS) 5 Unit(s) SubCutaneous at bedtime  insulin lispro (HumaLOG) corrective regimen sliding scale   SubCutaneous three times a day before meals  insulin lispro (HumaLOG) corrective regimen sliding scale   SubCutaneous at bedtime  insulin lispro Injectable (HumaLOG) 3 Unit(s) SubCutaneous three times a day before meals  isosorbide   dinitrate Tablet (ISORDIL) 10 milliGRAM(s) Oral three times a day  pantoprazole    Tablet 40 milliGRAM(s) Oral two times a day  polyethylene glycol 3350 17 Gram(s) Oral daily  senna 2 Tablet(s) Oral at bedtime    MEDICATIONS  (PRN):  dextrose 40% Gel 15 Gram(s) Oral once PRN Blood Glucose LESS THAN 70 milliGRAM(s)/deciliter  glucagon  Injectable 1 milliGRAM(s) IntraMuscular once PRN Glucose LESS THAN 70 milligrams/deciliter      Objective:    Vitals: Vital Signs Last 24 Hrs  T(C): 36.7 (01-12-19 @ 05:41), Max: 36.7 (01-11-19 @ 21:24)  T(F): 98 (01-12-19 @ 05:41), Max: 98.1 (01-11-19 @ 21:24)  HR: 82 (01-12-19 @ 05:41) (74 - 82)  BP: 109/64 (01-12-19 @ 05:41) (109/64 - 152/94)  BP(mean): --  RR: 18 (01-12-19 @ 05:41) (18 - 18)  SpO2: 100% (01-12-19 @ 05:41) (98% - 100%)              I&O's Summary    11 Jan 2019 07:01  -  12 Jan 2019 07:00  --------------------------------------------------------  IN: 500 mL / OUT: 2150 mL / NET: -1650 mL        PHYSICAL EXAM:  GENERAL: NAD  HEAD:  Atraumatic, Normocephalic  EYES: EOMI, conjunctiva and sclera clear  CHEST/LUNG: Clear to percussion bilaterally; No rales, rhonchi, wheezing  HEART: Regular rate and rhythm; No murmurs, rubs, or gallops  ABDOMEN: Soft, Nontender, Nondistended; no rebound or guarding  SKIN: No rashes or lesions, no petichiae, right permacath site is c/d/i  NERVOUS SYSTEM:  Alert & Oriented X3    LABS:                        7.9    8.29  )-----------( 72       ( 12 Jan 2019 05:30 )             25.3                         8.8    5.07  )-----------( 89       ( 11 Jan 2019 05:25 )             27.3                         8.0    4.24  )-----------( 125      ( 10 Kali 2019 06:00 )             24.4     01-12    137  |  99  |  87<H>  ----------------------------<  154<H>  4.6   |  23  |  5.30<H>  01-11    135  |  96<L>  |  94<H>  ----------------------------<  433<H>  4.3   |  23  |  5.07<H>  01-10    139  |  95<L>  |  120<H>  ----------------------------<  113<H>  3.6   |  24  |  6.44<H>    Ca    8.4      12 Jan 2019 05:30  Ca    7.9<L>      11 Jan 2019 05:25  Ca    8.2<L>      10 Kali 2019 05:15  Phos  4.3     01-12  Mg     1.7     01-12    TPro  x   /  Alb  x   /  TBili  0.3  /  DBili  x   /  AST  x   /  ALT  x   /  AlkPhos  x   01-12  TPro  6.6  /  Alb  3.5  /  TBili  0.3  /  DBili  x   /  AST  17  /  ALT  26  /  AlkPhos  110  01-10  TPro  7.6  /  Alb  4.0  /  TBili  0.3  /  DBili  x   /  AST  15  /  ALT  26  /  AlkPhos  128<H>  01-09                  Hepatitis B Surface AB Quantitative (01.10.19 @ 21:10)    Hepatitis B Surface AB Quantitative: 42.8:   HEPBSAB Quantitative Interpretation:  > 12.0         mIU/mL     Immune  8.0-12.0      mIU/mL     Borderline  < 8.0           mIU/mL     Non-Immune mlU/mL    Hepatitis B Core IgM Antibody (01.10.19 @ 05:15)    Hepatitis B Core IgM Antibody: Nonreactive    Hepatitis B Surface Antigen (01.10.19 @ 05:15)    Hepatitis B Surface Antigen: NEGATIVE    Hepatitis C Antibody Test (01.10.19 @ 05:15)    Hepatitis C Virus S/CO Ratio: 0.11 S/CO    Hepatitis C Virus Interpretation: Nonreactive Hepatitis C AB  S/CO Ratio                        Interpretation  < 1.0                                     Non-Reactive  1.0 - 4.9                           Weakly-Reactive  > 5.0                                 Reactive  Non-Reactive: Aperson with a non-reactive HCV antibody  result is considered uninfected.  No further action is  needed unless recent infection is suspected.  In these  cases, consider repeat testing later to detect  seroconversion..  Weakly-Reactive: HCV antibody test is abnormal, HCV RNA  Qualitative test will follow.  Reactive: HCV antibody test is abnormal, HCV RNA  Qualitative test will follow.  Note: HCV antibody testing is performed on the Abbott   system.    Hepatitis B Surface Antigen (01.09.19 @ 17:40)    Hepatitis B Surface Antigen: NEGATIVE        CAPILLARY BLOOD GLUCOSE      POCT Blood Glucose.: 280 mg/dL (11 Jan 2019 22:22)  POCT Blood Glucose.: 216 mg/dL (11 Jan 2019 17:53)  POCT Blood Glucose.: 290 mg/dL (11 Jan 2019 12:22)  POCT Blood Glucose.: 202 mg/dL (11 Jan 2019 08:55)      RADIOLOGY & ADDITIONAL TESTS:  Imaging Personally Reviewed:  [x ] YES  [ ] NO    Consultants involved in case:   Consultant(s) Notes Reviewed:  [ x] YES  [ ] NO: Renal  Care Discussed with Consultants/Other Providers [x ] YES  [ ] NO: Dr. Espinoza (heme) re: thrombocytopenia

## 2019-01-12 NOTE — PROGRESS NOTE ADULT - PROBLEM SELECTOR PLAN 2
- hx of CAD w/ MI with subsequent ischemic cardiomyopathy: Last EF 24%   - clinically does not look volume overloaded and hemodynamically stable  - c/w home regimen of bumex, hydralazine, coreg  -Per heart failure team, start on isordil 10 mg three times/day - hx of CAD w/ MI with subsequent ischemic cardiomyopathy: Last EF 24%   - Per HF, increase bumex, c/w hydralazine, coreg, isosordil as pt's weight is above goal Patient with anemia in the setting of ESRD. Hemoglobin below target range. Per Nephro, pt would benefit from IV Iron. Plan for Venofer 100mg IV for 5 doses with HD. Will initiate Epogen 4000 units with HD.   -Will initiate po iron 325 qD  -monitor Hgb daily

## 2019-01-12 NOTE — CONSULT NOTE ADULT - SUBJECTIVE AND OBJECTIVE BOX
HPI:  53 year old male with hx of HFrEF (28%), CAD s/p stent, ESRD who was initially admitted for AVF placement for HD. Patient's hospital course has been complicated by anemia with concern for GIB. Recent EGD showed nonbleeding ulcers and erosions. Hematology was consulted today for thrombocytopenia. Per patient, he is unaware of ever having low platelets. He denies petechiae, easy bruising or bleeding. On admission in 1/9/19  patient had platelet count of 135 with a noticeable downward trend and most recent count of 72 today. He is currently on heparin for DVT ppx.      Allergies    No Known Allergies    Intolerances      MEDICATIONS  (STANDING):  amoxicillin 1000 milliGRAM(s) Oral two times a day  aspirin enteric coated 81 milliGRAM(s) Oral daily  atorvastatin 80 milliGRAM(s) Oral at bedtime  buMETAnide 6 milliGRAM(s) Oral two times a day  calcium acetate 667 milliGRAM(s) Oral three times a day with meals  carvedilol 6.25 milliGRAM(s) Oral every 12 hours  chlorhexidine 4% Liquid 1 Application(s) Topical two times a day  clarithromycin 500 milliGRAM(s) Oral two times a day  dextrose 5%. 1000 milliLiter(s) (50 mL/Hr) IV Continuous <Continuous>  dextrose 50% Injectable 12.5 Gram(s) IV Push once  dextrose 50% Injectable 25 Gram(s) IV Push once  dextrose 50% Injectable 25 Gram(s) IV Push once  epoetin elisabet Injectable 4000 Unit(s) IV Push <User Schedule>  ferrous    sulfate 325 milliGRAM(s) Oral daily  heparin  Injectable 5000 Unit(s) SubCutaneous every 8 hours  hydrALAZINE 100 milliGRAM(s) Oral every 8 hours  insulin glargine Injectable (LANTUS) 5 Unit(s) SubCutaneous at bedtime  insulin lispro (HumaLOG) corrective regimen sliding scale   SubCutaneous three times a day before meals  insulin lispro (HumaLOG) corrective regimen sliding scale   SubCutaneous at bedtime  insulin lispro Injectable (HumaLOG) 3 Unit(s) SubCutaneous three times a day before meals  iron sucrose IVPB 100 milliGRAM(s) IV Intermittent <User Schedule>  isosorbide   dinitrate Tablet (ISORDIL) 10 milliGRAM(s) Oral three times a day  pantoprazole    Tablet 40 milliGRAM(s) Oral two times a day  polyethylene glycol 3350 17 Gram(s) Oral daily  senna 2 Tablet(s) Oral at bedtime    MEDICATIONS  (PRN):  dextrose 40% Gel 15 Gram(s) Oral once PRN Blood Glucose LESS THAN 70 milliGRAM(s)/deciliter  glucagon  Injectable 1 milliGRAM(s) IntraMuscular once PRN Glucose LESS THAN 70 milligrams/deciliter      PAST MEDICAL & SURGICAL HISTORY:  GIB (gastrointestinal bleeding): was treated  with H pyelori stable since Dec 10, 2018  Abnormal echocardiogram: 06/2018severe MR  Severe global left ventricular systolic dysfunction.  6. Right ventricular enlargement with decreased right  ventricular systolic function, moderate RV dysfunction  CKD (chronic kidney disease), stage V  HFrEF (heart failure with reduced ejection fraction): EF 20-24%  Myocardial infarction: Jan 2016  Hyperlipidemia  CAD (coronary artery disease): 2016, coronary artery stentX1  DM (diabetes mellitus): 2  not taking any medication lat4st HgA1c 5.7  HTN (hypertension)  Stented coronary artery: ? 1  Arterial stent thrombosis      FAMILY HISTORY:  Family history of diabetes mellitus (DM) (Sibling)  Family history of MI (myocardial infarction) (Sibling)      SOCIAL HISTORY: No EtOH, no tobacco    REVIEW OF SYSTEMS: per HPI       T(F): 98 (01-12-19 @ 13:25), Max: 98.1 (01-11-19 @ 21:24)  HR: 68 (01-12-19 @ 13:25)  BP: 113/78 (01-12-19 @ 13:25)  RR: 18 (01-12-19 @ 13:25)  SpO2: 98% (01-12-19 @ 13:25)  Wt(kg): --    GENERAL: NAD, well-developed  HEAD:  Atraumatic, Normocephalic  EYES: EOMI,  conjunctiva and sclera clear  NECK: Supple  CHEST/LUNG: Clear to auscultation bilaterally; No wheeze  HEART: Regular rate and rhythm  ABDOMEN: Soft, Nontender, Nondistended  EXTREMITIES: No clubbing, cyanosis, or edema  NEUROLOGY: non-focal  SKIN: No rashes or lesions                          7.9    8.29  )-----------( 72       ( 12 Jan 2019 05:30 )             25.3       01-12    137  |  99  |  87<H>  ----------------------------<  154<H>  4.6   |  23  |  5.30<H>    Ca    8.4      12 Jan 2019 05:30  Phos  4.3     01-12  Mg     1.7     01-12    TPro  x   /  Alb  x   /  TBili  0.3  /  DBili  x   /  AST  x   /  ALT  x   /  AlkPhos  x   01-12      Phosphorus Level, Serum: 4.3 mg/dL (01-12 @ 05:30)  Magnesium, Serum: 1.7 mg/dL (01-12 @ 05:30)  Lactate Dehydrogenase, Serum: 211 U/L (01-12 @ 05:30)

## 2019-01-12 NOTE — CHART NOTE - NSCHARTNOTEFT_GEN_A_CORE
Vascular Surgery    - Results of vein mapping noted  - Patient is currently on the schedule for AVF placement on Monday  - Please continue left arm precautions- do not use for blood draws, IVs, or blood pressure cuffs  - Please document medical/cardiac optimization and risk stratification today  - Please dialyze patient over the weekend (preferably on Sunday). No dialysis prior to surgery on Monday.    DARCI Becker PGY 3  [id number] Vascular Surgery    - Results of vein mapping noted  - Patient is currently on the schedule for AVF placement on Monday  - Please continue left arm precautions- do not use for blood draws, IVs, or blood pressure cuffs  - Please document medical/cardiac optimization and risk stratification today  - Please dialyze patient over the weekend. No dialysis prior to surgery on Monday.  - D/w team @ pager [phone]    DARCI Becekr PGY 3  [id number]

## 2019-01-12 NOTE — CONSULT NOTE ADULT - ASSESSMENT
53 year old male with hx of HFrEF (28%), CAD s/p stent, ESRD who was initially admitted for AVF placement for HD. Hematology was consulted today for thrombocytopenia    # Thrombocytopenia  - Plt of 135 on 1/9 now at 72K today  - Check: LDH, haptoglobin, reticulocyte count  - Possible etiology 2/2 ITP from H. Pylori, antibiotics, HIT vs less likely hemolysis  - 4T score 3-4 low to intermediate risk, HIT panel pending   - Would hold off on heparin for now  - Will review peripheral smear   - Order: HIV, hepatitis panel    Sis Alan  PGY-4 Hematology Oncology  [phone] Saturday coverage 53 year old male with hx of HFrEF (28%), CAD s/p stent, ESRD who was initially admitted for AVF placement for HD. Hematology was consulted today for thrombocytopenia    # Thrombocytopenia  - Plt of 135 on 1/9 now at 72K today  - Check: LDH, haptoglobin  - Possible etiology 2/2 ITP from H. Pylori, antibiotics, HIT vs less likely hemolysis  - 4T score 3-4 low to intermediate risk, HIT panel pending   - Would hold off on heparin for now  - Will review peripheral smear   - Order: HIV, hepatitis panel    # Normocytic anemia  - Likely anemia of chronic disease 2/2 ESRD  - check: fobt, retic count, iron studies, b12/folate    Coral Dayanna  PGY-4 Hematology Oncology  [phone] Saturday coverage

## 2019-01-12 NOTE — PROGRESS NOTE ADULT - PROBLEM SELECTOR PLAN 2
Patient with anemia in the setting of ESRD. Hemoglobin below target range. Pt would benefit from IV Iron. Plan for Venofer 100mg IV for 5 doses with HD. Will initiate Epogen 4000 units with HD. Monitor hemoglobin. Patient with anemia in the setting of ESRD. Hemoglobin and serum ferritin below target range. Plan for Venofer 100mg IV for 5 doses with HD. Will initiate Epogen 4000 units with HD. Monitor hemoglobin

## 2019-01-12 NOTE — PROGRESS NOTE ADULT - PROBLEM SELECTOR PLAN 6
- hx of T2DM, not on oral hypoglycemics according to pt, on metformin per outpt records  - initiate insulin regimen today - A1c 6.5  -c/w 3 units Lispro premeal and ISS - recently diagnosed H pylori gastritis - did not undergo any treatment   - c/w triple therapy

## 2019-01-12 NOTE — PROGRESS NOTE ADULT - PROBLEM SELECTOR PLAN 7
- c/w lipitor baseline platelet level in 200s  has been downtrending prior to initiation of heparin subq, not in DIC and pt denies hx of alcohol abuse  hemolysis labs negative, not septic, medications reviewed for possible cause  pt asymptomatic  continue to monitor baseline platelet level in 200s  has been downtrending prior to initiation of heparin subq, not in DIC and pt denies hx of alcohol abuse  hemolysis labs negative, not septic, medications reviewed for possible cause  pt asymptomatic  -LDH, bili normal  -f/u platelet count blue top, fibrinogen, PT/INR, PTT, D-dimer  -Heme consult  -continue to monitor baseline platelet level in 200s  has been downtrending prior to initiation of heparin subq, not in DIC and pt denies hx of alcohol abuse  hemolysis labs negative, not septic, medications reviewed for possible cause  pt asymptomatic  -LDH, bili normal  -f/u platelet count blue top, fibrinogen, PT/INR, PTT, D-dimer, HIT screen  -Heme consult  -continue to monitor - A1c 6.5  -c/w 3 units Lispro premeal and ISS

## 2019-01-12 NOTE — PROGRESS NOTE ADULT - PROBLEM SELECTOR PLAN 8
DVT PPx: Improve score of 0, lower risk for DVT - - no pharmacologic ppx - c/w lipitor baseline platelet level in 200s  has been downtrending prior to initiation of heparin subq, not in DIC and pt denies hx of alcohol abuse  hemolysis labs negative, not septic, medications reviewed for possible cause  pt asymptomatic  -LDH, bili normal  -f/u platelet count blue top, fibrinogen, PT/INR, PTT, D-dimer, HIT screen  -Heme consult  -continue to monitor

## 2019-01-13 ENCOUNTER — TRANSCRIPTION ENCOUNTER (OUTPATIENT)
Age: 54
End: 2019-01-13

## 2019-01-13 LAB
ALBUMIN SERPL ELPH-MCNC: 3.6 G/DL — SIGNIFICANT CHANGE UP (ref 3.3–5)
ALP SERPL-CCNC: 87 U/L — SIGNIFICANT CHANGE UP (ref 40–120)
ALT FLD-CCNC: 27 U/L — SIGNIFICANT CHANGE UP (ref 4–41)
ANION GAP SERPL CALC-SCNC: 14 MEQ/L — SIGNIFICANT CHANGE UP (ref 7–14)
AST SERPL-CCNC: 23 U/L — SIGNIFICANT CHANGE UP (ref 4–40)
BASOPHILS # BLD AUTO: 0.03 K/UL — SIGNIFICANT CHANGE UP (ref 0–0.2)
BASOPHILS NFR BLD AUTO: 0.5 % — SIGNIFICANT CHANGE UP (ref 0–2)
BILIRUB SERPL-MCNC: 0.5 MG/DL — SIGNIFICANT CHANGE UP (ref 0.2–1.2)
BUN SERPL-MCNC: 56 MG/DL — HIGH (ref 7–23)
CALCIUM SERPL-MCNC: 8.2 MG/DL — LOW (ref 8.4–10.5)
CHLORIDE SERPL-SCNC: 98 MMOL/L — SIGNIFICANT CHANGE UP (ref 98–107)
CO2 SERPL-SCNC: 25 MMOL/L — SIGNIFICANT CHANGE UP (ref 22–31)
CREAT SERPL-MCNC: 3.96 MG/DL — HIGH (ref 0.5–1.3)
EOSINOPHIL # BLD AUTO: 0.08 K/UL — SIGNIFICANT CHANGE UP (ref 0–0.5)
EOSINOPHIL NFR BLD AUTO: 1.2 % — SIGNIFICANT CHANGE UP (ref 0–6)
GLUCOSE SERPL-MCNC: 103 MG/DL — HIGH (ref 70–99)
HCT VFR BLD CALC: 26.5 % — LOW (ref 39–50)
HGB BLD-MCNC: 8.6 G/DL — LOW (ref 13–17)
IMM GRANULOCYTES NFR BLD AUTO: 0.3 % — SIGNIFICANT CHANGE UP (ref 0–1.5)
LYMPHOCYTES # BLD AUTO: 0.89 K/UL — LOW (ref 1–3.3)
LYMPHOCYTES # BLD AUTO: 13.5 % — SIGNIFICANT CHANGE UP (ref 13–44)
MAGNESIUM SERPL-MCNC: 1.7 MG/DL — SIGNIFICANT CHANGE UP (ref 1.6–2.6)
MCHC RBC-ENTMCNC: 28.3 PG — SIGNIFICANT CHANGE UP (ref 27–34)
MCHC RBC-ENTMCNC: 32.5 % — SIGNIFICANT CHANGE UP (ref 32–36)
MCV RBC AUTO: 87.2 FL — SIGNIFICANT CHANGE UP (ref 80–100)
MONOCYTES # BLD AUTO: 0.64 K/UL — SIGNIFICANT CHANGE UP (ref 0–0.9)
MONOCYTES NFR BLD AUTO: 9.7 % — SIGNIFICANT CHANGE UP (ref 2–14)
NEUTROPHILS # BLD AUTO: 4.94 K/UL — SIGNIFICANT CHANGE UP (ref 1.8–7.4)
NEUTROPHILS NFR BLD AUTO: 74.8 % — SIGNIFICANT CHANGE UP (ref 43–77)
NRBC # FLD: 0 K/UL — LOW (ref 25–125)
PHOSPHATE SERPL-MCNC: 3.9 MG/DL — SIGNIFICANT CHANGE UP (ref 2.5–4.5)
PLATELET # BLD AUTO: 65 K/UL — LOW (ref 150–400)
PMV BLD: 11.2 FL — SIGNIFICANT CHANGE UP (ref 7–13)
POTASSIUM SERPL-MCNC: 4.1 MMOL/L — SIGNIFICANT CHANGE UP (ref 3.5–5.3)
POTASSIUM SERPL-SCNC: 4.1 MMOL/L — SIGNIFICANT CHANGE UP (ref 3.5–5.3)
PROT SERPL-MCNC: 6.7 G/DL — SIGNIFICANT CHANGE UP (ref 6–8.3)
RBC # BLD: 3.04 M/UL — LOW (ref 4.2–5.8)
RBC # FLD: 14.4 % — SIGNIFICANT CHANGE UP (ref 10.3–14.5)
RETICS #: 54 K/UL — SIGNIFICANT CHANGE UP (ref 25–125)
RETICS/RBC NFR: 1.8 % — SIGNIFICANT CHANGE UP (ref 0.5–2.5)
SODIUM SERPL-SCNC: 137 MMOL/L — SIGNIFICANT CHANGE UP (ref 135–145)
WBC # BLD: 6.6 K/UL — SIGNIFICANT CHANGE UP (ref 3.8–10.5)
WBC # FLD AUTO: 6.6 K/UL — SIGNIFICANT CHANGE UP (ref 3.8–10.5)

## 2019-01-13 PROCEDURE — 99233 SBSQ HOSP IP/OBS HIGH 50: CPT | Mod: GC

## 2019-01-13 RX ORDER — INSULIN GLARGINE 100 [IU]/ML
2 INJECTION, SOLUTION SUBCUTANEOUS AT BEDTIME
Qty: 0 | Refills: 0 | Status: DISCONTINUED | OUTPATIENT
Start: 2019-01-13 | End: 2019-01-15

## 2019-01-13 RX ADMIN — Medication 3 UNIT(S): at 09:28

## 2019-01-13 RX ADMIN — BUMETANIDE 6 MILLIGRAM(S): 0.25 INJECTION INTRAMUSCULAR; INTRAVENOUS at 19:41

## 2019-01-13 RX ADMIN — ISOSORBIDE DINITRATE 10 MILLIGRAM(S): 5 TABLET ORAL at 22:16

## 2019-01-13 RX ADMIN — BUMETANIDE 6 MILLIGRAM(S): 0.25 INJECTION INTRAMUSCULAR; INTRAVENOUS at 07:30

## 2019-01-13 RX ADMIN — Medication 500 MILLIGRAM(S): at 18:25

## 2019-01-13 RX ADMIN — ISOSORBIDE DINITRATE 10 MILLIGRAM(S): 5 TABLET ORAL at 13:25

## 2019-01-13 RX ADMIN — Medication 100 MILLIGRAM(S): at 22:16

## 2019-01-13 RX ADMIN — CARVEDILOL PHOSPHATE 6.25 MILLIGRAM(S): 80 CAPSULE, EXTENDED RELEASE ORAL at 18:26

## 2019-01-13 RX ADMIN — PANTOPRAZOLE SODIUM 40 MILLIGRAM(S): 20 TABLET, DELAYED RELEASE ORAL at 18:27

## 2019-01-13 RX ADMIN — Medication 500 MILLIGRAM(S): at 01:40

## 2019-01-13 RX ADMIN — CHLORHEXIDINE GLUCONATE 1 APPLICATION(S): 213 SOLUTION TOPICAL at 18:26

## 2019-01-13 RX ADMIN — Medication 667 MILLIGRAM(S): at 09:28

## 2019-01-13 RX ADMIN — Medication 500 MILLIGRAM(S): at 06:29

## 2019-01-13 RX ADMIN — Medication 100 MILLIGRAM(S): at 06:29

## 2019-01-13 RX ADMIN — ISOSORBIDE DINITRATE 10 MILLIGRAM(S): 5 TABLET ORAL at 06:31

## 2019-01-13 RX ADMIN — Medication 3 UNIT(S): at 13:24

## 2019-01-13 RX ADMIN — Medication 81 MILLIGRAM(S): at 13:25

## 2019-01-13 RX ADMIN — Medication 3 UNIT(S): at 18:24

## 2019-01-13 RX ADMIN — PANTOPRAZOLE SODIUM 40 MILLIGRAM(S): 20 TABLET, DELAYED RELEASE ORAL at 06:29

## 2019-01-13 RX ADMIN — INSULIN GLARGINE 2 UNIT(S): 100 INJECTION, SOLUTION SUBCUTANEOUS at 22:15

## 2019-01-13 RX ADMIN — Medication 667 MILLIGRAM(S): at 18:25

## 2019-01-13 RX ADMIN — Medication 325 MILLIGRAM(S): at 13:25

## 2019-01-13 RX ADMIN — CARVEDILOL PHOSPHATE 6.25 MILLIGRAM(S): 80 CAPSULE, EXTENDED RELEASE ORAL at 06:30

## 2019-01-13 RX ADMIN — CHLORHEXIDINE GLUCONATE 1 APPLICATION(S): 213 SOLUTION TOPICAL at 06:32

## 2019-01-13 RX ADMIN — Medication 500 MILLIGRAM(S): at 09:29

## 2019-01-13 RX ADMIN — SENNA PLUS 2 TABLET(S): 8.6 TABLET ORAL at 23:29

## 2019-01-13 RX ADMIN — POLYETHYLENE GLYCOL 3350 17 GRAM(S): 17 POWDER, FOR SOLUTION ORAL at 13:25

## 2019-01-13 RX ADMIN — Medication 667 MILLIGRAM(S): at 13:24

## 2019-01-13 RX ADMIN — Medication 500 MILLIGRAM(S): at 18:27

## 2019-01-13 RX ADMIN — Medication 100 MILLIGRAM(S): at 13:25

## 2019-01-13 RX ADMIN — ATORVASTATIN CALCIUM 80 MILLIGRAM(S): 80 TABLET, FILM COATED ORAL at 22:16

## 2019-01-13 NOTE — PROGRESS NOTE ADULT - PROBLEM SELECTOR PLAN 2
Patient with anemia in the setting of ESRD. Hemoglobin below target range. Per Nephro, pt would benefit from IV Iron. Plan for Venofer 100mg IV for 5 doses with HD. Will initiate Epogen 4000 units with HD.   -Will initiate po iron 325 qD  -monitor Hgb daily

## 2019-01-13 NOTE — PROGRESS NOTE ADULT - PROBLEM SELECTOR PLAN 3
baseline platelet level in 200s  has been downtrending prior to initiation of heparin subq, not in DIC and pt denies hx of alcohol abuse  hemolysis labs negative, not septic, medications reviewed for possible cause  pt asymptomatic  -LDH, bili normal  - Heme: ITP with the use of Amoxacillin, d/c amoxoacillian for now  - HIT panel also sent

## 2019-01-13 NOTE — PROGRESS NOTE ADULT - PROBLEM SELECTOR PLAN 5
- hx of CAD w/ MI s/p stent x 1   - c/w ASA, lipitor, coreg  - will give 1 unit PRBC with HD today as pt is planned for OR on Monday, to keep Hgb >8

## 2019-01-13 NOTE — PROGRESS NOTE ADULT - PROBLEM SELECTOR PLAN 1
- s/p permacath placement with IR and 2 sessions of HD, HD planned for this afternoon  - c/w bumex 6mg BID, daily weights (dry weight roughly 165-168lbs) and monitor I/Os   - appreciate renal recs  - repeat vein mapping done, AVF placement by vascular planned for Monday

## 2019-01-13 NOTE — PROGRESS NOTE ADULT - PROBLEM SELECTOR PLAN 4
- hx of CAD w/ MI with subsequent ischemic cardiomyopathy: Last EF 24%   - Per HF, increase bumex, c/w hydralazine, coreg, isosordil as pt's weight is above goal

## 2019-01-13 NOTE — PROGRESS NOTE ADULT - NSHPATTENDINGPLANDISCUSS_GEN_ALL_CORE
Dr. rodarte
patient
Patient, 9SCMB, SW/CM
Patient, 9SCMB, SW/CM
Patient, 9SCMB,
Patient, 9SCMB, Family

## 2019-01-13 NOTE — PROGRESS NOTE ADULT - ASSESSMENT
53 year old male with hx of chronic HFrEF (24%), CAD s/p stent, CKD V, T2DM, HTN, HLD, and recent admission for CHF optimization for AVF placement with course c/b anemia, s/p permacath placement for RRT, and 2 sessions of HD with course now c/b thrombocytopenia 2/2 ?amoxicillin.

## 2019-01-13 NOTE — PROGRESS NOTE ADULT - SUBJECTIVE AND OBJECTIVE BOX
***************************************************************  Dr. Tomer Chakraborty, PGY3  Internal Medicine   [facility] Pager: [phone]  [facility] Pager: [phone]  ***************************************************************    DEMETRIO VILLALTA  53y  MRN: [id number]    Subjective:    Patient is a 53y old  Male who presents with a chief complaint of told to come in for catheter placement for renal replacement therapy (12 Jan 2019 14:46)      HPI: no Complaints, went for a walk in the hallway.       MEDICATIONS  (STANDING):  aspirin enteric coated 81 milliGRAM(s) Oral daily  atorvastatin 80 milliGRAM(s) Oral at bedtime  buMETAnide 6 milliGRAM(s) Oral two times a day  calcium acetate 667 milliGRAM(s) Oral three times a day with meals  carvedilol 6.25 milliGRAM(s) Oral every 12 hours  chlorhexidine 4% Liquid 1 Application(s) Topical two times a day  clarithromycin 500 milliGRAM(s) Oral two times a day  dextrose 5%. 1000 milliLiter(s) (50 mL/Hr) IV Continuous <Continuous>  dextrose 50% Injectable 12.5 Gram(s) IV Push once  dextrose 50% Injectable 25 Gram(s) IV Push once  dextrose 50% Injectable 25 Gram(s) IV Push once  epoetin elisabet Injectable 4000 Unit(s) IV Push <User Schedule>  ferrous    sulfate 325 milliGRAM(s) Oral daily  hydrALAZINE 100 milliGRAM(s) Oral every 8 hours  insulin glargine Injectable (LANTUS) 5 Unit(s) SubCutaneous at bedtime  insulin lispro (HumaLOG) corrective regimen sliding scale   SubCutaneous three times a day before meals  insulin lispro (HumaLOG) corrective regimen sliding scale   SubCutaneous at bedtime  insulin lispro Injectable (HumaLOG) 3 Unit(s) SubCutaneous three times a day before meals  iron sucrose IVPB 100 milliGRAM(s) IV Intermittent <User Schedule>  isosorbide   dinitrate Tablet (ISORDIL) 10 milliGRAM(s) Oral three times a day  metroNIDAZOLE    Tablet 500 milliGRAM(s) Oral every 8 hours  pantoprazole    Tablet 40 milliGRAM(s) Oral two times a day  polyethylene glycol 3350 17 Gram(s) Oral daily  senna 2 Tablet(s) Oral at bedtime    MEDICATIONS  (PRN):  dextrose 40% Gel 15 Gram(s) Oral once PRN Blood Glucose LESS THAN 70 milliGRAM(s)/deciliter  glucagon  Injectable 1 milliGRAM(s) IntraMuscular once PRN Glucose LESS THAN 70 milligrams/deciliter        Objective:    Vitals: Vital Signs Last 24 Hrs  T(C): 36.9 (01-13-19 @ 06:26), Max: 37 (01-12-19 @ 18:30)  T(F): 98.4 (01-13-19 @ 06:26), Max: 98.6 (01-12-19 @ 18:30)  HR: 74 (01-13-19 @ 06:26) (68 - 76)  BP: 127/79 (01-13-19 @ 06:26) (113/78 - 133/72)  BP(mean): --  RR: 18 (01-13-19 @ 06:26) (17 - 18)  SpO2: 100% (01-13-19 @ 06:26) (98% - 100%)            I&O's Summary    12 Jan 2019 07:01  -  13 Jan 2019 07:00  --------------------------------------------------------  IN: 700 mL / OUT: 2500 mL / NET: -1800 mL        PHYSICAL EXAM:  GENERAL: NAD  HEAD:  Atraumatic, Normocephalic  EYES: EOMI, PERRLA, conjunctiva and sclera clear  CHEST/LUNG: Clear to percussion bilaterally; No rales, rhonchi, wheezing, or rubs. PermCath in place, no erythema or tenderness  HEART: Regular rate and rhythm; No murmurs, rubs, or gallops  ABDOMEN: Soft, Nontender, Nondistended; Bowel sounds present  SKIN: No rashes or lesions  NERVOUS SYSTEM:  Alert & Oriented X3, Good concentration; Motor Strength 5/5 B/L upper and lower extremities                                           LABS:  01-13    137  |  98  |  56<H>  ----------------------------<  103<H>  4.1   |  25  |  3.96<H>  01-12    137  |  99  |  87<H>  ----------------------------<  154<H>  4.6   |  23  |  5.30<H>  01-11    135  |  96<L>  |  94<H>  ----------------------------<  433<H>  4.3   |  23  |  5.07<H>    Ca    8.2<L>      13 Jan 2019 05:10  Ca    8.4      12 Jan 2019 05:30  Ca    7.9<L>      11 Jan 2019 05:25  Phos  3.9     01-13  Mg     1.7     01-13    TPro  6.7  /  Alb  3.6  /  TBili  0.5  /  DBili  x   /  AST  23  /  ALT  27  /  AlkPhos  87  01-13  TPro  x   /  Alb  x   /  TBili  0.3  /  DBili  x   /  AST  x   /  ALT  x   /  AlkPhos  x   01-12      PT/INR - ( 12 Jan 2019 11:04 )   PT: 14.1 SEC;   INR: 1.26          PTT - ( 12 Jan 2019 11:06 )  PTT:33.2 SEC                                        8.6    6.60  )-----------( 65       ( 13 Jan 2019 05:10 )             26.5                         7.9    8.29  )-----------( 72       ( 12 Jan 2019 05:30 )             25.3                         8.8    5.07  )-----------( 89       ( 11 Jan 2019 05:25 )             27.3     CAPILLARY BLOOD GLUCOSE      POCT Blood Glucose.: 108 mg/dL (13 Jan 2019 08:33)  POCT Blood Glucose.: 207 mg/dL (12 Jan 2019 22:05)  POCT Blood Glucose.: 216 mg/dL (12 Jan 2019 17:00)  POCT Blood Glucose.: 149 mg/dL (12 Jan 2019 12:29)      RADIOLOGY & ADDITIONAL TESTS:    Imaging Personally Reviewed:  [ ] YES  [ ] NO      Consultants involved in case:   Consultant(s) Notes Reviewed:  [ ] YES  [ ] NO:   Care Discussed with Consultants/Other Providers [ ] YES  [ ] NO ***************************************************************  Dr. Tomer Chakraborty, PGY3  Internal Medicine   [facility] Pager: [phone]  [facility] Pager: [phone]  ***************************************************************    DEMETRIO VILLALTA  53y  MRN: [id number]    Subjective:    Patient is a 53y old  Male who presents with a chief complaint of told to come in for catheter placement for renal replacement therapy (12 Jan 2019 14:46)      HPI: no Complaints, went for a walk in the hallway.       MEDICATIONS  (STANDING):  aspirin enteric coated 81 milliGRAM(s) Oral daily  atorvastatin 80 milliGRAM(s) Oral at bedtime  buMETAnide 6 milliGRAM(s) Oral two times a day  calcium acetate 667 milliGRAM(s) Oral three times a day with meals  carvedilol 6.25 milliGRAM(s) Oral every 12 hours  chlorhexidine 4% Liquid 1 Application(s) Topical two times a day  clarithromycin 500 milliGRAM(s) Oral two times a day  dextrose 5%. 1000 milliLiter(s) (50 mL/Hr) IV Continuous <Continuous>  dextrose 50% Injectable 12.5 Gram(s) IV Push once  dextrose 50% Injectable 25 Gram(s) IV Push once  dextrose 50% Injectable 25 Gram(s) IV Push once  epoetin elisabet Injectable 4000 Unit(s) IV Push <User Schedule>  ferrous    sulfate 325 milliGRAM(s) Oral daily  hydrALAZINE 100 milliGRAM(s) Oral every 8 hours  insulin glargine Injectable (LANTUS) 5 Unit(s) SubCutaneous at bedtime  insulin lispro (HumaLOG) corrective regimen sliding scale   SubCutaneous three times a day before meals  insulin lispro (HumaLOG) corrective regimen sliding scale   SubCutaneous at bedtime  insulin lispro Injectable (HumaLOG) 3 Unit(s) SubCutaneous three times a day before meals  iron sucrose IVPB 100 milliGRAM(s) IV Intermittent <User Schedule>  isosorbide   dinitrate Tablet (ISORDIL) 10 milliGRAM(s) Oral three times a day  metroNIDAZOLE    Tablet 500 milliGRAM(s) Oral every 8 hours  pantoprazole    Tablet 40 milliGRAM(s) Oral two times a day  polyethylene glycol 3350 17 Gram(s) Oral daily  senna 2 Tablet(s) Oral at bedtime    MEDICATIONS  (PRN):  dextrose 40% Gel 15 Gram(s) Oral once PRN Blood Glucose LESS THAN 70 milliGRAM(s)/deciliter  glucagon  Injectable 1 milliGRAM(s) IntraMuscular once PRN Glucose LESS THAN 70 milligrams/deciliter        Objective:    Vitals: Vital Signs Last 24 Hrs  T(C): 36.9 (01-13-19 @ 06:26), Max: 37 (01-12-19 @ 18:30)  T(F): 98.4 (01-13-19 @ 06:26), Max: 98.6 (01-12-19 @ 18:30)  HR: 74 (01-13-19 @ 06:26) (68 - 76)  BP: 127/79 (01-13-19 @ 06:26) (113/78 - 133/72)  BP(mean): --  RR: 18 (01-13-19 @ 06:26) (17 - 18)  SpO2: 100% (01-13-19 @ 06:26) (98% - 100%)            I&O's Summary    12 Jan 2019 07:01  -  13 Jan 2019 07:00  --------------------------------------------------------  IN: 700 mL / OUT: 2500 mL / NET: -1800 mL        PHYSICAL EXAM:  GENERAL: NAD  HEAD:  Atraumatic, Normocephalic  EYES: EOMI, PERRLA, conjunctiva and sclera clear  CHEST/LUNG: Clear to percussion bilaterally; No rales, rhonchi, wheezing, or rubs. PermCath in place, no erythema or tenderness  HEART: Regular rate and rhythm; No murmurs, rubs, or gallops  ABDOMEN: Soft, Nontender, Nondistended; Bowel sounds present  SKIN: No rashes or lesions  NERVOUS SYSTEM:  Alert & Oriented X3, Good concentration; Motor Strength 5/5 B/L upper and lower extremities                                           LABS:  01-13    137  |  98  |  56<H>  ----------------------------<  103<H>  4.1   |  25  |  3.96<H>  01-12    137  |  99  |  87<H>  ----------------------------<  154<H>  4.6   |  23  |  5.30<H>  01-11    135  |  96<L>  |  94<H>  ----------------------------<  433<H>  4.3   |  23  |  5.07<H>    Ca    8.2<L>      13 Jan 2019 05:10  Ca    8.4      12 Jan 2019 05:30  Ca    7.9<L>      11 Jan 2019 05:25  Phos  3.9     01-13  Mg     1.7     01-13    TPro  6.7  /  Alb  3.6  /  TBili  0.5  /  DBili  x   /  AST  23  /  ALT  27  /  AlkPhos  87  01-13  TPro  x   /  Alb  x   /  TBili  0.3  /  DBili  x   /  AST  x   /  ALT  x   /  AlkPhos  x   01-12      PT/INR - ( 12 Jan 2019 11:04 )   PT: 14.1 SEC;   INR: 1.26          PTT - ( 12 Jan 2019 11:06 )  PTT:33.2 SEC                                        8.6    6.60  )-----------( 65       ( 13 Jan 2019 05:10 )             26.5                         7.9    8.29  )-----------( 72       ( 12 Jan 2019 05:30 )             25.3                         8.8    5.07  )-----------( 89       ( 11 Jan 2019 05:25 )             27.3     CAPILLARY BLOOD GLUCOSE      POCT Blood Glucose.: 108 mg/dL (13 Jan 2019 08:33)  POCT Blood Glucose.: 207 mg/dL (12 Jan 2019 22:05)  POCT Blood Glucose.: 216 mg/dL (12 Jan 2019 17:00)  POCT Blood Glucose.: 149 mg/dL (12 Jan 2019 12:29)      RADIOLOGY & ADDITIONAL TESTS:    Imaging Personally Reviewed:  [ ] YES  [ ] NO      Consultants involved in case:   Consultant(s) Notes Reviewed:  [ ] YES  [ ] NO:   Care Discussed with Consultants/Other Providers [x] YES  [ ] NO: Heme (Dr. Barajas) re: platelets and OR

## 2019-01-14 ENCOUNTER — TRANSCRIPTION ENCOUNTER (OUTPATIENT)
Age: 54
End: 2019-01-14

## 2019-01-14 DIAGNOSIS — I50.23 ACUTE ON CHRONIC SYSTOLIC (CONGESTIVE) HEART FAILURE: ICD-10-CM

## 2019-01-14 LAB
ALBUMIN SERPL ELPH-MCNC: 3.6 G/DL — SIGNIFICANT CHANGE UP (ref 3.3–5)
ALP SERPL-CCNC: 91 U/L — SIGNIFICANT CHANGE UP (ref 40–120)
ALT FLD-CCNC: 36 U/L — SIGNIFICANT CHANGE UP (ref 4–41)
ANION GAP SERPL CALC-SCNC: 16 MEQ/L — HIGH (ref 7–14)
APTT BLD: 31.5 SEC — SIGNIFICANT CHANGE UP (ref 27.5–36.3)
AST SERPL-CCNC: 31 U/L — SIGNIFICANT CHANGE UP (ref 4–40)
BASOPHILS # BLD AUTO: 0.03 K/UL — SIGNIFICANT CHANGE UP (ref 0–0.2)
BASOPHILS NFR BLD AUTO: 0.6 % — SIGNIFICANT CHANGE UP (ref 0–2)
BILIRUB SERPL-MCNC: 0.4 MG/DL — SIGNIFICANT CHANGE UP (ref 0.2–1.2)
BLD GP AB SCN SERPL QL: NEGATIVE — SIGNIFICANT CHANGE UP
BUN SERPL-MCNC: 69 MG/DL — HIGH (ref 7–23)
CALCIUM SERPL-MCNC: 8.4 MG/DL — SIGNIFICANT CHANGE UP (ref 8.4–10.5)
CHLORIDE SERPL-SCNC: 97 MMOL/L — LOW (ref 98–107)
CO2 SERPL-SCNC: 25 MMOL/L — SIGNIFICANT CHANGE UP (ref 22–31)
CREAT SERPL-MCNC: 4.94 MG/DL — HIGH (ref 0.5–1.3)
EOSINOPHIL # BLD AUTO: 0.13 K/UL — SIGNIFICANT CHANGE UP (ref 0–0.5)
EOSINOPHIL NFR BLD AUTO: 2.5 % — SIGNIFICANT CHANGE UP (ref 0–6)
GLUCOSE BLDC GLUCOMTR-MCNC: 105 MG/DL — HIGH (ref 70–99)
GLUCOSE BLDC GLUCOMTR-MCNC: 89 MG/DL — SIGNIFICANT CHANGE UP (ref 70–99)
GLUCOSE SERPL-MCNC: 98 MG/DL — SIGNIFICANT CHANGE UP (ref 70–99)
HCT VFR BLD CALC: 28 % — LOW (ref 39–50)
HEPARIN CF II AG PPP-ACNC: 0.08 — SIGNIFICANT CHANGE UP (ref 0–0.39)
HGB BLD-MCNC: 8.8 G/DL — LOW (ref 13–17)
IMM GRANULOCYTES NFR BLD AUTO: 0.2 % — SIGNIFICANT CHANGE UP (ref 0–1.5)
INR BLD: 1.25 — HIGH (ref 0.88–1.17)
LYMPHOCYTES # BLD AUTO: 0.91 K/UL — LOW (ref 1–3.3)
LYMPHOCYTES # BLD AUTO: 17.2 % — SIGNIFICANT CHANGE UP (ref 13–44)
MAGNESIUM SERPL-MCNC: 1.8 MG/DL — SIGNIFICANT CHANGE UP (ref 1.6–2.6)
MCHC RBC-ENTMCNC: 27.5 PG — SIGNIFICANT CHANGE UP (ref 27–34)
MCHC RBC-ENTMCNC: 31.4 % — LOW (ref 32–36)
MCV RBC AUTO: 87.5 FL — SIGNIFICANT CHANGE UP (ref 80–100)
MONOCYTES # BLD AUTO: 0.65 K/UL — SIGNIFICANT CHANGE UP (ref 0–0.9)
MONOCYTES NFR BLD AUTO: 12.3 % — SIGNIFICANT CHANGE UP (ref 2–14)
NEUTROPHILS # BLD AUTO: 3.57 K/UL — SIGNIFICANT CHANGE UP (ref 1.8–7.4)
NEUTROPHILS NFR BLD AUTO: 67.2 % — SIGNIFICANT CHANGE UP (ref 43–77)
NRBC # FLD: 0 K/UL — LOW (ref 25–125)
PHOSPHATE SERPL-MCNC: 4.9 MG/DL — HIGH (ref 2.5–4.5)
PLATELET # BLD AUTO: 72 K/UL — LOW (ref 150–400)
PMV BLD: 11 FL — SIGNIFICANT CHANGE UP (ref 7–13)
POTASSIUM SERPL-MCNC: 4.2 MMOL/L — SIGNIFICANT CHANGE UP (ref 3.5–5.3)
POTASSIUM SERPL-SCNC: 4.2 MMOL/L — SIGNIFICANT CHANGE UP (ref 3.5–5.3)
PROT SERPL-MCNC: 6.7 G/DL — SIGNIFICANT CHANGE UP (ref 6–8.3)
PROTHROM AB SERPL-ACNC: 14.3 SEC — HIGH (ref 9.8–13.1)
RBC # BLD: 3.2 M/UL — LOW (ref 4.2–5.8)
RBC # FLD: 14.4 % — SIGNIFICANT CHANGE UP (ref 10.3–14.5)
RH IG SCN BLD-IMP: POSITIVE — SIGNIFICANT CHANGE UP
SODIUM SERPL-SCNC: 138 MMOL/L — SIGNIFICANT CHANGE UP (ref 135–145)
WBC # BLD: 5.3 K/UL — SIGNIFICANT CHANGE UP (ref 3.8–10.5)
WBC # FLD AUTO: 5.3 K/UL — SIGNIFICANT CHANGE UP (ref 3.8–10.5)

## 2019-01-14 PROCEDURE — 36821 AV FUSION DIRECT ANY SITE: CPT | Mod: LT

## 2019-01-14 PROCEDURE — 99232 SBSQ HOSP IP/OBS MODERATE 35: CPT | Mod: GC

## 2019-01-14 PROCEDURE — 99233 SBSQ HOSP IP/OBS HIGH 50: CPT | Mod: GC

## 2019-01-14 RX ORDER — FUROSEMIDE 40 MG
80 TABLET ORAL ONCE
Qty: 0 | Refills: 0 | Status: COMPLETED | OUTPATIENT
Start: 2019-01-14 | End: 2019-01-14

## 2019-01-14 RX ORDER — MAGNESIUM OXIDE 400 MG ORAL TABLET 241.3 MG
400 TABLET ORAL ONCE
Qty: 0 | Refills: 0 | Status: COMPLETED | OUTPATIENT
Start: 2019-01-14 | End: 2019-01-14

## 2019-01-14 RX ADMIN — Medication 100 MILLIGRAM(S): at 06:03

## 2019-01-14 RX ADMIN — ATORVASTATIN CALCIUM 80 MILLIGRAM(S): 80 TABLET, FILM COATED ORAL at 22:59

## 2019-01-14 RX ADMIN — CHLORHEXIDINE GLUCONATE 1 APPLICATION(S): 213 SOLUTION TOPICAL at 18:38

## 2019-01-14 RX ADMIN — ISOSORBIDE DINITRATE 10 MILLIGRAM(S): 5 TABLET ORAL at 13:37

## 2019-01-14 RX ADMIN — PANTOPRAZOLE SODIUM 40 MILLIGRAM(S): 20 TABLET, DELAYED RELEASE ORAL at 18:38

## 2019-01-14 RX ADMIN — MAGNESIUM OXIDE 400 MG ORAL TABLET 400 MILLIGRAM(S): 241.3 TABLET ORAL at 11:19

## 2019-01-14 RX ADMIN — Medication 325 MILLIGRAM(S): at 13:37

## 2019-01-14 RX ADMIN — Medication 100 MILLIGRAM(S): at 22:59

## 2019-01-14 RX ADMIN — ISOSORBIDE DINITRATE 10 MILLIGRAM(S): 5 TABLET ORAL at 23:49

## 2019-01-14 RX ADMIN — Medication 500 MILLIGRAM(S): at 22:59

## 2019-01-14 RX ADMIN — Medication 500 MILLIGRAM(S): at 06:02

## 2019-01-14 RX ADMIN — Medication 500 MILLIGRAM(S): at 06:06

## 2019-01-14 RX ADMIN — Medication 500 MILLIGRAM(S): at 13:37

## 2019-01-14 RX ADMIN — Medication 500 MILLIGRAM(S): at 18:38

## 2019-01-14 RX ADMIN — Medication 100 MILLIGRAM(S): at 13:37

## 2019-01-14 RX ADMIN — CHLORHEXIDINE GLUCONATE 1 APPLICATION(S): 213 SOLUTION TOPICAL at 06:04

## 2019-01-14 RX ADMIN — CARVEDILOL PHOSPHATE 6.25 MILLIGRAM(S): 80 CAPSULE, EXTENDED RELEASE ORAL at 06:02

## 2019-01-14 RX ADMIN — ISOSORBIDE DINITRATE 10 MILLIGRAM(S): 5 TABLET ORAL at 06:02

## 2019-01-14 RX ADMIN — Medication 81 MILLIGRAM(S): at 11:19

## 2019-01-14 RX ADMIN — Medication 80 MILLIGRAM(S): at 09:01

## 2019-01-14 RX ADMIN — PANTOPRAZOLE SODIUM 40 MILLIGRAM(S): 20 TABLET, DELAYED RELEASE ORAL at 06:02

## 2019-01-14 NOTE — DISCHARGE NOTE ADULT - PLAN OF CARE
to remove waste products and excess fluids You have kidney failure. Please attend your dialysis sessions three times a week. Continue to take calcium acetate with meals. Adhere to a renal diet. Hgb between 14 and 18 Hemoglobin and serum ferritin below target range due to kidney disease and iron deficiency. You were given 1 unit of blood in the hospital and started on IV iron (Venofer 100mg) with HD. You were also started on Epogen 4000 units with HD. Continue to take oral iron (ferrous sulfate 325mg) once a day. Please see your nephrologist to monitor your hemoglobin levels. You have a history of untreated H. pylori which causes stomach ulcers. Continue to take omeprazole, clarithromycin and flagyl everyday for a total course of 10 -14 days. You have a history of heart failure. Continue to take 6mg Bumex, hydralazine, coreg, isosordil everyday. You should weigh yourself daily. Goal dry weight is 165-167 pounds. Please follow up with Dr. Enio Pavon. Continue to take atorvastatin 80mg once a day. We noticed that your platelet count dropped in the hospital. We think it was a side effect of amoxicillin, an antibiotic you were on to treat the H. pylori infection. You have a history of untreated H. pylori which causes stomach ulcers. Continue to take omeprazole, clarithromycin and flagyl everyday for a total course of 14 days. Last day of treatment will be January 23, 2019. WOUND CARE:  Please keep incisions clean and dry. Please do not Scrub or rub incisions. Do not use lotion or powder on incisions. You have any bleeding that does not stop, any pus draining from your wound(s), any fever (over 100.4 F) or chills, persistent nausea/vomiting, persistent diarrhea, or if your pain is not controlled on your discharge pain medications.  FOLLOW-UP: Please follow up with your primary care physician in one week regarding your hospitalization. Please follow-up with your surgeon, within 7-14 days following discharge- please call to schedule an appointment.   BATHING: Please do not submerge wound underwater. You may shower and/or sponge bathe.  ACTIVITY: No heavy lifting or straining of arm. Hemoglobin and serum ferritin below target range due to kidney disease and iron deficiency. You were given 1 unit of blood in the hospital and started on IV iron (Venofer 100mg) with HD. You were also started on Epogen 4000 units with HD. Continue to take oral iron (ferrous sulfate 325mg) once a day. Please see your nephrologist to monitor your hemoglobin levels. You will also be on IV Venofer during hemodialyses for the next 4 sessions

## 2019-01-14 NOTE — PROGRESS NOTE ADULT - SUBJECTIVE AND OBJECTIVE BOX
Cori Butcher MD-PGY1  Department of Internal Medicine  Pager [phone]        DEMETRIO VILLALTA  53y  MRN: [id number]    Patient is a 53y old  Male who presents with a chief complaint of told to come in for catheter placement for renal replacement therapy (13 Jan 2019 09:15)      Subjective: no events ON. Denies fever, CP, SOB, abn pain, N/V. Tolerating diet.      MEDICATIONS  (STANDING):  aspirin enteric coated 81 milliGRAM(s) Oral daily  atorvastatin 80 milliGRAM(s) Oral at bedtime  buMETAnide 6 milliGRAM(s) Oral two times a day  calcium acetate 667 milliGRAM(s) Oral three times a day with meals  carvedilol 6.25 milliGRAM(s) Oral every 12 hours  chlorhexidine 4% Liquid 1 Application(s) Topical two times a day  clarithromycin 500 milliGRAM(s) Oral two times a day  dextrose 5%. 1000 milliLiter(s) (50 mL/Hr) IV Continuous <Continuous>  dextrose 50% Injectable 12.5 Gram(s) IV Push once  dextrose 50% Injectable 25 Gram(s) IV Push once  dextrose 50% Injectable 25 Gram(s) IV Push once  epoetin elisabet Injectable 4000 Unit(s) IV Push <User Schedule>  ferrous    sulfate 325 milliGRAM(s) Oral daily  hydrALAZINE 100 milliGRAM(s) Oral every 8 hours  insulin glargine Injectable (LANTUS) 2 Unit(s) SubCutaneous at bedtime  insulin lispro (HumaLOG) corrective regimen sliding scale   SubCutaneous three times a day before meals  insulin lispro (HumaLOG) corrective regimen sliding scale   SubCutaneous at bedtime  insulin lispro Injectable (HumaLOG) 3 Unit(s) SubCutaneous three times a day before meals  iron sucrose IVPB 100 milliGRAM(s) IV Intermittent <User Schedule>  isosorbide   dinitrate Tablet (ISORDIL) 10 milliGRAM(s) Oral three times a day  metroNIDAZOLE    Tablet 500 milliGRAM(s) Oral every 8 hours  pantoprazole    Tablet 40 milliGRAM(s) Oral two times a day  polyethylene glycol 3350 17 Gram(s) Oral daily  senna 2 Tablet(s) Oral at bedtime    MEDICATIONS  (PRN):  dextrose 40% Gel 15 Gram(s) Oral once PRN Blood Glucose LESS THAN 70 milliGRAM(s)/deciliter  glucagon  Injectable 1 milliGRAM(s) IntraMuscular once PRN Glucose LESS THAN 70 milligrams/deciliter      Objective:    Vitals: Vital Signs Last 24 Hrs  T(C): 36.7 (01-14-19 @ 05:58), Max: 36.7 (01-14-19 @ 05:58)  T(F): 98 (01-14-19 @ 05:58), Max: 98 (01-14-19 @ 05:58)  HR: 69 (01-14-19 @ 05:58) (67 - 78)  BP: 114/- (01-14-19 @ 05:58) (114/- - 134/78)  BP(mean): --  RR: 18 (01-14-19 @ 05:58) (18 - 18)  SpO2: 100% (01-14-19 @ 05:58) (100% - 100%)              I&O's Summary    12 Jan 2019 07:01  -  13 Jan 2019 07:00  --------------------------------------------------------  IN: 700 mL / OUT: 2500 mL / NET: -1800 mL    13 Jan 2019 07:01  -  14 Jan 2019 06:48  --------------------------------------------------------  IN: 0 mL / OUT: 550 mL / NET: -550 mL        PHYSICAL EXAM:  GENERAL: NAD  HEAD:  Atraumatic, Normocephalic  EYES: EOMI, conjunctiva and sclera clear  CHEST/LUNG: Clear to percussion bilaterally; No rales, rhonchi, wheezing  HEART: Regular rate and rhythm; No murmurs, rubs, or gallops  ABDOMEN: Soft, Nontender, Nondistended; no rebound or guarding  SKIN: No rashes or lesions  NERVOUS SYSTEM:  Alert & Oriented X3    LABS:                        8.8    5.30  )-----------( 72       ( 14 Jan 2019 05:20 )             28.0                         8.6    6.60  )-----------( 65       ( 13 Jan 2019 05:10 )             26.5                         7.9    8.29  )-----------( 72       ( 12 Jan 2019 05:30 )             25.3     01-14    138  |  97<L>  |  69<H>  ----------------------------<  98  4.2   |  25  |  4.94<H>  01-13    137  |  98  |  56<H>  ----------------------------<  103<H>  4.1   |  25  |  3.96<H>  01-12    137  |  99  |  87<H>  ----------------------------<  154<H>  4.6   |  23  |  5.30<H>    Ca    8.4      14 Jan 2019 05:20  Ca    8.2<L>      13 Jan 2019 05:10  Ca    8.4      12 Jan 2019 05:30  Phos  4.9     01-14  Mg     1.8     01-14    TPro  6.7  /  Alb  3.6  /  TBili  0.4  /  DBili  x   /  AST  31  /  ALT  36  /  AlkPhos  91  01-14  TPro  6.7  /  Alb  3.6  /  TBili  0.5  /  DBili  x   /  AST  23  /  ALT  27  /  AlkPhos  87  01-13  TPro  x   /  Alb  x   /  TBili  0.3  /  DBili  x   /  AST  x   /  ALT  x   /  AlkPhos  x   01-12    PT/INR - ( 14 Jan 2019 05:20 )   PT: 14.3 SEC;   INR: 1.25          PTT - ( 14 Jan 2019 05:20 )  PTT:31.5 SEC                  CAPILLARY BLOOD GLUCOSE      POCT Blood Glucose.: 156 mg/dL (13 Jan 2019 22:00)  POCT Blood Glucose.: 125 mg/dL (13 Jan 2019 18:09)  POCT Blood Glucose.: 148 mg/dL (13 Jan 2019 12:46)  POCT Blood Glucose.: 108 mg/dL (13 Jan 2019 08:33)      RADIOLOGY & ADDITIONAL TESTS:  Imaging Personally Reviewed:  [x ] YES  [ ] NO    Consultants involved in case:   Consultant(s) Notes Reviewed:  [ x] YES  [ ] NO:   Care Discussed with Consultants/Other Providers [x ] YES  [ ] NO Cori Butcher MD-PGY1  Department of Internal Medicine  Pager [phone]        DEMETRIO VILLALTA  53y  MRN: [id number]    Patient is a 53y old  Male who presents with a chief complaint of told to come in for catheter placement for renal replacement therapy (13 Jan 2019 09:15)      Subjective: no events ON. Denies fever, CP, SOB, abn pain, N/V. NPO for AVF placement today. Per vascular surgery, platelet count acceptable.     MEDICATIONS  (STANDING):  aspirin enteric coated 81 milliGRAM(s) Oral daily  atorvastatin 80 milliGRAM(s) Oral at bedtime  buMETAnide 6 milliGRAM(s) Oral two times a day  calcium acetate 667 milliGRAM(s) Oral three times a day with meals  carvedilol 6.25 milliGRAM(s) Oral every 12 hours  chlorhexidine 4% Liquid 1 Application(s) Topical two times a day  clarithromycin 500 milliGRAM(s) Oral two times a day  dextrose 5%. 1000 milliLiter(s) (50 mL/Hr) IV Continuous <Continuous>  dextrose 50% Injectable 12.5 Gram(s) IV Push once  dextrose 50% Injectable 25 Gram(s) IV Push once  dextrose 50% Injectable 25 Gram(s) IV Push once  epoetin elisabet Injectable 4000 Unit(s) IV Push <User Schedule>  ferrous    sulfate 325 milliGRAM(s) Oral daily  hydrALAZINE 100 milliGRAM(s) Oral every 8 hours  insulin glargine Injectable (LANTUS) 2 Unit(s) SubCutaneous at bedtime  insulin lispro (HumaLOG) corrective regimen sliding scale   SubCutaneous three times a day before meals  insulin lispro (HumaLOG) corrective regimen sliding scale   SubCutaneous at bedtime  insulin lispro Injectable (HumaLOG) 3 Unit(s) SubCutaneous three times a day before meals  iron sucrose IVPB 100 milliGRAM(s) IV Intermittent <User Schedule>  isosorbide   dinitrate Tablet (ISORDIL) 10 milliGRAM(s) Oral three times a day  metroNIDAZOLE    Tablet 500 milliGRAM(s) Oral every 8 hours  pantoprazole    Tablet 40 milliGRAM(s) Oral two times a day  polyethylene glycol 3350 17 Gram(s) Oral daily  senna 2 Tablet(s) Oral at bedtime    MEDICATIONS  (PRN):  dextrose 40% Gel 15 Gram(s) Oral once PRN Blood Glucose LESS THAN 70 milliGRAM(s)/deciliter  glucagon  Injectable 1 milliGRAM(s) IntraMuscular once PRN Glucose LESS THAN 70 milligrams/deciliter      Objective:    Vitals: Vital Signs Last 24 Hrs  T(C): 36.7 (01-14-19 @ 05:58), Max: 36.7 (01-14-19 @ 05:58)  T(F): 98 (01-14-19 @ 05:58), Max: 98 (01-14-19 @ 05:58)  HR: 69 (01-14-19 @ 05:58) (67 - 78)  BP: 114/- (01-14-19 @ 05:58) (114/- - 134/78)  BP(mean): --  RR: 18 (01-14-19 @ 05:58) (18 - 18)  SpO2: 100% (01-14-19 @ 05:58) (100% - 100%)              I&O's Summary    12 Jan 2019 07:01  -  13 Jan 2019 07:00  --------------------------------------------------------  IN: 700 mL / OUT: 2500 mL / NET: -1800 mL    13 Jan 2019 07:01  -  14 Jan 2019 06:48  --------------------------------------------------------  IN: 0 mL / OUT: 550 mL / NET: -550 mL        PHYSICAL EXAM:  GENERAL: NAD  HEAD:  Atraumatic, Normocephalic  EYES: EOMI, conjunctiva and sclera clear  CHEST/LUNG: Clear to percussion bilaterally; No rales, rhonchi, wheezing  HEART: Regular rate and rhythm; No murmurs, rubs, or gallops  ABDOMEN: Soft, Nontender, Nondistended; no rebound or guarding  SKIN: No rashes or lesions  NERVOUS SYSTEM:  Alert & Oriented X3    LABS:                        8.8    5.30  )-----------( 72       ( 14 Jan 2019 05:20 )             28.0                         8.6    6.60  )-----------( 65       ( 13 Jan 2019 05:10 )             26.5                         7.9    8.29  )-----------( 72       ( 12 Jan 2019 05:30 )             25.3     01-14    138  |  97<L>  |  69<H>  ----------------------------<  98  4.2   |  25  |  4.94<H>  01-13    137  |  98  |  56<H>  ----------------------------<  103<H>  4.1   |  25  |  3.96<H>  01-12    137  |  99  |  87<H>  ----------------------------<  154<H>  4.6   |  23  |  5.30<H>    Ca    8.4      14 Jan 2019 05:20  Ca    8.2<L>      13 Jan 2019 05:10  Ca    8.4      12 Jan 2019 05:30  Phos  4.9     01-14  Mg     1.8     01-14    TPro  6.7  /  Alb  3.6  /  TBili  0.4  /  DBili  x   /  AST  31  /  ALT  36  /  AlkPhos  91  01-14  TPro  6.7  /  Alb  3.6  /  TBili  0.5  /  DBili  x   /  AST  23  /  ALT  27  /  AlkPhos  87  01-13  TPro  x   /  Alb  x   /  TBili  0.3  /  DBili  x   /  AST  x   /  ALT  x   /  AlkPhos  x   01-12    PT/INR - ( 14 Jan 2019 05:20 )   PT: 14.3 SEC;   INR: 1.25          PTT - ( 14 Jan 2019 05:20 )  PTT:31.5 SEC                  CAPILLARY BLOOD GLUCOSE      POCT Blood Glucose.: 156 mg/dL (13 Jan 2019 22:00)  POCT Blood Glucose.: 125 mg/dL (13 Jan 2019 18:09)  POCT Blood Glucose.: 148 mg/dL (13 Jan 2019 12:46)  POCT Blood Glucose.: 108 mg/dL (13 Jan 2019 08:33)      RADIOLOGY & ADDITIONAL TESTS:  Imaging Personally Reviewed:  [x ] YES  [ ] NO    Consultants involved in case:   Consultant(s) Notes Reviewed:  [ x] YES  [ ] NO:   Care Discussed with Consultants/Other Providers [x ] YES  [ ] NO

## 2019-01-14 NOTE — BRIEF OPERATIVE NOTE - PROCEDURE
<<-----Click on this checkbox to enter Procedure Arteriovenous anastomosis for renal dialysis  01/14/2019    Active  STALATHI

## 2019-01-14 NOTE — DISCHARGE NOTE ADULT - CARE PLAN
Principal Discharge DX:	ESRD (end stage renal disease)  Goal:	to remove waste products and excess fluids  Assessment and plan of treatment:	You have kidney failure. Please attend your dialysis sessions three times a week. Continue to take calcium acetate with meals. Adhere to a renal diet.  Secondary Diagnosis:	Anemia  Secondary Diagnosis:	Helicobacter pylori gastritis  Secondary Diagnosis:	Chronic systolic heart failure  Secondary Diagnosis:	Hyperlipidemia, unspecified hyperlipidemia type Principal Discharge DX:	ESRD (end stage renal disease)  Goal:	to remove waste products and excess fluids  Assessment and plan of treatment:	You have kidney failure. Please attend your dialysis sessions three times a week. Continue to take calcium acetate with meals. Adhere to a renal diet.  Secondary Diagnosis:	Anemia  Goal:	Hgb between 14 and 18  Assessment and plan of treatment:	Hemoglobin and serum ferritin below target range due to kidney disease and iron deficiency. You were given 1 unit of blood in the hospital and started on IV iron (Venofer 100mg) with HD. You were also started on Epogen 4000 units with HD. Continue to take oral iron (ferrous sulfate 325mg) once a day. Please see your nephrologist to monitor your hemoglobin levels.  Secondary Diagnosis:	Helicobacter pylori gastritis  Assessment and plan of treatment:	You have a history of untreated H. pylori which causes stomach ulcers. Continue to take omeprazole, clarithromycin and flagyl everyday for a total course of 10 -14 days.  Secondary Diagnosis:	Chronic systolic heart failure  Assessment and plan of treatment:	You have a history of heart failure. Continue to take 6mg Bumex, hydralazine, coreg, isosordil everyday. You should weigh yourself daily. Goal dry weight is 165-167 pounds. Please follow up with Dr. Enio Pavon.  Secondary Diagnosis:	Hyperlipidemia, unspecified hyperlipidemia type  Assessment and plan of treatment:	Continue to take atorvastatin 80mg once a day.  Secondary Diagnosis:	Thrombocytopenia  Assessment and plan of treatment:	We noticed that your platelet count dropped in the hospital. We think it was a side effect of amoxicillin, an antibiotic you were on to treat the H. pylori infection. Principal Discharge DX:	ESRD (end stage renal disease)  Goal:	to remove waste products and excess fluids  Assessment and plan of treatment:	You have kidney failure. Please attend your dialysis sessions three times a week. Continue to take calcium acetate with meals. Adhere to a renal diet.  Secondary Diagnosis:	Anemia  Goal:	Hgb between 14 and 18  Assessment and plan of treatment:	Hemoglobin and serum ferritin below target range due to kidney disease and iron deficiency. You were given 1 unit of blood in the hospital and started on IV iron (Venofer 100mg) with HD. You were also started on Epogen 4000 units with HD. Continue to take oral iron (ferrous sulfate 325mg) once a day. Please see your nephrologist to monitor your hemoglobin levels.  Secondary Diagnosis:	Helicobacter pylori gastritis  Assessment and plan of treatment:	You have a history of untreated H. pylori which causes stomach ulcers. Continue to take omeprazole, clarithromycin and flagyl everyday for a total course of 14 days. Last day of treatment will be January 23, 2019.  Secondary Diagnosis:	Chronic systolic heart failure  Assessment and plan of treatment:	You have a history of heart failure. Continue to take 6mg Bumex, hydralazine, coreg, isosordil everyday. You should weigh yourself daily. Goal dry weight is 165-167 pounds. Please follow up with Dr. Enio Pavon.  Secondary Diagnosis:	Hyperlipidemia, unspecified hyperlipidemia type  Assessment and plan of treatment:	Continue to take atorvastatin 80mg once a day.  Secondary Diagnosis:	Thrombocytopenia  Assessment and plan of treatment:	We noticed that your platelet count dropped in the hospital. We think it was a side effect of amoxicillin, an antibiotic you were on to treat the H. pylori infection.  Secondary Diagnosis:	AV fistula  Assessment and plan of treatment:	WOUND CARE:  Please keep incisions clean and dry. Please do not Scrub or rub incisions. Do not use lotion or powder on incisions. You have any bleeding that does not stop, any pus draining from your wound(s), any fever (over 100.4 F) or chills, persistent nausea/vomiting, persistent diarrhea, or if your pain is not controlled on your discharge pain medications.  FOLLOW-UP: Please follow up with your primary care physician in one week regarding your hospitalization. Please follow-up with your surgeon, within 7-14 days following discharge- please call to schedule an appointment.   BATHING: Please do not submerge wound underwater. You may shower and/or sponge bathe.  ACTIVITY: No heavy lifting or straining of arm. Principal Discharge DX:	ESRD (end stage renal disease)  Goal:	to remove waste products and excess fluids  Assessment and plan of treatment:	You have kidney failure. Please attend your dialysis sessions three times a week. Continue to take calcium acetate with meals. Adhere to a renal diet.  Secondary Diagnosis:	Anemia  Goal:	Hgb between 14 and 18  Assessment and plan of treatment:	Hemoglobin and serum ferritin below target range due to kidney disease and iron deficiency. You were given 1 unit of blood in the hospital and started on IV iron (Venofer 100mg) with HD. You were also started on Epogen 4000 units with HD. Continue to take oral iron (ferrous sulfate 325mg) once a day. Please see your nephrologist to monitor your hemoglobin levels. You will also be on IV Venofer during hemodialyses for the next 4 sessions  Secondary Diagnosis:	Helicobacter pylori gastritis  Assessment and plan of treatment:	You have a history of untreated H. pylori which causes stomach ulcers. Continue to take omeprazole, clarithromycin and flagyl everyday for a total course of 14 days. Last day of treatment will be January 23, 2019.  Secondary Diagnosis:	Chronic systolic heart failure  Assessment and plan of treatment:	You have a history of heart failure. Continue to take 6mg Bumex, hydralazine, coreg, isosordil everyday. You should weigh yourself daily. Goal dry weight is 165-167 pounds. Please follow up with Dr. Enio Pavon.  Secondary Diagnosis:	Hyperlipidemia, unspecified hyperlipidemia type  Assessment and plan of treatment:	Continue to take atorvastatin 80mg once a day.  Secondary Diagnosis:	Thrombocytopenia  Assessment and plan of treatment:	We noticed that your platelet count dropped in the hospital. We think it was a side effect of amoxicillin, an antibiotic you were on to treat the H. pylori infection.  Secondary Diagnosis:	AV fistula  Assessment and plan of treatment:	WOUND CARE:  Please keep incisions clean and dry. Please do not Scrub or rub incisions. Do not use lotion or powder on incisions. You have any bleeding that does not stop, any pus draining from your wound(s), any fever (over 100.4 F) or chills, persistent nausea/vomiting, persistent diarrhea, or if your pain is not controlled on your discharge pain medications.  FOLLOW-UP: Please follow up with your primary care physician in one week regarding your hospitalization. Please follow-up with your surgeon, within 7-14 days following discharge- please call to schedule an appointment.   BATHING: Please do not submerge wound underwater. You may shower and/or sponge bathe.  ACTIVITY: No heavy lifting or straining of arm.

## 2019-01-14 NOTE — PROGRESS NOTE ADULT - PROBLEM SELECTOR PLAN 1
- continue dialysis/UF daily for goal weight of 165-167 pounds. Weight today 176 lbs.  - Continue Bumex to 6 mg twice daily ( was given Lasix 80 mg x 1 today due to no Bumex in pharmacy, but now available).  - standing weights daily  - Continue on isordil 10 mg three times/day  -Continue hydral 100 mg po TID  - would ensure defibrillator is in OR as patient does not have ICD in case of VT

## 2019-01-14 NOTE — PROGRESS NOTE ADULT - PROBLEM SELECTOR PLAN 3
baseline platelet level in 200s  has been downtrending prior to initiation of heparin subq, not in DIC and pt denies hx of alcohol abuse  hemolysis labs negative, not septic, medications reviewed for possible cause  pt asymptomatic  -LDH, bili normal  - Heme: ITP with the use of Amoxacillin, d/c amoxoacillian for now  - HIT panel also sent baseline platelet level in 200s  has been downtrending prior to initiation of heparin subq, not in DIC and pt denies hx of alcohol abuse  hemolysis labs negative, not septic, medications reviewed for possible cause  pt asymptomatic  -LDH, bili, hapto normal  - Heme: ITP with the use of Amoxacillin, d/c amoxacillin for now. Flagyl instead  - HIT panel also sent, pending

## 2019-01-14 NOTE — PROGRESS NOTE ADULT - PROBLEM SELECTOR PLAN 4
- hx of CAD w/ MI with subsequent ischemic cardiomyopathy: Last EF 24%   - Per HF, increase bumex, c/w hydralazine, coreg, isosordil as pt's weight is above goal - hx of CAD w/ MI with subsequent ischemic cardiomyopathy: Last EF 24%   - Per HF, increase bumex, c/w hydralazine, coreg, isosordil as pt's weight is above goal of 165-167 pounds.  Standing weight today is 176.5lbs

## 2019-01-14 NOTE — CHART NOTE - NSCHARTNOTEFT_GEN_A_CORE
Surgery Pre-Op Note    Pre-Op Diagnosis:       ESRD requiring HD  Procedure:          AVF creation, BHARAT COKER    Lab Findings:    CBC Full  -  ( 14 Jan 2019 05:20 )  WBC Count : 5.30 K/uL  Hemoglobin : 8.8 g/dL  Hematocrit : 28.0 %  Platelet Count - Automated : 72 K/uL  Mean Cell Volume : 87.5 fL  Mean Cell Hemoglobin : 27.5 pg  Mean Cell Hemoglobin Concentration : 31.4 %  Auto Neutrophil # : 3.57 K/uL  Auto Lymphocyte # : 0.91 K/uL  Auto Monocyte # : 0.65 K/uL  Auto Eosinophil # : 0.13 K/uL  Auto Basophil # : 0.03 K/uL  Auto Neutrophil % : 67.2 %  Auto Lymphocyte % : 17.2 %  Auto Monocyte % : 12.3 %  Auto Eosinophil % : 2.5 %  Auto Basophil % : 0.6 %   01-14    138  |  97<L>  |  69<H>  ----------------------------<  98  4.2   |  25  |  4.94<H>    Ca    8.4      14 Jan 2019 05:20  Phos  4.9     01-14  Mg     1.8     01-14    TPro  6.7  /  Alb  3.6  /  TBili  0.4  /  DBili  x   /  AST  31  /  ALT  36  /  AlkPhos  91  01-14   PT/INR - ( 14 Jan 2019 05:20 )   PT: 14.3 SEC;   INR: 1.25          PTT - ( 14 Jan 2019 05:20 )  PTT:31.5 SEC LIVER FUNCTIONS - ( 14 Jan 2019 05:20 )  Alb: 3.6 g/dL / Pro: 6.7 g/dL / ALK PHOS: 91 u/L / ALT: 36 u/L / AST: 31 u/L / GGT: x                  CAPILLARY BLOOD GLUCOSE      POCT Blood Glucose.: 156 mg/dL (13 Jan 2019 22:00)  POCT Blood Glucose.: 125 mg/dL (13 Jan 2019 18:09)  POCT Blood Glucose.: 148 mg/dL (13 Jan 2019 12:46)  POCT Blood Glucose.: 108 mg/dL (13 Jan 2019 08:33)   Magnesium, Serum: 1.8 mg/dL [1.6 - 2.6] (01-14-19 @ 05:20)                  01-14    138  |  97<L>  |  69<H>  ----------------------------<  98  4.2   |  25  |  4.94<H>    Ca    8.4      14 Jan 2019 05:20  Phos  4.9     01-14  Mg     1.8     01-14     Type and Screen:   Vital Signs Last 24 Hrs  T(C): 36.7 (14 Jan 2019 05:58), Max: 36.7 (14 Jan 2019 05:58)  T(F): 98 (14 Jan 2019 05:58), Max: 98 (14 Jan 2019 05:58)  HR: 69 (14 Jan 2019 05:58) (67 - 78)  BP: 114/66 (14 Jan 2019 05:58) (114/66 - 134/78)  BP(mean): --  RR: 18 (14 Jan 2019 05:58) (18 - 18)  SpO2: 100% (14 Jan 2019 05:58) (100% - 100%) I&O's Detail    13 Jan 2019 07:01  -  14 Jan 2019 07:00  --------------------------------------------------------  IN:  Total IN: 0 mL    OUT:    Voided: 550 mL  Total OUT: 550 mL    Total NET: -550 mL      Cleared by medicine team for OR  -  medicine with concern for low plt but uptrending this AM; Plt in 70k not CI to surgery, can proceed to OR  Orders: NPO pMN  No Known Allergies  Consent:      in chart    [facility] (C TEAM)  [id number]

## 2019-01-14 NOTE — PROGRESS NOTE ADULT - SUBJECTIVE AND OBJECTIVE BOX
[facility] DIVISION OF KIDNEY DISEASES AND HYPERTENSION -- FOLLOW UP NOTE  --------------------------------------------------------------------------------  HPI: 53 year old male with advanced CKD stage 5, CHFrEF, DM, HTN, CAD admitted for progressive CKD and initiation of HD. Pt. with advanced CKD in setting of DM and HTN. Pt. underwent right tunneled HD catheter placement and was initiated on HD on 1/10/19. Pt. awaiting AVF creation surgery.     Pt. seen and examined today. No complaints offered. Pt. feels well.      PAST HISTORY  --------------------------------------------------------------------------------  No significant changes to PMH, PSH, FHx, SHx, unless otherwise noted    ALLERGIES & MEDICATIONS  --------------------------------------------------------------------------------  Allergies    No Known Allergies    Intolerances      Standing Inpatient Medications  aspirin enteric coated 81 milliGRAM(s) Oral daily  atorvastatin 80 milliGRAM(s) Oral at bedtime  buMETAnide 6 milliGRAM(s) Oral two times a day  calcium acetate 667 milliGRAM(s) Oral three times a day with meals  carvedilol 6.25 milliGRAM(s) Oral every 12 hours  chlorhexidine 4% Liquid 1 Application(s) Topical two times a day  clarithromycin 500 milliGRAM(s) Oral two times a day  dextrose 5%. 1000 milliLiter(s) IV Continuous <Continuous>  dextrose 50% Injectable 12.5 Gram(s) IV Push once  dextrose 50% Injectable 25 Gram(s) IV Push once  dextrose 50% Injectable 25 Gram(s) IV Push once  epoetin elisabet Injectable 4000 Unit(s) IV Push <User Schedule>  ferrous    sulfate 325 milliGRAM(s) Oral daily  hydrALAZINE 100 milliGRAM(s) Oral every 8 hours  insulin glargine Injectable (LANTUS) 2 Unit(s) SubCutaneous at bedtime  insulin lispro (HumaLOG) corrective regimen sliding scale   SubCutaneous three times a day before meals  insulin lispro (HumaLOG) corrective regimen sliding scale   SubCutaneous at bedtime  insulin lispro Injectable (HumaLOG) 3 Unit(s) SubCutaneous three times a day before meals  iron sucrose IVPB 100 milliGRAM(s) IV Intermittent <User Schedule>  isosorbide   dinitrate Tablet (ISORDIL) 10 milliGRAM(s) Oral three times a day  metroNIDAZOLE    Tablet 500 milliGRAM(s) Oral every 8 hours  pantoprazole    Tablet 40 milliGRAM(s) Oral two times a day  polyethylene glycol 3350 17 Gram(s) Oral daily  senna 2 Tablet(s) Oral at bedtime    PRN Inpatient Medications  dextrose 40% Gel 15 Gram(s) Oral once PRN  glucagon  Injectable 1 milliGRAM(s) IntraMuscular once PRN      REVIEW OF SYSTEMS  --------------------------------------------------------------------------------  Gen: No fevers/chills  Skin: No rashes  Head/Eyes/Ears: Normal hearing,   Respiratory: No dyspnea, cough  CV: No chest pain  GI: No abdominal pain, diarrhea  : No dysuria, hematuria  MSK: No  edema  Heme: No easy bruising or bleeding  Psych: No significant depression      All other systems were reviewed and are negative, except as noted.    VITALS/PHYSICAL EXAM  --------------------------------------------------------------------------------  T(C): 36.7 (01-14-19 @ 05:58), Max: 36.7 (01-14-19 @ 05:58)  HR: 65 (01-14-19 @ 08:59) (65 - 78)  BP: 111/69 (01-14-19 @ 08:59) (111/69 - 134/78)  RR: 18 (01-14-19 @ 08:59) (18 - 18)  SpO2: 100% (01-14-19 @ 08:59) (100% - 100%)  Wt(kg): --    Weight (kg): 79.5 (01-13-19 @ 07:34)      01-13-19 @ 07:01  -  01-14-19 @ 07:00  --------------------------------------------------------  IN: 0 mL / OUT: 550 mL / NET: -550 mL        Physical Exam:  	Gen: NAD  	HEENT: No JVD  	Pulm: CTA B/L  	CV: S1S2+  	Abd: Soft, +BS   	Ext: No LE edema B/L  	Neuro: Awake  	Skin: Warm and dry  	Vascular access: RIJ tunneled HD catheter site: no bleeding    LABS/STUDIES  --------------------------------------------------------------------------------              8.8    5.30  >-----------<  72       [01-14-19 @ 05:20]              28.0     138  |  97  |  69  ----------------------------<  98      [01-14-19 @ 05:20]  4.2   |  25  |  4.94        Ca     8.4     [01-14-19 @ 05:20]      Mg     1.8     [01-14-19 @ 05:20]      Phos  4.9     [01-14-19 @ 05:20]    TPro  6.7  /  Alb  3.6  /  TBili  0.4  /  DBili  x   /  AST  31  /  ALT  36  /  AlkPhos  91  [01-14-19 @ 05:20]    PT/INR: PT 14.3 , INR 1.25       [01-14-19 @ 05:20]  PTT: 31.5       [01-14-19 @ 05:20]      Creatinine Trend:  SCr 4.94 [01-14 @ 05:20]  SCr 3.96 [01-13 @ 05:10]  SCr 5.30 [01-12 @ 05:30]  SCr 5.07 [01-11 @ 05:25]  SCr 6.44 [01-10 @ 05:15]    Urinalysis - [01-09-19 @ 12:49]      Color LIGHT YELLOW / Appearance CLEAR / SG 1.014 / pH 6.0      Gluc 150 / Ketone NEGATIVE  / Bili NEGATIVE / Urobili NORMAL       Blood TRACE / Protein 200 / Leuk Est NEGATIVE / Nitrite NEGATIVE      RBC 2-5 / WBC 0-2 / Hyaline NEGATIVE / Gran  / Sq Epi OCC / Non Sq Epi  / Bacteria NEGATIVE      Iron 18, TIBC 313, %sat --      [01-12-19 @ 05:30]  Ferritin 28.14      [01-12-19 @ 05:30]  HbA1c 6.5      [01-11-19 @ 06:00]  Lipid: chol 109, TG 64, HDL 48, LDL 53      [07-18-18 @ 20:46]    HBsAb 42.8      [01-10-19 @ 21:10]  HBsAg NEGATIVE      [01-10-19 @ 05:15]  HCV 0.11, Nonreactive Hepatitis C AB  S/CO Ratio                        Interpretation  < 1.0                                     Non-Reactive  1.0 - 4.9                           Weakly-Reactive  > 5.0                                 Reactive  Non-Reactive: Aperson with a non-reactive HCV antibody  result is considered uninfected.  No further action is  needed unless recent infection is suspected.  In these  cases, consider repeat testing later to detect  seroconversion..  Weakly-Reactive: HCV antibody test is abnormal, HCV RNA  Qualitative test will follow.  Reactive: HCV antibody test is abnormal, HCV RNA  Qualitative test will follow.  Note: HCV antibody testing is performed on the [employer] system.      [01-10-19 @ 05:15]

## 2019-01-14 NOTE — DISCHARGE NOTE ADULT - MEDICATION SUMMARY - MEDICATIONS TO CHANGE
I will SWITCH the dose or number of times a day I take the medications listed below when I get home from the hospital:    bumetanide 2 mg oral tablet  -- 2 tab(s) by mouth 2 times a day   -- Avoid prolonged or excessive exposure to direct and/or artificial sunlight while taking this medication.  It is very important that you take or use this exactly as directed.  Do not skip doses or discontinue unless directed by your doctor.  It may be advisable to drink a full glass orange juice or eat a banana daily while taking this medication.

## 2019-01-14 NOTE — PROGRESS NOTE ADULT - PROBLEM SELECTOR PLAN 2
Patient with anemia in the setting of ESRD. Hemoglobin and serum ferritin below target range. Plan for Venofer 100mg IV for 5 doses with HD. Epogen 4000 units with HD. Monitor hemoglobin

## 2019-01-14 NOTE — DISCHARGE NOTE ADULT - SECONDARY DIAGNOSIS.
Anemia Helicobacter pylori gastritis Chronic systolic heart failure Hyperlipidemia, unspecified hyperlipidemia type Thrombocytopenia AV fistula

## 2019-01-14 NOTE — PROGRESS NOTE ADULT - PROBLEM SELECTOR PLAN 1
Pt. with advanced/progressive CKD stage 5 in the setting of long-standing uncontrolled DM and HTN. Pt. initiated on long-term HD during current hospital stay on 1/10/19. Last HD 1/12/19. Pt. deemed ESRD. No plan for HD today. Pt. awaiting AVF creation surgery today. Monitor BP and labs

## 2019-01-14 NOTE — PROGRESS NOTE ADULT - ASSESSMENT
53 year old M w/ h/o IDDM c/b neuropathy (A1c 5.7 12/18) , HTN, CAD (s/p stent in [place] in 2016, unknown coronary anatomy), HFrEF (EF 25%, LVEDD 6.6 cm), severe MR (functional), stage V CKD (b/l Cr 4-5), recent GIB 2/2 ulcer who is here after being instructed to come in to hospital due to worsening renal function despite being fluid overloaded in my office last week as he would require a permacath while awaiting an AVF. Received permacath w/o incident. Currently still overloaded on exam and has labs consistent with acute on chronic renal failure. He has a RCRI score of 3 (CHF, CKD, DM) for an intermediate risk surgery but given absence of signicant ischemia on stress test and able to perform >4 METS, he does not require any further ischemic workup prior to surgery. He however does need to be optimally managed from volume standpoint with dialysis prior to surgery. Otherwise, he can proceed with AVF surgery.

## 2019-01-14 NOTE — PROGRESS NOTE ADULT - PROBLEM SELECTOR PLAN 1
- s/p permacath placement with IR and 2 sessions of HD, HD planned for this afternoon  - c/w bumex 6mg BID, daily weights (dry weight roughly 165-168lbs) and monitor I/Os   - appreciate renal recs  - repeat vein mapping done, AVF placement by vascular planned for Monday - s/p permacath placement with IR and 3 sessions of HD, HD planned for tomorrow  - c/w bumex 6mg BID, daily weights (dry weight roughly 165-168lbs) and monitor I/Os   - appreciate renal recs  - repeat vein mapping done, AVF placement by vascular planned for today

## 2019-01-14 NOTE — DISCHARGE NOTE ADULT - PROVIDER TOKENS
TOKROSANNE:'[id number]',TOKROSANNE:'[id number]' TOKEN:'[id number]',TOKEN:'[id number]',TOKEN:'[id number]' TOKEN:'[id number]',TOKEN:'[id number]',TOKEN:'[id number]'

## 2019-01-14 NOTE — DISCHARGE NOTE ADULT - PATIENT PORTAL LINK FT
You can access the [facility] Patient Portal, offered by [facility], by registering with the following website: [email or web address]

## 2019-01-14 NOTE — DISCHARGE NOTE ADULT - HOSPITAL COURSE
HPI:  53 year old male with hx of HFrEF (28%), CAD s/p stent, CKD V, T2DM, HTN, HLD, and recent admission for CHF optimization for AVF placement with course c/b anemia with concern for GIB (EGD: nonbleeding ulcers and erosions). Patient told to come in today for catheter placement to start RRT.   Patient not endorsing any complaints, concerns of symptoms. In the ED:Afebrile, HR 73-77 -143/60-76 RR 16-18 O2:  on RA  Hospital Course:  Pt. initiated on long-term HD on 1/10/19 after permacath placement by IR. HD on 1/11, 1/12 and 1/15. Pt started on Venofer 100mg IV for 5 doses with HD. Epogen 4000 units with HD. Started on oral ferrous sulfate 325 mg daily as well. Pt had a hx of H pylori that was untreated, started on triple therapy inpt. Pt started on 3 units Lispro premeal and ISS. A1C was 6.5, pt reports not taking metformin or any treatment for his DM outpt. For his CAD, we continued treating with ASA, lipitor, coreg. Per heart failure team recommendations, we increased his bumex dose to 6mg daily, and c/w hydralazine, coreg, isosordil. c/c/b thrombocytopenia. Hemolysis labs, HIT screen negative. Pt was asymptomatic. Heme was consulted. 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 Amoxicillin was stopped, replaced with flagyl. Platelets stabilized and started to trend up. AVF fistula created on 1/14. Pt is medically stable and ready for discharge. HPI:  53 year old male with hx of HFrEF (28%), CAD s/p stent, CKD V, T2DM, HTN, HLD, and recent admission for CHF optimization for AVF placement with course c/b anemia with concern for GIB (EGD: nonbleeding ulcers and erosions). Patient told to come in today for catheter placement to start RRT.   Patient not endorsing any complaints, concerns of symptoms. In the ED:Afebrile, HR 73-77 -143/60-76 RR 16-18 O2:  on RA  Hospital Course:  Pt. initiated on long-term HD on 1/10/19 after permacath placement by IR. HD on 1/11, 1/12 and 1/15. Pt started on Venofer 100mg IV for 5 doses with HD. Epogen 4000 units with HD. Started on oral ferrous sulfate 325 mg daily as well. Pt had a hx of H pylori that was untreated, started on triple therapy inpt. Pt started on 3 units Lispro premeal and ISS. A1C was 6.5, pt reports not taking metformin or any treatment for his DM outpt. For his CAD, we continued treating with ASA, lipitor, coreg. Per heart failure team recommendations, we increased his bumex dose to 6mg daily, and c/w hydralazine, coreg, isosordil. c/c/b thrombocytopenia. Hemolysis labs, HIT screen negative. Pt was asymptomatic. Heme was consulted. 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 Amoxicillin was stopped, replaced with flagyl. Platelets stabilized and started to trend up. AVF fistula created on 1/14. Pt is medically stable and ready for discharge with instructions to complete therapy for H. Pylori infection, and follow up with surgery, nephrology and cardiology. He was set up to have HD on T/Th/S at 5:45 am.

## 2019-01-14 NOTE — DISCHARGE NOTE ADULT - CONDITIONS AT DISCHARGE
Patient is alert and oriented.  Had a left AVF placed on 1/14. Currently using a right permacath for dialysis.  Discharge instructions and prescriptions were reviewed with patient and copies provided.

## 2019-01-14 NOTE — DISCHARGE NOTE ADULT - HOME CARE AGENCY
Referred to [facility] at [place] [phone]. Accepted by [facility] at Home [phone]. Nurse to visit on day after discharge. Nurse to call prior to visit to arrange.

## 2019-01-14 NOTE — PROGRESS NOTE ADULT - PROBLEM SELECTOR PLAN 2
Patient with anemia in the setting of ESRD. Hemoglobin below target range. Per Nephro, pt would benefit from IV Iron. Plan for Venofer 100mg IV for 5 doses with HD. Will initiate Epogen 4000 units with HD.   -Will initiate po iron 325 qD  -monitor Hgb daily Patient with anemia in the setting of ESRD. Hemoglobin below target range. Per Nephro, pt would benefit from IV Iron. Plan for Venofer 100mg IV for 5 doses with HD. Epogen 4000 units with HD.   -c/w po iron 325 qD  -monitor Hgb daily

## 2019-01-14 NOTE — DISCHARGE NOTE ADULT - ADDITIONAL INSTRUCTIONS
Enio Pavon), Cardiovascular Disease; Internal Medicine  [address] [place] [address]  Phone: [phone]  Fax: [phone]    Larisa Galvez), Internal Medicine; Nephrology  [address]  [address] [place] [address]  Phone: [phone]  Fax: [phone] Enio Pavon), Cardiovascular Disease; Internal Medicine  [address] [place] [address]  Phone: [phone]  Fax: [phone]    Larisa Galvez), Internal Medicine; Nephrology  [address]  [address] [place] [address]  Phone: [phone]  Fax: [phone]  Sakina Paredes), Surgery; Vascular Surgery  [address] [facility] [address] [facility]  [address]  [place] [address]  Phone: [phone]  Fax: [phone] Enio Pavon), Cardiovascular Disease; Internal Medicine  [address] [place] [address]  Phone: [phone]  Fax: [phone]    Sakina Paredes), Surgery; Vascular Surgery  [address] [facility] [facility]  [address]  [place] [address]  Phone: [phone]  Fax: [phone]      Giovanny Pavon), Nephrology  [address]  [address] [place] [address]  Phone: [phone]  Fax: [phone]

## 2019-01-14 NOTE — PROGRESS NOTE ADULT - PROBLEM SELECTOR PLAN 5
- hx of CAD w/ MI s/p stent x 1   - c/w ASA, lipitor, coreg  - will give 1 unit PRBC with HD today as pt is planned for OR on Monday, to keep Hgb >8 - hx of CAD w/ MI s/p stent x 1   - c/w ASA, lipitor, coreg

## 2019-01-14 NOTE — PROGRESS NOTE ADULT - SUBJECTIVE AND OBJECTIVE BOX
Patient seen and examined. He feels well- no acute SOB, orthopnea, PND, CP, palpitations.   Awaiting AVF.     Medications:  aspirin enteric coated 81 milliGRAM(s) Oral daily  atorvastatin 80 milliGRAM(s) Oral at bedtime  buMETAnide 6 milliGRAM(s) Oral two times a day  calcium acetate 667 milliGRAM(s) Oral three times a day with meals  carvedilol 6.25 milliGRAM(s) Oral every 12 hours  chlorhexidine 4% Liquid 1 Application(s) Topical two times a day  clarithromycin 500 milliGRAM(s) Oral two times a day  dextrose 40% Gel 15 Gram(s) Oral once PRN  dextrose 5%. 1000 milliLiter(s) IV Continuous <Continuous>  dextrose 50% Injectable 12.5 Gram(s) IV Push once  dextrose 50% Injectable 25 Gram(s) IV Push once  dextrose 50% Injectable 25 Gram(s) IV Push once  epoetin elisabet Injectable 4000 Unit(s) IV Push <User Schedule>  ferrous    sulfate 325 milliGRAM(s) Oral daily  glucagon  Injectable 1 milliGRAM(s) IntraMuscular once PRN  hydrALAZINE 100 milliGRAM(s) Oral every 8 hours  insulin glargine Injectable (LANTUS) 2 Unit(s) SubCutaneous at bedtime  insulin lispro (HumaLOG) corrective regimen sliding scale   SubCutaneous three times a day before meals  insulin lispro (HumaLOG) corrective regimen sliding scale   SubCutaneous at bedtime  insulin lispro Injectable (HumaLOG) 3 Unit(s) SubCutaneous three times a day before meals  iron sucrose IVPB 100 milliGRAM(s) IV Intermittent <User Schedule>  isosorbide   dinitrate Tablet (ISORDIL) 10 milliGRAM(s) Oral three times a day  metroNIDAZOLE    Tablet 500 milliGRAM(s) Oral every 8 hours  pantoprazole    Tablet 40 milliGRAM(s) Oral two times a day  polyethylene glycol 3350 17 Gram(s) Oral daily  senna 2 Tablet(s) Oral at bedtime      Vitals:  Vital Signs Last 24 Hrs  T(C): 36.6 (2019 13:35), Max: 36.7 (2019 05:58)  T(F): 97.9 (2019 13:35), Max: 98 (2019 05:58)  HR: 72 (2019 13:35) (65 - 78)  BP: 121/69 (2019 13:35) (111/69 - 134/78)  BP(mean): --  RR: 18 (2019 13:35) (18 - 18)  SpO2: 100% (2019 13:35) (100% - 100%)    Daily     Daily Weight in k.1 (2019 14:03)    I&O's Detail    2019 07:01  -  2019 07:00  --------------------------------------------------------  IN:  Total IN: 0 mL    OUT:    Voided: 550 mL  Total OUT: 550 mL    Total NET: -550 mL      2019 07:01  -  2019 17:04  --------------------------------------------------------  IN:  Total IN: 0 mL    OUT:    Voided: 400 mL  Total OUT: 400 mL    Total NET: -400 mL          Physical Exam:     General: No distress. Comfortable.  HEENT: EOM intact.  Neck: Neck supple. JVP mildly elevated. No masses  Chest: Clear to auscultation bilaterally  CV: Normal S1 and S2. No murmurs, rub, or gallops. Radial pulses normal. No LE edema b/l.   Abdomen: Soft, non-distended, non-tender  Skin: No rashes or skin breakdown  Neurology: Alert and oriented times three. Sensation intact  Psych: Affect normal    Labs:                        8.8    5.30  )-----------( 72       ( 2019 05:20 )             28.0     14    138  |  97<L>  |  69<H>  ----------------------------<  98  4.2   |  25  |  4.94<H>    Ca    8.4      2019 05:20  Phos  4.9       Mg     1.8         TPro  6.7  /  Alb  3.6  /  TBili  0.4  /  DBili  x   /  AST  31  /  ALT  36  /  AlkPhos  91  -14    PT/INR - ( 2019 05:20 )   PT: 14.3 SEC;   INR: 1.25          PTT - ( 2019 05:20 )  PTT:31.5 SEC    < from: Transthoracic Echocardiogram (18 @ 08:47) >  DIMENSIONS:  Dimensions:     Normal Values:  LA:     4.2 cm    2.0 - 4.0 cm  Ao:     3.2 cm    2.0 - 3.8 cm  SEPTUM: 0.8 cm    0.6 - 1.2 cm  PWT:    0.8 cm    0.6 - 1.1 cm  LVIDd:  7.0 cm    3.0 - 5.6 cm  LVIDs:  6.2 cm    1.8 - 4.0 cm  Derived Variables:  LVMI: 134 g/m2  RWT: 0.22  Fractional short: 11 %  Ejection Fraction (Teicholtz): 24 %  ------------------------------------------------------------------------  OBSERVATIONS:  Mitral Valve: Mitral annular calcification, otherwise  normal mitral valve. Severe mitral regurgitation with an  eccentric, posteriorly directed jet.  Aortic Root: Normal aortic root.  Aortic Valve: Calcified trileaflet aortic valve with  decreased opening.  Left Atrium: Severely dilated left atrium.  LA volume index  = 52 cc/m2.  Left Ventricle: Severe global left ventricular systolic  dysfunction. Severe left ventricular enlargement. (DT:106  ms).  Right Heart: Normal right atrium. Right ventricular  enlargement with decreased right ventricular systolic  function. Normal tricuspid valve.  Mild-moderate tricuspid  regurgitation. Normal pulmonic valve. Minimal pulmonic  regurgitation.  Pericardium/PleuraSmall pericardial effusion superior to  the right atrium.  Hemodynamic: Estimated right ventricular systolic pressure  equals 71 mm Hg, assuming right atrial pressure equals 10  mm Hg, consistent with severepulmonary hypertension.  ------------------------------------------------------------------------    < end of copied text >  < from: Transthoracic Echocardiogram (18 @ 08:47) >  DIMENSIONS:  Dimensions:     Normal Values:  LA:     4.2 cm    2.0 - 4.0 cm  Ao:     3.2 cm    2.0 - 3.8 cm  SEPTUM: 0.8 cm    0.6 - 1.2 cm  PWT:    0.8 cm    0.6 - 1.1 cm  LVIDd:  7.0 cm    3.0 - 5.6 cm  LVIDs:  6.2 cm    1.8 - 4.0 cm  Derived Variables:  LVMI: 134 g/m2  RWT: 0.22  Fractional short: 11 %  Ejection Fraction (Teicholtz): 24 %  ------------------------------------------------------------------------  OBSERVATIONS:  Mitral Valve: Mitral annular calcification, otherwise  normal mitral valve. Severe mitral regurgitation with an  eccentric, posteriorly directed jet.  Aortic Root: Normal aortic root.  Aortic Valve: Calcified trileaflet aortic valve with  decreased opening.  Left Atrium: Severely dilated left atrium.  LA volume index  = 52 cc/m2.  Left Ventricle: Severe global left ventricular systolic  dysfunction. Severe left ventricular enlargement. (DT:106  ms).  Right Heart: Normal right atrium. Right ventricular  enlargement with decreased right ventricular systolic  function. Normal tricuspid valve.  Mild-moderate tricuspid  regurgitation. Normal pulmonic valve. Minimal pulmonic  regurgitation.  Pericardium/PleuraSmall pericardial effusion superior to  the right atrium.  Hemodynamic: Estimated right ventricular systolic pressure  equals 71 mm Hg, assuming right atrial pressure equals 10  mm Hg, consistent with severepulmonary hypertension.  ------------------------------------------------------------------------    < end of copied text >

## 2019-01-14 NOTE — DISCHARGE NOTE ADULT - CARE PROVIDERS DIRECT ADDRESSES
,[email or web address] ,[email or web address] [email or web address]

## 2019-01-15 ENCOUNTER — APPOINTMENT (OUTPATIENT)
Dept: INTERNAL MEDICINE | Facility: HOSPITAL | Age: 54
End: 2019-01-15

## 2019-01-15 VITALS
HEART RATE: 80 BPM | TEMPERATURE: 98 F | DIASTOLIC BLOOD PRESSURE: 72 MMHG | SYSTOLIC BLOOD PRESSURE: 123 MMHG | RESPIRATION RATE: 18 BRPM | OXYGEN SATURATION: 100 % | WEIGHT: 164.91 LBS

## 2019-01-15 LAB
ANION GAP SERPL CALC-SCNC: 14 MEQ/L — SIGNIFICANT CHANGE UP (ref 7–14)
BASOPHILS # BLD AUTO: 0.03 K/UL — SIGNIFICANT CHANGE UP (ref 0–0.2)
BASOPHILS NFR BLD AUTO: 0.4 % — SIGNIFICANT CHANGE UP (ref 0–2)
BUN SERPL-MCNC: 76 MG/DL — HIGH (ref 7–23)
CALCIUM SERPL-MCNC: 8.5 MG/DL — SIGNIFICANT CHANGE UP (ref 8.4–10.5)
CHLORIDE SERPL-SCNC: 95 MMOL/L — LOW (ref 98–107)
CO2 SERPL-SCNC: 26 MMOL/L — SIGNIFICANT CHANGE UP (ref 22–31)
CREAT SERPL-MCNC: 5.7 MG/DL — HIGH (ref 0.5–1.3)
EOSINOPHIL # BLD AUTO: 0.12 K/UL — SIGNIFICANT CHANGE UP (ref 0–0.5)
EOSINOPHIL NFR BLD AUTO: 1.5 % — SIGNIFICANT CHANGE UP (ref 0–6)
GLUCOSE BLDC GLUCOMTR-MCNC: 128 MG/DL — HIGH (ref 70–99)
GLUCOSE BLDC GLUCOMTR-MCNC: 324 MG/DL — HIGH (ref 70–99)
GLUCOSE BLDC GLUCOMTR-MCNC: 99 MG/DL — SIGNIFICANT CHANGE UP (ref 70–99)
GLUCOSE SERPL-MCNC: 122 MG/DL — HIGH (ref 70–99)
HCT VFR BLD CALC: 28.4 % — LOW (ref 39–50)
HGB BLD-MCNC: 9 G/DL — LOW (ref 13–17)
IMM GRANULOCYTES NFR BLD AUTO: 0.4 % — SIGNIFICANT CHANGE UP (ref 0–1.5)
LYMPHOCYTES # BLD AUTO: 0.62 K/UL — LOW (ref 1–3.3)
LYMPHOCYTES # BLD AUTO: 7.6 % — LOW (ref 13–44)
MAGNESIUM SERPL-MCNC: 2.1 MG/DL — SIGNIFICANT CHANGE UP (ref 1.6–2.6)
MCHC RBC-ENTMCNC: 28.2 PG — SIGNIFICANT CHANGE UP (ref 27–34)
MCHC RBC-ENTMCNC: 31.7 % — LOW (ref 32–36)
MCV RBC AUTO: 89 FL — SIGNIFICANT CHANGE UP (ref 80–100)
MONOCYTES # BLD AUTO: 0.76 K/UL — SIGNIFICANT CHANGE UP (ref 0–0.9)
MONOCYTES NFR BLD AUTO: 9.4 % — SIGNIFICANT CHANGE UP (ref 2–14)
NEUTROPHILS # BLD AUTO: 6.56 K/UL — SIGNIFICANT CHANGE UP (ref 1.8–7.4)
NEUTROPHILS NFR BLD AUTO: 80.7 % — HIGH (ref 43–77)
NRBC # FLD: 0 K/UL — LOW (ref 25–125)
PHOSPHATE SERPL-MCNC: 5.5 MG/DL — HIGH (ref 2.5–4.5)
PLATELET # BLD AUTO: 105 K/UL — LOW (ref 150–400)
PMV BLD: 11 FL — SIGNIFICANT CHANGE UP (ref 7–13)
POTASSIUM SERPL-MCNC: 4.2 MMOL/L — SIGNIFICANT CHANGE UP (ref 3.5–5.3)
POTASSIUM SERPL-SCNC: 4.2 MMOL/L — SIGNIFICANT CHANGE UP (ref 3.5–5.3)
RBC # BLD: 3.19 M/UL — LOW (ref 4.2–5.8)
RBC # FLD: 14.3 % — SIGNIFICANT CHANGE UP (ref 10.3–14.5)
SODIUM SERPL-SCNC: 135 MMOL/L — SIGNIFICANT CHANGE UP (ref 135–145)
WBC # BLD: 8.12 K/UL — SIGNIFICANT CHANGE UP (ref 3.8–10.5)
WBC # FLD AUTO: 8.12 K/UL — SIGNIFICANT CHANGE UP (ref 3.8–10.5)

## 2019-01-15 PROCEDURE — 99239 HOSP IP/OBS DSCHRG MGMT >30: CPT

## 2019-01-15 PROCEDURE — 90935 HEMODIALYSIS ONE EVALUATION: CPT | Mod: GC

## 2019-01-15 RX ORDER — ISOSORBIDE DINITRATE 5 MG/1
1 TABLET ORAL
Qty: 0 | Refills: 0 | DISCHARGE
Start: 2019-01-15 | End: 2019-02-13

## 2019-01-15 RX ORDER — ATORVASTATIN CALCIUM 80 MG/1
1 TABLET, FILM COATED ORAL
Qty: 30 | Refills: 0 | OUTPATIENT
Start: 2019-01-15 | End: 2019-02-13

## 2019-01-15 RX ORDER — POLYETHYLENE GLYCOL 3350 17 G/17G
17 POWDER, FOR SOLUTION ORAL
Qty: 510 | Refills: 0 | OUTPATIENT
Start: 2019-01-15 | End: 2019-02-13

## 2019-01-15 RX ORDER — ISOSORBIDE DINITRATE 5 MG/1
1 TABLET ORAL
Qty: 90 | Refills: 0 | OUTPATIENT
Start: 2019-01-15 | End: 2019-02-13

## 2019-01-15 RX ORDER — ATORVASTATIN CALCIUM 80 MG/1
1 TABLET, FILM COATED ORAL
Qty: 30 | Refills: 0
Start: 2019-01-15 | End: 2019-02-13

## 2019-01-15 RX ORDER — ISOSORBIDE DINITRATE 5 MG/1
1 TABLET ORAL
Qty: 0 | Refills: 0 | COMMUNITY
Start: 2019-01-15

## 2019-01-15 RX ORDER — METRONIDAZOLE 500 MG
1 TABLET ORAL
Qty: 21 | Refills: 0 | OUTPATIENT
Start: 2019-01-15 | End: 2019-01-21

## 2019-01-15 RX ORDER — CLARITHROMYCIN 500 MG
1 TABLET ORAL
Qty: 14 | Refills: 0 | OUTPATIENT
Start: 2019-01-15 | End: 2019-01-21

## 2019-01-15 RX ORDER — FERROUS SULFATE 325(65) MG
1 TABLET ORAL
Qty: 30 | Refills: 0 | OUTPATIENT
Start: 2019-01-15 | End: 2019-02-13

## 2019-01-15 RX ORDER — ASPIRIN/CALCIUM CARB/MAGNESIUM 324 MG
1 TABLET ORAL
Qty: 0 | Refills: 0 | DISCHARGE
Start: 2019-01-15

## 2019-01-15 RX ORDER — PANTOPRAZOLE SODIUM 20 MG/1
1 TABLET, DELAYED RELEASE ORAL
Qty: 14 | Refills: 0 | OUTPATIENT
Start: 2019-01-15 | End: 2019-01-21

## 2019-01-15 RX ORDER — PANTOPRAZOLE SODIUM 20 MG/1
1 TABLET, DELAYED RELEASE ORAL
Qty: 0 | Refills: 0 | COMMUNITY
Start: 2019-01-15 | End: 2019-01-21

## 2019-01-15 RX ORDER — BUMETANIDE 0.25 MG/ML
2 INJECTION INTRAMUSCULAR; INTRAVENOUS
Qty: 0 | Refills: 0 | DISCHARGE
Start: 2019-01-15 | End: 2019-02-13

## 2019-01-15 RX ORDER — IRON SUCROSE 20 MG/ML
100 INJECTION, SOLUTION INTRAVENOUS
Qty: 0 | Refills: 0 | Status: DISCONTINUED | OUTPATIENT
Start: 2019-01-15 | End: 2019-01-15

## 2019-01-15 RX ORDER — BUMETANIDE 0.25 MG/ML
3 INJECTION INTRAMUSCULAR; INTRAVENOUS
Qty: 180 | Refills: 0 | OUTPATIENT
Start: 2019-01-15 | End: 2019-02-13

## 2019-01-15 RX ORDER — CALCIUM ACETATE 667 MG
1 TABLET ORAL
Qty: 90 | Refills: 0
Start: 2019-01-15 | End: 2019-02-13

## 2019-01-15 RX ORDER — OXYCODONE HYDROCHLORIDE 5 MG/1
5 TABLET ORAL EVERY 6 HOURS
Qty: 0 | Refills: 0 | Status: DISCONTINUED | OUTPATIENT
Start: 2019-01-15 | End: 2019-01-15

## 2019-01-15 RX ORDER — HYDRALAZINE HCL 50 MG
1 TABLET ORAL
Qty: 90 | Refills: 0
Start: 2019-01-15 | End: 2019-02-13

## 2019-01-15 RX ORDER — ACETAMINOPHEN 500 MG
650 TABLET ORAL EVERY 6 HOURS
Qty: 0 | Refills: 0 | Status: DISCONTINUED | OUTPATIENT
Start: 2019-01-15 | End: 2019-01-15

## 2019-01-15 RX ORDER — CARVEDILOL PHOSPHATE 80 MG/1
1 CAPSULE, EXTENDED RELEASE ORAL
Qty: 60 | Refills: 0 | OUTPATIENT
Start: 2019-01-15 | End: 2019-02-13

## 2019-01-15 RX ADMIN — BUMETANIDE 6 MILLIGRAM(S): 0.25 INJECTION INTRAMUSCULAR; INTRAVENOUS at 19:24

## 2019-01-15 RX ADMIN — CARVEDILOL PHOSPHATE 6.25 MILLIGRAM(S): 80 CAPSULE, EXTENDED RELEASE ORAL at 06:46

## 2019-01-15 RX ADMIN — Medication 667 MILLIGRAM(S): at 09:26

## 2019-01-15 RX ADMIN — CHLORHEXIDINE GLUCONATE 1 APPLICATION(S): 213 SOLUTION TOPICAL at 17:39

## 2019-01-15 RX ADMIN — ERYTHROPOIETIN 4000 UNIT(S): 10000 INJECTION, SOLUTION INTRAVENOUS; SUBCUTANEOUS at 13:38

## 2019-01-15 RX ADMIN — OXYCODONE HYDROCHLORIDE 5 MILLIGRAM(S): 5 TABLET ORAL at 15:23

## 2019-01-15 RX ADMIN — BUMETANIDE 6 MILLIGRAM(S): 0.25 INJECTION INTRAMUSCULAR; INTRAVENOUS at 06:47

## 2019-01-15 RX ADMIN — OXYCODONE HYDROCHLORIDE 5 MILLIGRAM(S): 5 TABLET ORAL at 06:56

## 2019-01-15 RX ADMIN — OXYCODONE HYDROCHLORIDE 5 MILLIGRAM(S): 5 TABLET ORAL at 16:20

## 2019-01-15 RX ADMIN — Medication 667 MILLIGRAM(S): at 17:37

## 2019-01-15 RX ADMIN — Medication 4: at 18:23

## 2019-01-15 RX ADMIN — Medication 500 MILLIGRAM(S): at 06:46

## 2019-01-15 RX ADMIN — ISOSORBIDE DINITRATE 10 MILLIGRAM(S): 5 TABLET ORAL at 06:46

## 2019-01-15 RX ADMIN — PANTOPRAZOLE SODIUM 40 MILLIGRAM(S): 20 TABLET, DELAYED RELEASE ORAL at 06:52

## 2019-01-15 RX ADMIN — Medication 667 MILLIGRAM(S): at 14:16

## 2019-01-15 RX ADMIN — CARVEDILOL PHOSPHATE 6.25 MILLIGRAM(S): 80 CAPSULE, EXTENDED RELEASE ORAL at 17:37

## 2019-01-15 RX ADMIN — IRON SUCROSE 210 MILLIGRAM(S): 20 INJECTION, SOLUTION INTRAVENOUS at 13:42

## 2019-01-15 RX ADMIN — Medication 3 UNIT(S): at 09:25

## 2019-01-15 RX ADMIN — Medication 3 UNIT(S): at 18:23

## 2019-01-15 RX ADMIN — CHLORHEXIDINE GLUCONATE 1 APPLICATION(S): 213 SOLUTION TOPICAL at 06:47

## 2019-01-15 RX ADMIN — PANTOPRAZOLE SODIUM 40 MILLIGRAM(S): 20 TABLET, DELAYED RELEASE ORAL at 17:38

## 2019-01-15 RX ADMIN — Medication 500 MILLIGRAM(S): at 18:24

## 2019-01-15 RX ADMIN — Medication 3 UNIT(S): at 14:12

## 2019-01-15 RX ADMIN — Medication 100 MILLIGRAM(S): at 06:46

## 2019-01-15 RX ADMIN — OXYCODONE HYDROCHLORIDE 5 MILLIGRAM(S): 5 TABLET ORAL at 07:30

## 2019-01-15 NOTE — CHART NOTE - NSCHARTNOTEFT_GEN_A_CORE
53M POD 1 approx 5 hours s/p left radiocephalic AVF creation. No acute events since OR. Pain well controlled. Pt tolerating PO, ambulating, voiding.     Vital Signs (24 Hrs):  T(C): 36.7 (01-15-19 @ 01:11), Max: 37.4 (01-15-19 @ 00:00)  HR: 66 (01-15-19 @ 01:11) (62 - 72)  BP: 113/66 (01-15-19 @ 01:11) (111/69 - 135/81)  RR: 18 (01-15-19 @ 01:11) (10 - 18)  SpO2: 99% (01-15-19 @ 01:11) (96% - 100%)  Wt(kg): --  Daily Height in cm: 165.1 (2019 19:02)    Daily Weight in k.1 (2019 14:03)    I&O's Summary    2019 07:01  -  2019 07:00  --------------------------------------------------------  IN: 0 mL / OUT: 550 mL / NET: -550 mL    2019 07:01  -  15 Kali 2019 03:16  --------------------------------------------------------  IN: 240 mL / OUT: 1000 mL / NET: -760 mL    PE:   AOx3 NAD  Right arm local anesthetic block wearing off appropriately  + Gross motor and sensory function LUE  Good 2s cap refill left fingers, 2+ L radial pulse  LUE surgical dressing intact saturated with heme, no active bleeding, no signs hematoma      A/P: 53M progressing well approx 5 hours s/p left radiocephalic AVF creation  - Encourage ambulating, voiding, PO intake  - Pain control  - Montior neurovascularture LUE

## 2019-01-15 NOTE — PROGRESS NOTE ADULT - PROBLEM SELECTOR PLAN 4
- hx of CAD w/ MI with subsequent ischemic cardiomyopathy: Last EF 24%   - Per HF, increase bumex, c/w hydralazine, coreg, isosordil as pt's weight is above goal of 165-167 pounds.  Standing weight today is 176.5lbs - hx of CAD w/ MI with subsequent ischemic cardiomyopathy: Last EF 24%   - Per HF, increase bumex, c/w hydralazine, coreg, isosordil as pt's weight is above goal of 165-167 pounds.  Standing weight yesterday was 176.5lbs

## 2019-01-15 NOTE — PROGRESS NOTE ADULT - PROVIDER SPECIALTY LIST ADULT
Anesthesia
Heart Failure
Internal Medicine
Nephrology
Surgery
Vascular Surgery
Vascular Surgery
Internal Medicine

## 2019-01-15 NOTE — PROGRESS NOTE ADULT - PROBLEM SELECTOR PLAN 3
baseline platelet level in 200s  has been downtrending prior to initiation of heparin subq, not in DIC and pt denies hx of alcohol abuse  hemolysis labs negative, not septic, medications reviewed for possible cause  pt asymptomatic  -LDH, bili, hapto normal  - Heme: ITP with the use of Amoxacillin, d/c amoxacillin for now. Flagyl instead  - HIT panel also sent, pending baseline platelet level in 200s  has been downtrending prior to initiation of heparin subq, not in DIC and pt denies hx of alcohol abuse  hemolysis labs negative, not septic, medications reviewed for possible cause  pt asymptomatic  -LDH, bili, hapto normal  - Heme: ITP with the use of Amoxacillin, it was discontinued and flagyl being used instead for tx of H. Pylori  - HIT panel negative

## 2019-01-15 NOTE — PROGRESS NOTE ADULT - ASSESSMENT
53 year old M w/ h/o IDDM c/b neuropathy (A1c 5.7 12/18) , HTN, CAD (s/p stent in [place] in 2016, unknown coronary anatomy), HFrEF (EF 25%, LVEDD 6.6 cm), severe MR (functional), stage V CKD (b/l Cr 4-5), recent GIB 2/2 ulcer who is here after being instructed to come in to hospital due to worsening renal function despite being fluid overloaded in my office last week as he would require a permacath while awaiting an AVF. Received permacath w/o incident. Currently still overloaded on exam and has labs consistent with acute on chronic renal failure. He has a RCRI score of 3 (CHF, CKD, DM) for an intermediate risk surgery but given absence of signicant ischemia on stress test and able to perform >4 METS, he does not require any further ischemic workup prior to surgery.    underwent AVF placement on 01/14, no perioperative issues; on scheduled HD. discharge planning per primary team

## 2019-01-15 NOTE — PROGRESS NOTE ADULT - PROBLEM SELECTOR PLAN 1
- s/p permacath placement with IR and 3 sessions of HD, HD planned for tomorrow  - c/w bumex 6mg BID, daily weights (dry weight roughly 165-168lbs) and monitor I/Os   - appreciate renal recs  - repeat vein mapping done, AVF placement by vascular planned for today - s/p permacath placement with IR and 3 sessions of HD, HD planned for 4pm today  - c/w Bumex 6mg BID, daily weights (dry weight roughly 165-168lbs) and monitor I/Os   - appreciate renal recs  - repeat vein mapping done, AVF placement by vascular done

## 2019-01-15 NOTE — PROGRESS NOTE ADULT - SUBJECTIVE AND OBJECTIVE BOX
Cori Butcher MD-PGY1  Department of Internal Medicine  Pager [phone]        DEMETRIO VILLALTA  53y  MRN: [id number]    Patient is a 53y old  Male who presents with a chief complaint of told to come in for catheter placement for renal replacement therapy (14 Jan 2019 17:04)      Subjective: no events ON. Denies fever, CP, SOB, abn pain, N/V. Tolerating diet.      MEDICATIONS  (STANDING):  aspirin enteric coated 81 milliGRAM(s) Oral daily  atorvastatin 80 milliGRAM(s) Oral at bedtime  buMETAnide 6 milliGRAM(s) Oral two times a day  calcium acetate 667 milliGRAM(s) Oral three times a day with meals  carvedilol 6.25 milliGRAM(s) Oral every 12 hours  chlorhexidine 4% Liquid 1 Application(s) Topical two times a day  clarithromycin 500 milliGRAM(s) Oral two times a day  dextrose 5%. 1000 milliLiter(s) (50 mL/Hr) IV Continuous <Continuous>  dextrose 50% Injectable 12.5 Gram(s) IV Push once  dextrose 50% Injectable 25 Gram(s) IV Push once  dextrose 50% Injectable 25 Gram(s) IV Push once  epoetin elisabet Injectable 4000 Unit(s) IV Push <User Schedule>  ferrous    sulfate 325 milliGRAM(s) Oral daily  hydrALAZINE 100 milliGRAM(s) Oral every 8 hours  insulin glargine Injectable (LANTUS) 2 Unit(s) SubCutaneous at bedtime  insulin lispro (HumaLOG) corrective regimen sliding scale   SubCutaneous three times a day before meals  insulin lispro (HumaLOG) corrective regimen sliding scale   SubCutaneous at bedtime  insulin lispro Injectable (HumaLOG) 3 Unit(s) SubCutaneous three times a day before meals  iron sucrose IVPB 100 milliGRAM(s) IV Intermittent <User Schedule>  isosorbide   dinitrate Tablet (ISORDIL) 10 milliGRAM(s) Oral three times a day  metroNIDAZOLE    Tablet 500 milliGRAM(s) Oral every 8 hours  pantoprazole    Tablet 40 milliGRAM(s) Oral two times a day  polyethylene glycol 3350 17 Gram(s) Oral daily  senna 2 Tablet(s) Oral at bedtime    MEDICATIONS  (PRN):  dextrose 40% Gel 15 Gram(s) Oral once PRN Blood Glucose LESS THAN 70 milliGRAM(s)/deciliter  glucagon  Injectable 1 milliGRAM(s) IntraMuscular once PRN Glucose LESS THAN 70 milligrams/deciliter      Objective:    Vitals: Vital Signs Last 24 Hrs  T(C): 36.8 (01-15-19 @ 06:35), Max: 37.4 (01-15-19 @ 00:00)  T(F): 98.2 (01-15-19 @ 06:35), Max: 99.3 (01-15-19 @ 00:00)  HR: 68 (01-15-19 @ 06:35) (62 - 72)  BP: 114/74 (01-15-19 @ 06:35) (111/69 - 135/81)  BP(mean): 91 (01-15-19 @ 00:00) (90 - 96)  RR: 18 (01-15-19 @ 06:35) (10 - 18)  SpO2: 100% (01-15-19 @ 06:35) (96% - 100%)              I&O's Summary    13 Jan 2019 07:01  -  14 Jan 2019 07:00  --------------------------------------------------------  IN: 0 mL / OUT: 550 mL / NET: -550 mL    14 Jan 2019 07:01  -  15 Jan 2019 06:39  --------------------------------------------------------  IN: 240 mL / OUT: 1000 mL / NET: -760 mL        PHYSICAL EXAM:  GENERAL: NAD  HEAD:  Atraumatic, Normocephalic  EYES: EOMI, conjunctiva and sclera clear  CHEST/LUNG: Clear to percussion bilaterally; No rales, rhonchi, wheezing  HEART: Regular rate and rhythm; No murmurs, rubs, or gallops  ABDOMEN: Soft, Nontender, Nondistended; no rebound or guarding  SKIN: No rashes or lesions  NERVOUS SYSTEM:  Alert & Oriented X3    LABS:                        8.8    5.30  )-----------( 72       ( 14 Jan 2019 05:20 )             28.0                         8.6    6.60  )-----------( 65       ( 13 Jan 2019 05:10 )             26.5     01-14    138  |  97<L>  |  69<H>  ----------------------------<  98  4.2   |  25  |  4.94<H>  01-13    137  |  98  |  56<H>  ----------------------------<  103<H>  4.1   |  25  |  3.96<H>    Ca    8.4      14 Jan 2019 05:20  Ca    8.2<L>      13 Jan 2019 05:10  Phos  4.9     01-14  Mg     1.8     01-14    TPro  6.7  /  Alb  3.6  /  TBili  0.4  /  DBili  x   /  AST  31  /  ALT  36  /  AlkPhos  91  01-14  TPro  6.7  /  Alb  3.6  /  TBili  0.5  /  DBili  x   /  AST  23  /  ALT  27  /  AlkPhos  87  01-13    PT/INR - ( 14 Jan 2019 05:20 )   PT: 14.3 SEC;   INR: 1.25          PTT - ( 14 Jan 2019 05:20 )  PTT:31.5 SEC                  CAPILLARY BLOOD GLUCOSE      POCT Blood Glucose.: 89 mg/dL (14 Jan 2019 23:16)  POCT Blood Glucose.: 105 mg/dL (14 Jan 2019 18:17)  POCT Blood Glucose.: 98 mg/dL (14 Jan 2019 12:35)  POCT Blood Glucose.: 108 mg/dL (14 Jan 2019 08:48)      RADIOLOGY & ADDITIONAL TESTS:  Imaging Personally Reviewed:  [x ] YES  [ ] NO    Consultants involved in case:   Consultant(s) Notes Reviewed:  [ x] YES  [ ] NO:   Care Discussed with Consultants/Other Providers [x ] YES  [ ] NO Cori Butcher MD-PGY1  Department of Internal Medicine  Pager [phone]      DEMETRIO VILLALTA  53y  MRN: [id number]    Patient is a 53y old  Male who presents with a chief complaint of told to come in for catheter placement for renal replacement therapy (14 Jan 2019 17:04)      Subjective: no events ON. Denies fever, CP, SOB, abn pain, N/V. Tolerating diet. Pt endorses pain at site of surgery, requested pain meds. He would like a letter stating he needs a kidney transplant so  his cousin from [facility] can come here to donate one.     MEDICATIONS  (STANDING):  aspirin enteric coated 81 milliGRAM(s) Oral daily  atorvastatin 80 milliGRAM(s) Oral at bedtime  buMETAnide 6 milliGRAM(s) Oral two times a day  calcium acetate 667 milliGRAM(s) Oral three times a day with meals  carvedilol 6.25 milliGRAM(s) Oral every 12 hours  chlorhexidine 4% Liquid 1 Application(s) Topical two times a day  clarithromycin 500 milliGRAM(s) Oral two times a day  dextrose 5%. 1000 milliLiter(s) (50 mL/Hr) IV Continuous <Continuous>  dextrose 50% Injectable 12.5 Gram(s) IV Push once  dextrose 50% Injectable 25 Gram(s) IV Push once  dextrose 50% Injectable 25 Gram(s) IV Push once  epoetin elisabet Injectable 4000 Unit(s) IV Push <User Schedule>  ferrous    sulfate 325 milliGRAM(s) Oral daily  hydrALAZINE 100 milliGRAM(s) Oral every 8 hours  insulin glargine Injectable (LANTUS) 2 Unit(s) SubCutaneous at bedtime  insulin lispro (HumaLOG) corrective regimen sliding scale   SubCutaneous three times a day before meals  insulin lispro (HumaLOG) corrective regimen sliding scale   SubCutaneous at bedtime  insulin lispro Injectable (HumaLOG) 3 Unit(s) SubCutaneous three times a day before meals  iron sucrose IVPB 100 milliGRAM(s) IV Intermittent <User Schedule>  isosorbide   dinitrate Tablet (ISORDIL) 10 milliGRAM(s) Oral three times a day  metroNIDAZOLE    Tablet 500 milliGRAM(s) Oral every 8 hours  pantoprazole    Tablet 40 milliGRAM(s) Oral two times a day  polyethylene glycol 3350 17 Gram(s) Oral daily  senna 2 Tablet(s) Oral at bedtime    MEDICATIONS  (PRN):  dextrose 40% Gel 15 Gram(s) Oral once PRN Blood Glucose LESS THAN 70 milliGRAM(s)/deciliter  glucagon  Injectable 1 milliGRAM(s) IntraMuscular once PRN Glucose LESS THAN 70 milligrams/deciliter      Objective:    Vitals: Vital Signs Last 24 Hrs  T(C): 36.8 (01-15-19 @ 06:35), Max: 37.4 (01-15-19 @ 00:00)  T(F): 98.2 (01-15-19 @ 06:35), Max: 99.3 (01-15-19 @ 00:00)  HR: 68 (01-15-19 @ 06:35) (62 - 72)  BP: 114/74 (01-15-19 @ 06:35) (111/69 - 135/81)  BP(mean): 91 (01-15-19 @ 00:00) (90 - 96)  RR: 18 (01-15-19 @ 06:35) (10 - 18)  SpO2: 100% (01-15-19 @ 06:35) (96% - 100%)              I&O's Summary    13 Jan 2019 07:01  -  14 Jan 2019 07:00  --------------------------------------------------------  IN: 0 mL / OUT: 550 mL / NET: -550 mL    14 Jan 2019 07:01  -  15 Kali 2019 06:39  --------------------------------------------------------  IN: 240 mL / OUT: 1000 mL / NET: -760 mL        PHYSICAL EXAM:  GENERAL: NAD  HEAD:  Atraumatic, Normocephalic  EYES: EOMI, conjunctiva and sclera clear  CHEST/LUNG: Clear to percussion bilaterally; No rales, rhonchi, wheezing  HEART: Regular rate and rhythm; No murmurs, rubs, or gallops  ABDOMEN: Soft, Nontender, Nondistended; no rebound or guarding  SKIN: No rashes or lesions  NERVOUS SYSTEM:  Alert & Oriented X3  EXTREMITIES: blood soaked dressing at site of AVF creation    LABS:                        8.8    5.30  )-----------( 72       ( 14 Jan 2019 05:20 )             28.0                         8.6    6.60  )-----------( 65       ( 13 Jan 2019 05:10 )             26.5     01-14    138  |  97<L>  |  69<H>  ----------------------------<  98  4.2   |  25  |  4.94<H>  01-13    137  |  98  |  56<H>  ----------------------------<  103<H>  4.1   |  25  |  3.96<H>    Ca    8.4      14 Jan 2019 05:20  Ca    8.2<L>      13 Jan 2019 05:10  Phos  4.9     01-14  Mg     1.8     01-14    TPro  6.7  /  Alb  3.6  /  TBili  0.4  /  DBili  x   /  AST  31  /  ALT  36  /  AlkPhos  91  01-14  TPro  6.7  /  Alb  3.6  /  TBili  0.5  /  DBili  x   /  AST  23  /  ALT  27  /  AlkPhos  87  01-13    PT/INR - ( 14 Jan 2019 05:20 )   PT: 14.3 SEC;   INR: 1.25          PTT - ( 14 Jan 2019 05:20 )  PTT:31.5 SEC                  CAPILLARY BLOOD GLUCOSE      POCT Blood Glucose.: 89 mg/dL (14 Jan 2019 23:16)  POCT Blood Glucose.: 105 mg/dL (14 Jan 2019 18:17)  POCT Blood Glucose.: 98 mg/dL (14 Jan 2019 12:35)  POCT Blood Glucose.: 108 mg/dL (14 Jan 2019 08:48)      RADIOLOGY & ADDITIONAL TESTS:  Imaging Personally Reviewed:  [x ] YES  [ ] NO    Consultants involved in case:   Consultant(s) Notes Reviewed:  [ x] YES  [ ] NO:   Care Discussed with Consultants/Other Providers [x ] YES  [ ] NO

## 2019-01-15 NOTE — PROGRESS NOTE ADULT - PROBLEM SELECTOR PLAN 7
- recently diagnosed H pylori gastritis - did not undergo any treatment   - c/w triple therapy - recently diagnosed H pylori gastritis - did not undergo any treatment   - c/w PPI, clarithromycin, and flagyl

## 2019-01-15 NOTE — PROGRESS NOTE ADULT - ASSESSMENT
53 year old male with hx of chronic HFrEF (24%), CAD s/p stent, CKD V, T2DM, HTN, HLD, and recent admission for CHF optimization for AVF placement with course c/b anemia, s/p permacath placement for RRT, and 2 sessions of HD with course now c/b thrombocytopenia 2/2 ?amoxicillin. 53 year old male with hx of chronic HFrEF (24%), CAD s/p stent, CKD V, T2DM, HTN, HLD, and recent admission for CHF optimization for AVF placement with course c/b anemia, s/p permacath placement for RRT, and 3 sessions of HD with course now c/b thrombocytopenia 2/2 ?amoxicillin, s/p AVF creation

## 2019-01-15 NOTE — PROGRESS NOTE ADULT - SUBJECTIVE AND OBJECTIVE BOX
Subjective:  underwent left arm fistula procedure yesterday without any issues    Medications:  acetaminophen   Tablet .. 650 milliGRAM(s) Oral every 6 hours PRN  aspirin enteric coated 81 milliGRAM(s) Oral daily  atorvastatin 80 milliGRAM(s) Oral at bedtime  buMETAnide 6 milliGRAM(s) Oral two times a day  calcium acetate 667 milliGRAM(s) Oral three times a day with meals  carvedilol 6.25 milliGRAM(s) Oral every 12 hours  chlorhexidine 4% Liquid 1 Application(s) Topical two times a day  clarithromycin 500 milliGRAM(s) Oral two times a day  dextrose 40% Gel 15 Gram(s) Oral once PRN  dextrose 5%. 1000 milliLiter(s) IV Continuous <Continuous>  dextrose 50% Injectable 12.5 Gram(s) IV Push once  dextrose 50% Injectable 25 Gram(s) IV Push once  dextrose 50% Injectable 25 Gram(s) IV Push once  epoetin elisabet Injectable 4000 Unit(s) IV Push <User Schedule>  ferrous    sulfate 325 milliGRAM(s) Oral daily  glucagon  Injectable 1 milliGRAM(s) IntraMuscular once PRN  hydrALAZINE 100 milliGRAM(s) Oral every 8 hours  insulin glargine Injectable (LANTUS) 2 Unit(s) SubCutaneous at bedtime  insulin lispro (HumaLOG) corrective regimen sliding scale   SubCutaneous three times a day before meals  insulin lispro (HumaLOG) corrective regimen sliding scale   SubCutaneous at bedtime  insulin lispro Injectable (HumaLOG) 3 Unit(s) SubCutaneous three times a day before meals  iron sucrose IVPB 100 milliGRAM(s) IV Intermittent <User Schedule>  isosorbide   dinitrate Tablet (ISORDIL) 10 milliGRAM(s) Oral three times a day  metroNIDAZOLE    Tablet 500 milliGRAM(s) Oral every 8 hours  oxyCODONE    IR 5 milliGRAM(s) Oral every 6 hours PRN  pantoprazole    Tablet 40 milliGRAM(s) Oral two times a day  polyethylene glycol 3350 17 Gram(s) Oral daily  senna 2 Tablet(s) Oral at bedtime      Physical Exam:    Vitals:  T(C): 36.5 (01-15-19 @ 14:54), Max: 37.4 (01-15-19 @ 00:00)  HR: 80 (01-15-19 @ 14:54) (62 - 80)  BP: 123/72 (01-15-19 @ 14:54) (113/66 - 135/81)  BP(mean): 91 (01-15-19 @ 00:00) (90 - 96)  ABP: --  ABP(mean): --  RR: 18 (01-15-19 @ 14:54) (10 - 18)  SpO2: 100% (01-15-19 @ 14:54) (96% - 100%)  Wt(kg): --  CVP(cm H2O): --  CO: --  CI: --  PA: --  PA(mean): --  PCWP: --  SVR: --  PVR: --  Vital Signs Last 24 Hrs  T(C): 36.5 (15 Kali 2019 14:54), Max: 37.4 (15 Kali 2019 00:00)  T(F): 97.7 (15 Kali 2019 14:54), Max: 99.3 (15 Kali 2019 00:00)  HR: 80 (15 Kali 2019 14:54) (62 - 80)  BP: 123/72 (15 Kali 2019 14:54) (113/66 - 135/81)  BP(mean): 91 (15 Kali 2019 00:00) (90 - 96)  RR: 18 (15 Kali 2019 14:54) (10 - 18)  SpO2: 100% (15 Kali 2019 14:54) (96% - 100%)    Daily Height in cm: 165.1 (2019 19:02)    Daily Weight in k.8 (15 Kali 2019 14:54)    I&O's Summary    2019 07:01  -  15 Kali 2019 07:00  --------------------------------------------------------  IN: 240 mL / OUT: 1000 mL / NET: -760 mL  at HD today, unable to see    Labs:                        9.0    8.12  )-----------( 105      ( 15 Kali 2019 11:45 )             28.4     01-15    135  |  95<L>  |  76<H>  ----------------------------<  122<H>  4.2   |  26  |  5.70<H>    Ca    8.5      15 Kali 2019 11:45  Phos  5.5     01-15  Mg     2.1     01-15    TPro  6.7  /  Alb  3.6  /  TBili  0.4  /  DBili  x   /  AST  31  /  ALT  36  /  AlkPhos  91      PT/INR - ( 2019 05:20 )   PT: 14.3 SEC;   INR: 1.25          PTT - ( 2019 05:20 )  PTT:31.5 SEC

## 2019-01-15 NOTE — PROGRESS NOTE ADULT - REASON FOR ADMISSION
told to come in for catheter placement for renal replacement therapy
Progressive CKD
told to come in for catheter placement for renal replacement therapy

## 2019-01-15 NOTE — PROGRESS NOTE ADULT - SUBJECTIVE AND OBJECTIVE BOX
[facility] DIVISION OF KIDNEY DISEASES AND HYPERTENSION -- FOLLOW UP NOTE  --------------------------------------------------------------------------------  HPI: 53 year old male with advanced CKD stage 5, CHFrEF, DM, HTN, CAD admitted for progressive CKD and initiation of HD. Pt. with advanced CKD in setting of DM and HTN. Pt. underwent right tunneled HD catheter placement and was initiated on HD on 1/10/19. Pt. s/p AVF creation surgery on 1/14/19.    Pt. seen and examined today. No complaints offered. Pt. feels well.  Plan for HD today.     PAST HISTORY  --------------------------------------------------------------------------------  No significant changes to PMH, PSH, FHx, SHx, unless otherwise noted    ALLERGIES & MEDICATIONS  --------------------------------------------------------------------------------  Allergies    No Known Allergies    Intolerances      Standing Inpatient Medications  aspirin enteric coated 81 milliGRAM(s) Oral daily  atorvastatin 80 milliGRAM(s) Oral at bedtime  buMETAnide 6 milliGRAM(s) Oral two times a day  calcium acetate 667 milliGRAM(s) Oral three times a day with meals  carvedilol 6.25 milliGRAM(s) Oral every 12 hours  chlorhexidine 4% Liquid 1 Application(s) Topical two times a day  clarithromycin 500 milliGRAM(s) Oral two times a day  dextrose 5%. 1000 milliLiter(s) IV Continuous <Continuous>  dextrose 50% Injectable 12.5 Gram(s) IV Push once  dextrose 50% Injectable 25 Gram(s) IV Push once  dextrose 50% Injectable 25 Gram(s) IV Push once  epoetin elisabet Injectable 4000 Unit(s) IV Push <User Schedule>  ferrous    sulfate 325 milliGRAM(s) Oral daily  hydrALAZINE 100 milliGRAM(s) Oral every 8 hours  insulin glargine Injectable (LANTUS) 2 Unit(s) SubCutaneous at bedtime  insulin lispro (HumaLOG) corrective regimen sliding scale   SubCutaneous three times a day before meals  insulin lispro (HumaLOG) corrective regimen sliding scale   SubCutaneous at bedtime  insulin lispro Injectable (HumaLOG) 3 Unit(s) SubCutaneous three times a day before meals  iron sucrose IVPB 100 milliGRAM(s) IV Intermittent <User Schedule>  isosorbide   dinitrate Tablet (ISORDIL) 10 milliGRAM(s) Oral three times a day  metroNIDAZOLE    Tablet 500 milliGRAM(s) Oral every 8 hours  pantoprazole    Tablet 40 milliGRAM(s) Oral two times a day  polyethylene glycol 3350 17 Gram(s) Oral daily  senna 2 Tablet(s) Oral at bedtime    PRN Inpatient Medications  acetaminophen   Tablet .. 650 milliGRAM(s) Oral every 6 hours PRN  dextrose 40% Gel 15 Gram(s) Oral once PRN  glucagon  Injectable 1 milliGRAM(s) IntraMuscular once PRN  oxyCODONE    IR 5 milliGRAM(s) Oral every 6 hours PRN      REVIEW OF SYSTEMS  --------------------------------------------------------------------------------  Gen: No fevers/chills  Skin: No rashes  Head/Eyes/Ears: Normal hearing,   Respiratory: No dyspnea, cough  CV: No chest pain  GI: No abdominal pain, diarrhea  : No dysuria, hematuria  MSK: No  edema  Heme: No easy bruising or bleeding  Psych: No significant depression      All other systems were reviewed and are negative, except as noted.    VITALS/PHYSICAL EXAM  --------------------------------------------------------------------------------  T(C): 36.8 (01-15-19 @ 06:35), Max: 37.4 (01-15-19 @ 00:00)  HR: 68 (01-15-19 @ 06:35) (62 - 72)  BP: 114/74 (01-15-19 @ 06:35) (113/66 - 135/81)  RR: 18 (01-15-19 @ 06:35) (10 - 18)  SpO2: 100% (01-15-19 @ 06:35) (96% - 100%)  Wt(kg): --  Height (cm): 165.1 (01-14-19 @ 19:17)  Weight (kg): 79.4 (01-14-19 @ 19:17)  BMI (kg/m2): 29.1 (01-14-19 @ 19:17)  BSA (m2): 1.87 (01-14-19 @ 19:17)      01-14-19 @ 07:01  -  01-15-19 @ 07:00  --------------------------------------------------------  IN: 240 mL / OUT: 1000 mL / NET: -760 mL        Physical Exam:  	Gen: NAD  	HEENT: No JVD  	Pulm: CTA B/L  	CV: S1S2+  	Abd: Soft, +BS   	Ext: No LE edema B/L  	Neuro: Awake  	Skin: Warm and dry  	Vascular access: RIJ tunneled HD catheter site: no bleeding. BHARAT EDMONDSON +bruit      LABS/STUDIES  --------------------------------------------------------------------------------              8.8    5.30  >-----------<  72       [01-14-19 @ 05:20]              28.0     138  |  97  |  69  ----------------------------<  98      [01-14-19 @ 05:20]  4.2   |  25  |  4.94        Ca     8.4     [01-14-19 @ 05:20]      Mg     1.8     [01-14-19 @ 05:20]      Phos  4.9     [01-14-19 @ 05:20]    TPro  6.7  /  Alb  3.6  /  TBili  0.4  /  DBili  x   /  AST  31  /  ALT  36  /  AlkPhos  91  [01-14-19 @ 05:20]    PT/INR: PT 14.3 , INR 1.25       [01-14-19 @ 05:20]  PTT: 31.5       [01-14-19 @ 05:20]      Creatinine Trend:  SCr 4.94 [01-14 @ 05:20]  SCr 3.96 [01-13 @ 05:10]  SCr 5.30 [01-12 @ 05:30]  SCr 5.07 [01-11 @ 05:25]  SCr 6.44 [01-10 @ 05:15]    Urinalysis - [01-09-19 @ 12:49]      Color LIGHT YELLOW / Appearance CLEAR / SG 1.014 / pH 6.0      Gluc 150 / Ketone NEGATIVE  / Bili NEGATIVE / Urobili NORMAL       Blood TRACE / Protein 200 / Leuk Est NEGATIVE / Nitrite NEGATIVE      RBC 2-5 / WBC 0-2 / Hyaline NEGATIVE / Gran  / Sq Epi OCC / Non Sq Epi  / Bacteria NEGATIVE      Iron 18, TIBC 313, %sat --      [01-12-19 @ 05:30]  Ferritin 28.14      [01-12-19 @ 05:30]  HbA1c 6.5      [01-11-19 @ 06:00]  Lipid: chol 109, TG 64, HDL 48, LDL 53      [07-18-18 @ 20:46]    HBsAb 42.8      [01-10-19 @ 21:10]  HBsAg NEGATIVE      [01-10-19 @ 05:15]  HCV 0.11, Nonreactive Hepatitis C AB  S/CO Ratio                        Interpretation  < 1.0                                     Non-Reactive  1.0 - 4.9                           Weakly-Reactive  > 5.0                                 Reactive  Non-Reactive: [place] with a non-reactive HCV antibody  result is considered uninfected.  No further action is  needed unless recent infection is suspected.  In these  cases, consider repeat testing later to detect  seroconversion..  Weakly-Reactive: HCV antibody test is abnormal, HCV RNA  Qualitative test will follow.  Reactive: HCV antibody test is abnormal, HCV RNA  Qualitative test will follow.  Note: HCV antibody testing is performed on the [employer]   system.      [01-10-19 @ 05:15]

## 2019-01-15 NOTE — PROGRESS NOTE ADULT - PROBLEM SELECTOR PLAN 2
Patient with anemia in the setting of ESRD. Hemoglobin below target range. Per Nephro, pt would benefit from IV Iron. Plan for Venofer 100mg IV for 5 doses with HD. Epogen 4000 units with HD.   -c/w po iron 325 qD  -monitor Hgb daily

## 2019-01-15 NOTE — PROGRESS NOTE ADULT - PROBLEM SELECTOR PLAN 10
DVT PPx: Improve score of 0, lower risk for DVT - - no pharmacologic ppx

## 2019-01-15 NOTE — PROGRESS NOTE ADULT - ATTENDING COMMENTS
new onset ESRD now requiring HD  pt. needs HD access  will plan for AVF creation on this admission once medically optimized  protect L arm   vein mapping
ESRD for PermCath then start dialysis.  I called Dr. Paredes for fistula creation
ESRD patient who could not start Hd as outpatient.  he needs permcath.   Cardiology clearance and vascular eval for inpatient AVF
Doyle Galvez MD, FACP.  Attending Nephrologist  Office: [phone]  Pager: [phone]  Cell:    [phone]  Email: [email or web address]
evaluated while at HD  s/p fistula  DC planning     Doyle Galvez MD, FACP.  Attending Nephrologist  Office: [phone]  Pager: [phone]  Cell:    [phone]  Email: [email or web address]
iron sat was 10% in Dec but no ferritin. please repeat iron studies and if low would start iron Iv with dialysis.  seen while at HD  for fistula next week    Doyle Galvez MD, FACP.  Attending Nephrologist  Office: [phone]  Pager: [phone]  Cell:    [phone]  Email: [email or web address]
Patient seen and examined. Had Permacath placed by IR yesterday wo issue. Some bleeding from the site during HD but resolved. Tolerated 2 HD sessions wo issue. Patient is optimized from a cardiac perspective as per CHF team. From medical perspective, patient may proceed to the OR without further w/u at this time. Will work for improved glycemic control over the weekend and closely monitor platelets. Started on Lantus and Humalog today, monitor finger sticks. Patient admits to dietary indiscretion last night post permacath placement. A1c as an outpatient is 6.5
Patient seen and examined. Case d/w HS. Patient reports he is hungry and wants to go for permacath placement ASAP so he can eat. Denies any CP, SOB, palpitations, LE edema. On exam, lungs are clear, no LE edema, cardiac exam unremarkable. Plan for Permacath placement today by IR and then initiation of HD tomorrow (will need 3 sessions prior to dc). Vascular consulted as well. As Dr. Gonzalez does not come to [facility] often, case to be done by Dr. Paredes in house. Vein mapping ordered. Patient now has ESRD.
Patient seen and examined this AM. Feeling well. Some slight pain at right permacath site, but no bleeding and improving. No petichiae. Platelets are stable but still overall downtrending. Spoke with hematology today re: patient's plan for OR tomorrow. Recommend waiting to see if AM platelets are improved. Would also recommend waiting for HIT panel to return. Team to call lab to expedite this. Amoxicillin dc'd yesterday for metronidazole. Could also be H. pylori related but patient has had that since 12/18. If platelets improving and HIT panel negative, will plan for OR in AM. Otherwise, may need to postpone procedure.
AVF placement today. Anticipate HD afterwards per Renal schedule.
Pt tolerated AVF surgery. Currently in HD via Permacath. Accepted for outpatient HD. Anticipate D/C home today. Time planning discharge 35 minutes.
Patient seen and examined at bedside. Case d/w HS. Patient's platelets continue to drop, now 65K on latest testing. Labs reviewed, not in DIC, not septic, could be drug-induced from amoxicillin? that was initiated for H. pylori therapy. Will dc amoxicillin and replace it with metronidazole. C/w PPI and clarithromycin. F/u further hematology recommendations. Hold heparin for now, 4T score is 4, intermediate probability. Will send HIT screen. Renal recs appreciated, will initiate IV iron per their recommendations. Will give 1u PRBC today for cardiac optimization for the OR on Monday. Glucose control is improved on current basal-bolus regimen. Monitor platelets.

## 2019-01-15 NOTE — PROGRESS NOTE ADULT - ASSESSMENT
A/P: 53M  s/p left radiocephalic AVF creation    - Encourage ambulating, voiding, PO intake  - Pain control  - Montior neurovascularture LUE.  - Patient can follow up in Dr. Paredes's office after discharge.   - Signing off, please page with questions    [id number] A/P: 53M  s/p left radiocephalic AVF creation    - Dressing can come off 48 hours after operation. Dry gauze to area after prn.  - Discharge Instructions   - Patient can follow up in Dr. Paredes's office after discharge.   - Signing off, please page with questions    [id number]      WOUND CARE:  Please keep incisions clean and dry. Please do not Scrub or rub incisions. Do not use lotion or powder on incisions.   BATHING: Please do not submerge wound underwater. You may shower and/or sponge bathe.  ACTIVITY: No heavy lifting or straining of arm. Otherwise, you may return to your usual level of physical activity. If you are taking narcotic pain medication (such as Percocet) DO NOT drive a car, operate machinery or make important decisions.  DIET: Return to your usual diet.  NOTIFY YOUR SURGEON IF: You have any bleeding that does not stop, any pus draining from your wound(s), any fever (over 100.4 F) or chills, persistent nausea/vomiting, persistent diarrhea, or if your pain is not controlled on your discharge pain medications.  FOLLOW-UP: Please follow up with your primary care physician in one week regarding your hospitalization. Please follow-up with your surgeon, within 7-14 days following discharge- please call to schedule an appointment.

## 2019-01-15 NOTE — PROGRESS NOTE ADULT - SUBJECTIVE AND OBJECTIVE BOX
ANESTHESIA POSTOP CHECK    53y Male POSTOP DAY 1     Vital Signs Last 24 Hrs  T(C): 36.8 (15 Kali 2019 06:35), Max: 37.4 (15 Kali 2019 00:00)  T(F): 98.2 (15 Kali 2019 06:35), Max: 99.3 (15 Kali 2019 00:00)  HR: 68 (15 Kali 2019 06:35) (62 - 72)  BP: 114/74 (15 Kali 2019 06:35) (113/66 - 135/81)  BP(mean): 91 (15 Kali 2019 00:00) (90 - 96)  RR: 18 (15 Kali 2019 06:35) (10 - 18)  SpO2: 100% (15 Kali 2019 06:35) (96% - 100%)  I&O's Summary    14 Jan 2019 07:01  -  15 Kali 2019 07:00  --------------------------------------------------------  IN: 240 mL / OUT: 1000 mL / NET: -760 mL        NO APPARENT ANESTHESIA COMPLICATIONS

## 2019-01-15 NOTE — PROGRESS NOTE ADULT - PROBLEM SELECTOR PLAN 1
- continue dialysis/UF daily for goal weight of 165-167 pounds.   - Continue Bumex to 6 mg twice daily  - Continue on isordil 10 mg three times/day  - Continue hydral 100 mg po TID  - follow up with us as outpatient

## 2019-01-15 NOTE — PROGRESS NOTE ADULT - SUBJECTIVE AND OBJECTIVE BOX
Surgery Progress Note    SUBJECTIVE: Pt seen and examined at bedside. Patient comfortable and in no-apparent distress.       Vital Signs Last 24 Hrs  T(C): 36.8 (15 Kali 2019 06:35), Max: 37.4 (15 Kali 2019 00:00)  T(F): 98.2 (15 Kali 2019 06:35), Max: 99.3 (15 Kali 2019 00:00)  HR: 68 (15 Kali 2019 06:35) (62 - 72)  BP: 114/74 (15 Kali 2019 06:35) (113/66 - 135/81)  BP(mean): 91 (15 Kali 2019 00:00) (90 - 96)  RR: 18 (15 Kali 2019 06:35) (10 - 18)  SpO2: 100% (15 Kali 2019 06:35) (96% - 100%)    Physical Exam:  General Appearance: Appears well, NAD  Respiratory: No labored breathing  CV: Pulse regularly present  Abdomen: Soft, nontense  + Gross motor and sensory function LUE  Good 2s cap refill left fingers, 2+ L radial pulse  LUE surgical dressing intact saturated with heme, no active bleeding, no signs hematoma        LABS:                        8.8    5.30  )-----------( 72       ( 14 Jan 2019 05:20 )             28.0     01-14    138  |  97<L>  |  69<H>  ----------------------------<  98  4.2   |  25  |  4.94<H>    Ca    8.4      14 Jan 2019 05:20  Phos  4.9     01-14  Mg     1.8     01-14    TPro  6.7  /  Alb  3.6  /  TBili  0.4  /  DBili  x   /  AST  31  /  ALT  36  /  AlkPhos  91  01-14    PT/INR - ( 14 Jan 2019 05:20 )   PT: 14.3 SEC;   INR: 1.25          PTT - ( 14 Jan 2019 05:20 )  PTT:31.5 SEC      INs and OUTs:    01-14-19 @ 07:01  -  01-15-19 @ 07:00  --------------------------------------------------------  IN: 240 mL / OUT: 1000 mL / NET: -760 mL

## 2019-01-15 NOTE — PROGRESS NOTE ADULT - PROBLEM SELECTOR PLAN 1
Pt. with advanced/progressive CKD stage 5 in the setting of long-standing uncontrolled DM and HTN. Pt. initiated on long-term HD during current hospital stay on 1/10/19. Last HD 1/12/19. Pt. deemed ESRD. Plan for HD today. Pt. s/p AVF creation surgery 1/14/19. Monitor BP and labs

## 2019-01-16 ENCOUNTER — APPOINTMENT (OUTPATIENT)
Dept: VASCULAR SURGERY | Facility: HOSPITAL | Age: 54
End: 2019-01-16

## 2019-01-16 ENCOUNTER — EMERGENCY (EMERGENCY)
Facility: HOSPITAL | Age: 54
LOS: 1 days | Discharge: HOME | End: 2019-01-16
Attending: EMERGENCY MEDICINE | Admitting: EMERGENCY MEDICINE
Payer: MEDICAID

## 2019-01-16 VITALS
SYSTOLIC BLOOD PRESSURE: 142 MMHG | HEART RATE: 76 BPM | OXYGEN SATURATION: 98 % | DIASTOLIC BLOOD PRESSURE: 82 MMHG | RESPIRATION RATE: 14 BRPM

## 2019-01-16 VITALS
TEMPERATURE: 98 F | OXYGEN SATURATION: 100 % | HEART RATE: 77 BPM | SYSTOLIC BLOOD PRESSURE: 134 MMHG | DIASTOLIC BLOOD PRESSURE: 69 MMHG | RESPIRATION RATE: 16 BRPM

## 2019-01-16 DIAGNOSIS — Z95.5 PRESENCE OF CORONARY ANGIOPLASTY IMPLANT AND GRAFT: Chronic | ICD-10-CM

## 2019-01-16 DIAGNOSIS — T82.868A THROMBOSIS DUE TO VASCULAR PROSTHETIC DEVICES, IMPLANTS AND GRAFTS, INITIAL ENCOUNTER: Chronic | ICD-10-CM

## 2019-01-16 LAB
ALBUMIN SERPL ELPH-MCNC: 3.9 G/DL — SIGNIFICANT CHANGE UP (ref 3.3–5)
ALP SERPL-CCNC: 97 U/L — SIGNIFICANT CHANGE UP (ref 40–120)
ALT FLD-CCNC: 15 U/L — SIGNIFICANT CHANGE UP (ref 4–41)
ANION GAP SERPL CALC-SCNC: 14 MEQ/L — SIGNIFICANT CHANGE UP (ref 7–14)
APTT BLD: 34.5 SEC — SIGNIFICANT CHANGE UP (ref 27.5–36.3)
AST SERPL-CCNC: 18 U/L — SIGNIFICANT CHANGE UP (ref 4–40)
B-OH-BUTYR SERPL-SCNC: 0.2 MMOL/L — SIGNIFICANT CHANGE UP (ref 0–0.4)
BASE EXCESS BLDV CALC-SCNC: 5.9 MMOL/L — SIGNIFICANT CHANGE UP
BASOPHILS # BLD AUTO: 0.02 K/UL — SIGNIFICANT CHANGE UP (ref 0–0.2)
BASOPHILS NFR BLD AUTO: 0.3 % — SIGNIFICANT CHANGE UP (ref 0–2)
BILIRUB SERPL-MCNC: 0.5 MG/DL — SIGNIFICANT CHANGE UP (ref 0.2–1.2)
BLOOD GAS VENOUS - CREATININE: 4.21 MG/DL — HIGH (ref 0.5–1.3)
BUN SERPL-MCNC: 39 MG/DL — HIGH (ref 7–23)
CALCIUM SERPL-MCNC: 8.8 MG/DL — SIGNIFICANT CHANGE UP (ref 8.4–10.5)
CHLORIDE BLDV-SCNC: 100 MMOL/L — SIGNIFICANT CHANGE UP (ref 96–108)
CHLORIDE SERPL-SCNC: 95 MMOL/L — LOW (ref 98–107)
CO2 SERPL-SCNC: 26 MMOL/L — SIGNIFICANT CHANGE UP (ref 22–31)
CREAT SERPL-MCNC: 4.12 MG/DL — HIGH (ref 0.5–1.3)
EOSINOPHIL # BLD AUTO: 0.11 K/UL — SIGNIFICANT CHANGE UP (ref 0–0.5)
EOSINOPHIL NFR BLD AUTO: 1.6 % — SIGNIFICANT CHANGE UP (ref 0–6)
GAS PNL BLDV: 131 MMOL/L — LOW (ref 136–146)
GLUCOSE BLDV-MCNC: 107 — HIGH (ref 70–99)
GLUCOSE SERPL-MCNC: 108 MG/DL — HIGH (ref 70–99)
HCO3 BLDV-SCNC: 29 MMOL/L — HIGH (ref 20–27)
HCT VFR BLD CALC: 30.8 % — LOW (ref 39–50)
HCT VFR BLDV CALC: 30 % — LOW (ref 39–51)
HGB BLD-MCNC: 9.8 G/DL — LOW (ref 13–17)
HGB BLDV-MCNC: 9.7 G/DL — LOW (ref 13–17)
IMM GRANULOCYTES NFR BLD AUTO: 0.4 % — SIGNIFICANT CHANGE UP (ref 0–1.5)
INR BLD: 1.31 — HIGH (ref 0.88–1.17)
LACTATE BLDV-MCNC: 1 MMOL/L — SIGNIFICANT CHANGE UP (ref 0.5–2)
LYMPHOCYTES # BLD AUTO: 0.82 K/UL — LOW (ref 1–3.3)
LYMPHOCYTES # BLD AUTO: 11.6 % — LOW (ref 13–44)
MCHC RBC-ENTMCNC: 28.5 PG — SIGNIFICANT CHANGE UP (ref 27–34)
MCHC RBC-ENTMCNC: 31.8 % — LOW (ref 32–36)
MCV RBC AUTO: 89.5 FL — SIGNIFICANT CHANGE UP (ref 80–100)
MONOCYTES # BLD AUTO: 0.91 K/UL — HIGH (ref 0–0.9)
MONOCYTES NFR BLD AUTO: 12.9 % — SIGNIFICANT CHANGE UP (ref 2–14)
NEUTROPHILS # BLD AUTO: 5.17 K/UL — SIGNIFICANT CHANGE UP (ref 1.8–7.4)
NEUTROPHILS NFR BLD AUTO: 73.2 % — SIGNIFICANT CHANGE UP (ref 43–77)
NRBC # FLD: 0 K/UL — LOW (ref 25–125)
PCO2 BLDV: 48 MMHG — SIGNIFICANT CHANGE UP (ref 41–51)
PH BLDV: 7.42 PH — SIGNIFICANT CHANGE UP (ref 7.32–7.43)
PLATELET # BLD AUTO: 107 K/UL — LOW (ref 150–400)
PMV BLD: 10.1 FL — SIGNIFICANT CHANGE UP (ref 7–13)
PO2 BLDV: 39 MMHG — SIGNIFICANT CHANGE UP (ref 35–40)
POTASSIUM BLDV-SCNC: 3.6 MMOL/L — SIGNIFICANT CHANGE UP (ref 3.4–4.5)
POTASSIUM SERPL-MCNC: 3.7 MMOL/L — SIGNIFICANT CHANGE UP (ref 3.5–5.3)
POTASSIUM SERPL-SCNC: 3.7 MMOL/L — SIGNIFICANT CHANGE UP (ref 3.5–5.3)
PROT SERPL-MCNC: 7 G/DL — SIGNIFICANT CHANGE UP (ref 6–8.3)
PROTHROM AB SERPL-ACNC: 14.7 SEC — HIGH (ref 9.8–13.1)
RBC # BLD: 3.44 M/UL — LOW (ref 4.2–5.8)
RBC # FLD: 14.4 % — SIGNIFICANT CHANGE UP (ref 10.3–14.5)
SAO2 % BLDV: 69.1 % — SIGNIFICANT CHANGE UP (ref 60–85)
SODIUM SERPL-SCNC: 135 MMOL/L — SIGNIFICANT CHANGE UP (ref 135–145)
WBC # BLD: 7.06 K/UL — SIGNIFICANT CHANGE UP (ref 3.8–10.5)
WBC # FLD AUTO: 7.06 K/UL — SIGNIFICANT CHANGE UP (ref 3.8–10.5)

## 2019-01-16 PROCEDURE — 71045 X-RAY EXAM CHEST 1 VIEW: CPT | Mod: 26

## 2019-01-16 PROCEDURE — 99285 EMERGENCY DEPT VISIT HI MDM: CPT | Mod: 25

## 2019-01-16 PROCEDURE — 93010 ELECTROCARDIOGRAM REPORT: CPT

## 2019-01-16 NOTE — ED PROVIDER NOTE - ATTENDING CONTRIBUTION TO CARE
Afebrile. Awake and Alert. Lungs CTA. Heart RRR. CN II-XII grossly intact. Moves all extremities without lateralization. Freshly clotting blood oozing from Shiley insertion site subclavicular right chest. Check coags, CBC, CMP. CXR check placement. Consider ddavp. Shiley placed by IR, will discuss with service in AM. Afebrile. Awake and Alert. Lungs CTA. Heart RRR. CN II-XII grossly intact. Moves all extremities without lateralization. Freshly clotting blood oozing from Shiley insertion site subclavicular right chest. Check coags, CBC, CMP. CXR check placement. PermCath placed by IR 1/10/19, will discuss with service in AM. Afebrile. Awake and Alert. Lungs CTA. Heart RRR. CN II-XII grossly intact. Moves all extremities without lateralization. Freshly clotting blood oozing from Shiley insertion site subclavicular right chest. Tegaderm removed, site cleaned with Chloraprep and ABD pads applied. Check coags, CBC, CMP. CXR check placement. PermCath placed by IR 1/10/19, will discuss with service in AM.

## 2019-01-16 NOTE — ED ADULT TRIAGE NOTE - CHIEF COMPLAINT QUOTE
Pt comes in for bleeding right chest wall shiley since 8p last night. Pt reports that he was d/c'd from here yesterday evening after dialysis and went home to bed. Pt reports feeling something running down chest  an hour later. Pt also has newly placed LFA fistula placed Friday. Pt appears no acute distress, vs as noted, bleeding appears under control under pressure dressing. Pt comes in for bleeding right chest wall shiley since 8p last night. Pt reports that he was d/c'd from here yesterday evening after dialysis and went home to bed. Pt reports feeling something running down chest  an hour later. Pt also has newly placed LFA fistula placed Monday. Pt appears no acute distress, vs as noted, bleeding appears under control under pressure dressing.

## 2019-01-16 NOTE — ED PROVIDER NOTE - PROGRESS NOTE DETAILS
Brian LEES (fellow): received signout from night team, pt awaiting IR consult (after ~7am) to assess permacath; slow bleeding from skin insertion site noted, bulky dressing reapplied Spoke with IR at 1447: Will see pt this AM. Pt stable. ABD pads applied to cath site. Pt had one episode of blood trickle around 6AM controlled with pressure. CHIOMA. Brian LEES (fellow): spoke with IR fellow, says he is not involved in the consult and is currently performing IR cases, says he will call PA for eval but did not offer ETA [place] PA in ED to eval seen by IR, redressed, no bleeding, cleared for dc seen by IR, redressed, no bleeding, cleared for dc, advised to sit upright at home, pt aware of plan, will dc

## 2019-01-16 NOTE — ED ADULT NURSE NOTE - OBJECTIVE STATEMENT
Pt arrives to trauma room A c/o bleeding right chest wall Awais since 8p last night.  Pt reports that he was discharged from [facility] yesterday evening after dialysis and went home to bed. Pt reports feeling something running down his chest an hour later.  Pt also has newly placed LFA fistula placed Monday.  Beeley site arrived with gauze in place to control bleeding.  Pt undressed by staff to maintain bandage integrity.  MD at bedside assessing site.  20g iv placed by RN to rt forearm, labs drawn and sent.  Medical staff to perform procedure on site to stop bleeding.  Pt denies dizziness.  XR at bedside.

## 2019-01-16 NOTE — ED ADULT NURSE REASSESSMENT NOTE - NS ED NURSE REASSESS COMMENT FT1
Chest port adjusted and redressed by HOUSTON HALL. DSG dry with no bleeding noted. DC'd by Dr Torres

## 2019-01-16 NOTE — ED ADULT NURSE REASSESSMENT NOTE - NS ED NURSE REASSESS COMMENT FT1
assumed care of pt at 0825. Pt A&Ox3. Trickle of blood noted on right sided permacath sarahaze that, according to pt, started at 2300. no active bleeding. Currently waiting for IR to come and assess it. Left arm AV fistula inserted on 1/14/2019 has dried blood on the dressing and pt was told by dr to change 48 hours after insertion, which is today. Fistula assessed, thrill felt and bruit heard. Pt lung sounds clear. pt denies SOB or chest pain. radial pulses strong and regular. call bell within reach.

## 2019-01-16 NOTE — ED ADULT NURSE NOTE - CHPI ED NUR SYMPTOMS NEG
no decreased eating/drinking/no nausea/no tingling/no dizziness/no weakness/no vomiting/no fever/no chills

## 2019-01-16 NOTE — ED ADULT NURSE NOTE - CHIEF COMPLAINT QUOTE
Pt comes in for bleeding right chest wall shiley since 8p last night. Pt reports that he was d/c'd from here yesterday evening after dialysis and went home to bed. Pt reports feeling something running down chest  an hour later. Pt also has newly placed LFA fistula placed Monday. Pt appears no acute distress, vs as noted, bleeding appears under control under pressure dressing.

## 2019-01-16 NOTE — ED PROVIDER NOTE - OBJECTIVE STATEMENT
54yo M pmhx CAD CHF HTN ESRD on dialysis p/w CC bleeding around shiley catheter. Pt. was recently started on dialysis, had shiley placed on 1/10/19, was last dialyzed on Tuesday, presents today for bleeding around shiley site. Explains he laid down to sleep and noticed blood oozing from the site. Denies fever chills cp sob n/v/d weakness. 52yo M pmhx CAD CHF HTN ESRD on dialysis p/w CC bleeding around tunneled permcath catheter. Pt. was recently started on dialysis, had tunneled permcath placed on 1/10/19, was last dialyzed on Tuesday, presents today for bleeding around tunneled permcath site. Explains he laid down to sleep and noticed blood oozing from the site. Denies fever chills cp sob n/v/d weakness.

## 2019-01-18 ENCOUNTER — EMERGENCY (EMERGENCY)
Facility: HOSPITAL | Age: 54
LOS: 1 days | Discharge: HOME | End: 2019-01-18
Attending: EMERGENCY MEDICINE | Admitting: EMERGENCY MEDICINE
Payer: MEDICAID

## 2019-01-18 VITALS
OXYGEN SATURATION: 98 % | SYSTOLIC BLOOD PRESSURE: 125 MMHG | TEMPERATURE: 98 F | DIASTOLIC BLOOD PRESSURE: 69 MMHG | HEART RATE: 79 BPM | RESPIRATION RATE: 16 BRPM

## 2019-01-18 DIAGNOSIS — T82.868A THROMBOSIS DUE TO VASCULAR PROSTHETIC DEVICES, IMPLANTS AND GRAFTS, INITIAL ENCOUNTER: Chronic | ICD-10-CM

## 2019-01-18 DIAGNOSIS — Z95.5 PRESENCE OF CORONARY ANGIOPLASTY IMPLANT AND GRAFT: Chronic | ICD-10-CM

## 2019-01-18 LAB
ALBUMIN SERPL ELPH-MCNC: 3.8 G/DL — SIGNIFICANT CHANGE UP (ref 3.3–5)
ALP SERPL-CCNC: 114 U/L — SIGNIFICANT CHANGE UP (ref 40–120)
ALT FLD-CCNC: 15 U/L — SIGNIFICANT CHANGE UP (ref 4–41)
ANION GAP SERPL CALC-SCNC: 13 MMO/L — SIGNIFICANT CHANGE UP (ref 7–14)
ANISOCYTOSIS BLD QL: SLIGHT — SIGNIFICANT CHANGE UP
AST SERPL-CCNC: 26 U/L — SIGNIFICANT CHANGE UP (ref 4–40)
BASE EXCESS BLDV CALC-SCNC: 3.5 MMOL/L — SIGNIFICANT CHANGE UP
BASOPHILS # BLD AUTO: 0.03 K/UL — SIGNIFICANT CHANGE UP (ref 0–0.2)
BASOPHILS NFR BLD AUTO: 0.5 % — SIGNIFICANT CHANGE UP (ref 0–2)
BASOPHILS NFR SPEC: 0.9 % — SIGNIFICANT CHANGE UP (ref 0–2)
BILIRUB SERPL-MCNC: 0.4 MG/DL — SIGNIFICANT CHANGE UP (ref 0.2–1.2)
BLASTS # FLD: 0 % — SIGNIFICANT CHANGE UP (ref 0–0)
BLOOD GAS VENOUS - CREATININE: SIGNIFICANT CHANGE UP MG/DL (ref 0.5–1.3)
BUN SERPL-MCNC: 38 MG/DL — HIGH (ref 7–23)
CALCIUM SERPL-MCNC: 8.4 MG/DL — SIGNIFICANT CHANGE UP (ref 8.4–10.5)
CHLORIDE BLDV-SCNC: 100 MMOL/L — SIGNIFICANT CHANGE UP (ref 96–108)
CHLORIDE SERPL-SCNC: 98 MMOL/L — SIGNIFICANT CHANGE UP (ref 98–107)
CO2 SERPL-SCNC: 27 MMOL/L — SIGNIFICANT CHANGE UP (ref 22–31)
CREAT SERPL-MCNC: 3.85 MG/DL — HIGH (ref 0.5–1.3)
EOSINOPHIL # BLD AUTO: 0.11 K/UL — SIGNIFICANT CHANGE UP (ref 0–0.5)
EOSINOPHIL NFR BLD AUTO: 1.7 % — SIGNIFICANT CHANGE UP (ref 0–6)
EOSINOPHIL NFR FLD: 0.9 % — SIGNIFICANT CHANGE UP (ref 0–6)
GAS PNL BLDV: 136 MMOL/L — SIGNIFICANT CHANGE UP (ref 136–146)
GIANT PLATELETS BLD QL SMEAR: PRESENT — SIGNIFICANT CHANGE UP
GLUCOSE BLDV-MCNC: 240 — HIGH (ref 70–99)
GLUCOSE SERPL-MCNC: 249 MG/DL — HIGH (ref 70–99)
HCO3 BLDV-SCNC: 27 MMOL/L — SIGNIFICANT CHANGE UP (ref 20–27)
HCT VFR BLD CALC: 30.1 % — LOW (ref 39–50)
HCT VFR BLDV CALC: 26.7 % — LOW (ref 39–51)
HGB BLD-MCNC: 9.4 G/DL — LOW (ref 13–17)
HGB BLDV-MCNC: 8.6 G/DL — LOW (ref 13–17)
IMM GRANULOCYTES NFR BLD AUTO: 0.6 % — SIGNIFICANT CHANGE UP (ref 0–1.5)
LACTATE BLDV-MCNC: 1.7 MMOL/L — SIGNIFICANT CHANGE UP (ref 0.5–2)
LYMPHOCYTES # BLD AUTO: 0.59 K/UL — LOW (ref 1–3.3)
LYMPHOCYTES # BLD AUTO: 8.9 % — LOW (ref 13–44)
LYMPHOCYTES NFR SPEC AUTO: 7 % — LOW (ref 13–44)
MACROCYTES BLD QL: SLIGHT — SIGNIFICANT CHANGE UP
MAGNESIUM SERPL-MCNC: 2 MG/DL — SIGNIFICANT CHANGE UP (ref 1.6–2.6)
MCHC RBC-ENTMCNC: 28.9 PG — SIGNIFICANT CHANGE UP (ref 27–34)
MCHC RBC-ENTMCNC: 31.2 % — LOW (ref 32–36)
MCV RBC AUTO: 92.6 FL — SIGNIFICANT CHANGE UP (ref 80–100)
METAMYELOCYTES # FLD: 0 % — SIGNIFICANT CHANGE UP (ref 0–1)
MONOCYTES # BLD AUTO: 0.77 K/UL — SIGNIFICANT CHANGE UP (ref 0–0.9)
MONOCYTES NFR BLD AUTO: 11.6 % — SIGNIFICANT CHANGE UP (ref 2–14)
MONOCYTES NFR BLD: 7.9 % — SIGNIFICANT CHANGE UP (ref 2–9)
MYELOCYTES NFR BLD: 0 % — SIGNIFICANT CHANGE UP (ref 0–0)
NEUTROPHIL AB SER-ACNC: 82.4 % — HIGH (ref 43–77)
NEUTROPHILS # BLD AUTO: 5.08 K/UL — SIGNIFICANT CHANGE UP (ref 1.8–7.4)
NEUTROPHILS NFR BLD AUTO: 76.7 % — SIGNIFICANT CHANGE UP (ref 43–77)
NEUTS BAND # BLD: 0.9 % — SIGNIFICANT CHANGE UP (ref 0–6)
NRBC # FLD: 0 K/UL — LOW (ref 25–125)
OTHER - HEMATOLOGY %: 0 — SIGNIFICANT CHANGE UP
OVALOCYTES BLD QL SMEAR: SLIGHT — SIGNIFICANT CHANGE UP
PCO2 BLDV: 41 MMHG — SIGNIFICANT CHANGE UP (ref 41–51)
PH BLDV: 7.44 PH — HIGH (ref 7.32–7.43)
PHOSPHATE SERPL-MCNC: 4.1 MG/DL — SIGNIFICANT CHANGE UP (ref 2.5–4.5)
PLATELET # BLD AUTO: 84 K/UL — LOW (ref 150–400)
PLATELET COUNT - ESTIMATE: SIGNIFICANT CHANGE UP
PMV BLD: 11.2 FL — SIGNIFICANT CHANGE UP (ref 7–13)
PO2 BLDV: 29 MMHG — LOW (ref 35–40)
POIKILOCYTOSIS BLD QL AUTO: SIGNIFICANT CHANGE UP
POLYCHROMASIA BLD QL SMEAR: SLIGHT — SIGNIFICANT CHANGE UP
POTASSIUM BLDV-SCNC: 4.1 MMOL/L — SIGNIFICANT CHANGE UP (ref 3.4–4.5)
POTASSIUM SERPL-MCNC: 4 MMOL/L — SIGNIFICANT CHANGE UP (ref 3.5–5.3)
POTASSIUM SERPL-SCNC: 4 MMOL/L — SIGNIFICANT CHANGE UP (ref 3.5–5.3)
PROMYELOCYTES # FLD: 0 % — SIGNIFICANT CHANGE UP (ref 0–0)
PROT SERPL-MCNC: 6.8 G/DL — SIGNIFICANT CHANGE UP (ref 6–8.3)
RBC # BLD: 3.25 M/UL — LOW (ref 4.2–5.8)
RBC # FLD: 15.4 % — HIGH (ref 10.3–14.5)
SAO2 % BLDV: 56.5 % — LOW (ref 60–85)
SODIUM SERPL-SCNC: 138 MMOL/L — SIGNIFICANT CHANGE UP (ref 135–145)
VARIANT LYMPHS # BLD: 0 % — SIGNIFICANT CHANGE UP
WBC # BLD: 6.62 K/UL — SIGNIFICANT CHANGE UP (ref 3.8–10.5)
WBC # FLD AUTO: 6.62 K/UL — SIGNIFICANT CHANGE UP (ref 3.8–10.5)

## 2019-01-18 PROCEDURE — 71045 X-RAY EXAM CHEST 1 VIEW: CPT | Mod: 26

## 2019-01-18 PROCEDURE — 99283 EMERGENCY DEPT VISIT LOW MDM: CPT

## 2019-01-18 NOTE — ED ADULT TRIAGE NOTE - CHIEF COMPLAINT QUOTE
Pt states that his right CW dialysis cath is bleeding since last night, pt states that he went to dialysis center and they put dressing and advised him to come to ED. Pt with dressing and tegaderm to right chest, some scant bleeding noted, dressing reinforced PMH ESRD

## 2019-01-18 NOTE — ED PROVIDER NOTE - NS ED ROS FT
CONSTITUTIONAL: No weakness, fevers or chills  EYES/ENT: No visual changes;  No vertigo or throat pain   NECK: No pain or stiffness  RESPIRATORY: No cough, wheezing, hemoptysis; No shortness of breath  CARDIOVASCULAR: bleeding from right chest tunneled catheter, dressing with dried blood  GASTROINTESTINAL: No abdominal or epigastric pain. No nausea, vomiting, or hematemesis; No diarrhea or constipation. No melena or hematochezia.  GENITOURINARY: No dysuria, frequency or hematuria  NEUROLOGICAL: No numbness or weakness  SKIN: No itching, burning, rashes, or lesions   All other review of systems is negative unless indicated above.

## 2019-01-18 NOTE — ED PROVIDER NOTE - NSFOLLOWUPINSTRUCTIONS_ED_ALL_ED_FT
1) Please keep in upright position as possible after discharge to help prevent bleeding.    2) Please advise dialysis center not to remove dressing during dialysis session on 1/19.    3) If signs of active bleeding, please return to ED for further evaluation.

## 2019-01-18 NOTE — ED PROVIDER NOTE - MEDICAL DECISION MAKING DETAILS
52yo W/ hx ESRD new tunneled catheter p/w recurrent bleeding. HD stable. Check CXR for placement. Check CBC, CMP and consult IR for eval.

## 2019-01-18 NOTE — ED ADULT NURSE NOTE - OBJECTIVE STATEMENT
Pt w/ hx of CAD HF ESRD on HD via CW catheter c/o bleeding from CW site post dialysis.  Pt completed dialysis and denies any complaints.  Pt p/w NAD breaths even unlabored skin warm dry intact with noted exception small amount drainage around catheter site.  Awaiting MD orders.

## 2019-01-18 NOTE — ED PROVIDER NOTE - PHYSICAL EXAMINATION
T(C): 36.8 (01-18-19 @ 13:10), Max: 36.8 (01-18-19 @ 13:10)  HR: 79 (01-18-19 @ 13:10) (79 - 79)  BP: 125/69 (01-18-19 @ 13:10) (125/69 - 125/69)  RR: 16 (01-18-19 @ 13:10) (16 - 16)  SpO2: 98% (01-18-19 @ 13:10) (98% - 98%)  Wt(kg): --  GENERAL: NAD, well-developed  HEAD:  Atraumatic, Normocephalic  EYES: EOMI, PERRLA, conjunctiva and sclera clear  NECK: Supple, No JVD  CHEST/LUNG: Clear to auscultation bilaterally; right chest tunneled catheter in place, dressing with dried blood.  HEART: Regular rate and rhythm; No murmurs, rubs, or gallops  ABDOMEN: Soft, Nontender, Nondistended; Bowel sounds present  EXTREMITIES:  2+ Peripheral Pulses, No clubbing, cyanosis, or edema  PSYCH: AAOx3  NEUROLOGY: non-focal  SKIN: No rashes or lesions

## 2019-01-18 NOTE — ED PROVIDER NOTE - ATTENDING CONTRIBUTION TO CARE
54 y/o M PMHx HTN, CAD, CHF, ESRD (on HD, via permacath placed 1/10/19) p/w bleeding from permacath. Reports last dialysis yesterday and went to dialysis center afterwards when he was bleeding where it was bandaged and the bleeding stopped. Today, bleeding started again so he presented to ER. Believes he lost a total of half a pint of blood but denies any symptomatic anemia. Plan- Pressure dressing, Labs including CBC, IR consult to assess permacath, Reassess

## 2019-01-18 NOTE — CHART NOTE - NSCHARTNOTEFT_GEN_A_CORE
Pt seen at bedside in ED due to oozing from tunneled dialysis catheter site. The dressing was saturated with blood. The patient was instructed to sit upright in the stretcher. Dressing was taken down and site was cleaned with chloraprep. Slow oozing was noted from catheter site. D-stat injectable thrombin was administered into the catheter tunneled tract via the insertion site. Manual compression was held for 5 minutes after which hemostasis was achieved. Surgicel along with a pressure dressing was applied. Recommend instructing patient to remain seated upright for remainder of evening.  Also recommend not removing pressure dressing for pt's next dialysis session tomorrow am. Pt seen at bedside in ED due to oozing from tunneled dialysis catheter site. The dressing was saturated with blood. The patient was instructed to sit upright in the stretcher. Dressing was taken down and site was cleaned with chloraprep. Slow oozing was noted from catheter site. D-stat injectable thrombin was administered into the catheter tunneled tract via the insertion site. Manual compression was held for 5 minutes after which hemostasis was achieved. Surgicel along with a pressure dressing was applied. Recommend instructing patient to remain seated upright for remainder of evening.  Also recommend not removing pressure dressing for pt's next dialysis session tomorrow am. Call IR for any further questions or concerns.    [id number]

## 2019-01-18 NOTE — ED PROVIDER NOTE - OBJECTIVE STATEMENT
52yo M pmhx CAD CHF HTN ESRD on dialysis p/w CC bleeding around tunneled permcath catheter. Pt. was recently started on dialysis, had tunneled permcath placed on 1/10/19. Found to have bleeding from catheter 1/16 and presented to ED. Patient was cleared for discharge after IR eval during that visit after hemostasis achieved. Patient was last completed dialysis yesterday, presents today for recurrent bleeding around tunneled permcath site. Denies fever chills cp sob n/v/d weakness.

## 2019-01-23 ENCOUNTER — RX RENEWAL (OUTPATIENT)
Age: 54
End: 2019-01-23

## 2019-01-25 ENCOUNTER — APPOINTMENT (OUTPATIENT)
Dept: VASCULAR SURGERY | Facility: CLINIC | Age: 54
End: 2019-01-25
Payer: MEDICAID

## 2019-01-25 VITALS
BODY MASS INDEX: 28.49 KG/M2 | WEIGHT: 171 LBS | DIASTOLIC BLOOD PRESSURE: 68 MMHG | HEIGHT: 65 IN | TEMPERATURE: 98 F | SYSTOLIC BLOOD PRESSURE: 106 MMHG | HEART RATE: 71 BPM

## 2019-01-25 PROCEDURE — 99024 POSTOP FOLLOW-UP VISIT: CPT

## 2019-01-25 NOTE — REASON FOR VISIT
[de-identified] : left radiocephalic avf [de-identified] : 1/14/19 [de-identified] : Doing well.  No complaints at the surgical site.  No hand weakness/numbness.

## 2019-01-25 NOTE — PHYSICAL EXAM
[Carotid Bruits] : no carotid bruits [Normal Breath Sounds] : Normal breath sounds [Normal Heart Sounds] : normal heart sounds [2+] : left 2+ [de-identified] : awake, alert [de-identified] : no lad [FreeTextEntry1] : L hand warm.  incision clean.  +palp thrill.  no gross motor/sensory deficits

## 2019-01-27 ENCOUNTER — INPATIENT (INPATIENT)
Facility: HOSPITAL | Age: 54
LOS: 1 days | Discharge: HOME | End: 2019-01-29
Attending: PEDIATRICS | Admitting: PEDIATRICS
Payer: MEDICAID

## 2019-01-27 VITALS
SYSTOLIC BLOOD PRESSURE: 117 MMHG | OXYGEN SATURATION: 100 % | TEMPERATURE: 98 F | HEART RATE: 65 BPM | RESPIRATION RATE: 16 BRPM | DIASTOLIC BLOOD PRESSURE: 65 MMHG

## 2019-01-27 DIAGNOSIS — E78.5 HYPERLIPIDEMIA, UNSPECIFIED: ICD-10-CM

## 2019-01-27 DIAGNOSIS — E16.2 HYPOGLYCEMIA, UNSPECIFIED: ICD-10-CM

## 2019-01-27 DIAGNOSIS — N18.6 END STAGE RENAL DISEASE: ICD-10-CM

## 2019-01-27 DIAGNOSIS — E11.9 TYPE 2 DIABETES MELLITUS WITHOUT COMPLICATIONS: ICD-10-CM

## 2019-01-27 DIAGNOSIS — I10 ESSENTIAL (PRIMARY) HYPERTENSION: ICD-10-CM

## 2019-01-27 DIAGNOSIS — I50.20 UNSPECIFIED SYSTOLIC (CONGESTIVE) HEART FAILURE: ICD-10-CM

## 2019-01-27 DIAGNOSIS — Z29.9 ENCOUNTER FOR PROPHYLACTIC MEASURES, UNSPECIFIED: ICD-10-CM

## 2019-01-27 DIAGNOSIS — T82.868A THROMBOSIS DUE TO VASCULAR PROSTHETIC DEVICES, IMPLANTS AND GRAFTS, INITIAL ENCOUNTER: Chronic | ICD-10-CM

## 2019-01-27 DIAGNOSIS — D64.9 ANEMIA, UNSPECIFIED: ICD-10-CM

## 2019-01-27 DIAGNOSIS — D69.6 THROMBOCYTOPENIA, UNSPECIFIED: ICD-10-CM

## 2019-01-27 DIAGNOSIS — I25.10 ATHEROSCLEROTIC HEART DISEASE OF NATIVE CORONARY ARTERY WITHOUT ANGINA PECTORIS: ICD-10-CM

## 2019-01-27 DIAGNOSIS — Z95.5 PRESENCE OF CORONARY ANGIOPLASTY IMPLANT AND GRAFT: Chronic | ICD-10-CM

## 2019-01-27 LAB
ALBUMIN SERPL ELPH-MCNC: 3.4 G/DL — SIGNIFICANT CHANGE UP (ref 3.3–5)
ALP SERPL-CCNC: 114 U/L — SIGNIFICANT CHANGE UP (ref 40–120)
ALT FLD-CCNC: 22 U/L — SIGNIFICANT CHANGE UP (ref 4–41)
ANION GAP SERPL CALC-SCNC: 17 MMO/L — HIGH (ref 7–14)
APPEARANCE UR: CLEAR — SIGNIFICANT CHANGE UP
AST SERPL-CCNC: 28 U/L — SIGNIFICANT CHANGE UP (ref 4–40)
BACTERIA # UR AUTO: SIGNIFICANT CHANGE UP
BASE EXCESS BLDV CALC-SCNC: 2.1 MMOL/L — SIGNIFICANT CHANGE UP
BILIRUB SERPL-MCNC: 0.4 MG/DL — SIGNIFICANT CHANGE UP (ref 0.2–1.2)
BILIRUB UR-MCNC: NEGATIVE — SIGNIFICANT CHANGE UP
BLOOD GAS VENOUS - CREATININE: SIGNIFICANT CHANGE UP MG/DL (ref 0.5–1.3)
BLOOD UR QL VISUAL: SIGNIFICANT CHANGE UP
BUN SERPL-MCNC: 39 MG/DL — HIGH (ref 7–23)
CALCIUM SERPL-MCNC: 8.4 MG/DL — SIGNIFICANT CHANGE UP (ref 8.4–10.5)
CHLORIDE BLDV-SCNC: 104 MMOL/L — SIGNIFICANT CHANGE UP (ref 96–108)
CHLORIDE SERPL-SCNC: 101 MMOL/L — SIGNIFICANT CHANGE UP (ref 98–107)
CO2 SERPL-SCNC: 21 MMOL/L — LOW (ref 22–31)
COLOR SPEC: YELLOW — SIGNIFICANT CHANGE UP
CREAT SERPL-MCNC: 4.74 MG/DL — HIGH (ref 0.5–1.3)
GAS PNL BLDV: 136 MMOL/L — SIGNIFICANT CHANGE UP (ref 136–146)
GLUCOSE BLDV-MCNC: 141 — HIGH (ref 70–99)
GLUCOSE SERPL-MCNC: 143 MG/DL — HIGH (ref 70–99)
GLUCOSE UR-MCNC: SIGNIFICANT CHANGE UP
HCO3 BLDV-SCNC: 26 MMOL/L — SIGNIFICANT CHANGE UP (ref 20–27)
HCT VFR BLD CALC: 29.1 % — LOW (ref 39–50)
HCT VFR BLDV CALC: 28.4 % — LOW (ref 39–51)
HGB BLD-MCNC: 8.8 G/DL — LOW (ref 13–17)
HGB BLDV-MCNC: 9.2 G/DL — LOW (ref 13–17)
KETONES UR-MCNC: NEGATIVE — SIGNIFICANT CHANGE UP
LACTATE BLDV-MCNC: 2.1 MMOL/L — HIGH (ref 0.5–2)
LEUKOCYTE ESTERASE UR-ACNC: NEGATIVE — SIGNIFICANT CHANGE UP
MCHC RBC-ENTMCNC: 28.9 PG — SIGNIFICANT CHANGE UP (ref 27–34)
MCHC RBC-ENTMCNC: 30.2 % — LOW (ref 32–36)
MCV RBC AUTO: 95.4 FL — SIGNIFICANT CHANGE UP (ref 80–100)
NITRITE UR-MCNC: NEGATIVE — SIGNIFICANT CHANGE UP
NRBC # FLD: 0 K/UL — LOW (ref 25–125)
PCO2 BLDV: 46 MMHG — SIGNIFICANT CHANGE UP (ref 41–51)
PH BLDV: 7.39 PH — SIGNIFICANT CHANGE UP (ref 7.32–7.43)
PH UR: 6 — SIGNIFICANT CHANGE UP (ref 5–8)
PLATELET # BLD AUTO: 81 K/UL — LOW (ref 150–400)
PMV BLD: 10.2 FL — SIGNIFICANT CHANGE UP (ref 7–13)
PO2 BLDV: 45 MMHG — HIGH (ref 35–40)
POTASSIUM BLDV-SCNC: 5 MMOL/L — HIGH (ref 3.4–4.5)
POTASSIUM SERPL-MCNC: 4.3 MMOL/L — SIGNIFICANT CHANGE UP (ref 3.5–5.3)
POTASSIUM SERPL-SCNC: 4.3 MMOL/L — SIGNIFICANT CHANGE UP (ref 3.5–5.3)
PROT SERPL-MCNC: 6.7 G/DL — SIGNIFICANT CHANGE UP (ref 6–8.3)
PROT UR-MCNC: >600 — SIGNIFICANT CHANGE UP
RBC # BLD: 3.05 M/UL — LOW (ref 4.2–5.8)
RBC # FLD: 17.2 % — HIGH (ref 10.3–14.5)
RBC CASTS # UR COMP ASSIST: SIGNIFICANT CHANGE UP (ref 0–?)
SAO2 % BLDV: 82.6 % — SIGNIFICANT CHANGE UP (ref 60–85)
SODIUM SERPL-SCNC: 139 MMOL/L — SIGNIFICANT CHANGE UP (ref 135–145)
SP GR SPEC: 1.02 — SIGNIFICANT CHANGE UP (ref 1–1.04)
SQUAMOUS # UR AUTO: SIGNIFICANT CHANGE UP
UROBILINOGEN FLD QL: SIGNIFICANT CHANGE UP
WBC # BLD: 4.7 K/UL — SIGNIFICANT CHANGE UP (ref 3.8–10.5)
WBC # FLD AUTO: 4.7 K/UL — SIGNIFICANT CHANGE UP (ref 3.8–10.5)
WBC UR QL: SIGNIFICANT CHANGE UP (ref 0–?)

## 2019-01-27 PROCEDURE — 99223 1ST HOSP IP/OBS HIGH 75: CPT

## 2019-01-27 PROCEDURE — 71045 X-RAY EXAM CHEST 1 VIEW: CPT | Mod: 26

## 2019-01-27 RX ORDER — ATORVASTATIN CALCIUM 80 MG/1
1 TABLET, FILM COATED ORAL
Qty: 0 | Refills: 0 | COMMUNITY

## 2019-01-27 RX ORDER — INSULIN LISPRO 100/ML
VIAL (ML) SUBCUTANEOUS AT BEDTIME
Qty: 0 | Refills: 0 | Status: DISCONTINUED | OUTPATIENT
Start: 2019-01-27 | End: 2019-01-28

## 2019-01-27 RX ORDER — INSULIN LISPRO 100/ML
VIAL (ML) SUBCUTANEOUS
Qty: 0 | Refills: 0 | Status: DISCONTINUED | OUTPATIENT
Start: 2019-01-27 | End: 2019-01-28

## 2019-01-27 RX ORDER — DEXTROSE 50 % IN WATER 50 %
25 SYRINGE (ML) INTRAVENOUS ONCE
Qty: 0 | Refills: 0 | Status: DISCONTINUED | OUTPATIENT
Start: 2019-01-27 | End: 2019-01-29

## 2019-01-27 RX ORDER — INSULIN LISPRO 100/ML
VIAL (ML) SUBCUTANEOUS AT BEDTIME
Qty: 0 | Refills: 0 | Status: DISCONTINUED | OUTPATIENT
Start: 2019-01-27 | End: 2019-01-27

## 2019-01-27 RX ORDER — DEXTROSE 50 % IN WATER 50 %
15 SYRINGE (ML) INTRAVENOUS ONCE
Qty: 0 | Refills: 0 | Status: DISCONTINUED | OUTPATIENT
Start: 2019-01-27 | End: 2019-01-29

## 2019-01-27 RX ORDER — HEPARIN SODIUM 5000 [USP'U]/ML
5000 INJECTION INTRAVENOUS; SUBCUTANEOUS EVERY 8 HOURS
Qty: 0 | Refills: 0 | Status: DISCONTINUED | OUTPATIENT
Start: 2019-01-27 | End: 2019-01-28

## 2019-01-27 NOTE — H&P ADULT - PROBLEM SELECTOR PLAN 6
- s/p PCI x 1 in 2016  - c/w aspirin 81mg daily - s/p PCI x 1 in 2016  - c/w aspirin 81mg daily and atorvastatin 80mg daily

## 2019-01-27 NOTE — H&P ADULT - NSHPOUTPATIENTPROVIDERS_GEN_ALL_CORE
PMD: Dr. Zac Bowens  Nephrology: Dr. Giovanny Pavon  Cardiology: Dr. Enio Pavon PMD: Dr. Zac Bowens/Dr. Cierra Gill  Nephrology: Dr. Giovanny Pavon  Cardiology: Dr. Enio Pavon

## 2019-01-27 NOTE — ED ADULT NURSE NOTE - CHIEF COMPLAINT QUOTE
Pt was found altered and diaphoretic. FS 37 on the scene. Pt received oral glucose, juice and half a peanut butter sandwich. Pt alert currently. . Pt on dialysis TThSt. New left AV fistula in place that has not been used. Pt with chest shiley.

## 2019-01-27 NOTE — ED ADULT NURSE REASSESSMENT NOTE - NS ED NURSE REASSESS COMMENT FT1
Received report from day RN, pt Aox4, denies any cp, sob, lightheadedness. Vitals as charted, will continue to monitor.

## 2019-01-27 NOTE — ED PROVIDER NOTE - OBJECTIVE STATEMENT
Patient is 53yM with PMH CHF, MI/CAD with stent, DM on glyburide/metformin, htn, ERD on HD T/Th/Sat through chest shiley, new L AV fistula, gi bleed presenting with episode of confusion, diaphoresis, hypoglycemia to 37 at home, had pb&j/juice and felt better, now feels at baseline. No recent med changes.     PMD: Zac Bowens/Jiaro  Nephro: Giovanny Pavon  Card: Enio Pavon  ROS: Denies fever, palpitations, chills, recent sickness, HA, vision changes, cough, SOB, chest pain, abdominal pain, n/v/d/c, dysuria, hematuria, rash, new joint aches, sick contacts, and recent travel. Patient is 53yM with PMH CHF, MI/CAD with stent, DM on glyburide/metformin, htn, ERD on HD T/Th/Sat through chest shiley, new L AV fistula, gi bleed presenting with episode of confusion, diaphoresis, hypoglycemia to 37 at home, had pb&j/juice and felt better, now feels at baseline. Started glyburide yesterday.      PMD: Zac Bowens/Jairo  Nephro: Giovanny Pavon  Card: Enio Pavon  ROS: Denies fever, palpitations, chills, recent sickness, HA, vision changes, cough, SOB, chest pain, abdominal pain, n/v/d/c, dysuria, hematuria, rash, new joint aches, sick contacts, and recent travel.

## 2019-01-27 NOTE — ED ADULT NURSE NOTE - NSIMPLEMENTINTERV_GEN_ALL_ED
Implemented All Universal Safety Interventions:  [place] to call system. Call bell, personal items and telephone within reach. Instruct patient to call for assistance. Room bathroom lighting operational. Non-slip footwear when patient is off stretcher. Physically safe environment: no spills, clutter or unnecessary equipment. Stretcher in lowest position, wheels locked, appropriate side rails in place.

## 2019-01-27 NOTE — ED PROVIDER NOTE - MEDICAL DECISION MAKING DETAILS
Hypoglycemia likely 2/2 new glyburide, will need admission for glucose monitoring given CKD+sulfonylurea

## 2019-01-27 NOTE — H&P ADULT - PROBLEM SELECTOR PLAN 2
- on HD T/Th/Sat   -   - renal consult in AM - on HD T/Th/Sat vial right chest wall tunneled catheter, LUE fistula maturing  - c/w calcium acetate TID with meals   - renal consult in AM - on HD T/Th/Sat via right chest wall tunneled catheter, LUE fistula maturing  - c/w calcium acetate TID with meals   - renal consult in AM

## 2019-01-27 NOTE — H&P ADULT - PROBLEM SELECTOR PLAN 5
- EF 28%, euvolemic   - c/w   - monitor intake and output - EF 28%, euvolemic   - c/w isordil 10mg TID, bumex 6mg BID, carvedilol 6.25mg BID  - monitor intake and output - EF 28%, euvolemic   - c/w isordil 10mg TID, bumex 6mg BID, carvedilol 6.25mg BID  - monitor intake and output  - daily weights  - salt restriction

## 2019-01-27 NOTE — H&P ADULT - PROBLEM SELECTOR PLAN 3
- Hb 8.8 (baseline 8-9)  - 2/2 ESRD  - c/w - Hb 8.8 (baseline 8-9)  - 2/2 ESRD   - c/w ferrous sulfate 325mg daily

## 2019-01-27 NOTE — H&P ADULT - ASSESSMENT
64M hx of ESRD recently started on HD (T/Th/S, via tunneled permacath 1/10), HFrEF (EF 28%), CAD s/p stent, Type 2 Diabetes (A1C 6.5%), recent H. pylori gastritis (01/2018, s/p triple therapy), HTN, HLD, who presents with hypoglycemia to 37 likely 2/2 sulfonylurea use. 64M hx of ESRD recently started on HD (T/Th/S, via tunneled catheter 1/10), HFrEF (EF 28%), CAD s/p stent, Type 2 Diabetes (A1C 6.5%), recent H. pylori gastritis (01/2018, s/p triple therapy), HTN, HLD, who presents with hypoglycemia to 37 likely 2/2 sulfonylurea use. 64M hx of ESRD recently started on HD (T/Th/S, via tunneled catheter 1/10, LUE fistula maturing), HFrEF (EF 28%), CAD s/p PCI in 2016, Type 2 Diabetes (A1C 6.5%), recent H. pylori gastritis (01/2018, s/p triple therapy), HTN, HLD who presents with hypoglycemia to 37 likely 2/2 sulfonylurea use. 64M hx of ESRD recently started on HD (T/Th/S, via tunneled catheter 1/10, LUE fistula maturing), HFrEF (EF 28%), CAD s/p PCI in 2016, Type 2 Diabetes (A1C 6.5% on 1/11/19), recent H. pylori gastritis (01/2018, s/p triple therapy), HTN, HLD who presents with hypoglycemia to 37 likely 2/2 sulfonylurea use.

## 2019-01-27 NOTE — ED ADULT NURSE NOTE - OBJECTIVE STATEMENT
Pt presents to room 4 AxOx4 with c/o diaphoresis and hypoglycemia. Pt was found by EMS to have BS of 37- was treated as per hypoglycemia protocol,  in triage. PMH of HTN, HLD, cardiac stents, DM. Pt states he was recently taken off DM medications due to weight loss. Pt currently denies diarrhea, N/V, confusion, dizziness, lightheadedness, CP, SOB, fever. Pt is receiving dialysis 3x per week, has a new fistula that has not been used in left arm.  IV established in RAC, labs drawn and sent, on cardiac monitor NSR @75, MD evaluation pending, vitals noted and stable, will continue to monitor. Pt presents to room 4 AxOx4 with c/o diaphoresis, disorientation and hypoglycemia. Pt was found by EMS to have BS of 37- was treated as per hypoglycemia protocol,  in triage. PMH of HTN, HLD, cardiac stents, DM. Pt states he was recently taken off DM medications due to weight loss. Pt currently denies diarrhea, N/V, confusion, dizziness, lightheadedness, CP, SOB, fever. Pt is receiving dialysis 3x per week, has a new fistula that has not been used in left arm.  IV established in RAC, labs drawn and sent, on cardiac monitor NSR @75, MD evaluation pending, vitals noted and stable, will continue to monitor.

## 2019-01-27 NOTE — H&P ADULT - PROBLEM SELECTOR PLAN 8
- c/w - controlled  - c/w hydralazine 100mg TID, carvedilol 6.25mg BID, isordil 10mg TID  - monitor vital signs [id number]

## 2019-01-27 NOTE — H&P ADULT - NSHPSOCIALHISTORY_GEN_ALL_CORE
Lives at home with sister. Quit drinking 3 - 4 years ago, quit smoking 6 months ago (smoked 1ppd x 33 years). No illicit drugs.

## 2019-01-27 NOTE — H&P ADULT - NSHPLABSRESULTS_GEN_ALL_CORE
8.8    4.70  )-----------( 81       ( 27 Jan 2019 20:25 )             29.1     01-27    139  |  101  |  39<H>  ----------------------------<  143<H>  4.3   |  21<L>  |  4.74<H>    Ca    8.4      27 Jan 2019 20:25    TPro  6.7  /  Alb  3.4  /  TBili  0.4  /  DBili  x   /  AST  28  /  ALT  22  /  AlkPhos  114  01-27    POCT Blood Glucose.: 262 mg/dL (01.27.19 @ 23:17) 8.8    4.70  )-----------( 81       ( 27 Jan 2019 20:25 )             29.1     01-27    139  |  101  |  39<H>  ----------------------------<  143<H>  4.3   |  21<L>  |  4.74<H>    Ca    8.4      27 Jan 2019 20:25    TPro  6.7  /  Alb  3.4  /  TBili  0.4  /  DBili  x   /  AST  28  /  ALT  22  /  AlkPhos  114  01-27    POCT Blood Glucose.: 262 mg/dL (01.27.19 @ 23:17)    Imaging personally reviewed.  CXR with no focal consolidations or pleural effusions.    EKG personally reviewed.  NSR at 74 bpm. QTc 477 ms. Q waves in inferior leads (not new).

## 2019-01-27 NOTE — H&P ADULT - PROBLEM SELECTOR PLAN 10
- DVT ppx: HSQ  - DIET: Renal Restricted - DVT ppx: Pt low risk for VTE with IMPROVE score 0. Encourage OOB and ambulation.   - DIET: Renal Restricted

## 2019-01-27 NOTE — H&P ADULT - NSHPPHYSICALEXAM_GEN_ALL_CORE
Vital Signs Last 24 Hrs  T(C): 36.6 (27 Jan 2019 21:07), Max: 36.7 (27 Jan 2019 19:14)  T(F): 97.9 (27 Jan 2019 21:07), Max: 98 (27 Jan 2019 19:14)  HR: 73 (27 Jan 2019 21:07) (65 - 73)  BP: 118/66 (27 Jan 2019 21:07) (112/61 - 118/66)  RR: 18 (27 Jan 2019 21:07) (16 - 18)  SpO2: 98% (27 Jan 2019 21:07) (98% - 100%)    General: WN/WD NAD  Neurology: A&Ox3, nonfocal, WOODS x 4  Eyes: PERRLA/ EOMI, Gross vision intact  ENT/Neck: Neck supple, trachea midline, No JVD, Gross hearing intact  Respiratory: CTA B/L, No wheezing, rales, rhonchi  CV: RRR, S1S2, no murmurs, rubs or gallops  Abdominal: Soft, NT, ND +BS,   Extremities: No edema, + peripheral pulses  Skin: No Rashes, Hematoma, Ecchymosis Vital Signs Last 24 Hrs  T(C): 36.6 (27 Jan 2019 21:07), Max: 36.7 (27 Jan 2019 19:14)  T(F): 97.9 (27 Jan 2019 21:07), Max: 98 (27 Jan 2019 19:14)  HR: 73 (27 Jan 2019 21:07) (65 - 73)  BP: 118/66 (27 Jan 2019 21:07) (112/61 - 118/66)  RR: 18 (27 Jan 2019 21:07) (16 - 18)  SpO2: 98% (27 Jan 2019 21:07) (98% - 100%)    General: WN/WD NAD  Neurology: A&Ox3, nonfocal, WOODS x 4  Eyes: PERRLA/ EOMI, Gross vision intact  ENT/Neck: Neck supple, trachea midline, No JVD, Gross hearing intact  Respiratory: CTA B/L, No wheezing, rales, rhonchi  CV: RRR, S1S2, no murmurs, rubs or gallops  Abdominal: Soft, NT, ND +BS,   Extremities: No edema, + peripheral pulses  Skin: No Rashes, Hematoma, Ecchymosis, LUE fistula with palpable thrill  Lines: right chest wall permacath site c/d/i Vital Signs Last 24 Hrs  T(C): 37 (28 Jan 2019 02:27), Max: 37 (28 Jan 2019 02:27)  T(F): 98.6 (28 Jan 2019 02:27), Max: 98.6 (28 Jan 2019 02:27)  HR: 82 (28 Jan 2019 02:27) (65 - 82)  BP: 112/73 (28 Jan 2019 02:27) (112/61 - 118/66)  BP(mean): --  RR: 18 (28 Jan 2019 02:27) (16 - 18)  SpO2: 98% (27 Jan 2019 21:07) (98% - 100%)    PHYSICAL EXAM:  General: Awake and alert.  No acute distress.  Head: Normocephalic, atraumatic.    Eyes: PERRL.  EOMI.  No scleral icterus.    Mouth: Moist MM.  No oropharyngeal exudates.    Neck: Supple.  Full range of motion.  No JVD.  Trachea midline.  No lymphadenopathy.   Heart: RRR.  Normal S1 and S2.  No murmurs, rubs, or gallops.  1+ pitting LE edema of LLE and trace pitting edema of RLE from ankles to knees.  Lungs: Good inspiratory effort.  Nonlabored breathing.  CTAB.  No wheezes, crackles, or rhonchi.    Abdomen: BS+, soft, NT/ND.    Skin: Warm and dry.  No rashes.  Right chest wall permacath site c/d/i  Extremities: No clubbing or cyanosis.  2+ peripheral pulses b/l.  LUE fistula with palpable thrill and good bruit.  Musculoskeletal: No joint deformities.  No spinal or paraspinal tenderness.  Neuro: A&Ox3.  CN II-XII intact.  5/5 motor strength in UE and LE b/l.  Tactile sensation intact in UE and LE b/l.  NO focal deficits.

## 2019-01-27 NOTE — H&P ADULT - PROBLEM SELECTOR PLAN 1
- at home hypoglycemic to 37 in setting of sulfonylurea use (glyburide)  - FS currently 262, will check sulfonylurea level   - monitor FS qac/hs, DM management as below - at home hypoglycemic to 37 in setting of sulfonylurea use (glyburide)  - FS currently 262, will check sulfonylurea level, hold glyburide   - monitor FS qac/hs, DM management as below - at home hypoglycemic to 37 in setting of sulfonylurea use (glyburide)  - FS currently 262, will check sulfonylurea level, D/C glyburide   - monitor FS qac/hs, DM management as below  - will need to make sure pt does not restart glyburide upon discharge

## 2019-01-27 NOTE — H&P ADULT - PMH
CAD (coronary artery disease)  2016, coronary artery stentX1  DM (diabetes mellitus)  2  not taking any medication lat4st HgA1c 5.7  ESRD (end stage renal disease) on dialysis    GIB (gastrointestinal bleeding)  was treated  with H pyelori stable since Dec 10, 2018  HFrEF (heart failure with reduced ejection fraction)  EF 20-24%  HTN (hypertension)    Hyperlipidemia    Myocardial infarction  Jan 2016

## 2019-01-27 NOTE — H&P ADULT - PROBLEM SELECTOR PLAN 7
- HbA1C 6.5%  - at home on glyburide   - SSI and FS qac and hs - HbA1C 6.5%  - at home on glyburide   - SSI and FS qac and hs  - will need to make sure pt does not restart glyburide upon discharge  - given pt's most recent A1c of 6.5% on 1/11/19, pt might benefit from less strict A1c control, especially as pt has multiple other comorbidities  - Endocrine consult in AM

## 2019-01-27 NOTE — H&P ADULT - PROBLEM SELECTOR PLAN 4
- platelets 81, baseline 60s-100s  - trend CBC - platelets 81, baseline 60s-100s, HIT ruled out previously  - trend CBC

## 2019-01-27 NOTE — H&P ADULT - NSHPREVIEWOFSYSTEMS_GEN_ALL_CORE
REVIEW OF SYSTEMS:  CONSTITUTIONAL: +diaphoresis, No weakness, fevers or chills  EYES/ENT: No visual changes;  No vertigo or throat pain   NECK: No pain or stiffness  RESPIRATORY: No cough, wheezing, hemoptysis; No shortness of breath  CARDIOVASCULAR: No chest pain or palpitations  GASTROINTESTINAL: No abdominal or epigastric pain. No nausea, vomiting, or hematemesis; No diarrhea or constipation. No melena or hematochezia.  GENITOURINARY: No dysuria, frequency or hematuria  NEUROLOGICAL: +confusion, No numbness or weakness  SKIN: No itching, rashes Constitutional: +Diaphoresis. No fevers, chills, or weight loss.  Eyes: No visual changes, double vision, or eye pain  Ears, Nose, Mouth, Throat: No runny nose, sinus pain, ear pain, tinnitus, sore throat, dysphagia, or odynophagia  Cardiovascular: No chest pain or palpitations  Respiratory: No cough, wheezing, hemoptysis, or shortness of breath  Gastrointestinal: No abdominal pain, nausea/vomiting, diarrhea/constipation, hematemesis, BRBPR, or melena  Genitourinary: No dysuria, frequency, urgency, or hematuria  Musculoskeletal: No joint pain, joint swelling, or decreased ROM  Skin: No pruritus or rashes  Neurologic:  +Confusion. No seizures, headache, paresthesias, or numbness.  Psychiatric: No depression, anxiety, or agitation  Endocrine: No heat/cold intolerance, mood swings, sweats, polydipsia, or polyuria  Hematologic/lymphatic: No purpura, petechia, or prolonged or excessive bleeding after dental extraction / injury    Positives and pertinent negatives noted and all other systems negative.

## 2019-01-27 NOTE — ED PROVIDER NOTE - ATTENDING CONTRIBUTION TO CARE
Zabrina: 54 yo male with a h/o CHF, MI/CAD with stent, DM on glyburide/metformin, htn, ERD on HD T/Th/Sat through chest shiley, new L AV fistula, gi bleed BIBEMS for hypoglycemia with a FS of 37 at home. Pt endorses that he became diaphoretic and when they checked his FS realized that it was low. Pt now feeling much better after drinking juice. No associated chest pain or SOB. No abdominal pain, nausea or vomiting. No recent illness fevers or chills. Pt endorses that he recently had the glyburide added to his regimen and took it for the first time today. Exam: GENERAL: well appearing, NAD, HEENT: MMM, PERRLA, CARDIO: +S1/S2, no murmurs, rubs or gallops, LUNGS: CTA B/L, no wheezing, rales or rhonchi, CHEST: right sided chest wall shiley ABD: soft, nontender, BSx4 quadrants, no guarding or rigidity. EXT: No LE edema or calf TTP, 2+ distal pulses x 4 extremities. NEURO: AxOx3, SKIN: no rashes or lesions, well perfused A/P- 54 yo male with hypoglycemia with recent medication change. Will obtain cbc, cmp, u/a, CXR, feed patient and likely admit.

## 2019-01-28 PROBLEM — N18.5 CHRONIC KIDNEY DISEASE, STAGE 5: Chronic | Status: INACTIVE | Noted: 2018-11-14 | Resolved: 2019-01-27

## 2019-01-28 PROBLEM — R93.1 ABNORMAL FINDINGS ON DIAGNOSTIC IMAGING OF HEART AND CORONARY CIRCULATION: Chronic | Status: INACTIVE | Noted: 2018-11-14 | Resolved: 2019-01-27

## 2019-01-28 PROCEDURE — 99222 1ST HOSP IP/OBS MODERATE 55: CPT | Mod: GC

## 2019-01-28 PROCEDURE — 99233 SBSQ HOSP IP/OBS HIGH 50: CPT | Mod: GC

## 2019-01-28 RX ORDER — CARVEDILOL PHOSPHATE 80 MG/1
6.25 CAPSULE, EXTENDED RELEASE ORAL EVERY 12 HOURS
Qty: 0 | Refills: 0 | Status: DISCONTINUED | OUTPATIENT
Start: 2019-01-28 | End: 2019-01-29

## 2019-01-28 RX ORDER — HYDRALAZINE HCL 50 MG
100 TABLET ORAL EVERY 8 HOURS
Qty: 0 | Refills: 0 | Status: DISCONTINUED | OUTPATIENT
Start: 2019-01-28 | End: 2019-01-29

## 2019-01-28 RX ORDER — PANTOPRAZOLE SODIUM 20 MG/1
40 TABLET, DELAYED RELEASE ORAL
Qty: 0 | Refills: 0 | Status: DISCONTINUED | OUTPATIENT
Start: 2019-01-28 | End: 2019-01-29

## 2019-01-28 RX ORDER — POLYETHYLENE GLYCOL 3350 17 G/17G
17 POWDER, FOR SOLUTION ORAL DAILY
Qty: 0 | Refills: 0 | Status: DISCONTINUED | OUTPATIENT
Start: 2019-01-28 | End: 2019-01-29

## 2019-01-28 RX ORDER — BUMETANIDE 0.25 MG/ML
6 INJECTION INTRAMUSCULAR; INTRAVENOUS
Qty: 0 | Refills: 0 | Status: DISCONTINUED | OUTPATIENT
Start: 2019-01-28 | End: 2019-01-29

## 2019-01-28 RX ORDER — ASPIRIN/CALCIUM CARB/MAGNESIUM 324 MG
81 TABLET ORAL DAILY
Qty: 0 | Refills: 0 | Status: DISCONTINUED | OUTPATIENT
Start: 2019-01-28 | End: 2019-01-29

## 2019-01-28 RX ORDER — CALCIUM ACETATE 667 MG
667 TABLET ORAL
Qty: 0 | Refills: 0 | Status: DISCONTINUED | OUTPATIENT
Start: 2019-01-28 | End: 2019-01-29

## 2019-01-28 RX ORDER — FERROUS SULFATE 325(65) MG
325 TABLET ORAL DAILY
Qty: 0 | Refills: 0 | Status: DISCONTINUED | OUTPATIENT
Start: 2019-01-28 | End: 2019-01-29

## 2019-01-28 RX ORDER — ATORVASTATIN CALCIUM 80 MG/1
80 TABLET, FILM COATED ORAL AT BEDTIME
Qty: 0 | Refills: 0 | Status: DISCONTINUED | OUTPATIENT
Start: 2019-01-28 | End: 2019-01-29

## 2019-01-28 RX ORDER — ISOSORBIDE DINITRATE 5 MG/1
10 TABLET ORAL THREE TIMES A DAY
Qty: 0 | Refills: 0 | Status: DISCONTINUED | OUTPATIENT
Start: 2019-01-28 | End: 2019-01-29

## 2019-01-28 RX ADMIN — Medication 667 MILLIGRAM(S): at 17:13

## 2019-01-28 RX ADMIN — Medication 100 MILLIGRAM(S): at 15:58

## 2019-01-28 RX ADMIN — ISOSORBIDE DINITRATE 10 MILLIGRAM(S): 5 TABLET ORAL at 15:58

## 2019-01-28 RX ADMIN — CARVEDILOL PHOSPHATE 6.25 MILLIGRAM(S): 80 CAPSULE, EXTENDED RELEASE ORAL at 06:33

## 2019-01-28 RX ADMIN — BUMETANIDE 6 MILLIGRAM(S): 0.25 INJECTION INTRAMUSCULAR; INTRAVENOUS at 21:45

## 2019-01-28 RX ADMIN — Medication 325 MILLIGRAM(S): at 15:58

## 2019-01-28 RX ADMIN — ATORVASTATIN CALCIUM 80 MILLIGRAM(S): 80 TABLET, FILM COATED ORAL at 21:32

## 2019-01-28 RX ADMIN — PANTOPRAZOLE SODIUM 40 MILLIGRAM(S): 20 TABLET, DELAYED RELEASE ORAL at 06:32

## 2019-01-28 RX ADMIN — BUMETANIDE 6 MILLIGRAM(S): 0.25 INJECTION INTRAMUSCULAR; INTRAVENOUS at 06:32

## 2019-01-28 RX ADMIN — Medication 100 MILLIGRAM(S): at 06:33

## 2019-01-28 RX ADMIN — CARVEDILOL PHOSPHATE 6.25 MILLIGRAM(S): 80 CAPSULE, EXTENDED RELEASE ORAL at 19:43

## 2019-01-28 RX ADMIN — Medication 667 MILLIGRAM(S): at 10:14

## 2019-01-28 RX ADMIN — ISOSORBIDE DINITRATE 10 MILLIGRAM(S): 5 TABLET ORAL at 06:50

## 2019-01-28 RX ADMIN — Medication 81 MILLIGRAM(S): at 15:59

## 2019-01-28 NOTE — PROGRESS NOTE ADULT - PROBLEM SELECTOR PLAN 7
- HbA1C 6.5%  - at home on glyburide   - SSI and FS qac and hs  - will need to make sure pt does not restart glyburide upon discharge, will likely require short acting insulin regimen.  Pt states he used to take insulin >15 yrs ago but will likely require education on how to administer.    - Given pt's most recent A1c of 6.5% on 1/11/19, pt might benefit from less strict A1c control, especially as pt has multiple other comorbidities  - Endocrine consult in AM - HbA1C 6.5%  - On glyburide at home, -will need to make sure pt does not restart glyburide upon discharge.  Will likely require short acting insulin regimen.  Pt states he used to take insulin >15 yrs ago but will likely require education on how to administer.      - SSI and FS qac and hs  - Given pt's most recent A1c of 6.5% on 1/11/19, pt might benefit from less strict A1c control, especially as pt has multiple other comorbidities  - Endocrine consult in AM - s/p PCI x 1 in 2016  - c/w aspirin 81mg daily and atorvastatin 80mg daily

## 2019-01-28 NOTE — PROGRESS NOTE ADULT - ASSESSMENT
64M hx of ESRD recently started on HD (T/Th/S, via tunneled catheter 1/10, LUE fistula maturing), HFrEF (EF 28%), CAD s/p PCI in 2016, Type 2 Diabetes (A1C 6.5% on 1/11/19), recent H. pylori gastritis (01/2018, s/p triple therapy), HTN, HLD who presents with hypoglycemia to 37 likely 2/2 sulfonylurea use.

## 2019-01-28 NOTE — PROGRESS NOTE ADULT - PROBLEM SELECTOR PLAN 8
- controlled  - c/w hydralazine 100mg TID, carvedilol 6.25mg BID, isordil 10mg TID  - monitor vital signs [id number]

## 2019-01-28 NOTE — PROGRESS NOTE ADULT - PROBLEM SELECTOR PLAN 5
- EF 28%, euvolemic   - c/w isordil 10mg TID, bumex 6mg BID, carvedilol 6.25mg BID  - monitor intake and output  - daily weights  - salt restriction - platelets 81, baseline 60s-100s, HIT ruled out previously  - trend CBC

## 2019-01-28 NOTE — PROGRESS NOTE ADULT - PROBLEM SELECTOR PLAN 6
- s/p PCI x 1 in 2016  - c/w aspirin 81mg daily and atorvastatin 80mg daily - EF 28%, euvolemic   - c/w isordil 10mg TID, bumex 6mg BID, carvedilol 6.25mg BID  - monitor intake and output  - daily weights  - salt restriction

## 2019-01-28 NOTE — PROGRESS NOTE ADULT - PROBLEM SELECTOR PLAN 10
- DVT ppx: Pt low risk for VTE with IMPROVE score 0. Encourage OOB and ambulation.   - DIET: Renal Restricted

## 2019-01-28 NOTE — PROGRESS NOTE ADULT - PROBLEM SELECTOR PLAN 3
- Hb 8.8 (baseline 8-9)  - 2/2 ESRD   - c/w ferrous sulfate 325mg daily - On HD T/Th/Sat via right chest wall tunneled catheter, LUE fistula maturing  - C/w calcium acetate TID with meals   - Nephrology aware, to see pt today and schedule for dialysis.

## 2019-01-28 NOTE — CONSULT NOTE ADULT - ATTENDING COMMENTS
Hypoglycemia due to glyburide in a patient with ESRD. Stop glyburide. Monitor glucose trend overnight to assess for stability as sulfonylure can remain in the system for 72 hours or longer. based on glucose trend will finalize dc plan. No insulin correction scale for now.

## 2019-01-28 NOTE — CONSULT NOTE ADULT - ASSESSMENT
ASSESSMENT:  64M, with PMHx of ESRD recently started on HD (T/Th/S, via tunneled catheter 1/10, LUE fistula maturing), HFrEF (EF 28%), CAD s/p PCI in 2016, Type 2 Diabetes (A1C 6.5% on 1/11/19), recent H. pylori gastritis (01/2018, s/p triple therapy), HTN, HLD, who presents with hypoglycemia to 37 likely 2/2 sulfonylurea use in the setting of ESRD.     PLAN:  - Agree with d/c glyburide  - Continue with diabetic diet, FS before meals & at bedtime, and insulin sliding scale for now. So far patient has not required any insulin while inpatient  - If FS < 55, draw the following: serum glucose, serum insulin level, C-peptide level, proinsulin level, sulfonylurea and meglitinide screen  - Will discuss with team        Nallely Tejeda MD  Internal Medicine, PGY-1  [phone] ASSESSMENT:  64M, with PMHx of ESRD recently started on HD (T/Th/S, via tunneled catheter 1/10, LUE fistula maturing), HFrEF (EF 28%), CAD s/p PCI in 2016, Type 2 Diabetes (A1C 6.5% on 1/11/19), recent H. pylori gastritis (01/2018, s/p triple therapy), HTN, HLD, who presents with hypoglycemia to 37 likely 2/2 sulfonylurea use in the setting of ESRD.     PLAN:  - Agree with d/c glyburide  - If FS < 55, draw the following: serum glucose, serum insulin level, C-peptide level, proinsulin level, sulfonylurea and meglitinide screen  - Continue with diabetic diet, FS before meals & at bedtime, and insulin sliding scale for now. So far patient has not required any insulin while inpatient  - Current HgbA1C 6.5%, which is at goal. Target HbgA1C for ESRD patients is between 6% and 7%, and in older patients (>50) or those with multiple co-morbidities, a slightly higher target HgbA1C (7.5%-8%) is acceptable  - Given that patient is at goal HgbA1C, may not require tight glycemic control  - Will discuss with team        Nallely Tejeda MD  Internal Medicine, PGY-1  [phone] ASSESSMENT:  64M, with PMHx of ESRD recently started on HD (T/Th/S, via tunneled catheter 1/10, LUE fistula maturing), HFrEF (EF 28%), CAD s/p PCI in 2016, Type 2 Diabetes (A1C 6.5% on 1/11/19), recent H. pylori gastritis (01/2018, s/p triple therapy), HTN, HLD, who presents with hypoglycemia to 37, likely 2/2 sulfonylurea use in the setting of ESRD.     PLAN:  1. Hypoglycemia 2/2 glyburide use in setting of ESRD  - Agree with d/c glyburide  - D/C insulin sliding scale for now because sulfonylureas can remain in one's system for up to 3 days.  - C/w FS before meals and at bedtime  - As patient was hypoglycemic today to 58, will need to continue to monitor patient's blood glucose trend for at least another 24 hours to ensure the sulfonylurea has cleared.   - Current HgbA1C 6.5%, which is at goal. Target HbgA1C for ESRD patients is between 6% and 7%, and in older patients or those with multiple co-morbidities, a slightly higher target HgbA1C (7.5%-8%) is acceptable  - Check fructosamine level. Will correlate fructosamine level with HgbA1C to ensure accuracy of HgbA1C, as sometimes HgbA1C is less accurate in ESRD.  - Patient likely does not require very tight glycemic control. Will leave final recommendations on discharge medications, depending on blood glucose measurements over next 24 hours.  - Patient should follow up with an endocrinologist from now on, as diabetes management in ESRD should be individualized. Provided patient with information and number for follow-up.        Discussed with team.      Nallely Tejeda MD  Internal Medicine, PGY-1

## 2019-01-28 NOTE — PROGRESS NOTE ADULT - PROBLEM SELECTOR PLAN 4
- platelets 81, baseline 60s-100s, HIT ruled out previously  - trend CBC - Hb 8.8 (baseline 8-9)  - 2/2 ESRD   - c/w ferrous sulfate 325mg daily

## 2019-01-28 NOTE — PROGRESS NOTE ADULT - SUBJECTIVE AND OBJECTIVE BOX
***************************************************************  Mark Hellerman, PGY1  Internal Medicine   pager: [facility]: [phone]; [facility]: [phone]  ***************************************************************    DEMETRIO VILLALTA  53y  MRN: [id number]    Patient is a 53y old  Male who presents with a chief complaint of hypoglycemia (2019 23:36)    Subjective:   - No events ON.   - No longer with shaking or sweats  - Denies fever, CP, SOB, abn pain, N/V, dysuria. Tolerating regular constant carb diet.      MEDICATIONS  (STANDING):  aspirin enteric coated 81 milliGRAM(s) Oral daily  atorvastatin 80 milliGRAM(s) Oral at bedtime  buMETAnide 6 milliGRAM(s) Oral two times a day  calcium acetate 667 milliGRAM(s) Oral three times a day with meals  carvedilol 6.25 milliGRAM(s) Oral every 12 hours  dextrose 50% Injectable 25 Gram(s) IV Push once  ferrous    sulfate 325 milliGRAM(s) Oral daily  hydrALAZINE 100 milliGRAM(s) Oral every 8 hours  insulin lispro (HumaLOG) corrective regimen sliding scale   SubCutaneous at bedtime  insulin lispro (HumaLOG) corrective regimen sliding scale   SubCutaneous three times a day before meals  isosorbide   dinitrate Tablet (ISORDIL) 10 milliGRAM(s) Oral three times a day  pantoprazole    Tablet 40 milliGRAM(s) Oral before breakfast    MEDICATIONS  (PRN):  dextrose 40% Gel 15 Gram(s) Oral once PRN Blood Glucose LESS THAN 70 milliGRAM(s)/deciliter  polyethylene glycol 3350 17 Gram(s) Oral daily PRN Constipation      Objective:    Vitals: Vital Signs Last 24 Hrs  T(C): 36.7 (19 @ 09:00), Max: 37 (19 @ 02:27)  T(F): 98 (19 @ 09:00), Max: 98.6 (19 @ 02:27)  HR: 78 (19 @ 09:00) (65 - 82)  BP: 134/80 (19 @ 09:00) (112/61 - 134/80)  RR: 16 (19 @ 09:00) (16 - 18)  SpO2: 100% (19 @ 09:00) (98% - 100%)            I&O's Summary      PHYSICAL EXAM:  General: Awake and alert.  No acute distress.  Head: Normocephalic, atraumatic.    Eyes: PERRL.  EOMI.  No scleral icterus.    Mouth: Moist MM.  No oropharyngeal exudates.    Neck: Supple.  Full range of motion.  No JVD.  Trachea midline.  No lymphadenopathy.   Heart: RRR.  Normal S1 and S2.  No murmurs, rubs, or gallops.  1+ pitting LE edema of LLE and trace pitting edema of RLE from ankles to knees.  Lungs: Good inspiratory effort.  Nonlabored breathing.  CTAB.  No wheezes, crackles, or rhonchi.    Abdomen: BS+, soft, NT/ND.    Skin: Warm and dry.  No rashes.  Right chest wall permacath site c/d/i  Extremities: No clubbing or cyanosis.  2+ peripheral pulses b/l.  LUE fistula with palpable thrill and good bruit.  Musculoskeletal: No joint deformities.  No spinal or paraspinal tenderness.  Neuro: A&Ox3.  CN II-XII intact.  5/5 motor strength in UE and LE b/l.  Tactile sensation intact in UE and LE b/l.  NO focal deficits.    LABS:      139  |  101  |  39<H>  ----------------------------<  143<H>  4.3   |  21<L>  |  4.74<H>    Ca    8.4      2019 20:25    TPro  6.7  /  Alb  3.4  /  TBili  0.4  /  DBili  x   /  AST  28  /  ALT  22  /  AlkPhos  114  01-27                    Urinalysis Basic - ( 2019 22:10 )    Color: YELLOW / Appearance: CLEAR / S.022 / pH: 6.0  Gluc: TRACE / Ketone: NEGATIVE  / Bili: NEGATIVE / Urobili: TRACE   Blood: SMALL / Protein: >600 / Nitrite: NEGATIVE   Leuk Esterase: NEGATIVE / RBC: 5-10 / WBC 3-5   Sq Epi: OCC / Non Sq Epi: x / Bacteria: FEW                              8.8    4.70  )-----------( 81       ( 2019 20:25 )             29.1     CAPILLARY BLOOD GLUCOSE      POCT Blood Glucose.: 162 mg/dL (2019 09:36)  POCT Blood Glucose.: 90 mg/dL (2019 09:10)  POCT Blood Glucose.: 58 mg/dL (2019 08:50)  POCT Blood Glucose.: 141 mg/dL (2019 05:22)  POCT Blood Glucose.: 262 mg/dL (2019 23:17)  POCT Blood Glucose.: 208 mg/dL (2019 21:19)  POCT Blood Glucose.: 160 mg/dL (2019 19:16)      RADIOLOGY & ADDITIONAL TESTS:    Imaging Personally Reviewed:  [x ] YES  [ ] NO    Consultants involved in case:   Consultant(s) Notes Reviewed:  [ x] YES  [ ] NO:   Care Discussed with Consultants/Other Providers [x ] YES  [ ] NO

## 2019-01-28 NOTE — PROGRESS NOTE ADULT - PROBLEM SELECTOR PLAN 2
- On HD T/Th/Sat via right chest wall tunneled catheter, LUE fistula maturing  - c/w calcium acetate TID with meals   - renal consult in AM - On HD T/Th/Sat via right chest wall tunneled catheter, LUE fistula maturing  - C/w calcium acetate TID with meals   - Nephrology aware, to see pt today and schedule for dialysis. - HbA1C 6.5%  - On glyburide at home, - will need to make sure pt does not restart glyburide upon discharge.  Will likely require short acting insulin regimen.  Pt states he used to take insulin >15 yrs ago but will likely require education on how to administer.    - SSI and FS qac and hs  - F/u endo recs

## 2019-01-28 NOTE — PROGRESS NOTE ADULT - PROBLEM SELECTOR PLAN 1
- At home hypoglycemic to 37 in setting of sulfonylurea use (glyburide)  - FS currently 262, will check sulfonylurea level, D/C glyburide   - Monitor FS qac/hs, DM management as below  - Will need to make sure pt does not restart glyburide upon discharge - Found hypoglycemic at home to 37 in setting of sulfonylurea use (glyburide)  - FS currently 262, will check sulfonylurea level, D/C glyburide   - Monitor FS qac/hs, DM management as below  - Will need to make sure pt does not restart glyburide upon discharge - Found hypoglycemic at home to 37 in setting of sulfonylurea use (glyburide)  - FS currently 262, will check sulfonylurea level, D/C glyburide   - Monitor FS qac/hs, DM management as below

## 2019-01-28 NOTE — CONSULT NOTE ADULT - SUBJECTIVE AND OBJECTIVE BOX
HPI:  64M, PMHx of ESRD recently started on HD (T/Th/S, via tunneled catheter 1/10, LUE fistula maturing), HFrEF (EF 28%), CAD s/p PCI in 2016, Type 2 Diabetes (A1C 6.5% on 1/11/19), recent H. pylori gastritis (completed triple therapy 1/23/18), HTN, HLD, who presents with hypoglycemia to 37.     Pt reports that he was recently prescribed glyburide by a new PCP. The first day he took glyburide in the morning, and by mid-day he experienced lightheadedness, confusion, sleepiness, and diaphoresis, FS at home was 37, after which he had a peanut butter jelly/juice and felt better. Pt reports that this is his first episode of hypoglycemia.   Per pt, he was first diagnosed with diabetes when he was in his 30s (about 20 years ago). He used to be on oral medications (metformin and sulfonylureas) "many many years ago", and then he was switched to insulin for a period of time which the patient reports that he self-discontinued because he was "tired of sticking himself". Due to his unwillingness to use insulin at home, he was recently restarted on oral antidiabetic agents.    Today, patient currently reports feeling well. No complaints. No headache, confusion, sleepiness, lightheadedness, dizziness, chest pain, SOB, abd pain, n/v/d/c, dysuria, tremor.    Endorses blurred vision, chronic, not acutely worse. Attributes his blurry vision to longstanding diabetes.    Endorses numbness on soles of feet and burning sensation of b/l feet and ankles. Endorses that he checks his feet daily for cuts, scrapes, ulcers, etc.    Patient endorses unintentional weight loss of 90lbs in ~6 months. Says he used to weigh about 260lbs in April 2018 but now is about 170lbs. Reports compliance with renal diet, but denies intentional calorie restriction. Denies increase in exercise (limited by CHF severity). No fever, no chills, no focal infectious symptoms. Attributes his weight loss to Bumex.    Patient reports that he does eat three meals a day, but describes small portion sizes. Reports that he has little appetite and feels full easily. Denies abd pain, nausea, vomiting, constipation.    Patient endorses compliance with checking his blood glucose at home. Reports that recently his numbers "have been good", FSs usually around  in the morning and around 140-150 after he eats. "If anything, they run too high, never low." Describes some difficulty with consistency in monitoring his blood glucose, as he sometimes stays with sister or his ex-wife, and when he stays with other family members he sometimes forgets to bring his glucometer with him.        PAST MEDICAL & SURGICAL HISTORY:  ESRD (end stage renal disease) on dialysis  GIB (gastrointestinal bleeding): was treated  with H pyelori stable since Dec 10, 2018  HFrEF (heart failure with reduced ejection fraction): EF 20-24%  Myocardial infarction: Jan 2016  Hyperlipidemia  CAD (coronary artery disease): 2016, coronary artery stentX1  DM (diabetes mellitus): 2  not taking any medication lat4st HgA1c 5.7  HTN (hypertension)  Stented coronary artery: ? 1  Arterial stent thrombosis      FAMILY HISTORY:  Family history of diabetes mellitus (DM) (Sibling)  Family history of MI (myocardial infarction) (Sibling)      Social History:    Outpatient Medications:    MEDICATIONS  (STANDING):  aspirin enteric coated 81 milliGRAM(s) Oral daily  atorvastatin 80 milliGRAM(s) Oral at bedtime  buMETAnide 6 milliGRAM(s) Oral two times a day  calcium acetate 667 milliGRAM(s) Oral three times a day with meals  carvedilol 6.25 milliGRAM(s) Oral every 12 hours  dextrose 50% Injectable 25 Gram(s) IV Push once  ferrous    sulfate 325 milliGRAM(s) Oral daily  hydrALAZINE 100 milliGRAM(s) Oral every 8 hours  insulin lispro (HumaLOG) corrective regimen sliding scale   SubCutaneous at bedtime  insulin lispro (HumaLOG) corrective regimen sliding scale   SubCutaneous three times a day before meals  isosorbide   dinitrate Tablet (ISORDIL) 10 milliGRAM(s) Oral three times a day  pantoprazole    Tablet 40 milliGRAM(s) Oral before breakfast    MEDICATIONS  (PRN):  dextrose 40% Gel 15 Gram(s) Oral once PRN Blood Glucose LESS THAN 70 milliGRAM(s)/deciliter  polyethylene glycol 3350 17 Gram(s) Oral daily PRN Constipation      Allergies    No Known Allergies    Intolerances      Review of Systems:  Constitutional: No fever, no chills. Endorses unintentional weight loss of 90lbs in 6 months, see HPI.   Eyes: Endorses blurry vision, chronic, see HPI  Neuro: No tremors  Cardiovascular: No chest pain, palpitations  Respiratory: No SOB, no cough  GI: No nausea, vomiting, abdominal pain  : No dysuria. Endorses urinary frequency, attributes to Bumex  Skin: no rash  Psych: no depression  Endocrine: Endorses polyuria, attributes to Bumex, but reports that it is decreasing over time despite increasing doses of Bumex  Hem/lymph: no swelling      PHYSICAL EXAM:  VITALS: T(C): 36.6 (01-28-19 @ 11:40)  T(F): 97.9 (01-28-19 @ 11:40), Max: 98.6 (01-28-19 @ 02:27)  HR: 78 (01-28-19 @ 11:40) (65 - 82)  BP: 126/77 (01-28-19 @ 11:40) (112/61 - 134/80)  RR:  (16 - 18)  SpO2:  (98% - 100%)  Wt(kg): --  GENERAL: NAD, well-groomed, well-developed  EYES: No proptosis, no lid lag. Anicteric. Sclera and conjunctiva clear.  HEENT:  Atraumatic, normocephalic. Moist mucous membranes  THYROID: Normal size, no palpable nodules  RESPIRATORY: Nonlabored breathing on room air. Crackles up to mid lung fields bilaterally  CARDIOVASCULAR: Regular rate and rhythm; No murmurs. (+) pitting peripheral edema bilaterally up to shins  GI: Soft, nontender, non distended, normal bowel sounds  SKIN: Dry, intact, No rashes or lesions  MUSCULOSKELETAL: Full range of motion, normal strength. LUE fistula without surrounding erythema, edema, exudate.   NEURO: sensation intact, extraocular movements intact, no tremor  PSYCH: Alert and oriented x 3, normal affect, normal mood  CUSHING'S SIGNS: no striae      CAPILLARY BLOOD GLUCOSE      POCT Blood Glucose.: 109 mg/dL (28 Jan 2019 11:48)  POCT Blood Glucose.: 162 mg/dL (28 Jan 2019 09:36)  POCT Blood Glucose.: 90 mg/dL (28 Jan 2019 09:10)  POCT Blood Glucose.: 58 mg/dL (28 Jan 2019 08:50)  POCT Blood Glucose.: 141 mg/dL (28 Jan 2019 05:22)  POCT Blood Glucose.: 262 mg/dL (27 Jan 2019 23:17)  POCT Blood Glucose.: 208 mg/dL (27 Jan 2019 21:19)  POCT Blood Glucose.: 160 mg/dL (27 Jan 2019 19:16)                            8.8    4.70  )-----------( 81       ( 27 Jan 2019 20:25 )             29.1       01-27    139  |  101  |  39<H>  ----------------------------<  143<H>  4.3   |  21<L>  |  4.74<H>    EGFR if : 15  EGFR if non : 13    Ca    8.4      01-27    TPro  6.7  /  Alb  3.4  /  TBili  0.4  /  DBili  x   /  AST  28  /  ALT  22  /  AlkPhos  114  01-27      Thyroid Function Tests:      Hemoglobin A1C, Whole Blood: 6.5 % <H> [4.0 - 5.6] (01-11-19 @ 06:00)  Hemoglobin A1C, Whole Blood: 6.5 % <H> [4.0 - 5.6] (01-10-19 @ 06:00)  Hemoglobin A1C, Whole Blood: 5.7 % <H> [4.0 - 5.6] (12-04-18 @ 16:00) HPI:  64M, PMHx of ESRD recently started on HD (T/Th/S, via tunneled catheter 1/10, LUE fistula maturing), HFrEF (EF 28%), CAD s/p PCI in 2016, Type 2 Diabetes (A1C 6.5% on 1/11/19), recent H. pylori gastritis (completed triple therapy 1/23/18), HTN, HLD, who presents with hypoglycemia to 37.     Pt reports that he was recently prescribed glyburide by a new PCP. The first day he took glyburide in the morning, and by mid-day he experienced lightheadedness, confusion, sleepiness, and diaphoresis, FS at home was 37, after which he had a peanut butter jelly/juice and felt better. Pt reports that this is his first episode of hypoglycemia.   Per pt, he was first diagnosed with diabetes when he was in his 30s (about 20 years ago). He used to be on oral medications (metformin and sulfonylureas) "many many years ago", and then he was switched to insulin for a period of time which the patient reports that he self-discontinued because he was "tired of sticking himself". Due to his unwillingness to use insulin at home, he was recently restarted on oral antidiabetic agents.    Today, patient currently reports feeling well. No complaints. No headache, confusion, sleepiness, lightheadedness, dizziness, chest pain, SOB, abd pain, n/v/d/c, dysuria, tremor.    Endorses blurred vision, chronic, not acutely worse. Attributes his blurry vision to longstanding diabetes.    Endorses numbness on soles of feet and burning sensation of b/l feet and ankles. Endorses that he checks his feet daily for cuts, scrapes, ulcers, etc.    Patient endorses unintentional weight loss of 90lbs in ~6 months. Says he used to weigh about 260lbs in April 2018 but now is about 170lbs. Reports compliance with renal diet, but denies intentional calorie restriction. Denies increase in exercise (limited by CHF severity). No fever, no chills, no focal infectious symptoms. Attributes his weight loss to Bumex.    Patient reports that he does eat three meals a day, but describes small portion sizes. Reports that he has little appetite and feels full easily. Denies abd pain, nausea, vomiting, constipation.    Patient endorses compliance with checking his blood glucose at home. Reports that recently his numbers "have been good", FSs usually around  in the morning and around 140-150 after he eats. "If anything, they run too high, never low." Describes some difficulty with consistency in monitoring his blood glucose, as he sometimes stays with sister or his ex-wife, and when he stays with other family members he sometimes forgets to bring his glucometer with him.        PAST MEDICAL & SURGICAL HISTORY:  ESRD (end stage renal disease) on dialysis  GIB (gastrointestinal bleeding): was treated  with H pyelori stable since Dec 10, 2018  HFrEF (heart failure with reduced ejection fraction): EF 20-24%  Myocardial infarction: Jan 2016  Hyperlipidemia  CAD (coronary artery disease): 2016, coronary artery stentX1  DM (diabetes mellitus): 2  not taking any medication lat4st HgA1c 5.7  HTN (hypertension)  Stented coronary artery: ? 1  Arterial stent thrombosis      FAMILY HISTORY:  Family history of diabetes mellitus (DM) (Sibling)  Family history of MI (myocardial infarction) (Sibling)      Social History:  Lives at home with sister; sometimes stays with his ex-wife. Quit drinking 3 - 4 years ago, quit smoking 6 months ago (smoked 1ppd x 33 years). No illicit drugs.    Outpatient Medications:  · 	atorvastatin 80 mg oral tablet: 1 tab(s) orally once a day (at bedtime), Last Dose Taken:    · 	isosorbide dinitrate 10 mg oral tablet: 1 tab(s) orally 3 times a day, Last Dose Taken:    · 	polyethylene glycol 3350 oral powder for reconstitution: 17 gram(s) orally once a day, Last Dose Taken:    · 	carvedilol 6.25 mg oral tablet: 1 tab(s) orally every 12 hours, Last Dose Taken:    · 	calcium acetate 667 mg oral tablet: 1 tab(s) orally 3 times a day (with meals), Last Dose Taken:    · 	hydrALAZINE 100 mg oral tablet: 1 tab(s) orally every 8 hours, Last Dose Taken:    · 	pantoprazole 40 mg oral delayed release tablet: 1 tab(s) orally once a day  · 	ferrous sulfate 325 mg (65 mg elemental iron) oral tablet: 1 tab(s) orally once a day , Last Dose Taken:    · 	bumetanide 2 mg oral tablet: 3 tab(s) orally 2 times a day, Last Dose Taken:    · 	aspirin 81 mg oral delayed release tablet: 1 tab(s) orally once a day, Last Dose Taken:    · 	glyBURIDE 5 mg oral tablet: 1 tab(s) orally once a day    MEDICATIONS  (STANDING):  aspirin enteric coated 81 milliGRAM(s) Oral daily  atorvastatin 80 milliGRAM(s) Oral at bedtime  buMETAnide 6 milliGRAM(s) Oral two times a day  calcium acetate 667 milliGRAM(s) Oral three times a day with meals  carvedilol 6.25 milliGRAM(s) Oral every 12 hours  dextrose 50% Injectable 25 Gram(s) IV Push once  ferrous    sulfate 325 milliGRAM(s) Oral daily  hydrALAZINE 100 milliGRAM(s) Oral every 8 hours  insulin lispro (HumaLOG) corrective regimen sliding scale   SubCutaneous at bedtime  insulin lispro (HumaLOG) corrective regimen sliding scale   SubCutaneous three times a day before meals  isosorbide   dinitrate Tablet (ISORDIL) 10 milliGRAM(s) Oral three times a day  pantoprazole    Tablet 40 milliGRAM(s) Oral before breakfast    MEDICATIONS  (PRN):  dextrose 40% Gel 15 Gram(s) Oral once PRN Blood Glucose LESS THAN 70 milliGRAM(s)/deciliter  polyethylene glycol 3350 17 Gram(s) Oral daily PRN Constipation      Allergies    No Known Allergies        Review of Systems:  Constitutional: No fever, no chills. Endorses unintentional weight loss of 90lbs in 6 months, see HPI.   Eyes: Endorses blurry vision, chronic, see HPI  Neuro: No tremors  Cardiovascular: No chest pain, palpitations  Respiratory: No SOB, no cough  GI: No nausea, vomiting, abdominal pain  : No dysuria. Endorses urinary frequency, attributes to Bumex  Skin: no rash  Psych: no depression  Endocrine: Endorses polyuria, attributes to Bumex, but reports that it is decreasing over time despite increasing doses of Bumex  Hem/lymph: no swelling      PHYSICAL EXAM:  VITALS: T(C): 36.6 (01-28-19 @ 11:40)  T(F): 97.9 (01-28-19 @ 11:40), Max: 98.6 (01-28-19 @ 02:27)  HR: 78 (01-28-19 @ 11:40) (65 - 82)  BP: 126/77 (01-28-19 @ 11:40) (112/61 - 134/80)  RR:  (16 - 18)  SpO2:  (98% - 100%)  Wt(kg): --  GENERAL: NAD, well-groomed, well-developed  EYES: No proptosis, no lid lag. Anicteric. Sclera and conjunctiva clear.  HEENT:  Atraumatic, normocephalic. Moist mucous membranes  THYROID: Normal size, no palpable nodules  RESPIRATORY: Nonlabored breathing on room air. Crackles up to mid lung fields bilaterally  CARDIOVASCULAR: Regular rate and rhythm; No murmurs. (+) pitting peripheral edema bilaterally up to shins  GI: Soft, nontender, non distended, normal bowel sounds  SKIN: Dry, intact, No rashes or lesions  MUSCULOSKELETAL: Full range of motion, normal strength. LUE fistula without surrounding erythema, edema, exudate.   NEURO: sensation intact, extraocular movements intact, no tremor  PSYCH: Alert and oriented x 3, normal affect, normal mood  CUSHING'S SIGNS: no striae      CAPILLARY BLOOD GLUCOSE      POCT Blood Glucose.: 109 mg/dL (28 Jan 2019 11:48)  POCT Blood Glucose.: 162 mg/dL (28 Jan 2019 09:36)  POCT Blood Glucose.: 90 mg/dL (28 Jan 2019 09:10)  POCT Blood Glucose.: 58 mg/dL (28 Jan 2019 08:50)  POCT Blood Glucose.: 141 mg/dL (28 Jan 2019 05:22)  POCT Blood Glucose.: 262 mg/dL (27 Jan 2019 23:17)  POCT Blood Glucose.: 208 mg/dL (27 Jan 2019 21:19)  POCT Blood Glucose.: 160 mg/dL (27 Jan 2019 19:16)                            8.8    4.70  )-----------( 81       ( 27 Jan 2019 20:25 )             29.1       01-27    139  |  101  |  39<H>  ----------------------------<  143<H>  4.3   |  21<L>  |  4.74<H>    EGFR if : 15  EGFR if non : 13    Ca    8.4      01-27    TPro  6.7  /  Alb  3.4  /  TBili  0.4  /  DBili  x   /  AST  28  /  ALT  22  /  AlkPhos  114  01-27      Thyroid Function Tests:      Hemoglobin A1C, Whole Blood: 6.5 % <H> [4.0 - 5.6] (01-11-19 @ 06:00)  Hemoglobin A1C, Whole Blood: 6.5 % <H> [4.0 - 5.6] (01-10-19 @ 06:00)  Hemoglobin A1C, Whole Blood: 5.7 % <H> [4.0 - 5.6] (12-04-18 @ 16:00)

## 2019-01-29 ENCOUNTER — TRANSCRIPTION ENCOUNTER (OUTPATIENT)
Age: 54
End: 2019-01-29

## 2019-01-29 VITALS
HEART RATE: 74 BPM | DIASTOLIC BLOOD PRESSURE: 61 MMHG | TEMPERATURE: 98 F | RESPIRATION RATE: 16 BRPM | OXYGEN SATURATION: 100 % | SYSTOLIC BLOOD PRESSURE: 108 MMHG

## 2019-01-29 LAB
ANION GAP SERPL CALC-SCNC: 13 MMO/L — SIGNIFICANT CHANGE UP (ref 7–14)
BUN SERPL-MCNC: 52 MG/DL — HIGH (ref 7–23)
CALCIUM SERPL-MCNC: 8.9 MG/DL — SIGNIFICANT CHANGE UP (ref 8.4–10.5)
CHLORIDE SERPL-SCNC: 97 MMOL/L — LOW (ref 98–107)
CO2 SERPL-SCNC: 27 MMOL/L — SIGNIFICANT CHANGE UP (ref 22–31)
CREAT SERPL-MCNC: 5.59 MG/DL — HIGH (ref 0.5–1.3)
FRUCTOSAMINE SERPL-MCNC: 327 UMOL/L — HIGH (ref 205–285)
GLUCOSE SERPL-MCNC: 110 MG/DL — HIGH (ref 70–99)
HCT VFR BLD CALC: 28.3 % — LOW (ref 39–50)
HGB BLD-MCNC: 8.8 G/DL — LOW (ref 13–17)
MAGNESIUM SERPL-MCNC: 2.2 MG/DL — SIGNIFICANT CHANGE UP (ref 1.6–2.6)
MCHC RBC-ENTMCNC: 29.3 PG — SIGNIFICANT CHANGE UP (ref 27–34)
MCHC RBC-ENTMCNC: 31.1 % — LOW (ref 32–36)
MCV RBC AUTO: 94.3 FL — SIGNIFICANT CHANGE UP (ref 80–100)
NRBC # FLD: 0 K/UL — LOW (ref 25–125)
PHOSPHATE SERPL-MCNC: 3.7 MG/DL — SIGNIFICANT CHANGE UP (ref 2.5–4.5)
PLATELET # BLD AUTO: 128 K/UL — LOW (ref 150–400)
PMV BLD: 10.5 FL — SIGNIFICANT CHANGE UP (ref 7–13)
POTASSIUM SERPL-MCNC: 4.5 MMOL/L — SIGNIFICANT CHANGE UP (ref 3.5–5.3)
POTASSIUM SERPL-SCNC: 4.5 MMOL/L — SIGNIFICANT CHANGE UP (ref 3.5–5.3)
RBC # BLD: 3 M/UL — LOW (ref 4.2–5.8)
RBC # FLD: 18 % — HIGH (ref 10.3–14.5)
SODIUM SERPL-SCNC: 137 MMOL/L — SIGNIFICANT CHANGE UP (ref 135–145)
WBC # BLD: 4.56 K/UL — SIGNIFICANT CHANGE UP (ref 3.8–10.5)
WBC # FLD AUTO: 4.56 K/UL — SIGNIFICANT CHANGE UP (ref 3.8–10.5)

## 2019-01-29 PROCEDURE — 90935 HEMODIALYSIS ONE EVALUATION: CPT

## 2019-01-29 PROCEDURE — 99232 SBSQ HOSP IP/OBS MODERATE 35: CPT | Mod: GC

## 2019-01-29 PROCEDURE — 99239 HOSP IP/OBS DSCHRG MGMT >30: CPT

## 2019-01-29 RX ORDER — GLYBURIDE 5 MG
1 TABLET ORAL
Qty: 0 | Refills: 0 | COMMUNITY

## 2019-01-29 RX ADMIN — Medication 325 MILLIGRAM(S): at 12:30

## 2019-01-29 RX ADMIN — ISOSORBIDE DINITRATE 10 MILLIGRAM(S): 5 TABLET ORAL at 00:02

## 2019-01-29 RX ADMIN — Medication 667 MILLIGRAM(S): at 12:30

## 2019-01-29 RX ADMIN — Medication 100 MILLIGRAM(S): at 00:02

## 2019-01-29 RX ADMIN — Medication 81 MILLIGRAM(S): at 12:30

## 2019-01-29 NOTE — PROGRESS NOTE ADULT - PROBLEM SELECTOR PLAN 1
Pt. tolerating HD. BP stable during HD rounds. HD catheter functioning well. Pt. with anemia, on FATOUMATA treatment with HD. Monitor BP and labs

## 2019-01-29 NOTE — PROGRESS NOTE ADULT - PROBLEM SELECTOR PLAN 6
- EF 28%, euvolemic   - c/w isordil 10mg TID, bumex 6mg BID, carvedilol 6.25mg BID  - monitor intake and output  - daily weights  - salt restriction

## 2019-01-29 NOTE — PROGRESS NOTE ADULT - SUBJECTIVE AND OBJECTIVE BOX
Chief Complaint: hypoglycemia    Interval History: (unable to obtain, pt at dialysis)     MEDICATIONS  (STANDING):  aspirin enteric coated 81 milliGRAM(s) Oral daily  atorvastatin 80 milliGRAM(s) Oral at bedtime  buMETAnide 6 milliGRAM(s) Oral two times a day  calcium acetate 667 milliGRAM(s) Oral three times a day with meals  carvedilol 6.25 milliGRAM(s) Oral every 12 hours  dextrose 50% Injectable 25 Gram(s) IV Push once  ferrous    sulfate 325 milliGRAM(s) Oral daily  hydrALAZINE 100 milliGRAM(s) Oral every 8 hours  isosorbide   dinitrate Tablet (ISORDIL) 10 milliGRAM(s) Oral three times a day  pantoprazole    Tablet 40 milliGRAM(s) Oral before breakfast    MEDICATIONS  (PRN):  dextrose 40% Gel 15 Gram(s) Oral once PRN Blood Glucose LESS THAN 70 milliGRAM(s)/deciliter  polyethylene glycol 3350 17 Gram(s) Oral daily PRN Constipation      Allergies    No Known Allergies        Review of Systems:  UNABLE TO OBTAIN    PHYSICAL EXAM:  VITALS: T(C): 36.7 (01-29-19 @ 09:40)  T(F): 98 (01-29-19 @ 09:40), Max: 98.7 (01-28-19 @ 21:27)  HR: 75 (01-29-19 @ 09:40) (64 - 78)  BP: 134/77 (01-29-19 @ 09:40) (104/66 - 134/77)  RR:  (16 - 19)  SpO2:  (97% - 100%)  Wt(kg): --  GENERAL: NAD, well-groomed, well-developed  EYES: No proptosis, no lid lag. Anicteric. Sclera and conjunctiva clear.  HEENT:  Atraumatic, normocephalic. Moist mucous membranes  THYROID: Normal size, no palpable nodules  RESPIRATORY: Nonlabored breathing on room air. Crackles up to mid lung fields bilaterally  CARDIOVASCULAR: Regular rate and rhythm; No murmurs. (+) pitting peripheral edema bilaterally up to shins  GI: Soft, nontender, non distended, normal bowel sounds  SKIN: Dry, intact, No rashes or lesions  MUSCULOSKELETAL: Full range of motion, normal strength. LUE fistula without surrounding erythema, edema, exudate.   NEURO: sensation intact, extraocular movements intact, no tremor  PSYCH: Alert and oriented x 3, normal affect, normal mood  CUSHING'S SIGNS: no striae      CAPILLARY BLOOD GLUCOSE      POCT Blood Glucose.: 98 mg/dL (29 Jan 2019 08:27)  POCT Blood Glucose.: 100 mg/dL (29 Jan 2019 05:59)  POCT Blood Glucose.: 162 mg/dL (29 Jan 2019 01:04)  POCT Blood Glucose.: 180 mg/dL (28 Jan 2019 21:46)  POCT Blood Glucose.: 146 mg/dL (28 Jan 2019 18:19)  POCT Blood Glucose.: 87 mg/dL (28 Jan 2019 16:45)  POCT Blood Glucose.: 109 mg/dL (28 Jan 2019 11:48)      01-29    137  |  97<L>  |  52<H>  ----------------------------<  110<H>  4.5   |  27  |  5.59<H>    EGFR if : 13  EGFR if non : 11    Ca    8.9      01-29  Mg     2.2     01-29  Phos  3.7     01-29    TPro  6.7  /  Alb  3.4  /  TBili  0.4  /  DBili  x   /  AST  28  /  ALT  22  /  AlkPhos  114  01-27          Thyroid Function Tests:      Hemoglobin A1C, Whole Blood: 6.5 % <H> [4.0 - 5.6] (01-11-19 @ 06:00)  Hemoglobin A1C, Whole Blood: 6.5 % <H> [4.0 - 5.6] (01-10-19 @ 06:00)  Hemoglobin A1C, Whole Blood: 5.7 % <H> [4.0 - 5.6] (12-04-18 @ 16:00) Chief Complaint: hypoglycemia    Interval History: Patient reports that he feels well. no new complaints.    MEDICATIONS  (STANDING):  aspirin enteric coated 81 milliGRAM(s) Oral daily  atorvastatin 80 milliGRAM(s) Oral at bedtime  buMETAnide 6 milliGRAM(s) Oral two times a day  calcium acetate 667 milliGRAM(s) Oral three times a day with meals  carvedilol 6.25 milliGRAM(s) Oral every 12 hours  dextrose 50% Injectable 25 Gram(s) IV Push once  ferrous    sulfate 325 milliGRAM(s) Oral daily  hydrALAZINE 100 milliGRAM(s) Oral every 8 hours  isosorbide   dinitrate Tablet (ISORDIL) 10 milliGRAM(s) Oral three times a day  pantoprazole    Tablet 40 milliGRAM(s) Oral before breakfast    MEDICATIONS  (PRN):  dextrose 40% Gel 15 Gram(s) Oral once PRN Blood Glucose LESS THAN 70 milliGRAM(s)/deciliter  polyethylene glycol 3350 17 Gram(s) Oral daily PRN Constipation      Allergies    No Known Allergies        Review of Systems:  UNABLE TO OBTAIN    PHYSICAL EXAM:  VITALS: T(C): 36.7 (01-29-19 @ 09:40)  T(F): 98 (01-29-19 @ 09:40), Max: 98.7 (01-28-19 @ 21:27)  HR: 75 (01-29-19 @ 09:40) (64 - 78)  BP: 134/77 (01-29-19 @ 09:40) (104/66 - 134/77)  RR:  (16 - 19)  SpO2:  (97% - 100%)  Wt(kg): --  GENERAL: NAD, well-groomed, well-developed  EYES: No proptosis, no lid lag. Anicteric. Sclera and conjunctiva clear.  HEENT:  Atraumatic, normocephalic. Moist mucous membranes  THYROID: Normal size, no palpable nodules  RESPIRATORY: Nonlabored breathing on room air. Crackles up to mid lung fields bilaterally  CARDIOVASCULAR: Regular rate and rhythm; No murmurs. (+) pitting peripheral edema bilaterally up to shins  GI: Soft, nontender, non distended, normal bowel sounds  SKIN: Dry, intact, No rashes or lesions  MUSCULOSKELETAL: Full range of motion, normal strength. LUE fistula without surrounding erythema, edema, exudate.   NEURO: sensation intact, extraocular movements intact, no tremor  PSYCH: Alert and oriented x 3, normal affect, normal mood  CUSHING'S SIGNS: no striae      CAPILLARY BLOOD GLUCOSE      POCT Blood Glucose.: 98 mg/dL (29 Jan 2019 08:27)  POCT Blood Glucose.: 100 mg/dL (29 Jan 2019 05:59)  POCT Blood Glucose.: 162 mg/dL (29 Jan 2019 01:04)  POCT Blood Glucose.: 180 mg/dL (28 Jan 2019 21:46)  POCT Blood Glucose.: 146 mg/dL (28 Jan 2019 18:19)  POCT Blood Glucose.: 87 mg/dL (28 Jan 2019 16:45)  POCT Blood Glucose.: 109 mg/dL (28 Jan 2019 11:48)      01-29    137  |  97<L>  |  52<H>  ----------------------------<  110<H>  4.5   |  27  |  5.59<H>    EGFR if : 13  EGFR if non : 11    Ca    8.9      01-29  Mg     2.2     01-29  Phos  3.7     01-29    TPro  6.7  /  Alb  3.4  /  TBili  0.4  /  DBili  x   /  AST  28  /  ALT  22  /  AlkPhos  114  01-27          Thyroid Function Tests:      Hemoglobin A1C, Whole Blood: 6.5 % <H> [4.0 - 5.6] (01-11-19 @ 06:00)  Hemoglobin A1C, Whole Blood: 6.5 % <H> [4.0 - 5.6] (01-10-19 @ 06:00)  Hemoglobin A1C, Whole Blood: 5.7 % <H> [4.0 - 5.6] (12-04-18 @ 16:00)

## 2019-01-29 NOTE — DISCHARGE NOTE ADULT - MEDICATION SUMMARY - MEDICATIONS TO STOP TAKING
I will STOP taking the medications listed below when I get home from the hospital:    glyBURIDE 5 mg oral tablet  -- 1 tab(s) by mouth once a day

## 2019-01-29 NOTE — DISCHARGE NOTE ADULT - CARE PLAN
Principal Discharge DX:	Hypoglycemia  Goal:	Treat  Secondary Diagnosis:	Type 2 diabetes mellitus with hyperglycemia, without long-term current use of insulin  Secondary Diagnosis:	ESRD (end stage renal disease)  Secondary Diagnosis:	Thrombocytopenia Principal Discharge DX:	Hypoglycemia  Goal:	Treat  Assessment and plan of treatment:	You had episodes of hypoglycemia due to a long-acting oral medication which lingered in your blood because of your kidney failure. Your glyburide was discontinued in the hospital and you are not to take it at home. The endocrinologists evaluated you and recommend you check your finger sticks 2-3 times/day off oral diabetes medications and record this in a log book. When you followup with the endocrinologist in clinic, your outpatient medications can be decided. If you feel sweaty, clammy, lightheaded, check your fingerstick and drink juice if it is low.  Secondary Diagnosis:	Type 2 diabetes mellitus with hyperglycemia, without long-term current use of insulin  Assessment and plan of treatment:	You were discharged off all oral diabetes medications due to hypoglycemia. Your A1c was 6.5% in the hospital.  Secondary Diagnosis:	ESRD (end stage renal disease)  Assessment and plan of treatment:	You received hemodialysis 1/29/2019.  Secondary Diagnosis:	Thrombocytopenia  Assessment and plan of treatment:	You have had low platelets chronically. Your platelet count improved to 128 in the hospital. You should follow up with your primary care doctor to work up your low platelet count.

## 2019-01-29 NOTE — PROGRESS NOTE ADULT - PROBLEM SELECTOR PLAN 2
- HbA1C 6.5%  - On glyburide at home - will need to make sure pt does not restart glyburide upon discharge.   - Appreciate endo recs, will touch base with them today for updated plan. No ISS for now

## 2019-01-29 NOTE — DISCHARGE NOTE ADULT - MEDICATION SUMMARY - MEDICATIONS TO TAKE
I will START or STAY ON the medications listed below when I get home from the hospital:    aspirin 81 mg oral delayed release tablet  -- 1 tab(s) by mouth once a day  -- Indication: For CAD (coronary artery disease)    isosorbide dinitrate 10 mg oral tablet  -- 1 tab(s) by mouth 3 times a day  -- Indication: For Chest pain    atorvastatin 80 mg oral tablet  -- 1 tab(s) by mouth once a day (at bedtime)  -- Indication: For CAD (coronary artery disease)    carvedilol 6.25 mg oral tablet  -- 1 tab(s) by mouth every 12 hours  -- Indication: For HFrEF (heart failure with reduced ejection fraction)    bumetanide 2 mg oral tablet  -- 3 tab(s) by mouth 2 times a day  -- Indication: For HFrEF (heart failure with reduced ejection fraction)    ferrous sulfate 325 mg (65 mg elemental iron) oral tablet  -- 1 tab(s) by mouth once a day   -- Indication: For Anemia    polyethylene glycol 3350 oral powder for reconstitution  -- 17 gram(s) by mouth once a day, As Needed constipation  -- Indication: For Constipation    calcium acetate 667 mg oral tablet  -- 1 tab(s) by mouth 3 times a day (with meals)  -- Indication: For ESRD (end stage renal disease)    pantoprazole 40 mg oral delayed release tablet  -- 1 tab(s) by mouth once a day  -- Indication: For Acid reflux    hydrALAZINE 100 mg oral tablet  -- 1 tab(s) by mouth every 8 hours  -- Indication: For Hypertension

## 2019-01-29 NOTE — DISCHARGE NOTE ADULT - CARE PROVIDERS DIRECT ADDRESSES
,DirectAddress_Unknown ,[email or web address],DirectAddress_Unknown,DirectAddress_Unknown

## 2019-01-29 NOTE — PROGRESS NOTE ADULT - PROBLEM SELECTOR PLAN 3
- On HD T/Th/Sat via right chest wall tunneled catheter, LUE fistula maturing  - C/w calcium acetate TID with meals   - Nephrology aware, to see pt today and schedule for dialysis.

## 2019-01-29 NOTE — DISCHARGE NOTE ADULT - INSTRUCTIONS
Notify  Consistent carbohydrate diet Notify Dr Magaña if you experience any signs symptoms of hypoglycemia.  Continue hemodialysis Tues/Thurs/ Sat at your regular dialysis center.

## 2019-01-29 NOTE — DISCHARGE NOTE ADULT - DISCHARGE DATE
"Chief Complaint   Patient presents with     Acne       Vitals:    08/27/18 0911   BP: 106/66   Pulse: 54   Height: 1.676 m (5' 6\")     Wt Readings from Last 1 Encounters:   06/25/18 88.9 kg (196 lb) (98 %)*     * Growth percentiles are based on CDC 2-20 Years data.     Brigette Watkins LPN.................8/27/2018    "
29-Jan-2019

## 2019-01-29 NOTE — PROGRESS NOTE ADULT - SUBJECTIVE AND OBJECTIVE BOX
***************************************************************  Mark Hellerman, PGY1  Internal Medicine   pager: [facility]: [phone]; [facility]: [phone]  ***************************************************************    DEMETRIO VILLALTA  53y  MRN: [id number]    Patient is a 53y old  Male who presents with a chief complaint of hypoglycemia (2019 08:37)    Subjective:   - No events ON.   - Blood sugar labile, lower in AM down to high 80s to 90s  - Pt seen at bedside during dialysis this AM  - Denies fever, CP, SOB, abn pain, N/V, dysuria. Tolerating diet.      MEDICATIONS  (STANDING):  aspirin enteric coated 81 milliGRAM(s) Oral daily  atorvastatin 80 milliGRAM(s) Oral at bedtime  buMETAnide 6 milliGRAM(s) Oral two times a day  calcium acetate 667 milliGRAM(s) Oral three times a day with meals  carvedilol 6.25 milliGRAM(s) Oral every 12 hours  dextrose 50% Injectable 25 Gram(s) IV Push once  ferrous    sulfate 325 milliGRAM(s) Oral daily  hydrALAZINE 100 milliGRAM(s) Oral every 8 hours  isosorbide   dinitrate Tablet (ISORDIL) 10 milliGRAM(s) Oral three times a day  pantoprazole    Tablet 40 milliGRAM(s) Oral before breakfast    MEDICATIONS  (PRN):  dextrose 40% Gel 15 Gram(s) Oral once PRN Blood Glucose LESS THAN 70 milliGRAM(s)/deciliter  polyethylene glycol 3350 17 Gram(s) Oral daily PRN Constipation      Objective:    Vitals: Vital Signs Last 24 Hrs  T(C): 36.7 (19 @ 06:30), Max: 37.1 (19 @ 21:27)  T(F): 98.1 (19 @ 06:30), Max: 98.7 (19 @ 21:27)  HR: 64 (19 @ 06:30) (64 - 78)  BP: 118/71 (19 @ 06:30) (104/66 - 126/77)  RR: 19 (19 @ 06:30) (16 - 19)  SpO2: 100% (19 @ 05:27) (97% - 100%)            I&O's Summary    2019 07:  -  2019 07:00  --------------------------------------------------------  IN: 0 mL / OUT: 400 mL / NET: -400 mL        PHYSICAL EXAM:  GENERAL: NAD, lying in bed in dialysis unit  HEAD:  Atraumatic, Normocephalic  EYES: EOMI, conjunctiva and sclera clear  CHEST/LUNG: Clear to percussion anteriorily; No rales, rhonchi, wheezing, or rubs  HEART: Regular rate and rhythm; No murmurs, rubs, or gallops  ABDOMEN: Soft, Nontender, Nondistended;   SKIN: No rashes or lesions  NERVOUS SYSTEM:  Alert & Oriented X3    LABS:      137  |  97<L>  |  52<H>  ----------------------------<  110<H>  4.5   |  27  |  5.59<H>      139  |  101  |  39<H>  ----------------------------<  143<H>  4.3   |  21<L>  |  4.74<H>    Ca    8.9      2019 06:35  Ca    8.4      2019 20:25  Phos  3.7       Mg     2.2         TPro  6.7  /  Alb  3.4  /  TBili  0.4  /  DBili  x   /  AST  28  /  ALT  22  /  AlkPhos  114                      Urinalysis Basic - ( 2019 22:10 )    Color: YELLOW / Appearance: CLEAR / S.022 / pH: 6.0  Gluc: TRACE / Ketone: NEGATIVE  / Bili: NEGATIVE / Urobili: TRACE   Blood: SMALL / Protein: >600 / Nitrite: NEGATIVE   Leuk Esterase: NEGATIVE / RBC: 5-10 / WBC 3-5   Sq Epi: OCC / Non Sq Epi: x / Bacteria: FEW                              8.8    4.56  )-----------( 128      ( 2019 06:35 )             28.3                         8.8    4.70  )-----------( 81       ( 2019 20:25 )             29.1     CAPILLARY BLOOD GLUCOSE      POCT Blood Glucose.: 98 mg/dL (2019 08:27)  POCT Blood Glucose.: 100 mg/dL (2019 05:59)  POCT Blood Glucose.: 162 mg/dL (2019 01:04)  POCT Blood Glucose.: 180 mg/dL (2019 21:46)  POCT Blood Glucose.: 146 mg/dL (2019 18:19)  POCT Blood Glucose.: 87 mg/dL (2019 16:45)  POCT Blood Glucose.: 109 mg/dL (2019 11:48)      RADIOLOGY & ADDITIONAL TESTS:    Imaging Personally Reviewed:  [x ] YES  [ ] NO    Consultants involved in case:   Consultant(s) Notes Reviewed:  [ x] YES  [ ] NO:   Care Discussed with Consultants/Other Providers [x ] YES  [ ] NO

## 2019-01-29 NOTE — DISCHARGE NOTE ADULT - PLAN OF CARE
Treat You had episodes of hypoglycemia due to a long-acting oral medication which lingered in your blood because of your kidney failure. Your glyburide was discontinued in the hospital and you are not to take it at home. The endocrinologists evaluated you and recommend you check your finger sticks 2-3 times/day off oral diabetes medications and record this in a log book. When you followup with the endocrinologist in clinic, your outpatient medications can be decided. If you feel sweaty, clammy, lightheaded, check your fingerstick and drink juice if it is low. You were discharged off all oral diabetes medications due to hypoglycemia. Your A1c was 6.5% in the hospital. You received hemodialysis 1/29/2019. You have had low platelets chronically. Your platelet count improved to 128 in the hospital. You should follow up with your primary care doctor to work up your low platelet count.

## 2019-01-29 NOTE — DISCHARGE NOTE ADULT - NS AS DC FOLLOWUP STROKE INST
Influenza vaccination (VIS Pub Date: August 7, 2015)/diabetes education folder, carenotes on hypoglycemia

## 2019-01-29 NOTE — PROGRESS NOTE ADULT - ATTENDING COMMENTS
BG stable. Mild prandial elevations.  Would dc home off of DM meds (stop glyburide).  He is advised to monitor glucose levels at home 2-3 times daily and record in glucose log.  He should follow up with endocrine as outpatient to determine if medication is needed for DM management.  Endocrine office [phone].  Reviewed to control diet to help maintain glucose levels.
I spent 40 minutes coordinating this patient's discharge, explained the importance of OP F/U with endocrinology.

## 2019-01-29 NOTE — DISCHARGE NOTE ADULT - ADDITIONAL INSTRUCTIONS
-Followup with the endocrinologist at -Followup with the endocrinologist at the endocrine clinic within 1-2 weeks. Call [phone] to schedule an appointment.  -Followup with your primary care doctor within 2 weeks of discharge regarding your chronic thrombocytopenia (low platelets).  -Followup with your nephrologist regarding your end stage renal disease and dialysis.

## 2019-01-29 NOTE — DISCHARGE NOTE ADULT - SECONDARY DIAGNOSIS.
Type 2 diabetes mellitus with hyperglycemia, without long-term current use of insulin ESRD (end stage renal disease) Thrombocytopenia

## 2019-01-29 NOTE — PROGRESS NOTE ADULT - PROBLEM SELECTOR PLAN 1
- Found hypoglycemic at home to 37 in setting of sulfonylurea use (glyburide)  - FS labile, running on lower end of 100s  - Will f/u sulfonylurea level  - Glyburide d/c'd will hold on discharge  - Monitor FS qac/hs, DM management as below

## 2019-01-29 NOTE — DISCHARGE NOTE ADULT - PROVIDER TOKENS
FREE:[LAST:[Endocrinology clinic],PHONE:[(   )    -],FAX:[(   )    -],ADDRESS:[[address] [place] [address]  [phone]]] TOKEN:'[id number]',FREE:[LAST:[Endocrinology clinic],PHONE:[(   )    -],FAX:[(   )    -],ADDRESS:[[address] [place] [address]  [phone]]],TOKEN:'[id number]'

## 2019-01-29 NOTE — PROGRESS NOTE ADULT - PROBLEM SELECTOR PLAN 8
- controlled  - c/w hydralazine 100mg TID, carvedilol 6.25mg BID, isordil 10mg TID  - monitor vital signs [id number]

## 2019-01-29 NOTE — DISCHARGE NOTE ADULT - PATIENT PORTAL LINK FT
You can access the [facility] Patient Portal, offered by [facility], by registering with the following website: [email or web address]

## 2019-01-29 NOTE — DISCHARGE NOTE ADULT - HOSPITAL COURSE
HPI: 64M hx of ESRD recently started on HD (T/Th/S, via tunneled catheter 1/10, LUE fistula maturing), HFrEF (EF 28%), CAD s/p PCI in 2016, Type 2 Diabetes (A1C 6.5% on 1/11/19), recent H. pylori gastritis (completed triple therapy 1/23/18), HTN, HLD, who presents with hypoglycemia to 37. Pt. had episode of confusion and diaphoresis, FS at home was 37, after which he had a peanut butter jelly/juice and felt better. Of note, pt. went to see a new PCP, who started him on glyburide 5mg daily which he started to take since yesterday. He denies fevers, chills, cp, sob, abdominal pain, n/v/d, dysuria, sick contacts, recent travel.     In the ED - T 98, HR 65, /65, RR 16, 100% on RA  Labs notable for Hb 8.8, platelets 81, blood glucose 160, lactate 2.1    Hospital course: HPI: 64M hx of ESRD recently started on HD (T/Th/S, via tunneled catheter 1/10, LUE fistula maturing), HFrEF (EF 28%), CAD s/p PCI in 2016, Type 2 Diabetes (A1C 6.5% on 1/11/19), recent H. pylori gastritis (completed triple therapy 1/23/18), HTN, HLD, who presents with hypoglycemia to 37. Pt. had episode of confusion and diaphoresis, FS at home was 37, after which he had a peanut butter jelly/juice and felt better. Of note, pt. went to see a new PCP, who started him on glyburide 5mg daily which he started to take since yesterday. He denies fevers, chills, cp, sob, abdominal pain, n/v/d, dysuria, sick contacts, recent travel.     In the ED - T 98, HR 65, /65, RR 16, 100% on RA  Labs notable for Hb 8.8, platelets 81, blood glucose 160, lactate 2.1    Hospital course: Pt's glyburide was held. Hypoglycemia was due to long acting sulfonylurea in ESRD patient. Pt's blood glucose was monitored and he continued to have episodes of hypoglycemia. A1c was 6.5%. Pt was evaluated by endocrine. He was discharged home without any diabetes medications and instructions to check his FS at home 2-3 times/day, record in a log, and followup with endo clinic to determine outpt medication needs. He received HD 1/29. Pt was discharged home with outpt HD set up.

## 2019-01-29 NOTE — PROGRESS NOTE ADULT - SUBJECTIVE AND OBJECTIVE BOX
[facility] DIVISION OF KIDNEY DISEASES AND HYPERTENSION --   --------------------------------------------------------------------------------  Chief Complaint: ESRD/Ongoing hemodialysis requirement    24 hour events/subjective: Pt. with ESRD admitted for hypoglycemia. Pt. seen and examined during HD today. Pt. feels well and offered no complaints during HD rounds.    PAST HISTORY  --------------------------------------------------------------------------------  No significant changes to PMH, PSH, FHx, SHx, unless otherwise noted    ALLERGIES & MEDICATIONS  --------------------------------------------------------------------------------  Allergies    No Known Allergies    Intolerances    Standing Inpatient Medications  aspirin enteric coated 81 milliGRAM(s) Oral daily  atorvastatin 80 milliGRAM(s) Oral at bedtime  buMETAnide 6 milliGRAM(s) Oral two times a day  calcium acetate 667 milliGRAM(s) Oral three times a day with meals  carvedilol 6.25 milliGRAM(s) Oral every 12 hours  dextrose 50% Injectable 25 Gram(s) IV Push once  ferrous    sulfate 325 milliGRAM(s) Oral daily  hydrALAZINE 100 milliGRAM(s) Oral every 8 hours  isosorbide   dinitrate Tablet (ISORDIL) 10 milliGRAM(s) Oral three times a day  pantoprazole    Tablet 40 milliGRAM(s) Oral before breakfast    PRN Inpatient Medications  dextrose 40% Gel 15 Gram(s) Oral once PRN  polyethylene glycol 3350 17 Gram(s) Oral daily PRN    REVIEW OF SYSTEMS  --------------------------------------------------------------------------------  Gen: No fever  Respiratory: No dyspnea  CV: No chest pain  GI: No abdominal pain  Neuro: No dizziness  LE: Edema B/L+    VITALS/PHYSICAL EXAM  --------------------------------------------------------------------------------  T(C): 36.7 (01-29-19 @ 06:30), Max: 37.1 (01-28-19 @ 21:27)  HR: 64 (01-29-19 @ 06:30) (64 - 78)  BP: 118/71 (01-29-19 @ 06:30) (104/66 - 134/80)  RR: 19 (01-29-19 @ 06:30) (16 - 19)  SpO2: 100% (01-29-19 @ 05:27) (97% - 100%)  Wt(kg): --  Height (cm): 162.56 (01-28-19 @ 05:19)  Weight (kg): 81.3 (01-28-19 @ 05:19)  BMI (kg/m2): 30.8 (01-28-19 @ 05:19)  BSA (m2): 1.87 (01-28-19 @ 05:19)    01-28-19 @ 07:01  -  01-29-19 @ 07:00  --------------------------------------------------------  IN: 0 mL / OUT: 400 mL / NET: -400 mL    Physical Exam:  Gen: resting, NAD  	HEENT: No JVD  	Pulm: CTA B/L  	CV: S1S2+  	Abd: Soft  	LE: No edema  	Vascular access: Right IJ tunneled HD catheter site: no bleeding, LUE AVF: +bruit  LABS/STUDIES  --------------------------------------------------------------------------------              8.8    4.56  >-----------<  x        [01-29-19 @ 06:35]              28.3     137  |  97  |  52  ----------------------------<  110      [01-29-19 @ 06:35]  4.5   |  27  |  5.59        Ca     8.9     [01-29-19 @ 06:35]      Mg     2.2     [01-29-19 @ 06:35]      Phos  3.7     [01-29-19 @ 06:35]    TPro  6.7  /  Alb  3.4  /  TBili  0.4  /  DBili  x   /  AST  28  /  ALT  22  /  AlkPhos  114  [01-27-19 @ 20:25]    HBsAb 42.8      [01-10-19 @ 21:10]  HBsAg NEGATIVE      [01-10-19 @ 05:15]  HCV 0.11, Nonreactive Hepatitis C AB  S/CO Ratio                        Interpretation  < 1.0                                     Non-Reactive  1.0 - 4.9                           Weakly-Reactive  > 5.0                                 Reactive  Non-Reactive: Aperson with a non-reactive HCV antibody  result is considered uninfected.  No further action is  needed unless recent infection is suspected.  In these  cases, consider repeat testing later to detect  seroconversion..  Weakly-Reactive: HCV antibody test is abnormal, HCV RNA  Qualitative test will follow.  Reactive: HCV antibody test is abnormal, HCV RNA  Qualitative test will follow.  Note: HCV antibody testing is performed on the [employer] system.      [01-10-19 @ 05:15]

## 2019-01-29 NOTE — PROGRESS NOTE ADULT - ASSESSMENT
ASSESSMENT:  64M, with PMHx of ESRD recently started on HD (T/Th/S, via tunneled catheter 1/10, LUE fistula maturing), HFrEF (EF 28%), CAD s/p PCI in 2016, Type 2 Diabetes (A1C 6.5% on 1/11/19), recent H. pylori gastritis (01/2018, s/p triple therapy), HTN, HLD, who presents with hypoglycemia to 37, likely 2/2 sulfonylurea use in the setting of ESRD.     PLAN:  1. Hypoglycemia 2/2 glyburide use in setting of ESRD  - Hold glyburide  - Hold insulin sliding scale for now because sulfonylureas can remain in one's system for up to 3 days.  - C/w FS before meals and at bedtime  - f/u fructosamine level to correlate with HgbA1C  - Patient likely does not require very tight glycemic control. Will leave final recommendations on discharge medications, depending on blood glucose measurements today  - Patient should follow up with an endocrinologist from now on, as diabetes management in ESRD should be individualized. Provided patient with information and number for follow-up.      Will discuss with team.      Nallely Tejeda MD  Internal Medicine, PGY-1 ASSESSMENT:  64M, with PMHx of ESRD recently started on HD (T/Th/S, via tunneled catheter 1/10, LUE fistula maturing), HFrEF (EF 28%), CAD s/p PCI in 2016, Type 2 Diabetes (A1C 6.5% on 1/11/19), recent H. pylori gastritis (01/2018, s/p triple therapy), HTN, HLD, who presents with hypoglycemia to 37, likely 2/2 sulfonylurea use in the setting of ESRD.     PLAN:  1. Hypoglycemia 2/2 glyburide use in setting of ESRD  - Hold glyburide  - Hold insulin sliding scale for now because sulfonylureas can remain in one's system for up to 3 days.  - C/w FS before meals and at bedtime  - f/u fructosamine level to correlate with HgbA1C  - Patient likely does not require very tight glycemic control. Risks of hypoglycemia outweigh benefits of tight glycemic control. Permissible to discharge patient without diabetes medication, under the stipulation that the patient adheres to a diabetic diet at home and tracks his fingersticks 2 to 3 times per day while at home. When patient follows up as an outpatient with his home FS readings, the decision can be made as an outpatient whether to start patient on a medication for glycemic control.  - Patient should follow up with Endocrinology at [address] [place] [address]. [phone]. Please instruct patient to follow a diabetic diet while at home and that he should record his FS 2 to 3 times per day and bring these to his appointment.    Discussed with team.      Nallely Tejeda MD  Internal Medicine, PGY-1

## 2019-01-29 NOTE — DISCHARGE NOTE ADULT - CARE PROVIDER_API CALL
Endocrinology clinic,   [address] [place] [address] [phone]  Phone: (   )    -  Fax: (   )    - Giovanny Pavon), Nephrology  [address] [place] [address]  [address] [place] [address]  Phone: [phone]  Fax: [phone]    Endocrinology clinic,   [address] [place] [id number]  [phone]  Phone: (   )    -  Fax: (   )    -    Cierra Gill), Internal Medicine  [address] [place] [address]  Phone: [phone]  Fax: [phone]

## 2019-01-30 ENCOUNTER — APPOINTMENT (OUTPATIENT)
Dept: NEPHROLOGY | Facility: HOSPITAL | Age: 54
End: 2019-01-30

## 2019-01-31 ENCOUNTER — APPOINTMENT (OUTPATIENT)
Dept: VASCULAR SURGERY | Facility: CLINIC | Age: 54
End: 2019-01-31

## 2019-02-01 PROBLEM — N18.6 END STAGE RENAL DISEASE: Chronic | Status: ACTIVE | Noted: 2019-01-27

## 2019-02-07 LAB — SULFONYLUREA SERPL-MCNC: SIGNIFICANT CHANGE UP

## 2019-02-13 ENCOUNTER — APPOINTMENT (OUTPATIENT)
Dept: CARDIOLOGY | Facility: CLINIC | Age: 54
End: 2019-02-13
Payer: MEDICAID

## 2019-02-13 ENCOUNTER — NON-APPOINTMENT (OUTPATIENT)
Age: 54
End: 2019-02-13

## 2019-02-13 VITALS
BODY MASS INDEX: 29.82 KG/M2 | WEIGHT: 179 LBS | OXYGEN SATURATION: 100 % | DIASTOLIC BLOOD PRESSURE: 77 MMHG | HEIGHT: 65 IN | SYSTOLIC BLOOD PRESSURE: 129 MMHG | HEART RATE: 68 BPM

## 2019-02-13 PROCEDURE — 93000 ELECTROCARDIOGRAM COMPLETE: CPT

## 2019-02-13 PROCEDURE — 99215 OFFICE O/P EST HI 40 MIN: CPT

## 2019-02-13 RX ORDER — METOLAZONE 5 MG/1
5 TABLET ORAL
Qty: 30 | Refills: 0 | Status: DISCONTINUED | COMMUNITY
Start: 2018-10-12 | End: 2019-02-13

## 2019-02-14 NOTE — HISTORY OF PRESENT ILLNESS
[FreeTextEntry1] : 53 year old M w/ h/o IDDM c/b neuropathy (A1c 5.7 12/18) , HTN, CAD (s/p stent in [place] in 2016, unknown coronary anatomy), HFrEF (EF 25%, LVEDD 6.6 cm), severe MR (functional), ESRD on HD via permacath (AV fistula awaiting patency), recent GIB 2/2 ulcer (H. pylori positive) who is here for follow up. \par \par Since last hospitalization, has been on HD (T/Th/sat) which he gets at [address] Still urinates but less than prior. Has been taking bumex 4 mg twice daily (even on days he doesn't do HD).  \par \par Weight at home has ranged 175-177. Adherent to low sodium and fluid restriction. Able to ambulate w/o limitation. Denies SOB. Denies orthopnea/PND. Uses 1 pillow. Able to climb 6 stairs w/o difficulty. \par \par Denies palpitations. \par \par Was given triple therapy for H. pylori in hospital (1/10-1/15) but was not continued post-discharge as pharmacy did not give it to him. \par

## 2019-02-15 ENCOUNTER — OUTPATIENT (OUTPATIENT)
Dept: OUTPATIENT SERVICES | Facility: HOSPITAL | Age: 54
LOS: 1 days | End: 2019-02-15

## 2019-02-15 ENCOUNTER — APPOINTMENT (OUTPATIENT)
Dept: INTERNAL MEDICINE | Facility: HOSPITAL | Age: 54
End: 2019-02-15
Payer: MEDICAID

## 2019-02-15 ENCOUNTER — LABORATORY RESULT (OUTPATIENT)
Age: 54
End: 2019-02-15

## 2019-02-15 VITALS
SYSTOLIC BLOOD PRESSURE: 135 MMHG | WEIGHT: 180.78 LBS | HEIGHT: 65 IN | BODY MASS INDEX: 30.12 KG/M2 | HEART RATE: 64 BPM | DIASTOLIC BLOOD PRESSURE: 89 MMHG

## 2019-02-15 DIAGNOSIS — Z95.5 PRESENCE OF CORONARY ANGIOPLASTY IMPLANT AND GRAFT: Chronic | ICD-10-CM

## 2019-02-15 DIAGNOSIS — T82.868A THROMBOSIS DUE TO VASCULAR PROSTHETIC DEVICES, IMPLANTS AND GRAFTS, INITIAL ENCOUNTER: Chronic | ICD-10-CM

## 2019-02-15 LAB
ACANTHOCYTES BLD QL SMEAR: SLIGHT — SIGNIFICANT CHANGE UP
ANISOCYTOSIS BLD QL: SLIGHT — SIGNIFICANT CHANGE UP
BASOPHILS # BLD AUTO: 0.06 K/UL — SIGNIFICANT CHANGE UP (ref 0–0.2)
BASOPHILS NFR BLD AUTO: 1.1 % — SIGNIFICANT CHANGE UP (ref 0–2)
EOSINOPHIL # BLD AUTO: 0.24 K/UL — SIGNIFICANT CHANGE UP (ref 0–0.5)
EOSINOPHIL NFR BLD AUTO: 4.5 % — SIGNIFICANT CHANGE UP (ref 0–6)
HCT VFR BLD CALC: 36.4 % — LOW (ref 39–50)
HGB BLD-MCNC: 11.3 G/DL — LOW (ref 13–17)
IMM GRANULOCYTES NFR BLD AUTO: 0.4 % — SIGNIFICANT CHANGE UP (ref 0–1.5)
LYMPHOCYTES # BLD AUTO: 1.12 K/UL — SIGNIFICANT CHANGE UP (ref 1–3.3)
LYMPHOCYTES # BLD AUTO: 21.1 % — SIGNIFICANT CHANGE UP (ref 13–44)
MACROCYTES BLD QL: SLIGHT — SIGNIFICANT CHANGE UP
MCHC RBC-ENTMCNC: 30.3 PG — SIGNIFICANT CHANGE UP (ref 27–34)
MCHC RBC-ENTMCNC: 31 % — LOW (ref 32–36)
MCV RBC AUTO: 97.6 FL — SIGNIFICANT CHANGE UP (ref 80–100)
MONOCYTES # BLD AUTO: 0.5 K/UL — SIGNIFICANT CHANGE UP (ref 0–0.9)
MONOCYTES NFR BLD AUTO: 9.4 % — SIGNIFICANT CHANGE UP (ref 2–14)
NEUTROPHILS # BLD AUTO: 3.38 K/UL — SIGNIFICANT CHANGE UP (ref 1.8–7.4)
NEUTROPHILS NFR BLD AUTO: 63.5 % — SIGNIFICANT CHANGE UP (ref 43–77)
NRBC # FLD: 0 K/UL — LOW (ref 25–125)
NT-PROBNP SERPL-SCNC: SIGNIFICANT CHANGE UP PG/ML
OVALOCYTES BLD QL SMEAR: SLIGHT — SIGNIFICANT CHANGE UP
PLATELET # BLD AUTO: 72 K/UL — LOW (ref 150–400)
PLATELET COUNT - ESTIMATE: SIGNIFICANT CHANGE UP
PMV BLD: 11.4 FL — SIGNIFICANT CHANGE UP (ref 7–13)
POIKILOCYTOSIS BLD QL AUTO: SIGNIFICANT CHANGE UP
RBC # BLD: 3.73 M/UL — LOW (ref 4.2–5.8)
RBC # FLD: 18.8 % — HIGH (ref 10.3–14.5)
WBC # BLD: 5.32 K/UL — SIGNIFICANT CHANGE UP (ref 3.8–10.5)
WBC # FLD AUTO: 5.32 K/UL — SIGNIFICANT CHANGE UP (ref 3.8–10.5)

## 2019-02-15 PROCEDURE — 99213 OFFICE O/P EST LOW 20 MIN: CPT | Mod: GE

## 2019-02-19 DIAGNOSIS — I10 ESSENTIAL (PRIMARY) HYPERTENSION: ICD-10-CM

## 2019-02-19 DIAGNOSIS — I50.9 HEART FAILURE, UNSPECIFIED: ICD-10-CM

## 2019-02-19 DIAGNOSIS — N18.9 CHRONIC KIDNEY DISEASE, UNSPECIFIED: ICD-10-CM

## 2019-02-19 DIAGNOSIS — A04.8 OTHER SPECIFIED BACTERIAL INTESTINAL INFECTIONS: ICD-10-CM

## 2019-02-19 NOTE — END OF VISIT
[] : Resident [FreeTextEntry3] : 53yoF with DM2, HTN, HLD, CAD< HFrEF presents for follow up visit and post-hospitalization visit. Patient was recently hospitalized for GIB and was diagnosed with H. pylori - he was given a few days of triple therapy while hospitalized (with clarithromycin) but upon discharge, pharmacy would not dispense clarithromycin due to high intensity statin use. Discussed with our pharmacy team and will treat patient with quadruple therapy (renally dosed) due to contraindication for clarithromycin and high intensity statin. DM - A1c 5.7 in December. HTN - controlled today, continue current medications.

## 2019-02-19 NOTE — ASSESSMENT
[FreeTextEntry1] : Patient is a 53-year-old male with long standing history of DM2 (~30 years), HTN, diabetic retinopathy (s/p laser eye surgery), diabetic neuropathy, HLD, CAD (s/p stent placement in 4/2016) and HFrEF (EF of 25%), CKD5 (s/p AVF), presenting for follow-up visit\par \par # H. pylori\par - patient started on triple therapy, however did not complete it as Clarithromycin is contraindicated with high dose statins\par - patient currently denying any abdominal pain, BRBPR, melena\par  - will start patient on quadruple therapy x14 days\par - Protonix 40 BID, bismuth 300 q6, tetracycline 500 qd, and flagyl 250 q6 sent to patients pharmacy\par - GI referral given for follow up \par \par #HErEF\par - EF of 25% on LUCIANO from June 2018\par - Follows with HF. As per HF, continue Coreg 12.5 BID\par - Continue with Bumex 4mg BID\par - advised to follow a low Na diet, weigh himself daily and call if he gains >1 lbs per day or >3 lbs per week\par \par #CAD\par - Nuclear stress test in Nov 2018 showing large, moderate to severe defects in anteriorand apical, inferior walls that are predominantly fixed, suggestive of infarct with mild ischemia in the mid anterior wall\par - continue with ASA and Atorvastatin\par - f/u Cards recs\par \par #HTN\par - BP WNL today\par - c/w hydralazine 100 mg TID and isordil 40 mg TID\par \par #CKD stage 5\par - 2/2 diabetic nephropathy vs uncontrolled HTN\par - s/p AVF creation on 1/14/19\par - f/u Renal recs\par - advised to follow a low K diet\par \par #DM2\par - most recent A1c of 5.7 in Dec 2018\par - patient counselled on consistent carb diet\par \par #Brandyn care maintenance \par - UTD flu shot \par - Pneumovax and Tdap received on 4/18\par - will discuss colon CA screening at next visit\par \par RTC in 10 weeks\par \par D/W Dr. Patel \par

## 2019-02-19 NOTE — HISTORY OF PRESENT ILLNESS
[FreeTextEntry1] : Patient is a 53-year-old male with long standing history of DM2 (~30 years), HTN, diabetic retinopathy (s/p laser eye surgery), diabetic neuropathy, HLD, CAD (s/p stent placement in 4/2016) and HFrEF (EF of 25%), presenting for follow-up visit [de-identified] : Patient with recent multiple hospitalizations in January. One of his hospitalizations was for permacath placement and AVF formation. His other hospitalization was for GIB, in which he was diagnosed with H.pylori. Patient was d/davin on triple therapy, however pharmacy would not dispense Clarithromycin as it is contraindicated with high dose statins. \par \par Patient today with no complaints, reporting sleeping on 1 pillow and able to walk >2 blocks without having to stop to rest. Patient reports that his urine output has decreased slightly after starting dialysis. Patient also denies any abdominal pain, N/V, melena, BRBPR.

## 2019-02-22 NOTE — ED ADULT TRIAGE NOTE - MEANS OF ARRIVAL
Patient is requesting a refill of Clozepate  sent to [place]  pharmacy.   The patient is completely out of the medication.   ambulatory

## 2019-03-01 ENCOUNTER — APPOINTMENT (OUTPATIENT)
Dept: VASCULAR SURGERY | Facility: CLINIC | Age: 54
End: 2019-03-01
Payer: MEDICAID

## 2019-03-01 VITALS
HEART RATE: 66 BPM | TEMPERATURE: 98.2 F | BODY MASS INDEX: 29.99 KG/M2 | HEIGHT: 65 IN | WEIGHT: 180 LBS | DIASTOLIC BLOOD PRESSURE: 84 MMHG | SYSTOLIC BLOOD PRESSURE: 140 MMHG

## 2019-03-01 PROCEDURE — 93990 DOPPLER FLOW TESTING: CPT

## 2019-03-01 PROCEDURE — 99024 POSTOP FOLLOW-UP VISIT: CPT

## 2019-03-01 NOTE — PHYSICAL EXAM
[Carotid Bruits] : no carotid bruits [Normal Breath Sounds] : Normal breath sounds [Normal Heart Sounds] : normal heart sounds [2+] : left 2+ [de-identified] : awake, alert [de-identified] : no lad [FreeTextEntry1] : L hand warm.  incision clean.  +palp thrill.  no gross motor/sensory deficits

## 2019-03-07 ENCOUNTER — APPOINTMENT (OUTPATIENT)
Dept: VASCULAR SURGERY | Facility: CLINIC | Age: 54
End: 2019-03-07

## 2019-03-12 ENCOUNTER — NON-APPOINTMENT (OUTPATIENT)
Age: 54
End: 2019-03-12

## 2019-03-12 ENCOUNTER — APPOINTMENT (OUTPATIENT)
Dept: ELECTROPHYSIOLOGY | Facility: CLINIC | Age: 54
End: 2019-03-12
Payer: MEDICAID

## 2019-03-12 VITALS
HEART RATE: 76 BPM | RESPIRATION RATE: 14 BRPM | SYSTOLIC BLOOD PRESSURE: 136 MMHG | WEIGHT: 180 LBS | HEIGHT: 65 IN | BODY MASS INDEX: 29.99 KG/M2 | DIASTOLIC BLOOD PRESSURE: 88 MMHG | OXYGEN SATURATION: 98 %

## 2019-03-12 PROCEDURE — 93000 ELECTROCARDIOGRAM COMPLETE: CPT

## 2019-03-12 PROCEDURE — 99205 OFFICE O/P NEW HI 60 MIN: CPT

## 2019-03-12 NOTE — PHYSICAL EXAM
[General Appearance - Well Developed] : well developed [Normal Appearance] : normal appearance [Well Groomed] : well groomed [General Appearance - Well Nourished] : well nourished [No Deformities] : no deformities [General Appearance - In No Acute Distress] : no acute distress [Normal Conjunctiva] : the conjunctiva exhibited no abnormalities [Eyelids - No Xanthelasma] : the eyelids demonstrated no xanthelasmas [Normal Oral Mucosa] : normal oral mucosa [No Oral Pallor] : no oral pallor [No Oral Cyanosis] : no oral cyanosis [Normal Jugular Venous A Waves Present] : normal jugular venous A waves present [Normal Jugular Venous V Waves Present] : normal jugular venous V waves present [No Jugular Venous Jacobo A Waves] : no jugular venous jacobo A waves [Heart Rate And Rhythm] : heart rate and rhythm were normal [Heart Sounds] : normal S1 and S2 [Murmurs] : no murmurs present [Respiration, Rhythm And Depth] : normal respiratory rhythm and effort [Exaggerated Use Of Accessory Muscles For Inspiration] : no accessory muscle use [Auscultation Breath Sounds / Voice Sounds] : lungs were clear to auscultation bilaterally [Abdomen Soft] : soft [Abdomen Tenderness] : non-tender [Abdomen Mass (___ Cm)] : no abdominal mass palpated [Abnormal Walk] : normal gait [Gait - Sufficient For Exercise Testing] : the gait was sufficient for exercise testing [Nail Clubbing] : no clubbing of the fingernails [Cyanosis, Localized] : no localized cyanosis [Petechial Hemorrhages (___cm)] : no petechial hemorrhages [Skin Color & Pigmentation] : normal skin color and pigmentation [] : no rash [No Venous Stasis] : no venous stasis [Skin Lesions] : no skin lesions [No Skin Ulcers] : no skin ulcer [No Xanthoma] : no  xanthoma was observed [Oriented To Time, Place, And Person] : oriented to person, place, and time [Affect] : the affect was normal [Mood] : the mood was normal [No Anxiety] : not feeling anxious

## 2019-03-13 ENCOUNTER — APPOINTMENT (OUTPATIENT)
Dept: VASCULAR SURGERY | Facility: HOSPITAL | Age: 54
End: 2019-03-13

## 2019-03-13 ENCOUNTER — OUTPATIENT (OUTPATIENT)
Dept: OUTPATIENT SERVICES | Facility: HOSPITAL | Age: 54
LOS: 1 days | Discharge: HOME | End: 2019-03-13
Payer: MEDICAID

## 2019-03-13 DIAGNOSIS — N18.9 CHRONIC KIDNEY DISEASE, UNSPECIFIED: ICD-10-CM

## 2019-03-13 DIAGNOSIS — Z95.5 PRESENCE OF CORONARY ANGIOPLASTY IMPLANT AND GRAFT: Chronic | ICD-10-CM

## 2019-03-13 DIAGNOSIS — T82.868A THROMBOSIS DUE TO VASCULAR PROSTHETIC DEVICES, IMPLANTS AND GRAFTS, INITIAL ENCOUNTER: Chronic | ICD-10-CM

## 2019-03-13 PROCEDURE — 36902 INTRO CATH DIALYSIS CIRCUIT: CPT | Mod: 58

## 2019-03-13 PROCEDURE — 99152 MOD SED SAME PHYS/QHP 5/>YRS: CPT

## 2019-03-13 RX ORDER — POLYETHYLENE GLYCOL 3350 17 G/17G
17 POWDER, FOR SOLUTION ORAL
Qty: 0 | Refills: 0 | COMMUNITY

## 2019-03-13 RX ORDER — SODIUM CHLORIDE 9 MG/ML
3 INJECTION INTRAMUSCULAR; INTRAVENOUS; SUBCUTANEOUS EVERY 8 HOURS
Qty: 0 | Refills: 0 | Status: DISCONTINUED | OUTPATIENT
Start: 2019-03-13 | End: 2019-03-28

## 2019-03-13 NOTE — H&P CARDIOLOGY - HISTORY OF PRESENT ILLNESS
Patient is a 54 year old man with PMHx of HTN, HLD, DM (diet and exercise controlled), CAD s/p MI/stents, cardiomyopathy, and ESRD on HD (Tues, Thurs, Sat) arrived today for fistulogram. Patient had an AV fistula created on 1/16/19 and is here today for balloon assisted maturation. Patient denies any other symptoms at this time.

## 2019-03-14 NOTE — HISTORY OF PRESENT ILLNESS
[FreeTextEntry1] : Enio Pavon MD\par \par I saw Devan Lorenzo on March 12, 2019. As you know, he is a 55y/o man with Hx of HTN, HLD, DM on insulin, ESRD now s/p right AVF placement and on HD, CAD, and chronic systolic CHF, LVEF 20-25%, who presents today for initial evaluation for possible ICD placement. Admits doing well with stable dyspnea on exertion. Denies chest pain, palpitations, SOB at rest, syncope or near syncope. Has had persistent low EF, 26%, dating back to 2/2018 despite OMT.

## 2019-03-14 NOTE — DISCUSSION/SUMMARY
[FreeTextEntry1] : In summary, Devan Lorenzo is a 55y/o man with Hx of HTN, HLD, DM on insulin, ESRD now s/p right AVF placement and on HD, CAD, and chronic systolic CHF, LVEF 20-25%, who presents today for initial evaluation for possible ICD placement. Admits doing well with stable dyspnea on exertion. Denies chest pain, palpitations, SOB at rest, syncope or near syncope. Has had persistent low EF, 26%, dating back to 2/2018 despite OMT. Given persistently low EF, < 35%, recommend placement of ICD for primary prevention of SCD. Would recommend subq placement given dialysis to lower risk of infection. A thorough discussion was had with the patient concerning all aspects of ICD therapy. We reviewed the data supporting ICD therapy and how it applies individually.  We discussed the procedures, risks and outcomes of ICD implantation an living with an ICD. We discussed management of ICD therapy throughout life, including deactivation of the ICD. After all questions were answered, and literature from the [facility] was provided, it was a shared decision to proceed with ICD therapy. [employer] representatives present and patient deemed a candidate for subq ICD placement. Will schedule for procedure and RTO for f/u post placement. \par \par Sincerely,\par \par Kwame Ornelas MD

## 2019-03-19 ENCOUNTER — APPOINTMENT (OUTPATIENT)
Dept: ENDOCRINOLOGY | Facility: CLINIC | Age: 54
End: 2019-03-19
Payer: MEDICAID

## 2019-03-19 LAB
GLUCOSE BLDC GLUCOMTR-MCNC: 141
HBA1C MFR BLD HPLC: 6.5

## 2019-03-19 PROCEDURE — 82962 GLUCOSE BLOOD TEST: CPT

## 2019-03-19 PROCEDURE — G0108 DIAB MANAGE TRN  PER INDIV: CPT

## 2019-03-22 ENCOUNTER — APPOINTMENT (OUTPATIENT)
Dept: VASCULAR SURGERY | Facility: CLINIC | Age: 54
End: 2019-03-22

## 2019-03-27 ENCOUNTER — APPOINTMENT (OUTPATIENT)
Dept: CV DIAGNOSITCS | Facility: HOSPITAL | Age: 54
End: 2019-03-27

## 2019-03-27 ENCOUNTER — INPATIENT (INPATIENT)
Facility: HOSPITAL | Age: 54
LOS: 0 days | Discharge: HOME HEALTH | End: 2019-03-28
Attending: INTERNAL MEDICINE | Admitting: INTERNAL MEDICINE
Payer: MEDICAID

## 2019-03-27 VITALS
DIASTOLIC BLOOD PRESSURE: 93 MMHG | RESPIRATION RATE: 18 BRPM | HEART RATE: 84 BPM | OXYGEN SATURATION: 99 % | SYSTOLIC BLOOD PRESSURE: 131 MMHG | TEMPERATURE: 97 F

## 2019-03-27 DIAGNOSIS — T82.868A THROMBOSIS DUE TO VASCULAR PROSTHETIC DEVICES, IMPLANTS AND GRAFTS, INITIAL ENCOUNTER: Chronic | ICD-10-CM

## 2019-03-27 DIAGNOSIS — Z95.5 PRESENCE OF CORONARY ANGIOPLASTY IMPLANT AND GRAFT: Chronic | ICD-10-CM

## 2019-03-27 DIAGNOSIS — I50.22 CHRONIC SYSTOLIC (CONGESTIVE) HEART FAILURE: ICD-10-CM

## 2019-03-27 LAB
ANION GAP SERPL CALC-SCNC: 12 MMO/L — SIGNIFICANT CHANGE UP (ref 7–14)
BUN SERPL-MCNC: 37 MG/DL — HIGH (ref 7–23)
CALCIUM SERPL-MCNC: 9 MG/DL — SIGNIFICANT CHANGE UP (ref 8.4–10.5)
CHLORIDE SERPL-SCNC: 105 MMOL/L — SIGNIFICANT CHANGE UP (ref 98–107)
CO2 SERPL-SCNC: 24 MMOL/L — SIGNIFICANT CHANGE UP (ref 22–31)
CREAT SERPL-MCNC: 3.83 MG/DL — HIGH (ref 0.5–1.3)
GLUCOSE BLDC GLUCOMTR-MCNC: 102 MG/DL — HIGH (ref 70–99)
GLUCOSE BLDC GLUCOMTR-MCNC: 294 MG/DL — HIGH (ref 70–99)
GLUCOSE SERPL-MCNC: 118 MG/DL — HIGH (ref 70–99)
HCT VFR BLD CALC: 37 % — LOW (ref 39–50)
HGB BLD-MCNC: 11.6 G/DL — LOW (ref 13–17)
MCHC RBC-ENTMCNC: 30.1 PG — SIGNIFICANT CHANGE UP (ref 27–34)
MCHC RBC-ENTMCNC: 31.4 % — LOW (ref 32–36)
MCV RBC AUTO: 95.9 FL — SIGNIFICANT CHANGE UP (ref 80–100)
NRBC # FLD: 0 K/UL — SIGNIFICANT CHANGE UP (ref 0–0)
PLATELET # BLD AUTO: 69 K/UL — LOW (ref 150–400)
PMV BLD: 11.4 FL — SIGNIFICANT CHANGE UP (ref 7–13)
POTASSIUM SERPL-MCNC: 4.9 MMOL/L — SIGNIFICANT CHANGE UP (ref 3.5–5.3)
POTASSIUM SERPL-SCNC: 4.9 MMOL/L — SIGNIFICANT CHANGE UP (ref 3.5–5.3)
RBC # BLD: 3.86 M/UL — LOW (ref 4.2–5.8)
RBC # FLD: 14.8 % — HIGH (ref 10.3–14.5)
SODIUM SERPL-SCNC: 141 MMOL/L — SIGNIFICANT CHANGE UP (ref 135–145)
WBC # BLD: 5.48 K/UL — SIGNIFICANT CHANGE UP (ref 3.8–10.5)
WBC # FLD AUTO: 5.48 K/UL — SIGNIFICANT CHANGE UP (ref 3.8–10.5)

## 2019-03-27 PROCEDURE — 93010 ELECTROCARDIOGRAM REPORT: CPT

## 2019-03-27 PROCEDURE — 71045 X-RAY EXAM CHEST 1 VIEW: CPT | Mod: 26

## 2019-03-27 PROCEDURE — 33270 INS/REP SUBQ DEFIBRILLATOR: CPT

## 2019-03-27 PROCEDURE — 93306 TTE W/DOPPLER COMPLETE: CPT | Mod: 26

## 2019-03-27 RX ORDER — DESMOPRESSIN ACETATE 0.1 MG/1
24 TABLET ORAL ONCE
Qty: 0 | Refills: 0 | Status: DISCONTINUED | OUTPATIENT
Start: 2019-03-27 | End: 2019-03-27

## 2019-03-27 RX ORDER — ISOSORBIDE DINITRATE 5 MG/1
10 TABLET ORAL THREE TIMES A DAY
Qty: 0 | Refills: 0 | Status: DISCONTINUED | OUTPATIENT
Start: 2019-03-27 | End: 2019-03-28

## 2019-03-27 RX ORDER — ACETAMINOPHEN 500 MG
1000 TABLET ORAL ONCE
Qty: 0 | Refills: 0 | Status: DISCONTINUED | OUTPATIENT
Start: 2019-03-27 | End: 2019-03-28

## 2019-03-27 RX ORDER — HYDROMORPHONE HYDROCHLORIDE 2 MG/ML
0.5 INJECTION INTRAMUSCULAR; INTRAVENOUS; SUBCUTANEOUS
Qty: 0 | Refills: 0 | Status: DISCONTINUED | OUTPATIENT
Start: 2019-03-27 | End: 2019-03-28

## 2019-03-27 RX ORDER — DESMOPRESSIN ACETATE 0.1 MG/1
24 TABLET ORAL ONCE
Qty: 0 | Refills: 0 | Status: COMPLETED | OUTPATIENT
Start: 2019-03-27 | End: 2019-03-27

## 2019-03-27 RX ORDER — CALCIUM ACETATE 667 MG
667 TABLET ORAL
Qty: 0 | Refills: 0 | Status: DISCONTINUED | OUTPATIENT
Start: 2019-03-27 | End: 2019-03-28

## 2019-03-27 RX ORDER — CARVEDILOL PHOSPHATE 80 MG/1
12.5 CAPSULE, EXTENDED RELEASE ORAL EVERY 12 HOURS
Qty: 0 | Refills: 0 | Status: DISCONTINUED | OUTPATIENT
Start: 2019-03-27 | End: 2019-03-28

## 2019-03-27 RX ORDER — BUMETANIDE 0.25 MG/ML
2 INJECTION INTRAMUSCULAR; INTRAVENOUS
Qty: 0 | Refills: 0 | Status: DISCONTINUED | OUTPATIENT
Start: 2019-03-27 | End: 2019-03-28

## 2019-03-27 RX ORDER — HYDRALAZINE HCL 50 MG
100 TABLET ORAL EVERY 8 HOURS
Qty: 0 | Refills: 0 | Status: DISCONTINUED | OUTPATIENT
Start: 2019-03-27 | End: 2019-03-28

## 2019-03-27 RX ORDER — SODIUM CHLORIDE 9 MG/ML
3 INJECTION INTRAMUSCULAR; INTRAVENOUS; SUBCUTANEOUS EVERY 8 HOURS
Qty: 0 | Refills: 0 | Status: DISCONTINUED | OUTPATIENT
Start: 2019-03-27 | End: 2019-03-28

## 2019-03-27 RX ORDER — ATORVASTATIN CALCIUM 80 MG/1
80 TABLET, FILM COATED ORAL AT BEDTIME
Qty: 0 | Refills: 0 | Status: DISCONTINUED | OUTPATIENT
Start: 2019-03-27 | End: 2019-03-28

## 2019-03-27 RX ORDER — ASPIRIN/CALCIUM CARB/MAGNESIUM 324 MG
81 TABLET ORAL DAILY
Qty: 0 | Refills: 0 | Status: DISCONTINUED | OUTPATIENT
Start: 2019-03-27 | End: 2019-03-28

## 2019-03-27 RX ADMIN — Medication 667 MILLIGRAM(S): at 19:01

## 2019-03-27 RX ADMIN — DESMOPRESSIN ACETATE 224 MICROGRAM(S): 0.1 TABLET ORAL at 17:15

## 2019-03-27 RX ADMIN — ATORVASTATIN CALCIUM 80 MILLIGRAM(S): 80 TABLET, FILM COATED ORAL at 21:25

## 2019-03-27 RX ADMIN — CARVEDILOL PHOSPHATE 12.5 MILLIGRAM(S): 80 CAPSULE, EXTENDED RELEASE ORAL at 19:01

## 2019-03-27 RX ADMIN — SODIUM CHLORIDE 3 MILLILITER(S): 9 INJECTION INTRAMUSCULAR; INTRAVENOUS; SUBCUTANEOUS at 21:24

## 2019-03-27 RX ADMIN — ISOSORBIDE DINITRATE 10 MILLIGRAM(S): 5 TABLET ORAL at 21:26

## 2019-03-27 RX ADMIN — Medication 100 MILLIGRAM(S): at 21:26

## 2019-03-27 RX ADMIN — SODIUM CHLORIDE 3 MILLILITER(S): 9 INJECTION INTRAMUSCULAR; INTRAVENOUS; SUBCUTANEOUS at 17:16

## 2019-03-27 RX ADMIN — BUMETANIDE 2 MILLIGRAM(S): 0.25 INJECTION INTRAMUSCULAR; INTRAVENOUS at 19:01

## 2019-03-27 NOTE — H&P CARDIOLOGY - VASCULAR DETAILS
left AVF with heave/thrill   right anterior chest dialysis catheter with clean dressing, nontender, no bleed

## 2019-03-27 NOTE — CHART NOTE - NSCHARTNOTEFT_GEN_A_CORE
Dr Ornelas aware of PLT 69 prior to procedure, known thrombocytopenia in the past, given DDAVP prior to procedure  subcutaneous defibrillator site stable with 2 drops of blood on dressing, no hematoma. patient to be admitted to tele, will monitor   patient wihtout complaints, site soft, nontender Dr Ornelas aware of PLT 69 prior to procedure, known thrombocytopenia in the past, given DDAVP prior to procedure  subcutaneous defibrillator site stable with 2 drops of blood on dressing, no hematoma. Site soft, nontender, patient comfortable, pain mgmt reviewed with anesthesia and ordered PRN.   Patient to be admitted to tele, will monitor

## 2019-03-27 NOTE — H&P CARDIOLOGY - HISTORY OF PRESENT ILLNESS
54  year old male with HTN, hyperlipidemia, DM-II, ESRD on HD (Tues/Thurs/Sat) - last session 3/26/19 with right anterior chest dialysis catheter and left upper extremity AVF, CAD, chronic systolic CHF with EF 20-25% who presents for subcutaneous ICD implant.     please see hard copy H&P in paper chart 3/12/19  PATIENT SEEN AND EXAMINED AND NO NEW CLINICAL CHANGES SINCE office visit

## 2019-03-27 NOTE — CHART NOTE - NSCHARTNOTEFT_GEN_A_CORE
Patient seen and evaluated.  Now s/p S-ICD implant with Dr. Ornelas to the left thorax. Feels generally well and with minimal pain.  Teaching provided regarding site care and showering.  The temporary ID card, ICD booklet, and wound check letter were left in the care of the patient and his family.  Interrogation of the S-ICD by the [employer] device rep is PENDING.  Outpatient follow up with Dr. Ornelas is scheduled for 12-Apr-2019 09:30. Patient seen and evaluated.  Now s/p S-ICD implant with Dr. Ornelas to the left thorax. Feels generally well and with minimal pain.  Teaching provided regarding site care and showering.  The temporary ID card, and wound check letter were left in the care of the patient and his family.  Interrogation of the S-ICD by the [employer] device rep is PENDING.  Outpatient follow up with Dr. Ornelas is scheduled for 12-Apr-2019 09:30.

## 2019-03-27 NOTE — H&P CARDIOLOGY - COMMENTS
Dr Ornelas requests DDAVP be given prior to procedure in setting of increased bleeding risk in patient with ESRD   will contact Dr ESTHER Pavon for set up of HD prior to d/c in AM

## 2019-03-28 ENCOUNTER — TRANSCRIPTION ENCOUNTER (OUTPATIENT)
Age: 54
End: 2019-03-28

## 2019-03-28 VITALS
WEIGHT: 187.39 LBS | SYSTOLIC BLOOD PRESSURE: 144 MMHG | DIASTOLIC BLOOD PRESSURE: 95 MMHG | TEMPERATURE: 98 F | HEART RATE: 78 BPM | RESPIRATION RATE: 16 BRPM

## 2019-03-28 DIAGNOSIS — N18.6 END STAGE RENAL DISEASE: ICD-10-CM

## 2019-03-28 LAB
ANION GAP SERPL CALC-SCNC: 15 MMO/L — HIGH (ref 7–14)
BUN SERPL-MCNC: 42 MG/DL — HIGH (ref 7–23)
CALCIUM SERPL-MCNC: 8.9 MG/DL — SIGNIFICANT CHANGE UP (ref 8.4–10.5)
CHLORIDE SERPL-SCNC: 100 MMOL/L — SIGNIFICANT CHANGE UP (ref 98–107)
CO2 SERPL-SCNC: 22 MMOL/L — SIGNIFICANT CHANGE UP (ref 22–31)
CREAT SERPL-MCNC: 3.7 MG/DL — HIGH (ref 0.5–1.3)
GLUCOSE BLDC GLUCOMTR-MCNC: 171 MG/DL — HIGH (ref 70–99)
GLUCOSE BLDC GLUCOMTR-MCNC: 193 MG/DL — HIGH (ref 70–99)
GLUCOSE SERPL-MCNC: 179 MG/DL — HIGH (ref 70–99)
HCT VFR BLD CALC: 34.3 % — LOW (ref 39–50)
HGB BLD-MCNC: 10.8 G/DL — LOW (ref 13–17)
MAGNESIUM SERPL-MCNC: 2 MG/DL — SIGNIFICANT CHANGE UP (ref 1.6–2.6)
MCHC RBC-ENTMCNC: 29.8 PG — SIGNIFICANT CHANGE UP (ref 27–34)
MCHC RBC-ENTMCNC: 31.5 % — LOW (ref 32–36)
MCV RBC AUTO: 94.5 FL — SIGNIFICANT CHANGE UP (ref 80–100)
NRBC # FLD: 0 K/UL — SIGNIFICANT CHANGE UP (ref 0–0)
PLATELET # BLD AUTO: 74 K/UL — LOW (ref 150–400)
PMV BLD: 11.6 FL — SIGNIFICANT CHANGE UP (ref 7–13)
POTASSIUM SERPL-MCNC: 4.4 MMOL/L — SIGNIFICANT CHANGE UP (ref 3.5–5.3)
POTASSIUM SERPL-SCNC: 4.4 MMOL/L — SIGNIFICANT CHANGE UP (ref 3.5–5.3)
RBC # BLD: 3.63 M/UL — LOW (ref 4.2–5.8)
RBC # FLD: 14.8 % — HIGH (ref 10.3–14.5)
SODIUM SERPL-SCNC: 137 MMOL/L — SIGNIFICANT CHANGE UP (ref 135–145)
WBC # BLD: 8.04 K/UL — SIGNIFICANT CHANGE UP (ref 3.8–10.5)
WBC # FLD AUTO: 8.04 K/UL — SIGNIFICANT CHANGE UP (ref 3.8–10.5)

## 2019-03-28 PROCEDURE — 99223 1ST HOSP IP/OBS HIGH 75: CPT | Mod: GC

## 2019-03-28 RX ORDER — ACETAMINOPHEN 500 MG
650 TABLET ORAL EVERY 6 HOURS
Qty: 0 | Refills: 0 | Status: DISCONTINUED | OUTPATIENT
Start: 2019-03-28 | End: 2019-03-28

## 2019-03-28 RX ORDER — CHLORHEXIDINE GLUCONATE 213 G/1000ML
1 SOLUTION TOPICAL
Qty: 0 | Refills: 0 | Status: DISCONTINUED | OUTPATIENT
Start: 2019-03-28 | End: 2019-03-28

## 2019-03-28 RX ADMIN — ISOSORBIDE DINITRATE 10 MILLIGRAM(S): 5 TABLET ORAL at 05:43

## 2019-03-28 RX ADMIN — Medication 650 MILLIGRAM(S): at 12:14

## 2019-03-28 RX ADMIN — Medication 667 MILLIGRAM(S): at 12:15

## 2019-03-28 RX ADMIN — Medication 100 MILLIGRAM(S): at 05:43

## 2019-03-28 RX ADMIN — Medication 650 MILLIGRAM(S): at 13:14

## 2019-03-28 RX ADMIN — CARVEDILOL PHOSPHATE 12.5 MILLIGRAM(S): 80 CAPSULE, EXTENDED RELEASE ORAL at 05:43

## 2019-03-28 RX ADMIN — BUMETANIDE 2 MILLIGRAM(S): 0.25 INJECTION INTRAMUSCULAR; INTRAVENOUS at 05:43

## 2019-03-28 RX ADMIN — Medication 667 MILLIGRAM(S): at 09:01

## 2019-03-28 RX ADMIN — SODIUM CHLORIDE 3 MILLILITER(S): 9 INJECTION INTRAMUSCULAR; INTRAVENOUS; SUBCUTANEOUS at 05:43

## 2019-03-28 RX ADMIN — Medication 81 MILLIGRAM(S): at 12:14

## 2019-03-28 RX ADMIN — CHLORHEXIDINE GLUCONATE 1 APPLICATION(S): 213 SOLUTION TOPICAL at 12:15

## 2019-03-28 RX ADMIN — SODIUM CHLORIDE 3 MILLILITER(S): 9 INJECTION INTRAMUSCULAR; INTRAVENOUS; SUBCUTANEOUS at 12:53

## 2019-03-28 NOTE — DISCHARGE NOTE PROVIDER - NSDCFUADDAPPT_GEN_ALL_CORE_FT
EPS clinic for wound check on appointment given to you. EPS clinic for wound check on appointment given to you on 4/12 at 9 :30 am

## 2019-03-28 NOTE — DISCHARGE NOTE PROVIDER - CARE PROVIDER_API CALL
Oliver Cantu)  Cardiac Electrophysiology; Cardiology; Internal Medicine  [address] [place] [address]  Phone: [phone]  Fax: [phone]  Follow Up Time:     Zac Bowens)  Internal Medicine  [address] [place] [address]  Phone: [phone]  Fax: [phone]  Follow Up Time:

## 2019-03-28 NOTE — DISCHARGE NOTE NURSING/CASE MANAGEMENT/SOCIAL WORK - NSDCDPATPORTLINK_GEN_ALL_CORE
You can access the [facility] Patient Portal, offered by [facility], by registering with the following website: [email or web address]

## 2019-03-28 NOTE — DISCHARGE NOTE PROVIDER - CARE PROVIDERS DIRECT ADDRESSES
[email or web address]

## 2019-03-28 NOTE — DISCHARGE NOTE PROVIDER - HOSPITAL COURSE
This is a 55 yo M admitted fro AICD implant . Pmhx of HTN, dm TYPE 2 , DYSLIPIDEMIA, ESRD on HD , ef 20-25%. Patient had EKG, CXR,LABS, tele monitoring for arrythmia . CXR - An enlarged cardiac silhouette is again seen. A right IJ Shiley catheter     is unchanged in position. There is a new left-sided subcutaneous defibrillator in place. No focal lung consolidation, pleural effusion, or pneumothorax is seen. Patient received IV antibitotics prophylaxis. Patient is stable for discharge to home as per Dr Monzon .

## 2019-03-28 NOTE — CONSULT NOTE ADULT - SUBJECTIVE AND OBJECTIVE BOX
[facility] Division of Kidney Diseases & Hypertension  INITIAL CONSULT NOTE  [phone]--------------------------------------------------------------------------------    54 year old male with ESRD on HD TTS, CHF, CAD, DM, HTN, HLD who present to the hospital for SubQ ICD placement. Patient otherwise at baseline health. Nephrology consulted as patient would require HD prior to discharge.    Patient seen and examined at bedside. Appears to be doing well without any active complaints however is eager to go home. Otherwise patient has no acute complaints.          PAST HISTORY  --------------------------------------------------------------------------------  PAST MEDICAL & SURGICAL HISTORY:  ESRD (end stage renal disease) on dialysis  GIB (gastrointestinal bleeding): was treated  with H pyelori stable since Dec 10, 2018  HFrEF (heart failure with reduced ejection fraction): EF 20-24%  Myocardial infarction: Jan 2016  Hyperlipidemia  CAD (coronary artery disease): 2016, coronary artery stentX1  DM (diabetes mellitus): 2  not taking any medication lat4st HgA1c 5.7  HTN (hypertension)  Stented coronary artery: ? 1  Arterial stent thrombosis    FAMILY HISTORY:  Family history of diabetes mellitus (DM) (Sibling)  Family history of MI (myocardial infarction) (Sibling)    PAST SOCIAL HISTORY: No noted recent drug use.    ALLERGIES & MEDICATIONS  --------------------------------------------------------------------------------  Allergies    No Known Allergies    Intolerances      Standing Inpatient Medications  aspirin enteric coated 81 milliGRAM(s) Oral daily  atorvastatin 80 milliGRAM(s) Oral at bedtime  buMETAnide 2 milliGRAM(s) Oral two times a day  calcium acetate 667 milliGRAM(s) Oral three times a day with meals  carvedilol 12.5 milliGRAM(s) Oral every 12 hours  chlorhexidine 4% Liquid 1 Application(s) Topical <User Schedule>  hydrALAZINE 100 milliGRAM(s) Oral every 8 hours  isosorbide   dinitrate Tablet (ISORDIL) 10 milliGRAM(s) Oral three times a day  sodium chloride 0.9% lock flush 3 milliLiter(s) IV Push every 8 hours    PRN Inpatient Medications  acetaminophen   Tablet .. 650 milliGRAM(s) Oral every 6 hours PRN      REVIEW OF SYSTEMS  --------------------------------------------------------------------------------  Gen: No fevers/chills, weakness  Head/Eyes/Ears/Mouth: No headache  Respiratory: No dyspnea, cough, wheezing, hemoptysis  CV: No chest pain,   GI: No abdominal pain, diarrhea, constipation, nausea, vomiting  : No increased frequency, dysuria, hematuria, nocturia  MSK: No joint pain/swelling;   Neuro: No dizziness/lightheadedness, weakness, seizures    All other systems were reviewed and are negative, except as noted.    VITALS/PHYSICAL EXAM  --------------------------------------------------------------------------------  T(C): 36.4 (03-28-19 @ 12:16), Max: 36.7 (03-28-19 @ 05:41)  HR: 72 (03-28-19 @ 12:16) (70 - 86)  BP: 121/83 (03-28-19 @ 12:16) (121/83 - 131/93)  RR: 16 (03-28-19 @ 12:16) (16 - 18)  SpO2: 100% (03-28-19 @ 12:16) (99% - 100%)  Height (cm): 162.6 (03-27-19 @ 11:20)  Weight (kg): 83 (03-27-19 @ 11:21)  BMI (kg/m2): 31.4 (03-27-19 @ 11:21)  BSA (m2): 1.88 (03-27-19 @ 11:21)      03-28-19 @ 07:01  -  03-28-19 @ 13:17  --------------------------------------------------------  IN: 360 mL / OUT: 0 mL / NET: 360 mL      Physical Exam:  	Gen: NAD, well-appearing  	HEENT: Anicteric  	Pulm: Clear lungs  	CV:  S1S2  	Abd: +BS, soft   	Ext: No B/L Lower ext edema  	Neuro: No focal deficits  	Skin: Warm  	Vascular: No cyanosis              Access:    LABS/STUDIES  --------------------------------------------------------------------------------              11.6   5.48  >-----------<  69       [03-27-19 @ 11:15]              37.0     141  |  105  |  37  ----------------------------<  118      [03-27-19 @ 11:15]  4.9   |  24  |  3.83        Ca     9.0     [03-27-19 @ 11:15]            Creatinine Trend:  SCr 3.83 [03-27 @ 11:15] [facility] Division of Kidney Diseases & Hypertension  INITIAL CONSULT NOTE  [phone]--------------------------------------------------------------------------------    54 year old male with ESRD on HD TTS, CHF, CAD, DM, HTN, HLD who present to the hospital for SubQ ICD placement. Patient otherwise at baseline health. Nephrology consulted as patient would require HD prior to discharge.    Patient seen and examined at bedside. Appears to be doing well without any active complaints however is eager to go home. Otherwise patient has no acute complaints.          PAST HISTORY  --------------------------------------------------------------------------------  PAST MEDICAL & SURGICAL HISTORY:  ESRD (end stage renal disease) on dialysis  GIB (gastrointestinal bleeding): was treated  with H pyelori stable since Dec 10, 2018  HFrEF (heart failure with reduced ejection fraction): EF 20-24%  Myocardial infarction: Jan 2016  Hyperlipidemia  CAD (coronary artery disease): 2016, coronary artery stentX1  DM (diabetes mellitus): 2  not taking any medication lat4st HgA1c 5.7  HTN (hypertension)  Stented coronary artery: ? 1  Arterial stent thrombosis    FAMILY HISTORY:  Family history of diabetes mellitus (DM) (Sibling)  Family history of MI (myocardial infarction) (Sibling)    PAST SOCIAL HISTORY: No noted recent drug use.    ALLERGIES & MEDICATIONS  --------------------------------------------------------------------------------  Allergies    No Known Allergies    Intolerances      Standing Inpatient Medications  aspirin enteric coated 81 milliGRAM(s) Oral daily  atorvastatin 80 milliGRAM(s) Oral at bedtime  buMETAnide 2 milliGRAM(s) Oral two times a day  calcium acetate 667 milliGRAM(s) Oral three times a day with meals  carvedilol 12.5 milliGRAM(s) Oral every 12 hours  chlorhexidine 4% Liquid 1 Application(s) Topical <User Schedule>  hydrALAZINE 100 milliGRAM(s) Oral every 8 hours  isosorbide   dinitrate Tablet (ISORDIL) 10 milliGRAM(s) Oral three times a day  sodium chloride 0.9% lock flush 3 milliLiter(s) IV Push every 8 hours    PRN Inpatient Medications  acetaminophen   Tablet .. 650 milliGRAM(s) Oral every 6 hours PRN      REVIEW OF SYSTEMS  --------------------------------------------------------------------------------  Gen: No fevers/chills, weakness  Head/Eyes/Ears/Mouth: No headache  Respiratory: No dyspnea, cough, wheezing, hemoptysis  CV: No chest pain,   GI: No abdominal pain, diarrhea, constipation, nausea, vomiting  : No increased frequency, dysuria, hematuria, nocturia  MSK: No joint pain/swelling;   Neuro: No dizziness/lightheadedness, weakness, seizures    All other systems were reviewed and are negative, except as noted.    VITALS/PHYSICAL EXAM  --------------------------------------------------------------------------------  T(C): 36.4 (03-28-19 @ 12:16), Max: 36.7 (03-28-19 @ 05:41)  HR: 72 (03-28-19 @ 12:16) (70 - 86)  BP: 121/83 (03-28-19 @ 12:16) (121/83 - 131/93)  RR: 16 (03-28-19 @ 12:16) (16 - 18)  SpO2: 100% (03-28-19 @ 12:16) (99% - 100%)  Height (cm): 162.6 (03-27-19 @ 11:20)  Weight (kg): 83 (03-27-19 @ 11:21)  BMI (kg/m2): 31.4 (03-27-19 @ 11:21)  BSA (m2): 1.88 (03-27-19 @ 11:21)      03-28-19 @ 07:01  -  03-28-19 @ 13:17  --------------------------------------------------------  IN: 360 mL / OUT: 0 mL / NET: 360 mL      Physical Exam:  	Gen: NAD, well-appearing  	HEENT: Anicteric  	Pulm: Clear lungs  	CV:  S1S2  	Abd: +BS, soft   	Ext: No B/L Lower ext edema  	Neuro: No focal deficits  	Skin: Warm  	Vascular: No cyanosis              Access: L avf     LABS/STUDIES  --------------------------------------------------------------------------------              11.6   5.48  >-----------<  69       [03-27-19 @ 11:15]              37.0     141  |  105  |  37  ----------------------------<  118      [03-27-19 @ 11:15]  4.9   |  24  |  3.83        Ca     9.0     [03-27-19 @ 11:15]            Creatinine Trend:  SCr 3.83 [03-27 @ 11:15]

## 2019-03-28 NOTE — PROGRESS NOTE ADULT - ASSESSMENT
54  year old male with HTN, hyperlipidemia, DM-II, ESRD on HD (Tues/Thurs/Sat) - last session 3/26/19 with right anterior chest dialysis catheter and left upper extremity AVF, CAD, PCI, chronic systolic CHF with EF 20-25% who presents for subcutaneous ICD implant s/p SICD on 3/27.  Stable ovenight.  S-ICD interrogation this am normal findings.    -Device teaching provided, patient stated understanding  -follow up in the device clinic on 4/12 at 9:30 am    -Continue homes meds, Tylenol prn pain  -HD this afternoon, labs after HD, plan for discharge after HD 54  year old male with HTN, hyperlipidemia, DM-II, ESRD on HD (Tues/Thurs/Sat) - last session 3/26/19 with right anterior chest dialysis catheter and left upper extremity AVF, CAD, PCI, chronic systolic CHF with EF 20-25% who presents for subcutaneous ICD implant s/p SICD on 3/27.  Stable overnight.  S-ICD interrogation this am with normal findings.    -Device teaching provided, patient stated understanding  -follow up in the device clinic on 4/12 at 9:30 am    -Continue homes meds, Tylenol prn pain  -HD this afternoon, labs after HD, plan for discharge after HD

## 2019-03-28 NOTE — DISCHARGE NOTE NURSING/CASE MANAGEMENT/SOCIAL WORK - NSDCCRNAME_GEN_ALL_CORE_FT
[facility] [address] [facility] on TTS first shift Resume outpatient saturday 03/30/2019

## 2019-03-28 NOTE — DISCHARGE NOTE PROVIDER - NSDCCPCAREPLAN_GEN_ALL_CORE_FT
PRINCIPAL DISCHARGE DIAGNOSIS  Diagnosis: S/P implantation of automatic cardioverter/defibrillator (AICD)  Assessment and Plan of Treatment:

## 2019-03-28 NOTE — PROGRESS NOTE ADULT - SUBJECTIVE AND OBJECTIVE BOX
Patient is a 54y old  Male who presents with a chief complaint of   s/p SICD implantation.  Patient denies CP, SOB or palpitations.  Mild incisional site pain with movement.   PAST MEDICAL & SURGICAL HISTORY:  ESRD (end stage renal disease) on dialysis  GIB (gastrointestinal bleeding): was treated  with H pyelori stable since Dec 10, 2018  Abnormal echocardiogram: 06/2018severe MR  Severe global left ventricular systolic dysfunction.  6. Right ventricular enlargement with decreased right  ventricular systolic function, moderate RV dysfunction  CKD (chronic kidney disease), stage V  HFrEF (heart failure with reduced ejection fraction): EF 20-24%  Congestive heart failure (CHF)  Myocardial infarction: Jan 2016  Hyperlipidemia  CAD (coronary artery disease): 2016, coronary artery stentX1  DM (diabetes mellitus): 2  not taking any medication lat4st HgA1c 5.7  HTN (hypertension)  Stented coronary artery: ? 1  Arterial stent thrombosis      MEDICATIONS  (STANDING):  aspirin enteric coated 81 milliGRAM(s) Oral daily  atorvastatin 80 milliGRAM(s) Oral at bedtime  buMETAnide 2 milliGRAM(s) Oral two times a day  calcium acetate 667 milliGRAM(s) Oral three times a day with meals  carvedilol 12.5 milliGRAM(s) Oral every 12 hours  hydrALAZINE 100 milliGRAM(s) Oral every 8 hours  isosorbide   dinitrate Tablet (ISORDIL) 10 milliGRAM(s) Oral three times a day  sodium chloride 0.9% lock flush 3 milliLiter(s) IV Push every 8 hours    MEDICATIONS  (PRN):  acetaminophen  IVPB .. 1000 milliGRAM(s) IV Intermittent once PRN Mild Pain (1 - 3)  HYDROmorphone  Injectable 0.5 milliGRAM(s) IV Push every 2 hours PRN moderate to severe pain 4-6            Vital Signs Last 24 Hrs  T(C): 36.7 (28 Mar 2019 05:41), Max: 36.7 (28 Mar 2019 05:41)  T(F): 98 (28 Mar 2019 05:41), Max: 98 (28 Mar 2019 05:41)  HR: 70 (28 Mar 2019 05:41) (70 - 86)  BP: 125/86 (28 Mar 2019 05:41) (125/86 - 131/93)  BP(mean): --  RR: 17 (28 Mar 2019 05:41) (17 - 18)  SpO2: 100% (28 Mar 2019 05:41) (99% - 100%)            INTERPRETATION OF TELEMETRY:  SR no VT/NSVT seen    ECG: SR        LABS:                        11.6   5.48  )-----------( 69       ( 27 Mar 2019 11:15 )             37.0     03-27    141  |  105  |  37<H>  ----------------------------<  118<H>  4.9   |  24  |  3.83<H>    Ca    9.0      27 Mar 2019 11:15                BNP  RADIOLOGY & ADDITIONAL STUDIES:      PHYSICAL EXAM:    GENERAL: In no apparent distress, well nourished, and hydrated.  NECK: Supple and normal thyroid.  No JVD or carotid bruit.  Carotid pulse is 2+ bilaterally.  HEART: Regular rate and rhythm; No murmurs, rubs, or gallops.  L axillary ICD wound site and substernal incision sites with steri strips dry intact no active bleeding or hematoma; R chest with dialysis catheter noted  PULMONARY: Clear to auscultation and perfusion.  No rales, wheezing, or rhonchi bilaterally.  ABDOMEN: Soft, Nontender, Nondistended; Bowel sounds present  EXTREMITIES:  2+ Peripheral Pulses, No clubbing, cyanosis, or edema; L AV fistula +thrill/bruit  NEUROLOGICAL: Grossly nonfocal

## 2019-03-28 NOTE — CONSULT NOTE ADULT - PROBLEM SELECTOR RECOMMENDATION 9
Patient is ESRD on HD TTS. Last dialyzed Tuesday. Patient underwent procedure yesterday and to be discharged today after dialysis. Patient stable for dialysis, orders in. Continue to monitor fluid status and eletrolytes while patient is still here. Patient is ESRD on HD TTS. Last dialyzed Tuesday. Patient underwent procedure yesterday and to be discharged today after dialysis. Patient stable for dialysis, orders in. Continue to monitor fluid status and electrolytes while patient is still here.

## 2019-03-29 ENCOUNTER — OTHER (OUTPATIENT)
Age: 54
End: 2019-03-29

## 2019-03-29 NOTE — DISCUSSION/SUMMARY
[FreeTextEntry1] : Patient recently admitted for AICD placement. Called patient and he states he is doing well, with slight pain at the AICD placement site. Otherwise no complaints. He has numerous upcoming f/u appointments with different specialties. His IM appt is 4/24. \par \par TM, PGY1\par firm 5

## 2019-04-09 ENCOUNTER — APPOINTMENT (OUTPATIENT)
Dept: GASTROENTEROLOGY | Facility: CLINIC | Age: 54
End: 2019-04-09
Payer: MEDICAID

## 2019-04-09 VITALS
HEIGHT: 65 IN | DIASTOLIC BLOOD PRESSURE: 83 MMHG | BODY MASS INDEX: 30.32 KG/M2 | HEART RATE: 71 BPM | TEMPERATURE: 97.6 F | SYSTOLIC BLOOD PRESSURE: 130 MMHG | WEIGHT: 182 LBS | RESPIRATION RATE: 15 BRPM | OXYGEN SATURATION: 100 %

## 2019-04-09 PROCEDURE — 99213 OFFICE O/P EST LOW 20 MIN: CPT

## 2019-04-09 RX ORDER — TETRACYCLINE HYDROCHLORIDE 500 MG/1
500 CAPSULE ORAL
Qty: 14 | Refills: 0 | Status: DISCONTINUED | COMMUNITY
Start: 2019-02-15 | End: 2019-04-09

## 2019-04-09 RX ORDER — METRONIDAZOLE 250 MG/1
250 TABLET ORAL EVERY 6 HOURS
Qty: 56 | Refills: 0 | Status: DISCONTINUED | COMMUNITY
Start: 2019-02-15 | End: 2019-04-09

## 2019-04-09 RX ORDER — BISMUTH SUBSALICYLATE 262 MG/1
262 TABLET, CHEWABLE ORAL 4 TIMES DAILY
Qty: 112 | Refills: 0 | Status: DISCONTINUED | COMMUNITY
Start: 2019-02-15 | End: 2019-04-09

## 2019-04-09 RX ORDER — PANTOPRAZOLE 40 MG/1
40 TABLET, DELAYED RELEASE ORAL TWICE DAILY
Qty: 28 | Refills: 0 | Status: DISCONTINUED | COMMUNITY
Start: 2019-02-15 | End: 2019-04-09

## 2019-04-10 ENCOUNTER — APPOINTMENT (OUTPATIENT)
Dept: ELECTROPHYSIOLOGY | Facility: CLINIC | Age: 54
End: 2019-04-10
Payer: MEDICAID

## 2019-04-10 ENCOUNTER — APPOINTMENT (OUTPATIENT)
Dept: CARDIOLOGY | Facility: CLINIC | Age: 54
End: 2019-04-10
Payer: MEDICARE

## 2019-04-10 ENCOUNTER — NON-APPOINTMENT (OUTPATIENT)
Age: 54
End: 2019-04-10

## 2019-04-10 VITALS
BODY MASS INDEX: 30.72 KG/M2 | OXYGEN SATURATION: 100 % | HEART RATE: 63 BPM | HEIGHT: 65 IN | WEIGHT: 184.38 LBS | DIASTOLIC BLOOD PRESSURE: 87 MMHG | SYSTOLIC BLOOD PRESSURE: 138 MMHG

## 2019-04-10 PROCEDURE — 93000 ELECTROCARDIOGRAM COMPLETE: CPT

## 2019-04-10 PROCEDURE — 99215 OFFICE O/P EST HI 40 MIN: CPT

## 2019-04-10 PROCEDURE — 99024 POSTOP FOLLOW-UP VISIT: CPT

## 2019-04-12 ENCOUNTER — APPOINTMENT (OUTPATIENT)
Dept: VASCULAR SURGERY | Facility: CLINIC | Age: 54
End: 2019-04-12
Payer: MEDICARE

## 2019-04-12 VITALS
WEIGHT: 184 LBS | SYSTOLIC BLOOD PRESSURE: 107 MMHG | TEMPERATURE: 98 F | DIASTOLIC BLOOD PRESSURE: 66 MMHG | BODY MASS INDEX: 30.66 KG/M2 | HEART RATE: 55 BPM | HEIGHT: 65 IN

## 2019-04-12 PROCEDURE — 93990 DOPPLER FLOW TESTING: CPT

## 2019-04-12 PROCEDURE — 99213 OFFICE O/P EST LOW 20 MIN: CPT | Mod: 25

## 2019-04-12 NOTE — HISTORY OF PRESENT ILLNESS
[FreeTextEntry1] : s/p left rc avf 1/14\par s/p left fistulagram angioplasty 3/13/19 [de-identified] : Under went SICD placement in the interim\par co pain at that site, no issues at the site of the avf.  no hand pain

## 2019-04-12 NOTE — PHYSICAL EXAM
[Normal Appearance] : normal appearance [General Appearance - Well Developed] : well developed [General Appearance - Well Nourished] : well nourished [Normal Conjunctiva] : the conjunctiva exhibited no abnormalities [Normal Oropharynx] : normal oropharynx [Normal Oral Mucosa] : normal oral mucosa [] : no respiratory distress [Auscultation Breath Sounds / Voice Sounds] : lungs were clear to auscultation bilaterally [Respiration, Rhythm And Depth] : normal respiratory rhythm and effort [Heart Sounds] : normal S1 and S2 [Heart Rate And Rhythm] : heart rate and rhythm were normal [Bowel Sounds] : normal bowel sounds [Abdomen Soft] : soft [Abnormal Walk] : normal gait [Abdomen Tenderness] : non-tender [Nail Clubbing] : no clubbing of the fingernails [Cyanosis, Localized] : no localized cyanosis [Skin Color & Pigmentation] : normal skin color and pigmentation [Oriented To Time, Place, And Person] : oriented to person, place, and time [Impaired Insight] : insight and judgment were intact [FreeTextEntry1] : grade II/VI systolic murmur in L axilla

## 2019-04-12 NOTE — ASSESSMENT
[FreeTextEntry1] : repeat fistulagram, likely needs side branch coiled as well as additional ballooning.

## 2019-04-12 NOTE — ASSESSMENT
[FreeTextEntry1] : 54 year old M w/ h/o IDDM c/b neuropathy (A1c 5.7 12/18), HTN, CAD (s/p stent in [place] in 2016, unknown coronary anatomy), HFrEF (EF 25%, LVEDD 6.6 cm) s/p subQ ICD, severe MR (functional), ESRD on HD via permacath (T/Th/Sat; AV fistula awaiting patency), recent GIB 2/2 ulcer (H. pylori positive) who is here for follow up. Appears fairly euvolemic and compensated with class II symptoms. Interested in kidney transplant but discussed with patient and his sister that he'll likely need heart and kidney transplant. Awaiting cataract surgery for which he requires clearance. He has a RCRI score of 3 (ESRD, CHF, CAD) for a low risk procedure but is compensated and has no active ischemia with recent workup that was negative and he can perform >4 METs without difficulty thus requires no further cardiovascular workup prior to surgery and is cleared from a cardiovascular standpoint. \par \par 1. HFrEF - unclear etiology; prior known CAD; possibly HTN \par - continue bumex 4 mg twice/day\par - increase coreg to 25 mg twice/day\par - continue hydral 100 mg three times/day and isordil to 40 mg three times/day \par - labs from dialysis center reviewed; K was normal (4.1)\par - repeat TTE reviewed\par - will likely perform CPET to risk stratify and then consider referral to [facility] for heart/kidney transplant evaluation \par \par 2. ESRD on HD; awaiting fistula maturation\par - instructed to follow low potassium diet\par \par 3. GI bleed - found to have ulcer which was H. pylori positive\par - appreciate Dr. Haines's input\par \par 4. CAD - prior PCI\par - on ASA 81 mg daily\par - on lipitor 80 mg qhs\par \par RTC 6 weeks

## 2019-04-12 NOTE — PHYSICAL EXAM
[Carotid Bruits] : no carotid bruits [Normal Breath Sounds] : Normal breath sounds [Normal Heart Sounds] : normal heart sounds [2+] : right 2+ [de-identified] : awake, alert [FreeTextEntry1] : L hand warm. .  +palp thrill in cephalic as well as adjacent side branch. stitch in place from prior fistulagram\par  [de-identified] : no lad

## 2019-04-12 NOTE — HISTORY OF PRESENT ILLNESS
[FreeTextEntry1] : 54 year old M w/ h/o IDDM c/b neuropathy (A1c 5.7 12/18), HTN, CAD (s/p stent in [place] in 2016, unknown coronary anatomy), HFrEF (EF 25%, LVEDD 6.6 cm) s/p subQ ICD, severe MR (functional), ESRD on HD via permacath (T/Th/Sat; AV fistula awaiting patency), recent GIB 2/2 ulcer (H. pylori positive) who is here for follow up. \par \par Received subQ ICD w/o incident. Has been receiving HD w/o issues but fistula is reportedly small. \par \par Weight at dialysis has been 182 pounds. Adherent to low sodium and fluid restriction. Able to ambulate w/o limitation. Denies SOB. Denies orthopnea/PND. Uses 1 pillow. Able to climb 6 stairs w/o difficulty. ET approx 2-3 blocks limited by pain in calves. Reportedly had LAURI and possibly arterial dopplers with podiatrist. Denies CP. \par \par Denies palpitations. Has cataract in L eye and is planning on having surgery and requesting clearance.

## 2019-04-13 NOTE — ASSESSMENT
[FreeTextEntry1] : Impression:\par \par #1.  GI bleed / melena from gastric ulcer (incisura) which was biopsied and negative for malignancy\par \par #2.  H.pylori gastritis\par \par #3.  CHF EF 25%\par \par #4.  ESRD on HD\par \par #5.  Anemia\par \par #6.  Recent AICD\par \par Recommendations:\par \par #1.  H.pylori stool testing, pt to call for results.\par \par #2.  Renewed pantoprazole, start after stool testing.\par \par #3.  Eventual colonoscopy.\par \par #4.  Office followup in 3 months.\par

## 2019-04-13 NOTE — HISTORY OF PRESENT ILLNESS
[FreeTextEntry1] : Pt hospitalized in Dec 2018 for GI bleed / melena.\par Had H.pylori associated gastric ulcer.\par Prescribed Bismuth/Metronidazole/Tetracycline/PPI quadruple therapy x 2 weeks by PMD. (Due to Clarithromycin / statin interaction per pharmacy)\par \par On Pantoprazole\par \par Has not had recurrence of melena.  No hematochezia/hematemesis.\par No chest pain, dyspnea, dizziness, fatigue.\par No abd pain, n/v, dysphagia, heartburn, constipation, diarrhea, bloating, weight loss.\par \par Labs:\par Yaakov Hb 7.2 on 12/12/18:\par Discharge Hb 9 on 12/14/18\par \par EGD: 12/14/18:\par      The Z-line was regular and was found 35 cm from the incisors.\par      The exam of the esophagus was otherwise normal.\par      A 1 cm non-bleeding cratered gastric ulcer of moderate severity with no \par      stigmata of bleeding was found on the incura. Biopsies were taken with a \par      cold forceps for histology.\par      A few localized, small non-bleeding erosions were found in the gastric \par      body. There were no stigmata of recent bleeding. Biopsies were taken \par      with a cold forceps for Helicobacter pylori testing.\par      The exam of the stomach was otherwise normal.\par      The duodenal bulb and 2nd part of the duodenum were normal.\par                                                                                \par Impression:          - 1 cm clean based cratered gastric ulcer. Biopsied.\par                      - Non-bleeding erosive gastropathy. Biopsied.\par                      - Normal duodenal bulb and 2nd part of the duodenum.\par \par Pathology:\par 1-Antrum biopsy:\par - Gastric antral type mucosa with H. pylori-associated\par gastritis ([place] Starry stain).\par \par 2-Gastric, body biopsy:\par - Gastric body type mucosa with H. pylori-associated\par gastritis ([place] Starry stain).\par \par 3-Gastric, ulcer biopsy:\par - Gastric antral type mucosa with H. pylori-associated\par gastritis ([place] Starry stain).\par

## 2019-04-13 NOTE — CONSULT LETTER
[Sincerely,] : Sincerely, [Dear  ___] : Dear  [unfilled], [Consult Letter:] : I had the pleasure of evaluating your patient, [unfilled]. [Please see my note below.] : Please see my note below. [FreeTextEntry3] : Trevin Haines MD, MPH, LATRELL\par Chief of Clinical Quality in Gastroenterology, [facility]\par Director of Endoscopic Ultrasound, [facility]\par , [employer]\par Office [phone] (24 hours)\par Office staff [phone] (Daytime/Weekdays Only)\par

## 2019-04-17 ENCOUNTER — OUTPATIENT (OUTPATIENT)
Dept: OUTPATIENT SERVICES | Facility: HOSPITAL | Age: 54
LOS: 1 days | End: 2019-04-17
Payer: MEDICARE

## 2019-04-17 ENCOUNTER — APPOINTMENT (OUTPATIENT)
Dept: VASCULAR SURGERY | Facility: HOSPITAL | Age: 54
End: 2019-04-17

## 2019-04-17 VITALS
HEIGHT: 65 IN | SYSTOLIC BLOOD PRESSURE: 139 MMHG | DIASTOLIC BLOOD PRESSURE: 81 MMHG | WEIGHT: 180.78 LBS | TEMPERATURE: 98 F | RESPIRATION RATE: 18 BRPM | HEART RATE: 62 BPM | OXYGEN SATURATION: 98 %

## 2019-04-17 DIAGNOSIS — N18.6 END STAGE RENAL DISEASE: ICD-10-CM

## 2019-04-17 DIAGNOSIS — Z95.5 PRESENCE OF CORONARY ANGIOPLASTY IMPLANT AND GRAFT: Chronic | ICD-10-CM

## 2019-04-17 DIAGNOSIS — T82.868A THROMBOSIS DUE TO VASCULAR PROSTHETIC DEVICES, IMPLANTS AND GRAFTS, INITIAL ENCOUNTER: Chronic | ICD-10-CM

## 2019-04-17 LAB
ALBUMIN SERPL ELPH-MCNC: 3.9 G/DL — SIGNIFICANT CHANGE UP (ref 3.3–5)
ALP SERPL-CCNC: 122 U/L — HIGH (ref 40–120)
ALT FLD-CCNC: 22 U/L — SIGNIFICANT CHANGE UP (ref 10–45)
ANION GAP SERPL CALC-SCNC: 13 MMOL/L — SIGNIFICANT CHANGE UP (ref 5–17)
AST SERPL-CCNC: 23 U/L — SIGNIFICANT CHANGE UP (ref 10–40)
BILIRUB SERPL-MCNC: 0.3 MG/DL — SIGNIFICANT CHANGE UP (ref 0.2–1.2)
BUN SERPL-MCNC: 49 MG/DL — HIGH (ref 7–23)
CALCIUM SERPL-MCNC: 8.8 MG/DL — SIGNIFICANT CHANGE UP (ref 8.4–10.5)
CHLORIDE SERPL-SCNC: 106 MMOL/L — SIGNIFICANT CHANGE UP (ref 96–108)
CO2 SERPL-SCNC: 20 MMOL/L — LOW (ref 22–31)
CREAT SERPL-MCNC: 4.47 MG/DL — HIGH (ref 0.5–1.3)
GLUCOSE SERPL-MCNC: 123 MG/DL — HIGH (ref 70–99)
HCT VFR BLD CALC: 38.8 % — LOW (ref 39–50)
HGB BLD-MCNC: 12.6 G/DL — LOW (ref 13–17)
MCHC RBC-ENTMCNC: 29.9 PG — SIGNIFICANT CHANGE UP (ref 27–34)
MCHC RBC-ENTMCNC: 32.5 GM/DL — SIGNIFICANT CHANGE UP (ref 32–36)
MCV RBC AUTO: 91.9 FL — SIGNIFICANT CHANGE UP (ref 80–100)
PLATELET # BLD AUTO: 103 K/UL — LOW (ref 150–400)
POTASSIUM SERPL-MCNC: 5.1 MMOL/L — SIGNIFICANT CHANGE UP (ref 3.5–5.3)
POTASSIUM SERPL-SCNC: 5.1 MMOL/L — SIGNIFICANT CHANGE UP (ref 3.5–5.3)
PROT SERPL-MCNC: 7 G/DL — SIGNIFICANT CHANGE UP (ref 6–8.3)
RBC # BLD: 4.22 M/UL — SIGNIFICANT CHANGE UP (ref 4.2–5.8)
RBC # FLD: 13.3 % — SIGNIFICANT CHANGE UP (ref 10.3–14.5)
SODIUM SERPL-SCNC: 139 MMOL/L — SIGNIFICANT CHANGE UP (ref 135–145)
WBC # BLD: 5.3 K/UL — SIGNIFICANT CHANGE UP (ref 3.8–10.5)
WBC # FLD AUTO: 5.3 K/UL — SIGNIFICANT CHANGE UP (ref 3.8–10.5)

## 2019-04-17 PROCEDURE — C1769: CPT

## 2019-04-17 PROCEDURE — C1894: CPT

## 2019-04-17 PROCEDURE — 36909 DIALYSIS CIRCUIT EMBOLJ: CPT

## 2019-04-17 PROCEDURE — 93005 ELECTROCARDIOGRAM TRACING: CPT

## 2019-04-17 PROCEDURE — 80053 COMPREHEN METABOLIC PANEL: CPT

## 2019-04-17 PROCEDURE — 36902 INTRO CATH DIALYSIS CIRCUIT: CPT

## 2019-04-17 PROCEDURE — C1725: CPT

## 2019-04-17 PROCEDURE — 82962 GLUCOSE BLOOD TEST: CPT

## 2019-04-17 PROCEDURE — 85027 COMPLETE CBC AUTOMATED: CPT

## 2019-04-17 PROCEDURE — C1889: CPT

## 2019-04-17 PROCEDURE — C1887: CPT

## 2019-04-17 PROCEDURE — 93010 ELECTROCARDIOGRAM REPORT: CPT

## 2019-04-17 NOTE — H&P CARDIOLOGY - HISTORY OF PRESENT ILLNESS
54  year old male with HTN, hyperlipidemia, DM-II, ESRD on HD (Tues/Thurs/Sat) - last session 4/16/19 with right anterior chest dialysis catheter and left upper extremity AVF, CAD, chronic systolic CHF with EF 20-25% subcutaneous ICD implant 3/27/19 steristrips in place presents asymptomatic for AV fistula revision

## 2019-04-17 NOTE — CHART NOTE - NSCHARTNOTEFT_GEN_A_CORE
Fistulogram performed with balloon angioplasty as well as coil embolization x2 of the side branch. Strong thrill at the end of the case.

## 2019-04-17 NOTE — PROGRESS NOTE ADULT - PROBLEM SELECTOR PROBLEM 8
DM (diabetes mellitus) Consent (Scalp)/Introductory Paragraph: The rationale for Mohs was explained to the patient and consent was obtained. The risks, benefits and alternatives to therapy were discussed in detail. Specifically, the risks of changes in hair growth pattern secondary to repair, infection, scarring, bleeding, prolonged wound healing, incomplete removal, allergy to anesthesia, nerve injury and recurrence were addressed. Prior to the procedure, the treatment site was clearly identified and confirmed by the patient. All components of Universal Protocol/PAUSE Rule completed.

## 2019-04-24 ENCOUNTER — APPOINTMENT (OUTPATIENT)
Dept: INTERNAL MEDICINE | Facility: HOSPITAL | Age: 54
End: 2019-04-24
Payer: MEDICAID

## 2019-04-24 ENCOUNTER — LABORATORY RESULT (OUTPATIENT)
Age: 54
End: 2019-04-24

## 2019-04-24 ENCOUNTER — OUTPATIENT (OUTPATIENT)
Dept: OUTPATIENT SERVICES | Facility: HOSPITAL | Age: 54
LOS: 1 days | End: 2019-04-24

## 2019-04-24 VITALS
HEIGHT: 65 IN | DIASTOLIC BLOOD PRESSURE: 58 MMHG | WEIGHT: 190.5 LBS | HEART RATE: 64 BPM | BODY MASS INDEX: 31.74 KG/M2 | SYSTOLIC BLOOD PRESSURE: 90 MMHG

## 2019-04-24 DIAGNOSIS — T82.868A THROMBOSIS DUE TO VASCULAR PROSTHETIC DEVICES, IMPLANTS AND GRAFTS, INITIAL ENCOUNTER: Chronic | ICD-10-CM

## 2019-04-24 DIAGNOSIS — H61.21 IMPACTED CERUMEN, RIGHT EAR: ICD-10-CM

## 2019-04-24 DIAGNOSIS — N18.5 CHRONIC KIDNEY DISEASE, STAGE 5: ICD-10-CM

## 2019-04-24 DIAGNOSIS — Z95.5 PRESENCE OF CORONARY ANGIOPLASTY IMPLANT AND GRAFT: Chronic | ICD-10-CM

## 2019-04-24 DIAGNOSIS — Z87.448 PERSONAL HISTORY OF OTHER DISEASES OF URINARY SYSTEM: ICD-10-CM

## 2019-04-24 LAB
ALBUMIN SERPL ELPH-MCNC: 4.2 G/DL — SIGNIFICANT CHANGE UP (ref 3.3–5)
ALP SERPL-CCNC: 107 U/L — SIGNIFICANT CHANGE UP (ref 40–120)
ALT FLD-CCNC: 26 U/L — SIGNIFICANT CHANGE UP (ref 4–41)
ANION GAP SERPL CALC-SCNC: 16 MMO/L — HIGH (ref 7–14)
AST SERPL-CCNC: 23 U/L — SIGNIFICANT CHANGE UP (ref 4–40)
BASOPHILS # BLD AUTO: 0.05 K/UL — SIGNIFICANT CHANGE UP (ref 0–0.2)
BASOPHILS NFR BLD AUTO: 1.1 % — SIGNIFICANT CHANGE UP (ref 0–2)
BILIRUB SERPL-MCNC: 0.5 MG/DL — SIGNIFICANT CHANGE UP (ref 0.2–1.2)
BUN SERPL-MCNC: 51 MG/DL — HIGH (ref 7–23)
CALCIUM SERPL-MCNC: 9.1 MG/DL — SIGNIFICANT CHANGE UP (ref 8.4–10.5)
CHLORIDE SERPL-SCNC: 97 MMOL/L — LOW (ref 98–107)
CO2 SERPL-SCNC: 25 MMOL/L — SIGNIFICANT CHANGE UP (ref 22–31)
CREAT SERPL-MCNC: 4.83 MG/DL — HIGH (ref 0.5–1.3)
EOSINOPHIL # BLD AUTO: 0.23 K/UL — SIGNIFICANT CHANGE UP (ref 0–0.5)
EOSINOPHIL NFR BLD AUTO: 5.2 % — SIGNIFICANT CHANGE UP (ref 0–6)
GLUCOSE SERPL-MCNC: 141 MG/DL — HIGH (ref 70–99)
HBA1C BLD-MCNC: 6.3 % — HIGH (ref 4–5.6)
HCT VFR BLD CALC: 38.7 % — LOW (ref 39–50)
HGB BLD-MCNC: 12.3 G/DL — LOW (ref 13–17)
IMM GRANULOCYTES NFR BLD AUTO: 0.5 % — SIGNIFICANT CHANGE UP (ref 0–1.5)
LYMPHOCYTES # BLD AUTO: 1 K/UL — SIGNIFICANT CHANGE UP (ref 1–3.3)
LYMPHOCYTES # BLD AUTO: 22.7 % — SIGNIFICANT CHANGE UP (ref 13–44)
MAGNESIUM SERPL-MCNC: 1.9 MG/DL — SIGNIFICANT CHANGE UP (ref 1.6–2.6)
MCHC RBC-ENTMCNC: 29.6 PG — SIGNIFICANT CHANGE UP (ref 27–34)
MCHC RBC-ENTMCNC: 31.8 % — LOW (ref 32–36)
MCV RBC AUTO: 93 FL — SIGNIFICANT CHANGE UP (ref 80–100)
MONOCYTES # BLD AUTO: 0.48 K/UL — SIGNIFICANT CHANGE UP (ref 0–0.9)
MONOCYTES NFR BLD AUTO: 10.9 % — SIGNIFICANT CHANGE UP (ref 2–14)
NEUTROPHILS # BLD AUTO: 2.62 K/UL — SIGNIFICANT CHANGE UP (ref 1.8–7.4)
NEUTROPHILS NFR BLD AUTO: 59.6 % — SIGNIFICANT CHANGE UP (ref 43–77)
NRBC # FLD: 0 K/UL — SIGNIFICANT CHANGE UP (ref 0–0)
NT-PROBNP SERPL-SCNC: SIGNIFICANT CHANGE UP PG/ML
OVALOCYTES BLD QL SMEAR: SLIGHT — SIGNIFICANT CHANGE UP
PERFORM SLIDE REVIEW?: YES — SIGNIFICANT CHANGE UP
PHOSPHATE SERPL-MCNC: 5.3 MG/DL — HIGH (ref 2.5–4.5)
PLATELET # BLD AUTO: 41 K/UL — LOW (ref 150–400)
PLATELET COUNT - ESTIMATE: SIGNIFICANT CHANGE UP
PMV BLD: 12.1 FL — SIGNIFICANT CHANGE UP (ref 7–13)
POTASSIUM SERPL-MCNC: 4.6 MMOL/L — SIGNIFICANT CHANGE UP (ref 3.5–5.3)
POTASSIUM SERPL-SCNC: 4.6 MMOL/L — SIGNIFICANT CHANGE UP (ref 3.5–5.3)
PROT SERPL-MCNC: 7.4 G/DL — SIGNIFICANT CHANGE UP (ref 6–8.3)
RBC # BLD: 4.16 M/UL — LOW (ref 4.2–5.8)
RBC # FLD: 13.1 % — SIGNIFICANT CHANGE UP (ref 10.3–14.5)
SODIUM SERPL-SCNC: 138 MMOL/L — SIGNIFICANT CHANGE UP (ref 135–145)
TSH SERPL-MCNC: 2.44 UIU/ML — SIGNIFICANT CHANGE UP (ref 0.27–4.2)
WBC # BLD: 4.4 K/UL — SIGNIFICANT CHANGE UP (ref 3.8–10.5)
WBC # FLD AUTO: 4.4 K/UL — SIGNIFICANT CHANGE UP (ref 3.8–10.5)

## 2019-04-24 PROCEDURE — 99214 OFFICE O/P EST MOD 30 MIN: CPT | Mod: GC

## 2019-04-26 DIAGNOSIS — N18.6 END STAGE RENAL DISEASE: ICD-10-CM

## 2019-04-26 DIAGNOSIS — D69.6 THROMBOCYTOPENIA, UNSPECIFIED: ICD-10-CM

## 2019-04-26 DIAGNOSIS — I10 ESSENTIAL (PRIMARY) HYPERTENSION: ICD-10-CM

## 2019-04-26 DIAGNOSIS — E11.9 TYPE 2 DIABETES MELLITUS WITHOUT COMPLICATIONS: ICD-10-CM

## 2019-04-26 DIAGNOSIS — I50.22 CHRONIC SYSTOLIC (CONGESTIVE) HEART FAILURE: ICD-10-CM

## 2019-04-26 DIAGNOSIS — H61.21 IMPACTED CERUMEN, RIGHT EAR: ICD-10-CM

## 2019-04-26 DIAGNOSIS — A04.8 OTHER SPECIFIED BACTERIAL INTESTINAL INFECTIONS: ICD-10-CM

## 2019-04-28 NOTE — PHYSICAL EXAM
[No Acute Distress] : no acute distress [Well Developed] : well developed [Well Nourished] : well nourished [PERRL] : pupils equal round and reactive to light [EOMI] : extraocular movements intact [Normal Outer Ear/Nose] : the outer ears and nose were normal in appearance [Normal Oropharynx] : the oropharynx was normal [Supple] : supple [No Respiratory Distress] : no respiratory distress  [Clear to Auscultation] : lungs were clear to auscultation bilaterally [Soft] : abdomen soft [No Accessory Muscle Use] : no accessory muscle use [Non Tender] : non-tender [Non-distended] : non-distended [No Masses] : no abdominal mass palpated [Normal Bowel Sounds] : normal bowel sounds [No HSM] : no HSM [No Spinal Tenderness] : no spinal tenderness [No CVA Tenderness] : no CVA  tenderness [Grossly Normal Strength/Tone] : grossly normal strength/tone [No Joint Swelling] : no joint swelling [No Rash] : no rash [Coordination Grossly Intact] : coordination grossly intact [No Focal Deficits] : no focal deficits [Normal Affect] : the affect was normal [Normal Insight/Judgement] : insight and judgment were intact [de-identified] : Permacath present over the left chest [de-identified] : Irregularly irregular rhythm. Bradycardic.  [de-identified] : L AVF with audible bruit and palpable thrill. 1+ b/l LE pitting edema to the knees

## 2019-04-28 NOTE — HISTORY OF PRESENT ILLNESS
[FreeTextEntry1] : Patient is a 54-year-old male with long standing history of DM2 (~30 years), HTN, diabetic retinopathy (s/p laser eye surgery), diabetic neuropathy, HLD, CAD (s/p stent placement in 4/2016) and HFrEF (EF of 25%, s/p AICD), presenting for follow-up visit.  [de-identified] : Since his last visit, patient has underwent an AICD placement for his severe CHF. Patient had also underwent a side branch coiling and additional balooning of his L AFV. Patient reports that over the last several days, he gained an additional 9 kgs of weight, which required an extra session of dialysis for fluid removal. Patient currently denying any substernal CP, palpitations, light headedness, SOB, abdominal pain, N/V/D/C. Patient complaining of mild pain at the AICD site.

## 2019-04-28 NOTE — END OF VISIT
[] : Resident [FreeTextEntry3] : Pt appearing overall clinically stable but weight gain concerning for increasing amount of fluid retention. aiming to coordinate care with nephrology and cardiology to optimize fluid management.  checking relevant labs.

## 2019-04-28 NOTE — ASSESSMENT
[FreeTextEntry1] : Patient is a 53-year-old male with long standing history of DM2 (~30 years), HTN, diabetic retinopathy (s/p laser eye surgery), diabetic neuropathy, HLD, CAD (s/p stent placement in 4/2016) and HFrEF (EF of 25%), CKD5 (s/p AVF), presenting for follow-up visit\par \par # H. pylori\par - patient followed up with GI for H.Pylori\par - s/p 2 week course of quadruple therapy\par - as per patient, GI requested a stool antigen test to confirm eradication \par \par #HErEF\par - EF of 25% on LUCIANO from June 2018, s/p AICD placement in March of 2019\par - Follows with HF.\par - patient continues to gain weight, and is currently 190 lbs from 171 approx 3 months ago\par - Continue with Bumex 4mg BID, and Coreg 25 BID\par - will check proBNP \par - advised to follow a low Na diet, weigh himself daily and call if he gains >1 lbs per day or >3 lbs per week\par \par #CAD\par - Nuclear stress test in Nov 2018 showing large, moderate to severe defects in anterior and apical, inferior walls that are predominantly fixed, suggestive of infarct with mild ischemia in the mid anterior wall\par - continue with ASA and Atorvastatin\par - f/u Cards recs\par \par #HTN\par - BP soft today with SBP 90\par - c/w hydralazine 100 mg TID and Coreg 25 bid \par \par #ESRD\par - 2/2 diabetic nephropathy vs uncontrolled HTN\par - s/p AVF creation on 1/14/19, with a side branch coiling and AVF balooning in April 2019 \par - f/u Renal recs\par - advised to follow a low K diet\par \par #DM2\par - most recent A1c of 5.7 in Dec 2018\par - patient counselled on consistent carb diet\par - will recheck A1C today \par \par #Brandyn care maintenance \par - UTD flu shot \par - Pneumovax and Tdap received on 4/18\par - will discuss colon CA screening at next visit\par \par RTC in 5 weeks\par \par D/W Dr. Motta

## 2019-04-29 ENCOUNTER — OUTPATIENT (OUTPATIENT)
Dept: OUTPATIENT SERVICES | Facility: HOSPITAL | Age: 54
LOS: 1 days | End: 2019-04-29

## 2019-04-29 ENCOUNTER — OTHER (OUTPATIENT)
Age: 54
End: 2019-04-29

## 2019-04-29 ENCOUNTER — APPOINTMENT (OUTPATIENT)
Dept: INTERNAL MEDICINE | Facility: CLINIC | Age: 54
End: 2019-04-29
Payer: MEDICARE

## 2019-04-29 VITALS
SYSTOLIC BLOOD PRESSURE: 120 MMHG | BODY MASS INDEX: 31.65 KG/M2 | HEIGHT: 65 IN | DIASTOLIC BLOOD PRESSURE: 60 MMHG | WEIGHT: 190 LBS

## 2019-04-29 DIAGNOSIS — T82.868A THROMBOSIS DUE TO VASCULAR PROSTHETIC DEVICES, IMPLANTS AND GRAFTS, INITIAL ENCOUNTER: Chronic | ICD-10-CM

## 2019-04-29 DIAGNOSIS — Z95.5 PRESENCE OF CORONARY ANGIOPLASTY IMPLANT AND GRAFT: Chronic | ICD-10-CM

## 2019-04-29 PROCEDURE — 99213 OFFICE O/P EST LOW 20 MIN: CPT | Mod: GE

## 2019-05-01 LAB
GLUCOSE BLDC GLUCOMTR-MCNC: 188
GLUCOSE BLDC GLUCOMTR-MCNC: 199

## 2019-05-02 DIAGNOSIS — N18.6 END STAGE RENAL DISEASE: ICD-10-CM

## 2019-05-02 DIAGNOSIS — E11.9 TYPE 2 DIABETES MELLITUS WITHOUT COMPLICATIONS: ICD-10-CM

## 2019-05-02 DIAGNOSIS — I25.10 ATHEROSCLEROTIC HEART DISEASE OF NATIVE CORONARY ARTERY WITHOUT ANGINA PECTORIS: ICD-10-CM

## 2019-05-02 DIAGNOSIS — D69.6 THROMBOCYTOPENIA, UNSPECIFIED: ICD-10-CM

## 2019-05-02 DIAGNOSIS — I50.22 CHRONIC SYSTOLIC (CONGESTIVE) HEART FAILURE: ICD-10-CM

## 2019-05-02 DIAGNOSIS — A04.8 OTHER SPECIFIED BACTERIAL INTESTINAL INFECTIONS: ICD-10-CM

## 2019-05-06 NOTE — HISTORY OF PRESENT ILLNESS
[FreeTextEntry1] : F/u visit for thrombocytopenia  [de-identified] : Patient is a 54-year-old male with long standing history of DM2 (~30 years), HTN, diabetic retinopathy (s/p laser eye surgery), diabetic neuropathy, HLD, CAD (s/p stent placement in 4/2016) and HFrEF (EF of 25%, s/p AICD), presenting for follow-up visit for thrombocytopenia.\par \par Patient was seen last week at clinic for follow up for chronic medical problems. At that time patient was found to have thrombocytopenia to 40K. At that time their was a concern for ?HIT; though no clear documented heparin history. Patient presenting today for follow up labs and HIT antibody. Patient denies any hx of petechia, gum bleeding, bruising, SOB, CP, icterus, viral illness or recent abx use. Had pantoprazole stopped at that time for concern of etiology of thrombocytopenia. \par \par Patient has been unable to get more fluid off with HD 2/2 episodes of hypotension. Endorses dizziness on days of HD; timing consistent with his AM hydralazine administration.\par \par Endorses AM FSG's of .

## 2019-05-06 NOTE — PHYSICAL EXAM
[No Acute Distress] : no acute distress [Well Nourished] : well nourished [Normal Sclera/Conjunctiva] : normal sclera/conjunctiva [PERRL] : pupils equal round and reactive to light [Normal Outer Ear/Nose] : the outer ears and nose were normal in appearance [Normal Oropharynx] : the oropharynx was normal [Supple] : supple [No Respiratory Distress] : no respiratory distress  [Clear to Auscultation] : lungs were clear to auscultation bilaterally [Normal Rate] : normal rate  [Regular Rhythm] : with a regular rhythm [Soft] : abdomen soft [Non Tender] : non-tender [Normal Bowel Sounds] : normal bowel sounds [No CVA Tenderness] : no CVA  tenderness [No Joint Swelling] : no joint swelling [No Rash] : no rash [Normal Gait] : normal gait [Well Developed] : well developed [EOMI] : extraocular movements intact [No Accessory Muscle Use] : no accessory muscle use [Non-distended] : non-distended [No Masses] : no abdominal mass palpated [No HSM] : no HSM [No Spinal Tenderness] : no spinal tenderness [Grossly Normal Strength/Tone] : grossly normal strength/tone [Coordination Grossly Intact] : coordination grossly intact [No Focal Deficits] : no focal deficits [Normal Affect] : the affect was normal [Normal Insight/Judgement] : insight and judgment were intact [de-identified] : Permacath present over the left chest [de-identified] : Irregularly irregular rhythm. Bradycardic.  [de-identified] : L AVF with audible bruit and palpable thrill. 1+ b/l LE pitting edema to the knees  [de-identified] : No petechia

## 2019-05-06 NOTE — ASSESSMENT
[FreeTextEntry1] : Patient is a 53-year-old male with long standing history of DM2 (~30 years), HTN, diabetic retinopathy (s/p laser eye surgery), diabetic neuropathy, HLD, CAD (s/p stent placement in 4/2016) and HFrEF (EF of 25%), CKD5 (s/p AVF), presenting for follow-up visit for thrombocytopenia\par \par #Thrombocytopenia\par - Doubt etiology is HIT as pt asymptomatic, prior hx of thrombocytopenia noted in [facility] HIE; 60-100k\par - Will repeat CBC, Blue Top for plt in case there is clumping, INR/PT\par \par # H. pylori\par - patient followed up with GI for H.Pylori\par - s/p 2 week course of quadruple therapy\par - as per patient, GI requested a stool antigen test to confirm eradication a few weeks prior to appt\par \par #HErEF\par - EF of 25% on LUCIANO from June 2018, s/p AICD placement in March of 2019\par - Follows with HF.\par - patient continues to gain weight, and is currently 190 lbs from 171 approx 3 months ago\par - Continue with Bumex 4mg BID, and Coreg 25 BID\par - advised to follow a low Na diet, weigh himself daily and call if he gains >1 lbs per day or >3 lbs per week\par - Will reach out to Dr. Pavon regarding possibly removing extra fluid with HD.\par \par #CAD\par - Nuclear stress test in Nov 2018 showing large, moderate to severe defects in anterior and apical, inferior walls that are predominantly fixed, suggestive of infarct with mild ischemia in the mid anterior wall\par - continue with ASA and Atorvastatin\par - f/u Cards recs\par \par #HTN\par - BP soft today 120/60\par - c/w hydralazine 100 mg TID and Coreg 25 bid \par - Prescribe BP cuff to monitor BP's at home\par - Given pt's dizziness and hx of soft BP's; will recommend to 1/2 dose of hydralazine in the AM on the days of his HD\par - Will speak to Dr. Pavon about removing more fluid with HD\par \par #ESRD\par - 2/2 diabetic nephropathy vs uncontrolled HTN\par - s/p AVF creation on 1/14/19, with a side branch coiling and AVF ballooning in April 2019 \par - f/u Renal recs\par - advised to follow a low K diet\par \par #DM2\par - most recent A1c of 5.7 in Dec 2018\par - patient counselled on consistent carb diet\par \par #Brandyn care maintenance \par - UTD flu shot \par - Pneumovax and Tdap received on 4/18\par - will discuss colon CA screening at next visit\par \par RTC in 5 weeks with Dr. Diop\par \par Patrick Oliveira PGY3\par [employer]\par \par Case Discussed with Dr. Motta \par

## 2019-05-06 NOTE — END OF VISIT
[] : Resident [FreeTextEntry3] : Pt appears well today but with weight gain over time -- ?possibility to remove addn'l fluid at dialysis? Making contact with Nephrology to discuss.  Recent platelet count low but no clinical bleeding, no petechiae, no purpura on exam today or last visit.  Upon further review of labs inpt/outpt/HIE, it appears that pt has been mostly low-set with platelets often 60s-70s.  Most recent # is still notably lower - have held PPI. No arterial events and upon discussion with Nephrology following last visit, no heparin exposure at HD -- all renders HIT considerably unlikely.  Will recheck plts in blue top as next step and closely follow.

## 2019-05-09 ENCOUNTER — RX RENEWAL (OUTPATIENT)
Age: 54
End: 2019-05-09

## 2019-05-10 ENCOUNTER — APPOINTMENT (OUTPATIENT)
Dept: VASCULAR SURGERY | Facility: CLINIC | Age: 54
End: 2019-05-10
Payer: MEDICARE

## 2019-05-10 PROCEDURE — 99212 OFFICE O/P EST SF 10 MIN: CPT

## 2019-05-10 PROCEDURE — 93990 DOPPLER FLOW TESTING: CPT

## 2019-05-10 NOTE — PHYSICAL EXAM
[Carotid Bruits] : no carotid bruits [Normal Breath Sounds] : Normal breath sounds [Normal Heart Sounds] : normal heart sounds [2+] : left 2+ [de-identified] : awake, alert [de-identified] : no lad [FreeTextEntry1] : L hand warm. .  +palp thrill, no longer a thrill in side branch adjacent to avf\par

## 2019-05-10 NOTE — HISTORY OF PRESENT ILLNESS
[FreeTextEntry1] : s/p left rc avf 1/14\par s/p left fistulagram angioplasty 3/13/19\par s/p left fistulagram angioplasty, coiling side branch 4/17  [de-identified] : pt subjectively feels like there is more flow in the avf. no hand pain.  no weakness

## 2019-05-22 ENCOUNTER — APPOINTMENT (OUTPATIENT)
Dept: CARDIOLOGY | Facility: CLINIC | Age: 54
End: 2019-05-22

## 2019-05-22 ENCOUNTER — OUTPATIENT (OUTPATIENT)
Dept: OUTPATIENT SERVICES | Facility: HOSPITAL | Age: 54
LOS: 1 days | End: 2019-05-22
Payer: MEDICARE

## 2019-05-22 ENCOUNTER — APPOINTMENT (OUTPATIENT)
Dept: VASCULAR SURGERY | Facility: HOSPITAL | Age: 54
End: 2019-05-22

## 2019-05-22 VITALS
SYSTOLIC BLOOD PRESSURE: 127 MMHG | OXYGEN SATURATION: 96 % | RESPIRATION RATE: 18 BRPM | WEIGHT: 185.19 LBS | DIASTOLIC BLOOD PRESSURE: 73 MMHG | HEIGHT: 65 IN | TEMPERATURE: 98 F | HEART RATE: 58 BPM

## 2019-05-22 DIAGNOSIS — T82.868A THROMBOSIS DUE TO VASCULAR PROSTHETIC DEVICES, IMPLANTS AND GRAFTS, INITIAL ENCOUNTER: Chronic | ICD-10-CM

## 2019-05-22 DIAGNOSIS — Z95.5 PRESENCE OF CORONARY ANGIOPLASTY IMPLANT AND GRAFT: Chronic | ICD-10-CM

## 2019-05-22 DIAGNOSIS — N18.9 CHRONIC KIDNEY DISEASE, UNSPECIFIED: ICD-10-CM

## 2019-05-22 LAB
ALBUMIN SERPL ELPH-MCNC: 4.3 G/DL — SIGNIFICANT CHANGE UP (ref 3.3–5)
ALP SERPL-CCNC: 112 U/L — SIGNIFICANT CHANGE UP (ref 40–120)
ALT FLD-CCNC: 12 U/L — SIGNIFICANT CHANGE UP (ref 10–45)
ANION GAP SERPL CALC-SCNC: 15 MMOL/L — SIGNIFICANT CHANGE UP (ref 5–17)
AST SERPL-CCNC: 16 U/L — SIGNIFICANT CHANGE UP (ref 10–40)
BILIRUB SERPL-MCNC: 0.6 MG/DL — SIGNIFICANT CHANGE UP (ref 0.2–1.2)
BUN SERPL-MCNC: 46 MG/DL — HIGH (ref 7–23)
CALCIUM SERPL-MCNC: 9 MG/DL — SIGNIFICANT CHANGE UP (ref 8.4–10.5)
CHLORIDE SERPL-SCNC: 99 MMOL/L — SIGNIFICANT CHANGE UP (ref 96–108)
CO2 SERPL-SCNC: 24 MMOL/L — SIGNIFICANT CHANGE UP (ref 22–31)
CREAT SERPL-MCNC: 5.05 MG/DL — HIGH (ref 0.5–1.3)
GLUCOSE SERPL-MCNC: 154 MG/DL — HIGH (ref 70–99)
HCT VFR BLD CALC: 34.7 % — LOW (ref 39–50)
HGB BLD-MCNC: 12.1 G/DL — LOW (ref 13–17)
MCHC RBC-ENTMCNC: 31.7 PG — SIGNIFICANT CHANGE UP (ref 27–34)
MCHC RBC-ENTMCNC: 34.8 GM/DL — SIGNIFICANT CHANGE UP (ref 32–36)
MCV RBC AUTO: 91.1 FL — SIGNIFICANT CHANGE UP (ref 80–100)
PLATELET # BLD AUTO: 89 K/UL — LOW (ref 150–400)
POTASSIUM SERPL-MCNC: 4.6 MMOL/L — SIGNIFICANT CHANGE UP (ref 3.5–5.3)
POTASSIUM SERPL-SCNC: 4.6 MMOL/L — SIGNIFICANT CHANGE UP (ref 3.5–5.3)
PROT SERPL-MCNC: 7.3 G/DL — SIGNIFICANT CHANGE UP (ref 6–8.3)
RBC # BLD: 3.81 M/UL — LOW (ref 4.2–5.8)
RBC # FLD: 13.8 % — SIGNIFICANT CHANGE UP (ref 10.3–14.5)
SODIUM SERPL-SCNC: 138 MMOL/L — SIGNIFICANT CHANGE UP (ref 135–145)
WBC # BLD: 6.4 K/UL — SIGNIFICANT CHANGE UP (ref 3.8–10.5)
WBC # FLD AUTO: 6.4 K/UL — SIGNIFICANT CHANGE UP (ref 3.8–10.5)

## 2019-05-22 PROCEDURE — C1894: CPT

## 2019-05-22 PROCEDURE — C1887: CPT

## 2019-05-22 PROCEDURE — 93005 ELECTROCARDIOGRAM TRACING: CPT

## 2019-05-22 PROCEDURE — 93010 ELECTROCARDIOGRAM REPORT: CPT

## 2019-05-22 PROCEDURE — 80053 COMPREHEN METABOLIC PANEL: CPT

## 2019-05-22 PROCEDURE — C1725: CPT

## 2019-05-22 PROCEDURE — 85027 COMPLETE CBC AUTOMATED: CPT

## 2019-05-22 PROCEDURE — C1769: CPT

## 2019-05-22 PROCEDURE — 99213 OFFICE O/P EST LOW 20 MIN: CPT

## 2019-05-22 PROCEDURE — 36902 INTRO CATH DIALYSIS CIRCUIT: CPT

## 2019-05-22 NOTE — H&P CARDIOLOGY - HISTORY OF PRESENT ILLNESS
54  year old male with HTN, hyperlipidemia, DM-II, ESRD on HD (Tues/Thurs/Sat) - last session  with right anterior chest dialysis catheter and left upper extremity AVF, CAD, chronic systolic CHF with EF 20-25% subcutaneous ICD implant 3/27/19 steristrips in place presents asymptomatic for AV fistula revision.  s/p left rc avf 1/14  s/p left fistulagram angioplasty 3/13/19  s/p left fistulagram angioplasty, coiling side branch 4/17 54  year old male with HTN, hyperlipidemia, DM-II, ESRD on HD (Tues/Thurs/Sat) - last session 5/21/19  with right anterior chest dialysis catheter and left upper extremity AVF, CAD, chronic systolic CHF with EF 20-25% subcutaneous ICD implant 3/27/19 steristrips in place presents asymptomatic for AV fistula revision.  s/p left rc avf 1/14  s/p left fistulagram angioplasty 3/13/19  s/p left fistulagram angioplasty, coiling side branch 4/17

## 2019-05-28 ENCOUNTER — LABORATORY RESULT (OUTPATIENT)
Age: 54
End: 2019-05-28

## 2019-05-28 ENCOUNTER — OUTPATIENT (OUTPATIENT)
Dept: OUTPATIENT SERVICES | Facility: HOSPITAL | Age: 54
LOS: 1 days | End: 2019-05-28

## 2019-05-28 ENCOUNTER — APPOINTMENT (OUTPATIENT)
Dept: INTERNAL MEDICINE | Facility: CLINIC | Age: 54
End: 2019-05-28
Payer: MEDICARE

## 2019-05-28 VITALS
HEART RATE: 66 BPM | SYSTOLIC BLOOD PRESSURE: 120 MMHG | DIASTOLIC BLOOD PRESSURE: 80 MMHG | BODY MASS INDEX: 31.82 KG/M2 | HEIGHT: 65 IN | WEIGHT: 191 LBS

## 2019-05-28 DIAGNOSIS — T82.868A THROMBOSIS DUE TO VASCULAR PROSTHETIC DEVICES, IMPLANTS AND GRAFTS, INITIAL ENCOUNTER: Chronic | ICD-10-CM

## 2019-05-28 DIAGNOSIS — Z95.5 PRESENCE OF CORONARY ANGIOPLASTY IMPLANT AND GRAFT: Chronic | ICD-10-CM

## 2019-05-28 LAB
ALBUMIN SERPL ELPH-MCNC: 5.1 G/DL — HIGH (ref 3.3–5)
ALP SERPL-CCNC: 123 U/L — HIGH (ref 40–120)
ALT FLD-CCNC: 13 U/L — SIGNIFICANT CHANGE UP (ref 4–41)
ANION GAP SERPL CALC-SCNC: 18 MMO/L — HIGH (ref 7–14)
AST SERPL-CCNC: 16 U/L — SIGNIFICANT CHANGE UP (ref 4–40)
BASOPHILS # BLD AUTO: 0.06 K/UL — SIGNIFICANT CHANGE UP (ref 0–0.2)
BASOPHILS NFR BLD AUTO: 1 % — SIGNIFICANT CHANGE UP (ref 0–2)
BILIRUB SERPL-MCNC: 0.5 MG/DL — SIGNIFICANT CHANGE UP (ref 0.2–1.2)
BUN SERPL-MCNC: 31 MG/DL — HIGH (ref 7–23)
CALCIUM SERPL-MCNC: 9.7 MG/DL — SIGNIFICANT CHANGE UP (ref 8.4–10.5)
CHLORIDE SERPL-SCNC: 95 MMOL/L — LOW (ref 98–107)
CLOSURE TME COLL+EPINEP BLD: 39 K/UL — LOW (ref 150–400)
CO2 SERPL-SCNC: 27 MMOL/L — SIGNIFICANT CHANGE UP (ref 22–31)
CREAT SERPL-MCNC: 3.95 MG/DL — HIGH (ref 0.5–1.3)
EOSINOPHIL # BLD AUTO: 0.26 K/UL — SIGNIFICANT CHANGE UP (ref 0–0.5)
EOSINOPHIL NFR BLD AUTO: 4.4 % — SIGNIFICANT CHANGE UP (ref 0–6)
FOLATE SERPL-MCNC: 12.5 NG/ML — SIGNIFICANT CHANGE UP (ref 4.7–20)
GLUCOSE BLDC GLUCOMTR-MCNC: 182
GLUCOSE SERPL-MCNC: 144 MG/DL — HIGH (ref 70–99)
HCT VFR BLD CALC: 39.4 % — SIGNIFICANT CHANGE UP (ref 39–50)
HGB BLD-MCNC: 12.8 G/DL — LOW (ref 13–17)
IMM GRANULOCYTES NFR BLD AUTO: 0.3 % — SIGNIFICANT CHANGE UP (ref 0–1.5)
LYMPHOCYTES # BLD AUTO: 1.01 K/UL — SIGNIFICANT CHANGE UP (ref 1–3.3)
LYMPHOCYTES # BLD AUTO: 17.2 % — SIGNIFICANT CHANGE UP (ref 13–44)
MCHC RBC-ENTMCNC: 29.8 PG — SIGNIFICANT CHANGE UP (ref 27–34)
MCHC RBC-ENTMCNC: 32.5 % — SIGNIFICANT CHANGE UP (ref 32–36)
MCV RBC AUTO: 91.6 FL — SIGNIFICANT CHANGE UP (ref 80–100)
MONOCYTES # BLD AUTO: 0.54 K/UL — SIGNIFICANT CHANGE UP (ref 0–0.9)
MONOCYTES NFR BLD AUTO: 9.2 % — SIGNIFICANT CHANGE UP (ref 2–14)
NEUTROPHILS # BLD AUTO: 3.97 K/UL — SIGNIFICANT CHANGE UP (ref 1.8–7.4)
NEUTROPHILS NFR BLD AUTO: 67.9 % — SIGNIFICANT CHANGE UP (ref 43–77)
NRBC # FLD: 0 K/UL — SIGNIFICANT CHANGE UP (ref 0–0)
NT-PROBNP SERPL-SCNC: SIGNIFICANT CHANGE UP PG/ML
PLATELET # BLD AUTO: 46 K/UL — LOW (ref 150–400)
PMV BLD: 11.2 FL — SIGNIFICANT CHANGE UP (ref 7–13)
POTASSIUM SERPL-MCNC: 4.2 MMOL/L — SIGNIFICANT CHANGE UP (ref 3.5–5.3)
POTASSIUM SERPL-SCNC: 4.2 MMOL/L — SIGNIFICANT CHANGE UP (ref 3.5–5.3)
PROT SERPL-MCNC: 8.5 G/DL — HIGH (ref 6–8.3)
RBC # BLD: 4.3 M/UL — SIGNIFICANT CHANGE UP (ref 4.2–5.8)
RBC # FLD: 14.3 % — SIGNIFICANT CHANGE UP (ref 10.3–14.5)
SODIUM SERPL-SCNC: 140 MMOL/L — SIGNIFICANT CHANGE UP (ref 135–145)
VIT B12 SERPL-MCNC: 529 PG/ML — SIGNIFICANT CHANGE UP (ref 200–900)
WBC # BLD: 5.86 K/UL — SIGNIFICANT CHANGE UP (ref 3.8–10.5)
WBC # FLD AUTO: 5.86 K/UL — SIGNIFICANT CHANGE UP (ref 3.8–10.5)

## 2019-05-28 PROCEDURE — 99214 OFFICE O/P EST MOD 30 MIN: CPT | Mod: GC

## 2019-05-29 ENCOUNTER — RESULT REVIEW (OUTPATIENT)
Age: 54
End: 2019-05-29

## 2019-05-29 LAB
HCV AB S/CO SERPL IA: 0.1 S/CO — SIGNIFICANT CHANGE UP (ref 0–0.99)
HCV AB SERPL-IMP: SIGNIFICANT CHANGE UP
HIV 1+2 AB+HIV1 P24 AG SERPL QL IA: SIGNIFICANT CHANGE UP
MEV IGG SER-ACNC: >300 AU/ML — SIGNIFICANT CHANGE UP
MEV IGG+IGM SER-IMP: POSITIVE — SIGNIFICANT CHANGE UP
MUV AB SER-ACNC: POSITIVE — SIGNIFICANT CHANGE UP
MUV IGG FLD-ACNC: 269 AU/ML — SIGNIFICANT CHANGE UP
RUBV IGG SER-ACNC: >33 INDEX — SIGNIFICANT CHANGE UP
RUBV IGG SER-IMP: POSITIVE — SIGNIFICANT CHANGE UP

## 2019-05-30 ENCOUNTER — RESULT REVIEW (OUTPATIENT)
Age: 54
End: 2019-05-30

## 2019-05-30 NOTE — ASSESSMENT
[FreeTextEntry1] : Patient is a 53-year-old male with long standing history of DM2 (~30 years), HTN, diabetic retinopathy (s/p laser eye surgery), diabetic neuropathy, HLD, CAD (s/p stent placement in 4/2016) and HFrEF (EF of 25%), CKD5 (s/p AVF), presenting for follow-up\par \par #Thrombocytopenia\par - Patient with persistent thrombocytopenia\par - Inpatient labs from 5/22 reveal platelet count of 89K\par - Denies any current signs or symptoms of bleeding \par - Will repeat CBC and Platelet count with Blue Top in case there is clumping, INR/PT\par - check HIV and Hep C \par \par # H. pylori\par - patient followed up with GI for H.Pylori\par - s/p course of quadruple therapy\par - as per patient, GI requested a stool antigen test to confirm eradication a few weeks prior to appt on 7/9\par \par #HErEF\par - EF of 25% on LUCIANO from June 2018, s/p AICD placement in March of 2019\par - Follows with HF.\par - weight stable over the past several months \par - Continue with Bumex 4mg BID, and Coreg 25 BID\par - advised to follow a low Na diet, weigh himself daily and call if he gains >1 lbs per day or >3 lbs per week\par \par #CAD\par - Nuclear stress test in Nov 2018 showing large, moderate to severe defects in anterior and apical, inferior walls that are predominantly fixed, suggestive of infarct with mild ischemia in the mid anterior wall\par - continue with ASA and Atorvastatin\par - f/u Cards recs\par \par #HTN\par - BP WNL today 120/80\par - c/w hydralazine 100 mg TID and Coreg 25 bid with the exception of HD days when he will half his AM dose.  \par - patient has yet to buy a home BP cuff. encouraged to buy one again today \par \par #ESRD\par - 2/2 diabetic nephropathy vs uncontrolled HTN\par - s/p AVF creation on 1/14/19, with a side branch coiling and AVF ballooning in April 2019, and now s/p AVF revision on 5/22.\par - fistulogram on 5/10 reveals a patent AVF\par - f/u Renal recs\par - advised to follow a low K diet\par \par #DM2\par - most recent A1c of 6.3% in April 2019\par - patient counselled on consistent carb diet\par \par #Brandyn care maintenance \par - UTD flu shot \par - will check Quant Gold and MMR titers today \par - Pneumovax and Tdap received on 4/18\par - will discuss colon CA screening at next visit

## 2019-05-30 NOTE — END OF VISIT
[] : Resident [FreeTextEntry3] : 55 y/o M with h/o DM, HTN, HL, CAD s/p stents (2016), HFrEF s/p aicd, and esrd on HD here for follow up.\par Multiple complications with AV fistula requiring ballooning/revisions.\par On 4/29 was fluid overloaded and platelet count decreasing. Has had low BPs during dialysis -- has been taking 1/2 dose of hydralazine after dialysis.\par Continues to gain weight, likely a combination of fluid overload and poor dietary habits.

## 2019-05-30 NOTE — PHYSICAL EXAM
[No Acute Distress] : no acute distress [Well Nourished] : well nourished [Well Developed] : well developed [Well-Appearing] : well-appearing [Normal Sclera/Conjunctiva] : normal sclera/conjunctiva [PERRL] : pupils equal round and reactive to light [EOMI] : extraocular movements intact [Normal Outer Ear/Nose] : the outer ears and nose were normal in appearance [No JVD] : no jugular venous distention [Supple] : supple [No Respiratory Distress] : no respiratory distress  [Clear to Auscultation] : lungs were clear to auscultation bilaterally [No Accessory Muscle Use] : no accessory muscle use [Normal Rate] : normal rate  [Regular Rhythm] : with a regular rhythm [No Murmur] : no murmur heard [Normal S1, S2] : normal S1 and S2 [Soft] : abdomen soft [Non Tender] : non-tender [Non-distended] : non-distended [No Masses] : no abdominal mass palpated [No HSM] : no HSM [Normal Bowel Sounds] : normal bowel sounds [No CVA Tenderness] : no CVA  tenderness [No Spinal Tenderness] : no spinal tenderness [No Joint Swelling] : no joint swelling [Grossly Normal Strength/Tone] : grossly normal strength/tone [No Rash] : no rash [Normal Gait] : normal gait [Coordination Grossly Intact] : coordination grossly intact [No Focal Deficits] : no focal deficits [Deep Tendon Reflexes (DTR)] : deep tendon reflexes were 2+ and symmetric [Normal Affect] : the affect was normal [Normal Insight/Judgement] : insight and judgment were intact [de-identified] : 1+ b/l LE pitting edema. Left forearm AVF with palpable thrill and audible bruit. Left chest permacath

## 2019-05-30 NOTE — HISTORY OF PRESENT ILLNESS
[FreeTextEntry1] : Patient is a 54-year-old male with long standing history of DM2 (~30 years), HTN, diabetic retinopathy (s/p laser eye surgery), diabetic neuropathy, HLD, CAD (s/p stent placement in 4/2016) and HFrEF (EF of 25%, s/p AICD), presenting for follow-up visit.  [de-identified] : Patient reporting gaining 9 kilos over the weekend. Patient reports high carb diet, particularly rice, and states that he also drinks a lot of fluid especially tea. Patient reports that due to hypotension during HD days, he takes half of his AM dose of hydralazine, with the other 2 doses being the fully prescribed dose. Denies any signs of bleeding including bleeding from the gums, epistaxis, easy bruising, melena, BRBPR, hematuria

## 2019-05-31 ENCOUNTER — APPOINTMENT (OUTPATIENT)
Dept: VASCULAR SURGERY | Facility: CLINIC | Age: 54
End: 2019-05-31
Payer: MEDICARE

## 2019-05-31 LAB
GAMMA INTERFERON BACKGROUND BLD IA-ACNC: 0.03 IU/ML — SIGNIFICANT CHANGE UP
M TB IFN-G BLD-IMP: NEGATIVE — SIGNIFICANT CHANGE UP
M TB IFN-G CD4+ BCKGRND COR BLD-ACNC: -0.02 IU/ML — SIGNIFICANT CHANGE UP
M TB IFN-G CD4+CD8+ BCKGRND COR BLD-ACNC: -0.01 IU/ML — SIGNIFICANT CHANGE UP
QUANT TB PLUS MITOGEN MINUS NIL: 7.35 IU/ML — SIGNIFICANT CHANGE UP

## 2019-05-31 PROCEDURE — 93990 DOPPLER FLOW TESTING: CPT

## 2019-05-31 PROCEDURE — 99212 OFFICE O/P EST SF 10 MIN: CPT

## 2019-05-31 NOTE — PHYSICAL EXAM
[Carotid Bruits] : no carotid bruits [Normal Breath Sounds] : Normal breath sounds [Normal Heart Sounds] : normal heart sounds [2+] : left 2+ [de-identified] : awake, alert [de-identified] : no lad [FreeTextEntry1] : L hand warm. .  +palp thrill, no longer a thrill in side branch adjacent to avf\par

## 2019-05-31 NOTE — HISTORY OF PRESENT ILLNESS
[FreeTextEntry1] : s/p left rc avf 1/14\par s/p left fistulagram angioplasty 3/13/19\par s/p left fistulagram angioplasty, coiling side branch 4/17 \par s/p fistulagram, angioplasty 5/22 [de-identified] : pt subjectively feels like there is more flow in the avf. no hand pain.  no weakness

## 2019-06-01 ENCOUNTER — OUTPATIENT (OUTPATIENT)
Dept: OUTPATIENT SERVICES | Facility: HOSPITAL | Age: 54
LOS: 1 days | End: 2019-06-01
Payer: MEDICARE

## 2019-06-01 DIAGNOSIS — T82.868A THROMBOSIS DUE TO VASCULAR PROSTHETIC DEVICES, IMPLANTS AND GRAFTS, INITIAL ENCOUNTER: Chronic | ICD-10-CM

## 2019-06-01 DIAGNOSIS — Z95.5 PRESENCE OF CORONARY ANGIOPLASTY IMPLANT AND GRAFT: Chronic | ICD-10-CM

## 2019-06-06 ENCOUNTER — RX RENEWAL (OUTPATIENT)
Age: 54
End: 2019-06-06

## 2019-06-10 DIAGNOSIS — D69.6 THROMBOCYTOPENIA, UNSPECIFIED: ICD-10-CM

## 2019-06-10 DIAGNOSIS — N18.6 END STAGE RENAL DISEASE: ICD-10-CM

## 2019-06-10 DIAGNOSIS — E11.9 TYPE 2 DIABETES MELLITUS WITHOUT COMPLICATIONS: ICD-10-CM

## 2019-06-10 DIAGNOSIS — I50.22 CHRONIC SYSTOLIC (CONGESTIVE) HEART FAILURE: ICD-10-CM

## 2019-06-12 ENCOUNTER — RX RENEWAL (OUTPATIENT)
Age: 54
End: 2019-06-12

## 2019-06-12 RX ORDER — ASPIRIN 81 MG/1
81 TABLET ORAL DAILY
Qty: 90 | Refills: 1 | Status: DISCONTINUED | COMMUNITY
Start: 2018-03-14 | End: 2019-06-12

## 2019-06-12 RX ORDER — ASPIRIN ENTERIC COATED TABLETS 81 MG 81 MG/1
81 TABLET, DELAYED RELEASE ORAL
Qty: 90 | Refills: 0 | Status: DISCONTINUED | COMMUNITY
Start: 2019-06-12 | End: 2019-06-12

## 2019-06-14 ENCOUNTER — APPOINTMENT (OUTPATIENT)
Dept: VASCULAR SURGERY | Facility: CLINIC | Age: 54
End: 2019-06-14

## 2019-06-22 NOTE — PROGRESS NOTE ADULT - SUBJECTIVE AND OBJECTIVE BOX
Cori Butcher MD-PGY1  Department of Internal Medicine  Pager [phone]    HPI: 53 year old male with hx of HFrEF (28%), CAD s/p stent, CKD V, T2DM, HTN, HLD, and recent admission for CHF optimization for AVF placement with course c/b anemia with concern for GIB (EGD: nonbleeding ulcers and erosions). Patient told to come in today for catheter placement to start RRT.   Patient not endorsing any complaints, concerns of symptoms. In the ED: Afebrile, HR 73-77 -143/60-76 RR 16-18 O2:  on RA    OVERNIGHT EVENTS: none reported     SUBJECTIVE: Pt seen and evaluated at bedside. Pt c/o hunger, and having to wait for procedure. Denies any acute complaints.   REVIEW OF SYSTEMS:    CONSTITUTIONAL: No weakness, fevers or chills  EYES/ENT: No visual changes;  No vertigo or throat pain   NECK: No pain or stiffness  RESPIRATORY: No cough, wheezing, hemoptysis; No shortness of breath  CARDIOVASCULAR: No chest pain or palpitations  GASTROINTESTINAL: No abdominal or epigastric pain. No nausea, vomiting, or hematemesis; No diarrhea or constipation. No melena or hematochezia.  GENITOURINARY: No dysuria, frequency or hematuria  NEUROLOGICAL: No numbness or weakness  SKIN: No itching, burning, rashes, or lesions   All other review of systems is negative unless indicated above.    Vital Signs Last 12 Hrs  T(F): 98.3 (01-10-19 @ 05:47), Max: 98.3 (01-10-19 @ 05:47)  HR: 69 (01-10-19 @ 05:47) (69 - 71)  BP: 129/87 (01-10-19 @ 05:47) (129/87 - 146/93)  BP(mean): --  RR: 18 (01-10-19 @ 05:47) (18 - 18)  SpO2: 100% (01-10-19 @ 05:47) (99% - 100%)  I&O's Summary      PHYSICAL EXAM:    Constitutional: NAD, AAOx3, comfortable in bed.   Respiratory: Normal rate, rhythm, depth, effort. CTAB. No w/r/r.   Cardiovascular: RRR, normal S1 and S2, no m/r/g.   Gastrointestinal: +BS, soft NTND.   Extremities: wwp, no edema.   Vascular: Pulses equal and strong throughout.   Neurological: Cranial nerves grossly intact, strength and sensation intact throughout.   Skin: No gross skin abnormalities.         LABS:                        8.0    4.24  )-----------( 125      ( 10 Kali 2019 06:00 )             24.4     01-10    139  |  95<L>  |  120<H>  ----------------------------<  113<H>  3.6   |  24  |  6.44<H>    Ca    8.2<L>      10 Kali 2019 05:15  Phos  6.1     01-10  Mg     1.7     -10    TPro  6.6  /  Alb  3.5  /  TBili  0.3  /  DBili  x   /  AST  17  /  ALT  26  /  AlkPhos  110  01-10    PT/INR - ( 10 Kali 2019 05:15 )   PT: 14.4 SEC;   INR: 1.29          PTT - ( 10 Kali 2019 05:15 )  PTT:34.2 SEC  Urinalysis Basic - ( 2019 12:49 )    Color: LIGHT YELLOW / Appearance: CLEAR / S.014 / pH: 6.0  Gluc: 150 / Ketone: NEGATIVE  / Bili: NEGATIVE / Urobili: NORMAL   Blood: TRACE / Protein: 200 / Nitrite: NEGATIVE   Leuk Esterase: NEGATIVE / RBC: 2-5 / WBC 0-2   Sq Epi: OCC / Non Sq Epi: x / Bacteria: NEGATIVE        RADIOLOGY & ADDITIONAL TESTS:   < from: Transthoracic Echocardiogram (18 @ 08:47) >  CONCLUSIONS:  1. Mitral annular calcification, otherwise normal mitral  valve. Severe mitral regurgitation with an eccentric,  posteriorly directed jet.  2. Severely dilated left atrium.  LA volume index = 52  cc/m2.  3. Severe left ventricular enlargement.  4. Severe global left ventricular systolic dysfunction.  5. Right ventricular enlargement with decreased right  ventricular systolic function.  6. Estimated right ventricular systolic pressure equals 71  mm Hg, assuming right atrial pressure equals 10 mm Hg,  consistent with severe pulmonary hypertension.  EF 24%  < end of copied text >        MEDICATIONS  (STANDING):  amoxicillin 1000 milliGRAM(s) Oral two times a day  aspirin enteric coated 81 milliGRAM(s) Oral daily  atorvastatin 80 milliGRAM(s) Oral at bedtime  buMETAnide 4 milliGRAM(s) Oral two times a day  calcium acetate 667 milliGRAM(s) Oral three times a day with meals  carvedilol 6.25 milliGRAM(s) Oral every 12 hours  clarithromycin 500 milliGRAM(s) Oral two times a day  dextrose 5%. 1000 milliLiter(s) (50 mL/Hr) IV Continuous <Continuous>  dextrose 50% Injectable 12.5 Gram(s) IV Push once  dextrose 50% Injectable 25 Gram(s) IV Push once  dextrose 50% Injectable 25 Gram(s) IV Push once  hydrALAZINE 100 milliGRAM(s) Oral every 8 hours  insulin lispro (HumaLOG) corrective regimen sliding scale   SubCutaneous three times a day before meals  insulin lispro (HumaLOG) corrective regimen sliding scale   SubCutaneous at bedtime  pantoprazole    Tablet 40 milliGRAM(s) Oral two times a day    MEDICATIONS  (PRN):  dextrose 40% Gel 15 Gram(s) Oral once PRN Blood Glucose LESS THAN 70 milliGRAM(s)/deciliter  glucagon  Injectable 1 milliGRAM(s) IntraMuscular once PRN Glucose LESS THAN 70 milligrams/deciliter 22-Jun-2019 13:28 Cori Butcher MD-PGY1  Department of Internal Medicine  Pager [phone]    HPI: 53 year old male with hx of HFrEF (28%), CAD s/p stent, CKD V, T2DM, HTN, HLD, and recent admission for CHF optimization for AVF placement with course c/b anemia with concern for GIB (EGD: nonbleeding ulcers and erosions). Patient told to come in today for catheter placement to start RRT.   Patient not endorsing any complaints, concerns of symptoms. In the ED: Afebrile, HR 73-77 -143/60-76 RR 16-18 O2:  on RA    OVERNIGHT EVENTS: none reported     SUBJECTIVE: Pt seen and evaluated at bedside. Pt c/o hunger, and having to wait for procedure. Denies any acute complaints.   REVIEW OF SYSTEMS:    CONSTITUTIONAL: No weakness, fevers or chills  EYES/ENT: No visual changes;  No vertigo or throat pain   NECK: No pain or stiffness  RESPIRATORY: No cough, wheezing, hemoptysis; No shortness of breath  CARDIOVASCULAR: No chest pain or palpitations  GASTROINTESTINAL: No abdominal or epigastric pain. No nausea, vomiting, or hematemesis; No diarrhea or constipation. No melena or hematochezia.  GENITOURINARY: No dysuria, frequency or hematuria  NEUROLOGICAL: No numbness or weakness  SKIN: No itching, burning, rashes, or lesions   All other review of systems is negative unless indicated above.    Vital Signs Last 12 Hrs  T(F): 98.3 (01-10-19 @ 05:47), Max: 98.3 (01-10-19 @ 05:47)  HR: 69 (01-10-19 @ 05:47) (69 - 71)  BP: 129/87 (01-10-19 @ 05:47) (129/87 - 146/93)  BP(mean): --  RR: 18 (01-10-19 @ 05:47) (18 - 18)  SpO2: 100% (01-10-19 @ 05:47) (99% - 100%)  I&O's Summary      PHYSICAL EXAM:    Constitutional: NAD, AAOx3, comfortable in bed.   Respiratory: Normal rate, rhythm, depth, effort. CTAB. No w/r/r.   Cardiovascular: RRR, normal S1 and S2, no m/r/g.   Gastrointestinal: +BS, soft NTND.   Extremities: wwp, no edema.   Vascular: Pulses equal and strong throughout.   Neurological: Cranial nerves grossly intact, strength and sensation intact throughout.   Skin: No gross skin abnormalities.         LABS:                        8.0    4.24  )-----------( 125      ( 10 Kali 2019 06:00 )             24.4     01-10    139  |  95<L>  |  120<H>  ----------------------------<  113<H>  3.6   |  24  |  6.44<H>    Ca    8.2<L>      10 Kali 2019 05:15  Phos  6.1     01-10  Mg     1.7     -10    TPro  6.6  /  Alb  3.5  /  TBili  0.3  /  DBili  x   /  AST  17  /  ALT  26  /  AlkPhos  110  01-10    PT/INR - ( 10 Kali 2019 05:15 )   PT: 14.4 SEC;   INR: 1.29          PTT - ( 10 Kali 2019 05:15 )  PTT:34.2 SEC  Urinalysis Basic - ( 2019 12:49 )    Color: LIGHT YELLOW / Appearance: CLEAR / S.014 / pH: 6.0  Gluc: 150 / Ketone: NEGATIVE  / Bili: NEGATIVE / Urobili: NORMAL   Blood: TRACE / Protein: 200 / Nitrite: NEGATIVE   Leuk Esterase: NEGATIVE / RBC: 2-5 / WBC 0-2   Sq Epi: OCC / Non Sq Epi: x / Bacteria: NEGATIVE        RADIOLOGY & ADDITIONAL TESTS:   < from: Transthoracic Echocardiogram (18 @ 08:47) >  CONCLUSIONS:  1. Mitral annular calcification, otherwise normal mitral  valve. Severe mitral regurgitation with an eccentric,  posteriorly directed jet.  2. Severely dilated left atrium.  LA volume index = 52  cc/m2.  3. Severe left ventricular enlargement.  4. Severe global left ventricular systolic dysfunction.  5. Right ventricular enlargement with decreased right  ventricular systolic function.  6. Estimated right ventricular systolic pressure equals 71  mm Hg, assuming right atrial pressure equals 10 mm Hg,  consistent with severe pulmonary hypertension.  EF 24%  < end of copied text >        MEDICATIONS  (STANDING):  amoxicillin 1000 milliGRAM(s) Oral two times a day  aspirin enteric coated 81 milliGRAM(s) Oral daily  atorvastatin 80 milliGRAM(s) Oral at bedtime  buMETAnide 4 milliGRAM(s) Oral two times a day  calcium acetate 667 milliGRAM(s) Oral three times a day with meals  carvedilol 6.25 milliGRAM(s) Oral every 12 hours  clarithromycin 500 milliGRAM(s) Oral two times a day  dextrose 5%. 1000 milliLiter(s) (50 mL/Hr) IV Continuous <Continuous>  dextrose 50% Injectable 12.5 Gram(s) IV Push once  dextrose 50% Injectable 25 Gram(s) IV Push once  dextrose 50% Injectable 25 Gram(s) IV Push once  hydrALAZINE 100 milliGRAM(s) Oral every 8 hours  insulin lispro (HumaLOG) corrective regimen sliding scale   SubCutaneous three times a day before meals  insulin lispro (HumaLOG) corrective regimen sliding scale   SubCutaneous at bedtime  pantoprazole    Tablet 40 milliGRAM(s) Oral two times a day    MEDICATIONS  (PRN):  dextrose 40% Gel 15 Gram(s) Oral once PRN Blood Glucose LESS THAN 70 milliGRAM(s)/deciliter  glucagon  Injectable 1 milliGRAM(s) IntraMuscular once PRN Glucose LESS THAN 70 milligrams/deciliter    CASE DISCUSSED WITH: Dr. Galvez (renal) re: plan of care  CONSULTANT NOTES REVIEWED: CHF Team

## 2019-06-24 ENCOUNTER — OUTPATIENT (OUTPATIENT)
Dept: OUTPATIENT SERVICES | Facility: HOSPITAL | Age: 54
LOS: 1 days | Discharge: HOME | End: 2019-06-24

## 2019-06-24 DIAGNOSIS — Z95.5 PRESENCE OF CORONARY ANGIOPLASTY IMPLANT AND GRAFT: Chronic | ICD-10-CM

## 2019-06-24 DIAGNOSIS — T82.868A THROMBOSIS DUE TO VASCULAR PROSTHETIC DEVICES, IMPLANTS AND GRAFTS, INITIAL ENCOUNTER: Chronic | ICD-10-CM

## 2019-06-24 DIAGNOSIS — D64.9 ANEMIA, UNSPECIFIED: ICD-10-CM

## 2019-06-26 ENCOUNTER — RX CHANGE (OUTPATIENT)
Age: 54
End: 2019-06-26

## 2019-06-26 RX ORDER — CALCIUM ACETATE 667 MG
667 TABLET ORAL 3 TIMES DAILY
Qty: 270 | Refills: 1 | Status: DISCONTINUED | COMMUNITY
Start: 2018-07-29 | End: 2019-06-26

## 2019-07-01 ENCOUNTER — APPOINTMENT (OUTPATIENT)
Dept: HEMATOLOGY ONCOLOGY | Facility: CLINIC | Age: 54
End: 2019-07-01
Payer: MEDICARE

## 2019-07-01 ENCOUNTER — RESULT REVIEW (OUTPATIENT)
Age: 54
End: 2019-07-01

## 2019-07-01 VITALS
TEMPERATURE: 97.6 F | HEIGHT: 64.96 IN | SYSTOLIC BLOOD PRESSURE: 145 MMHG | RESPIRATION RATE: 15 BRPM | BODY MASS INDEX: 32.76 KG/M2 | DIASTOLIC BLOOD PRESSURE: 91 MMHG | OXYGEN SATURATION: 100 % | WEIGHT: 196.65 LBS | HEART RATE: 82 BPM

## 2019-07-01 LAB
ANION GAP SERPL CALC-SCNC: 16 MMOL/L
BUN SERPL-MCNC: 51 MG/DL
CALCIUM SERPL-MCNC: 8.7 MG/DL
CHLORIDE SERPL-SCNC: 100 MMOL/L
CO2 SERPL-SCNC: 24 MMOL/L
CREAT SERPL-MCNC: 5.33 MG/DL
CRP SERPL-MCNC: 0.33 MG/DL
ERYTHROCYTE [SEDIMENTATION RATE] IN BLOOD: 20 MM/HR — SIGNIFICANT CHANGE UP (ref 0–20)
FERRITIN SERPL-MCNC: 55 NG/ML
GLUCOSE SERPL-MCNC: 185 MG/DL
HCT VFR BLD CALC: 33.2 % — LOW (ref 39–50)
HGB BLD-MCNC: 10.9 G/DL — LOW (ref 13–17)
IRON SATN MFR SERPL: 10 %
IRON SERPL-MCNC: 36 UG/DL
MCHC RBC-ENTMCNC: 29.2 PG — SIGNIFICANT CHANGE UP (ref 27–34)
MCHC RBC-ENTMCNC: 32.8 G/DL — SIGNIFICANT CHANGE UP (ref 32–36)
MCV RBC AUTO: 89.1 FL — SIGNIFICANT CHANGE UP (ref 80–100)
PLATELET # BLD AUTO: 137 K/UL — LOW (ref 150–400)
POTASSIUM SERPL-SCNC: 4.4 MMOL/L
RBC # BLD: 3.73 M/UL — LOW (ref 4.2–5.8)
RBC # FLD: 13.4 % — SIGNIFICANT CHANGE UP (ref 10.3–14.5)
SODIUM SERPL-SCNC: 140 MMOL/L
TIBC SERPL-MCNC: 370 UG/DL
UIBC SERPL-MCNC: 334 UG/DL
WBC # BLD: 7.3 K/UL — SIGNIFICANT CHANGE UP (ref 3.8–10.5)
WBC # FLD AUTO: 7.3 K/UL — SIGNIFICANT CHANGE UP (ref 3.8–10.5)

## 2019-07-01 PROCEDURE — 99214 OFFICE O/P EST MOD 30 MIN: CPT

## 2019-07-01 PROCEDURE — G9005: CPT

## 2019-07-01 RX ORDER — PANTOPRAZOLE 20 MG/1
20 TABLET, DELAYED RELEASE ORAL DAILY
Qty: 90 | Refills: 0 | Status: DISCONTINUED | COMMUNITY
Start: 2019-04-09 | End: 2019-07-01

## 2019-07-01 RX ORDER — LIDOCAINE AND PRILOCAINE 25; 25 MG/G; MG/G
2.5-2.5 CREAM TOPICAL
Qty: 2 | Refills: 5 | Status: DISCONTINUED | COMMUNITY
Start: 2019-05-31 | End: 2019-07-01

## 2019-07-01 NOTE — PHYSICAL EXAM
[Normal] : affect appropriate [Restricted in physically strenuous activity but ambulatory and able to carry out work of a light or sedentary nature] : Status 1- Restricted in physically strenuous activity but ambulatory and able to carry out work of a light or sedentary nature, e.g., light house work, office work

## 2019-07-01 NOTE — CONSULT LETTER
[Dear  ___] : Dear  [unfilled], [Please see my note below.] : Please see my note below. [Consult Letter:] : I had the pleasure of evaluating your patient, [unfilled]. [Consult Closing:] : Thank you very much for allowing me to participate in the care of this patient.  If you have any questions, please do not hesitate to contact me. [Sincerely,] : Sincerely, [FreeTextEntry2] : Dr Madyson Celestin [DrJune  ___] : Dr. PATEL

## 2019-07-01 NOTE — ASSESSMENT
[FreeTextEntry1] : 53 yo gentleman with history of DM type 2 c/b retinopathy, neuropathy, nephropathy, HTN, HLD, MI 2016 s/p stent placement, HFrEF. Patient has have recent multiples hospitalizations  January, 2019 with  permacath placement and AVF formation. Patient is currently getting dialysis TIW, started on January, 2019. Patient's PLT count was normal last on December, 2018 - 215K. \par Patient was also admitted on March, 2019 and was found to have melena, endoscopic showed and ulcer, treated for H pylori x 15 days. \par \par I had a detailed discussion today with the patient regarding the natural history, epidemiology, diagnosis and treatment of anemia. I reviewed his laboratory studies in detail today. The etiology of anemia is likely multifactorial secondary to renal insufficiency and probably iron deficiency anemia. Anemia work up was performed today, including an erythropoietin level. \par \par - Thrombocytopenia: Reviewed of laboratory studies showed that his PLT count was normal before starting dialysis. Concern for HIT, heparin PLT antibody checked today. Recommended to avoid heparin with dialysis. \par \par  I answered all his questions to satisfaction.\par \par RTC 6 weeks. \par \par

## 2019-07-01 NOTE — HISTORY OF PRESENT ILLNESS
[0 - No Distress] : Distress Level: 0 [de-identified] : 55 yo gentleman with history of DM type 2 c/b retinopathy, neuropathy, nephropathy, HTN, HLD, MI 2016 s/p stent placement, HFrEF. Patient has have recent multiples hospitalizations  January, 2019 with  permacath placement and AVF formation. Patient is currently getting dialysis TIW, started on January, 2019. Patient's PLT count was normal last on December, 2018 - 215K. \par Patient was also admitted on March, 2019 and was found to have melena, endoscopic showed and ulcer, treated for H pylori x 15 days. \par \par Patient denies bleeding, epistaxis, melena, hematochezia. Denies fevers, night sweats, weight loss. Denies history of blood clots. \par \par Laboratory studies 5/29/19 WBC 5.86, RBC 4.3, Hb 12.8g/dl, HCT 39.4, MCV 91.6, RDW14.3, PLTS 46K. Diff-  WNL.\par Vitamin B 12 -529\par Folate 12/5, HIV  nonreactive, Hep C nonreactive.\par PLT count blue top tube 39.

## 2019-07-02 LAB
DEPRECATED KAPPA LC FREE/LAMBDA SER: 1.66 RATIO
EPO SERPL-MCNC: 31.9 MIU/ML
HAV IGM SER QL: NONREACTIVE
HAV IGM SER QL: NONREACTIVE
HBV CORE IGG+IGM SER QL: REACTIVE
HBV CORE IGM SER QL: NONREACTIVE
HBV CORE IGM SER QL: NONREACTIVE
HBV SURFACE AB SER QL: REACTIVE
HBV SURFACE AG SER QL: NONREACTIVE
HBV SURFACE AG SER QL: NONREACTIVE
HCV AB SER QL: NONREACTIVE
HCV S/CO RATIO: 0.09 S/CO
KAPPA LC CSF-MCNC: 11.7 MG/DL
KAPPA LC SERPL-MCNC: 19.44 MG/DL
M PROTEIN SPEC IFE-MCNC: NORMAL

## 2019-07-09 ENCOUNTER — APPOINTMENT (OUTPATIENT)
Dept: GASTROENTEROLOGY | Facility: CLINIC | Age: 54
End: 2019-07-09

## 2019-07-09 ENCOUNTER — APPOINTMENT (OUTPATIENT)
Dept: INFUSION THERAPY | Facility: HOSPITAL | Age: 54
End: 2019-07-09

## 2019-07-09 PROBLEM — I50.9 HEART FAILURE, UNSPECIFIED: Chronic | Status: ACTIVE | Noted: 2019-07-05

## 2019-07-10 DIAGNOSIS — Z51.11 ENCOUNTER FOR ANTINEOPLASTIC CHEMOTHERAPY: ICD-10-CM

## 2019-07-10 DIAGNOSIS — R11.2 NAUSEA WITH VOMITING, UNSPECIFIED: ICD-10-CM

## 2019-07-10 DIAGNOSIS — D50.9 IRON DEFICIENCY ANEMIA, UNSPECIFIED: ICD-10-CM

## 2019-07-12 ENCOUNTER — APPOINTMENT (OUTPATIENT)
Dept: VASCULAR SURGERY | Facility: CLINIC | Age: 54
End: 2019-07-12
Payer: MEDICARE

## 2019-07-12 VITALS
WEIGHT: 194 LBS | SYSTOLIC BLOOD PRESSURE: 117 MMHG | HEIGHT: 64 IN | DIASTOLIC BLOOD PRESSURE: 80 MMHG | TEMPERATURE: 98.6 F | HEART RATE: 77 BPM | BODY MASS INDEX: 33.12 KG/M2

## 2019-07-12 PROCEDURE — 36589 REMOVAL TUNNELED CV CATH: CPT

## 2019-07-12 NOTE — PROCEDURE
[FreeTextEntry1] : Removal of R IJ tunneled perm cath [FreeTextEntry2] : successful HD via Left radiocephalic avf [FreeTextEntry3] : 1% lidocaine infiltrated.\par small extension of dermatotomy\par blunt dissection to free the cuff.\par Catheter removed in its entirety

## 2019-07-17 ENCOUNTER — APPOINTMENT (OUTPATIENT)
Dept: INFUSION THERAPY | Facility: HOSPITAL | Age: 54
End: 2019-07-17

## 2019-07-24 ENCOUNTER — APPOINTMENT (OUTPATIENT)
Dept: ELECTROPHYSIOLOGY | Facility: CLINIC | Age: 54
End: 2019-07-24
Payer: MEDICARE

## 2019-07-24 VITALS — SYSTOLIC BLOOD PRESSURE: 123 MMHG | DIASTOLIC BLOOD PRESSURE: 81 MMHG | HEART RATE: 58 BPM | RESPIRATION RATE: 14 BRPM

## 2019-07-24 PROCEDURE — 93260 PRGRMG DEV EVAL IMPLTBL SYS: CPT

## 2019-07-26 ENCOUNTER — NON-APPOINTMENT (OUTPATIENT)
Age: 54
End: 2019-07-26

## 2019-07-26 ENCOUNTER — APPOINTMENT (OUTPATIENT)
Dept: CARDIOLOGY | Facility: CLINIC | Age: 54
End: 2019-07-26
Payer: MEDICARE

## 2019-07-26 VITALS
HEIGHT: 64 IN | SYSTOLIC BLOOD PRESSURE: 150 MMHG | OXYGEN SATURATION: 99 % | WEIGHT: 194 LBS | HEART RATE: 78 BPM | DIASTOLIC BLOOD PRESSURE: 92 MMHG | BODY MASS INDEX: 33.12 KG/M2

## 2019-07-26 PROCEDURE — 93000 ELECTROCARDIOGRAM COMPLETE: CPT

## 2019-07-26 PROCEDURE — 99214 OFFICE O/P EST MOD 30 MIN: CPT

## 2019-07-28 NOTE — ASSESSMENT
[FreeTextEntry1] : 54 year old M w/ h/o IDDM c/b neuropathy (A1c 5.7 12/18), HTN, CAD (s/p stent in [place] in 2016, unknown coronary anatomy), HFrEF (EF 25%, LVEDD 6.6 cm) s/p subQ ICD, severe MR (functional), ESRD on HD via permacath (T/Th/Sat; AV fistula awaiting patency), recent GIB 2/2 ulcer (H. pylori positive) who is here for follow up. Appears fairly euvolemic and compensated with class II symptoms. Interested in kidney transplant but discussed with patient and his sister that he'll likely need heart and kidney transplant. \par \par 1. HFrEF - unclear etiology; prior known CAD; possibly HTN \par - continue bumex 4 mg twice/day except for days of dialysis \par - continue coreg 25 mg twice/day \par - continue hydral 100 mg three times/day and isordil to 40 mg three times/day (ok to hold on days of HD)\par - repeat TTE \par - will perform CPET to risk stratify and then consider referral to [facility] for heart/kidney transplant evaluation \par \par 2. ESRD on HD; awaiting fistula maturation\par - instructed to follow low potassium diet\par \par 3. GI bleed - found to have ulcer which was H. pylori positive\par - appreciate Dr. Haines's input; will help facilitate sooner appt \par \par 4. CAD - prior PCI\par - on ASA 81 mg daily\par - on lipitor 80 mg qhs\par \par 5. Claudication - \par - will do LAURI and arterial dopplers to evaluate further\par \par RTC 8 weeks

## 2019-07-28 NOTE — HISTORY OF PRESENT ILLNESS
[FreeTextEntry1] : 54 year old M w/ h/o IDDM c/b neuropathy (A1c 5.7 12/18), HTN, CAD (s/p stent in [place] in 2016, unknown coronary anatomy), HFrEF (EF 25%, LVEDD 6.6 cm) s/p subQ ICD, severe MR (functional), ESRD on HD via permacath (T/Th/Sat; AV fistula now patent), recent GIB 2/2 ulcer (H. pylori positive) who is here for follow up. \par \par Had issues with AVF which has since been intervened on with improvement and since permacath removed. Being seen by hematology for low platelets of unclear etiology, presumed to be heparinrelated but HIT Ab test was cancelled. Platelets on last lab improved. \par \par Since last visit, has been doing fairly well. Weight has increased to 191 pounds d/t improved appetite. \par \par Adherent to low sodium and fluid restriction. Able to ambulate w/o limitation. Denies SOB. Denies orthopnea/PND. Uses 1 pillow. Able to climb 6 stairs w/o difficulty. ET approx 2-3 blocks limited by pain in calves. Reportedly had LAURI but report unavailable.

## 2019-08-01 ENCOUNTER — OUTPATIENT (OUTPATIENT)
Dept: OUTPATIENT SERVICES | Facility: HOSPITAL | Age: 54
LOS: 1 days | End: 2019-08-01
Payer: MEDICARE

## 2019-08-01 DIAGNOSIS — Z95.810 PRESENCE OF AUTOMATIC (IMPLANTABLE) CARDIAC DEFIBRILLATOR: Chronic | ICD-10-CM

## 2019-08-01 DIAGNOSIS — Z95.5 PRESENCE OF CORONARY ANGIOPLASTY IMPLANT AND GRAFT: Chronic | ICD-10-CM

## 2019-08-01 DIAGNOSIS — T82.868A THROMBOSIS DUE TO VASCULAR PROSTHETIC DEVICES, IMPLANTS AND GRAFTS, INITIAL ENCOUNTER: Chronic | ICD-10-CM

## 2019-08-05 ENCOUNTER — APPOINTMENT (OUTPATIENT)
Dept: CV DIAGNOSITCS | Facility: HOSPITAL | Age: 54
End: 2019-08-05

## 2019-08-06 ENCOUNTER — APPOINTMENT (OUTPATIENT)
Dept: INTERNAL MEDICINE | Facility: CLINIC | Age: 54
End: 2019-08-06
Payer: MEDICARE

## 2019-08-06 ENCOUNTER — LABORATORY RESULT (OUTPATIENT)
Age: 54
End: 2019-08-06

## 2019-08-06 ENCOUNTER — OUTPATIENT (OUTPATIENT)
Dept: OUTPATIENT SERVICES | Facility: HOSPITAL | Age: 54
LOS: 1 days | End: 2019-08-06

## 2019-08-06 VITALS
BODY MASS INDEX: 30.9 KG/M2 | WEIGHT: 181 LBS | SYSTOLIC BLOOD PRESSURE: 120 MMHG | HEART RATE: 86 BPM | DIASTOLIC BLOOD PRESSURE: 75 MMHG | HEIGHT: 64 IN

## 2019-08-06 DIAGNOSIS — Z95.5 PRESENCE OF CORONARY ANGIOPLASTY IMPLANT AND GRAFT: Chronic | ICD-10-CM

## 2019-08-06 DIAGNOSIS — T82.868A THROMBOSIS DUE TO VASCULAR PROSTHETIC DEVICES, IMPLANTS AND GRAFTS, INITIAL ENCOUNTER: Chronic | ICD-10-CM

## 2019-08-06 DIAGNOSIS — Z95.810 PRESENCE OF AUTOMATIC (IMPLANTABLE) CARDIAC DEFIBRILLATOR: Chronic | ICD-10-CM

## 2019-08-06 LAB
ALBUMIN SERPL ELPH-MCNC: 4.3 G/DL — SIGNIFICANT CHANGE UP (ref 3.3–5)
ALP SERPL-CCNC: 131 U/L — HIGH (ref 40–120)
ALT FLD-CCNC: 17 U/L — SIGNIFICANT CHANGE UP (ref 4–41)
ANION GAP SERPL CALC-SCNC: 13 MMO/L — SIGNIFICANT CHANGE UP (ref 7–14)
AST SERPL-CCNC: 19 U/L — SIGNIFICANT CHANGE UP (ref 4–40)
BASOPHILS # BLD AUTO: 0.04 K/UL — SIGNIFICANT CHANGE UP (ref 0–0.2)
BASOPHILS NFR BLD AUTO: 0.7 % — SIGNIFICANT CHANGE UP (ref 0–2)
BILIRUB SERPL-MCNC: 0.7 MG/DL — SIGNIFICANT CHANGE UP (ref 0.2–1.2)
BUN SERPL-MCNC: 33 MG/DL — HIGH (ref 7–23)
CALCIUM SERPL-MCNC: 9.5 MG/DL — SIGNIFICANT CHANGE UP (ref 8.4–10.5)
CHLORIDE SERPL-SCNC: 95 MMOL/L — LOW (ref 98–107)
CLOSURE TME COLL+EPINEP BLD: 73 K/UL — LOW (ref 150–400)
CO2 SERPL-SCNC: 32 MMOL/L — HIGH (ref 22–31)
CREAT SERPL-MCNC: 4.77 MG/DL — HIGH (ref 0.5–1.3)
EOSINOPHIL # BLD AUTO: 0.09 K/UL — SIGNIFICANT CHANGE UP (ref 0–0.5)
EOSINOPHIL NFR BLD AUTO: 1.6 % — SIGNIFICANT CHANGE UP (ref 0–6)
GLUCOSE SERPL-MCNC: 233 MG/DL — HIGH (ref 70–99)
HBA1C BLD-MCNC: 7.1 % — HIGH (ref 4–5.6)
HCT VFR BLD CALC: 46 % — SIGNIFICANT CHANGE UP (ref 39–50)
HGB BLD-MCNC: 14.7 G/DL — SIGNIFICANT CHANGE UP (ref 13–17)
IMM GRANULOCYTES NFR BLD AUTO: 0.5 % — SIGNIFICANT CHANGE UP (ref 0–1.5)
LYMPHOCYTES # BLD AUTO: 0.54 K/UL — LOW (ref 1–3.3)
LYMPHOCYTES # BLD AUTO: 9.6 % — LOW (ref 13–44)
MCHC RBC-ENTMCNC: 30.8 PG — SIGNIFICANT CHANGE UP (ref 27–34)
MCHC RBC-ENTMCNC: 32 % — SIGNIFICANT CHANGE UP (ref 32–36)
MCV RBC AUTO: 96.4 FL — SIGNIFICANT CHANGE UP (ref 80–100)
MONOCYTES # BLD AUTO: 0.33 K/UL — SIGNIFICANT CHANGE UP (ref 0–0.9)
MONOCYTES NFR BLD AUTO: 5.9 % — SIGNIFICANT CHANGE UP (ref 2–14)
NEUTROPHILS # BLD AUTO: 4.61 K/UL — SIGNIFICANT CHANGE UP (ref 1.8–7.4)
NEUTROPHILS NFR BLD AUTO: 81.7 % — HIGH (ref 43–77)
NRBC # FLD: 0 K/UL — SIGNIFICANT CHANGE UP (ref 0–0)
PLATELET # BLD AUTO: 112 K/UL — LOW (ref 150–400)
PMV BLD: 11.8 FL — SIGNIFICANT CHANGE UP (ref 7–13)
POTASSIUM SERPL-MCNC: 4.8 MMOL/L — SIGNIFICANT CHANGE UP (ref 3.5–5.3)
POTASSIUM SERPL-SCNC: 4.8 MMOL/L — SIGNIFICANT CHANGE UP (ref 3.5–5.3)
PROT SERPL-MCNC: 7.9 G/DL — SIGNIFICANT CHANGE UP (ref 6–8.3)
RBC # BLD: 4.77 M/UL — SIGNIFICANT CHANGE UP (ref 4.2–5.8)
RBC # FLD: 17.3 % — HIGH (ref 10.3–14.5)
SODIUM SERPL-SCNC: 140 MMOL/L — SIGNIFICANT CHANGE UP (ref 135–145)
WBC # BLD: 5.64 K/UL — SIGNIFICANT CHANGE UP (ref 3.8–10.5)
WBC # FLD AUTO: 5.64 K/UL — SIGNIFICANT CHANGE UP (ref 3.8–10.5)

## 2019-08-06 PROCEDURE — 99214 OFFICE O/P EST MOD 30 MIN: CPT | Mod: GC

## 2019-08-06 NOTE — PHYSICAL EXAM
[Well Nourished] : well nourished [Well Developed] : well developed [EOMI] : extraocular movements intact [PERRL] : pupils equal round and reactive to light [Normal Oropharynx] : the oropharynx was normal [No JVD] : no jugular venous distention [Supple] : supple [Clear to Auscultation] : lungs were clear to auscultation bilaterally [Normal Rate] : normal rate  [Regular Rhythm] : with a regular rhythm [Normal S1, S2] : normal S1 and S2 [No Murmur] : no murmur heard [Soft] : abdomen soft [Non Tender] : non-tender [Non-distended] : non-distended [No Masses] : no abdominal mass palpated [No HSM] : no HSM [Normal Bowel Sounds] : normal bowel sounds [No Rash] : no rash [No Spinal Tenderness] : no spinal tenderness [No Focal Deficits] : no focal deficits [Normal Gait] : normal gait [Normal Affect] : the affect was normal [Normal Insight/Judgement] : insight and judgment were intact [de-identified] : 1+ b/l LE pitting edema. L arm AVF with palpable thrill and audible bruit

## 2019-08-06 NOTE — HISTORY OF PRESENT ILLNESS
[FreeTextEntry1] : Patient is a 54-year-old male with long standing history of DM2 (~30 years), HTN, diabetic retinopathy (s/p laser eye surgery), diabetic neuropathy, HLD, CAD (s/p stent placement in 4/2016) and HFrEF (EF of 25%), ESRD (s/p AVF), presenting for follow-up [de-identified] : Since his last IM visit, patient saw his vascular surgeon who cleared his AVF for use. His L chest permacath was removed and he underwent HD via his AVF. \par \par Patient reports feeling unwell yesterday and therefore missing his appointment for TTE, LAURI and arterial duplex of the LE. Patient underwent HD this AM and reports feeling well.

## 2019-08-06 NOTE — ASSESSMENT
[FreeTextEntry1] : Patient is a 54-year-old male with long standing history of DM2 (~30 years), HTN, diabetic retinopathy (s/p laser eye surgery), diabetic neuropathy, HLD, CAD (s/p stent placement in 4/2016) and HFrEF (EF of 25%), ESRD (s/p AVF), presenting for follow-up\par \par #Thrombocytopenia\par - Patient with persistent thrombocytopenia\par - seen by Heme onc who believes HIT is in the differentia\par - pending results of repeat HIT panel \par - no signs of bleeding \par - will check CBC and blue top platelet today\par \par #Claudication\par - patient reporting claudication of b/l LE after walking 1-1.5 blocks\par - ordered for LAURI and b/l arterial LE duplex\par - missed scheduled appointment but reports will reschedule\par \par #HErEF\par - EF of 25% on LUCIANO from June 2018, s/p AICD placement in March of 2019\par - Follows with HF.\par - weight stable over the past several months \par - Continue with Bumex 4mg BID, Coreg 25 BID, Hydralazine 100 TID \par - advised to follow a low Na diet, weigh himself daily and call if he gains >1 lbs per day or >3 lbs per week\par \par #CAD\par - Nuclear stress test in Nov 2018 showing large, moderate to severe defects in anterior and apical, inferior walls that are predominantly fixed, suggestive of infarct with mild ischemia in the mid anterior wall\par - continue with ASA and Atorvastatin\par - f/u Cards recs\par \par #HTN\par - BP WNL today 120/75\par - c/w hydralazine 100 mg TID and Coreg 25 bid with the exception of HD days when he will half his AM dose. \par - patient has yet to buy a home BP cuff. encouraged to buy one again today \par \par #ESRD\par - 2/2 diabetic nephropathy vs uncontrolled HTN\par - s/p AVF creation on 1/14/19, with a side branch coiling and AVF ballooning in April 2019, and now s/p AVF revision on 5/22.\par - fistulogram on 5/10 reveals a patent AVF\par - left permacath removed \par - f/u Renal recs\par - advised to follow a low K diet\par \par #DM2\par - most recent A1c of 6.3% in April 2019\par - FS today 268\par - will recheck A1C \par - patient counselled on consistent carb diet\par \par #Brandyn care maintenance \par - UTD flu shot \par - will check Quant Gold and MMR titers today \par - Pneumovax and Tdap received on 4/18\par - will discuss colon CA screening at next visit. \par \par D/W Dr. Bowens

## 2019-08-07 DIAGNOSIS — E11.9 TYPE 2 DIABETES MELLITUS WITHOUT COMPLICATIONS: ICD-10-CM

## 2019-08-07 DIAGNOSIS — D69.6 THROMBOCYTOPENIA, UNSPECIFIED: ICD-10-CM

## 2019-08-07 DIAGNOSIS — I50.22 CHRONIC SYSTOLIC (CONGESTIVE) HEART FAILURE: ICD-10-CM

## 2019-08-07 DIAGNOSIS — N18.6 END STAGE RENAL DISEASE: ICD-10-CM

## 2019-08-07 LAB — GLUCOSE BLDC GLUCOMTR-MCNC: 268

## 2019-08-08 NOTE — DISCHARGE NOTE PROVIDER - PROVIDER TOKENS
Patient's belongings transferred with him from endoscopy to his new room on 6th floor.  His wallet and cell phone were located in 2 of the 4 bags and patient was shown them prior to colonoscopy.  I informed transport personnel (Gilberto) that patient may ask for those items and that they were in his belongings bags, among other personal items.  
PROVIDER:[TOKEN:[[id number]]],PROVIDER:[TOKEN:[[id number]]]

## 2019-08-09 ENCOUNTER — APPOINTMENT (OUTPATIENT)
Dept: ENDOCRINOLOGY | Facility: CLINIC | Age: 54
End: 2019-08-09
Payer: MEDICARE

## 2019-08-09 VITALS
BODY MASS INDEX: 29.87 KG/M2 | HEART RATE: 93 BPM | SYSTOLIC BLOOD PRESSURE: 118 MMHG | DIASTOLIC BLOOD PRESSURE: 64 MMHG | OXYGEN SATURATION: 99 % | WEIGHT: 174 LBS

## 2019-08-09 PROCEDURE — 99204 OFFICE O/P NEW MOD 45 MIN: CPT

## 2019-08-09 PROCEDURE — 95251 CONT GLUC MNTR ANALYSIS I&R: CPT

## 2019-08-09 PROCEDURE — 95250 CONT GLUC MNTR PHYS/QHP EQP: CPT

## 2019-08-09 NOTE — REVIEW OF SYSTEMS
[Recent Weight Gain (___ Lbs)] : recent [unfilled] ~Ulb weight gain [Fatigue] : fatigue [Lower Ext Edema] : lower extremity edema [SOB on Exertion] : shortness of breath during exertion [Joint Pain] : joint pain [Muscle Cramps] : muscle cramps [Back Pain] : back pain [All other systems negative] : All other systems negative [Negative] : Heme/Lymph [FreeTextEntry9] : muscle cramps especially after HD

## 2019-08-09 NOTE — HISTORY OF PRESENT ILLNESS
[FreeTextEntry1] : 54 year old male here for management of poorly controlled T2DM. A1c was 7.1% on August 6, 2019. Gluc was 233 at the time. \par \par DM was diagnosed 30+ years ago\par \par Complications include: ESRD on HD (Jan 2019) on T/Th/Sat (5am-10am), retinopathy in both eyes s/p laser eye surgery, s/p bilateral cataract surgery in July 2019, neuropathy, + CAD s/p stent in 2016, CHF with reduced EF 25% (3/27/19) s/p subQ ICD. He also has GI bleed history 2/2 ulcer. He also had LAURI done for evaluation of PVD. Was referred to [facility] for heart transplant consideration.\par \par Current DM Medications/Insulin: Not taking any medications at this time\par Past DM Medications/Insulin: 10+ years ago. Remembers taking insulin pen - 10 units.\par \par Current Fingerstick Glucose Logs: Did not bring meter today. Checking TID before meals\par Fasting: only on non-HD days: . occ 150-160\par 2 hour after breakfast -- 140-160\par Pre-Lunch: 125,130\par Pre-Dinner: 125,130\par Bedtime: not checking \par \par Current Diet Includes:\par Breakfast: Non-HD days -- coffee with sugar, toast 2 slices, maybe egg\par Lunch: rice, beans, chicken, fish\par Dinner: roti, rice, chicken, vegetables. \par Snacks: occ green plantain\par Drinks: Water or cup of coffee\par Sometimes plantains\par \par ROS: Last PRBC transfusion was in Jan 2019 and March 2019. Gained 10-20 lbs in the last 2 months. States he has been eating more than usual. Makes urine and not excessive but also takes Bumex. No history of ulcers. Recently more tired, especially after HD. Is getting PT twice/week and was started this week.\par \par Additional history:\par PMH: Anemia and chronic thrombocytopenia, ESRD, CHF, CAD, Retinopathy, Neuropathy, HLD, CAD\par PSH: ICD placement, stent placement, AV fistula\par Meds: Bumex 4 mg BID, Coreg 25 mg BID, Hydralazine 100 mg TID, Isordil 40 mg TID, Aspirin 81 mg QD, Lipitor 80 mg QD\par FH: Parents and brothers and sister with T2DM. Parents with heart disease. Sister with colon cancer\par Soc Hx: Lives close to sister and she cooks

## 2019-08-09 NOTE — ASSESSMENT
[Carbohydrate Consistent Diet] : carbohydrate consistent diet [FreeTextEntry1] : 54 year old male with poorly controlled T2DM (A1c 7.1%) complicated by ESRD on HD, CAD s/p stent, CHF w/ EF 25% and AICD, retinopathy s/p laser surgery & injections, hypertension and hyperlipidemia\par \par T2DM\par - A1c is at goal of 7% for his comorbidities. FS goal is  and advised occ 200 is likely due to food choices\par - I had a lengthy discussion regarding healthy diet (consistent carbohydrates and low calorie content) with the patient. I also emphasized to maintain routine exercise activity \par - Inserted Sara device today #[id number] and f/u with RD in 2 weeks or next available\par - Advised to continue checking FS TID AC and sometimes at bedtime\par - Uptodate with labs, cardiology, nephrology, and ophthalmology\par - Has upcoming apt for vascular study to eval for PAD/PVD\par - Referred him to podiatry for diabetic shoes and annual f/u\par \par Hypertension\par - BP is well controlled for now\par - Continue current anti-hypertensive meds as recommended by nephrologist\par \par Hyperlipidemia\par - Currently on Lipitor 80 mg QHS\par - Advised to discuss statin dose with cardiologist in the future since he is on HD\par \par F/u in 3 months\par \par \par  [Long Term Vascular Complications] : long term vascular complications of diabetes [Diabetes Foot Care] : diabetes foot care [Importance of Diet and Exercise] : importance of diet and exercise to improve glycemic control, achieve weight loss and improve cardiovascular health [Self Monitoring of Blood Glucose] : self monitoring of blood glucose [Retinopathy Screening] : Patient was referred to ophthalmology for retinopathy screening

## 2019-08-09 NOTE — PHYSICAL EXAM
[Alert] : alert [No Acute Distress] : no acute distress [Well Nourished] : well nourished [Well Developed] : well developed [Healthy Appearance] : healthy appearance [Normal Voice/Communication] : normal voice communication [EOMI] : extra ocular movement intact [Normal Sclera/Conjunctiva] : normal sclera/conjunctiva [Normal Hearing] : hearing was normal [No Proptosis] : no proptosis [Thyroid Not Enlarged] : the thyroid was not enlarged [Normal Oropharynx] : the oropharynx was normal [No Thyroid Nodules] : there were no palpable thyroid nodules [No Respiratory Distress] : no respiratory distress [No Accessory Muscle Use] : no accessory muscle use [Clear to Auscultation] : lungs were clear to auscultation bilaterally [Normal S1, S2] : normal S1 and S2 [Normal Rate] : heart rate was normal  [Regular Rhythm] : with a regular rhythm [No Edema] : there was no peripheral edema [Normal Bowel Sounds] : normal bowel sounds [Not Tender] : non-tender [Not Distended] : not distended [Soft] : abdomen soft [Anterior Cervical Nodes] : anterior cervical nodes [Post Cervical Nodes] : posterior cervical nodes [No Spinal Tenderness] : no spinal tenderness [Spine Straight] : spine straight [Normal Gait] : normal gait [No Stigmata of Cushings Syndrome] : no stigmata of cushings syndrome [Normal Strength/Tone] : muscle strength and tone were normal [No Rash] : no rash [Normal Reflexes] : deep tendon reflexes were 2+ and symmetric [No Tremors] : no tremors [Oriented x3] : oriented to person, place, and time [Normal Insight/Judgement] : insight and judgment were intact [Normal Affect] : the affect was normal [Normal Mood] : the mood was normal [Normal Outer Ear/Nose] : the ears and nose were normal in appearance [Supple] : the neck was supple [No Neck Mass] : no neck mass was observed [Pedal Pulses Normal] : the pedal pulses are present [Acanthosis Nigricans] : acanthosis nigricans [Normal] : normal [Full ROM] : with full range of motion [Diminished Throughout Both Feet] : normal tactile sensation with monofilament testing throughout both feet [Normal Sensation on Monofilament Testing] : normal sensation on monofilament testing of lower extremities [de-identified] : Obese male [de-identified] : Foot exam done 08/2019 [de-identified] : Pedal pulse normal [de-identified] : Fistula in LUE

## 2019-08-14 ENCOUNTER — APPOINTMENT (OUTPATIENT)
Dept: CV DIAGNOSITCS | Facility: HOSPITAL | Age: 54
End: 2019-08-14

## 2019-08-27 ENCOUNTER — MEDICATION RENEWAL (OUTPATIENT)
Age: 54
End: 2019-08-27

## 2019-09-06 ENCOUNTER — CLINICAL ADVICE (OUTPATIENT)
Age: 54
End: 2019-09-06

## 2019-09-09 DIAGNOSIS — Z71.89 OTHER SPECIFIED COUNSELING: ICD-10-CM

## 2019-09-10 ENCOUNTER — RX RENEWAL (OUTPATIENT)
Age: 54
End: 2019-09-10

## 2019-09-10 NOTE — H&P CARDIOLOGY - SURGICAL SITE INCISION
Caller would like to discuss an/a Referral for ENT and Audiology. Writer advised caller of callback within 24-72 hours.    Patient Name: Nelli Honeycutt  Caller Name: Charlie  Name of Facility:   Callback Number: [phone]  Best Availability: any  Can A Detailed Message Be left? na  Fax Number:   Additional Info: Pt calling for referrals to ENT an Audiology. Please call pt when referrals have been placed.   Did you confirm the message with the caller?: yes    Thank you,  Nicol Sibley    
Patient is having problems with hearing in his Rt ear, also sounds like he is in tin can.   He is to come to have his ear checked to make sure he does not have wax in his ears or another issue before seeing ENT and Audiology.   
no

## 2019-09-12 ENCOUNTER — APPOINTMENT (OUTPATIENT)
Dept: GASTROENTEROLOGY | Facility: CLINIC | Age: 54
End: 2019-09-12
Payer: MEDICARE

## 2019-09-12 ENCOUNTER — OUTPATIENT (OUTPATIENT)
Dept: OUTPATIENT SERVICES | Facility: HOSPITAL | Age: 54
LOS: 1 days | Discharge: HOME | End: 2019-09-12

## 2019-09-12 VITALS
WEIGHT: 186.29 LBS | BODY MASS INDEX: 31.8 KG/M2 | HEIGHT: 64 IN | TEMPERATURE: 98.7 F | OXYGEN SATURATION: 98 % | SYSTOLIC BLOOD PRESSURE: 128 MMHG | RESPIRATION RATE: 15 BRPM | DIASTOLIC BLOOD PRESSURE: 64 MMHG | HEART RATE: 78 BPM

## 2019-09-12 DIAGNOSIS — T82.868A THROMBOSIS DUE TO VASCULAR PROSTHETIC DEVICES, IMPLANTS AND GRAFTS, INITIAL ENCOUNTER: Chronic | ICD-10-CM

## 2019-09-12 DIAGNOSIS — D64.9 ANEMIA, UNSPECIFIED: ICD-10-CM

## 2019-09-12 DIAGNOSIS — Z95.5 PRESENCE OF CORONARY ANGIOPLASTY IMPLANT AND GRAFT: Chronic | ICD-10-CM

## 2019-09-12 DIAGNOSIS — Z95.810 PRESENCE OF AUTOMATIC (IMPLANTABLE) CARDIAC DEFIBRILLATOR: Chronic | ICD-10-CM

## 2019-09-12 PROCEDURE — 99214 OFFICE O/P EST MOD 30 MIN: CPT

## 2019-09-16 ENCOUNTER — RESULT REVIEW (OUTPATIENT)
Age: 54
End: 2019-09-16

## 2019-09-16 ENCOUNTER — LABORATORY RESULT (OUTPATIENT)
Age: 54
End: 2019-09-16

## 2019-09-16 ENCOUNTER — APPOINTMENT (OUTPATIENT)
Dept: HEMATOLOGY ONCOLOGY | Facility: CLINIC | Age: 54
End: 2019-09-16
Payer: MEDICARE

## 2019-09-16 VITALS
SYSTOLIC BLOOD PRESSURE: 150 MMHG | WEIGHT: 201.72 LBS | RESPIRATION RATE: 16 BRPM | OXYGEN SATURATION: 97 % | TEMPERATURE: 97.5 F | BODY MASS INDEX: 34.63 KG/M2 | HEART RATE: 91 BPM | DIASTOLIC BLOOD PRESSURE: 102 MMHG

## 2019-09-16 LAB
BASOPHILS # BLD AUTO: 0 K/UL — SIGNIFICANT CHANGE UP (ref 0–0.2)
BASOPHILS NFR BLD AUTO: 0.1 % — SIGNIFICANT CHANGE UP (ref 0–2)
EOSINOPHIL # BLD AUTO: 0.1 K/UL — SIGNIFICANT CHANGE UP (ref 0–0.5)
EOSINOPHIL NFR BLD AUTO: 2.3 % — SIGNIFICANT CHANGE UP (ref 0–6)
HCT VFR BLD CALC: 43.2 % — SIGNIFICANT CHANGE UP (ref 39–50)
HGB BLD-MCNC: 14.5 G/DL — SIGNIFICANT CHANGE UP (ref 13–17)
LYMPHOCYTES # BLD AUTO: 0.9 K/UL — LOW (ref 1–3.3)
LYMPHOCYTES # BLD AUTO: 16.2 % — SIGNIFICANT CHANGE UP (ref 13–44)
MCHC RBC-ENTMCNC: 32 PG — SIGNIFICANT CHANGE UP (ref 27–34)
MCHC RBC-ENTMCNC: 33.6 G/DL — SIGNIFICANT CHANGE UP (ref 32–36)
MCV RBC AUTO: 95.2 FL — SIGNIFICANT CHANGE UP (ref 80–100)
MONOCYTES # BLD AUTO: 0.5 K/UL — SIGNIFICANT CHANGE UP (ref 0–0.9)
MONOCYTES NFR BLD AUTO: 8.1 % — SIGNIFICANT CHANGE UP (ref 2–14)
NEUTROPHILS # BLD AUTO: 4.1 K/UL — SIGNIFICANT CHANGE UP (ref 1.8–7.4)
NEUTROPHILS NFR BLD AUTO: 73.3 % — SIGNIFICANT CHANGE UP (ref 43–77)
PLATELET # BLD AUTO: 110 K/UL — LOW (ref 150–400)
RBC # BLD: 4.54 M/UL — SIGNIFICANT CHANGE UP (ref 4.2–5.8)
RBC # FLD: 15.8 % — HIGH (ref 10.3–14.5)
WBC # BLD: 5.6 K/UL — SIGNIFICANT CHANGE UP (ref 3.8–10.5)
WBC # FLD AUTO: 5.6 K/UL — SIGNIFICANT CHANGE UP (ref 3.8–10.5)

## 2019-09-16 PROCEDURE — 99214 OFFICE O/P EST MOD 30 MIN: CPT

## 2019-09-16 NOTE — HISTORY OF PRESENT ILLNESS
[0 - No Distress] : Distress Level: 0 [de-identified] : 55 yo gentleman with history of DM type 2 c/b retinopathy, neuropathy, nephropathy, HTN, HLD, MI 2016 s/p stent placement, HFrEF. Patient has have recent multiples hospitalizations  January, 2019 with  perma cath placement and AVF formation. Patient is currently getting dialysis TIW, started on January, 2019. Patient's PLT count was normal last on December, 2018 - 215K. \par Patient was also admitted on March, 2019 and was found to have melena, endoscopic showed and ulcer, treated for H pylori x 15 days. \par \par Patient denies bleeding, epistaxis, melena, hematochezia. Denies fevers, night sweats, weight loss. Denies history of blood clots. \par \par Laboratory studies 5/29/19 WBC 5.86, RBC 4.3, Hb 12.8g/dl, HCT 39.4, MCV 91.6, RDW14.3, PLTS 46K. Diff-  WNL.\par Vitamin B 12 -529\par Folate 12/5, HIV  nonreactive, Hep C nonreactive.\par PLT count blue top tube 39. [de-identified] : 7/1/19 erythropoietin level 31\par Iron studies : iron level 36 , TIBC 370, % saturation 10%; received 2 doses of Feraheme 510 mg weekly. \par Hepatitis B core antibody positive\par \par Light chains in the serum Kappa 19.44, lambda 11.70 , kappa/ lambda ratio 1.66. Immunofixation no band identified.  Likely reactive, creatinine is 5.33. EGFR 13.\par \par No other changes in his medical, surgical or social history since 7/1/19.

## 2019-09-16 NOTE — CONSULT LETTER
[Dear  ___] : Dear  [unfilled], [Consult Letter:] : I had the pleasure of evaluating your patient, [unfilled]. [Please see my note below.] : Please see my note below. [Consult Closing:] : Thank you very much for allowing me to participate in the care of this patient.  If you have any questions, please do not hesitate to contact me. [Sincerely,] : Sincerely, [DrJune  ___] : Dr. PATEL [FreeTextEntry2] : Dr Madyson Celestin

## 2019-09-16 NOTE — ASSESSMENT
[FreeTextEntry1] : 53 yo gentleman with history of DM type 2 c/b retinopathy, neuropathy, nephropathy, HTN, HLD, MI 2016 s/p stent placement, HFrEF. Patient has have recent multiples hospitalizations  January, 2019 with  permacath placement and AVF formation. Patient is currently getting dialysis TIW, started on January, 2019. Patient's PLT count was normal last on December, 2018 - 215K. \par Patient was also admitted on March, 2019 and was found to have melena, endoscopic showed and ulcer, treated for H pylori x 15 days. 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 Hb is above 14 g/dl, no need for epogen. \par \par -Iron deficiency anemia: 7/1/19 Iron studies : iron level 36 , TIBC 370, % saturation 10%; received 2 doses of Feraheme 510 mg weekly.  Iron studies were done to evaluate response to treatment. \par \par Light chains in the serum Kappa 19.44, lambda 11.70 , kappa/ lambda ratio 1.66. Immunofixation no band identified.  Likely reactive, creatinine is 5.33. EGFR \par \par - Thrombocytopenia: Reviewed of laboratory studies showed that his PLT count was normal before starting dialysis. Thrombocytopenia is likely secondary to renal insufficiency, stable 112. Avoid heparin with dialysis. \par \par  I answered all his questions to satisfaction.\par \par RTC 3 months. \par \par

## 2019-09-17 ENCOUNTER — RX RENEWAL (OUTPATIENT)
Age: 54
End: 2019-09-17

## 2019-09-17 LAB
ALBUMIN MFR SERPL ELPH: 60.2 %
ALBUMIN SERPL ELPH-MCNC: 4.1 G/DL
ALBUMIN SERPL-MCNC: 4.2 G/DL
ALBUMIN/GLOB SERPL: 1.6 RATIO
ALP BLD-CCNC: 127 U/L
ALPHA1 GLOB MFR SERPL ELPH: 3.5 %
ALPHA1 GLOB SERPL ELPH-MCNC: 0.2 G/DL
ALPHA2 GLOB MFR SERPL ELPH: 10.6 %
ALPHA2 GLOB SERPL ELPH-MCNC: 0.7 G/DL
ALT SERPL-CCNC: 14 U/L
ANION GAP SERPL CALC-SCNC: 18 MMOL/L
AST SERPL-CCNC: 13 U/L
B-GLOBULIN MFR SERPL ELPH: 10 %
B-GLOBULIN SERPL ELPH-MCNC: 0.7 G/DL
B2 MICROGLOB SERPL-MCNC: 20.6 MG/L
BILIRUB SERPL-MCNC: 0.5 MG/DL
BUN SERPL-MCNC: 53 MG/DL
CALCIUM SERPL-MCNC: 8.7 MG/DL
CHLORIDE SERPL-SCNC: 95 MMOL/L
CO2 SERPL-SCNC: 25 MMOL/L
CREAT SERPL-MCNC: 6.85 MG/DL
DEPRECATED KAPPA LC FREE/LAMBDA SER: 1.96 RATIO
DEPRECATED KAPPA LC FREE/LAMBDA SER: 1.96 RATIO
FERRITIN SERPL-MCNC: 142 NG/ML
GAMMA GLOB FLD ELPH-MCNC: 1.1 G/DL
GAMMA GLOB MFR SERPL ELPH: 15.7 %
GLUCOSE SERPL-MCNC: 286 MG/DL
IGA SER QL IEP: 263 MG/DL
IGG SER QL IEP: 1214 MG/DL
IGM SER QL IEP: 52 MG/DL
INTERPRETATION SERPL IEP-IMP: NORMAL
IRON SATN MFR SERPL: 31 %
IRON SERPL-MCNC: 81 UG/DL
KAPPA LC CSF-MCNC: 8.65 MG/DL
KAPPA LC CSF-MCNC: 8.65 MG/DL
KAPPA LC SERPL-MCNC: 16.92 MG/DL
KAPPA LC SERPL-MCNC: 16.92 MG/DL
M PROTEIN SPEC IFE-MCNC: NORMAL
POTASSIUM SERPL-SCNC: 4.7 MMOL/L
PROT SERPL-MCNC: 6.9 G/DL
SODIUM SERPL-SCNC: 138 MMOL/L
TIBC SERPL-MCNC: 259 UG/DL
UIBC SERPL-MCNC: 178 UG/DL

## 2019-09-18 ENCOUNTER — APPOINTMENT (OUTPATIENT)
Dept: ENDOCRINOLOGY | Facility: CLINIC | Age: 54
End: 2019-09-18
Payer: MEDICARE

## 2019-09-18 VITALS — WEIGHT: 200 LBS | HEIGHT: 64 IN | BODY MASS INDEX: 34.15 KG/M2

## 2019-09-18 LAB — GLUCOSE BLDC GLUCOMTR-MCNC: 164

## 2019-09-18 PROCEDURE — G0108 DIAB MANAGE TRN  PER INDIV: CPT

## 2019-09-18 PROCEDURE — 82962 GLUCOSE BLOOD TEST: CPT

## 2019-09-19 LAB
FREE KAPPA URINE: 743 MG/L
FREE KAPPA/LAMDA RATIO: 5.23
FREE LAMDA URINE: 142 MG/L
H PYLORI AG STL QL: NOT DETECTED

## 2019-09-20 LAB
ALBUPE: 46.9 %
ALPHA1UPE: 25.1 %
ALPHA2UPE: 3.7 %
BETAUPE: 13.1 %
CREAT 24H UR-MCNC: NORMAL G/24 H
CREATININE UR (MAYO): 108 MG/DL
GAMMAUPE: 11.2 %
IGA 24H UR QL IFE: NORMAL
KAPPA LC 24H UR QL: NORMAL
PROT PATTERN 24H UR ELPH-IMP: NORMAL
PROT UR-MCNC: 224 MG/DL
PROT UR-MCNC: 224 MG/DL
SPECIMEN VOL 24H UR: NORMAL ML

## 2019-09-27 ENCOUNTER — APPOINTMENT (OUTPATIENT)
Dept: CARDIOLOGY | Facility: CLINIC | Age: 54
End: 2019-09-27
Payer: MEDICARE

## 2019-09-27 VITALS
DIASTOLIC BLOOD PRESSURE: 80 MMHG | RESPIRATION RATE: 16 BRPM | WEIGHT: 200 LBS | OXYGEN SATURATION: 97 % | SYSTOLIC BLOOD PRESSURE: 120 MMHG | HEIGHT: 64 IN | HEART RATE: 81 BPM | BODY MASS INDEX: 34.15 KG/M2

## 2019-09-27 PROCEDURE — 93000 ELECTROCARDIOGRAM COMPLETE: CPT

## 2019-09-27 PROCEDURE — 99214 OFFICE O/P EST MOD 30 MIN: CPT

## 2019-09-27 NOTE — CONSULT LETTER
[Dear  ___] : Dear  [unfilled], [Consult Letter:] : I had the pleasure of evaluating your patient, [unfilled]. [Please see my note below.] : Please see my note below. [Consult Closing:] : Thank you very much for allowing me to participate in the care of this patient.  If you have any questions, please do not hesitate to contact me. [Sincerely,] : Sincerely, [DrJune  ___] : Dr. PATEL [FreeTextEntry3] : Trevin Haines MD, MPH, FASGE\par Chief of Clinical Quality in Gastroenterology, [facility]\par Director of Endoscopic Ultrasound, [facility]\par [address], [address]\par [place], [address]\par Weekdays 8 AM-4PM [phone] - [place]\par 24 hours [phone] [DrJune ___] : Dr. PATEL

## 2019-09-27 NOTE — ASSESSMENT
[FreeTextEntry1] : #1. GI bleed / melena from gastric ulcer (incisura) which was biopsied and negative for malignancy.  \par \par #2. H.pylori gastritis, did not confirm eradication.\par \par #3. CHF EF 25%.  Cardiac status improving\par \par #4. ESRD on HD\par \par #5. Anemia\par \par #6. Recent AICD\par \par Recommendations:\par \par #1. H.pylori stool testing, pt to call for results.\par \par #2. Pantoprazole, decrease to 20 mg daily, start after stool testing.\par \par #3.  Currently refusing colonoscopy, will discuss with nephrologist (pre kidney transplant), as transplant medicine usually wants this done prior to consideration transplant.\par \par #4.  Office followup 3-4 months.

## 2019-09-27 NOTE — PHYSICAL EXAM
[General Appearance - Alert] : alert [General Appearance - In No Acute Distress] : in no acute distress [Oropharynx] : the oropharynx was normal [Sclera] : the sclera and conjunctiva were normal [Auscultation Breath Sounds / Voice Sounds] : lungs were clear to auscultation bilaterally [Heart Rate And Rhythm] : heart rate was normal and rhythm regular [Bowel Sounds] : normal bowel sounds [Abdomen Tenderness] : non-tender [Abdomen Soft] : soft [Abdomen Mass (___ Cm)] : no abdominal mass palpated [] : no hepato-splenomegaly [Abnormal Walk] : normal gait [Affect] : the affect was normal [FreeTextEntry1] : No jaundice

## 2019-09-27 NOTE — HISTORY OF PRESENT ILLNESS
[FreeTextEntry1] : Dr. Giovanny Pavon (nephrology)\par Dr. Enio Pavon (cardiology)\par \par Patient follows up for gastric ulcer with prior bleed.\par \par Asymptomatic, without fevers, chills, sweats, abdominal pain, heartburn, dysphagia, nausea, vomiting, constipation, diarrhea, melena, hematochezia, jaundice or weight loss.\par \par Did not do H.pylori stool antigen yet, but has stool collection kit at home.\par \par On pantoprazole.\par \par FROM LAST NOTE:\par Labs:\par Yaakov Hb 7.2 on 12/12/18:\par Discharge Hb 9 on 12/14/18\par \par EGD: 12/14/18:\par  The Z-line was regular and was found 35 cm from the incisors.\par  The exam of the esophagus was otherwise normal.\par  A 1 cm non-bleeding cratered gastric ulcer of moderate severity with no \par  stigmata of bleeding was found on the incura. Biopsies were taken with a \par  cold forceps for histology.\par  A few localized, small non-bleeding erosions were found in the gastric \par  body. There were no stigmata of recent bleeding. Biopsies were taken \par  with a cold forceps for Helicobacter pylori testing.\par  The exam of the stomach was otherwise normal.\par  The duodenal bulb and 2nd part of the duodenum were normal.\par      \par Impression: - 1 cm clean based cratered gastric ulcer. Biopsied.\par   - Non-bleeding erosive gastropathy. Biopsied.\par   - Normal duodenal bulb and 2nd part of the duodenum.\par \par Pathology:\par 1-Antrum biopsy:\par - Gastric antral type mucosa with H. pylori-associated\par gastritis ([place]in Starry stain).\par \par 2-Gastric, body biopsy:\par - Gastric body type mucosa with H. pylori-associated\par gastritis (Warthin Starry stain).\par \par 3-Gastric, ulcer biopsy:\par - Gastric antral type mucosa with H. pylori-associated\par gastritis (W[place]in Starry stain).\par \par \par \par

## 2019-09-28 NOTE — HISTORY OF PRESENT ILLNESS
[FreeTextEntry1] : 54 year old M w/ h/o IDDM (A1c 7.1 9/19), HTN, CAD (s/p stent in [place] in 2016, unknown coronary anatomy), HFrEF (EF 25%, LVEDD 6.6 cm) s/p subQ ICD, prior severe MR (functional; now improved), ESRD on HD via permacath (T/Th/Sat; AV fistula now patent), prior GIB 2/2 ulcer (H. pylori positive), who is here for f/u.\par \par Since last visit, has been doing fairly well. Weight was 86 kg or 189 lbs after HD yesterday with 4 liters off yesterday and weight is 197 lbs with clothes today from prior 191 pounds last visit. Pt had an LAURI on 8/14/19 with no evidence of significant occlusive disease and an echo on 8/14/19 with LVEF 18% and improvement in MR from severe to mild moderate.\par \par Adherent to low sodium and fluid restriction. Able to ambulate w/o limitation. Pt has been taking bumex 2 mg bid, not 4 mg bid and states he urinates a few times a day. He takes hydralazine twice a day on HD days and does not report any low B/P readings or dizziness/LH. He was not taking iso 40 mg tid because it was not covered by insurance. Denies SOB. Denies orthopnea/PND. Uses 1 pillow. Able to climb 6 stairs w/o difficulty. Pt complains of ongoing lower back pain and pain in his fingers on both hands and has taken motrin. He feels tylenol doesn't help.  ET approx 2-3 blocks limited by pain in calves. Pt is scheduled for a CPET at [facility] on 10/4/19.

## 2019-09-28 NOTE — PHYSICAL EXAM
[General Appearance - Well Developed] : well developed [Normal Appearance] : normal appearance [General Appearance - Well Nourished] : well nourished [Normal Oral Mucosa] : normal oral mucosa [Normal Conjunctiva] : the conjunctiva exhibited no abnormalities [Normal Oropharynx] : normal oropharynx [] : no respiratory distress [Respiration, Rhythm And Depth] : normal respiratory rhythm and effort [Auscultation Breath Sounds / Voice Sounds] : lungs were clear to auscultation bilaterally [Heart Rate And Rhythm] : heart rate and rhythm were normal [Heart Sounds] : normal S1 and S2 [Bowel Sounds] : normal bowel sounds [Abdomen Soft] : soft [Abdomen Tenderness] : non-tender [Abnormal Walk] : normal gait [Nail Clubbing] : no clubbing of the fingernails [Cyanosis, Localized] : no localized cyanosis [Skin Color & Pigmentation] : normal skin color and pigmentation [Oriented To Time, Place, And Person] : oriented to person, place, and time [Impaired Insight] : insight and judgment were intact [FreeTextEntry1] : scars on legs bilaterally with no erythema

## 2019-09-28 NOTE — ASSESSMENT
[FreeTextEntry1] : 54 year old M w/ h/o IDDM c/b neuropathy (A1c 7.1 8/19), HTN, CAD (s/p stent in [place] in 2016, unknown coronary anatomy), HFrEF (EF 25%, LVEDD 6.6 cm) s/p subQ ICD, ESRD on HD via permacath (T/Th/Sat via LUE AV fistula), prior GIB 2/2 ulcer (H. pylori positive) who is here for follow up. Appears mildly volume overloaded and normotensive with class II symptoms. Interested in kidney transplant but discussed with patient and his sister that he'll likely need heart and kidney transplant. Awaiting CPET for further risk stratification. \par \par 1. HFrEF - unclear etiology; prior known CAD; possibly HTN \par - continue bumex  2mg twice a day (not taking 4 mg twice/day)  except for days of dialysis \par - continue coreg 25 mg twice/day \par - continue hydral 100 mg three times/day (ok to hold on days of HD) and pt was not taking isordil to 40 mg three times/day because it was not covered by insurance. Will restart 20 mg TID\par - repeat TTE on 8/14/19 with LVEF 18% with mild mod MR\par - will perform CPET 10/4/19 to risk stratify and then consider referral to [place] for heart/kidney transplant evaluation \par \par 2. ESRD on HD; left AVF with + thrill and bruit\par - instructed to follow low potassium diet\par \par 3. GI bleed - found to have ulcer which was H. pylori positive\par - continue follow up with Dr. Haines's \par \par 4. CAD - prior PCI- no active chest pain or EKG changes\par - on ASA 81 mg daily\par - on lipitor 80 mg qhs\par \par 5. Claudication - \par - LAURI and arterial dopplers with no evidence of significant stenoses\par \par RTC 2 months

## 2019-10-04 ENCOUNTER — APPOINTMENT (OUTPATIENT)
Dept: CARDIOLOGY | Facility: CLINIC | Age: 54
End: 2019-10-04
Payer: MEDICARE

## 2019-10-04 PROCEDURE — 94200 LUNG FUNCTION TEST (MBC/MVV): CPT | Mod: 59

## 2019-10-04 PROCEDURE — 93000 ELECTROCARDIOGRAM COMPLETE: CPT | Mod: 59

## 2019-10-04 PROCEDURE — 94375 RESPIRATORY FLOW VOLUME LOOP: CPT

## 2019-10-04 PROCEDURE — 93018 CV STRESS TEST I&R ONLY: CPT | Mod: 59

## 2019-10-04 PROCEDURE — 94621 CARDIOPULM EXERCISE TESTING: CPT

## 2019-10-08 ENCOUNTER — RX CHANGE (OUTPATIENT)
Age: 54
End: 2019-10-08

## 2019-10-10 ENCOUNTER — RX CHANGE (OUTPATIENT)
Age: 54
End: 2019-10-10

## 2019-10-10 RX ORDER — CINACALCET 30 MG/1
30 TABLET ORAL DAILY
Qty: 30 | Refills: 5 | Status: DISCONTINUED | COMMUNITY
Start: 2019-10-08 | End: 2019-10-10

## 2019-10-22 ENCOUNTER — RX RENEWAL (OUTPATIENT)
Age: 54
End: 2019-10-22

## 2019-10-31 ENCOUNTER — APPOINTMENT (OUTPATIENT)
Dept: ELECTROPHYSIOLOGY | Facility: CLINIC | Age: 54
End: 2019-10-31
Payer: MEDICARE

## 2019-10-31 PROCEDURE — 93296 REM INTERROG EVL PM/IDS: CPT

## 2019-10-31 PROCEDURE — 93295 DEV INTERROG REMOTE 1/2/MLT: CPT

## 2019-11-05 ENCOUNTER — RX RENEWAL (OUTPATIENT)
Age: 54
End: 2019-11-05

## 2019-11-12 ENCOUNTER — RX RENEWAL (OUTPATIENT)
Age: 54
End: 2019-11-12

## 2019-11-15 ENCOUNTER — APPOINTMENT (OUTPATIENT)
Dept: ENDOCRINOLOGY | Facility: CLINIC | Age: 54
End: 2019-11-15

## 2019-11-19 ENCOUNTER — LABORATORY RESULT (OUTPATIENT)
Age: 54
End: 2019-11-19

## 2019-11-19 ENCOUNTER — APPOINTMENT (OUTPATIENT)
Dept: INTERNAL MEDICINE | Facility: CLINIC | Age: 54
End: 2019-11-19
Payer: MEDICARE

## 2019-11-19 ENCOUNTER — OUTPATIENT (OUTPATIENT)
Dept: OUTPATIENT SERVICES | Facility: HOSPITAL | Age: 54
LOS: 1 days | End: 2019-11-19

## 2019-11-19 VITALS
HEIGHT: 64 IN | SYSTOLIC BLOOD PRESSURE: 120 MMHG | WEIGHT: 200 LBS | DIASTOLIC BLOOD PRESSURE: 82 MMHG | BODY MASS INDEX: 34.15 KG/M2 | HEART RATE: 82 BPM

## 2019-11-19 DIAGNOSIS — Z95.5 PRESENCE OF CORONARY ANGIOPLASTY IMPLANT AND GRAFT: Chronic | ICD-10-CM

## 2019-11-19 DIAGNOSIS — T82.868A THROMBOSIS DUE TO VASCULAR PROSTHETIC DEVICES, IMPLANTS AND GRAFTS, INITIAL ENCOUNTER: Chronic | ICD-10-CM

## 2019-11-19 DIAGNOSIS — Z95.810 PRESENCE OF AUTOMATIC (IMPLANTABLE) CARDIAC DEFIBRILLATOR: Chronic | ICD-10-CM

## 2019-11-19 LAB
ALBUMIN SERPL ELPH-MCNC: 4.4 G/DL — SIGNIFICANT CHANGE UP (ref 3.3–5)
ALP SERPL-CCNC: 150 U/L — HIGH (ref 40–120)
ALT FLD-CCNC: 24 U/L — SIGNIFICANT CHANGE UP (ref 4–41)
ANION GAP SERPL CALC-SCNC: 15 MMO/L — HIGH (ref 7–14)
AST SERPL-CCNC: 20 U/L — SIGNIFICANT CHANGE UP (ref 4–40)
BASOPHILS # BLD AUTO: 0.05 K/UL — SIGNIFICANT CHANGE UP (ref 0–0.2)
BASOPHILS NFR BLD AUTO: 0.9 % — SIGNIFICANT CHANGE UP (ref 0–2)
BILIRUB SERPL-MCNC: 1 MG/DL — SIGNIFICANT CHANGE UP (ref 0.2–1.2)
BUN SERPL-MCNC: 26 MG/DL — HIGH (ref 7–23)
CALCIUM SERPL-MCNC: 9.5 MG/DL — SIGNIFICANT CHANGE UP (ref 8.4–10.5)
CHLORIDE SERPL-SCNC: 92 MMOL/L — LOW (ref 98–107)
CLOSURE TME COLL+EPINEP BLD: 83 K/UL — LOW (ref 150–400)
CO2 SERPL-SCNC: 30 MMOL/L — SIGNIFICANT CHANGE UP (ref 22–31)
CREAT SERPL-MCNC: 4.49 MG/DL — HIGH (ref 0.5–1.3)
EOSINOPHIL # BLD AUTO: 0.22 K/UL — SIGNIFICANT CHANGE UP (ref 0–0.5)
EOSINOPHIL NFR BLD AUTO: 3.9 % — SIGNIFICANT CHANGE UP (ref 0–6)
GLUCOSE BLDC GLUCOMTR-MCNC: 180
GLUCOSE SERPL-MCNC: 281 MG/DL — HIGH (ref 70–99)
HBA1C BLD-MCNC: 7.5 % — HIGH (ref 4–5.6)
HCT VFR BLD CALC: 43.2 % — SIGNIFICANT CHANGE UP (ref 39–50)
HGB BLD-MCNC: 13.7 G/DL — SIGNIFICANT CHANGE UP (ref 13–17)
IMM GRANULOCYTES NFR BLD AUTO: 0.2 % — SIGNIFICANT CHANGE UP (ref 0–1.5)
LYMPHOCYTES # BLD AUTO: 0.63 K/UL — LOW (ref 1–3.3)
LYMPHOCYTES # BLD AUTO: 11.3 % — LOW (ref 13–44)
MAGNESIUM SERPL-MCNC: 2.3 MG/DL — SIGNIFICANT CHANGE UP (ref 1.6–2.6)
MCHC RBC-ENTMCNC: 30.3 PG — SIGNIFICANT CHANGE UP (ref 27–34)
MCHC RBC-ENTMCNC: 31.7 % — LOW (ref 32–36)
MCV RBC AUTO: 95.6 FL — SIGNIFICANT CHANGE UP (ref 80–100)
MONOCYTES # BLD AUTO: 0.56 K/UL — SIGNIFICANT CHANGE UP (ref 0–0.9)
MONOCYTES NFR BLD AUTO: 10 % — SIGNIFICANT CHANGE UP (ref 2–14)
NEUTROPHILS # BLD AUTO: 4.13 K/UL — SIGNIFICANT CHANGE UP (ref 1.8–7.4)
NEUTROPHILS NFR BLD AUTO: 73.7 % — SIGNIFICANT CHANGE UP (ref 43–77)
NRBC # FLD: 0 K/UL — SIGNIFICANT CHANGE UP (ref 0–0)
PHOSPHATE SERPL-MCNC: 5.3 MG/DL — HIGH (ref 2.5–4.5)
PLATELET # BLD AUTO: 104 K/UL — LOW (ref 150–400)
PMV BLD: 11.7 FL — SIGNIFICANT CHANGE UP (ref 7–13)
POTASSIUM SERPL-MCNC: 4.5 MMOL/L — SIGNIFICANT CHANGE UP (ref 3.5–5.3)
POTASSIUM SERPL-SCNC: 4.5 MMOL/L — SIGNIFICANT CHANGE UP (ref 3.5–5.3)
PROT SERPL-MCNC: 8.1 G/DL — SIGNIFICANT CHANGE UP (ref 6–8.3)
RBC # BLD: 4.52 M/UL — SIGNIFICANT CHANGE UP (ref 4.2–5.8)
RBC # FLD: 13.9 % — SIGNIFICANT CHANGE UP (ref 10.3–14.5)
SODIUM SERPL-SCNC: 137 MMOL/L — SIGNIFICANT CHANGE UP (ref 135–145)
WBC # BLD: 5.6 K/UL — SIGNIFICANT CHANGE UP (ref 3.8–10.5)
WBC # FLD AUTO: 5.6 K/UL — SIGNIFICANT CHANGE UP (ref 3.8–10.5)

## 2019-11-19 PROCEDURE — 99214 OFFICE O/P EST MOD 30 MIN: CPT | Mod: GC

## 2019-11-19 NOTE — PHYSICAL EXAM
[No Acute Distress] : no acute distress [Well Nourished] : well nourished [Well Developed] : well developed [PERRL] : pupils equal round and reactive to light [EOMI] : extraocular movements intact [Clear to Auscultation] : lungs were clear to auscultation bilaterally [Normal Rate] : normal rate  [Regular Rhythm] : with a regular rhythm [No Joint Swelling] : no joint swelling [Normal S1, S2] : normal S1 and S2 [Coordination Grossly Intact] : coordination grossly intact [Normal Gait] : normal gait [Normal Affect] : the affect was normal [de-identified] : JVD to level of the mandible with + hepatojugular reflex  [de-identified] : + 2/6 systolic murmur  [de-identified] : 1+ b/l LE pitting edema. AVF in left forearm with palpable thrill and audible bruit

## 2019-11-19 NOTE — ASSESSMENT
[FreeTextEntry1] : Patient is a 54-year-old male with long standing history of DM2 (~30 years), HTN, diabetic retinopathy (s/p laser eye surgery), diabetic neuropathy, HLD, CAD (s/p stent placement in 4/2016) and HFrEF (EF of 18%), ESRD (s/p AVF), recent GIB 2/2 H.pylori gastric ulcers, who is presenting for follow-up. \par \par #Ischemic HErEF\par - EF of 18% on TTE from June 2019\par - s/p AICD placement in March of 2019\par - Stress test in October 2019 revealing Virk class D heart failure and indicators of worsened prognosis\par - As per HF, continue with Bumex 2mg BID, Coreg 25 BID, Hydralazine 100 TID \par - advised to follow a low Na diet, weigh himself daily and call if he gains >1 lbs per day or >3 lbs per week\par - as per patient, HF currently working on scheduling him for cardiac cath and potential workup for heart and kidney transplant\par \par #Thrombocytopenia\par - Patient with persistent thrombocytopenia likley 2/2 ESRD as per Heme\par - will recheck CBC and blue top platelet today\par \par #Claudication\par - patient reporting claudication of b/l LE after walking 1-1.5 blocks\par -  LAURI and b/l arterial LE duplex revealing mild heterogenous plaques throughout LE arteries however no significant occlusive arterial disease \par \par #CAD\par - continue with ASA and Atorvastatin\par \par #HTN\par - BP WNL today 120/82\par - c/w hydralazine 100 mg TID and Coreg 25 bid with the exception of HD days \par \par #ESRD\par - 2/2 diabetic nephropathy vs uncontrolled HTN\par - s/p AVF creation on 1/14/19, with a side branch coiling and AVF ballooning in April 2019, and now s/p AVF revision on 5/22.\par - fistulogram on 5/10 reveals a patent AVF\par - f/u Renal recs\par - advised to follow a low K diet\par - will check CMP today \par \par #DM2\par - most recent A1c of 7.1% in Aug 2019\par - continue Prandin 1mg with lunch and dinner\par - will recheck A1C \par - patient counselled extensively on consistent carb diet\par \par #Brandyn care maintenance \par - flu shot today 11/19/19\par - Pneumovax and Tdap received on 4/18\par - patient will require colonoscopy as part of transplant workup \par \par D/W Dr. Bowens. \par \par RTC in 5 weeks

## 2019-11-19 NOTE — HISTORY OF PRESENT ILLNESS
[FreeTextEntry1] : Patient is a 54-year-old male with long standing history of DM2 (~30 years), HTN, diabetic retinopathy (s/p laser eye surgery), diabetic neuropathy, HLD, CAD (s/p stent placement in 4/2016) and HFrEF (EF of 18%), ESRD (s/p AVF), recent GIB 2/2 H.pylori gastric ulcers, who is presenting for follow-up.  [de-identified] : DM: patient has followed up with Endo and was briefly started on a Sara continuous glucose monitor. Patient was started on Prandin 1mg with lunch and dinner. Patient continues to endorse eating significant quantity of carbohydrates with meals. Patient counseled extensively on appropriate diet practice and substituting artificial sweeteners for regular sugar \par \par Ischemic Cardiomyopathy: Patient followed up with HF and reported not taking Isordil 40mg TID as prescribed due to insurance issues and was restarted on 20mg TID at that time. Patient states that he has still been unable to fill that prescription. Patient s/p stress test on 10/4 which showed CHF class D and signs which represent poor prognosis. As per patient and wife, Dr. Pavon wanted to schedule a cath and is considering heart and kidney transplant workup. \par \par H. pylori: patient followed up with GI. H pylori antigen stool test on 9/16 negative.

## 2019-11-20 ENCOUNTER — RESULT REVIEW (OUTPATIENT)
Age: 54
End: 2019-11-20

## 2019-11-20 DIAGNOSIS — I50.22 CHRONIC SYSTOLIC (CONGESTIVE) HEART FAILURE: ICD-10-CM

## 2019-11-20 DIAGNOSIS — A04.8 OTHER SPECIFIED BACTERIAL INTESTINAL INFECTIONS: ICD-10-CM

## 2019-11-20 DIAGNOSIS — N18.6 END STAGE RENAL DISEASE: ICD-10-CM

## 2019-11-20 DIAGNOSIS — D69.6 THROMBOCYTOPENIA, UNSPECIFIED: ICD-10-CM

## 2019-11-20 DIAGNOSIS — E11.9 TYPE 2 DIABETES MELLITUS WITHOUT COMPLICATIONS: ICD-10-CM

## 2019-11-21 ENCOUNTER — MED ADMIN CHARGE (OUTPATIENT)
Age: 54
End: 2019-11-21

## 2019-11-29 ENCOUNTER — APPOINTMENT (OUTPATIENT)
Dept: CARDIOLOGY | Facility: CLINIC | Age: 54
End: 2019-11-29
Payer: MEDICARE

## 2019-11-29 VITALS
OXYGEN SATURATION: 98 % | WEIGHT: 198 LBS | HEART RATE: 76 BPM | BODY MASS INDEX: 33.8 KG/M2 | SYSTOLIC BLOOD PRESSURE: 126 MMHG | RESPIRATION RATE: 16 BRPM | HEIGHT: 64 IN | DIASTOLIC BLOOD PRESSURE: 90 MMHG

## 2019-11-29 PROCEDURE — 99214 OFFICE O/P EST MOD 30 MIN: CPT

## 2019-11-29 RX ORDER — CALCIUM ACETATE 667 MG/1
667 CAPSULE ORAL
Qty: 270 | Refills: 0 | Status: DISCONTINUED | COMMUNITY
Start: 2019-06-26 | End: 2019-11-29

## 2019-12-01 NOTE — HISTORY OF PRESENT ILLNESS
[FreeTextEntry1] : 54 year old M w/ h/o IDDM (A1c 7.1 9/19) c/b possible retinopathy/nephropathy, HTN, CAD (s/p stent in [place] in 2016, unknown coronary anatomy), HFrEF (EF 25%, LVEDD 6.6 cm) s/p subQ ICD, prior severe MR (functional; now improved), ESRD on HD via permacath (T/Th/Sat; AV fistula now patent), prior GIB 2/2 ulcer (H. pylori positive) who is here for f/u.\par \par Since last visit, has been doing fairly well. Dry weight reportedly 87 kg (per dialysis center). Adherent to low sodium and fluid restriction. Able to ambulate w/o limitation. Pt has been taking bumex 2 mg bid but states he doesn't urinate well when he takes it and urinates more w/o it. Tolerates medications which he takes on non-HD days. He has not been taking isordil 40 mg tid because it was not covered by insurance. \par \par Denies SOB. Denies orthopnea/PND. Uses 1 pillow. Able to climb 6 stairs w/o difficulty. ET approx 2-3 blocks limited by pain in calves (prior vascular workup negative). Had a CPET which was consistent with Virk Class D HF and he's interested in dual organ transplant.

## 2019-12-01 NOTE — PHYSICAL EXAM
[General Appearance - Well Developed] : well developed [Normal Appearance] : normal appearance [General Appearance - Well Nourished] : well nourished [Normal Conjunctiva] : the conjunctiva exhibited no abnormalities [Normal Oral Mucosa] : normal oral mucosa [] : no respiratory distress [Normal Oropharynx] : normal oropharynx [Auscultation Breath Sounds / Voice Sounds] : lungs were clear to auscultation bilaterally [Respiration, Rhythm And Depth] : normal respiratory rhythm and effort [Heart Rate And Rhythm] : heart rate and rhythm were normal [Heart Sounds] : normal S1 and S2 [Bowel Sounds] : normal bowel sounds [Abdomen Tenderness] : non-tender [Abdomen Soft] : soft [Abnormal Walk] : normal gait [Cyanosis, Localized] : no localized cyanosis [Nail Clubbing] : no clubbing of the fingernails [Skin Color & Pigmentation] : normal skin color and pigmentation [Oriented To Time, Place, And Person] : oriented to person, place, and time [Impaired Insight] : insight and judgment were intact [FreeTextEntry1] : scars on legs bilaterally with no erythema

## 2019-12-01 NOTE — ASSESSMENT
[FreeTextEntry1] : 54 year old M w/ h/o IDDM c/b neuropathy (A1c 7.1 8/19), HTN, CAD (s/p stent in [place] in 2016, unknown coronary anatomy), HFrEF (EF 25%, LVEDD 6.6 cm) s/p subQ ICD, ESRD on HD via permacath (T/Th/Sat via LUE AV fistula), prior GIB 2/2 ulcer (H. pylori positive) who is here for follow up. Appears volume overloaded and normotensive with class III-IV symptoms. Interested in kidney transplant but discussed with patient and his sister that he'll likely need heart and kidney transplant. Will proceed with scheduling for evaluation clinic. Of note, ABO O. \par \par 1. HFrEF - unclear etiology; prior known CAD; possibly HTN \par - continue bumex 2mg twice a day on non-HD days\par - continue coreg 25 mg twice/day \par - continue hydral 100 mg three times/day (ok to hold on days of HD) and isordil 20 mg three times/day\par - while DM w/ complications is a relative contraindication, he will likely benefit from heart/kidney transplant as his DM is well controlled now \par - contacted HD center and requested to dialyze towards dry weight of 83 kg\par \par 2. ESRD on HD; left AVF with + thrill and bruit\par - instructed to follow low potassium diet\par \par 3. CAD - prior PCI- no active chest pain or EKG changes\par - on ASA 81 mg daily\par - on lipitor 80 mg qhs\par \par 4. Claudication - likely 2/2 low flow with exertion \par - LAURI and arterial dopplers with no evidence of significant stenoses\par \par RTC 6 weeks with NP, 12 weeks with me

## 2019-12-09 ENCOUNTER — RX RENEWAL (OUTPATIENT)
Age: 54
End: 2019-12-09

## 2019-12-12 ENCOUNTER — INPATIENT (INPATIENT)
Facility: HOSPITAL | Age: 54
LOS: 1 days | Discharge: HOME | End: 2019-12-14
Attending: HOSPITALIST | Admitting: HOSPITALIST
Payer: MEDICARE

## 2019-12-12 VITALS
HEART RATE: 89 BPM | TEMPERATURE: 98 F | DIASTOLIC BLOOD PRESSURE: 81 MMHG | OXYGEN SATURATION: 95 % | SYSTOLIC BLOOD PRESSURE: 119 MMHG | RESPIRATION RATE: 16 BRPM

## 2019-12-12 DIAGNOSIS — I25.10 ATHEROSCLEROTIC HEART DISEASE OF NATIVE CORONARY ARTERY WITHOUT ANGINA PECTORIS: ICD-10-CM

## 2019-12-12 DIAGNOSIS — E78.5 HYPERLIPIDEMIA, UNSPECIFIED: ICD-10-CM

## 2019-12-12 DIAGNOSIS — E11.69 TYPE 2 DIABETES MELLITUS WITH OTHER SPECIFIED COMPLICATION: ICD-10-CM

## 2019-12-12 DIAGNOSIS — Z95.5 PRESENCE OF CORONARY ANGIOPLASTY IMPLANT AND GRAFT: Chronic | ICD-10-CM

## 2019-12-12 DIAGNOSIS — D69.6 THROMBOCYTOPENIA, UNSPECIFIED: ICD-10-CM

## 2019-12-12 DIAGNOSIS — R00.1 BRADYCARDIA, UNSPECIFIED: ICD-10-CM

## 2019-12-12 DIAGNOSIS — I10 ESSENTIAL (PRIMARY) HYPERTENSION: ICD-10-CM

## 2019-12-12 DIAGNOSIS — Z29.9 ENCOUNTER FOR PROPHYLACTIC MEASURES, UNSPECIFIED: ICD-10-CM

## 2019-12-12 DIAGNOSIS — I50.22 CHRONIC SYSTOLIC (CONGESTIVE) HEART FAILURE: ICD-10-CM

## 2019-12-12 DIAGNOSIS — Z95.810 PRESENCE OF AUTOMATIC (IMPLANTABLE) CARDIAC DEFIBRILLATOR: Chronic | ICD-10-CM

## 2019-12-12 DIAGNOSIS — Z02.9 ENCOUNTER FOR ADMINISTRATIVE EXAMINATIONS, UNSPECIFIED: ICD-10-CM

## 2019-12-12 DIAGNOSIS — T82.868A THROMBOSIS DUE TO VASCULAR PROSTHETIC DEVICES, IMPLANTS AND GRAFTS, INITIAL ENCOUNTER: Chronic | ICD-10-CM

## 2019-12-12 DIAGNOSIS — N18.6 END STAGE RENAL DISEASE: ICD-10-CM

## 2019-12-12 LAB
ALBUMIN SERPL ELPH-MCNC: 4 G/DL — SIGNIFICANT CHANGE UP (ref 3.3–5)
ALP SERPL-CCNC: 127 U/L — HIGH (ref 40–120)
ALT FLD-CCNC: 17 U/L — SIGNIFICANT CHANGE UP (ref 4–41)
ANION GAP SERPL CALC-SCNC: 17 MMO/L — HIGH (ref 7–14)
AST SERPL-CCNC: 12 U/L — SIGNIFICANT CHANGE UP (ref 4–40)
BASOPHILS # BLD AUTO: 0.07 K/UL — SIGNIFICANT CHANGE UP (ref 0–0.2)
BASOPHILS NFR BLD AUTO: 1.2 % — SIGNIFICANT CHANGE UP (ref 0–2)
BILIRUB SERPL-MCNC: 0.7 MG/DL — SIGNIFICANT CHANGE UP (ref 0.2–1.2)
BUN SERPL-MCNC: 36 MG/DL — HIGH (ref 7–23)
CALCIUM SERPL-MCNC: 9.1 MG/DL — SIGNIFICANT CHANGE UP (ref 8.4–10.5)
CHLORIDE SERPL-SCNC: 96 MMOL/L — LOW (ref 98–107)
CO2 SERPL-SCNC: 25 MMOL/L — SIGNIFICANT CHANGE UP (ref 22–31)
CREAT SERPL-MCNC: 6.25 MG/DL — HIGH (ref 0.5–1.3)
EOSINOPHIL # BLD AUTO: 0.22 K/UL — SIGNIFICANT CHANGE UP (ref 0–0.5)
EOSINOPHIL NFR BLD AUTO: 3.9 % — SIGNIFICANT CHANGE UP (ref 0–6)
GLUCOSE BLDC GLUCOMTR-MCNC: 164 MG/DL — HIGH (ref 70–99)
GLUCOSE BLDC GLUCOMTR-MCNC: 204 MG/DL — HIGH (ref 70–99)
GLUCOSE SERPL-MCNC: 143 MG/DL — HIGH (ref 70–99)
HCT VFR BLD CALC: 36.5 % — LOW (ref 39–50)
HGB BLD-MCNC: 11.2 G/DL — LOW (ref 13–17)
IMM GRANULOCYTES NFR BLD AUTO: 0.4 % — SIGNIFICANT CHANGE UP (ref 0–1.5)
LYMPHOCYTES # BLD AUTO: 1.16 K/UL — SIGNIFICANT CHANGE UP (ref 1–3.3)
LYMPHOCYTES # BLD AUTO: 20.6 % — SIGNIFICANT CHANGE UP (ref 13–44)
MAGNESIUM SERPL-MCNC: 2.3 MG/DL — SIGNIFICANT CHANGE UP (ref 1.6–2.6)
MCHC RBC-ENTMCNC: 28.5 PG — SIGNIFICANT CHANGE UP (ref 27–34)
MCHC RBC-ENTMCNC: 30.7 % — LOW (ref 32–36)
MCV RBC AUTO: 92.9 FL — SIGNIFICANT CHANGE UP (ref 80–100)
MONOCYTES # BLD AUTO: 0.46 K/UL — SIGNIFICANT CHANGE UP (ref 0–0.9)
MONOCYTES NFR BLD AUTO: 8.2 % — SIGNIFICANT CHANGE UP (ref 2–14)
NEUTROPHILS # BLD AUTO: 3.7 K/UL — SIGNIFICANT CHANGE UP (ref 1.8–7.4)
NEUTROPHILS NFR BLD AUTO: 65.7 % — SIGNIFICANT CHANGE UP (ref 43–77)
NRBC # FLD: 0 K/UL — SIGNIFICANT CHANGE UP (ref 0–0)
PHOSPHATE SERPL-MCNC: 5.2 MG/DL — HIGH (ref 2.5–4.5)
PLATELET # BLD AUTO: 121 K/UL — LOW (ref 150–400)
PMV BLD: 11.3 FL — SIGNIFICANT CHANGE UP (ref 7–13)
POTASSIUM SERPL-MCNC: 3.9 MMOL/L — SIGNIFICANT CHANGE UP (ref 3.5–5.3)
POTASSIUM SERPL-SCNC: 3.9 MMOL/L — SIGNIFICANT CHANGE UP (ref 3.5–5.3)
PROT SERPL-MCNC: 7.3 G/DL — SIGNIFICANT CHANGE UP (ref 6–8.3)
RBC # BLD: 3.93 M/UL — LOW (ref 4.2–5.8)
RBC # FLD: 14.1 % — SIGNIFICANT CHANGE UP (ref 10.3–14.5)
SODIUM SERPL-SCNC: 138 MMOL/L — SIGNIFICANT CHANGE UP (ref 135–145)
TSH SERPL-MCNC: 1.38 UIU/ML — SIGNIFICANT CHANGE UP (ref 0.27–4.2)
WBC # BLD: 5.63 K/UL — SIGNIFICANT CHANGE UP (ref 3.8–10.5)
WBC # FLD AUTO: 5.63 K/UL — SIGNIFICANT CHANGE UP (ref 3.8–10.5)

## 2019-12-12 PROCEDURE — 99222 1ST HOSP IP/OBS MODERATE 55: CPT | Mod: GC

## 2019-12-12 PROCEDURE — 99232 SBSQ HOSP IP/OBS MODERATE 35: CPT

## 2019-12-12 PROCEDURE — 99233 SBSQ HOSP IP/OBS HIGH 50: CPT | Mod: GC,25

## 2019-12-12 PROCEDURE — 99223 1ST HOSP IP/OBS HIGH 75: CPT | Mod: GC

## 2019-12-12 RX ORDER — SODIUM CHLORIDE 9 MG/ML
1000 INJECTION, SOLUTION INTRAVENOUS
Refills: 0 | Status: DISCONTINUED | OUTPATIENT
Start: 2019-12-12 | End: 2019-12-14

## 2019-12-12 RX ORDER — INSULIN LISPRO 100/ML
VIAL (ML) SUBCUTANEOUS
Refills: 0 | Status: DISCONTINUED | OUTPATIENT
Start: 2019-12-12 | End: 2019-12-14

## 2019-12-12 RX ORDER — INSULIN LISPRO 100/ML
VIAL (ML) SUBCUTANEOUS AT BEDTIME
Refills: 0 | Status: DISCONTINUED | OUTPATIENT
Start: 2019-12-12 | End: 2019-12-14

## 2019-12-12 RX ORDER — CHLORHEXIDINE GLUCONATE 213 G/1000ML
1 SOLUTION TOPICAL DAILY
Refills: 0 | Status: DISCONTINUED | OUTPATIENT
Start: 2019-12-12 | End: 2019-12-14

## 2019-12-12 RX ORDER — ISOSORBIDE DINITRATE 5 MG/1
20 TABLET ORAL EVERY 8 HOURS
Refills: 0 | Status: DISCONTINUED | OUTPATIENT
Start: 2019-12-12 | End: 2019-12-13

## 2019-12-12 RX ORDER — DEXTROSE 50 % IN WATER 50 %
12.5 SYRINGE (ML) INTRAVENOUS ONCE
Refills: 0 | Status: DISCONTINUED | OUTPATIENT
Start: 2019-12-12 | End: 2019-12-14

## 2019-12-12 RX ORDER — HEPARIN SODIUM 5000 [USP'U]/ML
5000 INJECTION INTRAVENOUS; SUBCUTANEOUS EVERY 8 HOURS
Refills: 0 | Status: DISCONTINUED | OUTPATIENT
Start: 2019-12-12 | End: 2019-12-14

## 2019-12-12 RX ORDER — CARVEDILOL PHOSPHATE 80 MG/1
25 CAPSULE, EXTENDED RELEASE ORAL EVERY 12 HOURS
Refills: 0 | Status: DISCONTINUED | OUTPATIENT
Start: 2019-12-12 | End: 2019-12-12

## 2019-12-12 RX ORDER — HYDRALAZINE HCL 50 MG
100 TABLET ORAL EVERY 8 HOURS
Refills: 0 | Status: DISCONTINUED | OUTPATIENT
Start: 2019-12-12 | End: 2019-12-13

## 2019-12-12 RX ORDER — ATORVASTATIN CALCIUM 80 MG/1
80 TABLET, FILM COATED ORAL AT BEDTIME
Refills: 0 | Status: DISCONTINUED | OUTPATIENT
Start: 2019-12-12 | End: 2019-12-14

## 2019-12-12 RX ORDER — POTASSIUM CHLORIDE 20 MEQ
20 PACKET (EA) ORAL ONCE
Refills: 0 | Status: COMPLETED | OUTPATIENT
Start: 2019-12-12 | End: 2019-12-12

## 2019-12-12 RX ORDER — GLUCAGON INJECTION, SOLUTION 0.5 MG/.1ML
1 INJECTION, SOLUTION SUBCUTANEOUS ONCE
Refills: 0 | Status: DISCONTINUED | OUTPATIENT
Start: 2019-12-12 | End: 2019-12-14

## 2019-12-12 RX ORDER — FERROUS SULFATE 325(65) MG
325 TABLET ORAL DAILY
Refills: 0 | Status: DISCONTINUED | OUTPATIENT
Start: 2019-12-12 | End: 2019-12-14

## 2019-12-12 RX ORDER — BUMETANIDE 0.25 MG/ML
2 INJECTION INTRAMUSCULAR; INTRAVENOUS EVERY 12 HOURS
Refills: 0 | Status: DISCONTINUED | OUTPATIENT
Start: 2019-12-12 | End: 2019-12-14

## 2019-12-12 RX ORDER — HYDRALAZINE HCL 50 MG
100 TABLET ORAL EVERY 8 HOURS
Refills: 0 | Status: DISCONTINUED | OUTPATIENT
Start: 2019-12-12 | End: 2019-12-12

## 2019-12-12 RX ORDER — PANTOPRAZOLE SODIUM 20 MG/1
40 TABLET, DELAYED RELEASE ORAL
Refills: 0 | Status: DISCONTINUED | OUTPATIENT
Start: 2019-12-12 | End: 2019-12-14

## 2019-12-12 RX ORDER — CARVEDILOL PHOSPHATE 80 MG/1
1 CAPSULE, EXTENDED RELEASE ORAL
Qty: 0 | Refills: 0 | DISCHARGE

## 2019-12-12 RX ORDER — ASPIRIN/CALCIUM CARB/MAGNESIUM 324 MG
81 TABLET ORAL DAILY
Refills: 0 | Status: DISCONTINUED | OUTPATIENT
Start: 2019-12-12 | End: 2019-12-14

## 2019-12-12 RX ORDER — DEXTROSE 50 % IN WATER 50 %
25 SYRINGE (ML) INTRAVENOUS ONCE
Refills: 0 | Status: DISCONTINUED | OUTPATIENT
Start: 2019-12-12 | End: 2019-12-14

## 2019-12-12 RX ORDER — CARVEDILOL PHOSPHATE 80 MG/1
25 CAPSULE, EXTENDED RELEASE ORAL EVERY 12 HOURS
Refills: 0 | Status: DISCONTINUED | OUTPATIENT
Start: 2019-12-12 | End: 2019-12-13

## 2019-12-12 RX ORDER — DEXTROSE 50 % IN WATER 50 %
15 SYRINGE (ML) INTRAVENOUS ONCE
Refills: 0 | Status: DISCONTINUED | OUTPATIENT
Start: 2019-12-12 | End: 2019-12-14

## 2019-12-12 RX ORDER — CALCIUM ACETATE 667 MG
667 TABLET ORAL
Refills: 0 | Status: DISCONTINUED | OUTPATIENT
Start: 2019-12-12 | End: 2019-12-14

## 2019-12-12 RX ADMIN — Medication 20 MILLIEQUIVALENT(S): at 14:14

## 2019-12-12 RX ADMIN — Medication 1: at 18:21

## 2019-12-12 RX ADMIN — CARVEDILOL PHOSPHATE 25 MILLIGRAM(S): 80 CAPSULE, EXTENDED RELEASE ORAL at 18:20

## 2019-12-12 RX ADMIN — Medication 325 MILLIGRAM(S): at 18:20

## 2019-12-12 RX ADMIN — Medication 667 MILLIGRAM(S): at 18:20

## 2019-12-12 NOTE — H&P ADULT - PROBLEM SELECTOR PROBLEM 5
Type 2 diabetes mellitus with other specified complication, unspecified whether long term insulin use Thrombocytopenia

## 2019-12-12 NOTE — ED PROVIDER NOTE - ATTENDING CONTRIBUTION TO CARE
Attending Statement: I have personally seen and examined this patient. I have fully participated in the care of this patient. I have reviewed all pertinent clinical information, including history physical exam, plan and the Resident's note and agree except as noted  54  year old male with HTN, hyperlipidemia, DM-II, ESRD on HD (Tues/Thurs/Sat) - last session 5/21/19  with right anterior chest dialysis catheter and left upper extremity AVF, CAD, chronic systolic CHF with EF 20-25% subcutaneous ICD implant 3/27/19 BIBEMS from HD for bradycardia. Pt was on the machine for an hour, noted to have a HR in 40's, pt denies any complaints. no CP/SOB not dizzy or lightheaded, no nausea/vomit. no diaphoresis. States "I was fine" Last session of HD two days ago, states he had two episodes of bradycardia, "stopped the machine for a while and then was able to complete HD"   Currently no complaints. Never felt the AICD fire.  Vital signs noted. HR 81-89 no distress. No resp distress. able to speak in full and clear sentences. no wheeze, rales or stridor. soft nontender abdomen. no  rebound. no guarding. no sign of trauma. no CVAT +fistula on the left arm.   plan ekg, tele monitoring, labs, cardiology to interrogate AICD and renal.

## 2019-12-12 NOTE — H&P ADULT - NSHPLABSRESULTS_GEN_ALL_CORE
LABS                        11.2   5.63  )-----------( 121      ( 12 Dec 2019 08:50 )             36.5     12-12    138  |  96<L>  |  36<H>  ----------------------------<  143<H>  3.9   |  25  |  6.25<H>    Ca    9.1      12 Dec 2019 08:50  Phos  5.2     12-12  Mg     2.3     12-12    TPro  7.3  /  Alb  4.0  /  TBili  0.7  /  DBili  x   /  AST  12  /  ALT  17  /  AlkPhos  127<H>  12-12    Tele: runs of bigeminy and frequent PVCs  EKG: sinus rhythm, 1st degree AV block    Mark Caballero PGY2  Internal Medicine  Pager: JONH: [phone] NS: [phone] LABS                        11.2   5.63  )-----------( 121      ( 12 Dec 2019 08:50 )             36.5     12-12    138  |  96<L>  |  36<H>  ----------------------------<  143<H>  3.9   |  25  |  6.25<H>    Ca    9.1      12 Dec 2019 08:50  Phos  5.2     12-12  Mg     2.3     12-12    TPro  7.3  /  Alb  4.0  /  TBili  0.7  /  DBili  x   /  AST  12  /  ALT  17  /  AlkPhos  127<H>  12-12    Tele: runs of bigeminy and frequent PVCs  EKG: self reviewed: sinus rhythm, 1st degree AV block, LAFB

## 2019-12-12 NOTE — H&P ADULT - PROBLEM SELECTOR PROBLEM 6
Essential hypertension Type 2 diabetes mellitus with other specified complication, unspecified whether long term insulin use

## 2019-12-12 NOTE — ED ADULT NURSE NOTE - OBJECTIVE STATEMENT
55 yo male, pmh ESRD, CHF, HTN, DM, HLD, sent to ED s/p episode of asymptomatic bradycardia 1 hour into dialysis session. pt reports this has happened two other times in last two weeks and dialysis center would stop session, resume 5 minutes later and HR would return to baseline. pt denies chest pain, SOB, HA, weakness, dizziness, N/V/D, dysuria, abdominal tenderness, dysuria. Pt reports chronic taj numbness to LE's. no EVER noted upon assessment. AV fistula to left arm (T, TH, Sat dialysis sessions). pt arrives w 18g iv insert to right hand, flushes well, +blood return. Labs sent as ordered. CCM in place.

## 2019-12-12 NOTE — PATIENT PROFILE ADULT - NSPROHMDIABETBLDGLCTST_GEN_A_NUR
Results for orders placed or performed in visit on 05/07/18   AMB POC GLUCOSE BLOOD, BY GLUCOSE MONITORING DEVICE   Result Value Ref Range    Glucose POC nf 105 mg/dL   AMB POC HEMOGLOBIN (HGB)   Result Value Ref Range    Hemoglobin (POC) 12.3    AMB POC URINE PREGNANCY TEST, VISUAL COLOR COMPARISON   Result Value Ref Range    VALID INTERNAL CONTROL POC Yes     HCG urine, Ql. (POC) Negative Negative   AMB POC URINALYSIS DIP STICK MANUAL W/O MICRO   Result Value Ref Range    Color (UA POC) Dark Yellow     Clarity (UA POC) Turbid     Glucose (UA POC) Negative Negative    Bilirubin (UA POC) 1+ Negative    Ketones (UA POC) Trace Negative    Specific gravity (UA POC) 1.020 1.001 - 1.035    Blood (UA POC) 3+ Negative    pH (UA POC) 5.0 4.6 - 8.0    Protein (UA POC) 1+ Negative    Urobilinogen (UA POC) 0.2 mg/dL 0.2 - 1    Nitrites (UA POC) Negative Negative    Leukocyte esterase (UA POC) 2+ Negative 3 times/day/patient needs a new glucometer because insurance does not cover the strips that match his

## 2019-12-12 NOTE — ED PROVIDER NOTE - PROGRESS NOTE DETAILS
DH: EP consulted for AICD interrogation. They will see patient in ED. DH: Dr. Giovanny Pavon paged ([phone]) to discuss patient. Will inquire if patient can have dialysis again today. DH: Spoke w/ nephrology fellow - aware of patient. Will see him in ED. Can have inpatient dialysis if admitted vs outpatient if chair available (will d/w SW if d/c). DH: Cardiology fellow at bedside. States patient has subq ICD, unable to interrogate. Patient asymptomatic, likely requires tele during dialysis. Will d/w dialysis unit to see if possible. Plan for medicine admit w/ tele for dialysis. DH: Bed board advised no boxes available to perform dialysis on tele. MICU consulted for dialysis in the unit w/ monitoring. Nephrology fellow advised of patient status and plan for admission for dialysis. They will continue to follow. RN noted that pt was having bigeminy on monitor, HR was 80 and on pulse ox read HR 40. pt was asymptomatic. Transient bigeminy, no NSR DH: D/w hospitalist - all questions answered and patient admission accepted to medicine on tele. Nephro will follow. Plan for patient to have dialysis w/ tele during admission to assess bradycardia.

## 2019-12-12 NOTE — CONSULT NOTE ADULT - SUBJECTIVE AND OBJECTIVE BOX
CHIEF COMPLAINT:    HPI:    PAST MEDICAL & SURGICAL HISTORY:  Congestive heart disease  ESRD (end stage renal disease) on dialysis  GIB (gastrointestinal bleeding): was treated  with FOREIGN blantonelana stable since Dec 10, 2018  HFrEF (heart failure with reduced ejection fraction): EF 20-24%  Myocardial infarction: Jan 2016  Hyperlipidemia  CAD (coronary artery disease): 2016, coronary artery stentX1  DM (diabetes mellitus): 2  not taking any medication lat4st HgA1c 5.7  HTN (hypertension)  History of implantable cardioverter-defibrillator (ICD) placement  Stented coronary artery: ? 1  Arterial stent thrombosis      FAMILY HISTORY:  Family history of diabetes mellitus (DM) (Sibling)  Family history of MI (myocardial infarction) (Sibling)      SOCIAL HISTORY:  Smoking: __ packs x ___ years  EtOH Use:  Marital Status:  Occupation:  Recent Travel:  Country of Birth:  Advance Directives:    Allergies    No Known Allergies    Intolerances        HOME MEDICATIONS:    REVIEW OF SYSTEMS:  Constitutional:   Eyes:  ENT:  CV:  Resp:  GI:  :  MSK:  Integumentary:  Neurological:  Psychiatric:  Endocrine:  Hematologic/Lymphatic:  Allergic/Immunologic:  [ ] All other systems negative  [ ] Unable to assess ROS because ________    OBJECTIVE:  ICU Vital Signs Last 24 Hrs  T(C): 36.4 (12 Dec 2019 07:03), Max: 36.4 (12 Dec 2019 07:03)  T(F): 97.5 (12 Dec 2019 07:03), Max: 97.5 (12 Dec 2019 07:03)  HR: 80 (12 Dec 2019 11:50) (80 - 89)  BP: 144/71 (12 Dec 2019 11:50) (119/81 - 144/71)  BP(mean): --  ABP: --  ABP(mean): --  RR: 14 (12 Dec 2019 11:50) (14 - 16)  SpO2: 100% (12 Dec 2019 11:50) (95% - 100%)        CAPILLARY BLOOD GLUCOSE          PHYSICAL EXAM:  General:   HEENT:   Lymph Nodes:  Neck:   Respiratory:   Cardiovascular:   Abdomen:   Extremities:   Skin:   Neurological:  Psychiatry:    HOSPITAL MEDICATIONS:  MEDICATIONS  (STANDING):    MEDICATIONS  (PRN):      LABS:                        11.2   5.63  )-----------( 121      ( 12 Dec 2019 08:50 )             36.5     12-12    138  |  96<L>  |  36<H>  ----------------------------<  143<H>  3.9   |  25  |  6.25<H>    Ca    9.1      12 Dec 2019 08:50  Phos  5.2     12-12  Mg     2.3     12-12    TPro  7.3  /  Alb  4.0  /  TBili  0.7  /  DBili  x   /  AST  12  /  ALT  17  /  AlkPhos  127<H>  12-12              MICROBIOLOGY:     RADIOLOGY:  [ ] Reviewed and interpreted by me    EKG: CHIEF COMPLAINT: Bradycardia    HPI:    54 year old man with PMHx T2DM, HTN, CAD (s/p stent 2016), HFrEF (EF 25%) s/p ICD, ESRD on HD T/Th/Sat via LUE AV fistula, GIB 2/2 ulcer, presenting after being sent in from dialysis for bradycardia into 40s. Pt says that he started dialysis and after about 20 minutes the staff had told him his HR was in the 40s. Pt denies any symptoms during this episode- no lightheadedness, dizziness, chest pain, palpitations, headache, vision changes, weakness, SOB. He notes this has occurred at several of his past dialysis sessions, but was just given a short break from dialysis and restarted and was able to complete dialysis without issue. Was told he should come to ED for further work up. He continues to deny any symptoms. HR now in 80s with some bigeminy and non sustained runs of PVCs. Pt says the only time he becomes symptomatic is when he takes hydralazine and coreg at the same time (experiences weakness and some dizziness). He has not been taking hydralazine due to this reason. Denies any LE swelling recently. Has been taking his bumex as prescribed.    PAST MEDICAL & SURGICAL HISTORY:  Congestive heart disease  ESRD (end stage renal disease) on dialysis  GIB (gastrointestinal bleeding): was treated  with H frannyelori stable since Dec 10, 2018  HFrEF (heart failure with reduced ejection fraction): EF 20-24%  Myocardial infarction: Jan 2016  Hyperlipidemia  CAD (coronary artery disease): 2016, coronary artery stentX1  DM (diabetes mellitus): 2  not taking any medication lat4st HgA1c 5.7  HTN (hypertension)  History of implantable cardioverter-defibrillator (ICD) placement  Stented coronary artery: ? 1  Arterial stent thrombosis      FAMILY HISTORY:  Family history of diabetes mellitus (DM) (Sibling)  Family history of MI (myocardial infarction) (Sibling)      SOCIAL HISTORY:  Smoking: Former smoker  EtOH Use: No alcohol use    Allergies    No Known Allergies    Intolerances        HOME MEDICATIONS:    REVIEW OF SYSTEMS:  Constitutional: no fever and no chills  Eyes: no discharge, no irritation, no pain, no visual changes  ENMT: no ear pain or hearing loss, no dysphagia or throat pain  Neck: no pain, no stiffness, no swollen glands  CV: no chest pain, no palpitations, no edema  Resp: no cough, no shortness of breath  Abd: no abdominal pain, no nausea or vomiting, no diarrhea  : no dysuria, no hematuria  MSK: no back pain, no neck pain, no joint pain  Neuro: no LOC, no gait abnormality, no headache, no sensory deficits, no weakness  Skin: no rashes, no lacerations, no lesions    OBJECTIVE:  ICU Vital Signs Last 24 Hrs  T(C): 36.4 (12 Dec 2019 07:03), Max: 36.4 (12 Dec 2019 07:03)  T(F): 97.5 (12 Dec 2019 07:03), Max: 97.5 (12 Dec 2019 07:03)  HR: 80 (12 Dec 2019 11:50) (80 - 89)  BP: 144/71 (12 Dec 2019 11:50) (119/81 - 144/71)  BP(mean): --  ABP: --  ABP(mean): --  RR: 14 (12 Dec 2019 11:50) (14 - 16)  SpO2: 100% (12 Dec 2019 11:50) (95% - 100%)      CAPILLARY BLOOD GLUCOSE      PHYSICAL EXAM:  GENERAL: Sitting comfortable in bed, in no acute distress  HENMT: Atraumatic, moist mucous membranes  EYES: Clear bilaterally, PERRL, EOMs intact b/l  HEART: Regular rhythm with occasional premature beats, S1/S2, no murmurs noted  RESPIRATORY: Clear to auscultation bilaterally, no wheezes/rhonchi/rales  ABDOMEN: +BS, soft, nontender, nondistended  EXTREMITIES: Trace lower extremity edema b/l, L forearm AVF with good thrill  NEURO:  A&Ox4, no focal motor deficits or sensory deficits   SKIN:  Skin normal color for race, warm, dry and intact. No evidence of rash.      HOSPITAL MEDICATIONS:  MEDICATIONS  (STANDING):    MEDICATIONS  (PRN):      LABS:                        11.2   5.63  )-----------( 121      ( 12 Dec 2019 08:50 )             36.5     12-12    138  |  96<L>  |  36<H>  ----------------------------<  143<H>  3.9   |  25  |  6.25<H>    Ca    9.1      12 Dec 2019 08:50  Phos  5.2     12-12  Mg     2.3     12-12    TPro  7.3  /  Alb  4.0  /  TBili  0.7  /  DBili  x   /  AST  12  /  ALT  17  /  AlkPhos  127<H>  12-12      MICROBIOLOGY:     RADIOLOGY:  [x] Reviewed and interpreted by me

## 2019-12-12 NOTE — H&P ADULT - NSICDXPASTSURGICALHX_GEN_ALL_CORE_FT
PAST SURGICAL HISTORY:  Arterial stent thrombosis     History of implantable cardioverter-defibrillator (ICD) placement     Stented coronary artery ? 1

## 2019-12-12 NOTE — ED PROVIDER NOTE - NS ED ROS FT
GENERAL: no fever, no chills  EYES: no change in vision  HEENT: no trouble swallowing or speaking  CARDIAC: no chest pain, no palpitations   PULMONARY: no cough, no shortness of breath, no wheezing  GI: no abdominal pain, no nausea, no vomiting, no diarrhea, no constipation  : no changes in urination  SKIN: no rashes  NEURO: no headache, no numbness, no weakness  MSK: no joint pain, no muscle pain, no back pain, no calf pain     -Tina Alcantar, PGY-1

## 2019-12-12 NOTE — ED PROVIDER NOTE - CLINICAL SUMMARY MEDICAL DECISION MAKING FREE TEXT BOX
Tian Alcantar, PGY1: 54 year old male presents for episode of asymptomatic bradycardia during dialysis. Session d/c after 1 hour. Denies any current complaints. VSS, HR 90, no EKG changes, BP stable. Plan for labs, EKG, interrogate AICD, reassess. Will receive dialysis inpatient if admitted or will arrange w/ SW if discharging. Tian Alcantar, PGY1: 54 year old male presents for episode of asymptomatic bradycardia during dialysis. Session d/c after 1 hour. Denies any current complaints. VSS, HR 90, no EKG changes, BP stable. Plan for labs, EKG, interrogate AICD, reassess. Will cs nephro for dialysis, EP for AICD, cards for recurrent bradycardia during dialysis.

## 2019-12-12 NOTE — H&P ADULT - NSHPREVIEWOFSYSTEMS_GEN_ALL_CORE
CONSTITUTIONAL:  No weight loss, fever, chills, weakness or fatigue.  HEENT:  Eyes:  No visual loss, blurred vision, double vision or yellow sclerae. Ears, Nose, Throat:  No hearing loss, sneezing, congestion, runny nose or sore throat.  SKIN:  No rash or itching.  CARDIOVASCULAR:  No chest pain, chest pressure or chest discomfort. No palpitations or edema.  RESPIRATORY:  No shortness of breath, cough or sputum.  GASTROINTESTINAL:  No anorexia, nausea, vomiting or diarrhea. No abdominal pain or blood.  GENITOURINARY:  Denies hematuria, dysuria.   NEUROLOGICAL:  No headache, dizziness, syncope, paralysis, ataxia, numbness or tingling in the extremities. No change in bowel or bladder control.  MUSCULOSKELETAL:  No muscle, back pain, joint pain or stiffness.  HEMATOLOGIC:  No bleeding or bruising. CONSTITUTIONAL:  No weight loss, fever, chills, weakness or fatigue.  HEENT:  Eyes:  No visual loss, blurred vision, double vision or yellow sclerae. Ears, Nose, Throat:  No hearing loss, sneezing, congestion, runny nose or sore throat.  SKIN:  No rash or itching.  CARDIOVASCULAR:  No chest pain, chest pressure or chest discomfort. No palpitations or edema.  RESPIRATORY:  No shortness of breath, cough or sputum.  GASTROINTESTINAL:  No anorexia, nausea, vomiting or diarrhea. No abdominal pain or blood.  GENITOURINARY:  Denies hematuria, dysuria.   NEUROLOGICAL:  No headache, dizziness, syncope, paralysis, ataxia, numbness or tingling in the extremities. No change in bowel or bladder control.  MUSCULOSKELETAL:  No muscle, back pain, joint pain or stiffness.  HEMATOLOGIC:  No bleeding or bruising.  Psych: No depression or anxiety

## 2019-12-12 NOTE — ED PROVIDER NOTE - PSH
Arterial stent thrombosis    History of implantable cardioverter-defibrillator (ICD) placement    Stented coronary artery  ? 1

## 2019-12-12 NOTE — ED PROVIDER NOTE - PMH
CAD (coronary artery disease)  2016, coronary artery stentX1  Congestive heart disease    DM (diabetes mellitus)  2  not taking any medication lat4st HgA1c 5.7  ESRD (end stage renal disease) on dialysis    GIB (gastrointestinal bleeding)  was treated  with H pyelori stable since Dec 10, 2018  HFrEF (heart failure with reduced ejection fraction)  EF 20-24%  HTN (hypertension)    Hyperlipidemia    Myocardial infarction  Jan 2016

## 2019-12-12 NOTE — H&P ADULT - PROBLEM SELECTOR PLAN 9
Transitions of Care Status:  1.  Name of PCP: Dr. Bowens  2.  PCP Contacted on Admission: [ ] Y    [ ] N   - clinic patient   3.  PCP contacted at Discharge: [ ] Y    [ ] N    [ ] N/A  4.  Post-Discharge Appointment Date and Location:  5.  Summary of Handoff given to PCP: DVT: SQH  Diet: CC, DASH, 1500cc fluid restriction  Dispo: pending bradycardia eval/monitoring on telemetry. no PT needs

## 2019-12-12 NOTE — H&P ADULT - ASSESSMENT
Pt is a 54 year old man with PMHx T2DM, HTN, CAD (s/p stent 2016), HFrEF (EF 25%) s/p ICD, ESRD on HD T/Th/Sat via LUE AV fistula, GIB 2/2 ulcer, presenting after being sent in from dialysis for bradycardia into 40s, here for monitoring. 54M h/o T2DM, HTN, CAD (s/p stent 2016), HFrEF (EF 25%) s/p ICD, ESRD on HD T/Th/Sat via LUE AV fistula, GIB 2/2 ulcer, presenting after being sent in from dialysis for bradycardia into 40s, currently asymptomatic and a/f bradycardia eval:

## 2019-12-12 NOTE — H&P ADULT - PROBLEM SELECTOR PLAN 8
DVT: SQH  Diet: CC, DASH, 1500cc fluid restriction DVT: SQH  Diet: CC, DASH, 1500cc fluid restriction  Dispo: pending bradycardia eval/monitoring on telemetry. no PT needs Atorvastatin 80mg

## 2019-12-12 NOTE — H&P ADULT - NSHPPHYSICALEXAM_GEN_ALL_CORE
T(C): 36.4 (12 Dec 2019 07:03), Max: 36.4 (12 Dec 2019 07:03)  T(F): 97.5 (12 Dec 2019 07:03), Max: 97.5 (12 Dec 2019 07:03)  HR: 80 (12 Dec 2019 11:50) (80 - 89)  BP: 144/71 (12 Dec 2019 11:50) (119/81 - 144/71)  BP(mean): --  RR: 14 (12 Dec 2019 11:50) (14 - 16)  SpO2: 100% (12 Dec 2019 11:50) (95% - 100%)    PHYSICAL EXAM:    GENERAL: Comfortable, no acute distress   HEAD:  Normocephalic, atraumatic  EYES: EOMI, PERRLA  HEENT: Moist mucous membranes  NECK: Supple, No JVD  NERVOUS SYSTEM:  Alert & Oriented X3, Motor Strength 5/5 B/L upper and lower extremities  CHEST/LUNG: Clear to auscultation bilaterally  HEART: Regular rate and rhythm, no murmur   ABDOMEN: Soft, Nontender, Nondistended, Bowel sounds present  EXTREMITIES:   No clubbing, cyanosis, or edema  MUSCULOSKELETAL: No muscle tenderness, no joint tenderness  SKIN:  warm and dry, no rash. LUE AVF c/d/i +bruit. T(C): 36.4 (12 Dec 2019 07:03), Max: 36.4 (12 Dec 2019 07:03)  T(F): 97.5 (12 Dec 2019 07:03), Max: 97.5 (12 Dec 2019 07:03)  HR: 80 (12 Dec 2019 11:50) (80 - 89)  BP: 144/71 (12 Dec 2019 11:50) (119/81 - 144/71)  BP(mean): --  RR: 14 (12 Dec 2019 11:50) (14 - 16)  SpO2: 100% (12 Dec 2019 11:50) (95% - 100%)    PHYSICAL EXAM:    GENERAL: Comfortable, no acute distress   HEAD:  Normocephalic, atraumatic  EYES: EOMI, PERRLA  HEENT: Moist mucous membranes  NECK: Supple, No JVD  NERVOUS SYSTEM:  Alert & Oriented X3, Motor Strength 5/5 B/L upper and lower extremities  CHEST/LUNG: Clear to auscultation bilaterally  HEART: Regular rate and rhythm with premature beats, no murmur   ABDOMEN: Soft, Nontender, Nondistended, Bowel sounds present  EXTREMITIES:   No clubbing, cyanosis, or edema  MUSCULOSKELETAL: No muscle tenderness, no joint tenderness  SKIN:  warm and dry, no rash. LUE AVF c/d/i +bruit.

## 2019-12-12 NOTE — H&P ADULT - PROBLEM SELECTOR PLAN 10
Transitions of Care Status:  1.  Name of PCP: Dr. Bowens  2.  PCP Contacted on Admission: [ ] Y    [ ] N   - clinic patient   3.  PCP contacted at Discharge: [ ] Y    [ ] N    [ ] N/A  4.  Post-Discharge Appointment Date and Location:  5.  Summary of Handoff given to PCP:

## 2019-12-12 NOTE — H&P ADULT - NSICDXPASTMEDICALHX_GEN_ALL_CORE_FT
PAST MEDICAL HISTORY:  CAD (coronary artery disease) 2016, coronary artery stentX1    Congestive heart disease     DM (diabetes mellitus) 2  not taking any medication lat4st HgA1c 5.7    ESRD (end stage renal disease) on dialysis     GIB (gastrointestinal bleeding) was treated  with H pyelori stable since Dec 10, 2018    HFrEF (heart failure with reduced ejection fraction) EF 20-24%    HTN (hypertension)     Hyperlipidemia     Myocardial infarction Jan 2016

## 2019-12-12 NOTE — CONSULT NOTE ADULT - SUBJECTIVE AND OBJECTIVE BOX
Date of Admission:    CHIEF COMPLAINT:    HISTORY OF PRESENT ILLNESS:    53 year old M w/ h/o IDDM c/b neuropathy (A1c 5.7 12/18) , HTN, CAD (s/p stent in [place] in 2016, unknown coronary anatomy), HFrEF (EF 25%, LVEDD 6.6 cm), severe MR (functional), stage V CKD (b/l Cr 4-5), recent GIB 2/2 ulcer who is here after being instructed to come in to hospital due bradycardia in the 40s in HD. Recent stress test for clearance of AVF was negative for significant ischemia but notable for large infarct. No complaints      Allergies    No Known Allergies    Intolerances    	    MEDICATIONS:                  PAST MEDICAL & SURGICAL HISTORY:  Congestive heart disease  ESRD (end stage renal disease) on dialysis  GIB (gastrointestinal bleeding): was treated  with H pyelori stable since Dec 10, 2018  HFrEF (heart failure with reduced ejection fraction): EF 20-24%  Myocardial infarction: Jan 2016  Hyperlipidemia  CAD (coronary artery disease): 2016, coronary artery stentX1  DM (diabetes mellitus): 2  not taking any medication lat4st HgA1c 5.7  HTN (hypertension)  History of implantable cardioverter-defibrillator (ICD) placement  Stented coronary artery: ? 1  Arterial stent thrombosis      FAMILY HISTORY:  Family history of diabetes mellitus (DM) (Sibling)  Family history of MI (myocardial infarction) (Sibling)      SOCIAL HISTORY:    [ ] Non-smoker  [ ] Smoker  [ ] Alcohol      REVIEW OF SYSTEMS:  CONSTITUTIONAL: No fever, weight loss, or fatigue  EYES: No eye pain, visual disturbances, or discharge  ENMT:  No difficulty hearing, tinnitus, vertigo; No sinus or throat pain  NECK: No pain or stiffness  RESPIRATORY: No cough, wheezing, chills or hemoptysis; No Shortness of Breath  CARDIOVASCULAR: No chest pain, palpitations, passing out, dizziness, or leg swelling  GASTROINTESTINAL: No abdominal or epigastric pain. No nausea, vomiting, or hematemesis; No diarrhea or constipation. No melena or hematochezia.  GENITOURINARY: No dysuria, frequency, hematuria, or incontinence  NEUROLOGICAL: No headaches, memory loss, loss of strength, numbness, or tremors  SKIN: No itching, burning, rashes, or lesions   LYMPH Nodes: No enlarged glands  ENDOCRINE: No heat or cold intolerance; No hair loss  MUSCULOSKELETAL: No joint pain or swelling; No muscle, back, or extremity pain  PSYCHIATRIC: No depression, anxiety, mood swings, or difficulty sleeping  HEME/LYMPH: No easy bruising, or bleeding gums  ALLERY AND IMMUNOLOGIC: No hives or eczema	    [ ] All others negative	  [ ] Unable to obtain    PHYSICAL EXAM:  T(C): 36.4 (12-12-19 @ 07:03), Max: 36.4 (12-12-19 @ 07:03)  HR: 80 (12-12-19 @ 11:50) (80 - 89)  BP: 144/71 (12-12-19 @ 11:50) (119/81 - 144/71)  RR: 14 (12-12-19 @ 11:50) (14 - 16)  SpO2: 100% (12-12-19 @ 11:50) (95% - 100%)  Wt(kg): --  I&O's Summary      Appearance: Normal	  HEENT:   Normal oral mucosa, PERRL, EOMI	  Lymphatic: No lymphadenopathy  Cardiovascular: Normal S1 S2, No JVD, No murmurs, No edema  Respiratory: Lungs clear to auscultation	  Psychiatry: A & O x 3, Mood & affect appropriate  Gastrointestinal:  Soft, Non-tender, + BS	  Skin: No rashes, No ecchymoses, No cyanosis	  Neurologic: Non-focal  Extremities: Normal range of motion, No clubbing, cyanosis or edema  Vascular: Peripheral pulses palpable 2+ bilaterally        LABS:	 	    CBC Full  -  ( 12 Dec 2019 08:50 )  WBC Count : 5.63 K/uL  Hemoglobin : 11.2 g/dL  Hematocrit : 36.5 %  Platelet Count - Automated : 121 K/uL  Mean Cell Volume : 92.9 fL  Mean Cell Hemoglobin : 28.5 pg  Mean Cell Hemoglobin Concentration : 30.7 %  Auto Neutrophil # : 3.70 K/uL  Auto Lymphocyte # : 1.16 K/uL  Auto Monocyte # : 0.46 K/uL  Auto Eosinophil # : 0.22 K/uL  Auto Basophil # : 0.07 K/uL  Auto Neutrophil % : 65.7 %  Auto Lymphocyte % : 20.6 %  Auto Monocyte % : 8.2 %  Auto Eosinophil % : 3.9 %  Auto Basophil % : 1.2 %    12-12    138  |  96<L>  |  36<H>  ----------------------------<  143<H>  3.9   |  25  |  6.25<H>    Ca    9.1      12 Dec 2019 08:50  Phos  5.2     12-12  Mg     2.3     12-12    TPro  7.3  /  Alb  4.0  /  TBili  0.7  /  DBili  x   /  AST  12  /  ALT  17  /  AlkPhos  127<H>  12-12      proBNP:   Lipid Profile:   HgA1c:   TSH:       CARDIAC MARKERS:            TELEMETRY: 	    ECG:  	  RADIOLOGY:  OTHER: 	    PREVIOUS DIAGNOSTIC TESTING:    [ ] Echocardiogram:  [ ]  Catheterization:  [ ] Stress Test:  	  	  ASSESSMENT/PLAN: Date of Admission:    CHIEF COMPLAINT:    HISTORY OF PRESENT ILLNESS:    54 year old M w/ h/o IDDM (A1c 7.5 on 11/19/19) c/b possible retinopathy/nephropathy, HTN, CAD (s/p stent in [place] in 2016, unknown coronary anatomy), HFrEF (EF 25%, LVEDD 6.6 cm) s/p subQ ICD, prior severe MR (functional; now improved), ESRD on HD via permacath (T/Th/Sat; AV fistula now patent), prior GIB 2/2 ulcer (H. pylori positive) who is here because he was sent in from his hemodialysis center for bradycardia in the 40s. Patient reports going to HD today with no complaints, but was told to go to the ER due to a slow heart beat. During the reported bradycardia patient reports no dizziness, lightheadedness, SOB, CP, palpitations. HD was not performed today. Currently he is resting comfortably in bed, no complaints, HR on monitor NSR 80s, w/ few PVCs noted.  He admits to SIERRA after 2 blocks of walking on level ground. Of note, patient is only taking Coreg 25 mg po BID. Per Dr. Pavon's notes on outpatient record he is supposed to be taking hydralazine 100 mg po TID, Isordil 20 mg po TID and Bumex 2 mg po BID. He states when he take anything more than Coerg his BP drops in low 80s and he is symptomatic.     Allergies    No Known Allergies    Intolerances    	    MEDICATIONS:                  PAST MEDICAL & SURGICAL HISTORY:  Congestive heart disease  ESRD (end stage renal disease) on dialysis  GIB (gastrointestinal bleeding): was treated  with FOREIGN haas stable since Dec 10, 2018  HFrEF (heart failure with reduced ejection fraction): EF 20-24%  Myocardial infarction: Jan 2016  Hyperlipidemia  CAD (coronary artery disease): 2016, coronary artery stentX1  DM (diabetes mellitus): 2  not taking any medication lat4st HgA1c 5.7  HTN (hypertension)  History of implantable cardioverter-defibrillator (ICD) placement  Stented coronary artery: ? 1  Arterial stent thrombosis      FAMILY HISTORY:  Family history of diabetes mellitus (DM) (Sibling)  Family history of MI (myocardial infarction) (Sibling)      SOCIAL HISTORY:    Non smoker, no ETOH or drug abuse       REVIEW OF SYSTEMS:  CONSTITUTIONAL: No fever, weight loss, or fatigue  EYES: No eye pain, visual disturbances, or discharge  ENMT:  No difficulty hearing, tinnitus, vertigo; No sinus or throat pain  NECK: No pain or stiffness  RESPIRATORY: No cough, wheezing, chills or hemoptysis; Has SIERRA after 2 blocks.   CARDIOVASCULAR: No chest pain, palpitations, passing out, dizziness, or leg swelling  GASTROINTESTINAL: No abdominal or epigastric pain. No nausea, vomiting, or hematemesis; No diarrhea or constipation. No melena or hematochezia.  GENITOURINARY: No dysuria, frequency, hematuria, or incontinence  NEUROLOGICAL: No headaches, memory loss, loss of strength, numbness, or tremors  SKIN: No itching, burning, rashes, or lesions   LYMPH Nodes: No enlarged glands  ENDOCRINE: No heat or cold intolerance; No hair loss  MUSCULOSKELETAL: No joint pain or swelling; No muscle, back, or extremity pain  PSYCHIATRIC: No depression, anxiety, mood swings, or difficulty sleeping  HEME/LYMPH: No easy bruising, or bleeding gums  ALLERY AND IMMUNOLOGIC: No hives or eczema	    [ ] All others negative	  [ ] Unable to obtain    PHYSICAL EXAM:  T(C): 36.4 (12-12-19 @ 07:03), Max: 36.4 (12-12-19 @ 07:03)  HR: 80 (12-12-19 @ 11:50) (80 - 89)  BP: 144/71 (12-12-19 @ 11:50) (119/81 - 144/71)  RR: 14 (12-12-19 @ 11:50) (14 - 16)  SpO2: 100% (12-12-19 @ 11:50) (95% - 100%)  Wt(kg): --  I&O's Summary      Appearance: Normal	  HEENT:   Normal oral mucosa, PERRL, EOMI	  Lymphatic: No lymphadenopathy  Cardiovascular: Normal S1 S2, + JVP, No murmurs, trace b/l LE edema, warm b/l.   Respiratory: Lungs clear to auscultation	  Psychiatry: A & O x 3, Mood & affect appropriate  Gastrointestinal:  Soft, Non-tender, + BS	  Skin: No rashes, No ecchymoses, No cyanosis	  Neurologic: Non-focal  Extremities: Normal range of motion, No clubbing, cyanosis   Vascular: Peripheral pulses palpable 2+ bilaterally        LABS:	 	    CBC Full  -  ( 12 Dec 2019 08:50 )  WBC Count : 5.63 K/uL  Hemoglobin : 11.2 g/dL  Hematocrit : 36.5 %  Platelet Count - Automated : 121 K/uL  Mean Cell Volume : 92.9 fL  Mean Cell Hemoglobin : 28.5 pg  Mean Cell Hemoglobin Concentration : 30.7 %  Auto Neutrophil # : 3.70 K/uL  Auto Lymphocyte # : 1.16 K/uL  Auto Monocyte # : 0.46 K/uL  Auto Eosinophil # : 0.22 K/uL  Auto Basophil # : 0.07 K/uL  Auto Neutrophil % : 65.7 %  Auto Lymphocyte % : 20.6 %  Auto Monocyte % : 8.2 %  Auto Eosinophil % : 3.9 %  Auto Basophil % : 1.2 %    12-12    138  |  96<L>  |  36<H>  ----------------------------<  143<H>  3.9   |  25  |  6.25<H>    Ca    9.1      12 Dec 2019 08:50  Phos  5.2     12-12  Mg     2.3     12-12    TPro  7.3  /  Alb  4.0  /  TBili  0.7  /  DBili  x   /  AST  12  /  ALT  17  /  AlkPhos  127<H>  12-12      Hemoglobin A1C, Whole Blood: 7.5: High Risk (prediabetic)    5.7 - 6.4 %  Diabetic, diagnostic           > 6.5 %  ADA diabetic treatment goal    < 7.0 %    HbA1C values may not accurately reflect mean blood glucose  in patients with Hb variants.  Suggest clinical correlation. % (11.19.19 @ 15:22)

## 2019-12-12 NOTE — H&P ADULT - PROBLEM SELECTOR PLAN 1
Reportedly had bradycardia (unknown duration) to 40s during HD this AM.  -Appreciate heart failure and electrophysiology recs- will continue Coreg 25mg BID and continue to monitor. Unclear etiology of bradycardia as electrolytes WNL, pt asymptomatic. Now with HR of 80s.  -Reattempt HD with telemetry   -STAT EKG if pt becomes bradycardic  -K 4-4.5, Mg >2 Reportedly had bradycardia (unknown duration) to 40s during HD this AM.  -Appreciate heart failure and electrophysiology recs- will continue Coreg 25mg BID and continue to monitor. Unclear etiology of bradycardia as electrolytes WNL, pt asymptomatic. Now with HR of 80s.  -Reattempt HD with telemetry   -STAT EKG if pt becomes bradycardic or symptomatic   -K 4-4.5, Mg >2

## 2019-12-12 NOTE — ED ADULT TRIAGE NOTE - CHIEF COMPLAINT QUOTE
pt s/p 1 hr of dialysis, dialysis center states they could only do one hr due to bradycardia- hr in the 40s. pt asymptomatic denies any pain. left fistula noted

## 2019-12-12 NOTE — H&P ADULT - NSHPSOCIALHISTORY_GEN_ALL_CORE
Pt lives at home with sister. Not working 2/2 disability.   Previous etoh and tobacco use. No current tobacco, drug, etoh use.

## 2019-12-12 NOTE — H&P ADULT - PROBLEM SELECTOR PLAN 6
-Continue Coreg 25mg PO BID  -Hydralazine 100mg PO TID on HD days (pt refuses to take; HF to speak w/ pt). Pt says he becomes symptomatic when taking Coreg and Hydralazine together.  -Isordil 20mg PO TID  -Bumex 2mg PO BID  -Na restriction, 1500cc fluid restriction a1c 7.5% on 11/19  -CC diet, c/w FS qac and hs with SSI

## 2019-12-12 NOTE — H&P ADULT - PROBLEM SELECTOR PLAN 7
Atorvastatin 80mg -Continue Coreg 25mg PO BID  -Hydralazine 100mg PO TID on HD days (pt refuses to take; HF to speak w/ pt). Pt says he becomes symptomatic when taking Coreg and Hydralazine together.  -Isordil 20mg PO TID  -Bumex 2mg PO BID  -Na restriction, 1500cc fluid restriction

## 2019-12-12 NOTE — ED PROVIDER NOTE - OBJECTIVE STATEMENT
54 year old male PMH HTN, CAD s/p stent x1 2016, AICD, ESRD on HD (T/Th/Sat), HLD, NIDDM, CHF, p/w bradycardia. Patient was at dialysis, had reported episode of bradycardia in the 40's. Dialysis was stopped after 1 hour. Patient states he was asymptomatic during event. Coming to ED for evaluation. Patient currently complaint free. Denies fever, visual changes, cp, sob, abd pain, N/V, weakness, or numbness/tingling. 54 year old male PMH HTN, CAD s/p stent x1 2016, AICD, ESRD on HD (T/Th/Sat), HLD, NIDDM, CHF, p/w bradycardia. Patient was at dialysis, had reported episode of bradycardia in the 40's. Dialysis was stopped after 1 hour. Patient states he was asymptomatic during event. Coming to ED for evaluation. States he had similar episode last session, needed 2 breaks but was able to complete session. Was told by cardiologist that his "heart is failing and needs a replacement". Patient currently complaint free. Denies fever, visual changes, cp, sob, abd pain, N/V, weakness, or numbness/tingling.

## 2019-12-12 NOTE — CONSULT NOTE ADULT - PROBLEM SELECTOR RECOMMENDATION 9
Patient with ESRD on HD TTS last fully dialyzed on 12/9. Patient now presents to the hospital with bradycardia from the dialysis unit. Patient asymptomatic with episode and otherwise feels well although concerns about weight gain due to prolonged period without dialysis from Monday to Thursday. Continue to monitor and dose all medications for dialysis.
In ED patient is in NSR 80s with a few PVCs. Not bradycardic.   Pt has subQ ICD.   Ok to continue with Coreg 25 mg po BID.

## 2019-12-12 NOTE — H&P ADULT - PROBLEM SELECTOR PLAN 5
a1c 7.5% on 11/19  -ISS, CC diet  -Basal/bolus if glucoses uncontrolled a1c 7.5% on 11/19  -CC diet, c/w FS qac and hs with SSI -chronic, will trend platelets

## 2019-12-12 NOTE — ED PROVIDER NOTE - PHYSICAL EXAMINATION
GENERAL: A&Ox3, non-toxic appearing, no acute distress  HEENT: NCAT, EOMI, oral mucosa moist, normal conjunctiva  RESP: CTAB, no respiratory distress, no wheezes/rhonchi/rales, speaking in full sentences  CV: RRR, no murmurs/rubs/gallops, fistula in left forearm w/ good thrill  ABDOMEN: soft, non-tender, non-distended, no guarding  MSK: no visible deformities  NEURO: no focal sensory or motor deficits, WOODS, steady gait  SKIN: warm, normal color, well perfused, no rash  PSYCH: normal affect    -Tian Alcantar, PGY-1

## 2019-12-12 NOTE — H&P ADULT - PROBLEM SELECTOR PLAN 3
ESRD Stage 5- HD T/Th/Sat via LUE AVF  -Continue on telemetry  -Hydralazine 100mg TID on NON-HD days ESRD Stage 5- HD T/Th/Sat via LUE AVF. renal contacted for HD   -Continue on telemetry  -Hydralazine 100mg TID on NON-HD days

## 2019-12-12 NOTE — CONSULT NOTE ADULT - ATTENDING COMMENTS
53 YO M hx of HTN, CAD, ESRD, HLD, NIDDM, HFrEF presented with bradycardia after 1 hour of dialysis. Pt. was asymptomatic at the time. Will assess with ILR v. cardionet event monitor. Will follow.
Patient with history as above, MICU consulted for concerns for bradycardia and need for urgent HD.  Patient not bradycardic as patient is having bigeminy, which the monitor is reading as bradycardia.  HR in 80s now.  Patient denies ever feeling lightheaded, dizzy, having chest pain or sob.  No acute indication for HD.      Patient does not require MICU level of care at this time.  Please re-consult as needed.
I personally discussed this case with dialysis Rn in charge who is actively seeking a Tel bed/ or box and the ADN/bed board staff whom I also spoke with is helping with that. since there is a need to document the rhythm we would like dialysis with Telemetry if possible.  again this was the opinion of the cardiology team( I spoke with cardiology fellow and Dr. ZORA Pavon).   the patient says he is 3-4kg over his target weight. appears comfortable but would like to have dialysis either way today se we agreed to continue to pursue dialysis with Telemetry for tonight but if unsuccessful then will do HD without Tel to alleviate the patient discomfort and anxiety.  the cardiology team will also arrange for loop recorder....
PVC's are probable reason for readings of bradycardia, for now continue Coreg, PVC burden can be assessed with outpatient monitoring.   As above, patient does not take HF meds as prescribed due to BP fluctuations

## 2019-12-12 NOTE — CONSULT NOTE ADULT - PROBLEM SELECTOR RECOMMENDATION 2
Not in ADHF  Continue Coreg 25 mg po BID  Supposed to be taking hydralazine 100 mg po TID, Isordil 20 mg po TID and Bumex 2 mg po BID, but patient refuses due to hypotension in past. Will discuss with patient if he will consider taking lower dose of vasodilators.

## 2019-12-12 NOTE — H&P ADULT - NSICDXFAMILYHX_GEN_ALL_CORE_FT
FAMILY HISTORY:  Sibling  Still living? Unknown  Family history of diabetes mellitus (DM), Age at diagnosis: Age Unknown  Family history of MI (myocardial infarction), Age at diagnosis: Age Unknown

## 2019-12-12 NOTE — H&P ADULT - HISTORY OF PRESENT ILLNESS
Pt is a 54 year old man with PMHx T2DM, HTN, CAD (s/p stent 2016), HFrEF (EF 25%) s/p ICD, ESRD on HD T/Th/Sat via LUE AV fistula, GIB 2/2 ulcer, presenting after being sent in from dialysis for bradycardia into 40s. Pt says that he started dialysis and after about 20 minutes the staff had told him his BP dropped a little and his HR was in the 40s. Pt denies any symptoms during this episode- no lightheadedness, dizziness, chest pain, palpitations, headache, vision changes, weakness, SOB. He is not aware of any prior episodes of bradycardia. Was told he should come to ED for further work up. He is currently continuing to deny any symptoms. HR now in 80s with some bigeminy and non sustained runs of PVCs. Pt says the only time he becomes symptomatic is when he takes hydralazine and coreg at the same time (experiences weakness and some dizziness). He has not been taking hydralazine due to this reason. Denies any LE swelling recently. Has been taking his bumex as prescribed. 54M h/o T2DM, HTN, CAD (s/p stent 2016), HFrEF (EF 25%) s/p ICD, ESRD on HD T/Th/Sat via LUE AV fistula, GIB 2/2 ulcer, presenting after being sent in from dialysis for bradycardia into 40s. Pt says that he started dialysis and after about 20 minutes the staff had told him his BP dropped a little and his HR was in the 40s. Pt denies any symptoms during this episode- no lightheadedness, dizziness, chest pain, palpitations, headache, vision changes, weakness, SOB. He had two prior episodes where this happened with dialysis this past Saturday and this past Monday. With both of these episodes he remained asymptomatic and was able to complete dialysis. Was told he should come to ED for further work up. Currently he is asymptomatic. HR now in 80s with some bigeminy and non sustained runs of PVCs. Pt says the only time he becomes symptomatic is when he takes hydralazine and coreg at the same time (experiences weakness and some dizziness). He has not been taking hydralazine due to this reason. Denies any LE swelling recently. Has been taking his bumex as prescribed. Able to walk about 6 blocks without SOB after he has completed dialysis. Denies fever/chills, CP, SOB, abn pain, N/V/D/C.

## 2019-12-12 NOTE — CONSULT NOTE ADULT - ASSESSMENT
ESRD on HD TTS. Presented to hospital with bradycardia from dialysis unit to 30s.
54 year old M w/ h/o IDDM (A1c 7.5 on 11/19/19) c/b possible retinopathy/nephropathy, HTN, CAD (s/p stent in [place] in 2016, unknown coronary anatomy), HFrEF (EF 25%, LVEDD 6.6 cm) s/p subQ ICD, prior severe MR (functional; now improved), ESRD on HD via permacath (T/Th/Sat; AV fistula now patent), prior GIB 2/2 ulcer (H. pylori positive) who is here because he was sent in from his hemodialysis center for bradycardia in the 40s. Patient reports going to HD today with no complaints, but was told to go to the ER due to a slow heart beat. During the reported bradycardia patient reports no dizziness, lightheadedness, SOB, CP, palpitations. HD was not performed today. Currently he is resting comfortably in bed, no complaints, HR on monitor NSR 80s, w/ few PVCs noted.  He admits to SIERRA after 2 blocks of walking on level ground. Of note, patient is only taking Coreg 25 mg po BID. Per Dr. Pavon's notes on outpatient record he is supposed to be taking hydralazine 100 mg po TID, Isordil 20 mg po TID and Bumex 2 mg po BID. He states when he take anything more than Coerg his BP drops in low 80s and he is symptomatic.
54 year old man with PMHx T2DM, HTN, CAD (s/p stent 2016), HFrEF (EF 25%) s/p ICD, ESRD on HD T/Th/Sat via LUE AV fistula, GIB 2/2 ulcer, presenting after being sent in from dialysis for bradycardia into 40s. Currently HR in the 80s with some PVCs and occasional bigeminy, hemodynamically stable, asymptomatic. MICU consulted for possible HD with close monitoring.    Recommendations:  - No indication for emergent HD at this time  - Can consider AICD interrogation to determine extent and duration of bradycardia episodes  - Can consider holding beta blocker prior to HD sessions  - No indication for ICU level of care at this time, pt can be admitted to tele floor for monitoring until HD available    [employer]ner [employer]  EM/IM PGY2  MICU Consult Resident

## 2019-12-12 NOTE — CONSULT NOTE ADULT - SUBJECTIVE AND OBJECTIVE BOX
[facility] Division of Kidney Diseases & Hypertension  INITIAL CONSULT NOTE  [phone]--------------------------------------------------------------------------------  HPI: 54 year old male with history of ESRD on HD TTS (Nephrologist: Dr. Pavon, Center: [facility]), MI s/p PCI c/b HPrEF (EF 20-24%) with SubQ ICD, DM, HTN, HLD who presents to the hospital from dialysis unit with bradycardia. Patient was undergoing dialysis and was ~1 hr through dialysis when his heart rate dropped to the 30s and patient was asymptomatic. Patient was sent to the hospital for further management. Nephrology consulted for management of ESRD.    Patient states he is feeling well when examined without major complaints or issues. Labs, vitals, and medications reviewed. Vital signs stable with HR in 80s, telemetry reviewed with frequent PVCs. Labs show grossly stable electrolytes. Patient is concerned that he is ~3-4kg over his target weight. On review of medications it is noted that patient is taking Carvedilol. Last complete dialysis on 12/9/19 as he had to reschedule Tuesday session to Monday.        PAST HISTORY  --------------------------------------------------------------------------------  PAST MEDICAL & SURGICAL HISTORY:  Congestive heart disease  ESRD (end stage renal disease) on dialysis  GIB (gastrointestinal bleeding): was treated  with H pyelori stable since Dec 10, 2018  HFrEF (heart failure with reduced ejection fraction): EF 20-24%  Myocardial infarction: Jan 2016  Hyperlipidemia  CAD (coronary artery disease): 2016, coronary artery stentX1  DM (diabetes mellitus): 2  not taking any medication lat4st HgA1c 5.7  HTN (hypertension)  History of implantable cardioverter-defibrillator (ICD) placement  Stented coronary artery: ? 1  Arterial stent thrombosis    FAMILY HISTORY:  Family history of diabetes mellitus (DM) (Sibling)  Family history of MI (myocardial infarction) (Sibling)    PAST SOCIAL HISTORY:    ALLERGIES & MEDICATIONS  --------------------------------------------------------------------------------  Allergies    No Known Allergies    Intolerances      Standing Inpatient Medications  aspirin enteric coated 81 milliGRAM(s) Oral daily  atorvastatin 80 milliGRAM(s) Oral at bedtime  buMETAnide 2 milliGRAM(s) Oral every 12 hours  calcium acetate 667 milliGRAM(s) Oral three times a day with meals  carvedilol 25 milliGRAM(s) Oral every 12 hours  ferrous    sulfate 325 milliGRAM(s) Oral daily  heparin  Injectable 5000 Unit(s) SubCutaneous every 8 hours  hydrALAZINE 100 milliGRAM(s) Oral every 8 hours  insulin lispro (HumaLOG) corrective regimen sliding scale   SubCutaneous three times a day before meals  insulin lispro (HumaLOG) corrective regimen sliding scale   SubCutaneous at bedtime  isosorbide   dinitrate Tablet (ISORDIL) 20 milliGRAM(s) Oral every 8 hours  pantoprazole    Tablet 40 milliGRAM(s) Oral before breakfast    PRN Inpatient Medications  dextrose 40% Gel 15 Gram(s) Oral once PRN  glucagon  Injectable 1 milliGRAM(s) IntraMuscular once PRN      REVIEW OF SYSTEMS  --------------------------------------------------------------------------------  Gen: No  fevers/chills, weakness  Head/Eyes/Ears/Mouth: No headache  Respiratory: No dyspnea, cough, wheezing, hemoptysis  CV: No chest pain,   GI: No abdominal pain, diarrhea, constipation, nausea, vomiting  MSK: Some swelling of LE   Neuro: No dizziness/lightheadedness, weakness    All other systems were reviewed and are negative, except as noted.    VITALS/PHYSICAL EXAM  --------------------------------------------------------------------------------  T(C): 36.4 (12-12-19 @ 07:03), Max: 36.4 (12-12-19 @ 07:03)  HR: 88 (12-12-19 @ 14:14) (80 - 89)  BP: 136/89 (12-12-19 @ 14:14) (119/81 - 144/71)  RR: 14 (12-12-19 @ 14:14) (14 - 16)  SpO2: 100% (12-12-19 @ 14:14) (95% - 100%)  Wt(kg): --        Physical Exam:  	Gen: NAD, well-appearing  	HEENT: PERRL, supple neck  	Pulm: CTA B/L  	CV:  S1S2  	Abd: +BS, soft   	Ext: 1/2+ edema of LE.  	Neuro: No focal deficits  	Skin: Warm, without rashes  	Vascular: No cyanosis              Access: BHARAT AVF: +bruit     LABS/STUDIES  --------------------------------------------------------------------------------              11.2   5.63  >-----------<  121      [12-12-19 @ 08:50]              36.5     138  |  96  |  36  ----------------------------<  143      [12-12-19 @ 08:50]  3.9   |  25  |  6.25        Ca     9.1     [12-12-19 @ 08:50]      Mg     2.3     [12-12-19 @ 08:50]      Phos  5.2     [12-12-19 @ 08:50]    TPro  7.3  /  Alb  4.0  /  TBili  0.7  /  DBili  x   /  AST  12  /  ALT  17  /  AlkPhos  127  [12-12-19 @ 08:50]          Creatinine Trend:  SCr 6.25 [12-12 @ 08:50]  SCr 4.49 [11-19 @ 15:22]        TSH 1.38      [12-12-19 @ 08:50]

## 2019-12-12 NOTE — H&P ADULT - PROBLEM SELECTOR PLAN 2
EF 25%- hemodynamically stable and no signs/symptoms of ADHF  -Continue Coreg 25mg PO BID  -Hydralazine 100mg PO TID on HD days (pt refuses to take; HF to speak w/ pt). Pt says he becomes symptomatic when taking Coreg and Hydralazine together.  -Isordil 20mg PO TID  -Bumex 2mg PO BID  -Na restriction, 1500cc fluid restriction EF 25%- hemodynamically stable and no signs/symptoms of ADHF  -Continue Coreg 25mg PO BID  -Hydralazine 100mg PO TID on HD days (pt refuses to take; HF to speak w/ pt). Pt says he becomes symptomatic when taking Coreg and Hydralazine together.  -Isordil 20mg PO TID  -Bumex 2mg PO BID- continues to make urine  -Na restriction, 1500cc fluid restriction

## 2019-12-12 NOTE — CONSULT NOTE ADULT - PROBLEM SELECTOR RECOMMENDATION 2
Patient with bradycardia during dialysis. Would suggest holding Carvedilol for now until bradycardia is elucidated or patient has dialysis successfully without episode of bradycardia. Patient with bradycardia during dialysis.

## 2019-12-13 ENCOUNTER — TRANSCRIPTION ENCOUNTER (OUTPATIENT)
Age: 54
End: 2019-12-13

## 2019-12-13 DIAGNOSIS — R00.1 BRADYCARDIA, UNSPECIFIED: ICD-10-CM

## 2019-12-13 LAB
ALBUMIN SERPL ELPH-MCNC: 3.5 G/DL — SIGNIFICANT CHANGE UP (ref 3.3–5)
ALP SERPL-CCNC: 117 U/L — SIGNIFICANT CHANGE UP (ref 40–120)
ALT FLD-CCNC: 13 U/L — SIGNIFICANT CHANGE UP (ref 4–41)
ANION GAP SERPL CALC-SCNC: 15 MMO/L — HIGH (ref 7–14)
APTT BLD: 29.6 SEC — SIGNIFICANT CHANGE UP (ref 27.5–36.3)
AST SERPL-CCNC: 9 U/L — SIGNIFICANT CHANGE UP (ref 4–40)
BASOPHILS # BLD AUTO: 0.02 K/UL — SIGNIFICANT CHANGE UP (ref 0–0.2)
BASOPHILS NFR BLD AUTO: 0.5 % — SIGNIFICANT CHANGE UP (ref 0–2)
BILIRUB SERPL-MCNC: 0.5 MG/DL — SIGNIFICANT CHANGE UP (ref 0.2–1.2)
BUN SERPL-MCNC: 28 MG/DL — HIGH (ref 7–23)
CALCIUM SERPL-MCNC: 8.5 MG/DL — SIGNIFICANT CHANGE UP (ref 8.4–10.5)
CHLORIDE SERPL-SCNC: 97 MMOL/L — LOW (ref 98–107)
CO2 SERPL-SCNC: 25 MMOL/L — SIGNIFICANT CHANGE UP (ref 22–31)
CREAT SERPL-MCNC: 4.74 MG/DL — HIGH (ref 0.5–1.3)
EOSINOPHIL # BLD AUTO: 0.11 K/UL — SIGNIFICANT CHANGE UP (ref 0–0.5)
EOSINOPHIL NFR BLD AUTO: 2.9 % — SIGNIFICANT CHANGE UP (ref 0–6)
GLUCOSE BLDC GLUCOMTR-MCNC: 104 MG/DL — HIGH (ref 70–99)
GLUCOSE BLDC GLUCOMTR-MCNC: 137 MG/DL — HIGH (ref 70–99)
GLUCOSE BLDC GLUCOMTR-MCNC: 209 MG/DL — HIGH (ref 70–99)
GLUCOSE BLDC GLUCOMTR-MCNC: 266 MG/DL — HIGH (ref 70–99)
GLUCOSE BLDC GLUCOMTR-MCNC: 268 MG/DL — HIGH (ref 70–99)
GLUCOSE SERPL-MCNC: 155 MG/DL — HIGH (ref 70–99)
HCT VFR BLD CALC: 34.5 % — LOW (ref 39–50)
HGB BLD-MCNC: 10.6 G/DL — LOW (ref 13–17)
IMM GRANULOCYTES NFR BLD AUTO: 0.3 % — SIGNIFICANT CHANGE UP (ref 0–1.5)
INR BLD: 1.09 — SIGNIFICANT CHANGE UP (ref 0.88–1.17)
LYMPHOCYTES # BLD AUTO: 0.92 K/UL — LOW (ref 1–3.3)
LYMPHOCYTES # BLD AUTO: 24.3 % — SIGNIFICANT CHANGE UP (ref 13–44)
MAGNESIUM SERPL-MCNC: 2.3 MG/DL — SIGNIFICANT CHANGE UP (ref 1.6–2.6)
MCHC RBC-ENTMCNC: 28.6 PG — SIGNIFICANT CHANGE UP (ref 27–34)
MCHC RBC-ENTMCNC: 30.7 % — LOW (ref 32–36)
MCV RBC AUTO: 93.2 FL — SIGNIFICANT CHANGE UP (ref 80–100)
MONOCYTES # BLD AUTO: 0.39 K/UL — SIGNIFICANT CHANGE UP (ref 0–0.9)
MONOCYTES NFR BLD AUTO: 10.3 % — SIGNIFICANT CHANGE UP (ref 2–14)
NEUTROPHILS # BLD AUTO: 2.34 K/UL — SIGNIFICANT CHANGE UP (ref 1.8–7.4)
NEUTROPHILS NFR BLD AUTO: 61.7 % — SIGNIFICANT CHANGE UP (ref 43–77)
NRBC # FLD: 0 K/UL — SIGNIFICANT CHANGE UP (ref 0–0)
PHOSPHATE SERPL-MCNC: 3.9 MG/DL — SIGNIFICANT CHANGE UP (ref 2.5–4.5)
PLATELET # BLD AUTO: 116 K/UL — LOW (ref 150–400)
PMV BLD: 11.9 FL — SIGNIFICANT CHANGE UP (ref 7–13)
POTASSIUM SERPL-MCNC: 3.6 MMOL/L — SIGNIFICANT CHANGE UP (ref 3.5–5.3)
POTASSIUM SERPL-SCNC: 3.6 MMOL/L — SIGNIFICANT CHANGE UP (ref 3.5–5.3)
PROT SERPL-MCNC: 6.5 G/DL — SIGNIFICANT CHANGE UP (ref 6–8.3)
PROTHROM AB SERPL-ACNC: 12.5 SEC — SIGNIFICANT CHANGE UP (ref 9.8–13.1)
RBC # BLD: 3.7 M/UL — LOW (ref 4.2–5.8)
RBC # FLD: 14.3 % — SIGNIFICANT CHANGE UP (ref 10.3–14.5)
SODIUM SERPL-SCNC: 137 MMOL/L — SIGNIFICANT CHANGE UP (ref 135–145)
WBC # BLD: 3.79 K/UL — LOW (ref 3.8–10.5)
WBC # FLD AUTO: 3.79 K/UL — LOW (ref 3.8–10.5)

## 2019-12-13 PROCEDURE — 99233 SBSQ HOSP IP/OBS HIGH 50: CPT | Mod: GC

## 2019-12-13 PROCEDURE — 99232 SBSQ HOSP IP/OBS MODERATE 35: CPT

## 2019-12-13 PROCEDURE — 99233 SBSQ HOSP IP/OBS HIGH 50: CPT

## 2019-12-13 PROCEDURE — 33285 INSJ SUBQ CAR RHYTHM MNTR: CPT

## 2019-12-13 RX ORDER — MIDODRINE HYDROCHLORIDE 2.5 MG/1
5 TABLET ORAL THREE TIMES A DAY
Refills: 0 | Status: DISCONTINUED | OUTPATIENT
Start: 2019-12-13 | End: 2019-12-14

## 2019-12-13 RX ORDER — HYDRALAZINE HCL 50 MG
50 TABLET ORAL THREE TIMES A DAY
Refills: 0 | Status: DISCONTINUED | OUTPATIENT
Start: 2019-12-13 | End: 2019-12-14

## 2019-12-13 RX ORDER — CARVEDILOL PHOSPHATE 80 MG/1
25 CAPSULE, EXTENDED RELEASE ORAL EVERY 12 HOURS
Refills: 0 | Status: DISCONTINUED | OUTPATIENT
Start: 2019-12-13 | End: 2019-12-13

## 2019-12-13 RX ORDER — METOPROLOL TARTRATE 50 MG
50 TABLET ORAL
Refills: 0 | Status: DISCONTINUED | OUTPATIENT
Start: 2019-12-13 | End: 2019-12-14

## 2019-12-13 RX ORDER — CHLORHEXIDINE GLUCONATE 213 G/1000ML
1 SOLUTION TOPICAL DAILY
Refills: 0 | Status: DISCONTINUED | OUTPATIENT
Start: 2019-12-13 | End: 2019-12-13

## 2019-12-13 RX ADMIN — Medication 667 MILLIGRAM(S): at 08:42

## 2019-12-13 RX ADMIN — Medication 667 MILLIGRAM(S): at 17:33

## 2019-12-13 RX ADMIN — MIDODRINE HYDROCHLORIDE 5 MILLIGRAM(S): 2.5 TABLET ORAL at 21:27

## 2019-12-13 RX ADMIN — BUMETANIDE 2 MILLIGRAM(S): 0.25 INJECTION INTRAMUSCULAR; INTRAVENOUS at 17:33

## 2019-12-13 RX ADMIN — Medication 50 MILLIGRAM(S): at 21:26

## 2019-12-13 RX ADMIN — Medication 3: at 17:33

## 2019-12-13 RX ADMIN — ATORVASTATIN CALCIUM 80 MILLIGRAM(S): 80 TABLET, FILM COATED ORAL at 21:26

## 2019-12-13 RX ADMIN — CHLORHEXIDINE GLUCONATE 1 APPLICATION(S): 213 SOLUTION TOPICAL at 18:51

## 2019-12-13 RX ADMIN — HEPARIN SODIUM 5000 UNIT(S): 5000 INJECTION INTRAVENOUS; SUBCUTANEOUS at 21:26

## 2019-12-13 RX ADMIN — Medication 325 MILLIGRAM(S): at 15:46

## 2019-12-13 RX ADMIN — ATORVASTATIN CALCIUM 80 MILLIGRAM(S): 80 TABLET, FILM COATED ORAL at 00:44

## 2019-12-13 RX ADMIN — Medication 50 MILLIGRAM(S): at 17:33

## 2019-12-13 RX ADMIN — PANTOPRAZOLE SODIUM 40 MILLIGRAM(S): 20 TABLET, DELAYED RELEASE ORAL at 06:18

## 2019-12-13 RX ADMIN — BUMETANIDE 2 MILLIGRAM(S): 0.25 INJECTION INTRAMUSCULAR; INTRAVENOUS at 06:22

## 2019-12-13 RX ADMIN — Medication 81 MILLIGRAM(S): at 15:46

## 2019-12-13 NOTE — DISCHARGE NOTE PROVIDER - CARE PROVIDER_API CALL
Enio Pavon)  Cardiovascular Disease; Internal Medicine  [address] [place] [address]  Phone: [phone]  Fax: [phone]  Follow Up Time:

## 2019-12-13 NOTE — PROGRESS NOTE ADULT - ASSESSMENT
55 YO M hx of HTN, CAD, ESRD, HLD, NIDDM, HFrEF presented with bradycardia while on dialysis. Pt. asymptomatic throughout. ED consulted for assessment of bradycardia.     Patient has subcutaneous ICD which cannot pace  Unclear etiology of bradycardia especially considering asymptomatic  Possibly secondary to PVC  Would reattempt dialysis with tele monitoring  If patient becomes bradycardic would repeat 12 lead EKG during episode of bradycardia   Monitor electrolytes, K 4-4.5, Mg >2      Vipul House  Cardiology Fellow  [phone]  All Cardiology service information can be found 24/7 on [email or web address], password: [email or web address]

## 2019-12-13 NOTE — DISCHARGE NOTE PROVIDER - HOSPITAL COURSE
54M h/o T2DM, HTN, CAD (s/p stent 2016), HFrEF (EF 25%) s/p ICD, ESRD on HD T/Th/Sat via LUE AV fistula, GIB 2/2 ulcer, presenting after being sent in from dialysis for bradycardia into 40s. Pt says that he started dialysis and after about 20 minutes the staff had told him his BP dropped a little and his HR was in the 40s. Pt denies any symptoms during this episode- no lightheadedness, dizziness, chest pain, palpitations, headache, vision changes, weakness, SOB. He had two prior episodes where this happened with dialysis this past Saturday and this past Monday. With both of these episodes he remained asymptomatic and was able to complete dialysis. Was told he should come to ED for further work up. Currently he is asymptomatic. HR now in 80s with some bigeminy and non sustained runs of PVCs. Pt says the only time he becomes symptomatic is when he takes hydralazine and coreg at the same time (experiences weakness and some dizziness). He has not been taking hydralazine due to this reason. Denies any LE swelling recently. Has been taking his bumex as prescribed. Able to walk about 6 blocks without SOB after he has completed dialysis. Denies fever/chills, CP, SOB, abn pain, N/V/D/C.         Problem/Plan -1:    Problem: Bradycardia, unspecified.  Plan: Asymptomatic bradycardia, unclear etiology      -No issues on tele during HD    -Continued w/ beta-blocker     -EP following-->ILR placed??????        Problem/Plan - 2:    ·  Problem: Chronic systolic heart failure.  Plan: EF 25%- hemodynamically stable and no signs/symptoms of ADHF    -switch to Toprol XL 50mg BID     -change Hydralazine to 50mg PO TID     -Stop Isordil     -Continue Bumex 2mg PO BID- continues to make urine    -Na restriction, 1500cc fluid restriction.         Problem/Plan - 3:    ·  Problem: ESRD (end stage renal disease) on dialysis.  Plan: ESRD Stage 5- HD T/Th/Sat via LUE AVF.     -dialysis as per renal    -starting midodrine to avoid hypotension w/ dialysis.         Problem/Plan - 4:    ·  Problem: Coronary artery disease involving native heart without angina pectoris, unspecified vessel or lesion type.  Plan: stable, c/w ASA, statin, BB.         Problem/Plan - 5:    ·  Problem: Thrombocytopenia.  Plan: -chronic, will trend platelets. 54M h/o T2DM, HTN, CAD (s/p stent 2016), HFrEF (EF 25%) s/p ICD, ESRD on HD T/Th/Sat via LUE AV fistula, GIB 2/2 ulcer, presenting after being sent in from dialysis for bradycardia into 40s. Pt says that he started dialysis and after about 20 minutes the staff had told him his BP dropped a little and his HR was in the 40s. Pt denies any symptoms during this episode- no lightheadedness, dizziness, chest pain, palpitations, headache, vision changes, weakness, SOB. He had two prior episodes where this happened with dialysis this past Saturday and this past Monday. With both of these episodes he remained asymptomatic and was able to complete dialysis. Was told he should come to ED for further work up. Currently he is asymptomatic. HR now in 80s with some bigeminy and non sustained runs of PVCs. Pt says the only time he becomes symptomatic is when he takes hydralazine and coreg at the same time (experiences weakness and some dizziness). He has not been taking hydralazine due to this reason. Denies any LE swelling recently. Has been taking his bumex as prescribed. Able to walk about 6 blocks without SOB after he has completed dialysis. Denies fever/chills, CP, SOB, abn pain, N/V/D/C.         Problem/Plan -1:    Problem: Bradycardia, unspecified.  Plan: Asymptomatic bradycardia, unclear etiology      -No issues on tele during HD    -Continued w/ beta-blocker     -EP following-->ILR placed on 12/13.      You will follow up with your Cardiologist for further medical management.          Problem/Plan - 2:    ·  Problem: Chronic systolic heart failure.  Plan: EF 25%- hemodynamically stable and no signs/symptoms of ADHF    -switch to Toprol XL 50mg BID     -change Hydralazine to 50mg PO TID     -Stop Isordil     -Continue Bumex 2mg PO BID- continues to make urine    -Na restriction, 1500cc fluid restriction.         Problem/Plan - 3:    ·  Problem: ESRD (end stage renal disease) on dialysis.  Plan: ESRD Stage 5- HD T/Th/Sat via LUE AVF.     -dialysis as per renal    -starting midodrine to avoid hypotension w/ dialysis.         Problem/Plan - 4:    ·  Problem: Coronary artery disease involving native heart without angina pectoris, unspecified vessel or lesion type.  Plan: stable, c/w ASA, statin, BB.         Problem/Plan - 5:    ·  Problem: Thrombocytopenia.  Plan: -chronic, will trend platelets.         Dispo- Medically stable for discharge home with outpatient Cardiology follow up. 54M h/o T2DM, HTN, CAD (s/p stent 2016), HFrEF (EF 25%) s/p ICD, ESRD on HD T/Th/Sat via LUE AV fistula, GIB 2/2 ulcer, presenting after being sent in from dialysis for bradycardia into 40s. Pt says that he started dialysis and after about 20 minutes the staff had told him his BP dropped a little and his HR was in the 40s. Pt denies any symptoms during this episode- no lightheadedness, dizziness, chest pain, palpitations, headache, vision changes, weakness, SOB. He had two prior episodes where this happened with dialysis this past Saturday and this past Monday. With both of these episodes he remained asymptomatic and was able to complete dialysis. Was told he should come to ED for further work up. He is asymptomatic. HR in the 80s on admission with some bigeminy and non sustained runs of PVCs. Pt says the only time he becomes symptomatic is when he takes hydralazine and coreg at the same time (experiences weakness and some dizziness). He has not been taking hydralazine due to this reason. Patient was seen by EP for asymptomatic bradycardia, s/p ILR placement on 12/13, may continue w/ beta-blocker. Chronic systolic heart failure, Coreg switched to Toprol 50mg PO BID for less effect on blood pressure. Continue PO Bumex and UF with dialysis. Hydralazine was changed to 50mg TID, Isordil was stopped, and Midodrine was started on dialysis days to avoid hypotension.         Dispo- Medically stable for discharge home with outpatient Cardiology follow up.

## 2019-12-13 NOTE — DISCHARGE NOTE PROVIDER - NSDCFUSCHEDAPPT_GEN_ALL_CORE_FT
RAUL VILLALTAALINA ; 12/17/2019 ; NPP CT Surg [address] RAUL AsherALINA ; 12/20/2019 ; [facility]  [facility] ; 12/27/2019 ; [place] [id number] [facility]  RAUL VILLALTAALINA ; 01/15/2020 ; NP Cardio Electro [address]  [facility] ; 01/15/2020 ; NP Cardio [address]  RAUL VILLALTAALINA ; 02/28/2020 ; NP Cardio [address] RAUL VILLALTAALINA ; 12/17/2019 ; NPP CT Surg [address] RAUL AsherALINA ; 12/20/2019 ; [facility]  [facility] ; 12/27/2019 ; [place] [id number] [facility]  RAUL VILLALTAALINA ; 01/15/2020 ; NP Cardio Electro [address]  [facility] ; 01/15/2020 ; NP Cardio [address]  RAUL VILLALTAALINA ; 02/28/2020 ; NP Cardio [address] [facility] ; 12/27/2019 ; [place] [id number] [facility] ; 12/30/2019 ; REYNALDO Cardio Electro [address]  [facility] ; 01/15/2020 ; REYNALDO Cardio Electro [address]  [facility] ; 01/15/2020 ; REYNALDO Cardio [address]  [facility] ; 01/24/2020 ; REYNALDO [facility] ; 02/28/2020 ; REYNALDO Cardio [address]

## 2019-12-13 NOTE — DISCHARGE NOTE PROVIDER - NSDCMRMEDTOKEN_GEN_ALL_CORE_FT
aspirin 81 mg oral delayed release tablet: 1 tab(s) orally once a day  atorvastatin 80 mg oral tablet: 1 tab(s) orally once a day (at bedtime)  bumetanide 2 mg oral tablet: 2 tab(s) orally 2 times a day  calcium acetate 667 mg oral tablet: 1 tab(s) orally 3 times a day (with meals)  carvedilol 12.5 mg oral tablet: 2 tab(s) orally 2 times a day  ferrous sulfate 325 mg (65 mg elemental iron) oral tablet: 1 tab(s) orally once a day  hydrALAZINE 100 mg oral tablet: 1 tab(s) orally every 8 hours  isosorbide dinitrate 10 mg oral tablet: 1 tab(s) orally 3 times a day  pantoprazole 40 mg oral delayed release tablet: 1 tab(s) orally once a day aspirin 81 mg oral delayed release tablet: 1 tab(s) orally once a day  atorvastatin 80 mg oral tablet: 1 tab(s) orally once a day (at bedtime)  bumetanide 2 mg oral tablet: 2 tab(s) orally 2 times a day  calcium acetate 667 mg oral tablet: 1 tab(s) orally 3 times a day (with meals)  ferrous sulfate 325 mg (65 mg elemental iron) oral tablet: 1 tab(s) orally once a day  hydrALAZINE 50 mg oral tablet: 1 tab(s) orally 3 times a day  metoprolol succinate 50 mg oral tablet, extended release: 1 tab(s) orally 2 times a day  midodrine 5 mg oral tablet: 1 tab(s) orally 3 times a day  Take only on Hemodialysis days T-Th-Sat  pantoprazole 40 mg oral delayed release tablet: 1 tab(s) orally once a day

## 2019-12-13 NOTE — PROGRESS NOTE ADULT - ASSESSMENT
54 year old male with history of ESRD on HD TTS (Nephrologist: Dr. Pavon, Center: [facility]), MI s/p PCI c/b HPrEF (EF 20-24%) with SubQ ICD, DM, HTN, HLD who presents to the hospital from dialysis unit with bradycardia. Patient was undergoing dialysis and was ~1 hr through dialysis when his heart rate dropped to the 30s      - Pt. underwent HD last yesterday only 1Kg removed due to low BP.   - Patient to be dialyzed today again.  UF as tolerated: will do Na modeling and low temperature hold BP if ok with cards.  3K bath, 2.5Ca bath   -CKD-MBD continue with ca Acetate  - anemia on target  -Access Flow acceptable   -further bradycardia management per Cardiology

## 2019-12-13 NOTE — PROGRESS NOTE ADULT - PROBLEM SELECTOR PLAN 1
Asymptomatic bradycardia, unclear etiology    -Reattempt HD with telemetry   -STAT EKG if pt becomes bradycardic or symptomatic   -may continue w/ beta-blocker   -EP following

## 2019-12-13 NOTE — PROGRESS NOTE ADULT - SUBJECTIVE AND OBJECTIVE BOX
[facility] DIVISION OF KIDNEY DISEASES AND HYPERTENSION -- HEMODIALYSIS NOTE  --------------------------------------------------------------------------------  Chief Complaint: ESRD/Ongoing hemodialysis requirement    24 hour events/subjective: was hypotensive during HD. Hr 43. Tele unavailable to me.         PAST HISTORY  --------------------------------------------------------------------------------  No significant changes to PMH, PSH, FHx, SHx, unless otherwise noted    ALLERGIES & MEDICATIONS  --------------------------------------------------------------------------------  Allergies    No Known Allergies    Intolerances      Standing Inpatient Medications  aspirin enteric coated 81 milliGRAM(s) Oral daily  atorvastatin 80 milliGRAM(s) Oral at bedtime  buMETAnide 2 milliGRAM(s) Oral every 12 hours  calcium acetate 667 milliGRAM(s) Oral three times a day with meals  carvedilol 25 milliGRAM(s) Oral every 12 hours  chlorhexidine 2% Cloths 1 Application(s) Topical daily  chlorhexidine 4% Liquid 1 Application(s) Topical daily  dextrose 5%. 1000 milliLiter(s) IV Continuous <Continuous>  dextrose 50% Injectable 12.5 Gram(s) IV Push once  dextrose 50% Injectable 25 Gram(s) IV Push once  dextrose 50% Injectable 25 Gram(s) IV Push once  ferrous    sulfate 325 milliGRAM(s) Oral daily  heparin  Injectable 5000 Unit(s) SubCutaneous every 8 hours  hydrALAZINE 100 milliGRAM(s) Oral every 8 hours  insulin lispro (HumaLOG) corrective regimen sliding scale   SubCutaneous three times a day before meals  insulin lispro (HumaLOG) corrective regimen sliding scale   SubCutaneous at bedtime  isosorbide   dinitrate Tablet (ISORDIL) 20 milliGRAM(s) Oral every 8 hours  pantoprazole    Tablet 40 milliGRAM(s) Oral before breakfast    PRN Inpatient Medications  dextrose 40% Gel 15 Gram(s) Oral once PRN  glucagon  Injectable 1 milliGRAM(s) IntraMuscular once PRN      REVIEW OF SYSTEMS  --------------------------------------------------------------------------------  Skin:  No new rashes  Head/Eyes/Ears/Mouth:  -No headache  Respiratory: No dyspnea,   CV:  No chest pain,   GI: No abdominal pain,  :  No hematuria, No frothy urine   Neuro:  No seizures,  All other systems were reviewed and are negative, except as noted.      VITALS/PHYSICAL EXAM  --------------------------------------------------------------------------------  T(C): 36.5 (12-13-19 @ 10:00), Max: 37.1 (12-13-19 @ 06:12)  HR: 71 (12-13-19 @ 10:00) (69 - 88)  BP: 103/78 (12-13-19 @ 10:00) (98/68 - 155/85)  RR: 16 (12-13-19 @ 10:00) (14 - 18)  SpO2: 100% (12-13-19 @ 10:00) (97% - 100%)  Wt(kg): --  Height (cm): 162.6 (12-12-19 @ 19:46)  Weight (kg): 89.9 (12-13-19 @ 06:12)  BMI (kg/m2): 34 (12-13-19 @ 06:12)  BSA (m2): 1.95 (12-13-19 @ 06:12)      12-12-19 @ 07:01  -  12-13-19 @ 07:00  --------------------------------------------------------  IN: 625 mL / OUT: 1001 mL / NET: -376 mL    12-13-19 @ 07:01  -  12-13-19 @ 10:31  --------------------------------------------------------  IN: 240 mL / OUT: 0 mL / NET: 240 mL      Physical Exam:  Gen: NAD  HEENT: supple neck, NO JVD  Pulmonary: CTA B/L  CV: RRR, S1S2; no rub  Abd:  +BS, soft, not tender, not distended  UE:  No edema;   LE:   No edema  Neuro: No focal deficits,   No asterixes   Psych: Normal affect and mood  Vascular  Left RC AVF  + Thrill         LABS/STUDIES  --------------------------------------------------------------------------------              10.6   3.79  >-----------<  116      [12-13-19 @ 05:44]              34.5     137  |  97  |  28  ----------------------------<  155      [12-13-19 @ 03:44]  3.6   |  25  |  4.74        Ca     8.5     [12-13-19 @ 03:44]      Mg     2.3     [12-13-19 @ 03:44]      Phos  3.9     [12-13-19 @ 03:44]    TPro  6.5  /  Alb  3.5  /  TBili  0.5  /  DBili  x   /  AST  9   /  ALT  13  /  AlkPhos  117  [12-13-19 @ 03:44]    PT/INR: PT 12.5 , INR 1.09       [12-13-19 @ 05:44]  PTT: 29.6       [12-13-19 @ 05:44]      Iron 18, TIBC 313, %sat --      [01-12-19 @ 05:30]  Ferritin 28.14      [01-12-19 @ 05:30]  HbA1c 7.5      [11-19-19 @ 15:22]  TSH 1.38      [12-12-19 @ 08:50]

## 2019-12-13 NOTE — PROGRESS NOTE ADULT - ASSESSMENT
54M h/o T2DM, HTN, CAD (s/p stent 2016), HFrEF (EF 25%) s/p ICD, ESRD on HD T/Th/Sat via LUE AV fistula, GIB 2/2 ulcer, presenting after being sent in from dialysis for bradycardia into 40s, currently asymptomatic and a/f bradycardia eval:

## 2019-12-13 NOTE — PROGRESS NOTE ADULT - SUBJECTIVE AND OBJECTIVE BOX
PROGRESS NOTE:     Patient is a 54y old  Male who presents with a chief complaint of Bradycardia (13 Dec 2019 11:04)      SUBJECTIVE / OVERNIGHT EVENTS: No new complaints.     ADDITIONAL REVIEW OF SYSTEMS:    MEDICATIONS  (STANDING):  aspirin enteric coated 81 milliGRAM(s) Oral daily  atorvastatin 80 milliGRAM(s) Oral at bedtime  buMETAnide 2 milliGRAM(s) Oral every 12 hours  calcium acetate 667 milliGRAM(s) Oral three times a day with meals  chlorhexidine 2% Cloths 1 Application(s) Topical daily  chlorhexidine 4% Liquid 1 Application(s) Topical daily  dextrose 5%. 1000 milliLiter(s) (50 mL/Hr) IV Continuous <Continuous>  dextrose 50% Injectable 12.5 Gram(s) IV Push once  dextrose 50% Injectable 25 Gram(s) IV Push once  dextrose 50% Injectable 25 Gram(s) IV Push once  ferrous    sulfate 325 milliGRAM(s) Oral daily  heparin  Injectable 5000 Unit(s) SubCutaneous every 8 hours  hydrALAZINE 50 milliGRAM(s) Oral three times a day  insulin lispro (HumaLOG) corrective regimen sliding scale   SubCutaneous three times a day before meals  insulin lispro (HumaLOG) corrective regimen sliding scale   SubCutaneous at bedtime  isosorbide   dinitrate Tablet (ISORDIL) 20 milliGRAM(s) Oral every 8 hours  metoprolol succinate ER 50 milliGRAM(s) Oral two times a day  midodrine. 5 milliGRAM(s) Oral three times a day  pantoprazole    Tablet 40 milliGRAM(s) Oral before breakfast    MEDICATIONS  (PRN):  dextrose 40% Gel 15 Gram(s) Oral once PRN Blood Glucose LESS THAN 70 milliGRAM(s)/deciliter  glucagon  Injectable 1 milliGRAM(s) IntraMuscular once PRN Glucose LESS THAN 70 milligrams/deciliter      CAPILLARY BLOOD GLUCOSE      POCT Blood Glucose.: 137 mg/dL (13 Dec 2019 07:44)  POCT Blood Glucose.: 204 mg/dL (12 Dec 2019 22:08)  POCT Blood Glucose.: 164 mg/dL (12 Dec 2019 18:00)    I&O's Summary    12 Dec 2019 07:01  -  13 Dec 2019 07:00  --------------------------------------------------------  IN: 625 mL / OUT: 1001 mL / NET: -376 mL    13 Dec 2019 07:01  -  13 Dec 2019 13:28  --------------------------------------------------------  IN: 240 mL / OUT: 0 mL / NET: 240 mL        PHYSICAL EXAM:  Vital Signs Last 24 Hrs  T(C): 36.4 (13 Dec 2019 10:50), Max: 37.1 (13 Dec 2019 06:12)  T(F): 97.5 (13 Dec 2019 10:50), Max: 98.7 (13 Dec 2019 06:12)  HR: 71 (13 Dec 2019 10:50) (69 - 88)  BP: 99/68 (13 Dec 2019 10:50) (98/68 - 155/85)  BP(mean): --  RR: 16 (13 Dec 2019 10:50) (14 - 18)  SpO2: 100% (13 Dec 2019 10:00) (97% - 100%)    CONSTITUTIONAL: NAD, well-developed  RESPIRATORY: Normal respiratory effort; lungs are clear to auscultation bilaterally  CARDIOVASCULAR: Regular rate and rhythm, normal S1 and S2, no murmur/rub/gallop; No lower extremity edema; Peripheral pulses are 2+ bilaterally  ABDOMEN: Nontender to palpation, normoactive bowel sounds, no rebound/guarding; No hepatosplenomegaly  MUSCLOSKELETAL: no clubbing or cyanosis of digits; no joint swelling or tenderness to palpation  PSYCH: A+O to person, place, and time; affect appropriate  NEURO: Non-focal     LABS:                        10.6   3.79  )-----------( 116      ( 13 Dec 2019 05:44 )             34.5     12-13    137  |  97<L>  |  28<H>  ----------------------------<  155<H>  3.6   |  25  |  4.74<H>    Ca    8.5      13 Dec 2019 03:44  Phos  3.9     12-13  Mg     2.3     12-13    TPro  6.5  /  Alb  3.5  /  TBili  0.5  /  DBili  x   /  AST  9   /  ALT  13  /  AlkPhos  117  12-13    PT/INR - ( 13 Dec 2019 05:44 )   PT: 12.5 SEC;   INR: 1.09          PTT - ( 13 Dec 2019 05:44 )  PTT:29.6 SEC            RADIOLOGY & ADDITIONAL TESTS:  Results Reviewed:   Imaging Personally Reviewed:  Electrocardiogram Personally Reviewed:    COORDINATION OF CARE:  Care Discussed with Consultants/Other Providers [Y/N]:  Prior or Outpatient Records Reviewed [Y/N]:

## 2019-12-13 NOTE — PROGRESS NOTE ADULT - PROBLEM SELECTOR PLAN 2
EF 25%- hemodynamically stable and no signs/symptoms of ADHF  -switch to Toprol XL 50mg BID   -change Hydralazine to 50mg PO TID   -Stop Isordil   -Continue Bumex 2mg PO BID- continues to make urine  -Na restriction, 1500cc fluid restriction

## 2019-12-13 NOTE — PROGRESS NOTE ADULT - PROBLEM SELECTOR PLAN 8
DVT: SQH  Diet: CC, DASH, 1500cc fluid restriction  Dispo: pending bradycardia eval/monitoring on telemetry. no PT needs

## 2019-12-13 NOTE — PROGRESS NOTE ADULT - SUBJECTIVE AND OBJECTIVE BOX
As D/w Dr. Pavon, to avoid hypotension discontinued Coreg and started Toprol 50 mg po BID, decreased hydralazine to 50 mg po TID.   He is also not taking Isordil at home due to high cost, therefore will discontinue it here as well. Also added midodrine 5 mg po TID to be taken on days of HD. He appears volume overloaded and should have maximal fluid removal, as d/w Dr. Paovn.

## 2019-12-13 NOTE — PROGRESS NOTE ADULT - PROBLEM SELECTOR PLAN 3
ESRD Stage 5- HD T/Th/Sat via LUE AVF.   -dialysis as per renal  -starting midodrine to avoid hypotension w/ dialysis

## 2019-12-13 NOTE — DISCHARGE NOTE PROVIDER - NSDCCPCAREPLAN_GEN_ALL_CORE_FT
PRINCIPAL DISCHARGE DIAGNOSIS  Diagnosis: Bradycardia, unspecified  Assessment and Plan of Treatment: No evidence of bradycardia while on telemetry monitoring in hospital. Loop recorder placed? Will need to follow up outpatient with the device clinic.      SECONDARY DISCHARGE DIAGNOSES  Diagnosis: ESRD (end stage renal disease) on dialysis  Assessment and Plan of Treatment: Continue with your regular HD sessions T/Th/Sat and follow up with your nephrologist outpatient.    Diagnosis: Essential hypertension  Assessment and Plan of Treatment: Maintain adequate control of your blood pressure. Follow up with PCP and/or cardiologist for ongoing medical management of your hypertension. Continue medications as prescribed. Low salt diet.    Diagnosis: Type 2 diabetes mellitus with other specified complication, unspecified whether long term insulin use  Assessment and Plan of Treatment: Maintain adequate glycemic control. Monitor your sugars.  Low carb diet. PRINCIPAL DISCHARGE DIAGNOSIS  Diagnosis: Bradycardia, unspecified  Assessment and Plan of Treatment: No evidence of bradycardia while on telemetry monitoring in hospital. Loop recorder placement on 12/13.  Will need to follow up outpatient with the device clinic. and your cardiologist.      SECONDARY DISCHARGE DIAGNOSES  Diagnosis: ESRD (end stage renal disease) on dialysis  Assessment and Plan of Treatment: Continue with your regular HD sessions T/Th/Sat and follow up with your nephrologist outpatient. Your last HD treatment was on 12/14.    Diagnosis: Type 2 diabetes mellitus with other specified complication, unspecified whether long term insulin use  Assessment and Plan of Treatment: Maintain adequate glycemic control. Monitor your sugars.  Low carb diet.    Diagnosis: Essential hypertension  Assessment and Plan of Treatment: Maintain adequate control of your blood pressure. Follow up with PCP and/or cardiologist for ongoing medical management of your hypertension. Continue medications as prescribed. Low salt diet.

## 2019-12-13 NOTE — PROGRESS NOTE ADULT - SUBJECTIVE AND OBJECTIVE BOX
Patient seen and examined at bedside.    Overnight Events:   No overnight events. pt.  hasn't received dialysis yet    REVIEW OF SYSTEMS:  Constitutional:     [ ] negative [ ] fevers [ ] chills [ ] weight loss [ ] weight gain  HEENT:                  [ ] negative [ ] dry eyes [ ] eye irritation [ ] postnasal drip [ ] nasal congestion  CV:                         [ ] negative  [ ] chest pain [ ] orthopnea [ ] palpitations [ ] murmur  Resp:                     [ ] negative [ ] cough [ ] shortness of breath [ ] dyspnea [ ] wheezing [ ] sputum [ ]hemoptysis  GI:                          [ ] negative [ ] nausea [ ] vomiting [ ] diarrhea [ ] constipation [ ] abd pain [ ] dysphagia   :                        [ ] negative [ ] dysuria [ ] nocturia [ ] hematuria [ ] increased urinary frequency  Musculoskeletal: [ ] negative [ ] back pain [ ] myalgias [ ] arthralgias [ ] fracture  Skin:                       [ ] negative [ ] rash [ ] itch  Neurological:        [ ] negative [ ] headache [ ] dizziness [ ] syncope [ ] weakness [ ] numbness  Psychiatric:           [ ] negative [ ] anxiety [ ] depression  Endocrine:            [ ] negative [ ] diabetes [ ] thyroid problem  Heme/Lymph:      [ ] negative [ ] anemia [ ] bleeding problem  Allergic/Immune: [ ] negative [ ] itchy eyes [ ] nasal discharge [ ] hives [ ] angioedema    [ ] All other systems negative  [ ] Unable to assess ROS due to    Current Meds:  aspirin enteric coated 81 milliGRAM(s) Oral daily  atorvastatin 80 milliGRAM(s) Oral at bedtime  buMETAnide 2 milliGRAM(s) Oral every 12 hours  calcium acetate 667 milliGRAM(s) Oral three times a day with meals  carvedilol 25 milliGRAM(s) Oral every 12 hours  chlorhexidine 2% Cloths 1 Application(s) Topical daily  chlorhexidine 4% Liquid 1 Application(s) Topical daily  dextrose 40% Gel 15 Gram(s) Oral once PRN  dextrose 5%. 1000 milliLiter(s) IV Continuous <Continuous>  dextrose 50% Injectable 12.5 Gram(s) IV Push once  dextrose 50% Injectable 25 Gram(s) IV Push once  dextrose 50% Injectable 25 Gram(s) IV Push once  ferrous    sulfate 325 milliGRAM(s) Oral daily  glucagon  Injectable 1 milliGRAM(s) IntraMuscular once PRN  heparin  Injectable 5000 Unit(s) SubCutaneous every 8 hours  hydrALAZINE 100 milliGRAM(s) Oral every 8 hours  insulin lispro (HumaLOG) corrective regimen sliding scale   SubCutaneous three times a day before meals  insulin lispro (HumaLOG) corrective regimen sliding scale   SubCutaneous at bedtime  isosorbide   dinitrate Tablet (ISORDIL) 20 milliGRAM(s) Oral every 8 hours  pantoprazole    Tablet 40 milliGRAM(s) Oral before breakfast      PAST MEDICAL & SURGICAL HISTORY:  Congestive heart disease  ESRD (end stage renal disease) on dialysis  GIB (gastrointestinal bleeding): was treated  with H pyelori stable since Dec 10, 2018  HFrEF (heart failure with reduced ejection fraction): EF 20-24%  Myocardial infarction: Jan 2016  Hyperlipidemia  CAD (coronary artery disease): 2016, coronary artery stentX1  DM (diabetes mellitus): 2  not taking any medication lat4st HgA1c 5.7  HTN (hypertension)  History of implantable cardioverter-defibrillator (ICD) placement  Stented coronary artery: ? 1  Arterial stent thrombosis      Vitals:  T(F): 97.7 (12-13), Max: 98.7 (12-13)  HR: 71 (12-13) (69 - 88)  BP: 103/78 (12-13) (98/68 - 155/85)  RR: 16 (12-13)  SpO2: 100% (12-13)  I&O's Summary    12 Dec 2019 07:01  -  13 Dec 2019 07:00  --------------------------------------------------------  IN: 625 mL / OUT: 1001 mL / NET: -376 mL    13 Dec 2019 07:01  -  13 Dec 2019 11:05  --------------------------------------------------------  IN: 240 mL / OUT: 0 mL / NET: 240 mL        Physical Exam:  Appearance: No acute distress; well appearing  Eyes: PERRL, EOMI, pink conjunctiva  HENT: Normal oral mucosa  Cardiovascular: RRR, S1, S2, no murmurs, rubs, or gallops; no edema; no JVD  Respiratory: Clear to auscultation bilaterally  Gastrointestinal: soft, non-tender, non-distended with normal bowel sounds  Musculoskeletal: No clubbing; no joint deformity   Neurologic: Non-focal  Lymphatic: No lymphadenopathy  Psychiatry: AAOx3, mood & affect appropriate  Skin: No rashes, ecchymoses, or cyanosis                          10.6   3.79  )-----------( 116      ( 13 Dec 2019 05:44 )             34.5     12-13    137  |  97<L>  |  28<H>  ----------------------------<  155<H>  3.6   |  25  |  4.74<H>    Ca    8.5      13 Dec 2019 03:44  Phos  3.9     12-13  Mg     2.3     12-13    TPro  6.5  /  Alb  3.5  /  TBili  0.5  /  DBili  x   /  AST  9   /  ALT  13  /  AlkPhos  117  12-13    PT/INR - ( 13 Dec 2019 05:44 )   PT: 12.5 SEC;   INR: 1.09          PTT - ( 13 Dec 2019 05:44 )  PTT:29.6 SEC              New ECG(s): Personally reviewed    Echo:    Stress Testing:     Cath:    Imaging:    Interpretation of Telemetry:  sinus rhythm

## 2019-12-14 ENCOUNTER — TRANSCRIPTION ENCOUNTER (OUTPATIENT)
Age: 54
End: 2019-12-14

## 2019-12-14 VITALS
DIASTOLIC BLOOD PRESSURE: 75 MMHG | SYSTOLIC BLOOD PRESSURE: 110 MMHG | OXYGEN SATURATION: 100 % | TEMPERATURE: 98 F | RESPIRATION RATE: 16 BRPM | HEART RATE: 65 BPM

## 2019-12-14 DIAGNOSIS — N18.6 END STAGE RENAL DISEASE: ICD-10-CM

## 2019-12-14 LAB
ANION GAP SERPL CALC-SCNC: 16 MMO/L — HIGH (ref 7–14)
BUN SERPL-MCNC: 32 MG/DL — HIGH (ref 7–23)
CALCIUM SERPL-MCNC: 8.5 MG/DL — SIGNIFICANT CHANGE UP (ref 8.4–10.5)
CHLORIDE SERPL-SCNC: 97 MMOL/L — LOW (ref 98–107)
CO2 SERPL-SCNC: 22 MMOL/L — SIGNIFICANT CHANGE UP (ref 22–31)
CREAT SERPL-MCNC: 4.42 MG/DL — HIGH (ref 0.5–1.3)
GLUCOSE BLDC GLUCOMTR-MCNC: 112 MG/DL — HIGH (ref 70–99)
GLUCOSE BLDC GLUCOMTR-MCNC: 142 MG/DL — HIGH (ref 70–99)
GLUCOSE BLDC GLUCOMTR-MCNC: 163 MG/DL — HIGH (ref 70–99)
GLUCOSE SERPL-MCNC: 172 MG/DL — HIGH (ref 70–99)
MAGNESIUM SERPL-MCNC: 2.1 MG/DL — SIGNIFICANT CHANGE UP (ref 1.6–2.6)
PHOSPHATE SERPL-MCNC: 3.7 MG/DL — SIGNIFICANT CHANGE UP (ref 2.5–4.5)
POTASSIUM SERPL-MCNC: 4.5 MMOL/L — SIGNIFICANT CHANGE UP (ref 3.5–5.3)
POTASSIUM SERPL-SCNC: 4.5 MMOL/L — SIGNIFICANT CHANGE UP (ref 3.5–5.3)
SODIUM SERPL-SCNC: 135 MMOL/L — SIGNIFICANT CHANGE UP (ref 135–145)
SPECIMEN SOURCE: SIGNIFICANT CHANGE UP
SPECIMEN SOURCE: SIGNIFICANT CHANGE UP

## 2019-12-14 PROCEDURE — 90935 HEMODIALYSIS ONE EVALUATION: CPT

## 2019-12-14 PROCEDURE — 99232 SBSQ HOSP IP/OBS MODERATE 35: CPT

## 2019-12-14 PROCEDURE — 99239 HOSP IP/OBS DSCHRG MGMT >30: CPT

## 2019-12-14 RX ORDER — METOPROLOL TARTRATE 50 MG
1 TABLET ORAL
Qty: 60 | Refills: 0
Start: 2019-12-14 | End: 2020-01-12

## 2019-12-14 RX ORDER — MIDODRINE HYDROCHLORIDE 2.5 MG/1
1 TABLET ORAL
Qty: 90 | Refills: 0
Start: 2019-12-14 | End: 2020-01-12

## 2019-12-14 RX ORDER — HYDRALAZINE HCL 50 MG
1 TABLET ORAL
Qty: 90 | Refills: 0
Start: 2019-12-14 | End: 2020-01-12

## 2019-12-14 RX ORDER — CARVEDILOL PHOSPHATE 80 MG/1
2 CAPSULE, EXTENDED RELEASE ORAL
Qty: 0 | Refills: 0 | DISCHARGE

## 2019-12-14 RX ADMIN — Medication 325 MILLIGRAM(S): at 10:58

## 2019-12-14 RX ADMIN — Medication 667 MILLIGRAM(S): at 09:12

## 2019-12-14 RX ADMIN — PANTOPRAZOLE SODIUM 40 MILLIGRAM(S): 20 TABLET, DELAYED RELEASE ORAL at 05:11

## 2019-12-14 RX ADMIN — CHLORHEXIDINE GLUCONATE 1 APPLICATION(S): 213 SOLUTION TOPICAL at 10:58

## 2019-12-14 RX ADMIN — Medication 81 MILLIGRAM(S): at 12:40

## 2019-12-14 RX ADMIN — Medication 667 MILLIGRAM(S): at 12:40

## 2019-12-14 RX ADMIN — Medication 50 MILLIGRAM(S): at 10:58

## 2019-12-14 RX ADMIN — MIDODRINE HYDROCHLORIDE 5 MILLIGRAM(S): 2.5 TABLET ORAL at 05:11

## 2019-12-14 RX ADMIN — BUMETANIDE 2 MILLIGRAM(S): 0.25 INJECTION INTRAMUSCULAR; INTRAVENOUS at 10:58

## 2019-12-14 RX ADMIN — MIDODRINE HYDROCHLORIDE 5 MILLIGRAM(S): 2.5 TABLET ORAL at 10:57

## 2019-12-14 NOTE — PROGRESS NOTE ADULT - PROBLEM SELECTOR PLAN 3
ESRD Stage 5- HD T/Th/Sat via LUE AVF.   -tolerated fluid removal w/ dialysis   -continue midodrine to avoid hypotension

## 2019-12-14 NOTE — PROGRESS NOTE ADULT - SUBJECTIVE AND OBJECTIVE BOX
PROGRESS NOTE:     Patient is a 54y old  Male who presents with a chief complaint of Bradycardia (14 Dec 2019 10:55)      SUBJECTIVE / OVERNIGHT EVENTS: Pt states that he felt a little dizzy this AM.     ADDITIONAL REVIEW OF SYSTEMS:    MEDICATIONS  (STANDING):  aspirin enteric coated 81 milliGRAM(s) Oral daily  atorvastatin 80 milliGRAM(s) Oral at bedtime  buMETAnide 2 milliGRAM(s) Oral every 12 hours  calcium acetate 667 milliGRAM(s) Oral three times a day with meals  chlorhexidine 4% Liquid 1 Application(s) Topical daily  dextrose 5%. 1000 milliLiter(s) (50 mL/Hr) IV Continuous <Continuous>  dextrose 50% Injectable 12.5 Gram(s) IV Push once  dextrose 50% Injectable 25 Gram(s) IV Push once  dextrose 50% Injectable 25 Gram(s) IV Push once  ferrous    sulfate 325 milliGRAM(s) Oral daily  heparin  Injectable 5000 Unit(s) SubCutaneous every 8 hours  hydrALAZINE 50 milliGRAM(s) Oral three times a day  insulin lispro (HumaLOG) corrective regimen sliding scale   SubCutaneous three times a day before meals  insulin lispro (HumaLOG) corrective regimen sliding scale   SubCutaneous at bedtime  metoprolol succinate ER 50 milliGRAM(s) Oral two times a day  midodrine. 5 milliGRAM(s) Oral three times a day  pantoprazole    Tablet 40 milliGRAM(s) Oral before breakfast    MEDICATIONS  (PRN):  dextrose 40% Gel 15 Gram(s) Oral once PRN Blood Glucose LESS THAN 70 milliGRAM(s)/deciliter  glucagon  Injectable 1 milliGRAM(s) IntraMuscular once PRN Glucose LESS THAN 70 milligrams/deciliter      CAPILLARY BLOOD GLUCOSE      POCT Blood Glucose.: 142 mg/dL (14 Dec 2019 12:02)  POCT Blood Glucose.: 112 mg/dL (14 Dec 2019 08:37)  POCT Blood Glucose.: 163 mg/dL (14 Dec 2019 05:20)  POCT Blood Glucose.: 209 mg/dL (13 Dec 2019 21:19)  POCT Blood Glucose.: 266 mg/dL (13 Dec 2019 19:24)  POCT Blood Glucose.: 268 mg/dL (13 Dec 2019 17:29)  POCT Blood Glucose.: 104 mg/dL (13 Dec 2019 13:55)    I&O's Summary    13 Dec 2019 07:01  -  14 Dec 2019 07:00  --------------------------------------------------------  IN: 1155 mL / OUT: 2200 mL / NET: -1045 mL    14 Dec 2019 07:01  -  14 Dec 2019 12:57  --------------------------------------------------------  IN: 500 mL / OUT: 2700 mL / NET: -2200 mL        PHYSICAL EXAM:  Vital Signs Last 24 Hrs  T(C): 36.7 (14 Dec 2019 10:30), Max: 36.9 (13 Dec 2019 17:30)  T(F): 98 (14 Dec 2019 10:30), Max: 98.5 (13 Dec 2019 21:22)  HR: 64 (14 Dec 2019 11:02) (57 - 77)  BP: 108/74 (14 Dec 2019 11:02) (101/68 - 118/52)  BP(mean): --  RR: 16 (14 Dec 2019 11:02) (16 - 18)  SpO2: 100% (14 Dec 2019 11:02) (100% - 100%)    CONSTITUTIONAL: NAD, well-developed  RESPIRATORY: Normal respiratory effort; lungs are clear to auscultation bilaterally  CARDIOVASCULAR: Regular rate and rhythm, normal S1 and S2, no murmur/rub/gallop; No lower extremity edema; Peripheral pulses are 2+ bilaterally  ABDOMEN: Nontender to palpation, normoactive bowel sounds, no rebound/guarding; No hepatosplenomegaly  MUSCLOSKELETAL: no clubbing or cyanosis of digits; no joint swelling or tenderness to palpation  PSYCH: A+O to person, place, and time; affect appropriate  NEURO: Non-focal       LABS:                        10.6   3.79  )-----------( 116      ( 13 Dec 2019 05:44 )             34.5     12-14    135  |  97<L>  |  32<H>  ----------------------------<  172<H>  4.5   |  22  |  4.42<H>    Ca    8.5      14 Dec 2019 06:45  Phos  3.7     12-14  Mg     2.1     12-14    TPro  6.5  /  Alb  3.5  /  TBili  0.5  /  DBili  x   /  AST  9   /  ALT  13  /  AlkPhos  117  12-13    PT/INR - ( 13 Dec 2019 05:44 )   PT: 12.5 SEC;   INR: 1.09          PTT - ( 13 Dec 2019 05:44 )  PTT:29.6 SEC          Culture - Nose (collected 13 Dec 2019 08:03)  Source: NOSE  Preliminary Report (14 Dec 2019 10:29):    No growth of Methicillin-Resisitant Staphylococcus aureus.    Culture in progress.    Culture - Nose (collected 13 Dec 2019 01:13)  Source: NOSE  Preliminary Report (14 Dec 2019 10:31):    No growth of Methicillin-Resisitant Staphylococcus aureus.    Culture in progress.        RADIOLOGY & ADDITIONAL TESTS:  Results Reviewed:   Imaging Personally Reviewed:  Electrocardiogram Personally Reviewed:    COORDINATION OF CARE:  Care Discussed with Consultants/Other Providers [Y/N]:  Prior or Outpatient Records Reviewed [Y/N]:

## 2019-12-14 NOTE — DISCHARGE NOTE NURSING/CASE MANAGEMENT/SOCIAL WORK - PATIENT PORTAL LINK FT
You can access the FollowMyHealth Patient Portal offered by [facility] by registering at the following website: [email or web address]. By joining [employer]’s FollowMyHealth portal, you will also be able to view your health information using other applications (apps) compatible with our system.

## 2019-12-14 NOTE — PROGRESS NOTE ADULT - ASSESSMENT
A/P: 54 year old M patient with PMH of HTN, CAD, HFrEF, ESRD, HLD, and NIDDM presented with asymptomatic bradycardia while on dialysis.     - s/p ILR implant  - clinically stable during HD this morning  - no further intervention from EP during this hospitalization  - outpatient EP follow-up  - please call with further questions    Maximiliano Adame, #[phone]  Cardiology Fellow

## 2019-12-14 NOTE — PROGRESS NOTE ADULT - PROBLEM SELECTOR PLAN 2
EF 25%- hemodynamically stable and no signs/symptoms of ADHF  -switched to Toprol XL 50mg BID   -lowered Hydralazine to 50mg PO TID   -Discontinued Isordil   -Continue Bumex 2mg PO BID- continues to make urine  -Na restriction, 1500cc fluid restriction

## 2019-12-14 NOTE — PROGRESS NOTE ADULT - SUBJECTIVE AND OBJECTIVE BOX
[facility] DIVISION OF KIDNEY DISEASES AND HYPERTENSION --   --------------------------------------------------------------------------------  Chief Complaint: ESRD/Ongoing hemodialysis requirement    24 hour events/subjective: Pt. with ESRD on HD TIW admitted for bradycardia. Pt. seen and examined during HD today. Pt. feels better and offered no complaints during HD rounds.    PAST HISTORY  --------------------------------------------------------------------------------  No significant changes to PMH, PSH, FHx, SHx, unless otherwise noted    ALLERGIES & MEDICATIONS  --------------------------------------------------------------------------------  Allergies    No Known Allergies    Intolerances    Standing Inpatient Medications  aspirin enteric coated 81 milliGRAM(s) Oral daily  atorvastatin 80 milliGRAM(s) Oral at bedtime  buMETAnide 2 milliGRAM(s) Oral every 12 hours  calcium acetate 667 milliGRAM(s) Oral three times a day with meals  chlorhexidine 4% Liquid 1 Application(s) Topical daily  dextrose 5%. 1000 milliLiter(s) IV Continuous <Continuous>  dextrose 50% Injectable 12.5 Gram(s) IV Push once  dextrose 50% Injectable 25 Gram(s) IV Push once  dextrose 50% Injectable 25 Gram(s) IV Push once  ferrous    sulfate 325 milliGRAM(s) Oral daily  heparin  Injectable 5000 Unit(s) SubCutaneous every 8 hours  hydrALAZINE 50 milliGRAM(s) Oral three times a day  insulin lispro (HumaLOG) corrective regimen sliding scale   SubCutaneous three times a day before meals  insulin lispro (HumaLOG) corrective regimen sliding scale   SubCutaneous at bedtime  metoprolol succinate ER 50 milliGRAM(s) Oral two times a day  midodrine. 5 milliGRAM(s) Oral three times a day  pantoprazole    Tablet 40 milliGRAM(s) Oral before breakfast    PRN Inpatient Medications  dextrose 40% Gel 15 Gram(s) Oral once PRN  glucagon  Injectable 1 milliGRAM(s) IntraMuscular once PRN    REVIEW OF SYSTEMS  --------------------------------------------------------------------------------  Gen: No fever  Respiratory: No dyspnea  CV: No chest pain  GI: No abdominal pain  Neuro: No dizziness  LE: No edema  VITALS/PHYSICAL EXAM  --------------------------------------------------------------------------------  T(C): 36.6 (12-14-19 @ 06:45), Max: 36.9 (12-13-19 @ 17:30)  HR: 68 (12-14-19 @ 06:45) (57 - 77)  BP: 104/68 (12-14-19 @ 06:45) (99/68 - 118/52)  RR: 18 (12-14-19 @ 06:45) (16 - 18)  SpO2: 100% (12-14-19 @ 06:45) (100% - 100%)  Wt(kg): --  Height (cm): 162.6 (12-12-19 @ 19:46)  Weight (kg): 89.9 (12-13-19 @ 06:12)  BMI (kg/m2): 34 (12-13-19 @ 06:12)  BSA (m2): 1.95 (12-13-19 @ 06:12)    12-13-19 @ 07:01  -  12-14-19 @ 07:00  --------------------------------------------------------  IN: 1155 mL / OUT: 2200 mL / NET: -1045 mL    Physical Exam:  Gen: resting, NAD  	HEENT: No JVD  	Pulm: CTA B/L  	CV: S1S2+  	Abd: Soft  	LE: No edema  	Vascular access: LUE AVF: skin intact  LABS/STUDIES  --------------------------------------------------------------------------------              10.6   3.79  >-----------<  116      [12-13-19 @ 05:44]              34.5     135  |  97  |  32  ----------------------------<  172      [12-14-19 @ 06:45]  4.5   |  22  |  4.42        Ca     8.5     [12-14-19 @ 06:45]      Mg     2.1     [12-14-19 @ 06:45]      Phos  3.7     [12-14-19 @ 06:45]    TPro  6.5  /  Alb  3.5  /  TBili  0.5  /  DBili  x   /  AST  9   /  ALT  13  /  AlkPhos  117  [12-13-19 @ 03:44]    Iron 18, TIBC 313, %sat --      [01-12-19 @ 05:30]  Ferritin 28.14      [01-12-19 @ 05:30]  HbA1c 7.5      [11-19-19 @ 15:22]  TSH 1.38      [12-12-19 @ 08:50]

## 2019-12-14 NOTE — PROGRESS NOTE ADULT - PROBLEM SELECTOR PLAN 1
Pt. tolerating HD. Pt. with episode of intradialytic hypotension earlier. UF goal decreased. Dialysate temperature lowered. BP improved/stable at time of HD rounds. Recommend to discontinue hydralazine. AVF functioning well. Monitor BP and labs

## 2019-12-14 NOTE — PROGRESS NOTE ADULT - PROBLEM SELECTOR PLAN 1
Asymptomatic bradycardia, unclear etiology    -may continue w/ beta-blocker   -s/p ILR placement   -EP f/up as outpatient

## 2019-12-14 NOTE — PROGRESS NOTE ADULT - SUBJECTIVE AND OBJECTIVE BOX
Cardiology Progress Note    Interval: Pt resting comfortably during HD. Noted he gets dizzy with hydralazine and does not want to take it.    Tele:     HPI:  54M h/o T2DM, HTN, CAD (s/p stent 2016), HFrEF (EF 25%) s/p ICD, ESRD on HD T//Sat via LUE AV fistula, GIB 2/2 ulcer, presenting after being sent in from dialysis for bradycardia into 40s. Pt says that he started dialysis and after about 20 minutes the staff had told him his BP dropped a little and his HR was in the 40s. Pt denies any symptoms during this episode- no lightheadedness, dizziness, chest pain, palpitations, headache, vision changes, weakness, SOB. He had two prior episodes where this happened with dialysis this past Saturday and this past Monday. With both of these episodes he remained asymptomatic and was able to complete dialysis. Was told he should come to ED for further work up. Currently he is asymptomatic. HR now in 80s with some bigeminy and non sustained runs of PVCs. Pt says the only time he becomes symptomatic is when he takes hydralazine and coreg at the same time (experiences weakness and some dizziness). He has not been taking hydralazine due to this reason. Denies any LE swelling recently. Has been taking his bumex as prescribed. Able to walk about 6 blocks without SOB after he has completed dialysis. Denies fever/chills, CP, SOB, abn pain, N/V/D/C. (12 Dec 2019 13:51)      Medications:  aspirin enteric coated 81 milliGRAM(s) Oral daily  atorvastatin 80 milliGRAM(s) Oral at bedtime  buMETAnide 2 milliGRAM(s) Oral every 12 hours  calcium acetate 667 milliGRAM(s) Oral three times a day with meals  chlorhexidine 4% Liquid 1 Application(s) Topical daily  dextrose 40% Gel 15 Gram(s) Oral once PRN  dextrose 5%. 1000 milliLiter(s) IV Continuous <Continuous>  dextrose 50% Injectable 12.5 Gram(s) IV Push once  dextrose 50% Injectable 25 Gram(s) IV Push once  dextrose 50% Injectable 25 Gram(s) IV Push once  ferrous    sulfate 325 milliGRAM(s) Oral daily  glucagon  Injectable 1 milliGRAM(s) IntraMuscular once PRN  heparin  Injectable 5000 Unit(s) SubCutaneous every 8 hours  hydrALAZINE 50 milliGRAM(s) Oral three times a day  insulin lispro (HumaLOG) corrective regimen sliding scale   SubCutaneous three times a day before meals  insulin lispro (HumaLOG) corrective regimen sliding scale   SubCutaneous at bedtime  metoprolol succinate ER 50 milliGRAM(s) Oral two times a day  midodrine. 5 milliGRAM(s) Oral three times a day  pantoprazole    Tablet 40 milliGRAM(s) Oral before breakfast      Review of Systems:  Constitutional: [ ] Fever [ ] Chills [ ] Fatigue [ ] Weight change   HEENT: [ ] Blurred vision [ ] Eye Pain [ ] Headache [ ] Runny nose [ ] Sore Throat   Respiratory: [ ] Cough [ ] Wheezing [ ] Shortness of breath  Cardiovascular: [ ] Chest Pain [ ] Palpitations [ ] SIERRA [ ] PND [ ] Orthopnea  Gastrointestinal: [ ] Abdominal Pain [ ] Diarrhea [ ] Constipation [ ] Hemorrhoids [ ] Nausea [ ] Vomiting  Genitourinary: [ ] Nocturia [ ] Dysuria [ ] Incontinence  Extremities: [ ] Swelling [ ] Joint Pain  Neurologic: [ ] Focal deficit [ ] Paresthesias [ ] Syncope  Lymphatic: [ ] Swelling [ ] Lymphadenopathy   Skin: [ ] Rash [ ] Ecchymoses [ ] Wounds [ ] Lesions  Psychiatry: [ ] Depression [ ] Suicidal/Homicidal Ideation [ ] Anxiety [ ] Sleep Disturbances  [ ] 10 point review of systems is otherwise negative except as mentioned above            [ ]Unable to obtain    Vitals:  T(C): 36.7 (19 @ 10:30), Max: 36.9 (19 @ 17:30)  HR: 67 (19 @ 10:30) (57 - 77)  BP: 110/71 (19 @ 10:30) (101/68 - 118/52)  BP(mean): --  RR: 17 (19 @ 10:30) (16 - 18)  SpO2: 100% (19 @ 10:30) (100% - 100%)  Wt(kg): --  Daily     Daily Weight in k.3 (14 Dec 2019 10:30)  I&O's Summary    13 Dec 2019 07:01  -  14 Dec 2019 07:00  --------------------------------------------------------  IN: 1155 mL / OUT: 2200 mL / NET: -1045 mL    14 Dec 2019 07:01  -  14 Dec 2019 10:55  --------------------------------------------------------  IN: 500 mL / OUT: 2700 mL / NET: -2200 mL        Physical Exam:  General: NAD  Eye: PERRL, EOMI  HENT: Normal oral mucosa NC/AT  CV: Normal S1/S2, RRR, No M/R/G, trace pitting edema in bilateral LE, no elevation in JVP  Resp: Normal respiratory effort, clear to auscultation bilaterally, no wheezing, no crackles  Abd: Soft, Non-tender, Non-distended, BS+  Ext: No clubbing, No joint deformity   Neuro: Non-focal, motor and sensory intact  Lymph: No lymphadenopathy  Psych: AAOx3, Mood & affect appropriate  Skin: No rashes, No ecchymoses, No cyanosis    Labs:                        10.6   3.79  )-----------( 116      ( 13 Dec 2019 05:44 )             34.5     -    135  |  97<L>  |  32<H>  ----------------------------<  172<H>  4.5   |  22  |  4.42<H>    Ca    8.5      14 Dec 2019 06:45  Phos  3.7     12-  Mg     2.1     -    TPro  6.5  /  Alb  3.5  /  TBili  0.5  /  DBili  x   /  AST  9   /  ALT  13  /  AlkPhos  117  12-13    PT/INR - ( 13 Dec 2019 05:44 )   PT: 12.5 SEC;   INR: 1.09          PTT - ( 13 Dec 2019 05:44 )  PTT:29.6 SEC              New results/imaging:

## 2019-12-14 NOTE — PROGRESS NOTE ADULT - ATTENDING COMMENTS
55 YO M hx of HTN, CAD, ESRD, HLD, NIDDM, HFrEF presented with bradycardia after 1 hour of dialysis. Pt. was asymptomatic at the time. No s/p ILR placement. Will follow.
DC home, d/c time 34 minutes

## 2019-12-15 LAB
BACTERIA NPH CULT: SIGNIFICANT CHANGE UP
BACTERIA NPH CULT: SIGNIFICANT CHANGE UP
SPECIMEN SOURCE: SIGNIFICANT CHANGE UP

## 2019-12-16 ENCOUNTER — MEDICATION RENEWAL (OUTPATIENT)
Age: 54
End: 2019-12-16

## 2019-12-16 ENCOUNTER — OTHER (OUTPATIENT)
Age: 54
End: 2019-12-16

## 2019-12-16 ENCOUNTER — OUTPATIENT (OUTPATIENT)
Dept: OUTPATIENT SERVICES | Facility: HOSPITAL | Age: 54
LOS: 1 days | Discharge: HOME | End: 2019-12-16

## 2019-12-16 DIAGNOSIS — T82.868A THROMBOSIS DUE TO VASCULAR PROSTHETIC DEVICES, IMPLANTS AND GRAFTS, INITIAL ENCOUNTER: Chronic | ICD-10-CM

## 2019-12-16 DIAGNOSIS — D64.9 ANEMIA, UNSPECIFIED: ICD-10-CM

## 2019-12-16 DIAGNOSIS — Z95.5 PRESENCE OF CORONARY ANGIOPLASTY IMPLANT AND GRAFT: Chronic | ICD-10-CM

## 2019-12-16 DIAGNOSIS — Z95.810 PRESENCE OF AUTOMATIC (IMPLANTABLE) CARDIAC DEFIBRILLATOR: Chronic | ICD-10-CM

## 2019-12-16 LAB — BACTERIA NPH CULT: SIGNIFICANT CHANGE UP

## 2019-12-17 ENCOUNTER — APPOINTMENT (OUTPATIENT)
Dept: CARDIOTHORACIC SURGERY | Facility: CLINIC | Age: 54
End: 2019-12-17

## 2019-12-20 ENCOUNTER — APPOINTMENT (OUTPATIENT)
Dept: HEMATOLOGY ONCOLOGY | Facility: CLINIC | Age: 54
End: 2019-12-20

## 2019-12-27 ENCOUNTER — APPOINTMENT (OUTPATIENT)
Dept: INTERNAL MEDICINE | Facility: CLINIC | Age: 54
End: 2019-12-27

## 2019-12-30 ENCOUNTER — APPOINTMENT (OUTPATIENT)
Dept: ELECTROPHYSIOLOGY | Facility: CLINIC | Age: 54
End: 2019-12-30
Payer: MEDICARE

## 2019-12-30 PROCEDURE — 93282 PRGRMG EVAL IMPLANTABLE DFB: CPT

## 2019-12-30 PROCEDURE — 93285 PRGRMG DEV EVAL SCRMS IP: CPT | Mod: 59

## 2020-01-01 ENCOUNTER — APPOINTMENT (OUTPATIENT)
Dept: ELECTROPHYSIOLOGY | Facility: CLINIC | Age: 55
End: 2020-01-01
Payer: MEDICARE

## 2020-01-01 ENCOUNTER — APPOINTMENT (OUTPATIENT)
Age: 55
End: 2020-01-01

## 2020-01-01 ENCOUNTER — OUTPATIENT (OUTPATIENT)
Dept: OUTPATIENT SERVICES | Facility: HOSPITAL | Age: 55
LOS: 1 days | End: 2020-01-01

## 2020-01-01 ENCOUNTER — APPOINTMENT (OUTPATIENT)
Dept: INTERNAL MEDICINE | Facility: CLINIC | Age: 55
End: 2020-01-01

## 2020-01-01 ENCOUNTER — APPOINTMENT (OUTPATIENT)
Dept: DISASTER EMERGENCY | Facility: CLINIC | Age: 55
End: 2020-01-01

## 2020-01-01 ENCOUNTER — APPOINTMENT (OUTPATIENT)
Dept: HEMATOLOGY ONCOLOGY | Facility: CLINIC | Age: 55
End: 2020-01-01
Payer: MEDICARE

## 2020-01-01 ENCOUNTER — APPOINTMENT (OUTPATIENT)
Dept: CARDIOLOGY | Facility: CLINIC | Age: 55
End: 2020-01-01
Payer: MEDICARE

## 2020-01-01 ENCOUNTER — APPOINTMENT (OUTPATIENT)
Dept: GASTROENTEROLOGY | Facility: CLINIC | Age: 55
End: 2020-01-01
Payer: MEDICARE

## 2020-01-01 ENCOUNTER — NON-APPOINTMENT (OUTPATIENT)
Age: 55
End: 2020-01-01

## 2020-01-01 ENCOUNTER — RESULT REVIEW (OUTPATIENT)
Age: 55
End: 2020-01-01

## 2020-01-01 ENCOUNTER — OUTPATIENT (OUTPATIENT)
Dept: OUTPATIENT SERVICES | Facility: HOSPITAL | Age: 55
LOS: 1 days | End: 2020-01-01
Payer: MEDICARE

## 2020-01-01 ENCOUNTER — APPOINTMENT (OUTPATIENT)
Dept: INTERNAL MEDICINE | Facility: CLINIC | Age: 55
End: 2020-01-01
Payer: MEDICARE

## 2020-01-01 ENCOUNTER — TRANSCRIPTION ENCOUNTER (OUTPATIENT)
Age: 55
End: 2020-01-01

## 2020-01-01 ENCOUNTER — RX RENEWAL (OUTPATIENT)
Age: 55
End: 2020-01-01

## 2020-01-01 ENCOUNTER — APPOINTMENT (OUTPATIENT)
Dept: GASTROENTEROLOGY | Facility: CLINIC | Age: 55
End: 2020-01-01

## 2020-01-01 ENCOUNTER — APPOINTMENT (OUTPATIENT)
Dept: GASTROENTEROLOGY | Facility: HOSPITAL | Age: 55
End: 2020-01-01

## 2020-01-01 ENCOUNTER — APPOINTMENT (OUTPATIENT)
Dept: ENDOCRINOLOGY | Facility: CLINIC | Age: 55
End: 2020-01-01
Payer: MEDICARE

## 2020-01-01 ENCOUNTER — APPOINTMENT (OUTPATIENT)
Dept: UROLOGY | Facility: CLINIC | Age: 55
End: 2020-01-01
Payer: MEDICARE

## 2020-01-01 ENCOUNTER — APPOINTMENT (OUTPATIENT)
Dept: ULTRASOUND IMAGING | Facility: IMAGING CENTER | Age: 55
End: 2020-01-01
Payer: MEDICARE

## 2020-01-01 ENCOUNTER — OUTPATIENT (OUTPATIENT)
Dept: OUTPATIENT SERVICES | Facility: HOSPITAL | Age: 55
LOS: 1 days | Discharge: HOME | End: 2020-01-01

## 2020-01-01 VITALS
DIASTOLIC BLOOD PRESSURE: 73 MMHG | WEIGHT: 186 LBS | RESPIRATION RATE: 16 BRPM | TEMPERATURE: 98 F | OXYGEN SATURATION: 100 % | BODY MASS INDEX: 31.93 KG/M2 | HEART RATE: 83 BPM | SYSTOLIC BLOOD PRESSURE: 107 MMHG

## 2020-01-01 VITALS
WEIGHT: 190 LBS | RESPIRATION RATE: 16 BRPM | BODY MASS INDEX: 32.44 KG/M2 | TEMPERATURE: 97.6 F | DIASTOLIC BLOOD PRESSURE: 77 MMHG | HEIGHT: 64 IN | HEART RATE: 81 BPM | OXYGEN SATURATION: 97 % | SYSTOLIC BLOOD PRESSURE: 108 MMHG

## 2020-01-01 VITALS
HEART RATE: 79 BPM | RESPIRATION RATE: 18 BRPM | OXYGEN SATURATION: 100 % | DIASTOLIC BLOOD PRESSURE: 85 MMHG | SYSTOLIC BLOOD PRESSURE: 107 MMHG

## 2020-01-01 VITALS
OXYGEN SATURATION: 99 % | SYSTOLIC BLOOD PRESSURE: 112 MMHG | RESPIRATION RATE: 16 BRPM | WEIGHT: 185.19 LBS | HEART RATE: 83 BPM | DIASTOLIC BLOOD PRESSURE: 86 MMHG | TEMPERATURE: 97 F | HEIGHT: 64 IN

## 2020-01-01 VITALS — BODY MASS INDEX: 31.97 KG/M2 | WEIGHT: 186.25 LBS

## 2020-01-01 VITALS — SYSTOLIC BLOOD PRESSURE: 130 MMHG | DIASTOLIC BLOOD PRESSURE: 70 MMHG

## 2020-01-01 VITALS
DIASTOLIC BLOOD PRESSURE: 80 MMHG | HEIGHT: 64 IN | HEART RATE: 82 BPM | SYSTOLIC BLOOD PRESSURE: 118 MMHG | OXYGEN SATURATION: 97 % | BODY MASS INDEX: 31.92 KG/M2 | WEIGHT: 187 LBS

## 2020-01-01 VITALS
BODY MASS INDEX: 31.97 KG/M2 | OXYGEN SATURATION: 100 % | SYSTOLIC BLOOD PRESSURE: 125 MMHG | HEART RATE: 94 BPM | TEMPERATURE: 97.2 F | HEIGHT: 64 IN | DIASTOLIC BLOOD PRESSURE: 88 MMHG

## 2020-01-01 VITALS
SYSTOLIC BLOOD PRESSURE: 116 MMHG | TEMPERATURE: 96.3 F | WEIGHT: 189 LBS | DIASTOLIC BLOOD PRESSURE: 79 MMHG | BODY MASS INDEX: 32.27 KG/M2 | OXYGEN SATURATION: 100 % | HEART RATE: 93 BPM | HEIGHT: 64 IN

## 2020-01-01 VITALS — WEIGHT: 189 LBS | HEIGHT: 64 IN | BODY MASS INDEX: 32.27 KG/M2

## 2020-01-01 VITALS
BODY MASS INDEX: 30.9 KG/M2 | HEIGHT: 64 IN | HEIGHT: 64 IN | DIASTOLIC BLOOD PRESSURE: 70 MMHG | HEART RATE: 85 BPM | SYSTOLIC BLOOD PRESSURE: 117 MMHG | WEIGHT: 181 LBS | HEART RATE: 79 BPM | RESPIRATION RATE: 16 BRPM | WEIGHT: 187.25 LBS | BODY MASS INDEX: 31.97 KG/M2 | TEMPERATURE: 98.7 F | SYSTOLIC BLOOD PRESSURE: 105 MMHG | DIASTOLIC BLOOD PRESSURE: 81 MMHG | OXYGEN SATURATION: 100 % | TEMPERATURE: 96.7 F | RESPIRATION RATE: 16 BRPM

## 2020-01-01 VITALS — BODY MASS INDEX: 32.78 KG/M2 | HEIGHT: 64 IN | WEIGHT: 192 LBS | OXYGEN SATURATION: 98 % | HEART RATE: 87 BPM

## 2020-01-01 VITALS — TEMPERATURE: 98.7 F

## 2020-01-01 VITALS — TEMPERATURE: 97.3 F

## 2020-01-01 DIAGNOSIS — T82.868A THROMBOSIS DUE TO VASCULAR PROSTHETIC DEVICES, IMPLANTS AND GRAFTS, INITIAL ENCOUNTER: Chronic | ICD-10-CM

## 2020-01-01 DIAGNOSIS — D69.6 THROMBOCYTOPENIA, UNSPECIFIED: ICD-10-CM

## 2020-01-01 DIAGNOSIS — R55 SYNCOPE AND COLLAPSE: ICD-10-CM

## 2020-01-01 DIAGNOSIS — Z95.5 PRESENCE OF CORONARY ANGIOPLASTY IMPLANT AND GRAFT: Chronic | ICD-10-CM

## 2020-01-01 DIAGNOSIS — R31.9 HEMATURIA, UNSPECIFIED: ICD-10-CM

## 2020-01-01 DIAGNOSIS — K25.9 GASTRIC ULCER, UNSPECIFIED AS ACUTE OR CHRONIC, W/OUT HEMORRHAGE OR PERFORATION: ICD-10-CM

## 2020-01-01 DIAGNOSIS — D63.8 ANEMIA IN OTHER CHRONIC DISEASES CLASSIFIED ELSEWHERE: ICD-10-CM

## 2020-01-01 DIAGNOSIS — I25.10 ATHEROSCLEROTIC HEART DISEASE OF NATIVE CORONARY ARTERY W/OUT ANGINA PECTORIS: ICD-10-CM

## 2020-01-01 DIAGNOSIS — Z01.818 ENCOUNTER FOR OTHER PREPROCEDURAL EXAMINATION: ICD-10-CM

## 2020-01-01 DIAGNOSIS — Z80.0 FAMILY HISTORY OF MALIGNANT NEOPLASM OF DIGESTIVE ORGANS: ICD-10-CM

## 2020-01-01 DIAGNOSIS — N18.6 END STAGE RENAL DISEASE: ICD-10-CM

## 2020-01-01 DIAGNOSIS — N30.01 ACUTE CYSTITIS WITH HEMATURIA: ICD-10-CM

## 2020-01-01 DIAGNOSIS — Z78.9 OTHER SPECIFIED HEALTH STATUS: ICD-10-CM

## 2020-01-01 DIAGNOSIS — I10 ESSENTIAL (PRIMARY) HYPERTENSION: ICD-10-CM

## 2020-01-01 DIAGNOSIS — E11.9 TYPE 2 DIABETES MELLITUS WITHOUT COMPLICATIONS: ICD-10-CM

## 2020-01-01 DIAGNOSIS — A04.8 OTHER SPECIFIED BACTERIAL INTESTINAL INFECTIONS: ICD-10-CM

## 2020-01-01 DIAGNOSIS — Z95.810 PRESENCE OF AUTOMATIC (IMPLANTABLE) CARDIAC DEFIBRILLATOR: Chronic | ICD-10-CM

## 2020-01-01 DIAGNOSIS — D50.9 IRON DEFICIENCY ANEMIA, UNSPECIFIED: ICD-10-CM

## 2020-01-01 DIAGNOSIS — R31.0 GROSS HEMATURIA: ICD-10-CM

## 2020-01-01 DIAGNOSIS — I50.9 HEART FAILURE, UNSPECIFIED: ICD-10-CM

## 2020-01-01 DIAGNOSIS — D64.9 ANEMIA, UNSPECIFIED: ICD-10-CM

## 2020-01-01 DIAGNOSIS — Z12.11 ENCOUNTER FOR SCREENING FOR MALIGNANT NEOPLASM OF COLON: ICD-10-CM

## 2020-01-01 DIAGNOSIS — Z63.5 DISRUPTION OF FAMILY BY SEPARATION AND DIVORCE: ICD-10-CM

## 2020-01-01 LAB
ALBUMIN SERPL ELPH-MCNC: 4.4 G/DL
ALBUMIN SERPL ELPH-MCNC: 4.6 G/DL
ALP BLD-CCNC: 129 U/L
ALP BLD-CCNC: 130 U/L
ALT SERPL-CCNC: 10 U/L
ALT SERPL-CCNC: 11 U/L
ANION GAP SERPL CALC-SCNC: 18 MMOL/L
ANION GAP SERPL CALC-SCNC: 19 MMOL/L
AST SERPL-CCNC: 13 U/L
AST SERPL-CCNC: 14 U/L
BASOPHILS # BLD AUTO: 0.04 K/UL
BASOPHILS NFR BLD AUTO: 0.7 %
BILIRUB SERPL-MCNC: 0.6 MG/DL
BILIRUB SERPL-MCNC: 1.1 MG/DL
BILIRUB UR QL STRIP: ABNORMAL
BUN SERPL-MCNC: 30 MG/DL
BUN SERPL-MCNC: 38 MG/DL
C PEPTIDE SERPL-MCNC: 27.5 NG/ML
CALCIUM SERPL-MCNC: 9.6 MG/DL
CALCIUM SERPL-MCNC: 9.8 MG/DL
CHLORIDE SERPL-SCNC: 95 MMOL/L
CHLORIDE SERPL-SCNC: 95 MMOL/L
CHOLEST SERPL-MCNC: 130 MG/DL
CHOLEST/HDLC SERPL: 3.7 RATIO
CLARITY UR: NORMAL
CO2 SERPL-SCNC: 26 MMOL/L
CO2 SERPL-SCNC: 28 MMOL/L
COLLECTION METHOD: NORMAL
CREAT ?TM UR-MCNC: 171 MG/DL — SIGNIFICANT CHANGE UP
CREAT SERPL-MCNC: 5.23 MG/DL
CREAT SERPL-MCNC: 6.55 MG/DL
EOSINOPHIL # BLD AUTO: 0.1 K/UL
EOSINOPHIL NFR BLD AUTO: 1.8 %
ESTIMATED AVERAGE GLUCOSE: 166 MG/DL
FERRITIN SERPL-MCNC: 42 NG/ML
FRUCTOSAMINE SERPL-MCNC: 453 UMOL/L
GLUCOSE BLDC GLUCOMTR-MCNC: 110 MG/DL — HIGH (ref 70–99)
GLUCOSE SERPL-MCNC: 132 MG/DL
GLUCOSE SERPL-MCNC: 151 MG/DL
GLUCOSE UR-MCNC: NEGATIVE
HBA1C MFR BLD HPLC: 7.4 %
HCG UR QL: 0.2 EU/DL
HCT VFR BLD CALC: 40.1 %
HDLC SERPL-MCNC: 36 MG/DL
HGB BLD-MCNC: 11.7 G/DL
HGB UR QL STRIP.AUTO: ABNORMAL
IMM GRANULOCYTES NFR BLD AUTO: 0.2 %
IRON SATN MFR SERPL: 6 %
IRON SERPL-MCNC: 26 UG/DL
KETONES UR-MCNC: ABNORMAL
LDLC SERPL CALC-MCNC: 76 MG/DL
LEUKOCYTE ESTERASE UR QL STRIP: ABNORMAL
LYMPHOCYTES # BLD AUTO: 0.92 K/UL
LYMPHOCYTES NFR BLD AUTO: 16.3 %
MAN DIFF?: NORMAL
MCHC RBC-ENTMCNC: 26.3 PG
MCHC RBC-ENTMCNC: 29.2 GM/DL
MCV RBC AUTO: 90.1 FL
MICROALBUMIN UR-MCNC: 39.5 MG/DL — SIGNIFICANT CHANGE UP
MICROALBUMIN/CREAT UR-RTO: 232 MG/G — HIGH (ref 0–30)
MONOCYTES # BLD AUTO: 0.57 K/UL
MONOCYTES NFR BLD AUTO: 10.1 %
NEUTROPHILS # BLD AUTO: 4 K/UL
NEUTROPHILS NFR BLD AUTO: 70.9 %
NITRITE UR QL STRIP: NEGATIVE
NT-PROBNP SERPL-MCNC: ABNORMAL PG/ML
PH UR STRIP: 5.5
PLATELET # BLD AUTO: 127 K/UL
POTASSIUM SERPL-SCNC: 4.8 MMOL/L
POTASSIUM SERPL-SCNC: 4.9 MMOL/L
PROT SERPL-MCNC: 7.7 G/DL
PROT SERPL-MCNC: 7.9 G/DL
PROT UR STRIP-MCNC: NEGATIVE
RBC # BLD: 4.45 M/UL
RBC # FLD: 17.9 %
SARS-COV-2 N GENE NPH QL NAA+PROBE: NOT DETECTED
SODIUM SERPL-SCNC: 140 MMOL/L
SODIUM SERPL-SCNC: 141 MMOL/L
SP GR UR STRIP: =1.03
THYROGLOB AB SERPL-ACNC: <20 IU/ML
THYROPEROXIDASE AB SERPL IA-ACNC: 22.8 IU/ML
TIBC SERPL-MCNC: 419 UG/DL
TRIGL SERPL-MCNC: 91 MG/DL
TSH SERPL-ACNC: 1.61 UIU/ML
UIBC SERPL-MCNC: 393 UG/DL
WBC # FLD AUTO: 5.64 K/UL

## 2020-01-01 PROCEDURE — 93000 ELECTROCARDIOGRAM COMPLETE: CPT

## 2020-01-01 PROCEDURE — 99214 OFFICE O/P EST MOD 30 MIN: CPT

## 2020-01-01 PROCEDURE — G2066: CPT

## 2020-01-01 PROCEDURE — 93285 PRGRMG DEV EVAL SCRMS IP: CPT

## 2020-01-01 PROCEDURE — 99214 OFFICE O/P EST MOD 30 MIN: CPT | Mod: GC

## 2020-01-01 PROCEDURE — 93298 REM INTERROG DEV EVAL SCRMS: CPT

## 2020-01-01 PROCEDURE — 99442: CPT | Mod: 95

## 2020-01-01 PROCEDURE — 45385 COLONOSCOPY W/LESION REMOVAL: CPT

## 2020-01-01 PROCEDURE — G0108 DIAB MANAGE TRN  PER INDIV: CPT

## 2020-01-01 PROCEDURE — 93296 REM INTERROG EVL PM/IDS: CPT

## 2020-01-01 PROCEDURE — 43239 EGD BIOPSY SINGLE/MULTIPLE: CPT

## 2020-01-01 PROCEDURE — 99213 OFFICE O/P EST LOW 20 MIN: CPT | Mod: 95

## 2020-01-01 PROCEDURE — 88305 TISSUE EXAM BY PATHOLOGIST: CPT | Mod: 26

## 2020-01-01 PROCEDURE — 82962 GLUCOSE BLOOD TEST: CPT

## 2020-01-01 PROCEDURE — C1889: CPT

## 2020-01-01 PROCEDURE — 88312 SPECIAL STAINS GROUP 1: CPT | Mod: 26

## 2020-01-01 PROCEDURE — 88305 TISSUE EXAM BY PATHOLOGIST: CPT

## 2020-01-01 PROCEDURE — 45381 COLONOSCOPY SUBMUCOUS NJX: CPT

## 2020-01-01 PROCEDURE — 36415 COLL VENOUS BLD VENIPUNCTURE: CPT

## 2020-01-01 PROCEDURE — 93000 ELECTROCARDIOGRAM COMPLETE: CPT | Mod: 59

## 2020-01-01 PROCEDURE — 76536 US EXAM OF HEAD AND NECK: CPT | Mod: 26

## 2020-01-01 PROCEDURE — 99214 OFFICE O/P EST MOD 30 MIN: CPT | Mod: 25

## 2020-01-01 PROCEDURE — 76536 US EXAM OF HEAD AND NECK: CPT

## 2020-01-01 PROCEDURE — 99215 OFFICE O/P EST HI 40 MIN: CPT | Mod: 95

## 2020-01-01 PROCEDURE — 99204 OFFICE O/P NEW MOD 45 MIN: CPT

## 2020-01-01 PROCEDURE — 88312 SPECIAL STAINS GROUP 1: CPT

## 2020-01-01 PROCEDURE — 93295 DEV INTERROG REMOTE 1/2/MLT: CPT

## 2020-01-01 RX ORDER — FERROUS SULFATE 325(65) MG
325 (65 FE) TABLET ORAL TWICE DAILY
Qty: 180 | Refills: 1 | Status: DISCONTINUED | COMMUNITY
Start: 2019-07-26 | End: 2020-01-01

## 2020-01-01 RX ORDER — LANCETS 33 GAUGE
EACH MISCELLANEOUS
Qty: 6 | Refills: 0 | Status: ACTIVE | COMMUNITY
Start: 2020-01-01

## 2020-01-01 RX ORDER — BUMETANIDE 2 MG/1
2 TABLET ORAL
Qty: 360 | Refills: 1 | Status: DISCONTINUED | COMMUNITY
Start: 2018-07-29 | End: 2020-01-01

## 2020-01-01 RX ORDER — KETOROLAC TROMETHAMINE 5 MG/ML
0.5 SOLUTION OPHTHALMIC
Qty: 5 | Refills: 0 | Status: DISCONTINUED | COMMUNITY
Start: 2019-08-05 | End: 2020-01-01

## 2020-01-01 RX ORDER — PREDNISOLONE ACETATE 10 MG/ML
1 SUSPENSION/ DROPS OPHTHALMIC
Qty: 5 | Refills: 0 | Status: DISCONTINUED | COMMUNITY
Start: 2019-10-08 | End: 2020-01-01

## 2020-01-01 RX ORDER — BLOOD SUGAR DIAGNOSTIC
STRIP MISCELLANEOUS
Qty: 400 | Refills: 1 | Status: ACTIVE | COMMUNITY
Start: 2018-12-04 | End: 1900-01-01

## 2020-01-01 RX ORDER — SODIUM CHLORIDE 9 MG/ML
1000 INJECTION INTRAMUSCULAR; INTRAVENOUS; SUBCUTANEOUS
Refills: 0 | Status: DISCONTINUED | OUTPATIENT
Start: 2020-01-01 | End: 2020-01-01

## 2020-01-01 RX ORDER — ISOSORBIDE DINITRATE 20 MG/1
20 TABLET ORAL DAILY
Refills: 0 | Status: DISCONTINUED | COMMUNITY
Start: 2020-04-24 | End: 2020-01-01

## 2020-01-01 RX ORDER — BLOOD SUGAR DIAGNOSTIC
STRIP MISCELLANEOUS
Qty: 2 | Refills: 0 | Status: DISCONTINUED | COMMUNITY
Start: 2019-09-18 | End: 2020-01-01

## 2020-01-01 RX ORDER — POLYETHYLENE GLYCOL 3350 AND ELECTROLYTES WITH LEMON FLAVOR 236; 22.74; 6.74; 5.86; 2.97 G/4L; G/4L; G/4L; G/4L; G/4L
236 POWDER, FOR SOLUTION ORAL
Qty: 1 | Refills: 0 | Status: COMPLETED | COMMUNITY
Start: 2020-01-01 | End: 2020-01-01

## 2020-01-01 RX ORDER — BLOOD-GLUCOSE METER
W/DEVICE EACH MISCELLANEOUS
Qty: 1 | Refills: 0 | Status: ACTIVE | COMMUNITY
Start: 2020-01-01 | End: 1900-01-01

## 2020-01-01 RX ORDER — FERROUS SULFATE 325(65) MG
1 TABLET ORAL
Qty: 0 | Refills: 0 | DISCHARGE

## 2020-01-01 RX ORDER — BLOOD-GLUCOSE METER
W/DEVICE KIT MISCELLANEOUS
Qty: 1 | Refills: 0 | Status: ACTIVE | OUTPATIENT
Start: 2018-12-04

## 2020-01-01 RX ORDER — BLOOD SUGAR DIAGNOSTIC
STRIP MISCELLANEOUS
Qty: 6 | Refills: 0 | Status: ACTIVE | COMMUNITY
Start: 2020-01-01 | End: 1900-01-01

## 2020-01-01 RX ORDER — METOPROLOL SUCCINATE 25 MG/1
25 TABLET, EXTENDED RELEASE ORAL
Qty: 60 | Refills: 3 | Status: DISCONTINUED | COMMUNITY
Start: 2020-01-01 | End: 2020-01-01

## 2020-01-01 RX ORDER — POLYETHYLENE GLYCOL 3350, SODIUM SULFATE ANHYDROUS, SODIUM BICARBONATE, SODIUM CHLORIDE, POTASSIUM CHLORIDE 227.1; 21.5; 6.36; 5.53; .754 G/L; G/L; G/L; G/L; G/L
227.1 POWDER, FOR SOLUTION ORAL
Qty: 1 | Refills: 0 | Status: COMPLETED | COMMUNITY
Start: 2020-01-01 | End: 2020-01-01

## 2020-01-01 RX ORDER — LANCETS 33 GAUGE
EACH MISCELLANEOUS
Qty: 180 | Refills: 3 | Status: ACTIVE | COMMUNITY
Start: 2018-12-04

## 2020-01-01 RX ADMIN — SODIUM CHLORIDE 30 MILLILITER(S): 9 INJECTION INTRAMUSCULAR; INTRAVENOUS; SUBCUTANEOUS at 10:47

## 2020-01-01 SDOH — SOCIAL STABILITY - SOCIAL INSECURITY: DISRUPTION OF FAMILY BY SEPARATION AND DIVORCE: Z63.5

## 2020-01-15 ENCOUNTER — APPOINTMENT (OUTPATIENT)
Dept: ELECTROPHYSIOLOGY | Facility: CLINIC | Age: 55
End: 2020-01-15

## 2020-01-15 ENCOUNTER — APPOINTMENT (OUTPATIENT)
Dept: CARDIOLOGY | Facility: CLINIC | Age: 55
End: 2020-01-15
Payer: MEDICARE

## 2020-01-15 VITALS
WEIGHT: 197 LBS | BODY MASS INDEX: 33.63 KG/M2 | HEART RATE: 92 BPM | DIASTOLIC BLOOD PRESSURE: 72 MMHG | SYSTOLIC BLOOD PRESSURE: 102 MMHG | OXYGEN SATURATION: 100 % | HEIGHT: 64 IN

## 2020-01-15 PROCEDURE — 93000 ELECTROCARDIOGRAM COMPLETE: CPT

## 2020-01-15 PROCEDURE — 99214 OFFICE O/P EST MOD 30 MIN: CPT

## 2020-01-15 RX ORDER — ISOSORBIDE DINITRATE 20 MG/1
20 TABLET ORAL EVERY 8 HOURS
Qty: 180 | Refills: 3 | Status: DISCONTINUED | COMMUNITY
Start: 2019-09-27 | End: 2020-01-15

## 2020-01-15 RX ORDER — HYDRALAZINE HYDROCHLORIDE 10 MG/1
10 TABLET ORAL EVERY 8 HOURS
Qty: 90 | Refills: 1 | Status: DISCONTINUED | COMMUNITY
Start: 2018-05-30 | End: 2020-01-15

## 2020-01-15 NOTE — REASON FOR VISIT
[Heart Failure] : congestive heart failure [Follow-Up - From Hospitalization] : follow-up of a recent hospitalization for [Spouse] : spouse [FreeTextEntry2] : S/P hospitalization at [facility] 12/12-12/14/19 secondary to bradycardia at dialysis

## 2020-01-15 NOTE — PHYSICAL EXAM
[General Appearance - Well Developed] : well developed [Normal Appearance] : normal appearance [General Appearance - Well Nourished] : well nourished [Normal Conjunctiva] : the conjunctiva exhibited no abnormalities [Normal Oral Mucosa] : normal oral mucosa [Normal Oropharynx] : normal oropharynx [] : no respiratory distress [Heart Rate And Rhythm] : heart rate and rhythm were normal [Respiration, Rhythm And Depth] : normal respiratory rhythm and effort [Auscultation Breath Sounds / Voice Sounds] : lungs were clear to auscultation bilaterally [Heart Sounds] : normal S1 and S2 [Bowel Sounds] : normal bowel sounds [Abdomen Soft] : soft [Abdomen Tenderness] : non-tender [Abnormal Walk] : normal gait [Nail Clubbing] : no clubbing of the fingernails [Cyanosis, Localized] : no localized cyanosis [Skin Color & Pigmentation] : normal skin color and pigmentation [Impaired Insight] : insight and judgment were intact [Oriented To Time, Place, And Person] : oriented to person, place, and time [FreeTextEntry1] : no pedal edema bilaterally , left AVF with + thrill and bruit

## 2020-01-15 NOTE — ASSESSMENT
[FreeTextEntry1] : 54 year old M w/ h/o IDDM c/b neuropathy (A1c 7.1 8/19), HTN, CAD (s/p stent in [place] in 2016, unknown coronary anatomy), HFrEF (EF 25%, LVEDD 6.6 cm) s/p subQ ICD, ESRD on HD via permacath (T/Th/Sat via LUE AV fistula), prior GIB 2/2 ulcer (H. pylori positive) who is here for follow up. Appears volume overloaded and normotensive with class III-IV symptoms. Interested in kidney transplant but discussed with patient and his sister that he'll likely need heart and kidney transplant. Will proceed with evaluation clinic on 1/21/20. Of note, ABO O. \par \par 1. HFrEF - unclear etiology; prior known CAD; possibly HTN \par - continue bumex 2mg - 2 tabs twice a day \par - take metolazone 5 mg for 2 days today 1/15 and Friday 1/17 before evening bumex dose\par - Coreg was discontinued during hospitalization, continue metoprolol 50 mg bid, HR 92 bpm\par - Pt is off hydral 100 mg three times/day and isordil 20 mg three times/day due to low B/P\par - while DM w/ complications is a relative contraindication, he will likely benefit from heart/kidney transplant as his DM is well controlled now, will make an appt with endo Dr. Patton\par - previously contacted HD center and requested to dialyze towards dry weight of 83 kg, dry weight at this time will be 84 kg-86 kg.\par \par 2. ESRD on HD; left AVF with + thrill and bruit\par - instructed to follow low potassium diet\par \par 3. CAD - prior PCI- no active chest pain or EKG changes\par - on ASA 81 mg daily\par - on lipitor 80 mg qhs\par \par 4. Claudication - likely 2/2 low flow with exertion \par - LAURI and arterial Dopplers with no evidence of significant stenoses\par \par RTC 8 weeks and will follow up in evaluation clinic on 1/21/20

## 2020-01-21 ENCOUNTER — NON-APPOINTMENT (OUTPATIENT)
Age: 55
End: 2020-01-21

## 2020-01-21 ENCOUNTER — APPOINTMENT (OUTPATIENT)
Dept: CARDIOTHORACIC SURGERY | Facility: CLINIC | Age: 55
End: 2020-01-21

## 2020-01-21 ENCOUNTER — OUTPATIENT (OUTPATIENT)
Dept: OUTPATIENT SERVICES | Facility: HOSPITAL | Age: 55
LOS: 1 days | End: 2020-01-21
Payer: MEDICARE

## 2020-01-21 ENCOUNTER — OUTPATIENT (OUTPATIENT)
Dept: OUTPATIENT SERVICES | Facility: HOSPITAL | Age: 55
LOS: 1 days | Discharge: HOME | End: 2020-01-21

## 2020-01-21 ENCOUNTER — APPOINTMENT (OUTPATIENT)
Dept: CARDIOTHORACIC SURGERY | Facility: CLINIC | Age: 55
End: 2020-01-21
Payer: MEDICARE

## 2020-01-21 ENCOUNTER — OTHER (OUTPATIENT)
Age: 55
End: 2020-01-21

## 2020-01-21 VITALS
HEIGHT: 60 IN | DIASTOLIC BLOOD PRESSURE: 67 MMHG | HEART RATE: 84 BPM | RESPIRATION RATE: 12 BRPM | OXYGEN SATURATION: 99 % | WEIGHT: 197 LBS | BODY MASS INDEX: 38.68 KG/M2 | SYSTOLIC BLOOD PRESSURE: 101 MMHG

## 2020-01-21 VITALS
TEMPERATURE: 97.7 F | OXYGEN SATURATION: 98 % | BODY MASS INDEX: 32.56 KG/M2 | WEIGHT: 190.7 LBS | HEART RATE: 78 BPM | RESPIRATION RATE: 12 BRPM | HEIGHT: 64 IN | DIASTOLIC BLOOD PRESSURE: 71 MMHG | SYSTOLIC BLOOD PRESSURE: 101 MMHG

## 2020-01-21 DIAGNOSIS — Z01.818 ENCOUNTER FOR OTHER PREPROCEDURAL EXAMINATION: ICD-10-CM

## 2020-01-21 DIAGNOSIS — Z95.810 PRESENCE OF AUTOMATIC (IMPLANTABLE) CARDIAC DEFIBRILLATOR: Chronic | ICD-10-CM

## 2020-01-21 DIAGNOSIS — Z95.5 PRESENCE OF CORONARY ANGIOPLASTY IMPLANT AND GRAFT: Chronic | ICD-10-CM

## 2020-01-21 DIAGNOSIS — D64.9 ANEMIA, UNSPECIFIED: ICD-10-CM

## 2020-01-21 DIAGNOSIS — T82.868A THROMBOSIS DUE TO VASCULAR PROSTHETIC DEVICES, IMPLANTS AND GRAFTS, INITIAL ENCOUNTER: Chronic | ICD-10-CM

## 2020-01-21 PROCEDURE — 99204 OFFICE O/P NEW MOD 45 MIN: CPT

## 2020-01-21 PROCEDURE — 71250 CT THORAX DX C-: CPT

## 2020-01-21 PROCEDURE — 71250 CT THORAX DX C-: CPT | Mod: 26

## 2020-01-21 PROCEDURE — 70450 CT HEAD/BRAIN W/O DYE: CPT

## 2020-01-21 PROCEDURE — 70450 CT HEAD/BRAIN W/O DYE: CPT | Mod: 26

## 2020-01-21 RX ORDER — ASPIRIN 81 MG
6.5 TABLET, DELAYED RELEASE (ENTERIC COATED) ORAL
Qty: 1 | Refills: 1 | Status: COMPLETED | COMMUNITY
Start: 2019-04-24 | End: 2020-01-21

## 2020-01-21 NOTE — PHYSICAL EXAM
[General Appearance - Alert] : alert [General Appearance - In No Acute Distress] : in no acute distress [General Appearance - Well Nourished] : well nourished [Sclera] : the sclera and conjunctiva were normal [Outer Ear] : the ears and nose were normal in appearance [Neck Appearance] : the appearance of the neck was normal [Jugular Venous Distention Increased] : there was no jugular-venous distention [] : no respiratory distress [Respiration, Rhythm And Depth] : normal respiratory rhythm and effort [Exaggerated Use Of Accessory Muscles For Inspiration] : no accessory muscle use [Auscultation Breath Sounds / Voice Sounds] : lungs were clear to auscultation bilaterally [Apical Impulse] : the apical impulse was normal [Heart Rate And Rhythm] : heart rate was normal and rhythm regular [Heart Sounds] : normal S1 and S2 [Murmurs] : no murmurs [Examination Of The Chest] : the chest was normal in appearance [Bowel Sounds] : normal bowel sounds [Abdomen Soft] : soft [Abdomen Tenderness] : non-tender [No CVA Tenderness] : no ~M costovertebral angle tenderness [Involuntary Movements] : no involuntary movements were seen [Skin Color & Pigmentation] : normal skin color and pigmentation [Skin Turgor] : normal skin turgor [No Focal Deficits] : no focal deficits [Oriented To Time, Place, And Person] : oriented to person, place, and time [Impaired Insight] : insight and judgment were intact [Affect] : the affect was normal [Mood] : the mood was normal [Right Carotid Bruit] : no bruit heard over the right carotid [Left Carotid Bruit] : no bruit heard over the left carotid [FreeTextEntry1] : Deferred

## 2020-01-21 NOTE — REVIEW OF SYSTEMS
[Feeling Tired] : feeling tired [Eyesight Problems] : eyesight problems [Loss Of Hearing] : hearing loss [Heart Rate Is Slow] : slow heart rate [Lower Ext Edema] : lower extremity edema [Cough] : cough [SOB on Exertion] : shortness of breath during exertion [As Noted in HPI] : as noted in HPI [Easy Bruising] : a tendency for easy bruising [Negative] : Endocrine [Fever] : no fever [Chills] : no chills [Chest Pain] : no chest pain [Palpitations] : no palpitations [FreeTextEntry3] : H [FreeTextEntry4] : Hx or cerumen [FreeTextEntry6] : Attributes cough to recent URI  [FreeTextEntry8] : Urinates but not as much since HD

## 2020-01-21 NOTE — REASON FOR VISIT
[Consultation] : a consultation visit [Spouse] : spouse [FreeTextEntry1] : LVAD/Transplant evaluation

## 2020-01-21 NOTE — ASSESSMENT
[FreeTextEntry1] : Mr. Lorenzo is a 54 year old male with PMH of HTN, CAD (s/p stent April 2016 at [facility]), ischemic cardiomyopathy, HFrEF (EF 18%, LVEDD 7 cm), DMII insulin dependent (A1c 7.1 9/19), s/p ICD in 2019 with Dr. Goldberg, prior severe mitral regurgitation that has since improved, ESRD on HD (T/Th/Sat) with Left arm AVF and history of GI Bleed with H pylori gastric ulcers who presents today for initial evaluation for LVAD/transplant. Follows Dr. Pavon for cardiology and Dr. Pierson for primary care. Recent hospitalization last month in Dec 2019 for low BP and low HR s/p dialysis. He is s/p left chest ILR prior to DC and has had no additional hospitalizations since last month. \par \par He presents today and reports, "I feel good, I have no pain and I pee". He reports some inconsistency with his BP's and taking medications (hydralazine) for elevated BP's that lowered his BP to "too low". He reports he is tired most days and can fall sleep at anytime. Reports SOB with walking 1-2 blocks and SOB with walking up 10 stairs.  No SOB at rest, sleeps with 2 pillows and denies orthopnea. Reports intermittent lower extremity swelling that improved with dialysis. Denies chest pain, palpitations, dizziness, syncope, fever or chills. He reports he is able to shower himself daily, but reports  he does need some help "on bad days" with his cooking and caring for ADL's. (NYHA III HF). He reports he feels more tired post dialysis days as he wakes up at 3AM for dialysis to be picked up at 430 and begins HD at 530 AM. Medications reconciled. Pt is prescribed hydralazine but reports "not taking it anymore" due to lowering his BP  "too much". Had laser eye surgery 2 weeks ago but is unsure for what and has been off aspirin for the past 3 months due to hemorrhage to bilateral eyes that’s happened "a few times". \par \par The surgical procedure for heart transplantation will last approximately six hours. You will be cared for right after surgery in the Cardiothoracic ICU (CTU). How long you stay in the hospital depends on how quickly you recover, your medical history and any complications you may have. The stay can be from 2-3 weeks. After surgery, you will be taken straight to the CTU. You will stay asleep from the anesthesia and slowly wake up over the next day or two. You will be on a machine to help you breathe until you can breathe on your own. You will have many IVs and monitoring lines, several drains and a tube to collect urine from your bladder. While the breathing tube is in place, you will be unable to talk, but a nurse will be close by at all times. \par \par As you recover, many of the IVs and tubes will be removed, and the nurses will increase your activity. They will also teach you how to care for yourself at home. You will learn about your medications and how to take care of your surgical incision (wound). Your family or friends will be asked to also learn these things so they can help you at home. \par \par Once you are discharged from the hospital, you will still be recovering for many weeks. You will slowly get better. Your activity will still have limits, and it will take time to return to your usual strength.\par \par Possible complications include but are not limited to stroke, blood transfusion, re-operation for bleeding, infection, lung collapse, arrhythmia, bleeding, primary graft dysfunction, mechanical support and or death.

## 2020-01-21 NOTE — HISTORY OF PRESENT ILLNESS
[FreeTextEntry1] : Mr. Lorenzo is a 54 year old male with PMH of HTN, CAD (s/p stent April 2016 at [place]), ischemic cardiomyopathy, HFrEF (EF 18%, LVEDD 7 cm), DMII insulin dependent (A1c 7.1 9/19), s/p ICD in 2019 with Dr. Goldberg, prior severe mitral regurgitation that has since improved, ESRD on HD (T/Th/Sat) with Left arm AVF and history of GI Bleed with H pylori gastric ulcers who presents today for initial evaluation for LVAD/transplant. Follows Dr. Pavon for cardiology and Dr. Pierson for primary care. Recent hospitalization last month in Dec 2019 for low BP and low HR s/p dialysis. He is s/p left chest ILR prior to DC and has had no additional hospitalizations since last month. \par \par He presents today and reports, "I feel good, I have no pain and I pee". He reports some inconsistency with his BP's and taking medications (hydralazine) for elevated BP's that lowered his BP to "too low". He reports he is tired most days and can fall sleep at anytime. Reports SOB with walking 1-2 blocks and SOB with walking up 10 stairs.  No SOB at rest, sleeps with 2 pillows and denies orthopnea. Reports intermittent lower extremity swelling that improved with dialysis. Denies chest pain, palpitations, dizziness, syncope, fever or chills. He reports he is able to shower himself daily, but reports  he does need some help "on bad days" with his cooking and caring for ADL's. (NYHA III HF). He reports he feels more tired post dialysis days as he wakes up at 3AM for dialysis to be picked up at 430 and begins HD at 530 AM. Medications reconciled. Pt is prescribed hydralazine but reports "not taking it anymore" due to lowering his BP  "too much". Had laser eye surgery 2 weeks ago but is unsure for what and has been off aspirin for the past 3 months due to hemorrhage to bilateral eyes that’s happened "a few times".

## 2020-01-21 NOTE — CONSULT LETTER
[Courtesy Letter:] : I had the pleasure of seeing your patient, [unfilled], in my office today. [Sincerely,] : Sincerely, [Please see my note below.] : Please see my note below. [FreeTextEntry2] : Dr. Pavon [FreeTextEntry3] : Written by Mike Rooney NP, acting as a scribe for Dr. Moy.\par “The documentation recorded by the scribe accurately reflects the service I personally performed and the decisions made by me.” Signature Mo Moy MD.\par

## 2020-01-21 NOTE — DATA REVIEWED
[FreeTextEntry1] : Echo:  \par 8/14/19 -TTE EF 18%, LVEDD 7 cm, mild to mod MR, severe dilated LA, severe global LV systolic dysfunction with decreased RV systolic function, mild TR, estimated RV systolic pressure 58 mmHg c/w mod pHTN\par \par 3/27/19 - EF 15-20%, LVEDD 6.4 cm, severe MR (eccentric), severe dilated LA, moderate TR, RVSP 80, mod RV enlargement\par \par 11/2/18 - EF 15-20%, LVEDD 7 cm, severe MR (eccentric), severe dilated LA, mild-mod TR, mod RV dysfunction, RVSP 71 \par \par 6/18/18 - LUCIANO - severe MR, nl leaflets, severe LV dysfunction, mod RV dysfunction\par \par 2/13/18 - TTE - EF 26%, LVEDD 6.6 cm, mod-severe MR\par \par Stress Test:  \par CPET 10/4/19 - RER >1.1; METS 2.5; 9:21 minutes; peak VO2 8.7 ml/kg/min (34% predicted), VO2 at AT 4.4 (<40% predicted); Ve/VCO2 35\par \par Cardiac Cath:  \par unknown; done at [facility] in 2016\par

## 2020-01-21 NOTE — END OF VISIT
[FreeTextEntry3] : Written by Mike Rooney NP, acting as a scribe for Dr. Moy.\par “The documentation recorded by the scribe accurately reflects the service I personally performed and the decisions made by me.” Signature Mo Moy MD.

## 2020-01-23 PROBLEM — D50.9 IRON DEFICIENCY ANEMIA: Status: ACTIVE | Noted: 2019-09-16

## 2020-01-23 LAB
ABO + RH PNL BLD: NORMAL
ABO + RH PNL BLD: NORMAL
ALBUMIN SERPL ELPH-MCNC: 4.7 G/DL
ALP BLD-CCNC: 147 U/L
ALT SERPL-CCNC: 14 U/L
ANION GAP SERPL CALC-SCNC: 20 MMOL/L
APPEARANCE: ABNORMAL
APTT BLD: 34.7 SEC
AST SERPL-CCNC: 11 U/L
BACTERIA: ABNORMAL
BASOPHILS # BLD AUTO: 0.06 K/UL
BASOPHILS NFR BLD AUTO: 1.1 %
BILIRUB SERPL-MCNC: 0.6 MG/DL
BILIRUBIN URINE: NEGATIVE
BLOOD URINE: ABNORMAL
BUN SERPL-MCNC: 28 MG/DL
CALCIUM SERPL-MCNC: 9.2 MG/DL
CHLORIDE SERPL-SCNC: 91 MMOL/L
CHOLEST SERPL-MCNC: 178 MG/DL
CHOLEST/HDLC SERPL: 4.2 RATIO
CMV IGG SERPL QL: >10 U/ML
CMV IGG SERPL-IMP: POSITIVE
CO2 SERPL-SCNC: 27 MMOL/L
COLOR: YELLOW
CREAT SERPL-MCNC: 4.88 MG/DL
CREAT SPEC-SCNC: 167 MG/DL
CRP SERPL-MCNC: 0.73 MG/DL
DRUG ABUSE PANEL-9, SERUM: NORMAL
EBV EA AB SER IA-ACNC: <5 U/ML
EBV EA AB TITR SER IF: POSITIVE
EBV EA IGG SER QL IA: 532 U/ML
EBV EA IGG SER-ACNC: NEGATIVE
EBV EA IGM SER IA-ACNC: NEGATIVE
EBV PATRN SPEC IB-IMP: NORMAL
EBV VCA IGG SER IA-ACNC: 143 U/ML
EBV VCA IGM SER QL IA: <10 U/ML
EOSINOPHIL # BLD AUTO: 0.14 K/UL
EOSINOPHIL NFR BLD AUTO: 2.6 %
EPSTEIN-BARR VIRUS CAPSID ANTIGEN IGG: POSITIVE
ERYTHROCYTE [SEDIMENTATION RATE] IN BLOOD BY WESTERGREN METHOD: 57 MM/HR
ESTIMATED AVERAGE GLUCOSE: 183 MG/DL
GLUCOSE QUALITATIVE U: NEGATIVE
GLUCOSE SERPL-MCNC: 165 MG/DL
HBA1C MFR BLD HPLC: 8 %
HBV CORE IGG+IGM SER QL: REACTIVE
HBV DNA # SERPL NAA+PROBE: NOT DETECTED
HBV SURFACE AB SER QL: REACTIVE
HBV SURFACE AG SER QL: NONREACTIVE
HCT VFR BLD CALC: 37.7 %
HCV AB SER QL: NONREACTIVE
HCV S/CO RATIO: 0.13 S/CO
HDLC SERPL-MCNC: 43 MG/DL
HEPATITIS A IGG ANTIBODY: REACTIVE
HEPB DNA PCR LOG: NOT DETECTED LOG10IU/ML
HGB BLD-MCNC: 11 G/DL
HIV1+2 AB SPEC QL IA.RAPID: NONREACTIVE
HSV 1+2 IGG SER IA-IMP: NEGATIVE
HSV 1+2 IGG SER IA-IMP: POSITIVE
HSV1 IGG SER QL: >62.2 INDEX
HSV2 IGG SER QL: 0.3 INDEX
HYALINE CASTS: 0 /LPF
IMM GRANULOCYTES NFR BLD AUTO: 0.4 %
INR PPP: 1.04 RATIO
KETONES URINE: NEGATIVE
LDH SERPL-CCNC: 202 U/L
LDLC SERPL CALC-MCNC: 113 MG/DL
LEUKOCYTE ESTERASE URINE: ABNORMAL
LYMPHOCYTES # BLD AUTO: 1.03 K/UL
LYMPHOCYTES NFR BLD AUTO: 18.9 %
M TB IFN-G BLD-IMP: NEGATIVE
MAGNESIUM SERPL-MCNC: 2.3 MG/DL
MAN DIFF?: NORMAL
MCHC RBC-ENTMCNC: 25.6 PG
MCHC RBC-ENTMCNC: 29.2 GM/DL
MCV RBC AUTO: 87.9 FL
MEV IGG FLD QL IA: >300 AU/ML
MEV IGG+IGM SER-IMP: POSITIVE
MICROSCOPIC-UA: NORMAL
MONOCYTES # BLD AUTO: 0.43 K/UL
MONOCYTES NFR BLD AUTO: 7.9 %
MUV AB SER-ACNC: POSITIVE
MUV IGG SER QL IA: 260 AU/ML
NEUTROPHILS # BLD AUTO: 3.77 K/UL
NEUTROPHILS NFR BLD AUTO: 69.1 %
NITRITE URINE: NEGATIVE
NT-PROBNP SERPL-MCNC: ABNORMAL PG/ML
PH URINE: 5.5
PHOSPHATE SERPL-MCNC: 5.1 MG/DL
PLATELET # BLD AUTO: 113 K/UL
POTASSIUM SERPL-SCNC: 4 MMOL/L
PREALB SERPL NEPH-MCNC: 27 MG/DL
PROT SERPL-MCNC: 7.8 G/DL
PROT UR-MCNC: 227 MG/DL
PROTEIN URINE: ABNORMAL
PSA SERPL-MCNC: 0.29 NG/ML
PT BLD: 11.8 SEC
QUANTIFERON TB PLUS MITOGEN MINUS NIL: >10 IU/ML
QUANTIFERON TB PLUS NIL: 0.04 IU/ML
QUANTIFERON TB PLUS TB1 MINUS NIL: 0 IU/ML
QUANTIFERON TB PLUS TB2 MINUS NIL: 0 IU/ML
RBC # BLD: 4.29 M/UL
RBC # FLD: 17.9 %
RED BLOOD CELLS URINE: 2 /HPF
RHEUMATOID FACT SER QL: <10 IU/ML
RUBV IGG FLD-ACNC: 31.2 INDEX
RUBV IGG SER-IMP: POSITIVE
SODIUM SERPL-SCNC: 137 MMOL/L
SPECIFIC GRAVITY URINE: 1.02
SQUAMOUS EPITHELIAL CELLS: 3 /HPF
T GONDII AB SER-IMP: POSITIVE
T GONDII IGG SER QL: 253 IU/ML
T PALLIDUM AB SER QL IA: NEGATIVE
T3 SERPL-MCNC: 105 NG/DL
T4 SERPL-MCNC: 7.2 UG/DL
TRIGL SERPL-MCNC: 113 MG/DL
TSH SERPL-ACNC: 1.55 UIU/ML
URATE SERPL-MCNC: 4.7 MG/DL
UROBILINOGEN URINE: ABNORMAL
VZV AB TITR SER: POSITIVE
VZV IGG SER IF-ACNC: 2853 INDEX
WBC # FLD AUTO: 5.45 K/UL
WHITE BLOOD CELLS URINE: 42 /HPF

## 2020-01-24 ENCOUNTER — RESULT REVIEW (OUTPATIENT)
Age: 55
End: 2020-01-24

## 2020-01-24 ENCOUNTER — APPOINTMENT (OUTPATIENT)
Dept: HEMATOLOGY ONCOLOGY | Facility: CLINIC | Age: 55
End: 2020-01-24
Payer: MEDICARE

## 2020-01-24 VITALS
SYSTOLIC BLOOD PRESSURE: 106 MMHG | HEART RATE: 86 BPM | WEIGHT: 197.95 LBS | DIASTOLIC BLOOD PRESSURE: 65 MMHG | BODY MASS INDEX: 33.98 KG/M2 | RESPIRATION RATE: 18 BRPM | TEMPERATURE: 98.6 F | OXYGEN SATURATION: 99 %

## 2020-01-24 DIAGNOSIS — D50.9 IRON DEFICIENCY ANEMIA, UNSPECIFIED: ICD-10-CM

## 2020-01-24 LAB
ALBUMIN SERPL ELPH-MCNC: 4 G/DL
ALP BLD-CCNC: 129 U/L
ALT SERPL-CCNC: 13 U/L
ANION GAP SERPL CALC-SCNC: 18 MMOL/L
AST SERPL-CCNC: 12 U/L
B2 MICROGLOB SERPL-MCNC: 25.9 MG/L
BASOPHILS # BLD AUTO: 0 K/UL — SIGNIFICANT CHANGE UP (ref 0–0.2)
BASOPHILS NFR BLD AUTO: 0.7 % — SIGNIFICANT CHANGE UP (ref 0–2)
BILIRUB SERPL-MCNC: 0.6 MG/DL
BUN SERPL-MCNC: 31 MG/DL
CALCIUM SERPL-MCNC: 8.8 MG/DL
CHLORIDE SERPL-SCNC: 94 MMOL/L
CO2 SERPL-SCNC: 26 MMOL/L
CREAT SERPL-MCNC: 5.46 MG/DL
EOSINOPHIL # BLD AUTO: 0.1 K/UL — SIGNIFICANT CHANGE UP (ref 0–0.5)
EOSINOPHIL NFR BLD AUTO: 1.9 % — SIGNIFICANT CHANGE UP (ref 0–6)
ERYTHROCYTE [SEDIMENTATION RATE] IN BLOOD: 11 MM/HR — SIGNIFICANT CHANGE UP (ref 0–20)
FERRITIN SERPL-MCNC: 123 NG/ML
GLUCOSE SERPL-MCNC: 222 MG/DL
HCT VFR BLD CALC: 33.5 % — LOW (ref 39–50)
HGB BLD-MCNC: 10.1 G/DL — LOW (ref 13–17)
IRON SATN MFR SERPL: 25 %
IRON SERPL-MCNC: 93 UG/DL
LDH SERPL-CCNC: 185 U/L
LYMPHOCYTES # BLD AUTO: 0.9 K/UL — LOW (ref 1–3.3)
LYMPHOCYTES # BLD AUTO: 19.2 % — SIGNIFICANT CHANGE UP (ref 13–44)
MCHC RBC-ENTMCNC: 26.7 PG — LOW (ref 27–34)
MCHC RBC-ENTMCNC: 30.2 G/DL — LOW (ref 32–36)
MCV RBC AUTO: 88.4 FL — SIGNIFICANT CHANGE UP (ref 80–100)
MONOCYTES # BLD AUTO: 0.4 K/UL — SIGNIFICANT CHANGE UP (ref 0–0.9)
MONOCYTES NFR BLD AUTO: 8.8 % — SIGNIFICANT CHANGE UP (ref 2–14)
NEUTROPHILS # BLD AUTO: 3.2 K/UL — SIGNIFICANT CHANGE UP (ref 1.8–7.4)
NEUTROPHILS NFR BLD AUTO: 69.4 % — SIGNIFICANT CHANGE UP (ref 43–77)
PLATELET # BLD AUTO: 78 K/UL — LOW (ref 150–400)
POTASSIUM SERPL-SCNC: 3.8 MMOL/L
PROT SERPL-MCNC: 6.7 G/DL
RBC # BLD: 3.79 M/UL — LOW (ref 4.2–5.8)
RBC # FLD: 19.1 % — HIGH (ref 10.3–14.5)
SODIUM SERPL-SCNC: 138 MMOL/L
TIBC SERPL-MCNC: 367 UG/DL
UIBC SERPL-MCNC: 273 UG/DL
WBC # BLD: 4.7 K/UL — SIGNIFICANT CHANGE UP (ref 3.8–10.5)
WBC # FLD AUTO: 4.7 K/UL — SIGNIFICANT CHANGE UP (ref 3.8–10.5)

## 2020-01-24 PROCEDURE — 99213 OFFICE O/P EST LOW 20 MIN: CPT

## 2020-01-24 NOTE — ASSESSMENT
[FreeTextEntry1] : 55 yo gentleman with history of DM type 2 c/b retinopathy, neuropathy, nephropathy, HTN, HLD, MI 2016 s/p stent placement, HFrEF. Patient has have recent multiples hospitalizations  January, 2019 with  permacath placement and AVF formation. Patient is currently getting dialysis TIW, started on January, 2019. Patient's PLT count was normal last on December, 2018 - 215K. \par Patient was also admitted on March, 2019 and was found to have melena, endoscopic showed and ulcer, treated for H pylori x 15 days. \par \par -Iron deficiency anemia: 7/1/19 Iron studies : iron level 36 , TIBC 370, % saturation 10%; received 2 doses of Feraheme 510 mg weekly.  9/17/19 ferritin 142, iron level 81, TIBC 31%. \par \par Light chains in the serum Kappa 19.44, lambda 11.70 , kappa/ lambda ratio 1.66. Immunofixation no band identified.  Likely reactive, creatinine is 5.33. EGFR \par \par - Thrombocytopenia: Reviewed of laboratory studies showed that his PLT count was normal before starting dialysis. Thrombocytopenia is likely secondary to renal insufficiency, stable 112. Avoid heparin with dialysis. \par \par  I answered all his questions to satisfaction.\par \par RTC  4 months. \par

## 2020-01-24 NOTE — HISTORY OF PRESENT ILLNESS
[0 - No Distress] : Distress Level: 0 [de-identified] : 55 yo gentleman with history of DM type 2 c/b retinopathy, neuropathy, nephropathy, HTN, HLD, MI 2016 s/p stent placement, HFrEF. Patient has have recent multiples hospitalizations  January, 2019 with  perma cath placement and AVF formation. Patient is currently getting dialysis TIW, started on January, 2019. Patient's PLT count was normal last on December, 2018 - 215K. \par Patient was also admitted on March, 2019 and was found to have melena, endoscopic showed and ulcer, treated for H pylori x 15 days. \par \par Patient denies bleeding, epistaxis, melena, hematochezia. Denies fevers, night sweats, weight loss. Denies history of blood clots. \par \par Laboratory studies 5/29/19 WBC 5.86, RBC 4.3, Hb 12.8g/dl, HCT 39.4, MCV 91.6, RDW14.3, PLTS 46K. Diff-  WNL.\par Vitamin B 12 -529\par Folate 12/5, HIV  nonreactive, Hep C nonreactive.\par PLT count blue top tube 39. [de-identified] : 7/1/19 erythropoietin level 31\par Iron studies : iron level 36 , TIBC 370, % saturation 10%; received 2 doses of Feraheme 510 mg weekly. \par Hepatitis B core antibody positive\par \par Light chains in the serum Kappa 19.44, lambda 11.70 , kappa/ lambda ratio 1.66. Immunofixation no band identified.  Likely reactive, creatinine is 5.33. EGFR 13.\par \par No other changes in his medical, surgical or social history since 9/16/19.

## 2020-01-24 NOTE — CONSULT LETTER
[Please see my note below.] : Please see my note below. [Dear  ___] : Dear  [unfilled], [Consult Letter:] : I had the pleasure of evaluating your patient, [unfilled]. [Sincerely,] : Sincerely, [Consult Closing:] : Thank you very much for allowing me to participate in the care of this patient.  If you have any questions, please do not hesitate to contact me. [DrJune  ___] : Dr. PATEL [FreeTextEntry2] : Dr Madyson Celestin

## 2020-01-27 LAB
ALBUMIN MFR SERPL ELPH: 56.7 %
ALBUMIN SERPL-MCNC: 3.8 G/DL
ALBUMIN/GLOB SERPL: 1.3 RATIO
ALPHA1 GLOB MFR SERPL ELPH: 4.8 %
ALPHA1 GLOB SERPL ELPH-MCNC: 0.3 G/DL
ALPHA2 GLOB MFR SERPL ELPH: 9.8 %
ALPHA2 GLOB SERPL ELPH-MCNC: 0.7 G/DL
B-GLOBULIN MFR SERPL ELPH: 10.9 %
B-GLOBULIN SERPL ELPH-MCNC: 0.7 G/DL
COTININE SERPL-MCNC: ABNORMAL
DEPRECATED KAPPA LC FREE/LAMBDA SER: 1.97 RATIO
DEPRECATED KAPPA LC FREE/LAMBDA SER: 1.97 RATIO
GAMMA GLOB FLD ELPH-MCNC: 1.2 G/DL
GAMMA GLOB MFR SERPL ELPH: 17.8 %
HDV AB SER IA-ACNC: NEGATIVE
HEV AB SER QL: NEGATIVE
IGA SER QL IEP: 300 MG/DL
IGG SER QL IEP: 1203 MG/DL
IGM SER QL IEP: 57 MG/DL
INTERPRETATION SERPL IEP-IMP: NORMAL
KAPPA LC CSF-MCNC: 11.52 MG/DL
KAPPA LC CSF-MCNC: 11.52 MG/DL
KAPPA LC SERPL-MCNC: 22.66 MG/DL
KAPPA LC SERPL-MCNC: 22.66 MG/DL
M PROTEIN SPEC IFE-MCNC: NORMAL
PROT SERPL-MCNC: 6.7 G/DL
PROT SERPL-MCNC: 6.7 G/DL
STRONGYLOIDES AB SER IA-ACNC: NEGATIVE

## 2020-01-28 ENCOUNTER — CLINICAL ADVICE (OUTPATIENT)
Age: 55
End: 2020-01-28

## 2020-01-28 LAB
ANA PAT FLD IF-IMP: ABNORMAL
ANA SER IF-ACNC: ABNORMAL

## 2020-01-29 ENCOUNTER — LABORATORY RESULT (OUTPATIENT)
Age: 55
End: 2020-01-29

## 2020-01-29 ENCOUNTER — APPOINTMENT (OUTPATIENT)
Dept: INFECTIOUS DISEASE | Facility: CLINIC | Age: 55
End: 2020-01-29
Payer: MEDICARE

## 2020-01-29 ENCOUNTER — APPOINTMENT (OUTPATIENT)
Dept: HEPATOLOGY | Facility: CLINIC | Age: 55
End: 2020-01-29
Payer: MEDICARE

## 2020-01-29 VITALS
HEART RATE: 77 BPM | BODY MASS INDEX: 32.61 KG/M2 | OXYGEN SATURATION: 98 % | HEIGHT: 64 IN | TEMPERATURE: 97.4 F | SYSTOLIC BLOOD PRESSURE: 104 MMHG | DIASTOLIC BLOOD PRESSURE: 80 MMHG | WEIGHT: 191 LBS

## 2020-01-29 VITALS
HEIGHT: 64 IN | RESPIRATION RATE: 17 BRPM | SYSTOLIC BLOOD PRESSURE: 115 MMHG | BODY MASS INDEX: 32.61 KG/M2 | TEMPERATURE: 97.9 F | WEIGHT: 191 LBS | HEART RATE: 84 BPM | DIASTOLIC BLOOD PRESSURE: 79 MMHG

## 2020-01-29 DIAGNOSIS — R76.8 OTHER SPECIFIED ABNORMAL IMMUNOLOGICAL FINDINGS IN SERUM: ICD-10-CM

## 2020-01-29 DIAGNOSIS — Z01.818 ENCOUNTER FOR OTHER PREPROCEDURAL EXAMINATION: ICD-10-CM

## 2020-01-29 DIAGNOSIS — R74.8 ABNORMAL LEVELS OF OTHER SERUM ENZYMES: ICD-10-CM

## 2020-01-29 PROCEDURE — ZZZZZ: CPT

## 2020-01-29 PROCEDURE — 91200 LIVER ELASTOGRAPHY: CPT

## 2020-01-29 PROCEDURE — 90471 IMMUNIZATION ADMIN: CPT

## 2020-01-29 PROCEDURE — 90750 HZV VACC RECOMBINANT IM: CPT | Mod: NC,GY

## 2020-01-29 PROCEDURE — 99204 OFFICE O/P NEW MOD 45 MIN: CPT | Mod: 25

## 2020-01-29 RX ORDER — CARVEDILOL 25 MG/1
25 TABLET, FILM COATED ORAL
Qty: 60 | Refills: 0 | Status: DISCONTINUED | COMMUNITY
Start: 2020-01-13 | End: 2020-01-29

## 2020-01-29 RX ORDER — METOPROLOL SUCCINATE 50 MG/1
50 TABLET, EXTENDED RELEASE ORAL
Refills: 3 | Status: DISCONTINUED | COMMUNITY
Start: 2020-01-15 | End: 2020-01-29

## 2020-01-29 RX ORDER — METOPROLOL SUCCINATE 50 MG/1
50 TABLET, EXTENDED RELEASE ORAL DAILY
Qty: 90 | Refills: 3 | Status: DISCONTINUED | COMMUNITY
Start: 2020-01-21 | End: 2020-01-29

## 2020-01-29 NOTE — ASSESSMENT
[FreeTextEntry1] : 55 yo M originally from [facility] with obesity, DM2 (x35 years, previously but no longer on insulin, with most recent HbA1c 8.0% on 1/21/20) and complicated by retinopathy, neuropathy, and nephropathy with ESRD on HD (T/Th/Sa) since 1/2019, CAD s/p MI in 2016 with PCI/stent, and chronic systolic heart failure due to ischemic cardiomyopathy also with pulmonary hypertension. He also has a history of H pylori gastric ulcer hemorrhage and iron deficiency anemia. He is being seen for pre-heart transplant evaluation.\par \par He has no prior history of overt hepatic decompensations and has normal liver synthetic function based on recent labs. However, he has mildly elevated alkaline phosphatase as well as progressive thrombocytopenia over the past year. His platelet count was previously >200 in 12/2018 (before he started hemodialysis).\par \par Pre-transplant serologic work-up was notable for evidence of prior resolved HBV infection (HBsAg-, HBcAb+, and HBsAb+). He does not have chronic HCV infection or chronic HEV infection. CELSO was weakly positive with 1:80 titer and speckled pattern, but his liver enzymes are not consistent with autoimmune hepatitis.\par \par His risk factors for chronic liver disease include prior heavy alcohol consumption (though reportedly sober x5-6 years) and metabolic syndrome, which place him at risk for steatohepatitis. He does appear to have mild hepatic steatosis based on CAP score from FibroScan done today. There is also concern that he may have chronic passive congestion of the liver from chronic right heart failure, which could also explain his mildly elevated alkaline phosphatase.\par \par Although his FibroScan today showed a high median liver stiffness concerning for advanced hepatic fibrosis / cirrhosis, it may have been falsely elevated due to hepatic congestion.\par \par Plan:\par - Abdominal ultrasound ordered to assess liver morphology and to assess for splenomegaly.\par - I discussed with him that, pending his abdominal sonogram results, it may be necessary to pursue a transjugular liver biopsy for further evaluation to rule out cirrhosis and portal hypertension.\par - HCV RNA by PCR ordered to complete pre-transplant liver-related laboratory work-up as per protocol.\par - Given prior resolved HBV infection with HBcAb and HBsAb positivity and potential risk of reactivation with immunosuppression, if he were to undergo heart transplantation, would recommend that he receive antiviral prophylaxis with Vemlidy (or alternatively, could also receive prophylaxis with entecavir or TDF) for at least 1 year post-heart transplant. Then, after the first post-transplant year and depending on his immunosuppression management as per his Transplant Cardiology team, could consider discontinuing his prophylaxis and switching to a strategy of HBV monitoring instead with periodic labs.

## 2020-01-29 NOTE — CONSULT LETTER
[Dear  ___] : Dear  [unfilled], [( Thank you for referring [unfilled] for consultation for _____ )] : Thank you for referring [unfilled] for consultation for [unfilled] [Please see my note below.] : Please see my note below. [Consult Closing:] : Thank you very much for allowing me to participate in the care of this patient.  If you have any questions, please do not hesitate to contact me. [FreeTextEntry2] : Dr. Enio Pavon [FreeTextEntry3] : Sincerely,\par \par Angie Thao M.D., Ph.D.\par  of Medicine\par [facility]\par [address] [place] [address]\[place] [address]\par Tel: [phone]\par Fax: [phone]\par Cell: [phone]\par E-mail: [email or web address]\par

## 2020-01-29 NOTE — HISTORY OF PRESENT ILLNESS
[Needlestick Exposure] : no needlestick exposure [IV Drug Use] : no IV drug use [Infected Sexual Partner] : no infected sexual partner [Tattoo] : no tattoos [Body Piercing] : no body piercing [Hemodialysis] : hemodialysis [Incarceration] : no incarceration [Transfusion before 1992] : no transfusion before 1992 [Transplant before 1992] : no transplant before 1992 [Autoimmune Disorder] : no autoimmune disorder [Travel to Endemic Area] : travel to an endemic area [Household Contact to HBV] : no household contact to HBV [Cocaine Use] : no cocaine use [Occupational Exposure] : no occupational exposure [de-identified] : Mr. Lorenzo is a 55 yo M originally from [facility] with obesity, DM2 (x35 years, previously but no longer on insulin, with most recent HbA1c 8.0% on 20) and complicated by retinopathy, neuropathy, and nephropathy with ESRD on HD (/) since 2019, CAD s/p MI in 2016 with PCI/stent, and chronic systolic heart failure due to ischemic cardiomyopathy. He also has a history of H pylori gastric ulcer hemorrhage and iron deficiency anemia. He was referred for consultation by Dr. Enio Pavon for pre-heart transplant evaluation.\par \par He has no known history of chronic liver disease and no prior history of paracentesis, confusion, or jaundice/scleral icterus. He has chronic peripheral edema bilaterally. He has exertional dyspnea that has improved in recent weeks but still limited to 1.5 blocks if on a flat surface. He is not able to climb a single flight of stairs without stopping to rest. \par \par He reports weighing 270 lbs at his heaviest, but gradually lost weight after he developed heart failure symptoms in 2018. \par \par He used to drink vodka with friends every weekend. He is unable to quantify exactly how much he used to drink, but would typically finish a 1.5L jug of vodka every weekend between him and 5-6 friends. He denies drinking on weekdays or a history of daily drinking. He also denies any history of blackouts, withdrawal symptoms, or DWI or other legal consequences related to his past drinking. He quit drinking 5-6 years ago, he says motivated because he did not want to "be like the drunks on the street."\par \par His family history is notable for one of his siblings (a 55-year-old sister) who may have nonalcoholic fatty liver disease (NAFLD), as well as diabetes. She also had colon cancer diagnosed at age 41. He has 3 other siblings (ages 60, 58, and 45). Both of his parents had diabetes and heart disease and are . \par \par He previously underwent EGD in 2018 that showed clean-based gastric ulcer but no upper GI varices. He has not had a prior colonoscopy.\par \par He previously worked as an  (last worked in 3/2018). He has been on disability since 2019. \par \par FibroScan done in the office today shows CAP score of 267 dB/m (consistent with S1 steatosis) and median liver stiffness of 18.0 kPA (concerning for F4 fibrosis).\par \par No recent abdominal sonogram.\par \par Prior liver-related imaging was reviewed, particularly his abdominal US (done on 18) that showed a right pleural effusion and cholelithiasis but was otherwise unremarkable, with normal liver contour and echogenicity.

## 2020-01-29 NOTE — REASON FOR VISIT
[Consultation] : a consultation visit [Patient Declined  Services] : - None: Patient declined  services [Other: _____] : [unfilled]

## 2020-01-29 NOTE — PHYSICAL EXAM
[Sclera] : the sclera and conjunctiva were normal [General Appearance - In No Acute Distress] : in no acute distress [General Appearance - Alert] : alert [PERRL With Normal Accommodation] : pupils were equal in size, round, reactive to light [Outer Ear] : the ears and nose were normal in appearance [Extraocular Movements] : extraocular movements were intact [Neck Appearance] : the appearance of the neck was normal [Oropharynx] : the oropharynx was normal with no thrush [Neck Cervical Mass (___cm)] : no neck mass was observed [Thyroid Diffuse Enlargement] : the thyroid was not enlarged [Jugular Venous Distention Increased] : there was no jugular-venous distention [Auscultation Breath Sounds / Voice Sounds] : lungs were clear to auscultation bilaterally [Heart Rate And Rhythm] : heart rate was normal and rhythm regular [Heart Sounds Gallop] : no gallops [Heart Sounds] : normal S1 and S2 [Murmurs] : no murmurs [Full Pulse] : the pedal pulses are present [Heart Sounds Pericardial Friction Rub] : no pericardial rub [Edema] : there was no peripheral edema [Bowel Sounds] : normal bowel sounds [Abdomen Tenderness] : non-tender [Abdomen Soft] : soft [Abdomen Mass (___ Cm)] : no abdominal mass palpated [No Palpable Adenopathy] : no palpable adenopathy [Costovertebral Angle Tenderness] : no CVA tenderness [Motor Tone] : muscle strength and tone were normal [Musculoskeletal - Swelling] : no joint swelling [Nail Clubbing] : no clubbing  or cyanosis of the fingernails [Skin Color & Pigmentation] : normal skin color and pigmentation [Sensation] : the sensory exam was normal to light touch and pinprick [] : no rash [Deep Tendon Reflexes (DTR)] : deep tendon reflexes were 2+ and symmetric [Oriented To Time, Place, And Person] : oriented to person, place, and time [No Focal Deficits] : no focal deficits [Affect] : the affect was normal

## 2020-01-31 LAB
HCV RNA SERPL NAA DL=5-ACNC: NOT DETECTED IU/ML
HCV RNA SERPL NAA+PROBE-LOG IU: NOT DETECTED LOG10IU/ML
MITOCHONDRIA AB SER IF-ACNC: NORMAL

## 2020-01-31 NOTE — HISTORY OF PRESENT ILLNESS
[Women] : with women [FreeTextEntry1] : 53 yo man who was born in [facility] and then at age [age] moved to [facility] and then about twenty years ago arrived [place] Has lived in [place] and [place].\par Denies a history of childhood or adult related hospitalizations for infections, denies exposure to Tb or a hx of a +PPD\par History of IDDM, HTN, CAD, HFrEF with 35% EF, severe MR hx, ESRD on HD past permcath now fistula LUE\par past GI bleed with H.pylori+, urine with light chains and seeing hematology for possible bone marrow biopsy\par \par Lives with sister,\par one adult son 30yrs\par 39yo nephew\par no pets\par no allergies\par \par He started dialysis in 2019 [Sexually Active] : The patient is not sexually active

## 2020-01-31 NOTE — ASSESSMENT
[FreeTextEntry1] : 55 yo man undergoing evaluation for dual organ transplant with renal and heart with some outstanding hematolgic issues.\par Serologies to date\par HIV ab nonreactive\par quantiferon negative\par EBV past exposure\par MMR IGGG+\par VZV IGGI+\par toxo  IGG+  CMV IGG+\par HSV 1 ab+, HSV negative\par HCV ab negative\par HEBSab+,Cab+, Sag-, viral load negative\par strongyloides ab -\par \par Needs [place] testing based upon [facility] residence and [place] in [facility]\par Needs\par HTLV 1 and 2 testing\par Received flu, pneumonia, Tdap\par Will give first shingrix dose today\par \par follow up as above\par Education provided to patient and his sister about importance of vaccines and prevention and pre-tx work up [Treatment Education] : treatment education [Risk Reduction] : risk reduction [Nutritional / Food Issues] : nutritional/food issues [Universal Precautions] : universal precautions [Medical Care Issues] : medical care issues [Anticipatory Guidance] : anticipatory guidance [Education Materials Provided] : Eduction materials were provided

## 2020-02-03 ENCOUNTER — OUTPATIENT (OUTPATIENT)
Dept: OUTPATIENT SERVICES | Facility: HOSPITAL | Age: 55
LOS: 1 days | End: 2020-02-03
Payer: MEDICARE

## 2020-02-03 DIAGNOSIS — D64.9 ANEMIA, UNSPECIFIED: ICD-10-CM

## 2020-02-03 DIAGNOSIS — Z95.810 PRESENCE OF AUTOMATIC (IMPLANTABLE) CARDIAC DEFIBRILLATOR: Chronic | ICD-10-CM

## 2020-02-03 DIAGNOSIS — Z95.5 PRESENCE OF CORONARY ANGIOPLASTY IMPLANT AND GRAFT: Chronic | ICD-10-CM

## 2020-02-03 DIAGNOSIS — T82.868A THROMBOSIS DUE TO VASCULAR PROSTHETIC DEVICES, IMPLANTS AND GRAFTS, INITIAL ENCOUNTER: Chronic | ICD-10-CM

## 2020-02-05 ENCOUNTER — APPOINTMENT (OUTPATIENT)
Dept: HEMATOLOGY ONCOLOGY | Facility: CLINIC | Age: 55
End: 2020-02-05

## 2020-02-05 ENCOUNTER — RESULT REVIEW (OUTPATIENT)
Age: 55
End: 2020-02-05

## 2020-02-05 ENCOUNTER — RX RENEWAL (OUTPATIENT)
Age: 55
End: 2020-02-05

## 2020-02-05 ENCOUNTER — LABORATORY RESULT (OUTPATIENT)
Age: 55
End: 2020-02-05

## 2020-02-05 VITALS
DIASTOLIC BLOOD PRESSURE: 66 MMHG | HEART RATE: 89 BPM | RESPIRATION RATE: 17 BRPM | OXYGEN SATURATION: 98 % | TEMPERATURE: 98.8 F | WEIGHT: 187.39 LBS | BODY MASS INDEX: 32.17 KG/M2 | SYSTOLIC BLOOD PRESSURE: 100 MMHG

## 2020-02-05 LAB
BASOPHILS # BLD AUTO: 0 K/UL — SIGNIFICANT CHANGE UP (ref 0–0.2)
BASOPHILS NFR BLD AUTO: 0.6 % — SIGNIFICANT CHANGE UP (ref 0–2)
EOSINOPHIL # BLD AUTO: 0.1 K/UL — SIGNIFICANT CHANGE UP (ref 0–0.5)
EOSINOPHIL NFR BLD AUTO: 1.3 % — SIGNIFICANT CHANGE UP (ref 0–6)
HCT VFR BLD CALC: 40.3 % — SIGNIFICANT CHANGE UP (ref 39–50)
HGB BLD-MCNC: 12.4 G/DL — LOW (ref 13–17)
LYMPHOCYTES # BLD AUTO: 1.1 K/UL — SIGNIFICANT CHANGE UP (ref 1–3.3)
LYMPHOCYTES # BLD AUTO: 15.4 % — SIGNIFICANT CHANGE UP (ref 13–44)
MCHC RBC-ENTMCNC: 28.3 PG — SIGNIFICANT CHANGE UP (ref 27–34)
MCHC RBC-ENTMCNC: 30.8 G/DL — LOW (ref 32–36)
MCV RBC AUTO: 92.1 FL — SIGNIFICANT CHANGE UP (ref 80–100)
MONOCYTES # BLD AUTO: 0.5 K/UL — SIGNIFICANT CHANGE UP (ref 0–0.9)
MONOCYTES NFR BLD AUTO: 7.6 % — SIGNIFICANT CHANGE UP (ref 2–14)
NEUTROPHILS # BLD AUTO: 5.3 K/UL — SIGNIFICANT CHANGE UP (ref 1.8–7.4)
NEUTROPHILS NFR BLD AUTO: 75.1 % — SIGNIFICANT CHANGE UP (ref 43–77)
PLATELET # BLD AUTO: 133 K/UL — LOW (ref 150–400)
RBC # BLD: 4.38 M/UL — SIGNIFICANT CHANGE UP (ref 4.2–5.8)
RBC # FLD: 21 % — HIGH (ref 10.3–14.5)
WBC # BLD: 7.1 K/UL — SIGNIFICANT CHANGE UP (ref 3.8–10.5)
WBC # FLD AUTO: 7.1 K/UL — SIGNIFICANT CHANGE UP (ref 3.8–10.5)

## 2020-02-05 PROCEDURE — 88237 TISSUE CULTURE BONE MARROW: CPT

## 2020-02-05 PROCEDURE — 88280 CHROMOSOME KARYOTYPE STUDY: CPT

## 2020-02-05 PROCEDURE — 88264 CHROMOSOME ANALYSIS 20-25: CPT

## 2020-02-05 NOTE — PROCEDURE
[Bone Marrow Biopsy] : bone marrow biopsy [Bone Marrow Aspiration] : bone marrow aspiration  [Patient] : the patient [Procedure verified and consent obtained] : procedure verified and consent obtained [Patient identification verified] : patient identification verified [Correct positioning] : correct positioning [Prone] : prone [The right posterior iliac crest was prepped with betadine and draped, using sterile technique.] : The right posterior iliac crest was prepped with betadine and draped, using sterile technique. [Lidocaine was injected and into the periosteum overlying the site.] : Lidocaine was injected and into the periosteum overlying the site. [Aspirate] : aspirate [Cytogenetics] : cytogenetics [Biopsy] : biopsy [FISH] : FISH [Flow Cytometry] : flow cytometry [] : The patient was instructed to remove the bandage the following AM. The patient may bathe. Acetaminophen may be taken for discomfort, as per package directions.If there are any other problems, the patient was instructed to call the office. The patient verbalized understanding, and is aware of the office contact numbers. [FreeTextEntry1] : progressive anemia and thrombocytopenia, elevated light chains w/ renal insufficiency, r/o MM [FreeTextEntry2] : CBC prior to procedure\par WBC 7.1\par Hgb 12.4  Hct 40.3\par Plt 133\par BM Bx and aspiration was performed by ISABEL Soto. Sufficient spicule rich aspirate and BM core obtained. \par

## 2020-02-06 ENCOUNTER — OUTPATIENT (OUTPATIENT)
Dept: OUTPATIENT SERVICES | Facility: HOSPITAL | Age: 55
LOS: 1 days | End: 2020-02-06
Payer: MEDICARE

## 2020-02-06 ENCOUNTER — APPOINTMENT (OUTPATIENT)
Dept: ULTRASOUND IMAGING | Facility: IMAGING CENTER | Age: 55
End: 2020-02-06
Payer: MEDICARE

## 2020-02-06 DIAGNOSIS — T82.868A THROMBOSIS DUE TO VASCULAR PROSTHETIC DEVICES, IMPLANTS AND GRAFTS, INITIAL ENCOUNTER: Chronic | ICD-10-CM

## 2020-02-06 DIAGNOSIS — Z01.818 ENCOUNTER FOR OTHER PREPROCEDURAL EXAMINATION: ICD-10-CM

## 2020-02-06 DIAGNOSIS — R74.8 ABNORMAL LEVELS OF OTHER SERUM ENZYMES: ICD-10-CM

## 2020-02-06 DIAGNOSIS — Z95.5 PRESENCE OF CORONARY ANGIOPLASTY IMPLANT AND GRAFT: Chronic | ICD-10-CM

## 2020-02-06 DIAGNOSIS — Z95.810 PRESENCE OF AUTOMATIC (IMPLANTABLE) CARDIAC DEFIBRILLATOR: Chronic | ICD-10-CM

## 2020-02-06 PROCEDURE — 76700 US EXAM ABDOM COMPLETE: CPT

## 2020-02-06 PROCEDURE — 76700 US EXAM ABDOM COMPLETE: CPT | Mod: 26

## 2020-02-10 LAB
FERRITIN SERPL-MCNC: 171 NG/ML
IRON SATN MFR SERPL: 15 %
IRON SERPL-MCNC: 50 UG/DL
LDH SERPL-CCNC: 212 U/L
TIBC SERPL-MCNC: 325 UG/DL
UIBC SERPL-MCNC: 275 UG/DL

## 2020-02-11 LAB — HEMATOPATHOLOGY REPORT: SIGNIFICANT CHANGE UP

## 2020-02-14 LAB — CHROM ANALY OVERALL INTERP SPEC-IMP: SIGNIFICANT CHANGE UP

## 2020-02-21 DIAGNOSIS — Z71.89 OTHER SPECIFIED COUNSELING: ICD-10-CM

## 2020-02-28 ENCOUNTER — APPOINTMENT (OUTPATIENT)
Dept: CARDIOLOGY | Facility: CLINIC | Age: 55
End: 2020-02-28
Payer: MEDICARE

## 2020-02-28 ENCOUNTER — APPOINTMENT (OUTPATIENT)
Dept: CV DIAGNOSITCS | Facility: HOSPITAL | Age: 55
End: 2020-02-28
Payer: MEDICARE

## 2020-02-28 ENCOUNTER — NON-APPOINTMENT (OUTPATIENT)
Age: 55
End: 2020-02-28

## 2020-02-28 VITALS
DIASTOLIC BLOOD PRESSURE: 81 MMHG | HEART RATE: 75 BPM | OXYGEN SATURATION: 95 % | WEIGHT: 193 LBS | SYSTOLIC BLOOD PRESSURE: 118 MMHG | BODY MASS INDEX: 32.95 KG/M2 | HEIGHT: 64 IN

## 2020-02-28 PROCEDURE — 93000 ELECTROCARDIOGRAM COMPLETE: CPT

## 2020-02-28 PROCEDURE — 93306 TTE W/DOPPLER COMPLETE: CPT | Mod: 26

## 2020-02-28 PROCEDURE — 99214 OFFICE O/P EST MOD 30 MIN: CPT

## 2020-02-28 RX ORDER — METOLAZONE 5 MG/1
5 TABLET ORAL
Qty: 10 | Refills: 5 | Status: DISCONTINUED | COMMUNITY
Start: 2020-01-15 | End: 2020-02-28

## 2020-02-28 NOTE — PHYSICAL EXAM
[General Appearance - Well Developed] : well developed [Normal Appearance] : normal appearance [General Appearance - Well Nourished] : well nourished [Normal Conjunctiva] : the conjunctiva exhibited no abnormalities [Normal Oral Mucosa] : normal oral mucosa [Normal Oropharynx] : normal oropharynx [] : no respiratory distress [Respiration, Rhythm And Depth] : normal respiratory rhythm and effort [Heart Rate And Rhythm] : heart rate and rhythm were normal [Auscultation Breath Sounds / Voice Sounds] : lungs were clear to auscultation bilaterally [Heart Sounds] : normal S1 and S2 [Bowel Sounds] : normal bowel sounds [Abdomen Soft] : soft [Abdomen Tenderness] : non-tender [Abnormal Walk] : normal gait [Nail Clubbing] : no clubbing of the fingernails [Cyanosis, Localized] : no localized cyanosis [Skin Color & Pigmentation] : normal skin color and pigmentation [Oriented To Time, Place, And Person] : oriented to person, place, and time [Impaired Insight] : insight and judgment were intact [FreeTextEntry1] : scars on legs bilaterally with no erythema

## 2020-02-28 NOTE — REASON FOR VISIT
[Follow-Up - From Hospitalization] : follow-up of a recent hospitalization for [Heart Failure] : congestive heart failure [Spouse] : spouse [FreeTextEntry2] : S/P hospitalization at [facility] 12/12-12/14/19 secondary to bradycardia at dialysis

## 2020-02-28 NOTE — HISTORY OF PRESENT ILLNESS
[FreeTextEntry1] : 54 year old M w/ h/o IDDM (A1c 7.1 9/19) c/b possible retinopathy/nephropathy, HTN, CAD (s/p stent in [place] in 2016, unknown coronary anatomy), HFrEF (EF 25%, LVEDD 6.6 cm) s/p subQ ICD, prior severe MR (functional; now improved), ESRD on HD via permacath (T/Th/Sat; AV fistula now patent), prior GIB 2/2 ulcer (H. pylori positive) who is here for f/u. Pt was admitted to [facility] from dialysis from 12/12-12/14 secondary to bradycardia with HR 40's 20 minutes into dialysis treatment with a drop in B/P. He was seen by EP Dr. Cantu and had an ILR implanted on 12/13/19. Midodrine was also started at that time.\par \par Since last visit 1/15/19, he has gained about 6lbs since 2/5/2020. He is getting dialyzed today and as well as tomorrow. He's been taking metolazone with bumex everyday for the past month and noted fatigue and lightheadedness. He report exertional dyspnea and can only walk about one block. He also reports leg edema but denies orthopnea, PND, chest pain, palpitations, syncope, ICD discharge, fever, chills, nausea, or abdominal distention. Appetite is good and watches his fluid and salt intake.\par

## 2020-02-28 NOTE — ASSESSMENT
[FreeTextEntry1] : 54 year old M, ABO O,  w/ h/o IDDM c/b neuropathy (A1c 7.1 8/19), HTN, CAD (s/p stent in [place] in 2016, unknown coronary anatomy), HFrEF (EF 25%, LVEDD 6.6 cm) s/p subQ ICD, ESRD on HD via permacath (T/Th/Sat via LUE AV fistula), prior GIB 2/2 ulcer (H. pylori positive) who is here for follow up.JVP is moderately elevated and mild volume expansion noted peripherally. Normotensive with class III-IV symptoms. He will undergo another session of dialysis tomorrow. He is eager to finish the evaluation process and has been keeping all of his appointments. On 1/21/20, he underwent our initial heart transplant evaluation visit then he saw ID and hepatology. I contacted our evaluation team to notify him of the rest of the pending items to complete his evaluation. Our team will contact GI office so he can be arranged for colonoscopy.\par \par 1. HFrEF - unclear etiology; prior known CAD; possibly HTN \par - continue bumex 2mg - 2 tabs twice a day \par - discontinue metolazone as pt was taking more than the two doses prescribed\par - Continue metoprolol 50 mg bid\par - He has been off of hydralazine and isordil since the last hospitalization due to hypotension.\par - while DM w/ complications is a relative contraindication, he will likely benefit from heart/kidney transplant as his DM is well controlled now, he will continue to follow up with his endocrinologist.\par -He underwent additional session of HD today and another session planned for this Saturday.\par \par 2. ESRD on HD; left AVF with + thrill and bruit\par - instructed to follow low potassium diet\par \par 3. CAD - prior PCI- no active chest pain or EKG changes\par - on ASA 81 mg daily\par - on lipitor 80 mg qhs\par \par 4. Claudication - likely 2/2 low flow with exertion \par - LAURI and arterial Dopplers with no evidence of significant stenoses\par \par Follow up

## 2020-03-02 ENCOUNTER — APPOINTMENT (OUTPATIENT)
Dept: ELECTROPHYSIOLOGY | Facility: CLINIC | Age: 55
End: 2020-03-02
Payer: MEDICARE

## 2020-03-02 PROCEDURE — G2066: CPT

## 2020-03-02 PROCEDURE — 93298 REM INTERROG DEV EVAL SCRMS: CPT

## 2020-03-07 ENCOUNTER — EMERGENCY (EMERGENCY)
Facility: HOSPITAL | Age: 55
LOS: 1 days | Discharge: HOME | End: 2020-03-07
Attending: EMERGENCY MEDICINE | Admitting: EMERGENCY MEDICINE
Payer: MEDICARE

## 2020-03-07 VITALS
DIASTOLIC BLOOD PRESSURE: 75 MMHG | RESPIRATION RATE: 16 BRPM | HEART RATE: 76 BPM | OXYGEN SATURATION: 100 % | SYSTOLIC BLOOD PRESSURE: 113 MMHG

## 2020-03-07 VITALS
HEART RATE: 84 BPM | OXYGEN SATURATION: 99 % | SYSTOLIC BLOOD PRESSURE: 111 MMHG | DIASTOLIC BLOOD PRESSURE: 80 MMHG | TEMPERATURE: 98 F | RESPIRATION RATE: 17 BRPM

## 2020-03-07 DIAGNOSIS — Z95.5 PRESENCE OF CORONARY ANGIOPLASTY IMPLANT AND GRAFT: Chronic | ICD-10-CM

## 2020-03-07 DIAGNOSIS — Z95.810 PRESENCE OF AUTOMATIC (IMPLANTABLE) CARDIAC DEFIBRILLATOR: Chronic | ICD-10-CM

## 2020-03-07 DIAGNOSIS — T82.868A THROMBOSIS DUE TO VASCULAR PROSTHETIC DEVICES, IMPLANTS AND GRAFTS, INITIAL ENCOUNTER: Chronic | ICD-10-CM

## 2020-03-07 LAB
ALBUMIN SERPL ELPH-MCNC: 3.9 G/DL — SIGNIFICANT CHANGE UP (ref 3.3–5)
ALP SERPL-CCNC: 114 U/L — SIGNIFICANT CHANGE UP (ref 40–120)
ALT FLD-CCNC: 19 U/L — SIGNIFICANT CHANGE UP (ref 4–41)
ANION GAP SERPL CALC-SCNC: 20 MMO/L — HIGH (ref 7–14)
ANISOCYTOSIS BLD QL: SLIGHT — SIGNIFICANT CHANGE UP
AST SERPL-CCNC: 29 U/L — SIGNIFICANT CHANGE UP (ref 4–40)
BASOPHILS # BLD AUTO: 0.03 K/UL — SIGNIFICANT CHANGE UP (ref 0–0.2)
BASOPHILS NFR BLD AUTO: 0.4 % — SIGNIFICANT CHANGE UP (ref 0–2)
BASOPHILS NFR SPEC: 0 % — SIGNIFICANT CHANGE UP (ref 0–2)
BILIRUB SERPL-MCNC: 1 MG/DL — SIGNIFICANT CHANGE UP (ref 0.2–1.2)
BLASTS # FLD: 0 % — SIGNIFICANT CHANGE UP (ref 0–0)
BUN SERPL-MCNC: 41 MG/DL — HIGH (ref 7–23)
CALCIUM SERPL-MCNC: 8.5 MG/DL — SIGNIFICANT CHANGE UP (ref 8.4–10.5)
CHLORIDE SERPL-SCNC: 96 MMOL/L — LOW (ref 98–107)
CO2 SERPL-SCNC: 22 MMOL/L — SIGNIFICANT CHANGE UP (ref 22–31)
CREAT SERPL-MCNC: 6.21 MG/DL — HIGH (ref 0.5–1.3)
ELLIPTOCYTES BLD QL SMEAR: SLIGHT — SIGNIFICANT CHANGE UP
EOSINOPHIL # BLD AUTO: 0.08 K/UL — SIGNIFICANT CHANGE UP (ref 0–0.5)
EOSINOPHIL NFR BLD AUTO: 1 % — SIGNIFICANT CHANGE UP (ref 0–6)
EOSINOPHIL NFR FLD: 0 % — SIGNIFICANT CHANGE UP (ref 0–6)
GIANT PLATELETS BLD QL SMEAR: PRESENT — SIGNIFICANT CHANGE UP
GLUCOSE SERPL-MCNC: 182 MG/DL — HIGH (ref 70–99)
HCT VFR BLD CALC: 36.9 % — LOW (ref 39–50)
HGB BLD-MCNC: 11.4 G/DL — LOW (ref 13–17)
IMM GRANULOCYTES NFR BLD AUTO: 0.3 % — SIGNIFICANT CHANGE UP (ref 0–1.5)
LYMPHOCYTES # BLD AUTO: 0.6 K/UL — LOW (ref 1–3.3)
LYMPHOCYTES # BLD AUTO: 7.7 % — LOW (ref 13–44)
LYMPHOCYTES NFR SPEC AUTO: 10.4 % — LOW (ref 13–44)
MACROCYTES BLD QL: SLIGHT — SIGNIFICANT CHANGE UP
MCHC RBC-ENTMCNC: 27.6 PG — SIGNIFICANT CHANGE UP (ref 27–34)
MCHC RBC-ENTMCNC: 30.9 % — LOW (ref 32–36)
MCV RBC AUTO: 89.3 FL — SIGNIFICANT CHANGE UP (ref 80–100)
METAMYELOCYTES # FLD: 0 % — SIGNIFICANT CHANGE UP (ref 0–1)
MONOCYTES # BLD AUTO: 0.46 K/UL — SIGNIFICANT CHANGE UP (ref 0–0.9)
MONOCYTES NFR BLD AUTO: 5.9 % — SIGNIFICANT CHANGE UP (ref 2–14)
MONOCYTES NFR BLD: 2.6 % — SIGNIFICANT CHANGE UP (ref 2–9)
MYELOCYTES NFR BLD: 0 % — SIGNIFICANT CHANGE UP (ref 0–0)
NEUTROPHIL AB SER-ACNC: 85.2 % — HIGH (ref 43–77)
NEUTROPHILS # BLD AUTO: 6.56 K/UL — SIGNIFICANT CHANGE UP (ref 1.8–7.4)
NEUTROPHILS NFR BLD AUTO: 84.7 % — HIGH (ref 43–77)
NEUTS BAND # BLD: 0 % — SIGNIFICANT CHANGE UP (ref 0–6)
NRBC # FLD: 0 K/UL — SIGNIFICANT CHANGE UP (ref 0–0)
OTHER - HEMATOLOGY %: 0 — SIGNIFICANT CHANGE UP
PLATELET # BLD AUTO: 91 K/UL — LOW (ref 150–400)
PLATELET COUNT - ESTIMATE: SIGNIFICANT CHANGE UP
PMV BLD: 11.1 FL — SIGNIFICANT CHANGE UP (ref 7–13)
POIKILOCYTOSIS BLD QL AUTO: SLIGHT — SIGNIFICANT CHANGE UP
POLYCHROMASIA BLD QL SMEAR: SLIGHT — SIGNIFICANT CHANGE UP
POTASSIUM SERPL-MCNC: 4.6 MMOL/L — SIGNIFICANT CHANGE UP (ref 3.5–5.3)
POTASSIUM SERPL-SCNC: 4.6 MMOL/L — SIGNIFICANT CHANGE UP (ref 3.5–5.3)
PROMYELOCYTES # FLD: 0 % — SIGNIFICANT CHANGE UP (ref 0–0)
PROT SERPL-MCNC: 7.4 G/DL — SIGNIFICANT CHANGE UP (ref 6–8.3)
RBC # BLD: 4.13 M/UL — LOW (ref 4.2–5.8)
RBC # FLD: 19.8 % — HIGH (ref 10.3–14.5)
REVIEW TO FOLLOW: YES — SIGNIFICANT CHANGE UP
SODIUM SERPL-SCNC: 138 MMOL/L — SIGNIFICANT CHANGE UP (ref 135–145)
TROPONIN T, HIGH SENSITIVITY: 132 NG/L — CRITICAL HIGH (ref ?–14)
TROPONIN T, HIGH SENSITIVITY: 142 NG/L — CRITICAL HIGH (ref ?–14)
VARIANT LYMPHS # BLD: 1.8 % — SIGNIFICANT CHANGE UP
WBC # BLD: 7.75 K/UL — SIGNIFICANT CHANGE UP (ref 3.8–10.5)
WBC # FLD AUTO: 7.75 K/UL — SIGNIFICANT CHANGE UP (ref 3.8–10.5)

## 2020-03-07 PROCEDURE — 99285 EMERGENCY DEPT VISIT HI MDM: CPT | Mod: GC

## 2020-03-07 PROCEDURE — 71046 X-RAY EXAM CHEST 2 VIEWS: CPT | Mod: 26

## 2020-03-07 NOTE — ED PROVIDER NOTE - NS ED ROS FT
CONSTITUTIONAL: No fevers, no chills, no lightheadedness, no dizziness  EYES: no visual changes, no eye pain  EARS: no ear drainage, no ear pain, no change in hearing  NOSE: no nasal congestion  MOUTH/THROAT: no sore throat  CV: No chest pain, no palpitations  RESP: No SOB, no cough  GI: No n/v/d, no abd pain  : no dysuria, no hematuria, no flank pain  MSK: no back pain, no extremity pain  SKIN: no rashes  NEURO: no headache, no focal weakness, no decreased sensation/parasthesias   PSYCHIATRIC: no known mental health issues.

## 2020-03-07 NOTE — ED PROVIDER NOTE - NSFOLLOWUPINSTRUCTIONS_ED_ALL_ED_FT
- Lab and imaging results, if performed, were discussed with you along with your discharge diagnosis    - Follow up with your doctor in 1 week - bring copies of your results if you were given. If you do not have a primary doctor, please call [phone] to find one convenient for you    - Return to the ED for any new, worsening, or concerning symptoms to you    - Continue all prescribed medications

## 2020-03-07 NOTE — ED PROVIDER NOTE - PATIENT PORTAL LINK FT
You can access the FollowMyHealth Patient Portal offered by [facility] by registering at the following website: [email or web address]. By joining [facility]’s FollowMyHealth portal, you will also be able to view your health information using other applications (apps) compatible with our system.

## 2020-03-07 NOTE — ED ADULT TRIAGE NOTE - CHIEF COMPLAINT QUOTE
syncopal episode while at dialysis (Tue,Thurs,Sat). PT received 15 minutes of treatment with 282cc removed. PT presents with no complaints offered and left arm fistula. PMH of DM, HTN, CKD

## 2020-03-07 NOTE — ED PROVIDER NOTE - ATTENDING CONTRIBUTION TO CARE
Attending Attestation: Dr. Gao  I have personally performed a history and physical examination of the patient and discussed management with the resident as well as the patient.  I reviewed the resident's note and agree with the documented findings and plan of care.  I have authored and modified critical sections of the Provider Note, including but not limited to HPI, Physical Exam and MDM. Pt arrives after reported brief LOC.  He states he was just tired and fells asleep as he usually does during early morning HD sessions.  Feels well, is at baseline.  Labs, ekg, cxr and reassess

## 2020-03-07 NOTE — ED ADULT NURSE NOTE - OBJECTIVE STATEMENT
Patient received from Dialysis center s/p syncopal episode 15mins into procedure. Patient A&Ox4, in no obvious distress, states "I feel like I can run a marathon". Denies chest pain or SOB. Denies nausea or vomiting. Presents with 20g angiocath to R hand placed by EMS. AV fistula noted to L arm. MD at bedside to anahi. Labs to be sent. will monitor.

## 2020-03-07 NOTE — ED PROVIDER NOTE - CLINICAL SUMMARY MEDICAL DECISION MAKING FREE TEXT BOX
S/p syncopal episode w/ prodrome of "sleepiness" although high risk features has known vasovagal episodes and well appearing. Labs, ekg, cxr and reassess Pt arrives after reported brief LOC.  He states he was just tired and fells asleep as he usually does during early morning HD sessions.  Feels well, is at baseline.  Labs, ekg, cxr and reassess

## 2020-03-07 NOTE — ED PROVIDER NOTE - PROGRESS NOTE DETAILS
Brian Abarca DO PGY2 - spoke to nephrology, can get dialysis if arrives by 6pm. Asymptomatic, agrees to plan. Will dc to get dialysis

## 2020-03-07 NOTE — ED ADULT NURSE REASSESSMENT NOTE - NS ED NURSE REASSESS COMMENT FT1
Pt received in room 26.  AOX4, skin warm, dry and intact.  Pt with left AV graft, + thrills noted, dressing dry and intact at this time.  Pt currently without complaints.  Repeat troponin drawn and sent.  Pt with right hand 20g.  Flushing without difficulty, site dry and intact at this time.

## 2020-03-07 NOTE — ED PROVIDER NOTE - OBJECTIVE STATEMENT
55M h/o T2DM, HTN, CAD (s/p stent 2016), HFrEF (EF 25%) s/p ICD, ESRD on HD T/Th/Sat via LUE AV fistula, GIB 2/2 ulcer p/w syncopal episode at dialysis today 15 mins into session. Pt notes that 55M h/o T2DM, HTN, CAD (s/p stent 2016), HFrEF (EF 25%) s/p ICD, ESRD on HD T/Th/Sat via LUE AV fistula, GIB 2/2 ulcer p/w syncopal episode at dialysis today 15 mins into session. Noted that he was sleepy all morning and felt more sleepy after getting stuck with a needle 4 times before access was gotten for dialysis. Does not remember passing out. Says that "his bp drops whenever he gets stuck with needles". Asymptoamtic currently. Denies cp, sob, abd pain, headache preceding passing out. Previous admission for similar sx and had unremarkable hospital course. 55 yr old M h/o T2DM, HTN, CAD (s/p stent 2016), HFrEF (EF 25%) s/p ICD, ESRD on HD T/Th/Sat via LUE AV fistula, GIB 2/2 ulcer presents after reported syncopal episode at dialysis today 15 mins into session. Noted that he was sleepy all morning and felt more sleepy after getting stuck with a needle 4 times before access was gotten for dialysis. Does not remember passing out. Says that "his bp drops whenever he gets stuck with needles". Asymptomatic currently. Denies cp, sob, abd pain, headache preceding passing out. Previous admission for similar sx and had unremarkable hospital course.

## 2020-03-07 NOTE — ED ADULT NURSE NOTE - NSIMPLEMENTINTERV_GEN_ALL_ED
Implemented All Universal Safety Interventions:  [place] to call system. Call bell, personal items and telephone within reach. Instruct patient to call for assistance. Room bathroom lighting operational. Non-slip footwear when patient is off stretcher. Physically safe environment: no spills, clutter or unnecessary equipment. Stretcher in lowest position, wheels locked, appropriate side rails in place.

## 2020-03-13 ENCOUNTER — APPOINTMENT (OUTPATIENT)
Dept: RADIOLOGY | Facility: CLINIC | Age: 55
End: 2020-03-13
Payer: MEDICARE

## 2020-03-13 ENCOUNTER — OUTPATIENT (OUTPATIENT)
Dept: OUTPATIENT SERVICES | Facility: HOSPITAL | Age: 55
LOS: 1 days | End: 2020-03-13
Payer: MEDICARE

## 2020-03-13 ENCOUNTER — APPOINTMENT (OUTPATIENT)
Dept: ULTRASOUND IMAGING | Facility: CLINIC | Age: 55
End: 2020-03-13
Payer: MEDICARE

## 2020-03-13 DIAGNOSIS — T82.868A THROMBOSIS DUE TO VASCULAR PROSTHETIC DEVICES, IMPLANTS AND GRAFTS, INITIAL ENCOUNTER: Chronic | ICD-10-CM

## 2020-03-13 DIAGNOSIS — Z95.5 PRESENCE OF CORONARY ANGIOPLASTY IMPLANT AND GRAFT: Chronic | ICD-10-CM

## 2020-03-13 DIAGNOSIS — Z00.8 ENCOUNTER FOR OTHER GENERAL EXAMINATION: ICD-10-CM

## 2020-03-13 DIAGNOSIS — Z95.810 PRESENCE OF AUTOMATIC (IMPLANTABLE) CARDIAC DEFIBRILLATOR: Chronic | ICD-10-CM

## 2020-03-13 PROCEDURE — 77080 DXA BONE DENSITY AXIAL: CPT | Mod: 26

## 2020-03-13 PROCEDURE — 93925 LOWER EXTREMITY STUDY: CPT | Mod: 26

## 2020-03-13 PROCEDURE — 93880 EXTRACRANIAL BILAT STUDY: CPT

## 2020-03-13 PROCEDURE — 93880 EXTRACRANIAL BILAT STUDY: CPT | Mod: 26

## 2020-03-13 PROCEDURE — 93925 LOWER EXTREMITY STUDY: CPT

## 2020-03-13 PROCEDURE — 77080 DXA BONE DENSITY AXIAL: CPT

## 2020-03-20 ENCOUNTER — APPOINTMENT (OUTPATIENT)
Dept: PULMONOLOGY | Facility: CLINIC | Age: 55
End: 2020-03-20
Payer: MEDICARE

## 2020-03-20 PROCEDURE — 94729 DIFFUSING CAPACITY: CPT

## 2020-03-20 PROCEDURE — 94010 BREATHING CAPACITY TEST: CPT

## 2020-03-20 PROCEDURE — 94726 PLETHYSMOGRAPHY LUNG VOLUMES: CPT

## 2020-03-23 ENCOUNTER — APPOINTMENT (OUTPATIENT)
Dept: GASTROENTEROLOGY | Facility: CLINIC | Age: 55
End: 2020-03-23

## 2020-04-02 ENCOUNTER — APPOINTMENT (OUTPATIENT)
Dept: ELECTROPHYSIOLOGY | Facility: CLINIC | Age: 55
End: 2020-04-02
Payer: MEDICARE

## 2020-04-02 PROCEDURE — 93295 DEV INTERROG REMOTE 1/2/MLT: CPT

## 2020-04-02 PROCEDURE — 93296 REM INTERROG EVL PM/IDS: CPT

## 2020-04-02 PROCEDURE — G2066: CPT

## 2020-04-10 ENCOUNTER — APPOINTMENT (OUTPATIENT)
Dept: INTERNAL MEDICINE | Facility: CLINIC | Age: 55
End: 2020-04-10
Payer: MEDICARE

## 2020-04-10 ENCOUNTER — OUTPATIENT (OUTPATIENT)
Dept: OUTPATIENT SERVICES | Facility: HOSPITAL | Age: 55
LOS: 1 days | End: 2020-04-10

## 2020-04-10 DIAGNOSIS — N25.81 SECONDARY HYPERPARATHYROIDISM OF RENAL ORIGIN: ICD-10-CM

## 2020-04-10 DIAGNOSIS — T82.868A THROMBOSIS DUE TO VASCULAR PROSTHETIC DEVICES, IMPLANTS AND GRAFTS, INITIAL ENCOUNTER: Chronic | ICD-10-CM

## 2020-04-10 DIAGNOSIS — E11.9 TYPE 2 DIABETES MELLITUS WITHOUT COMPLICATIONS: ICD-10-CM

## 2020-04-10 DIAGNOSIS — A04.8 OTHER SPECIFIED BACTERIAL INTESTINAL INFECTIONS: ICD-10-CM

## 2020-04-10 DIAGNOSIS — E83.39 OTHER DISORDERS OF PHOSPHORUS METABOLISM: ICD-10-CM

## 2020-04-10 DIAGNOSIS — N18.6 END STAGE RENAL DISEASE: ICD-10-CM

## 2020-04-10 DIAGNOSIS — Z95.5 PRESENCE OF CORONARY ANGIOPLASTY IMPLANT AND GRAFT: Chronic | ICD-10-CM

## 2020-04-10 DIAGNOSIS — I50.22 CHRONIC SYSTOLIC (CONGESTIVE) HEART FAILURE: ICD-10-CM

## 2020-04-10 DIAGNOSIS — Z95.810 PRESENCE OF AUTOMATIC (IMPLANTABLE) CARDIAC DEFIBRILLATOR: Chronic | ICD-10-CM

## 2020-04-10 PROCEDURE — ZZZZZ: CPT

## 2020-04-10 RX ORDER — MIDODRINE HYDROCHLORIDE 5 MG/1
5 TABLET ORAL
Qty: 90 | Refills: 1 | Status: DISCONTINUED | COMMUNITY
Start: 2019-12-16 | End: 2020-04-10

## 2020-04-15 ENCOUNTER — APPOINTMENT (OUTPATIENT)
Dept: ELECTROPHYSIOLOGY | Facility: CLINIC | Age: 55
End: 2020-04-15

## 2020-04-16 ENCOUNTER — APPOINTMENT (OUTPATIENT)
Dept: GASTROENTEROLOGY | Facility: CLINIC | Age: 55
End: 2020-04-16

## 2020-04-20 ENCOUNTER — APPOINTMENT (OUTPATIENT)
Dept: ULTRASOUND IMAGING | Facility: CLINIC | Age: 55
End: 2020-04-20
Payer: MEDICARE

## 2020-04-20 ENCOUNTER — OUTPATIENT (OUTPATIENT)
Dept: OUTPATIENT SERVICES | Facility: HOSPITAL | Age: 55
LOS: 1 days | End: 2020-04-20
Payer: MEDICARE

## 2020-04-20 ENCOUNTER — RESULT REVIEW (OUTPATIENT)
Age: 55
End: 2020-04-20

## 2020-04-20 DIAGNOSIS — T82.868A THROMBOSIS DUE TO VASCULAR PROSTHETIC DEVICES, IMPLANTS AND GRAFTS, INITIAL ENCOUNTER: Chronic | ICD-10-CM

## 2020-04-20 DIAGNOSIS — Z95.810 PRESENCE OF AUTOMATIC (IMPLANTABLE) CARDIAC DEFIBRILLATOR: Chronic | ICD-10-CM

## 2020-04-20 DIAGNOSIS — Z00.8 ENCOUNTER FOR OTHER GENERAL EXAMINATION: ICD-10-CM

## 2020-04-20 DIAGNOSIS — Z95.5 PRESENCE OF CORONARY ANGIOPLASTY IMPLANT AND GRAFT: Chronic | ICD-10-CM

## 2020-04-20 PROCEDURE — 93978 VASCULAR STUDY: CPT | Mod: 26

## 2020-04-20 PROCEDURE — 93978 VASCULAR STUDY: CPT

## 2020-04-24 ENCOUNTER — APPOINTMENT (OUTPATIENT)
Dept: CARDIOLOGY | Facility: CLINIC | Age: 55
End: 2020-04-24
Payer: MEDICARE

## 2020-04-24 PROCEDURE — 99214 OFFICE O/P EST MOD 30 MIN: CPT | Mod: 95

## 2020-04-24 RX ORDER — SEVELAMER CARBONATE 800 MG/1
800 TABLET, FILM COATED ORAL
Qty: 90 | Refills: 2 | Status: DISCONTINUED | COMMUNITY
Start: 2020-04-09 | End: 2020-04-24

## 2020-04-30 NOTE — HISTORY OF PRESENT ILLNESS
[Home] : at home, [unfilled] , at the time of the visit. [Other Location: e.g. Home (Enter Location, City,State)___] : at [unfilled] [Other:____] : [unfilled] [Patient] : the patient [FreeTextEntry4] : [place] [FreeTextEntry1] : 55 year old M w/ h/o IDDM (A1c 8 1/20) c/b possible retinopathy/nephropathy, HTN, CAD (s/p stent in [place] in 2016, unknown coronary anatomy), HFrEF (EF 25%, LVEDD 6.6 cm) s/p subQ ICD, prior severe MR (functional; now improved), ESRD on HD via permacath (T/Th/Sat; AV fistula now patent), prior GIB 2/2 ulcer (H. pylori positive) who is being seen via Teleheatl due to Corona epidemic. An evaluation for heart/kidney transplant was launched on 1/20 and he has since been undergoing the workup. \par \par Last was admitted to [facility] from dialysis from 12/12-12/14 secondary to bradycardia with HR 40's 20 minutes into dialysis treatment with a drop in B/P. He was seen by EP Dr. Cantu and had an ILR implanted on 12/13/19. Midodrine was also started at that time.\par \par Since last visit 2/28, he had another episode of bradycardia on 3/7 while receiving dialysis with HR in 40s for 25 seconds, reportedly lost consciousness. Had presented to ED where vitals were within normal limits. Labs notable for mild troponin elevation, otherwise electrolytes, CBC and CMP were stable. ECG revealed NSR, first deg AV block, PVCs (all unifocal), poor RWP (unchanged from prior). Was discharged as he was stable, /80. \par \par He reports exertional dyspnea and can only walk about 1/2 block. He also reports leg edema but denies orthopnea, PND, chest pain, palpitations, syncope, ICD discharge, fever, chills, nausea, or abdominal distention. Appetite is good and watches his fluid and salt intake. Able to climb 14 stairs w/o stopping. \par \par Was seen by hepatology and had fibroscan which showed F4 fibrosis and an abdominal US was done subsequently on 2/9 which showed no cirrhosis. \par \par He was seen by hematology for workup of thrombocytopenia and underwent BM Bx on 2/5 which showed normal trilineage hematopoiesis. \par

## 2020-04-30 NOTE — ASSESSMENT
[FreeTextEntry1] : 55 year old M w/ h/o IDDM c/b neuropathy (A1c 7.1 8/19), HTN, CAD (s/p stent in [place] in 2016, unknown coronary anatomy), HFrEF (EF 25%, LVEDD 6.6 cm) s/p subQ ICD, ESRD on HD via permacath (T/Th/Sat via LUE AV fistula), prior GIB 2/2 ulcer (H. pylori positive) who is being seen via Telehealth due to Corona epidemic. Currently endoreses class III symptoms and difficult to assess volume status. Concerning features are hypotension during HD and inability to tolerate neurohormonal agents. \par \par 1. HFrEF - unclear etiology; prior known CAD; possibly HTN \par - continue bumex 2mg - 2 tabs twice a day \par - previously on metoprolol 50 mg bid which has since been discontinued likely due to bradycardia at time of dialysis (likely vagal mediated)\par - He has been off of hydralazine; on isordil 20 mg daily (as he forgets to take it three times/day and gets dizzy)\par - while DM w/ complications is a relative contraindication, he will likely benefit from heart/kidney transplant as his DM is well controlled now, he will continue to follow up with his endocrinologist.\par - will need RHC once able to have done\par \par 2. ESRD on HD; left AVF with + thrill and bruit\par - instructed to follow low potassium diet\par \par 3. CAD - prior PCI- no active chest pain or EKG changes\par - on ASA 81 mg daily\par - on lipitor 80 mg qhs\par \par 4. Claudication - likely 2/2 low flow with exertion \par - LAURI and arterial Dopplers with no evidence of significant stenoses\par \par Follow up in 4 weeks \par \par Total time spent on Telehealth enounter 25 min

## 2020-04-30 NOTE — PHYSICAL EXAM
[Normal Appearance] : normal appearance [General Appearance - Well Developed] : well developed [Normal Conjunctiva] : the conjunctiva exhibited no abnormalities [General Appearance - Well Nourished] : well nourished [Normal Oropharynx] : normal oropharynx [Normal Oral Mucosa] : normal oral mucosa [] : no respiratory distress [Respiration, Rhythm And Depth] : normal respiratory rhythm and effort [Auscultation Breath Sounds / Voice Sounds] : lungs were clear to auscultation bilaterally [Abdomen Soft] : soft [Abdomen Tenderness] : non-tender [Cyanosis, Localized] : no localized cyanosis [Nail Clubbing] : no clubbing of the fingernails [Abnormal Walk] : normal gait [Skin Color & Pigmentation] : normal skin color and pigmentation [Oriented To Time, Place, And Person] : oriented to person, place, and time [Impaired Insight] : insight and judgment were intact [FreeTextEntry1] : no pedal edema bilaterally

## 2020-05-01 ENCOUNTER — APPOINTMENT (OUTPATIENT)
Dept: CARDIOLOGY | Facility: CLINIC | Age: 55
End: 2020-05-01

## 2020-05-02 ENCOUNTER — OUTPATIENT (OUTPATIENT)
Dept: OUTPATIENT SERVICES | Facility: HOSPITAL | Age: 55
LOS: 1 days | Discharge: HOME | End: 2020-05-02

## 2020-05-02 DIAGNOSIS — Z95.810 PRESENCE OF AUTOMATIC (IMPLANTABLE) CARDIAC DEFIBRILLATOR: Chronic | ICD-10-CM

## 2020-05-02 DIAGNOSIS — D64.9 ANEMIA, UNSPECIFIED: ICD-10-CM

## 2020-05-02 DIAGNOSIS — T82.868A THROMBOSIS DUE TO VASCULAR PROSTHETIC DEVICES, IMPLANTS AND GRAFTS, INITIAL ENCOUNTER: Chronic | ICD-10-CM

## 2020-05-02 DIAGNOSIS — Z95.5 PRESENCE OF CORONARY ANGIOPLASTY IMPLANT AND GRAFT: Chronic | ICD-10-CM

## 2020-05-04 ENCOUNTER — APPOINTMENT (OUTPATIENT)
Dept: ELECTROPHYSIOLOGY | Facility: CLINIC | Age: 55
End: 2020-05-04
Payer: MEDICARE

## 2020-05-04 PROCEDURE — G2066: CPT

## 2020-05-04 PROCEDURE — 93298 REM INTERROG DEV EVAL SCRMS: CPT

## 2020-05-07 NOTE — CONSULT NOTE ADULT - SUBJECTIVE AND OBJECTIVE BOX
Prior Authorization Retail Medication Request    Medication/Dose: buprenorphine HCl-naloxone HCl (SUBOXONE) 8-2 MG per film   ICD code (if different than what is on RX):    Previously Tried and Failed:    Rationale:      Insurance Name:  Covermeds  Insurance ID:  [id number]      Pharmacy Information (if different than what is on RX)  Name:    Phone:       HISTORY OF PRESENT ILLNESS:    53 YO M hx of HTN, CAD, ESRD, HLD, NIDDM, HFrEF presented with bradycardia after 1 hour of dialysis. Pt. was asymptomatic at the time. Currently asymptomatic as well. Ed consulted EP for interrogation of device.       Allergies    No Known Allergies    Intolerances    	    MEDICATIONS:        PAST MEDICAL & SURGICAL HISTORY:  Congestive heart disease  ESRD (end stage renal disease) on dialysis  GIB (gastrointestinal bleeding): was treated  with H pyelori stable since Dec 10, 2018  HFrEF (heart failure with reduced ejection fraction): EF 20-24%  Myocardial infarction: Jan 2016  Hyperlipidemia  CAD (coronary artery disease): 2016, coronary artery stentX1  DM (diabetes mellitus): 2  not taking any medication lat4st HgA1c 5.7  HTN (hypertension)  History of implantable cardioverter-defibrillator (ICD) placement  Stented coronary artery: ? 1  Arterial stent thrombosis      FAMILY HISTORY:  Family history of diabetes mellitus (DM) (Sibling)  Family history of MI (myocardial infarction) (Sibling)      SOCIAL HISTORY:    [ ] Non-smoker  [ ] Smoker  [ ] Alcohol      REVIEW OF SYSTEMS:  General: no fatigue/malaise, weight loss/gain.  Skin: no rashes.  Ophthalmologic: no blurred vision, no loss of vision. 	  ENT: no sore throat, rhinorrhea, sinus congestion.  Respiratory: no SOB, cough or wheeze.  Gastrointestinal:  no N/V/D, no melena/hematemesis/hematochezia.  Genitourinary: no dysuria/hesitancy or hematuria.  Musculoskeletal: no myalgias or arthralgias.  Neurological: no changes in vision or hearing, no lightheadedness/dizziness, no syncope/near syncope	  Psychiatric: no unusual stress/anxiety.   Hematology/Lymphatics: no unusual bleeding, bruising and no lymphadenopathy.  Endocrine: no unusual thirst.   All others negative except as stated above and in HPI.    PHYSICAL EXAM:  T(C): 36.4 (12-12-19 @ 07:03), Max: 36.4 (12-12-19 @ 07:03)  HR: 80 (12-12-19 @ 08:31) (80 - 89)  BP: 132/80 (12-12-19 @ 08:31) (119/81 - 132/80)  RR: 14 (12-12-19 @ 08:31) (14 - 16)  SpO2: 100% (12-12-19 @ 08:31) (95% - 100%)  Wt(kg): --  I&O's Summary      Appearance: Normal	  HEENT:   Normal oral mucosa, PERRL, EOMI	  Lymphatic: No lymphadenopathy  Cardiovascular: Normal S1 S2, No JVD, No murmurs, No edema  Respiratory: Lungs clear to auscultation	  Psychiatry: A & O x 3, Mood & affect appropriate  Gastrointestinal:  Soft, Non-tender, + BS	  Skin: No rashes, No ecchymoses, No cyanosis	  Neurologic: Non-focal  Extremities: Normal range of motion, No clubbing, cyanosis or edema  Vascular: Peripheral pulses palpable 2+ bilaterally        LABS:	 	    CBC Full  -  ( 12 Dec 2019 08:50 )  WBC Count : 5.63 K/uL  Hemoglobin : 11.2 g/dL  Hematocrit : 36.5 %  Platelet Count - Automated : 121 K/uL  Mean Cell Volume : 92.9 fL  Mean Cell Hemoglobin : 28.5 pg  Mean Cell Hemoglobin Concentration : 30.7 %  Auto Neutrophil # : 3.70 K/uL  Auto Lymphocyte # : 1.16 K/uL  Auto Monocyte # : 0.46 K/uL  Auto Eosinophil # : 0.22 K/uL  Auto Basophil # : 0.07 K/uL  Auto Neutrophil % : 65.7 %  Auto Lymphocyte % : 20.6 %  Auto Monocyte % : 8.2 %  Auto Eosinophil % : 3.9 %  Auto Basophil % : 1.2 %    12-12    138  |  96<L>  |  36<H>  ----------------------------<  143<H>  3.9   |  25  |  6.25<H>    Ca    9.1      12 Dec 2019 08:50  Phos  5.2     12-12  Mg     2.3     12-12    TPro  7.3  /  Alb  4.0  /  TBili  0.7  /  DBili  x   /  AST  12  /  ALT  17  /  AlkPhos  127<H>  12-12      proBNP:   Lipid Profile:   HgA1c:   TSH:       CARDIAC MARKERS:        TELEMETRY: 	 Sinus rhythm with frequent PVC   ECG:  	 Sinus rhythm with 1st degree AV block, Poor R wave progression  RADIOLOGY:   OTHER: 	    PREVIOUS DIAGNOSTIC TESTING:    [ ] Echocardiogram:  < from: Transthoracic Echocardiogram (08.14.19 @ 10:16) >  CONCLUSIONS:  1. Tethered mitral valve leaflets. Probably mild-moderate  posteriorly directed mitral regurgitation.  2. Normal trileaflet aortic valve.  3. Severely dilated left atrium.  LA volume index = 56  cc/m2.  4. Severe global left ventricular systolic dysfunction.  5. Right ventricular enlargement with decreased right  ventricular systolic function.  6. Normal tricuspid valve. Mild tricuspid regurgitation.  7. Estimated pulmonary artery systolic pressure equals 58  mm Hg, assuming right atrial pressure equals 10  mm Hg,  consistent with moderate pulmonary hypertension.    < end of copied text >      [ ]  Catheterization:        [ ] Stress Test:  	  	  ASSESSMENT/PLAN: 	  53 YO M hx of HTN, CAD, ESRD, HLD, NIDDM, HFrEF presented with bradycardia while on dialysis. Pt. asymptomatic throughout. ED consulted for assessment of bradycardia.     Patient has subcutaneous ICD which cannot pace  Interrogation unlikely to yield any benefit  Unclear etiology of bradycardia especially considering asymptomatic  Would reattempt dialysis and if possible with tele monitoring  If patient becomes bradycardic would repeat 12 lead EKG while bradycardic  Monitor electrolytes, K 4-4.5, Mg >2      Vipul House  Cardiology Fellow  [phone]  All Cardiology service information can be found 24/7 on [email or web address], password: [place]

## 2020-05-08 ENCOUNTER — APPOINTMENT (OUTPATIENT)
Dept: HEMATOLOGY ONCOLOGY | Facility: CLINIC | Age: 55
End: 2020-05-08
Payer: MEDICARE

## 2020-05-08 PROCEDURE — 99214 OFFICE O/P EST MOD 30 MIN: CPT | Mod: 95

## 2020-05-08 PROCEDURE — 99204 OFFICE O/P NEW MOD 45 MIN: CPT | Mod: 95

## 2020-05-08 NOTE — HISTORY OF PRESENT ILLNESS
[0 - No Distress] : Distress Level: 0 [Home] : at home, [unfilled] , at the time of the visit. [Medical Office: ([facility])___] : at the medical office located in  [Patient] : the patient [Other:____] : [unfilled] [Self] : self [FreeTextEntry4] : sister [FreeTextEntry2] : Devan Lorenzo [de-identified] : BMB 2/11/2020: Erythroid predominant trilineage hematopoiesis with maturation, mild polytypic plasmacytosis and present iron stores. \par Normal male karyotype\par Flow cytometry- the lymphocyte immunophenotypic findings show no diagnostic abnormalities with increased proportion of natural killer T cells. The myeloid immunophenotypic findings show no increase in myeloid immaturity, normal myeloid antigen maturation pattern, and the presence of hematogones. The plasma cell immunophenotype is unremarkable with polyclonality. \par \par No other changes in his medical, surgical or social history since 1/24/2020. .  [de-identified] : 55 yo gentleman with history of DM type 2 c/b retinopathy, neuropathy, nephropathy, HTN, HLD, MI 2016 s/p stent placement, HFrEF. Patient has have recent multiples hospitalizations  January, 2019 with  perma cath placement and AVF formation. Patient is currently getting dialysis TIW, started on January, 2019. Patient's PLT count was normal last on December, 2018 - 215K. \par Patient was also admitted on March, 2019 and was found to have melena, endoscopic showed and ulcer, treated for H pylori x 15 days. \par \par Patient denies bleeding, epistaxis, melena, hematochezia. Denies fevers, night sweats, weight loss. Denies history of blood clots. \par \par Laboratory studies 5/29/19 WBC 5.86, RBC 4.3, Hb 12.8g/dl, HCT 39.4, MCV 91.6, RDW14.3, PLTS 46K. Diff-  WNL.\par Vitamin B 12 -529\par Folate 12/5, HIV  nonreactive, Hep C nonreactive.\par PLT count blue top tube 39.

## 2020-05-08 NOTE — ASSESSMENT
[FreeTextEntry1] : 55 yo gentleman with history of DM type 2 c/b retinopathy, neuropathy, nephropathy, HTN, HLD, MI 2016 s/p stent placement, HFrEF. \par \par -Iron deficiency anemia: 7/1/19 Iron studies : iron level 36 , TIBC 370, % saturation 10%; received 2 doses of Feraheme 510 mg weekly.  9/17/19 ferritin 142, iron level 81, TIBC 31%. \par \par BMB 2/11/2020: Erythroid predominant trilineage hematopoiesis with maturation, mild polytypic plasmacytosis and present iron stores. \par Normal male karyotype\par Flow cytometry- the lymphocyte immunophenotypic findings show no diagnostic abnormalities with increased proportion of natural killer T cells. The myeloid immunophenotypic findings show no increase in myeloid immaturity, normal myeloid antigen maturation pattern, and the presence of hematogones. The plasma cell immunophenotype is unremarkable with polyclonality. \par \par - Thrombocytopenia: Reviewed of laboratory studies showed that his PLT count was normal before starting dialysis. Thrombocytopenia is likely secondary to renal insufficiency, stable 133. Avoid heparin with dialysis. \par \par This service was provided by using telehealth. The patient was at home and I was at [facility]. The patient requested and participated in this encounter. The encounter face to face last 30   minutes coordinating his/her care and counseling.\par \par \par RTC  6 months. \par

## 2020-05-20 ENCOUNTER — APPOINTMENT (OUTPATIENT)
Dept: CV DIAGNOSITCS | Facility: HOSPITAL | Age: 55
End: 2020-05-20

## 2020-06-08 NOTE — PATIENT PROFILE ADULT - HOW PATIENT ADDRESSED, PROFILE
[facility]  [address] [facility] [address]      Josy Townsend :1995 MRN:[id number]    2020 Time Session Began: 2:02 p.m.  Time Session Ended:  2:48 p.m.    Due to COVID-19 precautions, this visit was performed via live interactive two-way video with patient's verbal consent.   Clinician Location:Clinic.  Patient Location: Home.  Verified patient identity:  [x] Yes    Session Type:45 Minute Therapy (59614)    Others Present: No    Intervention: Behavioral, Cognitive, Supportive    Suicide/Homicide/Violence Ideation: No    If Yes, explain:     Current Outpatient Medications   Medication Sig   • norethindrone-ethinyl estradiol-FE (MICROGESTIN FE ) 1-20 MG-MCG per tablet Take 1 tablet by mouth daily.   • tretinoin (RETIN-A) 0.025 % cream Apply to affected area at bedtime as directed.   • hydroCORTisone (CORTIZONE) 2.5 % cream Apply to affected areas of the skin once daily for up to 2 weeks.   • sodium fluoride (PREVIDENT 5000 BOOSTER) 1.1 % dental paste use as directed once daily   • albuterol 108 (90 Base) MCG/ACT inhaler Inhale 2 puffs into the lungs Every 4 hours as needed.   • clotrimazole-betamethasone (LOTRISONE) cream Apply to hands/fingers twice a day for up to 2 weeks.   • ibuprofen (MOTRIN) 600 MG tablet Take 1 tablet by mouth every 6 hours as needed for Pain.   • cetirizine (ZYRTEC) 10 MG tablet Take 10 mg by mouth daily.     No current facility-administered medications for this visit.        Change in Medication(s) Reported: No  If Yes, explain:     SCREENINGS:  Recent PHQ 2/9 Score    PHQ 2:  Date Adult PHQ 2 Score Adult PHQ 2 Interpretation   2020 0 No further screening needed       PHQ 9:  Date Adult PHQ 9 Score Adult PHQ 9 Interpretation   2020 3 Minimal Depression     Most Recent IVÁN 7 Score       Date IVÁN 7 Score   2020 5       Patient/Family Education Provided: Yes  Patient/Family  Displays Understanding: Yes    If No, explain:     Chief complaint in patient's own words: \"My stress level was at its peak after we talked last time.  I had not been that stressed for months.  I got zero sleep because I was so anxious and I was crying over little things.  I called my mom and she came to change bill and was very, very helpful.  I talked to her and a friend and decided not to go to school next year.  I instantly felt await was listed from my shoulders.  I hold these societal pressures on myself about what I should be doing.  I compare myself to my friends.  I always am so focused on the negatives on the bad things that could happen.  I am telling myself I am not responsible for other people's feelings or how they feel about my actions.  My thinking have gone away but I have more tools to manage it.\"    Progress Note containing chief complaint and symptoms/problems related to the complaint:    (Data/Action/Response/Plan)    D:  The patient presented for her video visit.  She reviewed and updated the treatment plan with writer.  She reported her anxiety has decreased, discussing the anxiety she had experienced about the possibility of starting graduate school in the fall.  She explored the steps she took to manage her anxiety and address her decision, reaching out for support to her mother and a friend.  She discussed her decision to not apply for school in the fall, exploring the relief she experienced and the increased motivation to find a full-time job.  She reported she had a job interview, noting “everything has been falling into place since I made that decision.”  She identified and discussed her awareness that she sets unrealistic expectations for herself and worries about others feelings and their disapproval of her decisions.  She verbalized her efforts to remind herself she is not responsible for the feelings of others and their opinions of her decision.  She continued to explore her tendency  to focus on potential negative outcomes rather than positives.  She verbalized her awareness that her negative thinking still occurs but is able to focus on utilizing tools to manage her negative self talk and her anxiety.  She reported she uses the 5-4-2-1 distraction technique “all the time,” and a breathing relaxation technique.  She discussed her relief that she has decreased her tendency to procrastinate.  She reported she would like to transfer for longer-term psychotherapy, preferring a female psychotherapist.  A:  Utilized a supportive and cognitive behavioral approach with the patient.  Administered the PHQ 9 and the IVÁN-7, reviewing the results with the patient.  Assisted the patient in reviewing and updating her treatment plan.  Reinforced her efforts to utilize relaxation techniques, distraction techniques and to challenge and replace her negative thinking.  Facilitated a discussion regarding her unrealistic expectations for self and tendency to compare herself to others, assisting the patient in challenging and replacing her negative self talk.  Discussed a transfer for longer-term psychotherapy, exploring clinic options.  R:  Patient was alert and oriented.  Affect was pleasant.  She was engaged in the therapeutic process, demonstrating insight regarding her unrealistic expectations of self, difficulty detaching from others, comparing herself to others and tendency to focus on negative possibilities.  Patient participated in reviewing an updating her treatment plan, giving verbal consent to her updated treatment plan.  P:  Plan is to continue with individual therapy and to facilitate a transfer for longer-term psychotherapy    Need for Community Resources Assessed: Yes    Resources Needed: Yes    If Yes, what resources:     Primary Diagnosis: Generalized Anxiety Disorder  : 1 Mild    Treatment Plan: Modified    Discharge Plan: Strategies Discussed to Maintain Gains, Continue with M.DSabra    Next  Appointment:  06/22/2020  Brooklyn Pope, PHD   Mr Lorenzo

## 2020-06-26 PROBLEM — I50.9 CONGESTIVE HEART FAILURE: Status: ACTIVE | Noted: 2018-03-14

## 2020-06-26 PROBLEM — R55 VASOVAGAL NEAR-SYNCOPE: Status: ACTIVE | Noted: 2020-01-01

## 2020-06-26 NOTE — PHYSICAL EXAM
[General Appearance - Well Developed] : well developed [Normal Appearance] : normal appearance [Well Groomed] : well groomed [General Appearance - Well Nourished] : well nourished [No Deformities] : no deformities [General Appearance - In No Acute Distress] : no acute distress [Normal Conjunctiva] : the conjunctiva exhibited no abnormalities [Eyelids - No Xanthelasma] : the eyelids demonstrated no xanthelasmas [Normal Oral Mucosa] : normal oral mucosa [No Oral Pallor] : no oral pallor [Normal Jugular Venous A Waves Present] : normal jugular venous A waves present [Normal Jugular Venous V Waves Present] : normal jugular venous V waves present [No Oral Cyanosis] : no oral cyanosis [Heart Rate And Rhythm] : heart rate and rhythm were normal [No Jugular Venous Jacobo A Waves] : no jugular venous jacobo A waves [Murmurs] : no murmurs present [Heart Sounds] : normal S1 and S2 [Exaggerated Use Of Accessory Muscles For Inspiration] : no accessory muscle use [Respiration, Rhythm And Depth] : normal respiratory rhythm and effort [Abdomen Tenderness] : non-tender [Auscultation Breath Sounds / Voice Sounds] : lungs were clear to auscultation bilaterally [Abdomen Soft] : soft [Abnormal Walk] : normal gait [Abdomen Mass (___ Cm)] : no abdominal mass palpated [Nail Clubbing] : no clubbing of the fingernails [Gait - Sufficient For Exercise Testing] : the gait was sufficient for exercise testing [Cyanosis, Localized] : no localized cyanosis [Petechial Hemorrhages (___cm)] : no petechial hemorrhages [No Venous Stasis] : no venous stasis [Skin Color & Pigmentation] : normal skin color and pigmentation [] : no rash [Skin Lesions] : no skin lesions [No Skin Ulcers] : no skin ulcer [No Xanthoma] : no  xanthoma was observed [Oriented To Time, Place, And Person] : oriented to person, place, and time [Affect] : the affect was normal [Mood] : the mood was normal [No Anxiety] : not feeling anxious

## 2020-06-27 NOTE — HISTORY OF PRESENT ILLNESS
[FreeTextEntry1] : Enio Pavon MD\par \par Devan Lorenzo is a 56y/o man with Hx of HTN, HLD, DM, CAD s/p PCI, ESRD on HD via L LE AV fisutla (T/TH/Sat) and chronic systolic CHF s/p subq ICD placement and recurring bradycardia s/p ILR placement who presents today for initial evaluation. Last was admitted to [facility] from dialysis from 12/12-12/14 secondary to bradycardia with HR 40's 20 minutes into dialysis treatment with a drop in B/P. He was seen by EP Dr. Cantu and had an ILR implanted on 12/13/19. Midodrine was also started at that time. He had another incident in dialysis in March where he passed out after 280cc fluid removal. ILR revealed an episode of bradycardia in the 40s which lasted about 25 seconds in March as well but patient was asymptomatic and at dialysis at that time.  Has been doing well. Does get vasovagal response from seeing needles at HD. He notes his BP and HR to drop when they try to access him but always comes back to normal after. Denies any daily episodes of dizziness or syncope or near syncope. ILR with no recent bradycardia, last episode back in March. Denies chest pain, palpitations, SOB, syncope or near syncope.\par \par

## 2020-06-27 NOTE — DISCUSSION/SUMMARY
[FreeTextEntry1] : Devan Lorenzo is a 54y/o man with Hx of HTN, HLD, DM, CAD s/p PCI, ESRD on HD via L LE AV fisutla (T/TH/Sat) and chronic systolic CHF s/p subq ICD placement and recurring bradycardia s/p ILR placement who presents today for initial evaluation.\par \par Impression:\par \par 1. Chronic systolic CHF: s/p subq ICD placement. No shocks from ICD. Resume routine ICD monitoring. Resume OMT as prescribed, encouraged heart healthy diet, daily weight, and regular f/u with Cardiology/Heart failure team as scheduled.\par \par 2. Symptomatic bradycardia/vasovagal syncope: episodes of syncope or near syncope associated with HD and needles. Last episode of bradycardia on ILR back in March. Will resume routine ILR monitoring to assess for further evidence of sustained or symptomatic bradycardia. \par \par 3. HTN: resume oral antihypertensives as prescribed. Encouraged heart healthy diet, sodium restriction, and weight loss. Continue regular f/u with Cardiologist for further HTN management.\par \par 4. HLD: resume statin therapy as prescribed and regular f/u with Cardiologist for routine lipid monitoring and management.\par \par Sincerely,\par \par Kwame Ornelas MD

## 2020-07-31 NOTE — PHYSICAL EXAM
[Normal Appearance] : normal appearance [General Appearance - Well Developed] : well developed [Normal Conjunctiva] : the conjunctiva exhibited no abnormalities [General Appearance - Well Nourished] : well nourished [Normal Oral Mucosa] : normal oral mucosa [Normal Oropharynx] : normal oropharynx [] : no respiratory distress [Respiration, Rhythm And Depth] : normal respiratory rhythm and effort [Auscultation Breath Sounds / Voice Sounds] : lungs were clear to auscultation bilaterally [Abdomen Tenderness] : non-tender [Abnormal Walk] : normal gait [Abdomen Soft] : soft [Nail Clubbing] : no clubbing of the fingernails [Cyanosis, Localized] : no localized cyanosis [Impaired Insight] : insight and judgment were intact [Oriented To Time, Place, And Person] : oriented to person, place, and time [Heart Rate And Rhythm] : heart rate and rhythm were normal [Heart Sounds] : normal S1 and S2 [Murmurs] : no murmurs present [FreeTextEntry1] : no pedal edema bilaterally

## 2020-07-31 NOTE — HISTORY OF PRESENT ILLNESS
[Home] : at home, [unfilled] , at the time of the visit. [Other:____] : [unfilled] [Other Location: e.g. Home (Enter Location, City,State)___] : at [unfilled] [Patient] : the patient [FreeTextEntry4] : [place] [FreeTextEntry1] : 55 year old M w/ h/o IDDM (A1c 8 1/20) c/b possible retinopathy/nephropathy, HTN, CAD (s/p stent in [place] in 2016, unknown coronary anatomy), HFrEF (EF 25%, LVEDD 6.6 cm) s/p subQ ICD, prior severe MR (functional; now improved), ESRD on HD (since 1/19; T/Th/Sat; AV fistula), prior GIB 2/2 ulcer (H. pylori positive) who is being seen for follow-up. An evaluation for heart/kidney transplant was launched on 1/20 and he has since been undergoing the workup. \par \par At times he has had episodes of bradycardia when gets started on dialysis which is transient and then resolves. Has had ER visits and had ILR placed. Last visit was 2/28, he had another episode of bradycardia on 3/7 while receiving dialysis with HR in 40s for 25 seconds, reportedly lost consciousness. Had presented to ED where vitals were within normal limits. Labs notable for mild troponin elevation, otherwise electrolytes, CBC and CMP were stable. ECG revealed NSR, first deg AV block, PVCs (all unifocal), poor RWP (unchanged from prior). Was discharged as he was stable, /80. \par \par He reports can only walk about 1/2 block due to swelling in legs causing leg heaviness. He also reports leg edema but denies orthopnea (2 pillows), PND, chest pain, palpitations, syncope, ICD discharge, fever, chills, nausea, or abdominal distention. Appetite is good and watches his fluid and salt intake. Able to climb 14 stairs w/o stopping. \par \laura Previously was tolerating hydralazine last year but in past several months unable to tolerate low dose hydralazine. Reports getting dizzy/lightheaded/somnolent with even 10 mg on non-dialysis days. \par \laura Was seen by hepatology and had fibroscan which showed F4 fibrosis and an abdominal US was done subsequently on 2/9 which showed no cirrhosis. \par \par He was seen by hematology for workup of thrombocytopenia and underwent BM Bx on 2/5 which showed normal trilineage hematopoiesis. \par \par supine: /77, HR 84\par sitting: /75, HR 84\par standing: /70, HR 84

## 2020-07-31 NOTE — ASSESSMENT
[FreeTextEntry1] : 55 year old M w/ h/o IDDM c/b neuropathy (A1c 8 1/20), HTN, CAD (s/p stent in [place] in 2016, unknown coronary anatomy), HFrEF (EF 25%, LVEDD 6.6 cm) s/p subQ ICD, ESRD on HD (T/Th/Sat via LUE AV fistula), prior GIB 2/2 ulcer (H. pylori positive) who is being seen for follow-up. Currently endorses class III symptoms and appears overloaded. Concerning features are hypotension during HD and inability to tolerate neurohormonal agents. \par \par 1. HFrEF - unclear etiology; prior known CAD; possibly HTN \par - previously on metoprolol 50 mg bid which has since been discontinued likely due to bradycardia at time of dialysis (likely vagal mediated)\par - He has been off of hydralazine; on isordil 20 mg daily (as he forgets to take it three times/day and gets dizzy)\par - while DM w/ complications is a relative contraindication, he will likely benefit from heart/kidney transplant as his DM is well controlled now, he will continue to follow up with his endocrinologist.\par - will need RHC once able to have done\par - will refer to other center for heart/kidney transplant\par \par 2. ESRD on HD; left AVF with + thrill and bruit\par - instructed to follow low potassium diet\par \par 3. CAD - prior PCI- no active chest pain or EKG changes\par - on ASA 81 mg daily\par - on lipitor 80 mg qhs\par \par 4. Claudication - likely 2/2 low flow with exertion \par - LAURI and arterial Dopplers with no evidence of significant stenoses\par \par RTC 6 weeks with NP, 12 weeks with me\par Total time spent on Telehealth enounter 25 min

## 2020-08-03 NOTE — PROGRESS NOTE ADULT - PROBLEM SELECTOR PROBLEM 3
Hyperkalemia
CAD (coronary artery disease)
Ear Wedge Repair Text: A wedge excision was completed by carrying down an excision through the full thickness of the ear and cartilage with an inward facing Burow's triangle. The wound was then closed in a layered fashion.

## 2020-08-25 PROBLEM — Z78.9 DENIES ALCOHOL CONSUMPTION: Status: ACTIVE | Noted: 2020-01-01

## 2020-08-25 NOTE — ASSESSMENT
[FreeTextEntry1] : 56 yo M with PMH CAD post stent (2016, [place]), sCHF post ICD, DM2, HTN, HLD, ESRD (T, Th, S), GIB\par \par 1. DM2 - continue current regimen\par \par 2. HTN - continue losartan\par \par 3. HLD - continue atorvastatin

## 2020-08-25 NOTE — HISTORY OF PRESENT ILLNESS
[FreeTextEntry1] : 56 yo M with PMH CAD post stent (2016, [place]), sCHF post ICD, DM2, HTN, HLD, ESRD (T, Th, S), GIB\par \par he was diagnosed with DM2 in 1/2019 \par Microvascular complications: retinopathy post laser treatments\par he is on prandin 1 mg pre lunch 1 mg pre dinner\par B: egg + 1/2 roti + coffee\par L: 1/4 plate rice + cken + veg + water\par D: coffee + crackers\par snacks: fruit, apples, tangerines\par does not check FS values\par Last ophthalmologist visit: yesterday\par \par \par

## 2020-09-23 PROBLEM — K25.9 GASTRIC ULCER: Status: ACTIVE | Noted: 2019-04-09

## 2020-09-23 NOTE — HISTORY OF PRESENT ILLNESS
[FreeTextEntry1] : 55 year old M w/ h/o IDDM (A1c 8 1/20) c/b possible retinopathy/nephropathy, HTN, CAD (s/p stent in [place] in 2016, unknown coronary anatomy), HFrEF (EF 25%, LVEDD 6.6 cm) s/p subQ ICD, prior severe MR (functional; now improved), ESRD on HD (since 1/19; T/Th/Sat; AV fistula), prior GIB 2/2 ulcer (H. pylori positive) who is being seen for follow-up. An evaluation for heart/kidney transplant was launched on 1/20 and he has since been undergoing the workup. 2/28/20 TTE LVEF 27%, LVIDD 7.1 cm. \par \par Previously, at times he has had episodes of bradycardia when gets started on dialysis which is transient and then resolves. Has had ER visits and had ILR placed. Last visit was 2/28, he had another episode of bradycardia on 3/7 while receiving dialysis with HR in 40s for 25 seconds, reportedly lost consciousness. Had presented to ED where vitals were within normal limits. Labs notable for mild troponin elevation, otherwise electrolytes, CBC and CMP were stable. ECG revealed NSR, first deg AV block, PVCs (all unifocal), poor RWP (unchanged from prior). Was discharged as he was stable, /80. \par \par Pt is here for a follow up today. Admits to dietary indiscretions last weekend when sister cooked for him and weight went up to 91 kg/200 lbs. His legs and feet were swollen and abdomen distended when he went for HD Tues 9/22 and 3 kg of fluid was removed. He went again today and had another 3 kg removed and weight is 86 kg/189 lbs. He will go again tomorrow for his regular session. States his blood sugars have been in a good range,  in am, 130-160 after eating. He reports can only walk about 1/2 block due to swelling in legs causing leg heaviness. He also reports leg edema but denies orthopnea (2 pillows), PND, chest pain, palpitations, syncope, ICD discharge, fever, chills or nausea.  Able to climb 30 stairs w/o stopping when he does not have swelling. Of note, pt does not put out any urine anymore except for a few drops. He has a left SUQ ICD and an ILR.\par \par Previously was tolerating hydralazine last year but in past several months unable to tolerate low dose hydralazine. Reports getting dizzy/lightheaded/somnolent with even 10 mg on non-dialysis days. \par \par Was seen by hepatology and had fibroscan which showed F4 fibrosis and an abdominal US was done subsequently on 2/9 which showed no cirrhosis. \par \par He was seen by hematology for workup of thrombocytopenia and underwent BM Bx on 2/5 which showed normal trilineage hematopoiesis. \par \par Pt een by GI Dr. Dyson and will schedule colonoscopy.\par \par

## 2020-09-23 NOTE — PHYSICAL EXAM
[General Appearance - Well Developed] : well developed [Normal Appearance] : normal appearance [General Appearance - Well Nourished] : well nourished [Normal Conjunctiva] : the conjunctiva exhibited no abnormalities [Normal Oral Mucosa] : normal oral mucosa [Normal Oropharynx] : normal oropharynx [] : no respiratory distress [Respiration, Rhythm And Depth] : normal respiratory rhythm and effort [Auscultation Breath Sounds / Voice Sounds] : lungs were clear to auscultation bilaterally [Heart Rate And Rhythm] : heart rate and rhythm were normal [Heart Sounds] : normal S1 and S2 [Murmurs] : no murmurs present [Abdomen Soft] : soft [Abdomen Tenderness] : non-tender [Abnormal Walk] : normal gait [Nail Clubbing] : no clubbing of the fingernails [Cyanosis, Localized] : no localized cyanosis [Oriented To Time, Place, And Person] : oriented to person, place, and time [Impaired Insight] : insight and judgment were intact [Skin Color & Pigmentation] : normal skin color and pigmentation [Skin Turgor] : normal skin turgor [Affect] : the affect was normal [FreeTextEntry1] : 1-2+ pitting lower extremity edema bilaterally, L AVF with thrill and bruit with dressing intact

## 2020-09-23 NOTE — ASSESSMENT
[FreeTextEntry1] : 55 year old M w/ h/o IDDM c/b neuropathy (A1c 8 1/20), HTN, CAD (s/p stent in [place] in 2016, unknown coronary anatomy), HFrEF (EF 25%, LVEDD 6.6 cm) s/p subQ ICD, ESRD on HD (T/Th/Sat via LUE AV fistula), prior GIB 2/2 ulcer (H. pylori positive) who is being seen for follow-up. Currently endorses class III symptoms and appears overloaded but is normotensive. Pt has had prior episodes of hypotension during HD and inability to tolerate neurohormonal agents, currently on losartan 12.5 mg daily.\par \par 1. HFrEF - unclear etiology; prior known CAD; possibly HTN \par - previously on metoprolol 50 mg bid which has since been discontinued likely due to bradycardia at time of dialysis (likely vagal mediated). HR is 93 bpm today and states it is in the range of 70-80 at dialysis. Will discuss starting a low dose such metoprolol with Dr. Pavon.\par - He has been off of hydralazine ( didn't feel well on it); on isordil 20 mg daily (as he forgets to take it three times/day and gets dizzy)\par - continue losartan 12.5 mg daily, will not uptitrate due to prior dizziness B/P today 116/79\par - while DM w/ complications is a relative contraindication, he will likely benefit from heart/kidney transplant as his DM is well controlled now ( HgA1C 7.4%), he will continue to follow up with his endocrinologist.\par - will need RHC once able to have done\par - will refer to other center for heart/kidney transplant, will see Dr. Pavon first to follow up on 10/16/20\par \par 2. ESRD on HD; left AVF with + thrill and bruit\par - instructed to follow low potassium diet\par - avoid intake of salty foods which lead to volume overload, limit to less than 2000 mg per day\par - limit fluid to less than 48 oz per day\par \par 3. CAD - prior PCI- no active chest pain or EKG changes\par - on ASA 81 mg daily\par - on Lipitor 80 mg qhs, 7/31/20 lipid profile at goal\par \par 4. Claudication - likely 2/2 low flow with exertion \par - LAURI and arterial Dopplers with no evidence of significant stenoses\par \par RTC 10/16 for follow up with Dr. Pavon

## 2020-10-08 PROBLEM — A04.8 H. PYLORI INFECTION: Status: ACTIVE | Noted: 2019-02-15

## 2020-10-16 NOTE — PHYSICAL EXAM
[General Appearance - Well Developed] : well developed [Normal Appearance] : normal appearance [General Appearance - Well Nourished] : well nourished [Normal Oropharynx] : normal oropharynx [Normal Oral Mucosa] : normal oral mucosa [Normal Conjunctiva] : the conjunctiva exhibited no abnormalities [] : no respiratory distress [Respiration, Rhythm And Depth] : normal respiratory rhythm and effort [Auscultation Breath Sounds / Voice Sounds] : lungs were clear to auscultation bilaterally [Heart Rate And Rhythm] : heart rate and rhythm were normal [Abdomen Soft] : soft [Murmurs] : no murmurs present [Heart Sounds] : normal S1 and S2 [Abdomen Tenderness] : non-tender [Abnormal Walk] : normal gait [Nail Clubbing] : no clubbing of the fingernails [Cyanosis, Localized] : no localized cyanosis [Skin Color & Pigmentation] : normal skin color and pigmentation [Skin Turgor] : normal skin turgor [Affect] : the affect was normal [Impaired Insight] : insight and judgment were intact [Oriented To Time, Place, And Person] : oriented to person, place, and time [FreeTextEntry1] : 1-2+ pitting lower extremity edema bilaterally, L AVF with thrill and bruit with dressing intact

## 2020-10-16 NOTE — ASSESSMENT
[FreeTextEntry1] : 55 year old M w/ h/o IDDM c/b neuropathy (A1c 8 1/20), HTN, CAD (s/p stent in [place] in 2016, unknown coronary anatomy), HFrEF (EF 25%, LVEDD 6.6 cm) s/p subQ ICD, ESRD on HD (T/Th/Sat via LUE AV fistula), prior GIB 2/2 ulcer (H. pylori positive) who is being seen for follow-up. Currently endorses class III symptoms and is overloaded but is normotensive. Pt has had prior episodes of hypotension during HD and inability to tolerate neurohormonal agents but tolerating losartan 12.5 mg twice/day lately with normal potassium. Awaiting colonoscopy next week for which we will optimize fluid status with extra session of HD prior to this. Otherwise, he is at low risk for cardiovascular events and can proceed with procedure. \par \par 1. HFrEF - unclear etiology; prior known CAD; possibly HTN \par - previously on metoprolol 50 mg bid which has since been discontinued likely due to bradycardia at time of dialysis (likely vagal mediated)\par - will hold on starting toprol as he is volume overloaded \par - He has been off of hydralazine ( didn't feel well on it)\par - continue losartan 12.5 mg twice/day; hold on days of HD; /73\par - while DM w/ complications is a relative contraindication, he will likely benefit from heart/kidney transplant as his DM is better controlled now ( HgA1C 7.4%), he will continue to follow up with his endocrinologist.\par - will need RHC once able to have done\par - will refer to [facility] for heart/kidney transplant\par \par 2. ESRD on HD; left AVF with + thrill and bruit\par - instructed to follow low potassium diet\par - avoid intake of salty foods which lead to volume overload, limit to less than 2000 mg per day\par - limit fluid to less than 48 oz per day\par - contacted HD center and requested them to dialyzed for goal weight 180 pounds; will need extra session \par \par 3. CAD - prior PCI- no active chest pain or EKG changes\par - on ASA 81 mg daily\par - on Lipitor 80 mg qhs, 7/31/20 lipid profile at goal\par \par 4. Claudication - likely 2/2 low flow with exertion \par - LAURI and arterial Dopplers with no evidence of significant stenoses\par \par RTC 4 weeks with NP, me in 12 weeks

## 2020-10-16 NOTE — HISTORY OF PRESENT ILLNESS
[FreeTextEntry1] : 55 year old M w/ h/o IDDM (A1c 8 1/20) c/b possible retinopathy/nephropathy, HTN, CAD (s/p stent in [place] in 2016, unknown coronary anatomy), HFrEF (EF 25%, LVEDD 6.6 cm) s/p subQ ICD, prior severe MR (functional; now improved), ESRD on HD (since 1/19; T/Th/Sat; AV fistula), prior GIB 2/2 ulcer (H. pylori positive) who is being seen for follow-up. An evaluation for heart/kidney transplant was launched on 1/20 and he has since been undergoing the workup. \par \laura Previously, at times he has had episodes of bradycardia when gets started on dialysis which is transient and then resolves. Has had ER visits and had ILR placed with no episodes noted. \laura \laura Pt is here for a follow up today. Reports 1/2 block limited by leg heaviness. Last visit, his weight had increased in setting of dietary indiscretion but since reduced to 83.4 kg (183 pounds) when more "dry." Today's weight 188 pounds but has dialysis tomorrow. Has been tolerating losartan 12.5 mg twice/day w/o issues as he takes only on non-HD days (feels better than when on hydralazine). Denies orthopnea (2 pillows), PND, chest pain, palpitations, syncope, ICD discharge, fever, chills or nausea. Able to climb 30 stairs w/o stopping when he does not have swelling. Of note, pt does not put out any urine anymore except for a few drops. \laura \laura Previously was tolerating hydralazine last year but in past several months unable to tolerate low dose hydralazine. Gets some dizziness with bending down. \laura pittman Was seen by hepatology and had fibroscan which showed F4 fibrosis and an abdominal US was done subsequently on 2/9 which showed no cirrhosis. \par \par He was seen by hematology for workup of thrombocytopenia and underwent BM Bx on 2/5 which showed normal trilineage hematopoiesis. \laura pittman Has been having pruritic lesions maculopapular throughout body since being on dialysis and has been started on creams by dermatology. \par \par Pt seen by GI Dr. Dyson and scheduled for colonoscopy next week.

## 2020-10-17 PROBLEM — Z01.818 PREOP TESTING: Status: ACTIVE | Noted: 2020-01-01

## 2020-10-19 NOTE — PROGRESS NOTE ADULT - PROBLEM SELECTOR PROBLEM 1
Update Plan of care from H/P:    B/p improved with fluid resuscitation.  Going to monitor for dosing adjustments o/n.  He has been having a productive sputum of greenish brown for several days now, will get sputum cx/ gram stain and start CAP with Azithromycin.  He quit smoking a little more than a year ago and he never had sputum production till a few days ago.  WBC slightly elevated, without L shift, however better to be safe in recent hypotensive episode that this was not related to a brewing septic picture.      Monitor had what appeared to be ST elevation in II, III, AVF, pt without CP, Palp, or SOB.  ECG did not show similar pattern, but a early repolarization.  D dimer elevated at 1.37 most likely to early infection / possibly early sepsis.     Jewell Beavers NP  IM [facility]    ESRD (end stage renal disease)

## 2020-10-19 NOTE — PHYSICAL EXAM
[General Appearance - Alert] : alert [General Appearance - In No Acute Distress] : in no acute distress [Extraocular Movements] : extraocular movements were intact [Neck Appearance] : the appearance of the neck was normal [] : no respiratory distress [Respiration, Rhythm And Depth] : normal respiratory rhythm and effort [Exaggerated Use Of Accessory Muscles For Inspiration] : no accessory muscle use [No Focal Deficits] : no focal deficits [Impaired Insight] : insight and judgment were intact [Affect] : the affect was normal [FreeTextEntry1] : aox3

## 2020-10-19 NOTE — ASSESSMENT
[FreeTextEntry1] : 56 yo M pmh IDDM c/b neuropathy, CAD s/p RAUL, EF 24%, h/o ESRD on HD, h/o GIB 2/2 HP ulcer presenting to establish care.  He is planned for OHT evaluation and needs GI clearance prior.  Plan for EGD/Colon for anemia, crc screening, and f/u of HP status.\par \par Impression:\par 1) Anemia\par 2) Due for CRC Screening\par 3) HP s/p treatment - pending confirmed eradication\par 4) CAD with CHF EF 25% s/p AICD\par 5) IDDM\par \par Plan:\par 1) EGD/Colon - bx of stomach at same time\par - Needs PST and Cardiology clearance prior\par - Golytely prep\par - Coordinate with diabetes team and renal team\par - stay on ASA\par 2) Pending above - may need further testing prior to clearance\par 3) All discussed at length.  All questions answered.  Plan agreed upon with him and sister\par 4) RV pending above; Despite being f/u appt is new to me and seen for 45 min.

## 2020-10-19 NOTE — HISTORY OF PRESENT ILLNESS
[FreeTextEntry1] : Telehealth Visit:\par Patient located at home in [facility], and Physician located in Office\par Verbal consent obtained from patient after full discussion including limitations of telehealth appointment\par All questions re: telehealth and security answered.  Plan to proceed with telehealth appointment.\par ----------------------------------------------------------------------------------------------\par \par 56 yo M pmh IDDM c/b neuropathy, CAD s/p RAUL, EF 24%, h/o ESRD on HD, h/o GIB 2/2 HP ulcer presenting to establish care.  He is planned for OHT evaluation and needs GI clearance prior.\par \par Anemia:\par Hgb 12.4, though mcv is normal\par H/o GIB 2/2 Ulcer - is on PPI\par No sign of overt bleeding - no melena, brbpr\par No abdominal pain\par \par HP:\par S/p treatment\par No clearly confirmed eradication as per patient\par \par CRC Screening: Due at this time; no fam history\par \par \par \par \par Cardiology 7/31/2020:\par \par Chronic systolic CHF (congestive heart failure) (428.22,428.0) (I50.22)\par \par 55 year old M w/ h/o IDDM c/b neuropathy (A1c 8 1/20), HTN, CAD (s/p stent in [place] in 2016, unknown coronary anatomy), HFrEF (EF 25%, LVEDD 6.6 cm) s/p subQ ICD, ESRD on HD (T/Th/Sat via LUE AV fistula), prior GIB 2/2 ulcer (H. pylori positive) who is being seen for follow-up. Currently endorses class III symptoms and appears overloaded. Concerning features are hypotension during HD and inability to tolerate neurohormonal agents. \par \par 1. HFrEF - unclear etiology; prior known CAD; possibly HTN \par - previously on metoprolol 50 mg bid which has since been discontinued likely due to bradycardia at time of dialysis (likely vagal mediated)\par - He has been off of hydralazine; on isordil 20 mg daily (as he forgets to take it three times/day and gets dizzy)\par - while DM w/ complications is a relative contraindication, he will likely benefit from heart/kidney transplant as his DM is well controlled now, he will continue to follow up with his endocrinologist.\par - will need RHC once able to have done\par - will refer to other center for heart/kidney transplant\par \par 2. ESRD on HD; left AVF with + thrill and bruit\par - instructed to follow low potassium diet\par \par 3. CAD - prior PCI- no active chest pain or EKG changes\par - on ASA 81 mg daily\par - on lipitor 80 mg qhs\par \par 4. Claudication - likely 2/2 low flow with exertion \par - LAURI and arterial Dopplers with no evidence of significant stenoses\par \par RTC 6 weeks with NP, 12 weeks with me\par Total time spent on [facility] enounter 25 min. \par \par \par Previous Workup:\par EGD: 12/14/18:\par  The Z-line was regular and was found 35 cm from the incisors.\par  The exam of the esophagus was otherwise normal.\par  A 1 cm non-bleeding cratered gastric ulcer of moderate severity with no \par  stigmata of bleeding was found on the incura. Biopsies were taken with a \par  cold forceps for histology.\par  A few localized, small non-bleeding erosions were found in the gastric \par  body. There were no stigmata of recent bleeding. Biopsies were taken \par  with a cold forceps for Helicobacter pylori testing.\par  The exam of the stomach was otherwise normal.\par  The duodenal bulb and 2nd part of the duodenum were normal.\par      \par Impression: - 1 cm clean based cratered gastric ulcer. Biopsied.\par   - Non-bleeding erosive gastropathy. Biopsied.\par   - Normal duodenal bulb and 2nd part of the duodenum.\par \par Pathology:\par 1-Antrum biopsy:\par - Gastric antral type mucosa with H. pylori-associated\par gastritis ([place] Star[place] stain).\par \par 2-Gastric, body biopsy:\par - Gastric body type mucosa with H. pylori-associated\par gastritis ([place] Starry stain).\par \par 3-Gastric, ulcer biopsy:\par - Gastric antral type mucosa with H. pylori-associated\par gastritis ([place] Starry stain).\par

## 2020-10-21 NOTE — ASU PREOP CHECKLIST - IDENTIFICATION BAND VERIFIED
Daily Note     Today's date: 2020  Patient name: Ibrahima Sexton  : 1936  MRN: [id number]  Referring provider: Andreas Casey MD  Dx: No diagnosis found  Subjective: ***      Objective: See treatment diary below      Assessment: Tolerated treatment {Tolerated treatment :[id number]}   Patient {assessment:[id number]}      Plan: {PLAN:[id number]}     Precautions: None       Manual  1/3 1/7 1/9 1/14 1/21        R Knee PROM NV 10'   10' 10' 10'                                                                Exercise Diary  1/3 1/7 1/9 1/14 1/21        Bike AAROM NV 5' seat 16 5 5' seat 16 5' seat 16 5', seat 16        Calf SB stretch  NV 20"x3 R 20"x3 R 20"x3 R 20"x3        Marches at bar NV 15 ea 2x10 ea NV 2x10 ea        Hip abd standing NV 2x10 ea 2x10 ea 2x10 ea 2x10 ea        Hip ext standing NV 2x10 ea 2x10 ea 2x10 ea 2x10 ea        Heel/toe raises  NV 20 ea 20 ea 20 ea 20 ea        Mini squats NV 2x10 2x10 To chair 3 foams x10 To chair 3 foams x10        Side step at bar NV 1 lap 2 laps 2 laps 2 laps        Step up and over NV NV 4" x10 up only 4" x10 up only 4" x10 up only        Standing L/s extensions NV            LAQ NV 2x10 3x10 3x10 3x10        Heel slides NV 15x5" 15x5" 15x5" 15x5"        SLR flexion NV NV 2x10 2x10 2x10        Glute bridge NV 15 2x10 3x10 3x10        Clamshells (supine?) NV  RTB 2x10 RTB 2x10 RTB 2x10        Sit to stands from chair NV NV NV 2x5, 2 foams 2x5, 2 foams                                                                             Modalities  1/3            CP PRN
done

## 2020-10-21 NOTE — ASU PREOP CHECKLIST - DNR CLARIFICATION FORM COMPLETED
----- Message from Nick Roper MD sent at 11/18/2018  4:57 PM CST -----  Yasmin - please tell Marine that her labs look good and not anemic  
Pt informed of test results.  
n/a

## 2020-10-21 NOTE — PRE PROCEDURE NOTE - PRE PROCEDURE EVALUATION
Attending Physician:              Mark Dyson MD MSEd    Indication for Procedure          Screening    PAST MEDICAL & SURGICAL HISTORY:  Congestive heart disease    ESRD (end stage renal disease) on dialysis    GIB (gastrointestinal bleeding)  was treated  with FOREIGN pyelori stable since Dec 10, 2018    HFrEF (heart failure with reduced ejection fraction)  EF 20-24%    Myocardial infarction  Jan 2016    Hyperlipidemia    CAD (coronary artery disease)  2016, coronary artery stentX1    DM (diabetes mellitus)  2  not taking any medication lat4st HgA1c 5.7    HTN (hypertension)    History of implantable cardioverter-defibrillator (ICD) placement    Stented coronary artery  ? 1    Arterial stent thrombosis          See Allscripts Note for further details  ALLERGIES:  No Known Allergies    HOME MEDICATIONS:  losartan:   pantoprazole 40 mg oral delayed release tablet: 1 tab(s) orally once a day  repaglinide 1 mg oral tablet: 1 milligram(s) orally 2 times a day      See Allscripts Note for further details    AICD/PPM: [x ] yes   [ ] no    PERTINENT LAB DATA:    n/a    PHYSICAL EXAMINATION:    Height (cm): 162.6  Weight (kg): 84  BMI (kg/m2): 31.8  BSA (m2): 1.89T(C): 36.2  HR: 83  BP: 112/86  RR: 16  SpO2: 99%    Constitutional: NAD  HEENT: PERRLA, EOMI,    Neck:  No JVD  Respiratory: CTAB/L  Cardiovascular: S1 and S2  Gastrointestinal: BS+, soft, NT/ND  Extremities: No peripheral edema  Neurological: A/O x 3, no focal deficits  Psychiatric: Normal mood, normal affect  Skin: No rashes    ASA Class: I [ ]  II [ ]  III [ ]  IV [ x]    COMMENTS:    The patient is a suitable candidate for the planned procedure unless box checked [ ]  No, explain:     Attending Physician:              Mark Dyson MD MSEd    Indication for Procedure          Screening, anemia    PAST MEDICAL & SURGICAL HISTORY:  Congestive heart disease    ESRD (end stage renal disease) on dialysis    GIB (gastrointestinal bleeding)  was treated  with FOREIGN pyelori stable since Dec 10, 2018    HFrEF (heart failure with reduced ejection fraction)  EF 20-24%    Myocardial infarction  Jan 2016    Hyperlipidemia    CAD (coronary artery disease)  2016, coronary artery stentX1    DM (diabetes mellitus)  2  not taking any medication lat4st HgA1c 5.7    HTN (hypertension)    History of implantable cardioverter-defibrillator (ICD) placement    Stented coronary artery  ? 1    Arterial stent thrombosis          See Allscripts Note for further details  ALLERGIES:  No Known Allergies    HOME MEDICATIONS:  losartan:   pantoprazole 40 mg oral delayed release tablet: 1 tab(s) orally once a day  repaglinide 1 mg oral tablet: 1 milligram(s) orally 2 times a day      See Allscripts Note for further details    AICD/PPM: [x ] yes   [ ] no    PERTINENT LAB DATA:    n/a    PHYSICAL EXAMINATION:    Height (cm): 162.6  Weight (kg): 84  BMI (kg/m2): 31.8  BSA (m2): 1.89T(C): 36.2  HR: 83  BP: 112/86  RR: 16  SpO2: 99%    Constitutional: NAD  HEENT: PERRLA, EOMI,    Neck:  No JVD  Respiratory: CTAB/L  Cardiovascular: S1 and S2  Gastrointestinal: BS+, soft, NT/ND  Extremities: No peripheral edema  Neurological: A/O x 3, no focal deficits  Psychiatric: Normal mood, normal affect  Skin: No rashes    ASA Class: I [ ]  II [ ]  III [ ]  IV [ x]    COMMENTS:    The patient is a suitable candidate for the planned procedure unless box checked [ ]  No, explain:

## 2020-10-21 NOTE — PRE-ANESTHESIA EVALUATION ADULT - NS MD HP INPLANTS MED DEV
Full range of motion of upper and lower extremities, no joint tenderness/swelling.
AICD, ardiac stents, loop recorder

## 2020-10-28 PROBLEM — Z12.11 SCREENING FOR COLON CANCER: Status: ACTIVE | Noted: 2020-01-01

## 2020-11-18 PROBLEM — I25.10 CORONARY ARTERY DISEASE, ANGINA PRESENCE UNSPECIFIED, UNSPECIFIED VESSEL OR LESION TYPE, UNSPECIFIED WHETHER NATIVE OR TRANSPLANTED HEART: Status: ACTIVE | Noted: 2018-03-14

## 2020-11-18 NOTE — PHYSICAL EXAM
[General Appearance - Well Developed] : well developed [Normal Appearance] : normal appearance [General Appearance - Well Nourished] : well nourished [Normal Conjunctiva] : the conjunctiva exhibited no abnormalities [Normal Oral Mucosa] : normal oral mucosa [Normal Oropharynx] : normal oropharynx [] : no respiratory distress [Respiration, Rhythm And Depth] : normal respiratory rhythm and effort [Auscultation Breath Sounds / Voice Sounds] : lungs were clear to auscultation bilaterally [Heart Rate And Rhythm] : heart rate and rhythm were normal [Heart Sounds] : normal S1 and S2 [Murmurs] : no murmurs present [Abdomen Soft] : soft [Abdomen Tenderness] : non-tender [Abnormal Walk] : normal gait [Nail Clubbing] : no clubbing of the fingernails [Cyanosis, Localized] : no localized cyanosis [Skin Color & Pigmentation] : normal skin color and pigmentation [Skin Turgor] : normal skin turgor [Oriented To Time, Place, And Person] : oriented to person, place, and time [Impaired Insight] : insight and judgment were intact [Affect] : the affect was normal [FreeTextEntry1] : maculopapular lesions on trunk, arms and legs

## 2020-11-18 NOTE — ASSESSMENT
[FreeTextEntry1] : 55 year old M w/ h/o IDDM c/b neuropathy (A1c 8 1/20), HTN, CAD (s/p stent in [place] in 2016, unknown coronary anatomy), HFrEF (EF 25%, LVEDD 6.6 cm) s/p subQ ICD, ESRD on HD (T/Th/Sat via LUE AV fistula), prior GIB 2/2 ulcer (H. pylori positive) who is being seen for follow-up. Currently endorses class III symptoms and is overloaded but is normotensive. Pt has had prior episodes of hypotension during HD and inability to tolerate neurohormonal agents but tolerating losartan 12.5 mg twice/day lately with normal potassium. Pt did not do colonoscopy after last visit due to poor prep. Followed up at [facility] for heart kidney transplant and decided he will not pursue heart kidney at [facility] since he reports his chance of survival as per surgeon is 30%. Appears compensated and normotensive\par \par 1. HFrEF - unclear etiology; prior known CAD; possibly HTN \par - previously on metoprolol 50 mg bid which has since been discontinued likely due to bradycardia at time of dialysis (likely vagal mediated)\par - will start toprol 12.5 mg daily but will hold on HD days. HR 96 bpm today on EKG. \par - He has been off of hydralazine ( didn't feel well on it)\par - continue losartan 12.5 mg twice/day; hold on days of HD; /88\par - while DM w/ complications is a relative contraindication, he will likely benefit from heart/kidney transplant as his DM is better controlled now ( HgA1C 7.4%), he will continue to follow up with his endocrinologist.\par - will repeat TTE with next visit, last was 2/2020.\par - will need RHC once able to have done\par - will refer to [facility] for heart/kidney transplant- not pursuing at this time after speaking about risk with surgeon.\par \par 2. ESRD on HD; left AVF with + thrill and bruit\par - instructed to follow low potassium diet\par - avoid intake of salty foods which lead to volume overload, limit to less than 2000 mg per day\par - limit fluid to less than 48 oz per day\par - previously contacted HD center and requested them to dialyzed for goal weight 180 pounds. Weight today 181 lbs without clothes and last HD reports 173.8 lbs after HD.\par \par 3. CAD - prior PCI- no active chest pain or EKG changes\par - previously on ASA 81 mg daily. Pt states he had bleeding behind the eye with last opth appt approx 6 months ago and was told to stop ASA 81 mg daily, no longer taking.\par - on Lipitor 80 mg qhs, 7/31/20 lipid profile at goal\par \par 4. Prior Claudication - likely 2/2 low flow with exertion \par - LAURI and arterial Dopplers with no evidence of significant stenoses\par \par RTC 3 months on the same day as TTE

## 2020-11-18 NOTE — HISTORY OF PRESENT ILLNESS
[FreeTextEntry1] : 55 year old M w/ h/o IDDM (A1c 8 1/20) c/b possible retinopathy/nephropathy, HTN, CAD (s/p stent in [place] in 2016, unknown coronary anatomy), HFrEF (EF 25%, LVEDD 6.6 cm) s/p subQ ICD, prior severe MR (functional; now improved), ESRD on HD (since 1/19; T/Th/Sat; AV fistula), prior GIB 2/2 ulcer (H. pylori positive) who is being seen for follow-up. An evaluation for heart/kidney transplant was launched at [facility] on 1/2020 and he has since been undergoing the workup but reports chances are 30% survival. \par \par Previously, at times he has had episodes of bradycardia when gets started on dialysis which is transient and then resolves. ILR placed and SuBQ ICD no episodes noted. Pt states he had bleeding behind the eye with last opth appt approx 6 months ago and was told to stop ASA 81 mg daily, no longer taking.\par \par Pt is here for a follow up today. Reports he walks in his yard 3-4 times back and forth without dyspnea. He can climb 30 steps at home without leg heaviness. He goes to HD three times a week on T-Th-Sat and takes off 4 kg. States weight was 79 kg or 173.8 lb but is 185 lbs today with clothes ( 181 lbs without).  Has been tolerating losartan 12.5 mg twice/day w/o issues as he takes only on non-HD days (feels better than when on hydralazine). Previously on metoprolol but stopped due to vasovagal episodes in HD.  He drinks less than 48 oz per day and is following a low salt diet. Denies orthopnea (2 pillows),  PND, chest pain, palpitations, syncope, ICD discharge, fever, chills or nausea. He was seen by dermatology for itchy maculopapular skin lesions yesterday and was started on a cream. He had colonoscopy several weeks ago but had a poor prep so it was not done. Does not want to repeat since he decided he will not pursue heart kidney at [facility] since he reports his chance of survival as per surgeon is 30%. Of note, pt does not put out any urine anymore except for a few drops. \laura pittman Previously was tolerating hydralazine last year but in past several months unable to tolerate low dose hydralazine. Gets some dizziness with bending down. \laura pittman Was seen by hepatology and had fibroscan which showed F4 fibrosis and an abdominal US was done subsequently on 2/9 which showed no cirrhosis. \par \par He was seen by hematology for workup of thrombocytopenia and underwent BM Bx on 2/5 which showed normal trilineage hematopoiesis. \laura pittman Has been having pruritic lesions maculopapular throughout body since being on dialysis and has been started on creams by dermatology. \laura pittman Pt seen by GI Dr. Dyson and scheduled for colonoscopy  but had a poor prep so it was not done. Does not want to repeat since he decided he will not pursue heart kidney at [facility] since he reports his chance of survival as per surgeon is 30%.

## 2020-11-20 PROBLEM — D63.8 ANEMIA OF CHRONIC DISEASE: Status: ACTIVE | Noted: 2020-01-01

## 2020-11-20 PROBLEM — D69.6 THROMBOCYTOPENIA: Status: ACTIVE | Noted: 2019-04-25

## 2020-11-20 PROBLEM — D64.9 ANEMIA: Status: ACTIVE | Noted: 2019-04-09

## 2020-11-20 NOTE — ASSESSMENT
[FreeTextEntry1] : 53 yo gentleman with history of DM type 2 c/b retinopathy, neuropathy, nephropathy, HTN, HLD, MI 2016 s/p stent placement, HFrEF. \par \par -Iron deficiency anemia: 7/1/19 Iron studies : iron level 36 , TIBC 370, % saturation 10%; received 2 doses of Feraheme 510 mg weekly.  9/17/19 ferritin 142, iron level 81, TIBC 31%. \par \par BMB 2/11/2020: Erythroid predominant trilineage hematopoiesis with maturation, mild polytypic plasmacytosis and present iron stores. \par Normal male karyotype\par Flow cytometry- the lymphocyte immunophenotypic findings show no diagnostic abnormalities with increased proportion of natural killer T cells. The myeloid immunophenotypic findings show no increase in myeloid immaturity, normal myeloid antigen maturation pattern, and the presence of hematogones. The plasma cell immunophenotype is unremarkable with polyclonality. \par \par Etiology of anemia is anemia of chronic disease. The etiology of thrombocytopenia is likely liver disease and renal insufficiency.  \par \par This service was provided by using telehealth. The patient was at home and I was at [facility]. The patient requested and participated in this encounter. The encounter face to face last 30   minutes coordinating his/her care and counseling.\par \par \par RTC  6 months. \par

## 2020-11-20 NOTE — HISTORY OF PRESENT ILLNESS
[Home] : at home, [unfilled] , at the time of the visit. [Medical Office: ([facility])___] : at the medical office located in  [Other:____] : [unfilled] [Patient] : the patient [Self] : self [de-identified] : 53 yo gentleman with history of DM type 2 c/b retinopathy, neuropathy, nephropathy, HTN, HLD, MI 2016 s/p stent placement, HFrEF. Patient has have recent multiples hospitalizations  January, 2019 with  perma cath placement and AVF formation. Patient is currently getting dialysis TIW, started on January, 2019. Patient's PLT count was normal last on December, 2018 - 215K. \par Patient was also admitted on March, 2019 and was found to have melena, endoscopic showed and ulcer, treated for H pylori x 15 days. \par \par Patient denies bleeding, epistaxis, melena, hematochezia. Denies fevers, night sweats, weight loss. Denies history of blood clots. \par \par Laboratory studies 5/29/19 WBC 5.86, RBC 4.3, Hb 12.8g/dl, HCT 39.4, MCV 91.6, RDW14.3, PLTS 46K. Diff-  WNL.\par Vitamin B 12 -529\par Folate 12/5, HIV  nonreactive, Hep C nonreactive.\par PLT count blue top tube 39.\par \par BMB 2/11/2020: Erythroid predominant trilineage hematopoiesis with maturation, mild polytypic plasmacytosis and present iron stores. \par Normal male karyotype\par Flow cytometry- the lymphocyte immunophenotypic findings show no diagnostic abnormalities with increased proportion of natural killer T cells. The myeloid immunophenotypic findings show no increase in myeloid immaturity, normal myeloid antigen maturation pattern, and the presence of hematogones. The plasma cell immunophenotype is unremarkable with polyclonality.  [de-identified] : BMB 2/11/2020: Erythroid predominant trilineage hematopoiesis with maturation, mild polytypic plasmacytosis and present iron stores. \par Normal male karyotype\par Flow cytometry- the lymphocyte immunophenotypic findings show no diagnostic abnormalities with increased proportion of natural killer T cells. The myeloid immunophenotypic findings show no increase in myeloid immaturity, normal myeloid antigen maturation pattern, and the presence of hematogones. The plasma cell immunophenotype is unremarkable with polyclonality. \par \par Patient is feeling better, denies fevers, night sweats, positive for  intentional weight loss  approx 10 pounds. \par \par No other changes in his medical, surgical or social history since 5/8/2020. .  [FreeTextEntry2] : Devan Lorenzo [FreeTextEntry4] : sister

## 2020-12-07 PROBLEM — R31.9 HEMATURIA: Status: ACTIVE | Noted: 2020-01-01

## 2020-12-18 PROBLEM — N30.01 ACUTE CYSTITIS WITH HEMATURIA: Status: ACTIVE | Noted: 2020-01-01

## 2020-12-18 PROBLEM — Z78.9 DOES NOT USE TOBACCO: Status: ACTIVE | Noted: 2020-01-01

## 2020-12-18 PROBLEM — R31.0 GROSS HEMATURIA: Status: ACTIVE | Noted: 2020-01-01

## 2020-12-18 PROBLEM — Z63.5 DIVORCED: Status: ACTIVE | Noted: 2020-01-01

## 2020-12-18 PROBLEM — Z80.0 FAMILY HISTORY OF MALIGNANT NEOPLASM OF COLON: Status: ACTIVE | Noted: 2020-01-01

## 2020-12-18 NOTE — ASSESSMENT
[FreeTextEntry1] : Patient is a 56 yo M who presents for gross hematuria.\par Based on history may have been related to passing stone or UTI.\par However given on HD and gross hematuria, there is higher risk for malignancy.\par Will check ua/cx.  He has only scant urine in cup, not enough for cytology.\par Discussed with pt the implications of hematuria and need to further evaluate to rule out potentially dangerous or malignant underlying etiologies.  Discussed with pt need for upper tract imaging and cystoscopy.\par He is currently refusing cystoscopy, he is willing to undergo CT.\par His Cr is 5.23, eGFR 12, on HD.  Instructed pt to obtain CT urogram just prior to HD.\par F/u after CT\par

## 2020-12-18 NOTE — REVIEW OF SYSTEMS
[Recent Weight Loss (___ Lbs)] : recent [unfilled] ~Ulb weight loss [Eyesight Problems] : eyesight problems [Dry Eyes] : dryness of the eyes [Pain during urination] : pain during urination [Blood in urine that you can see] : blood visible in urine [Joint Pain] : joint pain [Joint Swelling] : joint swelling [Limb Swelling] : limb swelling [Skin Lesions] : skin lesion [Skin Wound] : skin wound [Itching] : itching [Limb Weakness] : limb weakness [Difficulty Walking] : difficulty walking [Muscle Weakness] : muscle weakness [Negative] : Heme/Lymph

## 2020-12-18 NOTE — PHYSICAL EXAM
[General Appearance - Well Developed] : well developed [General Appearance - Well Nourished] : well nourished [Normal Appearance] : normal appearance [Well Groomed] : well groomed [General Appearance - In No Acute Distress] : no acute distress [FreeTextEntry1] : b/l palp fem pulses [Respiration, Rhythm And Depth] : normal respiratory rhythm and effort [Exaggerated Use Of Accessory Muscles For Inspiration] : no accessory muscle use [Abdomen Soft] : soft [Abdomen Tenderness] : non-tender [Abdomen Hernia] : no hernia was discovered [Urethral Meatus] : meatus normal [Urinary Bladder Findings] : the bladder was normal on palpation [Scrotum] : the scrotum was normal [Epididymis] : the epididymides were normal [Testes Tenderness] : no tenderness of the testes [Testes Mass (___cm)] : there were no testicular masses [Normal Station and Gait] : the gait and station were normal for the patient's age [] : no rash [No Focal Deficits] : no focal deficits [Oriented To Time, Place, And Person] : oriented to person, place, and time [Affect] : the affect was normal [Mood] : the mood was normal [Not Anxious] : not anxious

## 2020-12-18 NOTE — HISTORY OF PRESENT ILLNESS
[FreeTextEntry1] : Patient is a 56 yo M h/o ESRD on HD who presents for 1 episode of gross hematuria.  He notes ~1 month ago he had significant urgency and \par \par He notes his urgency started in the morning, he went several times to bathroom and unable to void. He had mild penile/pelvic pain during these attempts, later after returning home he attempted to void and saw a few drops of bloody urine in the afternoon.  Later on, in the morning he voided and felt and heard a "ping" with immediate relief and good flow of urine.  He states he did not see anything obvious in the toilet bowl.  Thereafter he reports urination has been fairly normal. \par \par No flank pain.  No h/o stones.  He does only void small amounts twice daily.\par \par No fever/chills.   [Urinary Incontinence] : no urinary incontinence [Urinary Retention] : no urinary retention

## 2020-12-20 NOTE — PLAN
[FreeTextEntry1] : 54y/o M with DM2 c/b retinopathy, neuropathy, nephropathy, HTN, HLD, hx of MI in 2016 s/p stent placement, HFrEF, ESRD on HD. \par \par - sent Rx for cane \par - microalb/Cr\par - refill meds \par \par f/u 6m in clinic or tele \par \par Case discussed with Dr. Motta \par \par Sima Saba \par PGY1, IM

## 2020-12-20 NOTE — PHYSICAL EXAM
[No Acute Distress] : no acute distress [Well Nourished] : well nourished [Well Developed] : well developed [Well-Appearing] : well-appearing [Normal Sclera/Conjunctiva] : normal sclera/conjunctiva [PERRL] : pupils equal round and reactive to light [EOMI] : extraocular movements intact [Normal Outer Ear/Nose] : the outer ears and nose were normal in appearance [Normal Oropharynx] : the oropharynx was normal [No JVD] : no jugular venous distention [No Lymphadenopathy] : no lymphadenopathy [Supple] : supple [Thyroid Normal, No Nodules] : the thyroid was normal and there were no nodules present [No Respiratory Distress] : no respiratory distress  [No Accessory Muscle Use] : no accessory muscle use [Clear to Auscultation] : lungs were clear to auscultation bilaterally [Normal Rate] : normal rate  [Regular Rhythm] : with a regular rhythm [Normal S1, S2] : normal S1 and S2 [No Murmur] : no murmur heard [No Carotid Bruits] : no carotid bruits [No Abdominal Bruit] : a ~M bruit was not heard ~T in the abdomen [No Varicosities] : no varicosities [Pedal Pulses Present] : the pedal pulses are present [No Palpable Aorta] : no palpable aorta [No Extremity Clubbing/Cyanosis] : no extremity clubbing/cyanosis [Soft] : abdomen soft [Non Tender] : non-tender [Non-distended] : non-distended [No Masses] : no abdominal mass palpated [No HSM] : no HSM [Normal Bowel Sounds] : normal bowel sounds [Normal Posterior Cervical Nodes] : no posterior cervical lymphadenopathy [Normal Anterior Cervical Nodes] : no anterior cervical lymphadenopathy [No CVA Tenderness] : no CVA  tenderness [No Spinal Tenderness] : no spinal tenderness [No Joint Swelling] : no joint swelling [Grossly Normal Strength/Tone] : grossly normal strength/tone [Coordination Grossly Intact] : coordination grossly intact [No Focal Deficits] : no focal deficits [Normal Gait] : normal gait [Normal Affect] : the affect was normal [Normal Insight/Judgement] : insight and judgment were intact [Comprehensive Foot Exam Normal] : Right and left foot were examined and both feet are normal. No ulcers in either foot. Toes are normal and with full ROM.  Normal tactile sensation with monofilament testing throughout both feet [de-identified] : palpable thrill at fistula, 1+ pitting edema in LE [de-identified] : rash on abdomen that has been present for over a year and started after HD. ulcer leasion on legs b/l, no pus or open wounds. No bleeding , pt states that skin ulcers are "itchy"

## 2020-12-20 NOTE — HISTORY OF PRESENT ILLNESS
[FreeTextEntry1] : follow up for chronic issues  [de-identified] : 54y/o M with DM2 c/b retinopathy, neuropathy, nephropathy, HTN, HLD, hx of MI in 2016 s/p stent placement, HFrEF, ESRD on HD. \par \par Bradycardia: Pt was experiencing bradycardia for 2-3 days with metoprolol use. He was feeling lightheaded and dizzy. When he stood up he was unable to gain his balance. He said he felt very unwell. He stopped taking metoprolol and symptoms improved.\par \par Hematuria: Pt has been seeing blood in his urine for about 2 months now. Denies clots of blood but states that there was a pinkish tinge to the urine. He states that he noticed an "odd" sound when he started his stream a few months ago and after this he did not notice anymore blood in urine. He denies any pain with urination and states that he used to have some flank pain but it was intermittent and has resolved. He has an appointment with a urologist in the next coming weeks. \par \par DM: Morning FS is usually in low 100's and after breakfast in is about 150. Premeals are usually low- mid 100's. Bedtime insulin is about 120-130. No recent episodes of hypoglycemia. Last episode was many years ago when his fs was 37. Pts diet has been cinsistent eating vegetables, meat and carbs. His sister cooks for him. Pt denies tingling in hands and feet. He states that when he has fluid build up in his feet he feels some pain but it resolves after HD.\par \par Cataracts: pt had surgery in both eyes about 8 months ago and since then the patients vision has been much better. Denies visual spots, blurriness, pain in eyes, redness, itchiness. He uses eye drops daily for dry eyes.\par \par ESRD: Tue, Thu, Sat HD. Pt states that his son or sister will take him. Pt makes some urine. \par \par

## 2020-12-20 NOTE — REVIEW OF SYSTEMS
[Hematuria] : hematuria [Itching] : itching [Dizziness] : dizziness [Negative] : Heme/Lymph [Headache] : no headache [Fainting] : no fainting

## 2021-01-01 ENCOUNTER — NON-APPOINTMENT (OUTPATIENT)
Age: 56
End: 2021-01-01

## 2021-01-01 ENCOUNTER — TRANSCRIPTION ENCOUNTER (OUTPATIENT)
Age: 56
End: 2021-01-01

## 2021-01-01 ENCOUNTER — RESULT CHARGE (OUTPATIENT)
Age: 56
End: 2021-01-01

## 2021-01-01 ENCOUNTER — APPOINTMENT (OUTPATIENT)
Dept: ELECTROPHYSIOLOGY | Facility: CLINIC | Age: 56
End: 2021-01-01

## 2021-01-01 ENCOUNTER — APPOINTMENT (OUTPATIENT)
Dept: CARDIOLOGY | Facility: CLINIC | Age: 56
End: 2021-01-01
Payer: MEDICARE

## 2021-01-01 ENCOUNTER — APPOINTMENT (OUTPATIENT)
Dept: INTERNAL MEDICINE | Facility: CLINIC | Age: 56
End: 2021-01-01
Payer: MEDICARE

## 2021-01-01 ENCOUNTER — APPOINTMENT (OUTPATIENT)
Dept: ELECTROPHYSIOLOGY | Facility: CLINIC | Age: 56
End: 2021-01-01
Payer: MEDICARE

## 2021-01-01 ENCOUNTER — INPATIENT (INPATIENT)
Facility: HOSPITAL | Age: 56
LOS: 3 days | Discharge: ANOTHER HOSPITAL | End: 2021-04-30
Attending: INTERNAL MEDICINE | Admitting: INTERNAL MEDICINE
Payer: MEDICARE

## 2021-01-01 ENCOUNTER — APPOINTMENT (OUTPATIENT)
Dept: DISASTER EMERGENCY | Facility: CLINIC | Age: 56
End: 2021-01-01

## 2021-01-01 ENCOUNTER — OUTPATIENT (OUTPATIENT)
Dept: OUTPATIENT SERVICES | Facility: HOSPITAL | Age: 56
LOS: 1 days | End: 2021-01-01
Payer: MEDICARE

## 2021-01-01 ENCOUNTER — APPOINTMENT (OUTPATIENT)
Dept: ENDOVASCULAR SURGERY | Facility: CLINIC | Age: 56
End: 2021-01-01
Payer: MEDICARE

## 2021-01-01 ENCOUNTER — RESULT REVIEW (OUTPATIENT)
Age: 56
End: 2021-01-01

## 2021-01-01 ENCOUNTER — INPATIENT (INPATIENT)
Facility: HOSPITAL | Age: 56
LOS: 9 days | Discharge: LEFT BEFORE DISCHARGE | DRG: 286 | End: 2021-02-27
Attending: STUDENT IN AN ORGANIZED HEALTH CARE EDUCATION/TRAINING PROGRAM | Admitting: HOSPITALIST
Payer: MEDICARE

## 2021-01-01 ENCOUNTER — APPOINTMENT (OUTPATIENT)
Dept: CV DIAGNOSITCS | Facility: HOSPITAL | Age: 56
End: 2021-01-01

## 2021-01-01 ENCOUNTER — APPOINTMENT (OUTPATIENT)
Dept: INTERNAL MEDICINE | Facility: CLINIC | Age: 56
End: 2021-01-01

## 2021-01-01 ENCOUNTER — RX RENEWAL (OUTPATIENT)
Age: 56
End: 2021-01-01

## 2021-01-01 ENCOUNTER — APPOINTMENT (OUTPATIENT)
Dept: ENDOVASCULAR SURGERY | Facility: CLINIC | Age: 56
End: 2021-01-01

## 2021-01-01 ENCOUNTER — OUTPATIENT (OUTPATIENT)
Dept: OUTPATIENT SERVICES | Facility: HOSPITAL | Age: 56
LOS: 1 days | End: 2021-01-01

## 2021-01-01 ENCOUNTER — APPOINTMENT (OUTPATIENT)
Dept: CARDIOLOGY | Facility: CLINIC | Age: 56
End: 2021-01-01

## 2021-01-01 VITALS
HEART RATE: 83 BPM | HEIGHT: 64 IN | TEMPERATURE: 96.8 F | DIASTOLIC BLOOD PRESSURE: 63 MMHG | OXYGEN SATURATION: 100 % | BODY MASS INDEX: 31.76 KG/M2 | SYSTOLIC BLOOD PRESSURE: 91 MMHG

## 2021-01-01 VITALS
SYSTOLIC BLOOD PRESSURE: 96 MMHG | DIASTOLIC BLOOD PRESSURE: 72 MMHG | HEART RATE: 87 BPM | OXYGEN SATURATION: 97 % | TEMPERATURE: 97.3 F | BODY MASS INDEX: 30.73 KG/M2 | RESPIRATION RATE: 16 BRPM | WEIGHT: 180 LBS | HEIGHT: 64 IN

## 2021-01-01 VITALS
HEART RATE: 80 BPM | HEIGHT: 64 IN | DIASTOLIC BLOOD PRESSURE: 70 MMHG | RESPIRATION RATE: 15 BRPM | SYSTOLIC BLOOD PRESSURE: 104 MMHG | TEMPERATURE: 97.7 F | BODY MASS INDEX: 31.58 KG/M2 | WEIGHT: 185 LBS | OXYGEN SATURATION: 100 %

## 2021-01-01 VITALS
WEIGHT: 156.53 LBS | HEART RATE: 85 BPM | SYSTOLIC BLOOD PRESSURE: 127 MMHG | TEMPERATURE: 98 F | OXYGEN SATURATION: 99 % | RESPIRATION RATE: 18 BRPM | DIASTOLIC BLOOD PRESSURE: 78 MMHG

## 2021-01-01 VITALS
BODY MASS INDEX: 28.49 KG/M2 | DIASTOLIC BLOOD PRESSURE: 73 MMHG | TEMPERATURE: 97.4 F | HEIGHT: 64 IN | HEART RATE: 72 BPM | SYSTOLIC BLOOD PRESSURE: 105 MMHG | OXYGEN SATURATION: 99 %

## 2021-01-01 VITALS
DIASTOLIC BLOOD PRESSURE: 94 MMHG | SYSTOLIC BLOOD PRESSURE: 123 MMHG | TEMPERATURE: 98 F | HEIGHT: 64 IN | RESPIRATION RATE: 16 BRPM | HEART RATE: 60 BPM | OXYGEN SATURATION: 98 %

## 2021-01-01 VITALS — BODY MASS INDEX: 28.49 KG/M2 | WEIGHT: 166 LBS

## 2021-01-01 VITALS
BODY MASS INDEX: 28.34 KG/M2 | HEIGHT: 64 IN | WEIGHT: 166 LBS | SYSTOLIC BLOOD PRESSURE: 100 MMHG | DIASTOLIC BLOOD PRESSURE: 70 MMHG

## 2021-01-01 VITALS
HEIGHT: 64 IN | DIASTOLIC BLOOD PRESSURE: 77 MMHG | BODY MASS INDEX: 30.73 KG/M2 | SYSTOLIC BLOOD PRESSURE: 115 MMHG | WEIGHT: 180 LBS | HEART RATE: 83 BPM | OXYGEN SATURATION: 100 % | TEMPERATURE: 96.7 F

## 2021-01-01 VITALS
TEMPERATURE: 97 F | OXYGEN SATURATION: 100 % | RESPIRATION RATE: 16 BRPM | DIASTOLIC BLOOD PRESSURE: 67 MMHG | HEIGHT: 64 IN | WEIGHT: 175.05 LBS | HEART RATE: 80 BPM | SYSTOLIC BLOOD PRESSURE: 98 MMHG

## 2021-01-01 VITALS
WEIGHT: 180 LBS | HEART RATE: 88 BPM | DIASTOLIC BLOOD PRESSURE: 80 MMHG | RESPIRATION RATE: 16 BRPM | HEIGHT: 64 IN | SYSTOLIC BLOOD PRESSURE: 114 MMHG | OXYGEN SATURATION: 98 % | TEMPERATURE: 97.4 F | BODY MASS INDEX: 30.73 KG/M2

## 2021-01-01 VITALS — TEMPERATURE: 98 F

## 2021-01-01 VITALS — BODY MASS INDEX: 31.76 KG/M2 | WEIGHT: 185 LBS

## 2021-01-01 VITALS — HEART RATE: 76 BPM | RESPIRATION RATE: 14 BRPM

## 2021-01-01 DIAGNOSIS — I95.9 HYPOTENSION, UNSPECIFIED: ICD-10-CM

## 2021-01-01 DIAGNOSIS — N18.6 END STAGE RENAL DISEASE: ICD-10-CM

## 2021-01-01 DIAGNOSIS — Z95.810 PRESENCE OF AUTOMATIC (IMPLANTABLE) CARDIAC DEFIBRILLATOR: Chronic | ICD-10-CM

## 2021-01-01 DIAGNOSIS — T82.898A OTHER SPECIFIED COMPLICATION OF VASCULAR PROSTHETIC DEVICES, IMPLANTS AND GRAFTS, INITIAL ENCOUNTER: ICD-10-CM

## 2021-01-01 DIAGNOSIS — I50.21 ACUTE SYSTOLIC (CONGESTIVE) HEART FAILURE: ICD-10-CM

## 2021-01-01 DIAGNOSIS — Z95.5 PRESENCE OF CORONARY ANGIOPLASTY IMPLANT AND GRAFT: Chronic | ICD-10-CM

## 2021-01-01 DIAGNOSIS — T82.868A THROMBOSIS DUE TO VASCULAR PROSTHETIC DEVICES, IMPLANTS AND GRAFTS, INITIAL ENCOUNTER: Chronic | ICD-10-CM

## 2021-01-01 DIAGNOSIS — Z99.2 END STAGE RENAL DISEASE: ICD-10-CM

## 2021-01-01 DIAGNOSIS — Z95.810 PRESENCE OF AUTOMATIC (IMPLANTABLE) CARDIAC DEFIBRILLATOR: ICD-10-CM

## 2021-01-01 DIAGNOSIS — E11.9 TYPE 2 DIABETES MELLITUS W/OUT COMPLICATIONS: ICD-10-CM

## 2021-01-01 DIAGNOSIS — I50.22 CHRONIC SYSTOLIC (CONGESTIVE) HEART FAILURE: ICD-10-CM

## 2021-01-01 DIAGNOSIS — E88.9 METABOLIC DISORDER, UNSPECIFIED: ICD-10-CM

## 2021-01-01 DIAGNOSIS — E11.9 TYPE 2 DIABETES MELLITUS WITHOUT COMPLICATIONS: ICD-10-CM

## 2021-01-01 DIAGNOSIS — E78.5 HYPERLIPIDEMIA, UNSPECIFIED: ICD-10-CM

## 2021-01-01 DIAGNOSIS — I25.10 ATHEROSCLEROTIC HEART DISEASE OF NATIVE CORONARY ARTERY WITHOUT ANGINA PECTORIS: ICD-10-CM

## 2021-01-01 DIAGNOSIS — Z87.898 PERSONAL HISTORY OF OTHER SPECIFIED CONDITIONS: ICD-10-CM

## 2021-01-01 DIAGNOSIS — I10 ESSENTIAL (PRIMARY) HYPERTENSION: ICD-10-CM

## 2021-01-01 DIAGNOSIS — E83.39 OTHER DISORDERS OF PHOSPHORUS METABOLISM: ICD-10-CM

## 2021-01-01 DIAGNOSIS — D64.9 ANEMIA, UNSPECIFIED: ICD-10-CM

## 2021-01-01 DIAGNOSIS — Z29.9 ENCOUNTER FOR PROPHYLACTIC MEASURES, UNSPECIFIED: ICD-10-CM

## 2021-01-01 DIAGNOSIS — R21 RASH AND OTHER NONSPECIFIC SKIN ERUPTION: ICD-10-CM

## 2021-01-01 DIAGNOSIS — I50.20 UNSPECIFIED SYSTOLIC (CONGESTIVE) HEART FAILURE: ICD-10-CM

## 2021-01-01 DIAGNOSIS — T82.868A THROMBOSIS DUE VASCULAR PROSTHETIC DEVICES, IMPLANTS AND GRAFTS, INITIAL ENCOUNTER: ICD-10-CM

## 2021-01-01 DIAGNOSIS — I42.9 CARDIOMYOPATHY, UNSPECIFIED: ICD-10-CM

## 2021-01-01 DIAGNOSIS — I49.5 SICK SINUS SYNDROME: ICD-10-CM

## 2021-01-01 DIAGNOSIS — R00.1 BRADYCARDIA, UNSPECIFIED: ICD-10-CM

## 2021-01-01 DIAGNOSIS — I25.118 ATHEROSCLEROTIC HEART DISEASE OF NATIVE CORONARY ARTERY WITH OTHER FORMS OF ANGINA PECTORIS: ICD-10-CM

## 2021-01-01 DIAGNOSIS — Z87.448 PERSONAL HISTORY OF OTHER DISEASES OF URINARY SYSTEM: ICD-10-CM

## 2021-01-01 DIAGNOSIS — R57.0 CARDIOGENIC SHOCK: ICD-10-CM

## 2021-01-01 LAB
A1C WITH ESTIMATED AVERAGE GLUCOSE RESULT: 6.3 % — HIGH (ref 4–5.6)
A1C WITH ESTIMATED AVERAGE GLUCOSE RESULT: 6.4 % — HIGH (ref 4–5.6)
ALBUMIN SERPL ELPH-MCNC: 3.3 G/DL — SIGNIFICANT CHANGE UP (ref 3.3–5)
ALBUMIN SERPL ELPH-MCNC: 3.5 G/DL — SIGNIFICANT CHANGE UP (ref 3.3–5)
ALBUMIN SERPL ELPH-MCNC: 3.9 G/DL — SIGNIFICANT CHANGE UP (ref 3.3–5)
ALBUMIN SERPL ELPH-MCNC: 4 G/DL — SIGNIFICANT CHANGE UP (ref 3.3–5)
ALBUMIN SERPL ELPH-MCNC: 4.5 G/DL — SIGNIFICANT CHANGE UP (ref 3.3–5)
ALP SERPL-CCNC: 105 U/L — SIGNIFICANT CHANGE UP (ref 40–120)
ALP SERPL-CCNC: 127 U/L — HIGH (ref 40–120)
ALP SERPL-CCNC: 146 U/L — HIGH (ref 40–120)
ALP SERPL-CCNC: 162 U/L — HIGH (ref 40–120)
ALP SERPL-CCNC: 169 U/L — HIGH (ref 40–120)
ALP SERPL-CCNC: 90 U/L — SIGNIFICANT CHANGE UP (ref 40–120)
ALP SERPL-CCNC: 94 U/L — SIGNIFICANT CHANGE UP (ref 40–120)
ALP SERPL-CCNC: 95 U/L — SIGNIFICANT CHANGE UP (ref 40–120)
ALP SERPL-CCNC: 96 U/L — SIGNIFICANT CHANGE UP (ref 40–120)
ALT FLD-CCNC: 11 U/L — SIGNIFICANT CHANGE UP (ref 10–45)
ALT FLD-CCNC: 13 U/L — SIGNIFICANT CHANGE UP (ref 10–45)
ALT FLD-CCNC: 13 U/L — SIGNIFICANT CHANGE UP (ref 4–41)
ALT FLD-CCNC: 13 U/L — SIGNIFICANT CHANGE UP (ref 4–41)
ALT FLD-CCNC: 15 U/L — SIGNIFICANT CHANGE UP (ref 4–41)
ALT FLD-CCNC: 20 U/L — SIGNIFICANT CHANGE UP (ref 4–41)
ALT FLD-CCNC: 20 U/L — SIGNIFICANT CHANGE UP (ref 4–41)
ALT FLD-CCNC: 23 U/L — SIGNIFICANT CHANGE UP (ref 4–41)
ALT FLD-CCNC: 25 U/L — SIGNIFICANT CHANGE UP (ref 4–41)
ANION GAP SERPL CALC-SCNC: 12 MMOL/L — SIGNIFICANT CHANGE UP (ref 7–14)
ANION GAP SERPL CALC-SCNC: 13 MMOL/L — SIGNIFICANT CHANGE UP (ref 7–14)
ANION GAP SERPL CALC-SCNC: 15 MMOL/L — HIGH (ref 7–14)
ANION GAP SERPL CALC-SCNC: 15 MMOL/L — SIGNIFICANT CHANGE UP (ref 5–17)
ANION GAP SERPL CALC-SCNC: 16 MMOL/L — HIGH (ref 7–14)
ANION GAP SERPL CALC-SCNC: 17 MMOL/L — HIGH (ref 7–14)
ANION GAP SERPL CALC-SCNC: 17 MMOL/L — HIGH (ref 7–14)
ANION GAP SERPL CALC-SCNC: 17 MMOL/L — SIGNIFICANT CHANGE UP (ref 5–17)
ANION GAP SERPL CALC-SCNC: 18 MMOL/L — HIGH (ref 5–17)
ANION GAP SERPL CALC-SCNC: 18 MMOL/L — HIGH (ref 7–14)
ANION GAP SERPL CALC-SCNC: 19 MMOL/L — HIGH (ref 5–17)
ANION GAP SERPL CALC-SCNC: 20 MMOL/L — HIGH (ref 5–17)
ANION GAP SERPL CALC-SCNC: 20 MMOL/L — HIGH (ref 7–14)
ANION GAP SERPL CALC-SCNC: 21 MMOL/L — HIGH (ref 5–17)
ANION GAP SERPL CALC-SCNC: 24 MMOL/L — HIGH (ref 5–17)
APTT BLD: 34.1 SEC — SIGNIFICANT CHANGE UP (ref 27–36.3)
APTT BLD: 34.3 SEC — SIGNIFICANT CHANGE UP (ref 27–36.3)
APTT BLD: 36.3 SEC — HIGH (ref 27.5–35.5)
AST SERPL-CCNC: 15 U/L — SIGNIFICANT CHANGE UP (ref 4–40)
AST SERPL-CCNC: 20 U/L — SIGNIFICANT CHANGE UP (ref 4–40)
AST SERPL-CCNC: 21 U/L — SIGNIFICANT CHANGE UP (ref 10–40)
AST SERPL-CCNC: 22 U/L — SIGNIFICANT CHANGE UP (ref 4–40)
AST SERPL-CCNC: 23 U/L — SIGNIFICANT CHANGE UP (ref 10–40)
AST SERPL-CCNC: 26 U/L — SIGNIFICANT CHANGE UP (ref 4–40)
AST SERPL-CCNC: 29 U/L — SIGNIFICANT CHANGE UP (ref 4–40)
AST SERPL-CCNC: 35 U/L — SIGNIFICANT CHANGE UP (ref 4–40)
AST SERPL-CCNC: 65 U/L — HIGH (ref 4–40)
BASE EXCESS BLDA CALC-SCNC: -4.5 MMOL/L — LOW (ref -2–2)
BASE EXCESS BLDV CALC-SCNC: -3.1 MMOL/L — LOW (ref -3–2)
BASE EXCESS BLDV CALC-SCNC: -4.3 MMOL/L — LOW (ref -3–2)
BASE EXCESS BLDV CALC-SCNC: -4.8 MMOL/L — LOW (ref -3–2)
BASE EXCESS BLDV CALC-SCNC: 3.8 MMOL/L — HIGH (ref -2–2)
BASE EXCESS BLDV CALC-SCNC: 5.8 MMOL/L — HIGH (ref -3–2)
BASOPHILS # BLD AUTO: 0.03 K/UL — SIGNIFICANT CHANGE UP (ref 0–0.2)
BASOPHILS # BLD AUTO: 0.03 K/UL — SIGNIFICANT CHANGE UP (ref 0–0.2)
BASOPHILS # BLD AUTO: 0.06 K/UL — SIGNIFICANT CHANGE UP (ref 0–0.2)
BASOPHILS # BLD AUTO: 0.07 K/UL — SIGNIFICANT CHANGE UP (ref 0–0.2)
BASOPHILS NFR BLD AUTO: 0.4 % — SIGNIFICANT CHANGE UP (ref 0–2)
BASOPHILS NFR BLD AUTO: 0.7 % — SIGNIFICANT CHANGE UP (ref 0–2)
BASOPHILS NFR BLD AUTO: 1 % — SIGNIFICANT CHANGE UP (ref 0–2)
BASOPHILS NFR BLD AUTO: 1.5 % — SIGNIFICANT CHANGE UP (ref 0–2)
BILIRUB SERPL-MCNC: 0.5 MG/DL — SIGNIFICANT CHANGE UP (ref 0.2–1.2)
BILIRUB SERPL-MCNC: 0.6 MG/DL — SIGNIFICANT CHANGE UP (ref 0.2–1.2)
BILIRUB SERPL-MCNC: 0.9 MG/DL — SIGNIFICANT CHANGE UP (ref 0.2–1.2)
BILIRUB SERPL-MCNC: 1 MG/DL — SIGNIFICANT CHANGE UP (ref 0.2–1.2)
BILIRUB SERPL-MCNC: 1.1 MG/DL — SIGNIFICANT CHANGE UP (ref 0.2–1.2)
BILIRUB SERPL-MCNC: 1.2 MG/DL — SIGNIFICANT CHANGE UP (ref 0.2–1.2)
BLOOD GAS ARTERIAL COMPREHENSIVE RESULT: SIGNIFICANT CHANGE UP
BLOOD GAS ARTERIAL COMPREHENSIVE RESULT: SIGNIFICANT CHANGE UP
BLOOD GAS VENOUS - CREATININE: 3.8 MG/DL — HIGH (ref 0.5–1.3)
BLOOD GAS VENOUS - CREATININE: 7.3 MG/DL — HIGH (ref 0.5–1.3)
BLOOD GAS VENOUS - CREATININE: 7.7 MG/DL — HIGH (ref 0.5–1.3)
BLOOD GAS VENOUS - CREATININE: 7.7 MG/DL — HIGH (ref 0.5–1.3)
BLOOD GAS VENOUS COMPREHENSIVE RESULT: SIGNIFICANT CHANGE UP
BUN SERPL-MCNC: 10 MG/DL — SIGNIFICANT CHANGE UP (ref 7–23)
BUN SERPL-MCNC: 15 MG/DL — SIGNIFICANT CHANGE UP (ref 7–23)
BUN SERPL-MCNC: 22 MG/DL — SIGNIFICANT CHANGE UP (ref 7–23)
BUN SERPL-MCNC: 27 MG/DL — HIGH (ref 7–23)
BUN SERPL-MCNC: 28 MG/DL — HIGH (ref 7–23)
BUN SERPL-MCNC: 36 MG/DL — HIGH (ref 7–23)
BUN SERPL-MCNC: 40 MG/DL — HIGH (ref 7–23)
BUN SERPL-MCNC: 40 MG/DL — HIGH (ref 7–23)
BUN SERPL-MCNC: 41 MG/DL — HIGH (ref 7–23)
BUN SERPL-MCNC: 42 MG/DL — HIGH (ref 7–23)
BUN SERPL-MCNC: 43 MG/DL — HIGH (ref 7–23)
BUN SERPL-MCNC: 48 MG/DL — HIGH (ref 7–23)
BUN SERPL-MCNC: 49 MG/DL — HIGH (ref 7–23)
BUN SERPL-MCNC: 55 MG/DL — HIGH (ref 7–23)
BUN SERPL-MCNC: 61 MG/DL — HIGH (ref 7–23)
BUN SERPL-MCNC: 62 MG/DL — HIGH (ref 7–23)
BUN SERPL-MCNC: 63 MG/DL — HIGH (ref 7–23)
BUN SERPL-MCNC: 63 MG/DL — HIGH (ref 7–23)
BUN SERPL-MCNC: 70 MG/DL — HIGH (ref 7–23)
BUN SERPL-MCNC: 81 MG/DL — HIGH (ref 7–23)
CA-I SERPL-SCNC: 1.21 MMOL/L — SIGNIFICANT CHANGE UP (ref 1.12–1.3)
CALCIUM SERPL-MCNC: 10 MG/DL — SIGNIFICANT CHANGE UP (ref 8.4–10.5)
CALCIUM SERPL-MCNC: 10 MG/DL — SIGNIFICANT CHANGE UP (ref 8.4–10.5)
CALCIUM SERPL-MCNC: 10.1 MG/DL — SIGNIFICANT CHANGE UP (ref 8.4–10.5)
CALCIUM SERPL-MCNC: 10.2 MG/DL — SIGNIFICANT CHANGE UP (ref 8.4–10.5)
CALCIUM SERPL-MCNC: 10.4 MG/DL — SIGNIFICANT CHANGE UP (ref 8.4–10.5)
CALCIUM SERPL-MCNC: 10.5 MG/DL — SIGNIFICANT CHANGE UP (ref 8.4–10.5)
CALCIUM SERPL-MCNC: 10.7 MG/DL — HIGH (ref 8.4–10.5)
CALCIUM SERPL-MCNC: 10.7 MG/DL — HIGH (ref 8.4–10.5)
CALCIUM SERPL-MCNC: 8.9 MG/DL — SIGNIFICANT CHANGE UP (ref 8.4–10.5)
CALCIUM SERPL-MCNC: 8.9 MG/DL — SIGNIFICANT CHANGE UP (ref 8.4–10.5)
CALCIUM SERPL-MCNC: 9 MG/DL — SIGNIFICANT CHANGE UP (ref 8.4–10.5)
CALCIUM SERPL-MCNC: 9.1 MG/DL — SIGNIFICANT CHANGE UP (ref 8.4–10.5)
CALCIUM SERPL-MCNC: 9.3 MG/DL — SIGNIFICANT CHANGE UP (ref 8.4–10.5)
CALCIUM SERPL-MCNC: 9.4 MG/DL — SIGNIFICANT CHANGE UP (ref 8.4–10.5)
CALCIUM SERPL-MCNC: 9.5 MG/DL — SIGNIFICANT CHANGE UP (ref 8.4–10.5)
CALCIUM SERPL-MCNC: 9.5 MG/DL — SIGNIFICANT CHANGE UP (ref 8.4–10.5)
CALCIUM SERPL-MCNC: 9.6 MG/DL — SIGNIFICANT CHANGE UP (ref 8.4–10.5)
CALCIUM SERPL-MCNC: 9.7 MG/DL — SIGNIFICANT CHANGE UP (ref 8.4–10.5)
CALCIUM SERPL-MCNC: 9.8 MG/DL — SIGNIFICANT CHANGE UP (ref 8.4–10.5)
CALCIUM SERPL-MCNC: 9.9 MG/DL — SIGNIFICANT CHANGE UP (ref 8.4–10.5)
CHLORIDE BLDV-SCNC: 100 MMOL/L — SIGNIFICANT CHANGE UP (ref 96–108)
CHLORIDE BLDV-SCNC: 103 MMOL/L — SIGNIFICANT CHANGE UP (ref 96–108)
CHLORIDE BLDV-SCNC: 103 MMOL/L — SIGNIFICANT CHANGE UP (ref 96–108)
CHLORIDE BLDV-SCNC: 106 MMOL/L — SIGNIFICANT CHANGE UP (ref 96–108)
CHLORIDE BLDV-SCNC: 97 MMOL/L — SIGNIFICANT CHANGE UP (ref 96–108)
CHLORIDE SERPL-SCNC: 83 MMOL/L — LOW (ref 96–108)
CHLORIDE SERPL-SCNC: 84 MMOL/L — LOW (ref 96–108)
CHLORIDE SERPL-SCNC: 87 MMOL/L — LOW (ref 96–108)
CHLORIDE SERPL-SCNC: 88 MMOL/L — LOW (ref 96–108)
CHLORIDE SERPL-SCNC: 89 MMOL/L — LOW (ref 96–108)
CHLORIDE SERPL-SCNC: 89 MMOL/L — LOW (ref 96–108)
CHLORIDE SERPL-SCNC: 90 MMOL/L — LOW (ref 96–108)
CHLORIDE SERPL-SCNC: 91 MMOL/L — LOW (ref 96–108)
CHLORIDE SERPL-SCNC: 93 MMOL/L — LOW (ref 96–108)
CHLORIDE SERPL-SCNC: 94 MMOL/L — LOW (ref 96–108)
CHLORIDE SERPL-SCNC: 95 MMOL/L — LOW (ref 98–107)
CHLORIDE SERPL-SCNC: 96 MMOL/L — LOW (ref 98–107)
CHLORIDE SERPL-SCNC: 96 MMOL/L — LOW (ref 98–107)
CHLORIDE SERPL-SCNC: 97 MMOL/L — LOW (ref 98–107)
CHLORIDE SERPL-SCNC: 97 MMOL/L — LOW (ref 98–107)
CHLORIDE SERPL-SCNC: 98 MMOL/L — SIGNIFICANT CHANGE UP (ref 98–107)
CHOLEST SERPL-MCNC: 159 MG/DL — SIGNIFICANT CHANGE UP
CO2 BLDV-SCNC: 32 MMOL/L — HIGH (ref 22–30)
CO2 SERPL-SCNC: 16 MMOL/L — LOW (ref 22–31)
CO2 SERPL-SCNC: 17 MMOL/L — LOW (ref 22–31)
CO2 SERPL-SCNC: 19 MMOL/L — LOW (ref 22–31)
CO2 SERPL-SCNC: 20 MMOL/L — LOW (ref 22–31)
CO2 SERPL-SCNC: 21 MMOL/L — LOW (ref 22–31)
CO2 SERPL-SCNC: 23 MMOL/L — SIGNIFICANT CHANGE UP (ref 22–31)
CO2 SERPL-SCNC: 24 MMOL/L — SIGNIFICANT CHANGE UP (ref 22–31)
CO2 SERPL-SCNC: 25 MMOL/L — SIGNIFICANT CHANGE UP (ref 22–31)
CO2 SERPL-SCNC: 26 MMOL/L — SIGNIFICANT CHANGE UP (ref 22–31)
CO2 SERPL-SCNC: 27 MMOL/L — SIGNIFICANT CHANGE UP (ref 22–31)
CO2 SERPL-SCNC: 29 MMOL/L — SIGNIFICANT CHANGE UP (ref 22–31)
CO2 SERPL-SCNC: 30 MMOL/L — SIGNIFICANT CHANGE UP (ref 22–31)
COVID-19 SPIKE DOMAIN AB INTERP: POSITIVE
COVID-19 SPIKE DOMAIN ANTIBODY RESULT: >250 U/ML — HIGH
CREAT SERPL-MCNC: 3.71 MG/DL — HIGH (ref 0.5–1.3)
CREAT SERPL-MCNC: 4.17 MG/DL — HIGH (ref 0.5–1.3)
CREAT SERPL-MCNC: 4.61 MG/DL — HIGH (ref 0.5–1.3)
CREAT SERPL-MCNC: 5.7 MG/DL — HIGH (ref 0.5–1.3)
CREAT SERPL-MCNC: 5.74 MG/DL — HIGH (ref 0.5–1.3)
CREAT SERPL-MCNC: 5.91 MG/DL — HIGH (ref 0.5–1.3)
CREAT SERPL-MCNC: 6.06 MG/DL — HIGH (ref 0.5–1.3)
CREAT SERPL-MCNC: 6.12 MG/DL — HIGH (ref 0.5–1.3)
CREAT SERPL-MCNC: 6.2 MG/DL — HIGH (ref 0.5–1.3)
CREAT SERPL-MCNC: 6.21 MG/DL — HIGH (ref 0.5–1.3)
CREAT SERPL-MCNC: 6.46 MG/DL — HIGH (ref 0.5–1.3)
CREAT SERPL-MCNC: 6.58 MG/DL — HIGH (ref 0.5–1.3)
CREAT SERPL-MCNC: 6.77 MG/DL — HIGH (ref 0.5–1.3)
CREAT SERPL-MCNC: 6.85 MG/DL — HIGH (ref 0.5–1.3)
CREAT SERPL-MCNC: 6.92 MG/DL — HIGH (ref 0.5–1.3)
CREAT SERPL-MCNC: 7.04 MG/DL — HIGH (ref 0.5–1.3)
CREAT SERPL-MCNC: 7.3 MG/DL — HIGH (ref 0.5–1.3)
CREAT SERPL-MCNC: 7.88 MG/DL — HIGH (ref 0.5–1.3)
CREAT SERPL-MCNC: 7.9 MG/DL — HIGH (ref 0.5–1.3)
CREAT SERPL-MCNC: 7.95 MG/DL — HIGH (ref 0.5–1.3)
CULTURE RESULTS: SIGNIFICANT CHANGE UP
CULTURE RESULTS: SIGNIFICANT CHANGE UP
DIALYSIS INSTRUMENT RESULT - HEPATITIS B SURFACE ANTIGEN: NEGATIVE — SIGNIFICANT CHANGE UP
EOSINOPHIL # BLD AUTO: 0.03 K/UL — SIGNIFICANT CHANGE UP (ref 0–0.5)
EOSINOPHIL # BLD AUTO: 0.11 K/UL — SIGNIFICANT CHANGE UP (ref 0–0.5)
EOSINOPHIL # BLD AUTO: 0.14 K/UL — SIGNIFICANT CHANGE UP (ref 0–0.5)
EOSINOPHIL # BLD AUTO: 0.19 K/UL — SIGNIFICANT CHANGE UP (ref 0–0.5)
EOSINOPHIL NFR BLD AUTO: 0.4 % — SIGNIFICANT CHANGE UP (ref 0–6)
EOSINOPHIL NFR BLD AUTO: 1.7 % — SIGNIFICANT CHANGE UP (ref 0–6)
EOSINOPHIL NFR BLD AUTO: 3 % — SIGNIFICANT CHANGE UP (ref 0–6)
EOSINOPHIL NFR BLD AUTO: 4.1 % — SIGNIFICANT CHANGE UP (ref 0–6)
ESTIMATED AVERAGE GLUCOSE: 134 MG/DL — HIGH (ref 68–114)
ESTIMATED AVERAGE GLUCOSE: 137 MG/DL — HIGH (ref 68–114)
GAS PNL BLDV: 127 MMOL/L — LOW (ref 136–146)
GAS PNL BLDV: 128 MMOL/L — LOW (ref 136–146)
GAS PNL BLDV: 131 MMOL/L — LOW (ref 136–146)
GAS PNL BLDV: 133 MMOL/L — LOW (ref 136–146)
GAS PNL BLDV: 136 MMOL/L — SIGNIFICANT CHANGE UP (ref 135–145)
GAS PNL BLDV: SIGNIFICANT CHANGE UP
GAS PNL BLDV: SIGNIFICANT CHANGE UP
GLUCOSE BLDC GLUCOMTR-MCNC: 104 MG/DL — HIGH (ref 70–99)
GLUCOSE BLDC GLUCOMTR-MCNC: 107 MG/DL — HIGH (ref 70–99)
GLUCOSE BLDC GLUCOMTR-MCNC: 108 MG/DL — HIGH (ref 70–99)
GLUCOSE BLDC GLUCOMTR-MCNC: 110 MG/DL — HIGH (ref 70–99)
GLUCOSE BLDC GLUCOMTR-MCNC: 113 MG/DL — HIGH (ref 70–99)
GLUCOSE BLDC GLUCOMTR-MCNC: 113 MG/DL — HIGH (ref 70–99)
GLUCOSE BLDC GLUCOMTR-MCNC: 117 MG/DL — HIGH (ref 70–99)
GLUCOSE BLDC GLUCOMTR-MCNC: 117 MG/DL — HIGH (ref 70–99)
GLUCOSE BLDC GLUCOMTR-MCNC: 122 MG/DL — HIGH (ref 70–99)
GLUCOSE BLDC GLUCOMTR-MCNC: 123 MG/DL — HIGH (ref 70–99)
GLUCOSE BLDC GLUCOMTR-MCNC: 124 MG/DL — HIGH (ref 70–99)
GLUCOSE BLDC GLUCOMTR-MCNC: 125 MG/DL — HIGH (ref 70–99)
GLUCOSE BLDC GLUCOMTR-MCNC: 127 MG/DL — HIGH (ref 70–99)
GLUCOSE BLDC GLUCOMTR-MCNC: 127 MG/DL — HIGH (ref 70–99)
GLUCOSE BLDC GLUCOMTR-MCNC: 128 MG/DL — HIGH (ref 70–99)
GLUCOSE BLDC GLUCOMTR-MCNC: 128 MG/DL — HIGH (ref 70–99)
GLUCOSE BLDC GLUCOMTR-MCNC: 130 MG/DL — HIGH (ref 70–99)
GLUCOSE BLDC GLUCOMTR-MCNC: 130 MG/DL — HIGH (ref 70–99)
GLUCOSE BLDC GLUCOMTR-MCNC: 131 MG/DL — HIGH (ref 70–99)
GLUCOSE BLDC GLUCOMTR-MCNC: 133 MG/DL — HIGH (ref 70–99)
GLUCOSE BLDC GLUCOMTR-MCNC: 135 MG/DL — HIGH (ref 70–99)
GLUCOSE BLDC GLUCOMTR-MCNC: 136 MG/DL — HIGH (ref 70–99)
GLUCOSE BLDC GLUCOMTR-MCNC: 138 MG/DL — HIGH (ref 70–99)
GLUCOSE BLDC GLUCOMTR-MCNC: 139 MG/DL — HIGH (ref 70–99)
GLUCOSE BLDC GLUCOMTR-MCNC: 140 MG/DL — HIGH (ref 70–99)
GLUCOSE BLDC GLUCOMTR-MCNC: 141
GLUCOSE BLDC GLUCOMTR-MCNC: 141 MG/DL — HIGH (ref 70–99)
GLUCOSE BLDC GLUCOMTR-MCNC: 143 MG/DL — HIGH (ref 70–99)
GLUCOSE BLDC GLUCOMTR-MCNC: 144 MG/DL — HIGH (ref 70–99)
GLUCOSE BLDC GLUCOMTR-MCNC: 145 MG/DL — HIGH (ref 70–99)
GLUCOSE BLDC GLUCOMTR-MCNC: 150 MG/DL — HIGH (ref 70–99)
GLUCOSE BLDC GLUCOMTR-MCNC: 155 MG/DL — HIGH (ref 70–99)
GLUCOSE BLDC GLUCOMTR-MCNC: 156 MG/DL — HIGH (ref 70–99)
GLUCOSE BLDC GLUCOMTR-MCNC: 160 MG/DL — HIGH (ref 70–99)
GLUCOSE BLDC GLUCOMTR-MCNC: 161 MG/DL — HIGH (ref 70–99)
GLUCOSE BLDC GLUCOMTR-MCNC: 164 MG/DL — HIGH (ref 70–99)
GLUCOSE BLDC GLUCOMTR-MCNC: 176 MG/DL — HIGH (ref 70–99)
GLUCOSE BLDC GLUCOMTR-MCNC: 179 MG/DL — HIGH (ref 70–99)
GLUCOSE BLDC GLUCOMTR-MCNC: 182 MG/DL — HIGH (ref 70–99)
GLUCOSE BLDC GLUCOMTR-MCNC: 191 MG/DL — HIGH (ref 70–99)
GLUCOSE BLDC GLUCOMTR-MCNC: 193 MG/DL — HIGH (ref 70–99)
GLUCOSE BLDC GLUCOMTR-MCNC: 199 MG/DL — HIGH (ref 70–99)
GLUCOSE BLDC GLUCOMTR-MCNC: 200 MG/DL — HIGH (ref 70–99)
GLUCOSE BLDC GLUCOMTR-MCNC: 200 MG/DL — HIGH (ref 70–99)
GLUCOSE BLDC GLUCOMTR-MCNC: 204 MG/DL — HIGH (ref 70–99)
GLUCOSE BLDC GLUCOMTR-MCNC: 233 MG/DL — HIGH (ref 70–99)
GLUCOSE BLDC GLUCOMTR-MCNC: 246 MG/DL — HIGH (ref 70–99)
GLUCOSE BLDC GLUCOMTR-MCNC: 313 MG/DL — HIGH (ref 70–99)
GLUCOSE BLDC GLUCOMTR-MCNC: 320 MG/DL — HIGH (ref 70–99)
GLUCOSE BLDC GLUCOMTR-MCNC: 93 MG/DL — SIGNIFICANT CHANGE UP (ref 70–99)
GLUCOSE BLDC GLUCOMTR-MCNC: 96 MG/DL — SIGNIFICANT CHANGE UP (ref 70–99)
GLUCOSE BLDV-MCNC: 109 MG/DL — HIGH (ref 70–99)
GLUCOSE BLDV-MCNC: 145 MG/DL — HIGH (ref 70–99)
GLUCOSE BLDV-MCNC: 172 MG/DL — HIGH (ref 70–99)
GLUCOSE BLDV-MCNC: 190 MG/DL — HIGH (ref 70–99)
GLUCOSE BLDV-MCNC: 259 MG/DL — HIGH (ref 70–99)
GLUCOSE SERPL-MCNC: 101 MG/DL — HIGH (ref 70–99)
GLUCOSE SERPL-MCNC: 103 MG/DL — HIGH (ref 70–99)
GLUCOSE SERPL-MCNC: 104 MG/DL — HIGH (ref 70–99)
GLUCOSE SERPL-MCNC: 107 MG/DL — HIGH (ref 70–99)
GLUCOSE SERPL-MCNC: 115 MG/DL — HIGH (ref 70–99)
GLUCOSE SERPL-MCNC: 116 MG/DL — HIGH (ref 70–99)
GLUCOSE SERPL-MCNC: 117 MG/DL — HIGH (ref 70–99)
GLUCOSE SERPL-MCNC: 120 MG/DL — HIGH (ref 70–99)
GLUCOSE SERPL-MCNC: 133 MG/DL — HIGH (ref 70–99)
GLUCOSE SERPL-MCNC: 136 MG/DL — HIGH (ref 70–99)
GLUCOSE SERPL-MCNC: 141 MG/DL — HIGH (ref 70–99)
GLUCOSE SERPL-MCNC: 153 MG/DL — HIGH (ref 70–99)
GLUCOSE SERPL-MCNC: 154 MG/DL — HIGH (ref 70–99)
GLUCOSE SERPL-MCNC: 159 MG/DL — HIGH (ref 70–99)
GLUCOSE SERPL-MCNC: 175 MG/DL — HIGH (ref 70–99)
GLUCOSE SERPL-MCNC: 204 MG/DL — HIGH (ref 70–99)
GLUCOSE SERPL-MCNC: 210 MG/DL — HIGH (ref 70–99)
GLUCOSE SERPL-MCNC: 283 MG/DL — HIGH (ref 70–99)
GLUCOSE SERPL-MCNC: 90 MG/DL — SIGNIFICANT CHANGE UP (ref 70–99)
GLUCOSE SERPL-MCNC: 97 MG/DL — SIGNIFICANT CHANGE UP (ref 70–99)
HBV CORE AB SER-ACNC: REACTIVE
HBV SURFACE AB SER-ACNC: 37.6 MIU/ML — SIGNIFICANT CHANGE UP
HBV SURFACE AG SER-ACNC: SIGNIFICANT CHANGE UP
HCO3 BLDA-SCNC: 21 MMOL/L — LOW (ref 22–26)
HCO3 BLDV-SCNC: 20 MMOL/L — SIGNIFICANT CHANGE UP (ref 20–27)
HCO3 BLDV-SCNC: 20 MMOL/L — SIGNIFICANT CHANGE UP (ref 20–27)
HCO3 BLDV-SCNC: 21 MMOL/L — SIGNIFICANT CHANGE UP (ref 20–27)
HCO3 BLDV-SCNC: 29 MMOL/L — HIGH (ref 20–27)
HCO3 BLDV-SCNC: 30 MMOL/L — HIGH (ref 21–29)
HCT VFR BLD CALC: 31 % — LOW (ref 39–50)
HCT VFR BLD CALC: 33.6 % — LOW (ref 39–50)
HCT VFR BLD CALC: 34.7 % — LOW (ref 39–50)
HCT VFR BLD CALC: 35.3 % — LOW (ref 39–50)
HCT VFR BLD CALC: 36.3 % — LOW (ref 39–50)
HCT VFR BLD CALC: 37.3 % — LOW (ref 39–50)
HCT VFR BLD CALC: 38 % — LOW (ref 39–50)
HCT VFR BLD CALC: 38.9 % — LOW (ref 39–50)
HCT VFR BLD CALC: 40.3 % — SIGNIFICANT CHANGE UP (ref 39–50)
HCT VFR BLD CALC: 40.8 % — SIGNIFICANT CHANGE UP (ref 39–50)
HCT VFR BLD CALC: 41.5 % — SIGNIFICANT CHANGE UP (ref 39–50)
HCT VFR BLD CALC: 42 % — SIGNIFICANT CHANGE UP (ref 39–50)
HCT VFR BLD CALC: 42.2 % — SIGNIFICANT CHANGE UP (ref 39–50)
HCT VFR BLD CALC: 43 % — SIGNIFICANT CHANGE UP (ref 39–50)
HCT VFR BLDA CALC: 28.8 % — LOW (ref 39–51)
HCT VFR BLDA CALC: 33.4 % — LOW (ref 39–51)
HCT VFR BLDA CALC: 33.7 % — LOW (ref 39–51)
HCT VFR BLDA CALC: 37 % — LOW (ref 39–50)
HCT VFR BLDA CALC: 37 % — LOW (ref 39–51)
HCV AB S/CO SERPL IA: 0.12 S/CO — SIGNIFICANT CHANGE UP (ref 0–0.99)
HCV AB SERPL-IMP: SIGNIFICANT CHANGE UP
HDLC SERPL-MCNC: 51 MG/DL — SIGNIFICANT CHANGE UP
HGB BLD CALC-MCNC: 10.8 G/DL — LOW (ref 13–17)
HGB BLD CALC-MCNC: 10.9 G/DL — LOW (ref 13–17)
HGB BLD CALC-MCNC: 12 G/DL — LOW (ref 13–17)
HGB BLD CALC-MCNC: 12 G/DL — LOW (ref 13–17)
HGB BLD CALC-MCNC: 9.3 G/DL — LOW (ref 13–17)
HGB BLD-MCNC: 10.4 G/DL — LOW (ref 13–17)
HGB BLD-MCNC: 10.4 G/DL — LOW (ref 13–17)
HGB BLD-MCNC: 10.7 G/DL — LOW (ref 13–17)
HGB BLD-MCNC: 11.2 G/DL — LOW (ref 13–17)
HGB BLD-MCNC: 11.4 G/DL — LOW (ref 13–17)
HGB BLD-MCNC: 11.6 G/DL — LOW (ref 13–17)
HGB BLD-MCNC: 11.7 G/DL — LOW (ref 13–17)
HGB BLD-MCNC: 12.2 G/DL — LOW (ref 13–17)
HGB BLD-MCNC: 12.3 G/DL — LOW (ref 13–17)
HGB BLD-MCNC: 12.6 G/DL — LOW (ref 13–17)
HGB BLD-MCNC: 12.7 G/DL — LOW (ref 13–17)
HGB BLD-MCNC: 13.2 G/DL — SIGNIFICANT CHANGE UP (ref 13–17)
HGB BLD-MCNC: 13.3 G/DL — SIGNIFICANT CHANGE UP (ref 13–17)
HGB BLD-MCNC: 9.3 G/DL — LOW (ref 13–17)
HOROWITZ INDEX BLDV+IHG-RTO: SIGNIFICANT CHANGE UP
IANC: 2.84 K/UL — SIGNIFICANT CHANGE UP (ref 1.5–8.5)
IANC: 4.57 K/UL — SIGNIFICANT CHANGE UP (ref 1.5–8.5)
IANC: 5.83 K/UL — SIGNIFICANT CHANGE UP (ref 1.5–8.5)
IMM GRANULOCYTES NFR BLD AUTO: 0.3 % — SIGNIFICANT CHANGE UP (ref 0–1.5)
IMM GRANULOCYTES NFR BLD AUTO: 0.4 % — SIGNIFICANT CHANGE UP (ref 0–1.5)
IMM GRANULOCYTES NFR BLD AUTO: 0.4 % — SIGNIFICANT CHANGE UP (ref 0–1.5)
IMM GRANULOCYTES NFR BLD AUTO: 0.6 % — SIGNIFICANT CHANGE UP (ref 0–1.5)
INR BLD: 1.23 RATIO — HIGH (ref 0.88–1.16)
INR BLD: 1.32 RATIO — HIGH (ref 0.88–1.16)
INR BLD: 1.33 RATIO — HIGH (ref 0.88–1.16)
LACTATE BLDV-MCNC: 1.8 MMOL/L — SIGNIFICANT CHANGE UP (ref 0.5–2)
LACTATE BLDV-MCNC: 2.2 MMOL/L — HIGH (ref 0.7–2)
LACTATE BLDV-MCNC: 2.8 MMOL/L — HIGH (ref 0.5–2)
LACTATE BLDV-MCNC: 3.8 MMOL/L — HIGH (ref 0.5–2)
LACTATE BLDV-MCNC: 4.3 MMOL/L — CRITICAL HIGH (ref 0.5–2)
LIPID PNL WITH DIRECT LDL SERPL: 92 MG/DL — SIGNIFICANT CHANGE UP
LYMPHOCYTES # BLD AUTO: 0.66 K/UL — LOW (ref 1–3.3)
LYMPHOCYTES # BLD AUTO: 0.68 K/UL — LOW (ref 1–3.3)
LYMPHOCYTES # BLD AUTO: 0.83 K/UL — LOW (ref 1–3.3)
LYMPHOCYTES # BLD AUTO: 0.98 K/UL — LOW (ref 1–3.3)
LYMPHOCYTES # BLD AUTO: 13.2 % — SIGNIFICANT CHANGE UP (ref 13–44)
LYMPHOCYTES # BLD AUTO: 14.5 % — SIGNIFICANT CHANGE UP (ref 13–44)
LYMPHOCYTES # BLD AUTO: 21.3 % — SIGNIFICANT CHANGE UP (ref 13–44)
LYMPHOCYTES # BLD AUTO: 9.7 % — LOW (ref 13–44)
MAGNESIUM SERPL-MCNC: 2.1 MG/DL — SIGNIFICANT CHANGE UP (ref 1.6–2.6)
MAGNESIUM SERPL-MCNC: 2.2 MG/DL — SIGNIFICANT CHANGE UP (ref 1.6–2.6)
MAGNESIUM SERPL-MCNC: 2.3 MG/DL — SIGNIFICANT CHANGE UP (ref 1.6–2.6)
MAGNESIUM SERPL-MCNC: 2.4 MG/DL — SIGNIFICANT CHANGE UP (ref 1.6–2.6)
MAGNESIUM SERPL-MCNC: 2.5 MG/DL — SIGNIFICANT CHANGE UP (ref 1.6–2.6)
MAGNESIUM SERPL-MCNC: 2.5 MG/DL — SIGNIFICANT CHANGE UP (ref 1.6–2.6)
MAGNESIUM SERPL-MCNC: 2.6 MG/DL — SIGNIFICANT CHANGE UP (ref 1.6–2.6)
MANUAL SMEAR VERIFICATION: SIGNIFICANT CHANGE UP
MCHC RBC-ENTMCNC: 25.5 PG — LOW (ref 27–34)
MCHC RBC-ENTMCNC: 25.7 PG — LOW (ref 27–34)
MCHC RBC-ENTMCNC: 25.9 PG — LOW (ref 27–34)
MCHC RBC-ENTMCNC: 26.3 PG — LOW (ref 27–34)
MCHC RBC-ENTMCNC: 26.5 PG — LOW (ref 27–34)
MCHC RBC-ENTMCNC: 26.8 PG — LOW (ref 27–34)
MCHC RBC-ENTMCNC: 27 PG — SIGNIFICANT CHANGE UP (ref 27–34)
MCHC RBC-ENTMCNC: 27.2 PG — SIGNIFICANT CHANGE UP (ref 27–34)
MCHC RBC-ENTMCNC: 27.5 PG — SIGNIFICANT CHANGE UP (ref 27–34)
MCHC RBC-ENTMCNC: 27.5 PG — SIGNIFICANT CHANGE UP (ref 27–34)
MCHC RBC-ENTMCNC: 27.9 PG — SIGNIFICANT CHANGE UP (ref 27–34)
MCHC RBC-ENTMCNC: 29.4 GM/DL — LOW (ref 32–36)
MCHC RBC-ENTMCNC: 29.8 GM/DL — LOW (ref 32–36)
MCHC RBC-ENTMCNC: 30 GM/DL — LOW (ref 32–36)
MCHC RBC-ENTMCNC: 30.1 GM/DL — LOW (ref 32–36)
MCHC RBC-ENTMCNC: 30.3 GM/DL — LOW (ref 32–36)
MCHC RBC-ENTMCNC: 30.5 GM/DL — LOW (ref 32–36)
MCHC RBC-ENTMCNC: 30.7 GM/DL — LOW (ref 32–36)
MCHC RBC-ENTMCNC: 30.8 GM/DL — LOW (ref 32–36)
MCHC RBC-ENTMCNC: 30.9 GM/DL — LOW (ref 32–36)
MCHC RBC-ENTMCNC: 31 GM/DL — LOW (ref 32–36)
MCHC RBC-ENTMCNC: 31.4 GM/DL — LOW (ref 32–36)
MCHC RBC-ENTMCNC: 31.7 GM/DL — LOW (ref 32–36)
MCV RBC AUTO: 83.2 FL — SIGNIFICANT CHANGE UP (ref 80–100)
MCV RBC AUTO: 84.7 FL — SIGNIFICANT CHANGE UP (ref 80–100)
MCV RBC AUTO: 84.7 FL — SIGNIFICANT CHANGE UP (ref 80–100)
MCV RBC AUTO: 85 FL — SIGNIFICANT CHANGE UP (ref 80–100)
MCV RBC AUTO: 85.6 FL — SIGNIFICANT CHANGE UP (ref 80–100)
MCV RBC AUTO: 85.6 FL — SIGNIFICANT CHANGE UP (ref 80–100)
MCV RBC AUTO: 86.3 FL — SIGNIFICANT CHANGE UP (ref 80–100)
MCV RBC AUTO: 87.6 FL — SIGNIFICANT CHANGE UP (ref 80–100)
MCV RBC AUTO: 87.8 FL — SIGNIFICANT CHANGE UP (ref 80–100)
MCV RBC AUTO: 88.1 FL — SIGNIFICANT CHANGE UP (ref 80–100)
MCV RBC AUTO: 88.8 FL — SIGNIFICANT CHANGE UP (ref 80–100)
MCV RBC AUTO: 88.9 FL — SIGNIFICANT CHANGE UP (ref 80–100)
MCV RBC AUTO: 90 FL — SIGNIFICANT CHANGE UP (ref 80–100)
MCV RBC AUTO: 91.1 FL — SIGNIFICANT CHANGE UP (ref 80–100)
MONOCYTES # BLD AUTO: 0.25 K/UL — SIGNIFICANT CHANGE UP (ref 0–0.9)
MONOCYTES # BLD AUTO: 0.55 K/UL — SIGNIFICANT CHANGE UP (ref 0–0.9)
MONOCYTES # BLD AUTO: 0.58 K/UL — SIGNIFICANT CHANGE UP (ref 0–0.9)
MONOCYTES # BLD AUTO: 0.68 K/UL — SIGNIFICANT CHANGE UP (ref 0–0.9)
MONOCYTES NFR BLD AUTO: 10.8 % — SIGNIFICANT CHANGE UP (ref 2–14)
MONOCYTES NFR BLD AUTO: 11.9 % — SIGNIFICANT CHANGE UP (ref 2–14)
MONOCYTES NFR BLD AUTO: 12.4 % — SIGNIFICANT CHANGE UP (ref 2–14)
MONOCYTES NFR BLD AUTO: 3.7 % — SIGNIFICANT CHANGE UP (ref 2–14)
MRSA PCR RESULT.: SIGNIFICANT CHANGE UP
NEUTROPHILS # BLD AUTO: 2.84 K/UL — SIGNIFICANT CHANGE UP (ref 1.8–7.4)
NEUTROPHILS # BLD AUTO: 3.2 K/UL — SIGNIFICANT CHANGE UP (ref 1.8–7.4)
NEUTROPHILS # BLD AUTO: 4.57 K/UL — SIGNIFICANT CHANGE UP (ref 1.8–7.4)
NEUTROPHILS # BLD AUTO: 5.83 K/UL — SIGNIFICANT CHANGE UP (ref 1.8–7.4)
NEUTROPHILS NFR BLD AUTO: 61.6 % — SIGNIFICANT CHANGE UP (ref 43–77)
NEUTROPHILS NFR BLD AUTO: 68.2 % — SIGNIFICANT CHANGE UP (ref 43–77)
NEUTROPHILS NFR BLD AUTO: 72.7 % — SIGNIFICANT CHANGE UP (ref 43–77)
NEUTROPHILS NFR BLD AUTO: 85.5 % — HIGH (ref 43–77)
NON HDL CHOLESTEROL: 108 MG/DL — SIGNIFICANT CHANGE UP
NRBC # BLD: 0 /100 WBCS — SIGNIFICANT CHANGE UP
NRBC # BLD: 0 /100 WBCS — SIGNIFICANT CHANGE UP (ref 0–0)
NRBC # FLD: 0 K/UL — SIGNIFICANT CHANGE UP
NRBC # FLD: 0.02 K/UL — HIGH
NRBC # FLD: 0.02 K/UL — HIGH
NRBC # FLD: 0.03 K/UL — HIGH
NRBC # FLD: 0.03 K/UL — HIGH
NRBC # FLD: 0.04 K/UL — HIGH
NRBC # FLD: 0.04 K/UL — HIGH
NT-PROBNP SERPL-SCNC: HIGH PG/ML (ref 0–300)
PCO2 BLDA: 35 MMHG — SIGNIFICANT CHANGE UP (ref 35–48)
PCO2 BLDV: 39 MMHG — LOW (ref 42–55)
PCO2 BLDV: 40 MMHG — LOW (ref 42–55)
PCO2 BLDV: 42 MMHG — SIGNIFICANT CHANGE UP (ref 42–55)
PCO2 BLDV: 45 MMHG — SIGNIFICANT CHANGE UP (ref 41–51)
PCO2 BLDV: 57 MMHG — HIGH (ref 35–50)
PH BLDA: 7.37 — SIGNIFICANT CHANGE UP (ref 7.35–7.45)
PH BLDV: 7.33 — SIGNIFICANT CHANGE UP (ref 7.32–7.43)
PH BLDV: 7.33 — SIGNIFICANT CHANGE UP (ref 7.32–7.43)
PH BLDV: 7.34 — LOW (ref 7.35–7.45)
PH BLDV: 7.34 — SIGNIFICANT CHANGE UP (ref 7.32–7.43)
PH BLDV: 7.44 — HIGH (ref 7.32–7.43)
PHOSPHATE SERPL-MCNC: 2.7 MG/DL — SIGNIFICANT CHANGE UP (ref 2.5–4.5)
PHOSPHATE SERPL-MCNC: 3.9 MG/DL — SIGNIFICANT CHANGE UP (ref 2.5–4.5)
PHOSPHATE SERPL-MCNC: 4.3 MG/DL — SIGNIFICANT CHANGE UP (ref 2.5–4.5)
PHOSPHATE SERPL-MCNC: 5.4 MG/DL — HIGH (ref 2.5–4.5)
PHOSPHATE SERPL-MCNC: 5.6 MG/DL — HIGH (ref 2.5–4.5)
PHOSPHATE SERPL-MCNC: 5.7 MG/DL — HIGH (ref 2.5–4.5)
PHOSPHATE SERPL-MCNC: 6.2 MG/DL — HIGH (ref 2.5–4.5)
PHOSPHATE SERPL-MCNC: 6.3 MG/DL — HIGH (ref 2.5–4.5)
PHOSPHATE SERPL-MCNC: 6.5 MG/DL — HIGH (ref 2.5–4.5)
PHOSPHATE SERPL-MCNC: 6.5 MG/DL — HIGH (ref 2.5–4.5)
PLAT MORPH BLD: NORMAL — SIGNIFICANT CHANGE UP
PLATELET # BLD AUTO: 108 K/UL — LOW (ref 150–400)
PLATELET # BLD AUTO: 111 K/UL — LOW (ref 150–400)
PLATELET # BLD AUTO: 112 K/UL — LOW (ref 150–400)
PLATELET # BLD AUTO: 116 K/UL — LOW (ref 150–400)
PLATELET # BLD AUTO: 118 K/UL — LOW (ref 150–400)
PLATELET # BLD AUTO: 119 K/UL — LOW (ref 150–400)
PLATELET # BLD AUTO: 124 K/UL — LOW (ref 150–400)
PLATELET # BLD AUTO: 126 K/UL — LOW (ref 150–400)
PLATELET # BLD AUTO: 135 K/UL — LOW (ref 150–400)
PLATELET # BLD AUTO: 141 K/UL — LOW (ref 150–400)
PLATELET # BLD AUTO: 151 K/UL — SIGNIFICANT CHANGE UP (ref 150–400)
PLATELET # BLD AUTO: 84 K/UL — LOW (ref 150–400)
PLATELET # BLD AUTO: 92 K/UL — LOW (ref 150–400)
PLATELET # BLD AUTO: 98 K/UL — LOW (ref 150–400)
PLATELET COUNT - ESTIMATE: ABNORMAL
PO2 BLDA: 193 MMHG — HIGH (ref 83–108)
PO2 BLDV: 37 MMHG — SIGNIFICANT CHANGE UP (ref 35–40)
PO2 BLDV: 44 MMHG — HIGH (ref 35–40)
PO2 BLDV: 45 MMHG — HIGH (ref 35–40)
PO2 BLDV: 46 MMHG — HIGH (ref 35–40)
PO2 BLDV: <20 MMHG — LOW (ref 25–45)
POTASSIUM BLDV-SCNC: 3.2 MMOL/L — LOW (ref 3.4–4.5)
POTASSIUM BLDV-SCNC: 4.1 MMOL/L — SIGNIFICANT CHANGE UP (ref 3.4–4.5)
POTASSIUM BLDV-SCNC: 4.2 MMOL/L — SIGNIFICANT CHANGE UP (ref 3.4–4.5)
POTASSIUM BLDV-SCNC: 4.3 MMOL/L — SIGNIFICANT CHANGE UP (ref 3.5–5.3)
POTASSIUM BLDV-SCNC: 4.7 MMOL/L — HIGH (ref 3.4–4.5)
POTASSIUM SERPL-MCNC: 3.4 MMOL/L — LOW (ref 3.5–5.3)
POTASSIUM SERPL-MCNC: 3.8 MMOL/L — SIGNIFICANT CHANGE UP (ref 3.5–5.3)
POTASSIUM SERPL-MCNC: 3.9 MMOL/L — SIGNIFICANT CHANGE UP (ref 3.5–5.3)
POTASSIUM SERPL-MCNC: 4.3 MMOL/L — SIGNIFICANT CHANGE UP (ref 3.5–5.3)
POTASSIUM SERPL-MCNC: 4.5 MMOL/L — SIGNIFICANT CHANGE UP (ref 3.5–5.3)
POTASSIUM SERPL-MCNC: 4.7 MMOL/L — SIGNIFICANT CHANGE UP (ref 3.5–5.3)
POTASSIUM SERPL-MCNC: 4.8 MMOL/L — SIGNIFICANT CHANGE UP (ref 3.5–5.3)
POTASSIUM SERPL-MCNC: 4.9 MMOL/L — SIGNIFICANT CHANGE UP (ref 3.5–5.3)
POTASSIUM SERPL-MCNC: 5 MMOL/L — SIGNIFICANT CHANGE UP (ref 3.5–5.3)
POTASSIUM SERPL-MCNC: 5.1 MMOL/L — SIGNIFICANT CHANGE UP (ref 3.5–5.3)
POTASSIUM SERPL-MCNC: 5.2 MMOL/L — SIGNIFICANT CHANGE UP (ref 3.5–5.3)
POTASSIUM SERPL-MCNC: 5.2 MMOL/L — SIGNIFICANT CHANGE UP (ref 3.5–5.3)
POTASSIUM SERPL-MCNC: 5.3 MMOL/L — SIGNIFICANT CHANGE UP (ref 3.5–5.3)
POTASSIUM SERPL-MCNC: 5.3 MMOL/L — SIGNIFICANT CHANGE UP (ref 3.5–5.3)
POTASSIUM SERPL-MCNC: 5.5 MMOL/L — HIGH (ref 3.5–5.3)
POTASSIUM SERPL-MCNC: 5.6 MMOL/L — HIGH (ref 3.5–5.3)
POTASSIUM SERPL-MCNC: 5.7 MMOL/L — HIGH (ref 3.5–5.3)
POTASSIUM SERPL-MCNC: 5.7 MMOL/L — HIGH (ref 3.5–5.3)
POTASSIUM SERPL-MCNC: 5.8 MMOL/L — HIGH (ref 3.5–5.3)
POTASSIUM SERPL-MCNC: 6.2 MMOL/L — CRITICAL HIGH (ref 3.5–5.3)
POTASSIUM SERPL-SCNC: 3.4 MMOL/L — LOW (ref 3.5–5.3)
POTASSIUM SERPL-SCNC: 3.8 MMOL/L — SIGNIFICANT CHANGE UP (ref 3.5–5.3)
POTASSIUM SERPL-SCNC: 3.9 MMOL/L — SIGNIFICANT CHANGE UP (ref 3.5–5.3)
POTASSIUM SERPL-SCNC: 4.3 MMOL/L — SIGNIFICANT CHANGE UP (ref 3.5–5.3)
POTASSIUM SERPL-SCNC: 4.5 MMOL/L — SIGNIFICANT CHANGE UP (ref 3.5–5.3)
POTASSIUM SERPL-SCNC: 4.7 MMOL/L — SIGNIFICANT CHANGE UP (ref 3.5–5.3)
POTASSIUM SERPL-SCNC: 4.8 MMOL/L — SIGNIFICANT CHANGE UP (ref 3.5–5.3)
POTASSIUM SERPL-SCNC: 4.9 MMOL/L — SIGNIFICANT CHANGE UP (ref 3.5–5.3)
POTASSIUM SERPL-SCNC: 5 MMOL/L — SIGNIFICANT CHANGE UP (ref 3.5–5.3)
POTASSIUM SERPL-SCNC: 5.1 MMOL/L — SIGNIFICANT CHANGE UP (ref 3.5–5.3)
POTASSIUM SERPL-SCNC: 5.2 MMOL/L — SIGNIFICANT CHANGE UP (ref 3.5–5.3)
POTASSIUM SERPL-SCNC: 5.2 MMOL/L — SIGNIFICANT CHANGE UP (ref 3.5–5.3)
POTASSIUM SERPL-SCNC: 5.3 MMOL/L — SIGNIFICANT CHANGE UP (ref 3.5–5.3)
POTASSIUM SERPL-SCNC: 5.3 MMOL/L — SIGNIFICANT CHANGE UP (ref 3.5–5.3)
POTASSIUM SERPL-SCNC: 5.5 MMOL/L — HIGH (ref 3.5–5.3)
POTASSIUM SERPL-SCNC: 5.6 MMOL/L — HIGH (ref 3.5–5.3)
POTASSIUM SERPL-SCNC: 5.7 MMOL/L — HIGH (ref 3.5–5.3)
POTASSIUM SERPL-SCNC: 5.7 MMOL/L — HIGH (ref 3.5–5.3)
POTASSIUM SERPL-SCNC: 5.8 MMOL/L — HIGH (ref 3.5–5.3)
POTASSIUM SERPL-SCNC: 6.2 MMOL/L — CRITICAL HIGH (ref 3.5–5.3)
PROT SERPL-MCNC: 6.3 G/DL — SIGNIFICANT CHANGE UP (ref 6–8.3)
PROT SERPL-MCNC: 6.6 G/DL — SIGNIFICANT CHANGE UP (ref 6–8.3)
PROT SERPL-MCNC: 6.8 G/DL — SIGNIFICANT CHANGE UP (ref 6–8.3)
PROT SERPL-MCNC: 6.9 G/DL — SIGNIFICANT CHANGE UP (ref 6–8.3)
PROT SERPL-MCNC: 6.9 G/DL — SIGNIFICANT CHANGE UP (ref 6–8.3)
PROT SERPL-MCNC: 7.6 G/DL — SIGNIFICANT CHANGE UP (ref 6–8.3)
PROT SERPL-MCNC: 8.1 G/DL — SIGNIFICANT CHANGE UP (ref 6–8.3)
PROTHROM AB SERPL-ACNC: 14.6 SEC — HIGH (ref 10.6–13.6)
PROTHROM AB SERPL-ACNC: 14.9 SEC — HIGH (ref 10.6–13.6)
PROTHROM AB SERPL-ACNC: 15 SEC — HIGH (ref 10.6–13.6)
RBC # BLD: 3.62 M/UL — LOW (ref 4.2–5.8)
RBC # BLD: 4.04 M/UL — LOW (ref 4.2–5.8)
RBC # BLD: 4.08 M/UL — LOW (ref 4.2–5.8)
RBC # BLD: 4.09 M/UL — LOW (ref 4.2–5.8)
RBC # BLD: 4.17 M/UL — LOW (ref 4.2–5.8)
RBC # BLD: 4.27 M/UL — SIGNIFICANT CHANGE UP (ref 4.2–5.8)
RBC # BLD: 4.33 M/UL — SIGNIFICANT CHANGE UP (ref 4.2–5.8)
RBC # BLD: 4.36 M/UL — SIGNIFICANT CHANGE UP (ref 4.2–5.8)
RBC # BLD: 4.48 M/UL — SIGNIFICANT CHANGE UP (ref 4.2–5.8)
RBC # BLD: 4.59 M/UL — SIGNIFICANT CHANGE UP (ref 4.2–5.8)
RBC # BLD: 4.71 M/UL — SIGNIFICANT CHANGE UP (ref 4.2–5.8)
RBC # BLD: 4.82 M/UL — SIGNIFICANT CHANGE UP (ref 4.2–5.8)
RBC # BLD: 4.96 M/UL — SIGNIFICANT CHANGE UP (ref 4.2–5.8)
RBC # BLD: 4.98 M/UL — SIGNIFICANT CHANGE UP (ref 4.2–5.8)
RBC # FLD: 17.7 % — HIGH (ref 10.3–14.5)
RBC # FLD: 17.8 % — HIGH (ref 10.3–14.5)
RBC # FLD: 17.9 % — HIGH (ref 10.3–14.5)
RBC # FLD: 18 % — HIGH (ref 10.3–14.5)
RBC # FLD: 18.1 % — HIGH (ref 10.3–14.5)
RBC # FLD: 18.6 % — HIGH (ref 10.3–14.5)
RBC # FLD: 18.7 % — HIGH (ref 10.3–14.5)
RBC # FLD: 19 % — HIGH (ref 10.3–14.5)
RBC # FLD: 19.5 % — HIGH (ref 10.3–14.5)
RBC BLD AUTO: NORMAL — SIGNIFICANT CHANGE UP
S AUREUS DNA NOSE QL NAA+PROBE: SIGNIFICANT CHANGE UP
SAO2 % BLDA: 98.7 % — SIGNIFICANT CHANGE UP (ref 95–99)
SAO2 % BLDV: 14 % — LOW (ref 67–88)
SAO2 % BLDV: 65.8 % — SIGNIFICANT CHANGE UP (ref 60–85)
SAO2 % BLDV: 67.4 % — SIGNIFICANT CHANGE UP (ref 60–85)
SAO2 % BLDV: 70.4 % — SIGNIFICANT CHANGE UP (ref 60–85)
SAO2 % BLDV: 71.7 % — SIGNIFICANT CHANGE UP (ref 60–85)
SARS-COV-2 IGG SERPL QL IA: NEGATIVE — SIGNIFICANT CHANGE UP
SARS-COV-2 IGG+IGM SERPL QL IA: >250 U/ML — HIGH
SARS-COV-2 IGG+IGM SERPL QL IA: POSITIVE
SARS-COV-2 IGM SERPL IA-ACNC: 0.07 INDEX — SIGNIFICANT CHANGE UP
SARS-COV-2 N GENE NPH QL NAA+PROBE: NOT DETECTED
SARS-COV-2 RNA SPEC QL NAA+PROBE: SIGNIFICANT CHANGE UP
SODIUM SERPL-SCNC: 126 MMOL/L — LOW (ref 135–145)
SODIUM SERPL-SCNC: 128 MMOL/L — LOW (ref 135–145)
SODIUM SERPL-SCNC: 129 MMOL/L — LOW (ref 135–145)
SODIUM SERPL-SCNC: 130 MMOL/L — LOW (ref 135–145)
SODIUM SERPL-SCNC: 131 MMOL/L — LOW (ref 135–145)
SODIUM SERPL-SCNC: 131 MMOL/L — LOW (ref 135–145)
SODIUM SERPL-SCNC: 132 MMOL/L — LOW (ref 135–145)
SODIUM SERPL-SCNC: 132 MMOL/L — LOW (ref 135–145)
SODIUM SERPL-SCNC: 133 MMOL/L — LOW (ref 135–145)
SODIUM SERPL-SCNC: 133 MMOL/L — LOW (ref 135–145)
SODIUM SERPL-SCNC: 134 MMOL/L — LOW (ref 135–145)
SODIUM SERPL-SCNC: 135 MMOL/L — SIGNIFICANT CHANGE UP (ref 135–145)
SODIUM SERPL-SCNC: 136 MMOL/L — SIGNIFICANT CHANGE UP (ref 135–145)
SODIUM SERPL-SCNC: 136 MMOL/L — SIGNIFICANT CHANGE UP (ref 135–145)
SODIUM SERPL-SCNC: 137 MMOL/L — SIGNIFICANT CHANGE UP (ref 135–145)
SODIUM SERPL-SCNC: 137 MMOL/L — SIGNIFICANT CHANGE UP (ref 135–145)
SPECIMEN SOURCE: SIGNIFICANT CHANGE UP
SPECIMEN SOURCE: SIGNIFICANT CHANGE UP
TRIGL SERPL-MCNC: 79 MG/DL — SIGNIFICANT CHANGE UP
TROPONIN T, HIGH SENSITIVITY RESULT: 266 NG/L — CRITICAL HIGH
TROPONIN T, HIGH SENSITIVITY RESULT: 291 NG/L — CRITICAL HIGH
TSH SERPL-MCNC: 1.23 UIU/ML — SIGNIFICANT CHANGE UP (ref 0.27–4.2)
WBC # BLD: 4.61 K/UL — SIGNIFICANT CHANGE UP (ref 3.8–10.5)
WBC # BLD: 4.67 K/UL — SIGNIFICANT CHANGE UP (ref 3.8–10.5)
WBC # BLD: 4.69 K/UL — SIGNIFICANT CHANGE UP (ref 3.8–10.5)
WBC # BLD: 4.8 K/UL — SIGNIFICANT CHANGE UP (ref 3.8–10.5)
WBC # BLD: 4.82 K/UL — SIGNIFICANT CHANGE UP (ref 3.8–10.5)
WBC # BLD: 5.22 K/UL — SIGNIFICANT CHANGE UP (ref 3.8–10.5)
WBC # BLD: 5.27 K/UL — SIGNIFICANT CHANGE UP (ref 3.8–10.5)
WBC # BLD: 5.53 K/UL — SIGNIFICANT CHANGE UP (ref 3.8–10.5)
WBC # BLD: 5.75 K/UL — SIGNIFICANT CHANGE UP (ref 3.8–10.5)
WBC # BLD: 6.29 K/UL — SIGNIFICANT CHANGE UP (ref 3.8–10.5)
WBC # BLD: 6.82 K/UL — SIGNIFICANT CHANGE UP (ref 3.8–10.5)
WBC # BLD: 7.8 K/UL — SIGNIFICANT CHANGE UP (ref 3.8–10.5)
WBC # BLD: 8.41 K/UL — SIGNIFICANT CHANGE UP (ref 3.8–10.5)
WBC # BLD: 8.82 K/UL — SIGNIFICANT CHANGE UP (ref 3.8–10.5)
WBC # FLD AUTO: 4.61 K/UL — SIGNIFICANT CHANGE UP (ref 3.8–10.5)
WBC # FLD AUTO: 4.67 K/UL — SIGNIFICANT CHANGE UP (ref 3.8–10.5)
WBC # FLD AUTO: 4.69 K/UL — SIGNIFICANT CHANGE UP (ref 3.8–10.5)
WBC # FLD AUTO: 4.8 K/UL — SIGNIFICANT CHANGE UP (ref 3.8–10.5)
WBC # FLD AUTO: 4.82 K/UL — SIGNIFICANT CHANGE UP (ref 3.8–10.5)
WBC # FLD AUTO: 5.22 K/UL — SIGNIFICANT CHANGE UP (ref 3.8–10.5)
WBC # FLD AUTO: 5.27 K/UL — SIGNIFICANT CHANGE UP (ref 3.8–10.5)
WBC # FLD AUTO: 5.53 K/UL — SIGNIFICANT CHANGE UP (ref 3.8–10.5)
WBC # FLD AUTO: 5.75 K/UL — SIGNIFICANT CHANGE UP (ref 3.8–10.5)
WBC # FLD AUTO: 6.29 K/UL — SIGNIFICANT CHANGE UP (ref 3.8–10.5)
WBC # FLD AUTO: 6.82 K/UL — SIGNIFICANT CHANGE UP (ref 3.8–10.5)
WBC # FLD AUTO: 7.8 K/UL — SIGNIFICANT CHANGE UP (ref 3.8–10.5)
WBC # FLD AUTO: 8.41 K/UL — SIGNIFICANT CHANGE UP (ref 3.8–10.5)
WBC # FLD AUTO: 8.82 K/UL — SIGNIFICANT CHANGE UP (ref 3.8–10.5)

## 2021-01-01 PROCEDURE — 99291 CRITICAL CARE FIRST HOUR: CPT | Mod: CS,25,GC

## 2021-01-01 PROCEDURE — 99232 SBSQ HOSP IP/OBS MODERATE 35: CPT | Mod: GC

## 2021-01-01 PROCEDURE — 93000 ELECTROCARDIOGRAM COMPLETE: CPT

## 2021-01-01 PROCEDURE — G2066: CPT

## 2021-01-01 PROCEDURE — 85610 PROTHROMBIN TIME: CPT

## 2021-01-01 PROCEDURE — 82435 ASSAY OF BLOOD CHLORIDE: CPT

## 2021-01-01 PROCEDURE — 99233 SBSQ HOSP IP/OBS HIGH 50: CPT

## 2021-01-01 PROCEDURE — 99292 CRITICAL CARE ADDL 30 MIN: CPT

## 2021-01-01 PROCEDURE — 99261: CPT

## 2021-01-01 PROCEDURE — C1769: CPT

## 2021-01-01 PROCEDURE — 99215 OFFICE O/P EST HI 40 MIN: CPT

## 2021-01-01 PROCEDURE — 99223 1ST HOSP IP/OBS HIGH 75: CPT

## 2021-01-01 PROCEDURE — 99223 1ST HOSP IP/OBS HIGH 75: CPT | Mod: GC

## 2021-01-01 PROCEDURE — 99232 SBSQ HOSP IP/OBS MODERATE 35: CPT

## 2021-01-01 PROCEDURE — 71045 X-RAY EXAM CHEST 1 VIEW: CPT

## 2021-01-01 PROCEDURE — 99221 1ST HOSP IP/OBS SF/LOW 40: CPT

## 2021-01-01 PROCEDURE — 93291 INTERROG DEV EVAL SCRMS IP: CPT | Mod: 26,59

## 2021-01-01 PROCEDURE — C1887: CPT

## 2021-01-01 PROCEDURE — 83735 ASSAY OF MAGNESIUM: CPT

## 2021-01-01 PROCEDURE — G9005: CPT

## 2021-01-01 PROCEDURE — 99291 CRITICAL CARE FIRST HOUR: CPT

## 2021-01-01 PROCEDURE — C1894: CPT

## 2021-01-01 PROCEDURE — 80048 BASIC METABOLIC PNL TOTAL CA: CPT

## 2021-01-01 PROCEDURE — 85730 THROMBOPLASTIN TIME PARTIAL: CPT

## 2021-01-01 PROCEDURE — 99233 SBSQ HOSP IP/OBS HIGH 50: CPT | Mod: GC

## 2021-01-01 PROCEDURE — 99153 MOD SED SAME PHYS/QHP EA: CPT

## 2021-01-01 PROCEDURE — 85027 COMPLETE CBC AUTOMATED: CPT

## 2021-01-01 PROCEDURE — 93010 ELECTROCARDIOGRAM REPORT: CPT | Mod: GC

## 2021-01-01 PROCEDURE — 99152 MOD SED SAME PHYS/QHP 5/>YRS: CPT

## 2021-01-01 PROCEDURE — 93295 DEV INTERROG REMOTE 1/2/MLT: CPT

## 2021-01-01 PROCEDURE — 93260 PRGRMG DEV EVAL IMPLTBL SYS: CPT

## 2021-01-01 PROCEDURE — 93990 DOPPLER FLOW TESTING: CPT | Mod: 26

## 2021-01-01 PROCEDURE — 83036 HEMOGLOBIN GLYCOSYLATED A1C: CPT

## 2021-01-01 PROCEDURE — 90935 HEMODIALYSIS ONE EVALUATION: CPT

## 2021-01-01 PROCEDURE — 93010 ELECTROCARDIOGRAM REPORT: CPT

## 2021-01-01 PROCEDURE — 71045 X-RAY EXAM CHEST 1 VIEW: CPT | Mod: 26,77

## 2021-01-01 PROCEDURE — 99239 HOSP IP/OBS DSCHRG MGMT >30: CPT

## 2021-01-01 PROCEDURE — 99447 NTRPROF PH1/NTRNET/EHR 11-20: CPT | Mod: GC

## 2021-01-01 PROCEDURE — 93456 R HRT CORONARY ARTERY ANGIO: CPT

## 2021-01-01 PROCEDURE — 85018 HEMOGLOBIN: CPT

## 2021-01-01 PROCEDURE — 82947 ASSAY GLUCOSE BLOOD QUANT: CPT

## 2021-01-01 PROCEDURE — 82330 ASSAY OF CALCIUM: CPT

## 2021-01-01 PROCEDURE — 36215Z: CUSTOM | Mod: 59

## 2021-01-01 PROCEDURE — 36902Z: CUSTOM

## 2021-01-01 PROCEDURE — 99222 1ST HOSP IP/OBS MODERATE 55: CPT | Mod: 25

## 2021-01-01 PROCEDURE — 99285 EMERGENCY DEPT VISIT HI MDM: CPT

## 2021-01-01 PROCEDURE — 71045 X-RAY EXAM CHEST 1 VIEW: CPT | Mod: 26

## 2021-01-01 PROCEDURE — 36905Z: CUSTOM

## 2021-01-01 PROCEDURE — 93298 REM INTERROG DEV EVAL SCRMS: CPT

## 2021-01-01 PROCEDURE — 99213 OFFICE O/P EST LOW 20 MIN: CPT

## 2021-01-01 PROCEDURE — 83880 ASSAY OF NATRIURETIC PEPTIDE: CPT

## 2021-01-01 PROCEDURE — 93000 ELECTROCARDIOGRAM COMPLETE: CPT | Mod: 59

## 2021-01-01 PROCEDURE — 93005 ELECTROCARDIOGRAM TRACING: CPT

## 2021-01-01 PROCEDURE — 99213 OFFICE O/P EST LOW 20 MIN: CPT | Mod: 25

## 2021-01-01 PROCEDURE — 85014 HEMATOCRIT: CPT

## 2021-01-01 PROCEDURE — 93306 TTE W/DOPPLER COMPLETE: CPT | Mod: 26

## 2021-01-01 PROCEDURE — 99222 1ST HOSP IP/OBS MODERATE 55: CPT | Mod: GC

## 2021-01-01 PROCEDURE — 84295 ASSAY OF SERUM SODIUM: CPT

## 2021-01-01 PROCEDURE — 85025 COMPLETE CBC W/AUTO DIFF WBC: CPT

## 2021-01-01 PROCEDURE — G2066: CPT | Mod: NC

## 2021-01-01 PROCEDURE — 84520 ASSAY OF UREA NITROGEN: CPT

## 2021-01-01 PROCEDURE — 93285 PRGRMG DEV EVAL SCRMS IP: CPT | Mod: 59

## 2021-01-01 PROCEDURE — 83605 ASSAY OF LACTIC ACID: CPT

## 2021-01-01 PROCEDURE — 93261 INTERROGATE SUBQ DEFIB: CPT | Mod: 26

## 2021-01-01 PROCEDURE — 99214 OFFICE O/P EST MOD 30 MIN: CPT

## 2021-01-01 PROCEDURE — 36556 INSERT NON-TUNNEL CV CATH: CPT

## 2021-01-01 PROCEDURE — 84100 ASSAY OF PHOSPHORUS: CPT

## 2021-01-01 PROCEDURE — 80053 COMPREHEN METABOLIC PANEL: CPT

## 2021-01-01 PROCEDURE — 86769 SARS-COV-2 COVID-19 ANTIBODY: CPT

## 2021-01-01 PROCEDURE — 93296 REM INTERROG EVL PM/IDS: CPT

## 2021-01-01 PROCEDURE — 82803 BLOOD GASES ANY COMBINATION: CPT

## 2021-01-01 PROCEDURE — 93990 DOPPLER FLOW TESTING: CPT

## 2021-01-01 PROCEDURE — 93456 R HRT CORONARY ARTERY ANGIO: CPT | Mod: 26

## 2021-01-01 PROCEDURE — 90935 HEMODIALYSIS ONE EVALUATION: CPT | Mod: GC

## 2021-01-01 PROCEDURE — 84132 ASSAY OF SERUM POTASSIUM: CPT

## 2021-01-01 PROCEDURE — U0003: CPT

## 2021-01-01 PROCEDURE — 82962 GLUCOSE BLOOD TEST: CPT

## 2021-01-01 PROCEDURE — U0005: CPT

## 2021-01-01 RX ORDER — VANCOMYCIN HCL 1 G
VIAL (EA) INTRAVENOUS
Refills: 0 | Status: DISCONTINUED | OUTPATIENT
Start: 2021-01-01 | End: 2021-01-01

## 2021-01-01 RX ORDER — MEN-PHOR .5; .5 G/G; G/G
1 LOTION TOPICAL
Refills: 0 | Status: DISCONTINUED | OUTPATIENT
Start: 2021-01-01 | End: 2021-01-01

## 2021-01-01 RX ORDER — INSULIN LISPRO 100/ML
VIAL (ML) SUBCUTANEOUS
Refills: 0 | Status: DISCONTINUED | OUTPATIENT
Start: 2021-01-01 | End: 2021-01-01

## 2021-01-01 RX ORDER — SODIUM ZIRCONIUM CYCLOSILICATE 10 G/10G
5 POWDER, FOR SUSPENSION ORAL ONCE
Refills: 0 | Status: COMPLETED | OUTPATIENT
Start: 2021-01-01 | End: 2021-01-01

## 2021-01-01 RX ORDER — LOSARTAN POTASSIUM 100 MG/1
12.5 TABLET, FILM COATED ORAL
Refills: 0 | Status: DISCONTINUED | OUTPATIENT
Start: 2021-01-01 | End: 2021-01-01

## 2021-01-01 RX ORDER — LOSARTAN POTASSIUM 100 MG/1
12.5 TABLET, FILM COATED ORAL EVERY 12 HOURS
Refills: 0 | Status: DISCONTINUED | OUTPATIENT
Start: 2021-01-01 | End: 2021-01-01

## 2021-01-01 RX ORDER — LIDOCAINE AND PRILOCAINE 25; 25 MG/G; MG/G
2.5-2.5 CREAM TOPICAL
Qty: 1 | Refills: 5 | Status: ACTIVE | COMMUNITY
Start: 2019-08-27 | End: 1900-01-01

## 2021-01-01 RX ORDER — CHLORHEXIDINE GLUCONATE 213 G/1000ML
1 SOLUTION TOPICAL DAILY
Refills: 0 | Status: DISCONTINUED | OUTPATIENT
Start: 2021-01-01 | End: 2021-01-01

## 2021-01-01 RX ORDER — SODIUM CHLORIDE 9 MG/ML
1000 INJECTION, SOLUTION INTRAVENOUS
Refills: 0 | Status: DISCONTINUED | OUTPATIENT
Start: 2021-01-01 | End: 2021-01-01

## 2021-01-01 RX ORDER — PIPERACILLIN AND TAZOBACTAM 4; .5 G/20ML; G/20ML
3.38 INJECTION, POWDER, LYOPHILIZED, FOR SOLUTION INTRAVENOUS ONCE
Refills: 0 | Status: COMPLETED | OUTPATIENT
Start: 2021-01-01 | End: 2021-01-01

## 2021-01-01 RX ORDER — ERYTHROPOIETIN 10000 [IU]/ML
5000 INJECTION, SOLUTION INTRAVENOUS; SUBCUTANEOUS ONCE
Refills: 0 | Status: COMPLETED | OUTPATIENT
Start: 2021-01-01 | End: 2021-01-01

## 2021-01-01 RX ORDER — DEXTROSE 50 % IN WATER 50 %
25 SYRINGE (ML) INTRAVENOUS ONCE
Refills: 0 | Status: DISCONTINUED | OUTPATIENT
Start: 2021-01-01 | End: 2021-01-01

## 2021-01-01 RX ORDER — PIPERACILLIN AND TAZOBACTAM 4; .5 G/20ML; G/20ML
3.38 INJECTION, POWDER, LYOPHILIZED, FOR SOLUTION INTRAVENOUS EVERY 8 HOURS
Refills: 0 | Status: DISCONTINUED | OUTPATIENT
Start: 2021-01-01 | End: 2021-01-01

## 2021-01-01 RX ORDER — CALCIUM ACETATE 667 MG
667 TABLET ORAL
Refills: 0 | Status: DISCONTINUED | OUTPATIENT
Start: 2021-01-01 | End: 2021-01-01

## 2021-01-01 RX ORDER — MIDODRINE HYDROCHLORIDE 2.5 MG/1
10 TABLET ORAL ONCE
Refills: 0 | Status: COMPLETED | OUTPATIENT
Start: 2021-01-01 | End: 2021-01-01

## 2021-01-01 RX ORDER — MIDODRINE HYDROCHLORIDE 5 MG/1
5 TABLET ORAL
Refills: 0 | Status: ACTIVE | COMMUNITY
Start: 2021-01-01

## 2021-01-01 RX ORDER — LOSARTAN POTASSIUM 25 MG/1
25 TABLET, FILM COATED ORAL TWICE DAILY
Qty: 1 | Refills: 2 | Status: DISCONTINUED | COMMUNITY
Start: 2020-01-01 | End: 2021-01-01

## 2021-01-01 RX ORDER — DEXTROSE 50 % IN WATER 50 %
50 SYRINGE (ML) INTRAVENOUS ONCE
Refills: 0 | Status: COMPLETED | OUTPATIENT
Start: 2021-01-01 | End: 2021-01-01

## 2021-01-01 RX ORDER — NOREPINEPHRINE BITARTRATE/D5W 8 MG/250ML
0.05 PLASTIC BAG, INJECTION (ML) INTRAVENOUS
Qty: 8 | Refills: 0 | Status: DISCONTINUED | OUTPATIENT
Start: 2021-01-01 | End: 2021-01-01

## 2021-01-01 RX ORDER — ACETAMINOPHEN 500 MG
650 TABLET ORAL EVERY 6 HOURS
Refills: 0 | Status: DISCONTINUED | OUTPATIENT
Start: 2021-01-01 | End: 2021-01-01

## 2021-01-01 RX ORDER — MIDODRINE HYDROCHLORIDE 2.5 MG/1
1 TABLET ORAL
Qty: 0 | Refills: 0 | DISCHARGE

## 2021-01-01 RX ORDER — HEPARIN SODIUM 5000 [USP'U]/ML
5000 INJECTION INTRAVENOUS; SUBCUTANEOUS EVERY 12 HOURS
Refills: 0 | Status: DISCONTINUED | OUTPATIENT
Start: 2021-01-01 | End: 2021-01-01

## 2021-01-01 RX ORDER — REPAGLINIDE 1 MG/1
1 TABLET ORAL
Qty: 270 | Refills: 0 | Status: ACTIVE | COMMUNITY
Start: 2019-09-06 | End: 1900-01-01

## 2021-01-01 RX ORDER — DOBUTAMINE HCL 250MG/20ML
2.5 VIAL (ML) INTRAVENOUS
Qty: 500 | Refills: 0 | Status: DISCONTINUED | OUTPATIENT
Start: 2021-01-01 | End: 2021-01-01

## 2021-01-01 RX ORDER — CLINDAMYCIN PHOSPHATE GEL USP, 1% 10 MG/G
1 GEL TOPICAL
Qty: 0 | Refills: 0 | DISCHARGE

## 2021-01-01 RX ORDER — MIDODRINE HYDROCHLORIDE 2.5 MG/1
5 TABLET ORAL ONCE
Refills: 0 | Status: DISCONTINUED | OUTPATIENT
Start: 2021-01-01 | End: 2021-01-01

## 2021-01-01 RX ORDER — HEPARIN SODIUM 5000 [USP'U]/ML
5000 INJECTION INTRAVENOUS; SUBCUTANEOUS EVERY 8 HOURS
Refills: 0 | Status: DISCONTINUED | OUTPATIENT
Start: 2021-01-01 | End: 2021-01-01

## 2021-01-01 RX ORDER — CHLORHEXIDINE GLUCONATE 213 G/1000ML
1 SOLUTION TOPICAL
Refills: 0 | Status: DISCONTINUED | OUTPATIENT
Start: 2021-01-01 | End: 2021-01-01

## 2021-01-01 RX ORDER — SODIUM CHLORIDE 9 MG/ML
250 INJECTION INTRAMUSCULAR; INTRAVENOUS; SUBCUTANEOUS ONCE
Refills: 0 | Status: COMPLETED | OUTPATIENT
Start: 2021-01-01 | End: 2021-01-01

## 2021-01-01 RX ORDER — DOXYCYCLINE 100 MG/1
100 CAPSULE ORAL
Qty: 42 | Refills: 0 | Status: ACTIVE | COMMUNITY
Start: 2021-01-01

## 2021-01-01 RX ORDER — LIDOCAINE AND PRILOCAINE CREAM 25; 25 MG/G; MG/G
1 CREAM TOPICAL ONCE
Refills: 0 | Status: DISCONTINUED | OUTPATIENT
Start: 2021-01-01 | End: 2021-01-01

## 2021-01-01 RX ORDER — CALCIUM ACETATE 667 MG
667 TABLET ORAL 3 TIMES DAILY
Qty: 270 | Refills: 3 | Status: ACTIVE | COMMUNITY
Start: 2019-06-26 | End: 1900-01-01

## 2021-01-01 RX ORDER — CALCIUM ACETATE 667 MG
2001 TABLET ORAL
Refills: 0 | Status: DISCONTINUED | OUTPATIENT
Start: 2021-01-01 | End: 2021-01-01

## 2021-01-01 RX ORDER — PANTOPRAZOLE SODIUM 20 MG/1
40 TABLET, DELAYED RELEASE ORAL
Refills: 0 | Status: DISCONTINUED | OUTPATIENT
Start: 2021-01-01 | End: 2021-01-01

## 2021-01-01 RX ORDER — MEN-PHOR .5; .5 G/G; G/G
1 LOTION TOPICAL
Qty: 0 | Refills: 0 | DISCHARGE
Start: 2021-01-01

## 2021-01-01 RX ORDER — PANTOPRAZOLE SODIUM 20 MG/1
1 TABLET, DELAYED RELEASE ORAL
Qty: 0 | Refills: 0 | DISCHARGE

## 2021-01-01 RX ORDER — GLUCAGON INJECTION, SOLUTION 0.5 MG/.1ML
1 INJECTION, SOLUTION SUBCUTANEOUS ONCE
Refills: 0 | Status: DISCONTINUED | OUTPATIENT
Start: 2021-01-01 | End: 2021-01-01

## 2021-01-01 RX ORDER — MIDODRINE HYDROCHLORIDE 2.5 MG/1
10 TABLET ORAL EVERY 8 HOURS
Refills: 0 | Status: DISCONTINUED | OUTPATIENT
Start: 2021-01-01 | End: 2021-01-01

## 2021-01-01 RX ORDER — REPAGLINIDE 1 MG/1
1 TABLET ORAL
Qty: 0 | Refills: 0 | DISCHARGE

## 2021-01-01 RX ORDER — MIDODRINE HYDROCHLORIDE 2.5 MG/1
5 TABLET ORAL ONCE
Refills: 0 | Status: COMPLETED | OUTPATIENT
Start: 2021-01-01 | End: 2021-01-01

## 2021-01-01 RX ORDER — LOSARTAN POTASSIUM 100 MG/1
0 TABLET, FILM COATED ORAL
Qty: 0 | Refills: 0 | DISCHARGE

## 2021-01-01 RX ORDER — MUPIROCIN 20 MG/G
1 OINTMENT TOPICAL
Qty: 0 | Refills: 0 | DISCHARGE
Start: 2021-01-01

## 2021-01-01 RX ORDER — DEXTRAN 70/HYPROMELLOSE 0.1%-0.3%
0.1-0.3 DROPS OPHTHALMIC (EYE) 4 TIMES DAILY
Qty: 1 | Refills: 3 | Status: ACTIVE | COMMUNITY
Start: 2020-04-10 | End: 1900-01-01

## 2021-01-01 RX ORDER — ATORVASTATIN CALCIUM 80 MG/1
80 TABLET, FILM COATED ORAL AT BEDTIME
Refills: 0 | Status: DISCONTINUED | OUTPATIENT
Start: 2021-01-01 | End: 2021-01-01

## 2021-01-01 RX ORDER — MUPIROCIN 20 MG/G
1 OINTMENT TOPICAL
Refills: 0 | Status: DISCONTINUED | OUTPATIENT
Start: 2021-01-01 | End: 2021-01-01

## 2021-01-01 RX ORDER — DEXTROSE 50 % IN WATER 50 %
12.5 SYRINGE (ML) INTRAVENOUS ONCE
Refills: 0 | Status: DISCONTINUED | OUTPATIENT
Start: 2021-01-01 | End: 2021-01-01

## 2021-01-01 RX ORDER — VANCOMYCIN HCL 1 G
500 VIAL (EA) INTRAVENOUS EVERY 12 HOURS
Refills: 0 | Status: DISCONTINUED | OUTPATIENT
Start: 2021-01-01 | End: 2021-01-01

## 2021-01-01 RX ORDER — DEXTROSE 50 % IN WATER 50 %
15 SYRINGE (ML) INTRAVENOUS ONCE
Refills: 0 | Status: DISCONTINUED | OUTPATIENT
Start: 2021-01-01 | End: 2021-01-01

## 2021-01-01 RX ORDER — POTASSIUM CHLORIDE 20 MEQ
20 PACKET (EA) ORAL ONCE
Refills: 0 | Status: COMPLETED | OUTPATIENT
Start: 2021-01-01 | End: 2021-01-01

## 2021-01-01 RX ORDER — INSULIN HUMAN 100 [IU]/ML
10 INJECTION, SOLUTION SUBCUTANEOUS ONCE
Refills: 0 | Status: COMPLETED | OUTPATIENT
Start: 2021-01-01 | End: 2021-01-01

## 2021-01-01 RX ORDER — VANCOMYCIN HCL 1 G
1500 VIAL (EA) INTRAVENOUS ONCE
Refills: 0 | Status: COMPLETED | OUTPATIENT
Start: 2021-01-01 | End: 2021-01-01

## 2021-01-01 RX ORDER — VANCOMYCIN HCL 1 G
1500 VIAL (EA) INTRAVENOUS ONCE
Refills: 0 | Status: DISCONTINUED | OUTPATIENT
Start: 2021-01-01 | End: 2021-01-01

## 2021-01-01 RX ORDER — ATORVASTATIN CALCIUM 80 MG/1
80 TABLET, FILM COATED ORAL
Qty: 90 | Refills: 1 | Status: ACTIVE | COMMUNITY
Start: 2018-04-03 | End: 1900-01-01

## 2021-01-01 RX ORDER — INSULIN LISPRO 100/ML
VIAL (ML) SUBCUTANEOUS AT BEDTIME
Refills: 0 | Status: DISCONTINUED | OUTPATIENT
Start: 2021-01-01 | End: 2021-01-01

## 2021-01-01 RX ORDER — SODIUM CHLORIDE 9 MG/ML
1000 INJECTION, SOLUTION INTRAVENOUS ONCE
Refills: 0 | Status: DISCONTINUED | OUTPATIENT
Start: 2021-01-01 | End: 2021-01-01

## 2021-01-01 RX ADMIN — ATORVASTATIN CALCIUM 80 MILLIGRAM(S): 80 TABLET, FILM COATED ORAL at 22:25

## 2021-01-01 RX ADMIN — Medication 2001 MILLIGRAM(S): at 09:28

## 2021-01-01 RX ADMIN — PANTOPRAZOLE SODIUM 40 MILLIGRAM(S): 20 TABLET, DELAYED RELEASE ORAL at 06:05

## 2021-01-01 RX ADMIN — Medication 667 MILLIGRAM(S): at 09:20

## 2021-01-01 RX ADMIN — Medication 2001 MILLIGRAM(S): at 13:24

## 2021-01-01 RX ADMIN — SODIUM ZIRCONIUM CYCLOSILICATE 5 GRAM(S): 10 POWDER, FOR SUSPENSION ORAL at 23:58

## 2021-01-01 RX ADMIN — ATORVASTATIN CALCIUM 80 MILLIGRAM(S): 80 TABLET, FILM COATED ORAL at 20:46

## 2021-01-01 RX ADMIN — MUPIROCIN 1 APPLICATION(S): 20 OINTMENT TOPICAL at 13:24

## 2021-01-01 RX ADMIN — Medication 1: at 07:41

## 2021-01-01 RX ADMIN — PIPERACILLIN AND TAZOBACTAM 25 GRAM(S): 4; .5 INJECTION, POWDER, LYOPHILIZED, FOR SOLUTION INTRAVENOUS at 06:11

## 2021-01-01 RX ADMIN — MEN-PHOR 1 APPLICATION(S): .5; .5 LOTION TOPICAL at 05:21

## 2021-01-01 RX ADMIN — Medication 2001 MILLIGRAM(S): at 12:10

## 2021-01-01 RX ADMIN — MEN-PHOR 1 APPLICATION(S): .5; .5 LOTION TOPICAL at 06:06

## 2021-01-01 RX ADMIN — Medication 2001 MILLIGRAM(S): at 10:01

## 2021-01-01 RX ADMIN — Medication 100 MILLIGRAM(S): at 16:48

## 2021-01-01 RX ADMIN — CHLORHEXIDINE GLUCONATE 1 APPLICATION(S): 213 SOLUTION TOPICAL at 09:16

## 2021-01-01 RX ADMIN — MUPIROCIN 1 APPLICATION(S): 20 OINTMENT TOPICAL at 09:29

## 2021-01-01 RX ADMIN — CHLORHEXIDINE GLUCONATE 1 APPLICATION(S): 213 SOLUTION TOPICAL at 13:22

## 2021-01-01 RX ADMIN — Medication 667 MILLIGRAM(S): at 17:24

## 2021-01-01 RX ADMIN — PANTOPRAZOLE SODIUM 40 MILLIGRAM(S): 20 TABLET, DELAYED RELEASE ORAL at 05:45

## 2021-01-01 RX ADMIN — Medication 100 MILLIGRAM(S): at 17:11

## 2021-01-01 RX ADMIN — Medication 1 APPLICATION(S): at 05:01

## 2021-01-01 RX ADMIN — Medication 2001 MILLIGRAM(S): at 17:30

## 2021-01-01 RX ADMIN — ATORVASTATIN CALCIUM 80 MILLIGRAM(S): 80 TABLET, FILM COATED ORAL at 21:09

## 2021-01-01 RX ADMIN — MIDODRINE HYDROCHLORIDE 5 MILLIGRAM(S): 2.5 TABLET ORAL at 13:18

## 2021-01-01 RX ADMIN — Medication 100 MILLIGRAM(S): at 06:06

## 2021-01-01 RX ADMIN — Medication 5.7 MICROGRAM(S)/KG/MIN: at 22:33

## 2021-01-01 RX ADMIN — MEN-PHOR 1 APPLICATION(S): .5; .5 LOTION TOPICAL at 18:43

## 2021-01-01 RX ADMIN — Medication 1: at 13:17

## 2021-01-01 RX ADMIN — Medication 100 MILLIGRAM(S): at 05:16

## 2021-01-01 RX ADMIN — Medication 1: at 09:07

## 2021-01-01 RX ADMIN — MEN-PHOR 1 APPLICATION(S): .5; .5 LOTION TOPICAL at 05:46

## 2021-01-01 RX ADMIN — Medication 667 MILLIGRAM(S): at 18:05

## 2021-01-01 RX ADMIN — Medication 1 APPLICATION(S): at 06:06

## 2021-01-01 RX ADMIN — MEN-PHOR 1 APPLICATION(S): .5; .5 LOTION TOPICAL at 04:47

## 2021-01-01 RX ADMIN — Medication 2001 MILLIGRAM(S): at 18:07

## 2021-01-01 RX ADMIN — ATORVASTATIN CALCIUM 80 MILLIGRAM(S): 80 TABLET, FILM COATED ORAL at 22:08

## 2021-01-01 RX ADMIN — PANTOPRAZOLE SODIUM 40 MILLIGRAM(S): 20 TABLET, DELAYED RELEASE ORAL at 04:43

## 2021-01-01 RX ADMIN — Medication 100 MILLIGRAM(S): at 17:40

## 2021-01-01 RX ADMIN — ATORVASTATIN CALCIUM 80 MILLIGRAM(S): 80 TABLET, FILM COATED ORAL at 22:26

## 2021-01-01 RX ADMIN — MUPIROCIN 1 APPLICATION(S): 20 OINTMENT TOPICAL at 12:06

## 2021-01-01 RX ADMIN — Medication 100 MILLIGRAM(S): at 18:04

## 2021-01-01 RX ADMIN — Medication 2001 MILLIGRAM(S): at 18:03

## 2021-01-01 RX ADMIN — MEN-PHOR 1 APPLICATION(S): .5; .5 LOTION TOPICAL at 16:49

## 2021-01-01 RX ADMIN — Medication 2001 MILLIGRAM(S): at 18:17

## 2021-01-01 RX ADMIN — ATORVASTATIN CALCIUM 80 MILLIGRAM(S): 80 TABLET, FILM COATED ORAL at 21:42

## 2021-01-01 RX ADMIN — Medication 2001 MILLIGRAM(S): at 12:34

## 2021-01-01 RX ADMIN — Medication 4: at 18:04

## 2021-01-01 RX ADMIN — Medication 7.2 MICROGRAM(S)/KG/MIN: at 07:00

## 2021-01-01 RX ADMIN — Medication 7.2 MICROGRAM(S)/KG/MIN: at 08:28

## 2021-01-01 RX ADMIN — Medication 100 MILLIGRAM(S): at 17:13

## 2021-01-01 RX ADMIN — MUPIROCIN 1 APPLICATION(S): 20 OINTMENT TOPICAL at 12:37

## 2021-01-01 RX ADMIN — MUPIROCIN 1 APPLICATION(S): 20 OINTMENT TOPICAL at 20:31

## 2021-01-01 RX ADMIN — Medication 1: at 17:07

## 2021-01-01 RX ADMIN — Medication 2001 MILLIGRAM(S): at 12:07

## 2021-01-01 RX ADMIN — MUPIROCIN 1 APPLICATION(S): 20 OINTMENT TOPICAL at 13:49

## 2021-01-01 RX ADMIN — CHLORHEXIDINE GLUCONATE 1 APPLICATION(S): 213 SOLUTION TOPICAL at 05:27

## 2021-01-01 RX ADMIN — MIDODRINE HYDROCHLORIDE 5 MILLIGRAM(S): 2.5 TABLET ORAL at 11:58

## 2021-01-01 RX ADMIN — Medication 100 MILLIGRAM(S): at 17:24

## 2021-01-01 RX ADMIN — Medication 1 APPLICATION(S): at 05:17

## 2021-01-01 RX ADMIN — Medication 100 MILLIGRAM(S): at 05:01

## 2021-01-01 RX ADMIN — MUPIROCIN 1 APPLICATION(S): 20 OINTMENT TOPICAL at 20:48

## 2021-01-01 RX ADMIN — MEN-PHOR 1 APPLICATION(S): .5; .5 LOTION TOPICAL at 06:54

## 2021-01-01 RX ADMIN — SODIUM CHLORIDE 500 MILLILITER(S): 9 INJECTION INTRAMUSCULAR; INTRAVENOUS; SUBCUTANEOUS at 16:23

## 2021-01-01 RX ADMIN — Medication 1 APPLICATION(S): at 18:43

## 2021-01-01 RX ADMIN — Medication 100 MILLIGRAM(S): at 05:02

## 2021-01-01 RX ADMIN — PANTOPRAZOLE SODIUM 40 MILLIGRAM(S): 20 TABLET, DELAYED RELEASE ORAL at 05:01

## 2021-01-01 RX ADMIN — MUPIROCIN 1 APPLICATION(S): 20 OINTMENT TOPICAL at 20:02

## 2021-01-01 RX ADMIN — ATORVASTATIN CALCIUM 80 MILLIGRAM(S): 80 TABLET, FILM COATED ORAL at 20:30

## 2021-01-01 RX ADMIN — PANTOPRAZOLE SODIUM 40 MILLIGRAM(S): 20 TABLET, DELAYED RELEASE ORAL at 06:53

## 2021-01-01 RX ADMIN — MUPIROCIN 1 APPLICATION(S): 20 OINTMENT TOPICAL at 09:21

## 2021-01-01 RX ADMIN — CHLORHEXIDINE GLUCONATE 1 APPLICATION(S): 213 SOLUTION TOPICAL at 12:08

## 2021-01-01 RX ADMIN — Medication 1 APPLICATION(S): at 05:02

## 2021-01-01 RX ADMIN — Medication 1 APPLICATION(S): at 17:41

## 2021-01-01 RX ADMIN — Medication 2001 MILLIGRAM(S): at 12:37

## 2021-01-01 RX ADMIN — HEPARIN SODIUM 5000 UNIT(S): 5000 INJECTION INTRAVENOUS; SUBCUTANEOUS at 22:25

## 2021-01-01 RX ADMIN — Medication 1 APPLICATION(S): at 17:13

## 2021-01-01 RX ADMIN — ATORVASTATIN CALCIUM 80 MILLIGRAM(S): 80 TABLET, FILM COATED ORAL at 20:02

## 2021-01-01 RX ADMIN — MIDODRINE HYDROCHLORIDE 10 MILLIGRAM(S): 2.5 TABLET ORAL at 13:44

## 2021-01-01 RX ADMIN — Medication 0: at 09:13

## 2021-01-01 RX ADMIN — HEPARIN SODIUM 5000 UNIT(S): 5000 INJECTION INTRAVENOUS; SUBCUTANEOUS at 05:47

## 2021-01-01 RX ADMIN — Medication 2001 MILLIGRAM(S): at 16:21

## 2021-01-01 RX ADMIN — HEPARIN SODIUM 5000 UNIT(S): 5000 INJECTION INTRAVENOUS; SUBCUTANEOUS at 13:45

## 2021-01-01 RX ADMIN — ATORVASTATIN CALCIUM 80 MILLIGRAM(S): 80 TABLET, FILM COATED ORAL at 22:33

## 2021-01-01 RX ADMIN — MUPIROCIN 1 APPLICATION(S): 20 OINTMENT TOPICAL at 22:09

## 2021-01-01 RX ADMIN — PANTOPRAZOLE SODIUM 40 MILLIGRAM(S): 20 TABLET, DELAYED RELEASE ORAL at 05:11

## 2021-01-01 RX ADMIN — Medication 1 APPLICATION(S): at 04:57

## 2021-01-01 RX ADMIN — PANTOPRAZOLE SODIUM 40 MILLIGRAM(S): 20 TABLET, DELAYED RELEASE ORAL at 04:57

## 2021-01-01 RX ADMIN — CHLORHEXIDINE GLUCONATE 1 APPLICATION(S): 213 SOLUTION TOPICAL at 12:32

## 2021-01-01 RX ADMIN — Medication 100 MILLIGRAM(S): at 05:11

## 2021-01-01 RX ADMIN — Medication 5.7 MICROGRAM(S)/KG/MIN: at 08:28

## 2021-01-01 RX ADMIN — CHLORHEXIDINE GLUCONATE 1 APPLICATION(S): 213 SOLUTION TOPICAL at 06:11

## 2021-01-01 RX ADMIN — Medication 667 MILLIGRAM(S): at 09:15

## 2021-01-01 RX ADMIN — MUPIROCIN 1 APPLICATION(S): 20 OINTMENT TOPICAL at 09:04

## 2021-01-01 RX ADMIN — Medication 1 APPLICATION(S): at 17:51

## 2021-01-01 RX ADMIN — Medication 100 MILLIGRAM(S): at 04:56

## 2021-01-01 RX ADMIN — Medication 1: at 12:34

## 2021-01-01 RX ADMIN — Medication 100 MILLIGRAM(S): at 04:43

## 2021-01-01 RX ADMIN — INSULIN HUMAN 10 UNIT(S): 100 INJECTION, SOLUTION SUBCUTANEOUS at 23:57

## 2021-01-01 RX ADMIN — PANTOPRAZOLE SODIUM 40 MILLIGRAM(S): 20 TABLET, DELAYED RELEASE ORAL at 05:21

## 2021-01-01 RX ADMIN — Medication 1 APPLICATION(S): at 18:05

## 2021-01-01 RX ADMIN — MEN-PHOR 1 APPLICATION(S): .5; .5 LOTION TOPICAL at 17:13

## 2021-01-01 RX ADMIN — MUPIROCIN 1 APPLICATION(S): 20 OINTMENT TOPICAL at 22:08

## 2021-01-01 RX ADMIN — MIDODRINE HYDROCHLORIDE 5 MILLIGRAM(S): 2.5 TABLET ORAL at 10:01

## 2021-01-01 RX ADMIN — Medication 667 MILLIGRAM(S): at 09:53

## 2021-01-01 RX ADMIN — Medication 5.7 MICROGRAM(S)/KG/MIN: at 07:00

## 2021-01-01 RX ADMIN — MEN-PHOR 1 APPLICATION(S): .5; .5 LOTION TOPICAL at 17:31

## 2021-01-01 RX ADMIN — Medication 1 APPLICATION(S): at 17:30

## 2021-01-01 RX ADMIN — Medication 100 MILLIGRAM(S): at 05:21

## 2021-01-01 RX ADMIN — MUPIROCIN 1 APPLICATION(S): 20 OINTMENT TOPICAL at 10:02

## 2021-01-01 RX ADMIN — Medication 1 APPLICATION(S): at 06:55

## 2021-01-01 RX ADMIN — Medication 100 MILLIGRAM(S): at 16:40

## 2021-01-01 RX ADMIN — Medication 667 MILLIGRAM(S): at 11:57

## 2021-01-01 RX ADMIN — Medication 2001 MILLIGRAM(S): at 21:42

## 2021-01-01 RX ADMIN — Medication 7.2 MICROGRAM(S)/KG/MIN: at 22:34

## 2021-01-01 RX ADMIN — Medication 0: at 11:58

## 2021-01-01 RX ADMIN — ATORVASTATIN CALCIUM 80 MILLIGRAM(S): 80 TABLET, FILM COATED ORAL at 22:48

## 2021-01-01 RX ADMIN — MUPIROCIN 1 APPLICATION(S): 20 OINTMENT TOPICAL at 21:25

## 2021-01-01 RX ADMIN — Medication 667 MILLIGRAM(S): at 11:49

## 2021-01-01 RX ADMIN — Medication 20 MILLIEQUIVALENT(S): at 13:45

## 2021-01-01 RX ADMIN — ATORVASTATIN CALCIUM 80 MILLIGRAM(S): 80 TABLET, FILM COATED ORAL at 22:34

## 2021-01-01 RX ADMIN — PANTOPRAZOLE SODIUM 40 MILLIGRAM(S): 20 TABLET, DELAYED RELEASE ORAL at 05:16

## 2021-01-01 RX ADMIN — Medication 66.67 MILLIGRAM(S): at 09:53

## 2021-01-01 RX ADMIN — Medication 1 APPLICATION(S): at 16:22

## 2021-01-01 RX ADMIN — Medication 2001 MILLIGRAM(S): at 07:41

## 2021-01-01 RX ADMIN — Medication 1 APPLICATION(S): at 04:48

## 2021-01-01 RX ADMIN — Medication 2: at 17:40

## 2021-01-01 RX ADMIN — HEPARIN SODIUM 5000 UNIT(S): 5000 INJECTION INTRAVENOUS; SUBCUTANEOUS at 14:09

## 2021-01-01 RX ADMIN — MUPIROCIN 1 APPLICATION(S): 20 OINTMENT TOPICAL at 21:29

## 2021-01-01 RX ADMIN — Medication 7.2 MICROGRAM(S)/KG/MIN: at 22:33

## 2021-01-01 RX ADMIN — PANTOPRAZOLE SODIUM 40 MILLIGRAM(S): 20 TABLET, DELAYED RELEASE ORAL at 05:02

## 2021-01-01 RX ADMIN — HEPARIN SODIUM 5000 UNIT(S): 5000 INJECTION INTRAVENOUS; SUBCUTANEOUS at 22:34

## 2021-01-01 RX ADMIN — Medication 333.33 MILLIGRAM(S): at 23:58

## 2021-01-01 RX ADMIN — Medication 100 MILLIGRAM(S): at 16:21

## 2021-01-01 RX ADMIN — Medication 100 MILLIGRAM(S): at 17:30

## 2021-01-01 RX ADMIN — MEN-PHOR 1 APPLICATION(S): .5; .5 LOTION TOPICAL at 16:22

## 2021-01-01 RX ADMIN — MEN-PHOR 1 APPLICATION(S): .5; .5 LOTION TOPICAL at 18:15

## 2021-01-01 RX ADMIN — Medication 2001 MILLIGRAM(S): at 17:40

## 2021-01-01 RX ADMIN — Medication 1 APPLICATION(S): at 05:47

## 2021-01-01 RX ADMIN — ATORVASTATIN CALCIUM 80 MILLIGRAM(S): 80 TABLET, FILM COATED ORAL at 21:29

## 2021-01-01 RX ADMIN — HEPARIN SODIUM 5000 UNIT(S): 5000 INJECTION INTRAVENOUS; SUBCUTANEOUS at 21:09

## 2021-01-01 RX ADMIN — Medication 1: at 08:27

## 2021-01-01 RX ADMIN — Medication 2001 MILLIGRAM(S): at 17:13

## 2021-01-01 RX ADMIN — MUPIROCIN 1 APPLICATION(S): 20 OINTMENT TOPICAL at 22:48

## 2021-01-01 RX ADMIN — Medication 100 MILLIGRAM(S): at 05:45

## 2021-01-01 RX ADMIN — MEN-PHOR 1 APPLICATION(S): .5; .5 LOTION TOPICAL at 18:09

## 2021-01-01 RX ADMIN — MIDODRINE HYDROCHLORIDE 5 MILLIGRAM(S): 2.5 TABLET ORAL at 11:09

## 2021-01-01 RX ADMIN — Medication 4: at 17:48

## 2021-01-01 RX ADMIN — Medication 667 MILLIGRAM(S): at 13:45

## 2021-01-01 RX ADMIN — Medication 2001 MILLIGRAM(S): at 16:47

## 2021-01-01 RX ADMIN — Medication 1 APPLICATION(S): at 18:08

## 2021-01-01 RX ADMIN — Medication 1: at 11:48

## 2021-01-01 RX ADMIN — Medication 100 MILLIGRAM(S): at 18:07

## 2021-01-01 RX ADMIN — Medication 2001 MILLIGRAM(S): at 13:47

## 2021-01-01 RX ADMIN — Medication 667 MILLIGRAM(S): at 18:10

## 2021-01-01 RX ADMIN — MEN-PHOR 1 APPLICATION(S): .5; .5 LOTION TOPICAL at 05:11

## 2021-01-01 RX ADMIN — MUPIROCIN 1 APPLICATION(S): 20 OINTMENT TOPICAL at 22:49

## 2021-01-01 RX ADMIN — MIDODRINE HYDROCHLORIDE 10 MILLIGRAM(S): 2.5 TABLET ORAL at 16:27

## 2021-01-01 RX ADMIN — CHLORHEXIDINE GLUCONATE 1 APPLICATION(S): 213 SOLUTION TOPICAL at 13:45

## 2021-01-01 RX ADMIN — Medication 2001 MILLIGRAM(S): at 17:11

## 2021-01-01 RX ADMIN — MUPIROCIN 1 APPLICATION(S): 20 OINTMENT TOPICAL at 10:25

## 2021-01-01 RX ADMIN — Medication 2001 MILLIGRAM(S): at 15:19

## 2021-01-01 RX ADMIN — MEN-PHOR 1 APPLICATION(S): .5; .5 LOTION TOPICAL at 05:03

## 2021-01-01 RX ADMIN — ERYTHROPOIETIN 5000 UNIT(S): 10000 INJECTION, SOLUTION INTRAVENOUS; SUBCUTANEOUS at 11:30

## 2021-01-01 RX ADMIN — CHLORHEXIDINE GLUCONATE 1 APPLICATION(S): 213 SOLUTION TOPICAL at 13:47

## 2021-01-01 RX ADMIN — Medication 2001 MILLIGRAM(S): at 08:35

## 2021-01-01 RX ADMIN — PIPERACILLIN AND TAZOBACTAM 200 GRAM(S): 4; .5 INJECTION, POWDER, LYOPHILIZED, FOR SOLUTION INTRAVENOUS at 23:42

## 2021-01-01 RX ADMIN — MEN-PHOR 1 APPLICATION(S): .5; .5 LOTION TOPICAL at 17:40

## 2021-01-01 RX ADMIN — Medication 50 MILLILITER(S): at 23:57

## 2021-01-01 RX ADMIN — Medication 2001 MILLIGRAM(S): at 09:03

## 2021-01-01 RX ADMIN — ATORVASTATIN CALCIUM 80 MILLIGRAM(S): 80 TABLET, FILM COATED ORAL at 21:25

## 2021-01-01 RX ADMIN — Medication 1 APPLICATION(S): at 18:15

## 2021-01-01 RX ADMIN — Medication 2001 MILLIGRAM(S): at 09:07

## 2021-01-01 RX ADMIN — Medication 1: at 12:03

## 2021-01-01 RX ADMIN — Medication 2001 MILLIGRAM(S): at 09:13

## 2021-01-01 RX ADMIN — Medication 100 MILLIGRAM(S): at 18:17

## 2021-01-01 RX ADMIN — MEN-PHOR 1 APPLICATION(S): .5; .5 LOTION TOPICAL at 18:04

## 2021-01-01 RX ADMIN — MEN-PHOR 1 APPLICATION(S): .5; .5 LOTION TOPICAL at 04:57

## 2021-01-01 RX ADMIN — Medication 1 APPLICATION(S): at 05:21

## 2021-01-01 RX ADMIN — HEPARIN SODIUM 5000 UNIT(S): 5000 INJECTION INTRAVENOUS; SUBCUTANEOUS at 06:00

## 2021-01-01 RX ADMIN — Medication 1 APPLICATION(S): at 05:11

## 2021-01-01 RX ADMIN — Medication 667 MILLIGRAM(S): at 17:49

## 2021-01-01 RX ADMIN — MUPIROCIN 1 APPLICATION(S): 20 OINTMENT TOPICAL at 21:43

## 2021-01-01 RX ADMIN — MEN-PHOR 1 APPLICATION(S): .5; .5 LOTION TOPICAL at 05:18

## 2021-01-01 RX ADMIN — Medication 100 MILLIGRAM(S): at 06:53

## 2021-01-01 RX ADMIN — HEPARIN SODIUM 5000 UNIT(S): 5000 INJECTION INTRAVENOUS; SUBCUTANEOUS at 12:02

## 2021-01-01 RX ADMIN — MUPIROCIN 1 APPLICATION(S): 20 OINTMENT TOPICAL at 09:07

## 2021-01-01 RX ADMIN — Medication 2001 MILLIGRAM(S): at 12:36

## 2021-01-08 PROBLEM — Z95.810 ICD (IMPLANTABLE CARDIOVERTER-DEFIBRILLATOR) IN PLACE: Status: ACTIVE | Noted: 2020-01-01

## 2021-01-08 PROBLEM — I10 HYPERTENSION, UNSPECIFIED TYPE: Status: ACTIVE | Noted: 2018-03-14

## 2021-01-08 PROBLEM — I49.5 SINUS NODE DYSFUNCTION: Status: ACTIVE | Noted: 2021-01-01

## 2021-01-08 NOTE — HISTORY OF PRESENT ILLNESS
[FreeTextEntry1] : Enio Pavon MD\par \par Devan Lorenzo is a 56y/o man with Hx of HTN, HLD, DM, CAD s/p PCI, ESRD on HD via L LE AV fisutla (T/TH/Sat) and chronic systolic CHF s/p subq ICD placement and recurring bradycardia s/p ILR placement who presents today for routine f/u. Last was admitted to [facility] from dialysis from 12/12-12/14 (2019) secondary to bradycardia with HR 40's 20 minutes into dialysis treatment with a drop in B/P. He was seen by EP Dr. Cantu and had an ILR implanted on 12/13/19. Midodrine was also started at that time. He had another incident in dialysis in March (2020) where he passed out after 280cc fluid removal. ILR revealed an episode of bradycardia in the 40s which lasted about 25 seconds in March as well but patient was asymptomatic and at dialysis at that time. Has been doing well. Does get vasovagal response from seeing needles at HD. He notes his BP and HR to drop when they try to access him but always comes back to normal after. Denies any daily episodes of dizziness or syncope or near syncope. ILR with evidence of bradycardia to the 30s, all during sleeping hours. Denies chest pain, palpitations, SOB, syncope or near syncope. Concern for need for beta blockers to allow for OMT. Of note, currently awaiting eval for possible kidney/heart transplant. \par \par

## 2021-01-08 NOTE — PHYSICAL EXAM
[General Appearance - Well Developed] : well developed [Normal Appearance] : normal appearance [Well Groomed] : well groomed [General Appearance - Well Nourished] : well nourished [No Deformities] : no deformities [General Appearance - In No Acute Distress] : no acute distress [Normal Conjunctiva] : the conjunctiva exhibited no abnormalities [Eyelids - No Xanthelasma] : the eyelids demonstrated no xanthelasmas [Normal Oral Mucosa] : normal oral mucosa [No Oral Pallor] : no oral pallor [No Oral Cyanosis] : no oral cyanosis [Normal Jugular Venous A Waves Present] : normal jugular venous A waves present [Normal Jugular Venous V Waves Present] : normal jugular venous V waves present [No Jugular Venous Jacobo A Waves] : no jugular venous jacobo A waves [Respiration, Rhythm And Depth] : normal respiratory rhythm and effort [Exaggerated Use Of Accessory Muscles For Inspiration] : no accessory muscle use [Auscultation Breath Sounds / Voice Sounds] : lungs were clear to auscultation bilaterally [Heart Rate And Rhythm] : heart rate and rhythm were normal [Heart Sounds] : normal S1 and S2 [Murmurs] : no murmurs present [Abdomen Soft] : soft [Abdomen Tenderness] : non-tender [Abdomen Mass (___ Cm)] : no abdominal mass palpated [Abnormal Walk] : normal gait [Gait - Sufficient For Exercise Testing] : the gait was sufficient for exercise testing [Nail Clubbing] : no clubbing of the fingernails [Cyanosis, Localized] : no localized cyanosis [Petechial Hemorrhages (___cm)] : no petechial hemorrhages [Skin Color & Pigmentation] : normal skin color and pigmentation [] : no rash [No Venous Stasis] : no venous stasis [Skin Lesions] : no skin lesions [No Skin Ulcers] : no skin ulcer [No Xanthoma] : no  xanthoma was observed No [Oriented To Time, Place, And Person] : oriented to person, place, and time [Affect] : the affect was normal [Mood] : the mood was normal [No Anxiety] : not feeling anxious

## 2021-01-10 NOTE — ASSESSMENT
[FreeTextEntry1] : 55 year old M w/ h/o IDDM c/b neuropathy (A1c 8 1/20), HTN, CAD (s/p stent in [place] in 2016, unknown coronary anatomy), HFrEF (EF 25%, LVEDD 6.6 cm) s/p subQ ICD, ESRD on HD (T/Th/Sat via LUE AV fistula), prior GIB 2/2 ulcer (H. pylori positive) who is being seen for follow-up. Currently endorses class III symptoms and is overloaded but is normotensive. Has been unable to tolerate meds, aside from losartan 12.5 mg twice/day. Awaiting eval at [facility] for heart/kidney transplant. \par \par 1. HFrEF - unclear etiology; prior known CAD; possibly HTN \par - previously on metoprolol 50 mg bid which has since been discontinued likely due to bradycardia at time of dialysis (likely vagal mediated); if not a candidate for transplant, will plan on extracting subQ and placing BiV dual chamber device despite risk of infection \par - He has been off of hydralazine ( didn't feel well on it)\par - continue losartan 12.5 mg twice/day; hold on days of HD; /88\par - while DM w/ complications is a relative contraindication, he will likely benefit from heart/kidney transplant as his DM is better controlled now ( HgA1C 7.4%), he will continue to follow up with his endocrinologist.\par \par 2. ESRD on HD; left AVF with + thrill and bruit\par - instructed to follow low potassium diet\par - avoid intake of salty foods which lead to volume overload, limit to less than 2000 mg per day\par - limit fluid to less than 48 oz per day\par \par 3. CAD - prior PCI- no active chest pain or EKG changes\par - previously on ASA 81 mg daily. Pt states he had bleeding behind the eye with last opth appt approx 6 months ago and was told to stop ASA 81 mg daily, no longer taking.\par - on Lipitor 80 mg qhs, 7/31/20 lipid profile at goal\par \par 4. Prior Claudication - likely 2/2 low flow with exertion \par - LAURI and arterial Dopplers with no evidence of significant stenoses\par \par RTC 3 months

## 2021-01-10 NOTE — HISTORY OF PRESENT ILLNESS
[FreeTextEntry1] : 55 year old M w/ h/o IDDM (A1c 8 1/20) c/b possible retinopathy/nephropathy, HTN, CAD (s/p stent in [place] in 2016, unknown coronary anatomy), HFrEF (EF 25%, LVEDD 6.6 cm) s/p subQ ICD, prior severe MR (functional; now improved), ESRD on HD (since 1/19; T/Th/Sat; AV fistula), prior GIB 2/2 ulcer (H. pylori positive) who is being seen for follow-up. An evaluation for heart/kidney transplant was launched at [facility] on 1/2020 but was turned down for concern of poor outcome. Since, referred to [facility] which he has his first evaluation next week (1/15).  \par \par Previously, at times he has had episodes of bradycardia when gets started on dialysis which is transient and then resolves. ILR placed. Was challenged with metoprolol since last visit but did not tolerate and developed sinus bradycardia with Wenkebach. Discussion since has been about possibly extracting subQ and converting to a CRT device but will await further discussion by [place] as placing a transvenous system will expose him to risk of infection. \par \par Pt states he had bleeding behind the eye with last opth appt approx 6 months ago and was told to stop ASA 81 mg daily, no longer taking.\par \par Pt is here for a follow up today. Reports can walk 1/2 block. He can climb 30 steps at home without leg heaviness. He goes to HD three times a week on T-Th-Sat and takes off 4 kg. States weight was 79 kg or 173.8 lb. Has been tolerating losartan 12.5 mg twice/day w/o issues as he takes only on non-HD days (feels better than when on hydralazine). He drinks less than 48 oz per day and is following a low salt diet. Denies orthopnea (2 pillows),  PND, chest pain, palpitations, syncope, ICD discharge, fever, chills or nausea. \par \par He had recent colonoscopy but had a poor prep so it was not done. Does not want to repeat since he decided he will not pursue heart kidney at [facility] since he reports his chance of survival as per surgeon is 30%. Of note, pt does not put out any urine anymore except for a few drops. \par \laura Previously was tolerating hydralazine last year but in past several months unable to tolerate low dose hydralazine. Gets some dizziness with bending down. \laura \laura Was seen by hepatology and had fibroscan which showed F4 fibrosis and an abdominal US was done subsequently on 2/9 which showed no cirrhosis. \par \par He was seen by hematology for workup of thrombocytopenia and underwent BM Bx on 2/5/20 which showed normal trilineage hematopoiesis.

## 2021-01-14 PROBLEM — T82.868A CLOTTED DIALYSIS SHUNT: Status: ACTIVE | Noted: 2021-01-01

## 2021-01-14 NOTE — PAST MEDICAL HISTORY
[Increasing age ( >40 years old)] : Increasing age ( >40 years old) [No therapy indicated for cases scheduled for less than one hour] : No therapy indicated for cases scheduled for less than one hour. [FreeTextEntry1] : Malignant Hyperthermia Screening Tool and Risk of Bleeding Assessment\par \par Mr. DEMETRIO VILLALTA denies family history of unexpected death following Anesthesia or Exercise.\par Denies Family history of Malignant Hyperthermia, Muscle or Neuromuscular disorder and High Temperature following exercise.\par \par Mr. DEMETRIO VILLALTA denies history of Muscle Spasm, Dark or Chocolate - Colored urine and Unanticipated fever immediately following anesthesia or serious exercise. \par Mr. VILLALTA also denies bleeding tendencies/ Risks of Bleeding.\par

## 2021-01-14 NOTE — PROCEDURE
[D/C IV on discharge] : D/C IV on discharge [Resume diet] : resume diet [Site check for bleeding/hematoma/thrill/bruit] : Site check for bleeding/hematoma/thrill/bruit [Vital signs on admission the q 15 mins x2] : Vital signs on admission the q 15 mins x2 [FreeTextEntry1] : left arm fistula fistulogram, angiplasty

## 2021-01-14 NOTE — HISTORY OF PRESENT ILLNESS
[] : left radiocephalic fistula [FreeTextEntry1] : 1/14/2019 Dr. Paredes [FreeTextEntry4] : Tuesday 1/12/2021 [FreeTextEntry5] : 530 am-coffee [FreeTextEntry6] : Dr. Newton

## 2021-01-15 PROBLEM — T82.898A INADEQUATE FLOW OF DIALYSIS ARTERIOVENOUS FISTULA: Status: ACTIVE | Noted: 2021-01-01

## 2021-02-10 PROBLEM — N18.6 ESRD (END STAGE RENAL DISEASE) ON DIALYSIS: Status: ACTIVE | Noted: 2019-03-12

## 2021-02-12 NOTE — HISTORY OF PRESENT ILLNESS
[FreeTextEntry1] : 55 year old M w/ h/o IDDM (A1c 7.4 8/20) c/b possible retinopathy/nephropathy, HTN, CAD (s/p stent in [place] in 2016, unknown coronary anatomy), HFrEF (EF 15-20%, LVEDD 6.6 cm) s/p subQ ICD, severe MR (functional), ESRD on HD (since 1/19; T/Th/Sat; AV fistula), prior GIB 2/2 ulcer (H. pylori positive) who is being seen for follow-up. Is undergoing evaluation for heart/kidney transplant at [facility].   \par \par Since last visit, had been seen at [facility] for evaluation of heart/kidney with Dr. Vini Trevizo. \par \par Due to prior intolerance to BB, discussion since has been about possibly extracting subQ and converting to a CRT device but this will be pending decision by [place] as placing a transvenous system will expose him to risk of infection. \par \par Pt is here for a follow up today. Reports can walk 1/2 block limited by fatigue. He goes to HD three times a week on T-Th-Sat and takes off 4 kg (mitra to 80 kg). Has been tolerating losartan 12.5 mg twice/day w/o issues as he takes only on non-HD days (feels better than when on hydralazine). He drinks less than 48 oz per day and is following a low salt diet. Denies orthopnea (2 pillows),  PND, chest pain, palpitations, syncope, ICD discharge, fever, chills or nausea. \par \par He had a colonoscopy but had a poor prep so it was not done. Does not want to repeat since he decided he will not pursue heart kidney at [facility] since he reports his chance of survival as per surgeon is 30%. Of note, pt does not put out any urine anymore except for a few drops. \par \par Previously was tolerating hydralazine last year but in past several months unable to tolerate low dose hydralazine. Gets some dizziness with bending down. Since tolerating losartan. \par \par Was seen by hepatology and had fibroscan 1/29/20 which showed F4 fibrosis and an abdominal US was done subsequently on 2/9/20 which showed no cirrhosis. \par \par He was seen by hematology for workup of thrombocytopenia and underwent BM Bx on 2/5/20 which showed normal trilineage hematopoiesis. \par \laura Has developed lesions throughout his body that have are circular, raised, keratotic with occasional purulence. Was started on doxycycline 100mg twice/day and some topical creams. Had seen a dermatologist (Dr. Maximiliano Villanueva) and had a biopsy (results unknown). Denies fevers/chills/NS.

## 2021-02-12 NOTE — PHYSICAL EXAM
[General Appearance - Well Developed] : well developed [Normal Appearance] : normal appearance [General Appearance - Well Nourished] : well nourished [Normal Conjunctiva] : the conjunctiva exhibited no abnormalities [Normal Oral Mucosa] : normal oral mucosa [Normal Oropharynx] : normal oropharynx [] : no respiratory distress [Respiration, Rhythm And Depth] : normal respiratory rhythm and effort [Auscultation Breath Sounds / Voice Sounds] : lungs were clear to auscultation bilaterally [Heart Rate And Rhythm] : heart rate and rhythm were normal [Heart Sounds] : normal S1 and S2 [Murmurs] : no murmurs present [Abdomen Soft] : soft [Abdomen Tenderness] : non-tender [Abnormal Walk] : normal gait [Nail Clubbing] : no clubbing of the fingernails [Cyanosis, Localized] : no localized cyanosis [Skin Color & Pigmentation] : normal skin color and pigmentation [Skin Turgor] : normal skin turgor [Oriented To Time, Place, And Person] : oriented to person, place, and time [Impaired Insight] : insight and judgment were intact [Affect] : the affect was normal [FreeTextEntry1] : quarter sized lesions on legs with serous drainage

## 2021-02-12 NOTE — ASSESSMENT
[FreeTextEntry1] : 55 year old M w/ h/o IDDM (A1c 7.4 8/20) c/b possible retinopathy/nephropathy, HTN, CAD (s/p stent in [place] in 2016, unknown coronary anatomy), HFrEF (EF 15-20%, LVEDD 6.6 cm) s/p subQ ICD, severe MR (functional), ESRD on HD (since 1/19; T/Th/Sat; AV fistula), prior GIB 2/2 ulcer (H. pylori positive) who is being seen for follow-up. Is undergoing evaluation for heart/kidney transplant at [facility].  Currently endorses class III symptoms and is overloaded but low normotensive. Has been unable to tolerate meds, aside from losartan 12.5 mg twice/day. Awaiting eval at [facility] for heart/kidney transplant. Of note, ABO O. \par \par 1. HFrEF - unclear etiology; prior known CAD; possibly HTN \par - previously on metoprolol 50 mg bid which has since been discontinued likely due to bradycardia at time of dialysis (likely vagal mediated); if not a candidate for transplant, will plan on extracting subQ and placing BiV dual chamber device despite risk of infection \par - He has been off of hydralazine (didn't feel well on it)\par - continue losartan 12.5 mg twice/day; hold on days of HD\par - while DM w/ complications is a relative contraindication, he will likely benefit from heart/kidney transplant as his DM is better controlled now (HgA1C 7.4%), he will continue to follow up with his endocrinologist\par - may require admission for optimization followed by [facility] to assess hemodynamics \par \par 2. ESRD on HD; left AVF with + thrill and bruit\par - instructed to follow low potassium diet\par - avoid intake of salty foods which lead to volume overload, limit to less than 2000 mg per day\par - limit fluid to less than 48 oz per day\par \par 3. CAD - prior PCI- no active chest pain or EKG changes\par - previously on ASA 81 mg daily. Pt states he had bleeding behind the eye with last opth appt approx 6 months ago and was told to stop ASA 81 mg daily, no longer taking.\par - on Lipitor 80 mg qhs, 7/31/20 lipid profile at goal\par \par 4. Prior Claudication - likely 2/2 low flow with exertion \par - LAURI and arterial Dopplers with no evidence of significant stenoses\par \par 5. Dermatology - lesions of unclear etiology; possible manifestation from dialysis\par - will reach out to dermatologist for further info \par \par RTC 3 months

## 2021-02-17 NOTE — H&P ADULT - HISTORY OF PRESENT ILLNESS
55M PMHx of T2DM, HTN, CAD (s/p stent 2016), HFrEF (EF 15-20%) s/p ICD, ESRD on HD T/Th/Sat via LUE AV fistula, GIB 2/2 ulcer sent in by his Cardiologist for Volume overload.  Patient reports that he has been in his usual state of health. Pt states that he can walk half a block before he gets winded and short of breath. Per his cardiologist the patient clinically has increasing volume overload requiring the patient more aggressive volume removal via daily HD.

## 2021-02-17 NOTE — CONSULT NOTE ADULT - ASSESSMENT
Pt with ESRD on HD    - Will plan for UF today 2L as tolerated to BP      FULL CONSULT NOTE TO FOLLOW Pt with ESRD on HD presents to the hospital for fluid overload    #ESRD on HD    - Patient with ESRD on HD TTS   - Outpatient HD: [facility] dialysis facility  - Nephrologist Dr. ESTHER Pavon  - Pt with last HD 2/16 for 4 hours and 15 min with total UF 4L.  - Lab reviewed. Pt clinically stable  - Will plan for UF today 2L and HD tomorrow with UF as tolerated to BP  - Monitor renal panel  - Pt likely planned for L/RHC during admission    #Fluid overload- plans as above    #Hypertension  - Pt on Losartan 12.5mg BID.   - consider holding Losartan for now to be able to remove more fluid during HD  - Will plan for UF 2L today as tolerated    #Anemia  Patient with anemia in the setting of ESRD. Hemoglobin within target range. Pt on retacrit 2000 units as outpatient  - Will hold FATOUMATA for now. Monitor hemoglobin.    #Bone metabolism disorder  - in the setting of ESRD  - Check phos levels  - pt on phoslo 2001 mg TID with meals  - continue phos binders  - low phos diet          If you have any questions, please feel free to contact me  Rich Bowser  Nephrology Fellow  Pager: [phone] / [phone]  (After 5pm or on weekends please page the on-call fellow)      FULL CONSULT NOTE TO FOLLOW

## 2021-02-17 NOTE — CONSULT NOTE ADULT - ASSESSMENT
54 YO M CAD s/p stent in 2016, HFrEF (EF 15-20%, LEVEDD 6.6), subQ ICD, Severe MR, ESRD since 2019, IDDM, HTN, here for shortness of breath and edema.

## 2021-02-17 NOTE — CONSULT NOTE ADULT - SUBJECTIVE AND OBJECTIVE BOX
HPI:  54 yo male with PMHx of T2DM, HTN, CAD (s/p stent 2016), HFrEF (EF 15-20%) s/p ICD, ESRD on HD T/Th/Sat via LUE AV fistula, GIB 2/2 ulcer p/w fluid overload.  Patient reports that he is currently feeling well.  Has had persistent LE edema, SIERRA for a while now.  Received dialysis yesterday.  no new complaints.  Contacted by his cardiologist (Dr. Pavon, heart failure) who instructed him to come to the ER. Denies fevers/chills, Cp, SOB, abd pain, NVD.  No longer produces urine.    Currently admitted for work up of heart/kidney transplant. Dermatology consulted for "excoriated papules on trunk and extremities". They report he is followed by outside dermatologist, Dr. Villanueva in [place]     PAST MEDICAL & SURGICAL HISTORY:  Congestive heart disease    ESRD (end stage renal disease) on dialysis    GIB (gastrointestinal bleeding)  was treated  with H pyelori stable since Dec 10, 2018    HFrEF (heart failure with reduced ejection fraction)  EF 20-24%    Myocardial infarction  Jan 2016    Hyperlipidemia    CAD (coronary artery disease)  2016, coronary artery stentX1    DM (diabetes mellitus)  2  not taking any medication lat4st HgA1c 5.7    HTN (hypertension)    History of implantable cardioverter-defibrillator (ICD) placement    Stented coronary artery  ? 1    Arterial stent thrombosis        REVIEW OF SYSTEMS      General: no fevers/chills, no lethary	    Skin/Breast: see HPI  	  Ophthalmologic: no eye pain or change in vision  	  ENMT: no dysphagia or change in hearing    Respiratory and Thorax: no SOB or cough  	  Cardiovascular: no palpitations or chest pain    Gastrointestinal: no abdomenal pain or blood in stool     Genitourinary: no dysuria or frequency    Musculoskeletal: no joint pains or weakness	    Neurological:no weakness, numbness , or tingling    MEDICATIONS  (STANDING):  atorvastatin 80 milliGRAM(s) Oral at bedtime  calcium acetate 667 milliGRAM(s) Oral three times a day with meals  dextrose 40% Gel 15 Gram(s) Oral once  dextrose 5%. 1000 milliLiter(s) (50 mL/Hr) IV Continuous <Continuous>  dextrose 50% Injectable 25 Gram(s) IV Push once  doxycycline hyclate Capsule 100 milliGRAM(s) Oral every 12 hours  glucagon  Injectable 1 milliGRAM(s) IntraMuscular once  insulin lispro (ADMELOG) corrective regimen sliding scale   SubCutaneous three times a day before meals  insulin lispro (ADMELOG) corrective regimen sliding scale   SubCutaneous at bedtime  losartan 12.5 milliGRAM(s) Oral two times a day  mupirocin 2% Ointment 1 Application(s) Topical two times a day  pantoprazole    Tablet 40 milliGRAM(s) Oral before breakfast    MEDICATIONS  (PRN):      Allergies    No Known Allergies    Intolerances        SOCIAL HISTORY:    FAMILY HISTORY:  Family history of diabetes mellitus (DM) (Sibling)    Family history of MI (myocardial infarction) (Sibling)        Vital Signs Last 24 Hrs  T(C): 36.3 (17 Feb 2021 10:20), Max: 36.3 (17 Feb 2021 10:20)  T(F): 97.4 (17 Feb 2021 10:20), Max: 97.4 (17 Feb 2021 10:20)  HR: 88 (17 Feb 2021 12:19) (80 - 88)  BP: 106/70 (17 Feb 2021 12:19) (98/67 - 106/70)  BP(mean): --  RR: 20 (17 Feb 2021 12:19) (16 - 20)  SpO2: 99% (17 Feb 2021 12:19) (99% - 100%)    PHYSICAL EXAM:     The patient was alert and oriented X 3, well nourished, and in no  apparent distress.  OP showed no ulcerations  There was no visible lymphadenopathy.  Conjunctiva were non injected  There was no clubbing or edema of extremities.  The scalp, hair, face, eyebrows, lips, OP, neck, chest, back,   extremities X 4, nails were examined.  There was no hyperhidrosis or bromhidrosis.    Of note on skin exam:       LABS:                        11.6   4.69  )-----------( 116      ( 17 Feb 2021 11:04 )             38.9     02-17    137  |  93<L>  |  36<H>  ----------------------------<  204<H>  4.9   |  29  |  5.91<H>    Ca    9.6      17 Feb 2021 11:04    TPro  7.6  /  Alb  3.9  /  TBili  1.1  /  DBili  x   /  AST  21  /  ALT  11  /  AlkPhos  162<H>  02-17    PT/INR - ( 17 Feb 2021 11:04 )   PT: 14.6 sec;   INR: 1.23 ratio         PTT - ( 17 Feb 2021 11:04 )  PTT:36.3 sec      RADIOLOGY & ADDITIONAL STUDIES:           HPI:  54 yo male with PMHx of T2DM, HTN, CAD (s/p stent 2016), HFrEF (EF 15-20%) s/p ICD, ESRD on HD T/Th/Sat via LUE AV fistula, GIB 2/2 ulcer p/w fluid overload.  Patient reports that he is currently feeling well.  Has had persistent LE edema, SIERRA for a while now.  Received dialysis yesterday.  no new complaints.  Contacted by his cardiologist (Dr. Pavon, heart failure) who instructed him to come to the ER. Denies fevers/chills, Cp, SOB, abd pain, NVD.  No longer produces urine.    Currently admitted for work up of heart/kidney transplant. Dermatology consulted for "excoriated papules on trunk and extremities". They report he is followed by outside dermatologist, Dr. Villanueva in [place]. Patient underwent biopsy with Dr. Villanueva 1.5 months ago.     PAST MEDICAL & SURGICAL HISTORY:  Congestive heart disease    ESRD (end stage renal disease) on dialysis    GIB (gastrointestinal bleeding)  was treated  with FOREIGN blantonelori stable since Dec 10, 2018    HFrEF (heart failure with reduced ejection fraction)  EF 20-24%    Myocardial infarction  Jan 2016    Hyperlipidemia    CAD (coronary artery disease)  2016, coronary artery stentX1    DM (diabetes mellitus)  2  not taking any medication lat4st HgA1c 5.7    HTN (hypertension)    History of implantable cardioverter-defibrillator (ICD) placement    Stented coronary artery  ? 1    Arterial stent thrombosis        REVIEW OF SYSTEMS      General: no fevers/chills, no lethary	    Skin/Breast: see HPI  	  Ophthalmologic: no eye pain or change in vision  	  ENMT: no dysphagia or change in hearing    Respiratory and Thorax: no SOB or cough  	  Cardiovascular: no palpitations or chest pain    Gastrointestinal: no abdomenal pain or blood in stool     Genitourinary: no dysuria or frequency    Musculoskeletal: no joint pains or weakness	    Neurological:no weakness, numbness , or tingling    MEDICATIONS  (STANDING):  atorvastatin 80 milliGRAM(s) Oral at bedtime  calcium acetate 667 milliGRAM(s) Oral three times a day with meals  dextrose 40% Gel 15 Gram(s) Oral once  dextrose 5%. 1000 milliLiter(s) (50 mL/Hr) IV Continuous <Continuous>  dextrose 50% Injectable 25 Gram(s) IV Push once  doxycycline hyclate Capsule 100 milliGRAM(s) Oral every 12 hours  glucagon  Injectable 1 milliGRAM(s) IntraMuscular once  insulin lispro (ADMELOG) corrective regimen sliding scale   SubCutaneous three times a day before meals  insulin lispro (ADMELOG) corrective regimen sliding scale   SubCutaneous at bedtime  losartan 12.5 milliGRAM(s) Oral two times a day  mupirocin 2% Ointment 1 Application(s) Topical two times a day  pantoprazole    Tablet 40 milliGRAM(s) Oral before breakfast    MEDICATIONS  (PRN):      Allergies    No Known Allergies    Intolerances        SOCIAL HISTORY:    FAMILY HISTORY:  Family history of diabetes mellitus (DM) (Sibling)    Family history of MI (myocardial infarction) (Sibling)        Vital Signs Last 24 Hrs  T(C): 36.3 (17 Feb 2021 10:20), Max: 36.3 (17 Feb 2021 10:20)  T(F): 97.4 (17 Feb 2021 10:20), Max: 97.4 (17 Feb 2021 10:20)  HR: 88 (17 Feb 2021 12:19) (80 - 88)  BP: 106/70 (17 Feb 2021 12:19) (98/67 - 106/70)  BP(mean): --  RR: 20 (17 Feb 2021 12:19) (16 - 20)  SpO2: 99% (17 Feb 2021 12:19) (99% - 100%)    PHYSICAL EXAM:     The patient was alert and oriented X 3, well nourished, and in no  apparent distress.  OP showed no ulcerations  There was no visible lymphadenopathy.  Conjunctiva were non injected  There was no clubbing or edema of extremities.  The scalp, hair, face, eyebrows, lips, OP, neck, chest, back,   extremities X 4, nails were examined.  There was no hyperhidrosis or bromhidrosis.    Of note on skin exam:   Numerous papules and plaques with central keratinaceous core on bilateral lower extremities, hyperpigmented excoriated papules on back admixed with linear excoriations    LABS:                        11.6   4.69  )-----------( 116      ( 17 Feb 2021 11:04 )             38.9     02-17    137  |  93<L>  |  36<H>  ----------------------------<  204<H>  4.9   |  29  |  5.91<H>    Ca    9.6      17 Feb 2021 11:04    TPro  7.6  /  Alb  3.9  /  TBili  1.1  /  DBili  x   /  AST  21  /  ALT  11  /  AlkPhos  162<H>  02-17    PT/INR - ( 17 Feb 2021 11:04 )   PT: 14.6 sec;   INR: 1.23 ratio         PTT - ( 17 Feb 2021 11:04 )  PTT:36.3 sec      RADIOLOGY & ADDITIONAL STUDIES:           HPI:  56 yo male with PMHx of T2DM, HTN, CAD (s/p stent 2016), HFrEF (EF 15-20%) s/p ICD, ESRD on HD T/Th/Sat via LUE AV fistula, GIB 2/2 ulcer p/w fluid overload.  Patient reports that he is currently feeling well.  Has had persistent LE edema, SIERRA for a while now.  Received dialysis yesterday.  no new complaints.  Contacted by his cardiologist (Dr. Pavon, heart failure) who instructed him to come to the ER. Denies fevers/chills, Cp, SOB, abd pain, NVD.  No longer produces urine.    Currently admitted for work up of heart/kidney transplant. Dermatology consulted for "excoriated papules on trunk and extremities". They report he is followed by outside dermatologist, Dr. Villanueva in [place]. Patient underwent biopsy with Dr. Villanueva 1.5 months ago.     PAST MEDICAL & SURGICAL HISTORY:  Congestive heart disease    ESRD (end stage renal disease) on dialysis    GIB (gastrointestinal bleeding)  was treated  with FOREIGN blantonelori stable since Dec 10, 2018    HFrEF (heart failure with reduced ejection fraction)  EF 20-24%    Myocardial infarction  Jan 2016    Hyperlipidemia    CAD (coronary artery disease)  2016, coronary artery stentX1    DM (diabetes mellitus)  2  not taking any medication lat4st HgA1c 5.7    HTN (hypertension)    History of implantable cardioverter-defibrillator (ICD) placement    Stented coronary artery  ? 1    Arterial stent thrombosis        REVIEW OF SYSTEMS      General: no fevers/chills, no lethary	    Skin/Breast: see HPI  	  Ophthalmologic: no eye pain or change in vision  	  ENMT: no dysphagia or change in hearing    Respiratory and Thorax: no SOB or cough  	  Cardiovascular: no palpitations or chest pain    Gastrointestinal: no abdomenal pain or blood in stool     Genitourinary: no dysuria or frequency    Musculoskeletal: no joint pains or weakness	    Neurological:no weakness, numbness , or tingling    MEDICATIONS  (STANDING):  atorvastatin 80 milliGRAM(s) Oral at bedtime  calcium acetate 667 milliGRAM(s) Oral three times a day with meals  dextrose 40% Gel 15 Gram(s) Oral once  dextrose 5%. 1000 milliLiter(s) (50 mL/Hr) IV Continuous <Continuous>  dextrose 50% Injectable 25 Gram(s) IV Push once  doxycycline hyclate Capsule 100 milliGRAM(s) Oral every 12 hours  glucagon  Injectable 1 milliGRAM(s) IntraMuscular once  insulin lispro (ADMELOG) corrective regimen sliding scale   SubCutaneous three times a day before meals  insulin lispro (ADMELOG) corrective regimen sliding scale   SubCutaneous at bedtime  losartan 12.5 milliGRAM(s) Oral two times a day  mupirocin 2% Ointment 1 Application(s) Topical two times a day  pantoprazole    Tablet 40 milliGRAM(s) Oral before breakfast    MEDICATIONS  (PRN):      Allergies    No Known Allergies    Intolerances        SOCIAL HISTORY: denies drug use    FAMILY HISTORY:  Family history of diabetes mellitus (DM) (Sibling)    Family history of MI (myocardial infarction) (Sibling)        Vital Signs Last 24 Hrs  T(C): 36.3 (17 Feb 2021 10:20), Max: 36.3 (17 Feb 2021 10:20)  T(F): 97.4 (17 Feb 2021 10:20), Max: 97.4 (17 Feb 2021 10:20)  HR: 88 (17 Feb 2021 12:19) (80 - 88)  BP: 106/70 (17 Feb 2021 12:19) (98/67 - 106/70)  BP(mean): --  RR: 20 (17 Feb 2021 12:19) (16 - 20)  SpO2: 99% (17 Feb 2021 12:19) (99% - 100%)    PHYSICAL EXAM:     The patient was alert and oriented X 3, well nourished, and in no  apparent distress.  OP showed no ulcerations  There was no visible lymphadenopathy.  Conjunctiva were non injected  There was no clubbing or edema of extremities.  The scalp, hair, face, eyebrows, lips, OP, neck, chest, back,   extremities X 4, nails were examined.  There was no hyperhidrosis or bromhidrosis.    Of note on skin exam:   Numerous papules and plaques with central keratinaceous core on bilateral lower extremities, hyperpigmented excoriated papules on back admixed with linear excoriations    LABS:                        11.6   4.69  )-----------( 116      ( 17 Feb 2021 11:04 )             38.9     02-17    137  |  93<L>  |  36<H>  ----------------------------<  204<H>  4.9   |  29  |  5.91<H>    Ca    9.6      17 Feb 2021 11:04    TPro  7.6  /  Alb  3.9  /  TBili  1.1  /  DBili  x   /  AST  21  /  ALT  11  /  AlkPhos  162<H>  02-17    PT/INR - ( 17 Feb 2021 11:04 )   PT: 14.6 sec;   INR: 1.23 ratio         PTT - ( 17 Feb 2021 11:04 )  PTT:36.3 sec      RADIOLOGY & ADDITIONAL STUDIES:  no relevant radiology

## 2021-02-17 NOTE — H&P ADULT - PROBLEM SELECTOR PLAN 1
Acute on Chronic systolic Heart failure  - pt does not make urine, will need renal eval for more aggressive volume removal  - c/w Lipitor  - I discussed the case with Dr. Pavon, Heart Failure cardiologist --> pt will likely need a Left and Right Cath during this admission

## 2021-02-17 NOTE — H&P ADULT - NSHPREVIEWOFSYSTEMS_GEN_ALL_CORE
CONSTITUTIONAL:  No weight loss, fever, chills, weakness or fatigue.  HEENT:  Eyes:  No visual loss, blurred vision, double vision or yellow sclerae.   Ears, Nose, Throat:  No hearing loss, sneezing, congestion, runny nose or sore throat.  SKIN: generalized itching and skin ulcerations  CARDIOVASCULAR:  No chest pain, chest pressure or chest discomfort. Lower Extremity Edema.  RESPIRATORY:  No shortness of breath, cough or sputum.  GASTROINTESTINAL:  No anorexia, nausea, vomiting or diarrhea. No abdominal pain or blood.  GENITOURINARY:  Denies hematuria, dysuria.   NEUROLOGICAL:  No headache, dizziness, syncope, paralysis, ataxia, numbness or tingling in the extremities. No change in bowel or bladder control.  MUSCULOSKELETAL:  No muscle, back pain, joint pain or stiffness.  HEMATOLOGIC:  No bleeding or bruising.  Psych: No depression or anxiety

## 2021-02-17 NOTE — H&P ADULT - PROBLEM SELECTOR PLAN 4
- hold oral hypogylemic agents.   - Start on Insulin sliding scale coverage  - consistent carbohydrate diet.   - will check an A1C

## 2021-02-17 NOTE — H&P ADULT - NSHPPHYSICALEXAM_GEN_ALL_CORE
T(C): 36.3 (02-17 @ 10:20), Max: 36.3 (02-17 @ 10:20)   HR: 88   BP: 106/70   RR: 20   SpO2: 99%    PHYSICAL EXAM:    CONSTITUTIONAL: NAD, well-developed, well-groomed  EYES: PERRLA; conjunctiva and sclera clear  ENMT: Moist oral mucosa, no pharyngeal injection or exudates; normal dentition  NECK: Supple, no palpable masses; no thyromegaly  RESPIRATORY: Normal respiratory effort; lungs are clear to auscultation bilaterally  CARDIOVASCULAR: Regular rate and rhythm, normal S1 and S2, no murmur/rub/gallop; No lower extremity edema; Peripheral pulses are 2+ bilaterally  ABDOMEN: Nontender to palpation, normoactive bowel sounds, no rebound/guarding; No hepatosplenomegaly  MUSCULOSKELETAL:  Normal gait; no clubbing or cyanosis of digits; no joint swelling or tenderness to palpation  PSYCH: A+O to person, place, and time; affect appropriate  NEUROLOGY: CN 2-12 are intact and symmetric; no gross sensory deficits   SKIN: excoriations and skin ulcerations throughout all of his body

## 2021-02-17 NOTE — H&P ADULT - ASSESSMENT
55M PMHx of T2DM, HTN, CAD (s/p stent 2016), HFrEF (EF 15-20%) s/p ICD, ESRD on HD T/Th/Sat via LUE AV fistula, GIB 2/2 ulcer sent in by his Cardiologist for Volume overload.    He was a/w Acute on Chronic Systolic heart failure c/b a diffuse rash.

## 2021-02-17 NOTE — CONSULT NOTE ADULT - ASSESSMENT
Vipul Almaguer MD  Resident Physician, PGY3  [facility] Dermatology  Pager: [phone]  Office: [phone]    ** INCOMPLETE- PLEASE DISREGARD UNTIL FINAL AFTER ATTENDING ROUNDS**   #Numerous papules/plaques, some with keratinaceous core  Given clinical picture favor a diagnosis of acquired perforating disorder in the setting of ESRD  There may be admixed areas more consistent with prurigo nodules which are secondary to scratching. This is commonly seen in patients with ESRD as ESRD is often associated with significant pruritus.   Acquired perforating dermatoses are often associated with diabetes mellitus or chronic renal disease; however, they may also occur in patients with other systemic diseases, in association with medications, and in patients without an underlying disease.   This disease is often very difficult to treat. Patient would likely benefit from nbUVB light therapy as an outpatient.  While in the hospital, can treat with Sarna anti-itch lotion to all areas of body 2-3 times a day to reduce itch.  Start clobetasol 0.05% ointment BID to affected areas on trunk/extremities. Do not use on the face or groin. Please have pharmacy send multiple tubes given surface area of involvement.     Primary team, please try to obtain biopsy report from outpatient dermatologist, Dr. Villanueva. We will attempt as well.     These skin findings are not a contraindication to immunosuppressive medications that patient may require post transplant.     Upon discharge, can have patient follow with Dr. Henderson in 1-2 weeks    [facility] Dermatology at [address] [address]  [phone]    Please provide office information in the discharge instructions and inform patient to schedule follow up appointment.    Vipul Almaguer MD  Resident Physician, PGY3  [facility] Dermatology  Pager: [phone]  Office: [phone]    The patient's chart was reviewed in addition to being seen and examined at bedside with the dermatology attending Dr. Henderson  Recommendations were communicated with the primary team.  Please page [phone] for further related questions.   #Acquired perforating dermatosis and prurigo nodularis  Given clinical picture favor a diagnosis of acquired perforating disorder in the setting of ESRD  There may be admixed areas more consistent with prurigo nodules which are secondary to scratching. This is commonly seen in patients with ESRD as ESRD is often associated with significant pruritus.   Acquired perforating dermatoses are often associated with diabetes mellitus or chronic renal disease; however, they may also occur in patients with other systemic diseases, in association with medications, and in patients without an underlying disease.   This disease is often very difficult to treat. Patient would likely benefit from nbUVB light therapy as an outpatient.  While in the hospital, can treat with Sarna anti-itch lotion to all areas of body 2-3 times a day to reduce itch.  Start clobetasol 0.05% ointment BID to affected areas on trunk/extremities. Do not use on the face or groin. Please have pharmacy send multiple tubes given surface area of involvement.     Primary team, please try to obtain biopsy report from outpatient dermatologist, Dr. Villanueva. We will attempt as well.     These skin findings are not a contraindication to immunosuppressive medications that patient may require post transplant.     Upon discharge, can have patient follow with Dr. Henderson in 1-2 weeks    [facility] Dermatology at [address] [address]  [phone]    Please provide office information in the discharge instructions and inform patient to schedule follow up appointment.    Vipul Almaguer MD  Resident Physician, PGY3  [facility] Dermatology  Pager: [phone]  Office: [phone]    The patient's chart was reviewed in addition to being seen and examined at bedside with the dermatology attending Dr. Henderson  Recommendations were communicated with the primary team.  Please page [phone] for further related questions.

## 2021-02-17 NOTE — ED PROVIDER NOTE - CLINICAL SUMMARY MEDICAL DECISION MAKING FREE TEXT BOX
Dr. Bearden Note: sent by HF (per patient) for worsening fluid overload despite dialysis for medical optimization and possible RHC.  D/w HF, labs, CXR, EKG and likely admission

## 2021-02-17 NOTE — H&P ADULT - NSHPLABSRESULTS_GEN_ALL_CORE
labs and imaging personally reviewed.                        11.6   4.69  )-----------( 116      ( 17 Feb 2021 11:04 )             38.9      02-17    137  |  93<L>  |  36<H>  ----------------------------<  204<H>  4.9   |  29  |  5.91<H>    Ca    9.6      17 Feb 2021 11:04    TPro  7.6  /  Alb  3.9  /  TBili  1.1  /  DBili  x   /  AST  21  /  ALT  11  /  AlkPhos  162<H>  02-17     Serum Pro-Brain Natriuretic Peptide: 12202 pg/mL (02.17.21 @ 11:04)    CAPILLARY BLOOD GLUCOSE        RADIOLOGY & ADDITIONAL TESTS:  EKG: Sinus, no st elevations or depressions.  Imaging Personally Reviewed: CXR; clear lung fields  Consultant(s) Notes Reviewed:    Care Discussed with Consultants/Other Providers:

## 2021-02-17 NOTE — CONSULT NOTE ADULT - SUBJECTIVE AND OBJECTIVE BOX
HISTORY OF PRESENT ILLNESS:    56 YO M CAD s/p stent in 2016, HFrEF (EF 15-20%, LEVEDD 6.6), subQ ICD, Severe MR, ESRD since 2019, IDDM, HTN, here for increasing shortness of breath and decrased exercise tolerance. No recent fevers, chills, nausea, vomiting or diaphoresis. Pt. is currently in process of undergoing evaluation for Heart kidney transplant at [place] however patient continues to follow with our heart failure myopathy clinic for medical optimization.        Allergies    No Known Allergies    Intolerances    	    MEDICATIONS:  losartan 12.5 milliGRAM(s) Oral two times a day    doxycycline hyclate Capsule 100 milliGRAM(s) Oral every 12 hours        pantoprazole    Tablet 40 milliGRAM(s) Oral before breakfast    atorvastatin 80 milliGRAM(s) Oral at bedtime  dextrose 40% Gel 15 Gram(s) Oral once  dextrose 50% Injectable 25 Gram(s) IV Push once  glucagon  Injectable 1 milliGRAM(s) IntraMuscular once  insulin lispro (ADMELOG) corrective regimen sliding scale   SubCutaneous three times a day before meals  insulin lispro (ADMELOG) corrective regimen sliding scale   SubCutaneous at bedtime    calcium acetate 667 milliGRAM(s) Oral three times a day with meals  dextrose 5%. 1000 milliLiter(s) IV Continuous <Continuous>  mupirocin 2% Ointment 1 Application(s) Topical two times a day      PAST MEDICAL & SURGICAL HISTORY:  Congestive heart disease    ESRD (end stage renal disease) on dialysis    GIB (gastrointestinal bleeding)  was treated  with H pyelori stable since Dec 10, 2018    HFrEF (heart failure with reduced ejection fraction)  EF 20-24%    Myocardial infarction  Jan 2016    Hyperlipidemia    CAD (coronary artery disease)  2016, coronary artery stentX1    DM (diabetes mellitus)  2  not taking any medication lat4st HgA1c 5.7    HTN (hypertension)    History of implantable cardioverter-defibrillator (ICD) placement    Stented coronary artery  ? 1    Arterial stent thrombosis        FAMILY HISTORY:  Family history of diabetes mellitus (DM) (Sibling)    Family history of MI (myocardial infarction) (Sibling)        SOCIAL HISTORY:    [ ] Non-smoker  [ ] Smoker  [ ] Alcohol      REVIEW OF SYSTEMS:  General: no fatigue/malaise, weight loss/gain.  Skin: no rashes.  Ophthalmologic: no blurred vision, no loss of vision. 	  ENT: no sore throat, rhinorrhea, sinus congestion.  Respiratory: + SOB, no cough or wheeze.  Gastrointestinal:  no N/V/D, no melena/hematemesis/hematochezia.  Genitourinary: no dysuria/hesitancy or hematuria.  Musculoskeletal: no myalgias or arthralgias.  Neurological: no changes in vision or hearing, no lightheadedness/dizziness, no syncope/near syncope	  Psychiatric: no unusual stress/anxiety.   Hematology/Lymphatics: no unusual bleeding, bruising and no lymphadenopathy.  Endocrine: no unusual thirst.   All others negative except as stated above and in HPI.    PHYSICAL EXAM:  T(C): 36.4 (02-17-21 @ 15:29), Max: 36.4 (02-17-21 @ 15:29)  HR: 77 (02-17-21 @ 15:29) (77 - 88)  BP: 106/70 (02-17-21 @ 15:29) (98/67 - 106/70)  RR: 18 (02-17-21 @ 15:29) (16 - 20)  SpO2: 99% (02-17-21 @ 15:29) (99% - 100%)  Wt(kg): --  I&O's Summary      Appearance: comfortable on room air speaking full sentences	  HEENT:   Normal oral mucosa, PERRL, EOMI	  Lymphatic: No lymphadenopathy  Cardiovascular: Normal S1 S2, to mandible, 2/6 systolic murmur radiating to axilla, edema to knee  Respiratory: Lungs clear to auscultation	  Psychiatry: A & O x 3, Mood & affect appropriate  Gastrointestinal:  Soft, Non-tender, + BS	  Skin: multiple circular lesions on bilateral legs. No ecchymoses, No cyanosis	  Neurologic: Non-focal  Extremities: Normal range of motion, No clubbing, cyanosis or edema  Vascular: Peripheral pulses palpable 2+ bilaterally        LABS:	 	    CBC Full  -  ( 17 Feb 2021 11:04 )  WBC Count : 4.69 K/uL  Hemoglobin : 11.6 g/dL  Hematocrit : 38.9 %  Platelet Count - Automated : 116 K/uL  Mean Cell Volume : 91.1 fl  Mean Cell Hemoglobin : 27.2 pg  Mean Cell Hemoglobin Concentration : 29.8 gm/dL  Auto Neutrophil # : 3.20 K/uL  Auto Lymphocyte # : 0.68 K/uL  Auto Monocyte # : 0.58 K/uL  Auto Eosinophil # : 0.14 K/uL  Auto Basophil # : 0.07 K/uL  Auto Neutrophil % : 68.2 %  Auto Lymphocyte % : 14.5 %  Auto Monocyte % : 12.4 %  Auto Eosinophil % : 3.0 %  Auto Basophil % : 1.5 %    02-17    137  |  93<L>  |  36<H>  ----------------------------<  204<H>  4.9   |  29  |  5.91<H>    Ca    9.6      17 Feb 2021 11:04    TPro  7.6  /  Alb  3.9  /  TBili  1.1  /  DBili  x   /  AST  21  /  ALT  11  /  AlkPhos  162<H>  02-17      proBNP: Serum Pro-Brain Natriuretic Peptide: 91504 pg/mL (02-17 @ 11:04)    Lipid Profile:   HgA1c:   TSH:       CARDIAC MARKERS:    coCOVID-19 PCR: NotDetec: You can help in the fight against COVID-19. [facility] may contact     TELEMETRY: 	    ECG:  	  RADIOLOGY: < from: Xray Chest 1 View AP/PA (02.17.21 @ 11:56) >  IMPRESSION:  Clear lungs.          < end of copied text >    OTHER: 	    PREVIOUS DIAGNOSTIC TESTING:    [ ] Echocardiogram: < from: Transthoracic Echocardiogram (01.08.21 @ 10:25) >  CONCLUSIONS:  1. Tethered mitral valve leaflets with normal opening.  Severe mitral regurgitation.  2. Calcified trileaflet aortic valve with normal opening.  3. Moderately dilated left atrium.  LA volume index = 43  cc/m2.  4. Severe left ventricular enlargement.  5. Severe global left ventricular systolic dysfunction.  6. A device wire is noted in the right heart.  7. Right ventricular enlargement with decreased right  ventricular systolic function.    < end of copied text >    [ ]  Catheterization:    [ ] Stress Test:

## 2021-02-17 NOTE — ED ADULT NURSE NOTE - OBJECTIVE STATEMENT
pt 56 yo male states his md called him yesterday requesting he come to er for admission pt hx cardiac dm esrd on HD pt alert oriented ambulatory feels fine he states pt states he thinks sent for fluid retention pt also states couple of months some circular darkened skin lesions which were biopsied by dermatology to abd and legs pt had full dialysis yesterday access to left arm pt with ekg completed sinus rythem pt with mild JVD noted pt states some sob after walking 1/2 block placed on cardiac monitoring oriented to room labs sent as ordered and covid swab completed

## 2021-02-17 NOTE — ED PROVIDER NOTE - OBJECTIVE STATEMENT
56 yo male with PMHx of T2DM, HTN, CAD (s/p stent 2016), HFrEF (EF 15-20%) s/p ICD, ESRD on HD T/Th/Sat via LUE AV fistula, GIB 2/2 ulcer p/w fluid overload.  Patient reports that he is currently feeling well.  Has had persistent LE edema, SIERRA for a while now.  Received dialysis yesterday.  no new complaints.  Contacted by his cardiologist (Dr. Pavon, heart failure) who instructed him to come to the ER. Denies fevers/chills, Cp, SOB, abd pain, NVD.  No longer produces urine.

## 2021-02-17 NOTE — ED PROVIDER NOTE - PHYSICAL EXAMINATION
Gen: AAO x 3, NAD  Skin: small circular lesion on abdomen and BLE  HEENT: NC/AT, PERRLA, EOMI, MMM  Resp: unlabored CTAB  Cardiac: rrr s1s2, +JVD  GI: distended, +BS, Soft, NT  Ext: BLE pedal edema, FROM in all extremities  Neuro: no focal deficits

## 2021-02-17 NOTE — ED PROVIDER NOTE - ATTENDING CONTRIBUTION TO CARE
Pt with IDDM, mitral regurg, ESRD on HD with ongoing dyspnea on exertion for weeks, not relieved with dialysis, no associated chest pain, fever, cough.  +JVD about 5cm above clavicles b/l, clear lungs, +2 pitting edema LE, +thrill over LUE fistula.

## 2021-02-17 NOTE — ED PROVIDER NOTE - PROGRESS NOTE DETAILS
Dr. Bearden Note: copied from HF clinic note earlier this month: "DEMETRIO VILLALTA is being seen for a clinic follow-up of congestive heart failure.     History of Present Illness  55 year old M w/ h/o IDDM (A1c 7.4 8/20) c/b possible retinopathy/nephropathy, HTN, CAD (s/p stent in [place] in 2016, unknown coronary anatomy), HFrEF (EF 15-20%, LVEDD 6.6 cm) s/p subQ ICD, severe MR (functional), ESRD on HD (since 1/19; T/Th/Sat; AV fistula), prior GIB 2/2 ulcer (H. pylori positive) who is being seen for follow-up. Is undergoing evaluation for heart/kidney transplant at [facility].     Since last visit, had been seen at [facility] for evaluation of heart/kidney with Dr. Vini Trevizo.     Due to prior intolerance to BB, discussion since has been about possibly extracting subQ and converting to a CRT device but this will be pending decision by [place] as placing a transvenous system will expose him to risk of infection.     Pt is here for a follow up today. Reports can walk 1/2 block limited by fatigue. He goes to HD three times a week on T-Th-Sat and takes off 4 kg (mitra to 80 kg). Has been tolerating losartan 12.5 mg twice/day w/o issues as he takes only on non-HD days (feels better than when on hydralazine). He drinks less than 48 oz per day and is following a low salt diet. Denies orthopnea (2 pillows), PND, chest pain, palpitations, syncope, ICD discharge, fever, chills or nausea.     He had a colonoscopy but had a poor prep so it was not done. Does not want to repeat since he decided he will not pursue heart kidney at [facility] since he reports his chance of survival as per surgeon is 30%. Of note, pt does not put out any urine anymore except for a few drops.     Previously was tolerating hydralazine last year but in past several months unable to tolerate low dose hydralazine. Gets some dizziness with bending down. Since tolerating losartan.     Was seen by hepatology and had fibroscan 1/29/20 which showed F4 fibrosis and an abdominal US was done subsequently on 2/9/20 which showed no cirrhosis.     He was seen by hematology for workup of thrombocytopenia and underwent BM Bx on 2/5/20 which showed normal trilineage hematopoiesis.     Has developed lesions throughout his body that have are circular, raised, keratotic with occasional purulence. Was started on doxycycline 100mg twice/day and some topical creams. Had seen a dermatologist (Dr. Maximiliano Villanueva) and had a biopsy (results unknown). Denies fevers/chills/NS. " Dr. Bearden Note: HF team saw patient, agrees with plan to admit for daily ultrafiltration dialysis, possible RHC, and further management of fluid overload.

## 2021-02-17 NOTE — CONSULT NOTE ADULT - SUBJECTIVE AND OBJECTIVE BOX
[facility] Division of Kidney Diseases & Hypertension  INITIAL CONSULT NOTE  [phone]--------------------------------------------------------------------------------  HPI:        PAST HISTORY  --------------------------------------------------------------------------------  PAST MEDICAL & SURGICAL HISTORY:  Congestive heart disease    ESRD (end stage renal disease) on dialysis    GIB (gastrointestinal bleeding)  was treated  with H pyelori stable since Dec 10, 2018    HFrEF (heart failure with reduced ejection fraction)  EF 20-24%    Myocardial infarction  Jan 2016    Hyperlipidemia    CAD (coronary artery disease)  2016, coronary artery stentX1    DM (diabetes mellitus)  2  not taking any medication lat4st HgA1c 5.7    HTN (hypertension)    History of implantable cardioverter-defibrillator (ICD) placement    Stented coronary artery  ? 1    Arterial stent thrombosis      FAMILY HISTORY:  Family history of diabetes mellitus (DM) (Sibling)    Family history of MI (myocardial infarction) (Sibling)      PAST SOCIAL HISTORY:    ALLERGIES & MEDICATIONS  --------------------------------------------------------------------------------  Allergies    No Known Allergies    Intolerances      Standing Inpatient Medications  atorvastatin 80 milliGRAM(s) Oral at bedtime  calcium acetate 667 milliGRAM(s) Oral three times a day with meals  dextrose 40% Gel 15 Gram(s) Oral once  dextrose 5%. 1000 milliLiter(s) IV Continuous <Continuous>  dextrose 50% Injectable 25 Gram(s) IV Push once  doxycycline hyclate Capsule 100 milliGRAM(s) Oral every 12 hours  glucagon  Injectable 1 milliGRAM(s) IntraMuscular once  insulin lispro (ADMELOG) corrective regimen sliding scale   SubCutaneous three times a day before meals  insulin lispro (ADMELOG) corrective regimen sliding scale   SubCutaneous at bedtime  losartan 12.5 milliGRAM(s) Oral two times a day  mupirocin 2% Ointment 1 Application(s) Topical two times a day  pantoprazole    Tablet 40 milliGRAM(s) Oral before breakfast    PRN Inpatient Medications      REVIEW OF SYSTEMS  --------------------------------------------------------------------------------  Gen: No  fevers/chills, weakness  Skin: No rashes  Head/Eyes/Ears/Mouth: No headache; Normal hearing; Normal vision w/o blurriness;   Respiratory: No dyspnea, cough, wheezing, hemoptysis  CV: No chest pain,   GI: No abdominal pain, diarrhea, constipation, nausea, vomiting  : No increased frequency, dysuria, hematuria, nocturia  MSK: No joint pain/swelling;   Neuro: No dizziness/lightheadedness, weakness, seizures    All other systems were reviewed and are negative, except as noted.    VITALS/PHYSICAL EXAM  --------------------------------------------------------------------------------  T(C): 36.4 (02-17-21 @ 15:29), Max: 36.4 (02-17-21 @ 15:29)  HR: 77 (02-17-21 @ 15:29) (77 - 88)  BP: 106/70 (02-17-21 @ 15:29) (98/67 - 106/70)  RR: 18 (02-17-21 @ 15:29) (16 - 20)  SpO2: 99% (02-17-21 @ 15:29) (99% - 100%)  Wt(kg): --  Height (cm): 162.6 (02-17-21 @ 10:20)  Weight (kg): 79.4 (02-17-21 @ 10:20)  BMI (kg/m2): 30 (02-17-21 @ 10:20)  BSA (m2): 1.85 (02-17-21 @ 10:20)      Physical Exam:  	Gen: NAD, well-appearing  	HEENT: PERRL, supple neck  	Pulm: CTA B/L  	CV:  S1S2  	Abd: +BS, soft   	Ext: No B/L Lower ext edema  	Neuro: No focal deficits  	Skin: Warm, without rashes  	Vascular access:     LABS/STUDIES  --------------------------------------------------------------------------------              11.6   4.69  >-----------<  116      [02-17-21 @ 11:04]              38.9     137  |  93  |  36  ----------------------------<  204      [02-17-21 @ 11:04]  4.9   |  29  |  5.91        Ca     9.6     [02-17-21 @ 11:04]    TPro  7.6  /  Alb  3.9  /  TBili  1.1  /  DBili  x   /  AST  21  /  ALT  11  /  AlkPhos  162  [02-17-21 @ 11:04]    PT/INR: PT 14.6 , INR 1.23       [02-17-21 @ 11:04]  PTT: 36.3       [02-17-21 @ 11:04]      Creatinine Trend:  SCr 5.91 [02-17 @ 11:04]             [facility] Division of Kidney Diseases & Hypertension  INITIAL CONSULT NOTE  [phone]--------------------------------------------------------------------------------  HPI: 55M PMHx of T2DM, HTN, CAD (s/p stent 2016), HFrEF (EF 15-20%) s/p ICD, ESRD on HD T/Th/Sat via LUE AV fistula, GIB 2/2 ulcer sent in by his Cardiologist for Volume overload. Nephrology team  consulted for history of ESRD/HD Management.    Pt with history of ESRD on HD TTS  (Nephrologist: Dr. Pavon, Center: [facility]). Pt with last HD yesterday 2/16/21 with 4L of fluid removed  for 4 hours and 15 min via LUE AVF. Pt with EDW: 79kg. Pt was seen and examined in the ED, he has been reporting LE edema for the past 2-3 months associated with SOB on exertion. Pt denies recent fevers, chills, nausea, vomiting or diarrhea, chest pain. Pt. is currently in process of undergoing evaluation for Heart and kidney transplant at [place].    PAST HISTORY  --------------------------------------------------------------------------------  PAST MEDICAL & SURGICAL HISTORY:  Congestive heart disease    ESRD (end stage renal disease) on dialysis    GIB (gastrointestinal bleeding)  was treated  with H pyelori stable since Dec 10, 2018    HFrEF (heart failure with reduced ejection fraction)  EF 20-24%    Myocardial infarction  Jan 2016    Hyperlipidemia    CAD (coronary artery disease)  2016, coronary artery stentX1    DM (diabetes mellitus)  2  not taking any medication lat4st HgA1c 5.7    HTN (hypertension)    History of implantable cardioverter-defibrillator (ICD) placement    Stented coronary artery  ? 1    Arterial stent thrombosis      FAMILY HISTORY:  Family history of diabetes mellitus (DM) (Sibling)    Family history of MI (myocardial infarction) (Sibling)      PAST SOCIAL HISTORY:    ALLERGIES & MEDICATIONS  --------------------------------------------------------------------------------  Allergies    No Known Allergies    Intolerances      Standing Inpatient Medications  atorvastatin 80 milliGRAM(s) Oral at bedtime  calcium acetate 667 milliGRAM(s) Oral three times a day with meals  dextrose 40% Gel 15 Gram(s) Oral once  dextrose 5%. 1000 milliLiter(s) IV Continuous <Continuous>  dextrose 50% Injectable 25 Gram(s) IV Push once  doxycycline hyclate Capsule 100 milliGRAM(s) Oral every 12 hours  glucagon  Injectable 1 milliGRAM(s) IntraMuscular once  insulin lispro (ADMELOG) corrective regimen sliding scale   SubCutaneous three times a day before meals  insulin lispro (ADMELOG) corrective regimen sliding scale   SubCutaneous at bedtime  losartan 12.5 milliGRAM(s) Oral two times a day  mupirocin 2% Ointment 1 Application(s) Topical two times a day  pantoprazole    Tablet 40 milliGRAM(s) Oral before breakfast    PRN Inpatient Medications      REVIEW OF SYSTEMS  --------------------------------------------------------------------------------  Gen: No  fevers/chills, weakness  Skin: No rashes  Head/Eyes/Ears/Mouth: No headache; Normal hearing; Normal vision w/o blurriness;   Respiratory: + SOB on exertion  CV: No chest pain,   GI: No abdominal pain, diarrhea, constipation, nausea, vomiting  : No increased frequency, dysuria, hematuria, nocturia  MSK: + LE edema  Neuro: No dizziness/lightheadedness, weakness, seizures    All other systems were reviewed and are negative, except as noted.    VITALS/PHYSICAL EXAM  --------------------------------------------------------------------------------  T(C): 36.4 (02-17-21 @ 15:29), Max: 36.4 (02-17-21 @ 15:29)  HR: 77 (02-17-21 @ 15:29) (77 - 88)  BP: 106/70 (02-17-21 @ 15:29) (98/67 - 106/70)  RR: 18 (02-17-21 @ 15:29) (16 - 20)  SpO2: 99% (02-17-21 @ 15:29) (99% - 100%)  Wt(kg): --  Height (cm): 162.6 (02-17-21 @ 10:20)  Weight (kg): 79.4 (02-17-21 @ 10:20)  BMI (kg/m2): 30 (02-17-21 @ 10:20)  BSA (m2): 1.85 (02-17-21 @ 10:20)      Physical Exam:  Gen: NAD  HEENT: supple neck,   Pulmonary: CTA B/L  CV: RRR, S1S2; no rub  Abd:  +BS, soft, not tender, not distended  UE:  No edema;   LE:  mild edema  Neuro: No focal deficits,   No asterixes   Psych: Normal affect and mood  Vascular  Left UE AVF  + Thrill/ bruit    LABS/STUDIES  --------------------------------------------------------------------------------              11.6   4.69  >-----------<  116      [02-17-21 @ 11:04]              38.9     137  |  93  |  36  ----------------------------<  204      [02-17-21 @ 11:04]  4.9   |  29  |  5.91        Ca     9.6     [02-17-21 @ 11:04]    TPro  7.6  /  Alb  3.9  /  TBili  1.1  /  DBili  x   /  AST  21  /  ALT  11  /  AlkPhos  162  [02-17-21 @ 11:04]    PT/INR: PT 14.6 , INR 1.23       [02-17-21 @ 11:04]  PTT: 36.3       [02-17-21 @ 11:04]      Creatinine Trend:  SCr 5.91 [02-17 @ 11:04]

## 2021-02-17 NOTE — H&P ADULT - PROBLEM SELECTOR PLAN 2
presumed to be from ESRD complication, ? self induced  - c/w  Doxy and topical antibacterial ointments per his outpatient Dermatologist  - I called Derm for an inpatient evaluation and further recommendations given that he is a Cardiac transplant candidate

## 2021-02-18 NOTE — PROGRESS NOTE ADULT - PROBLEM SELECTOR PLAN 4
as per derm rash is Acquired perforating dermatosis and prurigo nodularis  c/w treatment as per Derm  c/w Sarna anti-itch lotion to all areas of body 2-3 times a day to reduce itch.  c/w clobetasol 0.05% ointment BID to affected areas on trunk/extremities.

## 2021-02-18 NOTE — PROGRESS NOTE ADULT - PROBLEM SELECTOR PLAN 4
- hold oral hypogylemic agents.   - Start on Insulin sliding scale coverage  - consistent carbohydrate diet.   - HbA1C: 6.3

## 2021-02-18 NOTE — PROGRESS NOTE ADULT - ATTENDING COMMENTS
I have seen this patient with the fellow and agree with their assessment and plan. In addition,    # ESRD - MWF dialysis via AVF. Given volume overload, we will do dialysis on MWF and intermittent UF on TTS.     # Volume overload - UF goal ~3.5 liters today. To hold anti-hypertension prior to dialysis.    # Medication toxicity Monitoring: medication dose reviewed. Please dose medications to CrCl<10    The rest of the recommendations as per fellow's note.    Aubree Liu MD  Attending Nephrologist  [phone]

## 2021-02-18 NOTE — PROGRESS NOTE ADULT - SUBJECTIVE AND OBJECTIVE BOX
[facility] Division of Kidney Diseases & Hypertension  FOLLOW UP NOTE  [phone]--------------------------------------------------------------------------------    24 hour events/subjective:  - Patient was seen this morning. Tolerated UF yesterday with 2L fluid removed  - No acute significant events overnight  - Pt planned for HD today with UF 2L as tolerated  - Hold AM Losartan prior to HD    PAST HISTORY  --------------------------------------------------------------------------------  No significant changes to PMH, PSH, FHx, SHx, unless otherwise noted    ALLERGIES & MEDICATIONS  --------------------------------------------------------------------------------  Allergies    No Known Allergies    Intolerances      Standing Inpatient Medications  atorvastatin 80 milliGRAM(s) Oral at bedtime  calcium acetate 667 milliGRAM(s) Oral three times a day with meals  clobetasol 0.05% Ointment 1 Application(s) Topical two times a day  dextrose 40% Gel 15 Gram(s) Oral once  dextrose 5%. 1000 milliLiter(s) IV Continuous <Continuous>  dextrose 50% Injectable 25 Gram(s) IV Push once  doxycycline hyclate Capsule 100 milliGRAM(s) Oral every 12 hours  glucagon  Injectable 1 milliGRAM(s) IntraMuscular once  insulin lispro (ADMELOG) corrective regimen sliding scale   SubCutaneous three times a day before meals  insulin lispro (ADMELOG) corrective regimen sliding scale   SubCutaneous at bedtime  losartan 12.5 milliGRAM(s) Oral two times a day  midodrine 5 milliGRAM(s) Oral once  mupirocin 2% Ointment 1 Application(s) Topical two times a day  pantoprazole    Tablet 40 milliGRAM(s) Oral before breakfast    PRN Inpatient Medications    REVIEW OF SYSTEMS  --------------------------------------------------------------------------------  Gen: No  fevers/chills, weakness  Skin: No rashes  Head/Eyes/Ears/Mouth: No headache; Normal hearing; Normal vision w/o blurriness;   Respiratory: + SOB on exertion  CV: No chest pain,   GI: No abdominal pain, diarrhea, constipation, nausea, vomiting  : No increased frequency, dysuria, hematuria, nocturia  MSK: + LE edema  Neuro: No dizziness/lightheadedness, weakness, seizures    All other systems were reviewed and are negative, except as noted.    VITALS/PHYSICAL EXAM  --------------------------------------------------------------------------------  T(C): 36.6 (02-18-21 @ 04:59), Max: 37 (02-17-21 @ 17:23)  HR: 78 (02-18-21 @ 04:59) (76 - 88)  BP: 106/72 (02-18-21 @ 04:59) (98/67 - 115/72)  RR: 17 (02-18-21 @ 04:59) (16 - 20)  SpO2: 98% (02-18-21 @ 04:59) (97% - 100%)  Wt(kg): --  Height (cm): 162.6 (02-17-21 @ 10:20)  Weight (kg): 79.4 (02-17-21 @ 10:20)  BMI (kg/m2): 30 (02-17-21 @ 10:20)  BSA (m2): 1.85 (02-17-21 @ 10:20)      02-17-21 @ 07:01  -  02-18-21 @ 07:00  --------------------------------------------------------  IN: 960 mL / OUT: 2600 mL / NET: -1640 mL    Physical Exam:  Gen: NAD  HEENT: supple neck,   Pulmonary: CTA B/L  CV: RRR, S1S2; no rub  Abd:  +BS, soft, not tender, not distended  UE:  No edema;   LE:  mild edema  Neuro: No focal deficits,   No asterixes   Psych: Normal affect and mood  Vascular  Left UE AVF  + Thrill/ bruit    LABS/STUDIES  --------------------------------------------------------------------------------              11.6   4.69  >-----------<  116      [02-17-21 @ 11:04]              38.9     135  |  94  |  48  ----------------------------<  120      [02-18-21 @ 07:05]  4.8   |  21  |  6.85        Ca     9.5     [02-18-21 @ 07:05]    TPro  7.6  /  Alb  4.0  /  TBili  1.1  /  DBili  x   /  AST  23  /  ALT  13  /  AlkPhos  169  [02-18-21 @ 07:05]    PT/INR: PT 14.6 , INR 1.23       [02-17-21 @ 11:04]  PTT: 36.3       [02-17-21 @ 11:04]    Creatinine Trend:  SCr 6.85 [02-18 @ 07:05]  SCr 5.91 [02-17 @ 11:04]

## 2021-02-18 NOTE — PROGRESS NOTE ADULT - ASSESSMENT
56 YO M CAD s/p stent in 2016, HFrEF (EF 15-20%, LEVEDD 6.6), subQ ICD, Severe MR, ESRD since 2019, IDDM, HTN, here for shortness of breath and edema.

## 2021-02-18 NOTE — PROGRESS NOTE ADULT - ASSESSMENT
Pt with ESRD on HD presents to the hospital for fluid overload    #ESRD on HD    - Patient with ESRD on HD TTS   - Outpatient HD: [facility] dialysis facility  - Nephrologist Dr. ESTHER Pavon  - Pt with last HD 2/16 for 4 hours and 15 min with total UF 4L. UF yesterday with 2L fluid removed  - Lab reviewed. Pt clinically stable  - Will plan for HD today with UF 2L as tolerated to BP  - Hold Losartan prior to HD (pt refused this morning) Give Midodrine 5mg prior to HD  - Monitor renal panel  - Pt likely planned for L/RHC during admission    #Fluid overload- plans as above    #Hypertension  - Pt on Losartan 12.5mg BID.   - consider holding Losartan for now to be able to remove more fluid during HD  - Will plan for HD with UF 2L today as tolerated    #Anemia  Patient with anemia in the setting of ESRD. Hemoglobin within target range. Pt on retacrit 2000 units as outpatient  - Will hold FATOUMATA for now. Monitor hemoglobin.    #Bone metabolism disorder  - in the setting of ESRD  - Check phos levels  - Resume phoslo 2001 mg TID with meals  - continue phos binders  - low phos diet    If you have any questions, please feel free to contact me  Rich Bowser  Nephrology Fellow  Pager: [phone] / [phone]  (After 5pm or on weekends please page the on-call fellow)   Pt with ESRD on HD presents to the hospital for fluid overload    #ESRD on HD    - Patient with ESRD on HD TTS   - Outpatient HD: [facility] dialysis facility  - Nephrologist Dr. ESTHER Pavon  - Pt with last HD 2/16 for 4 hours and 15 min with total UF 4L. UF yesterday with 2L fluid removed  - Lab reviewed. Pt clinically stable  - Will plan for HD today with UF 3.5L as tolerated to BP  - Hold Losartan prior to HD (pt refused this morning) Give Midodrine 5mg prior to HD  - Monitor renal panel  - Pt likely planned for L/RHC during admission    #Fluid overload- plans as above    #Hypertension  - Pt on Losartan 12.5mg BID.   - consider holding Losartan for now to be able to remove more fluid during HD  - Will plan for HD with UF 2L today as tolerated    #Anemia  Patient with anemia in the setting of ESRD. Hemoglobin within target range. Pt on retacrit 2000 units as outpatient  - Will hold FATOUMATA for now. Monitor hemoglobin.    #Bone metabolism disorder  - in the setting of ESRD  - Check phos levels  - Resume phoslo 2001 mg TID with meals  - continue phos binders  - low phos diet    If you have any questions, please feel free to contact me  Rich Bowser  Nephrology Fellow  Pager: [phone] / [phone]  (After 5pm or on weekends please page the on-call fellow)

## 2021-02-18 NOTE — PROGRESS NOTE ADULT - ATTENDING COMMENTS
Continue fluid removal and once euvolemic will plan on R/LHC. Hold losartan as develops hypotension during fluid removal sessions.

## 2021-02-18 NOTE — PROGRESS NOTE ADULT - SUBJECTIVE AND OBJECTIVE BOX
Patient is a 55y old  Male who presents with a chief complaint of Acute on Chronic Systolic Heart Failure, Volume overload (18 Feb 2021 10:57)      SUBJECTIVE / OVERNIGHT EVENTS: Patient seen and examined at bedside. States that he feels ok, still has SIERRA, denies SOB at rest, CP, abd pain and n/v. No acute events overnight, tolerated HD yesterday.     ROS:  All other review of systems negative    Allergies    No Known Allergies    Intolerances        MEDICATIONS  (STANDING):  atorvastatin 80 milliGRAM(s) Oral at bedtime  calcium acetate 667 milliGRAM(s) Oral three times a day with meals  camphor 0.5%/menthol 0.5% Topical Lotion 1 Application(s) Topical two times a day  clobetasol 0.05% Ointment 1 Application(s) Topical two times a day  dextrose 40% Gel 15 Gram(s) Oral once  dextrose 5%. 1000 milliLiter(s) (50 mL/Hr) IV Continuous <Continuous>  dextrose 50% Injectable 25 Gram(s) IV Push once  doxycycline hyclate Capsule 100 milliGRAM(s) Oral every 12 hours  glucagon  Injectable 1 milliGRAM(s) IntraMuscular once  insulin lispro (ADMELOG) corrective regimen sliding scale   SubCutaneous three times a day before meals  insulin lispro (ADMELOG) corrective regimen sliding scale   SubCutaneous at bedtime  mupirocin 2% Ointment 1 Application(s) Topical two times a day  pantoprazole    Tablet 40 milliGRAM(s) Oral before breakfast    MEDICATIONS  (PRN):      Vital Signs Last 24 Hrs  T(C): 36.3 (18 Feb 2021 12:30), Max: 37 (17 Feb 2021 17:23)  T(F): 97.4 (18 Feb 2021 12:30), Max: 98.6 (17 Feb 2021 17:23)  HR: 73 (18 Feb 2021 12:30) (72 - 79)  BP: 108/72 (18 Feb 2021 12:30) (106/70 - 115/72)  BP(mean): --  RR: 19 (18 Feb 2021 12:30) (17 - 19)  SpO2: 99% (18 Feb 2021 12:30) (97% - 100%)  CAPILLARY BLOOD GLUCOSE      POCT Blood Glucose.: 107 mg/dL (18 Feb 2021 09:11)  POCT Blood Glucose.: 155 mg/dL (17 Feb 2021 22:14)  POCT Blood Glucose.: 108 mg/dL (17 Feb 2021 17:58)    I&O's Summary    17 Feb 2021 07:01  -  18 Feb 2021 07:00  --------------------------------------------------------  IN: 960 mL / OUT: 2600 mL / NET: -1640 mL    18 Feb 2021 07:01  -  18 Feb 2021 13:54  --------------------------------------------------------  IN: 240 mL / OUT: 0 mL / NET: 240 mL        PHYSICAL EXAM:  GENERAL: NAD, well-developed  HEAD:  Atraumatic, Normocephalic  EYES: EOMI, PERRLA, conjunctiva and sclera clear  NECK: Supple, + JVD  CHEST/LUNG: mild bibasilar crackles; No wheeze  HEART: Regular rate and rhythm; + systolic murmur  ABDOMEN: Soft, Nontender, Nondistended; Bowel sounds present  EXTREMITIES:  2+ Peripheral Pulses,+1 pitting edema to knees  NEUROLOGY: AAOx3, non-focal  PSYCH: calm  SKIN: diffuse excoriations and skin lesion (improving)    LABS:                        11.6   4.69  )-----------( 116      ( 17 Feb 2021 11:04 )             38.9     02-18    135  |  94<L>  |  48<H>  ----------------------------<  120<H>  4.8   |  21<L>  |  6.85<H>    Ca    9.5      18 Feb 2021 07:05    TPro  7.6  /  Alb  4.0  /  TBili  1.1  /  DBili  x   /  AST  23  /  ALT  13  /  AlkPhos  169<H>  02-18    PT/INR - ( 17 Feb 2021 11:04 )   PT: 14.6 sec;   INR: 1.23 ratio         PTT - ( 17 Feb 2021 11:04 )  PTT:36.3 sec          RADIOLOGY & ADDITIONAL TESTS:  Consultant(s) Notes Reviewed:  HF and Nephrology rec noted     Care Discussed with Consultants/Other Providers: Medicine NP

## 2021-02-18 NOTE — PROGRESS NOTE ADULT - SUBJECTIVE AND OBJECTIVE BOX
Patient seen and examined at bedside.    Overnight Events:   Yesterday tolerated dialysis  No complaints this morning      Current Meds:  atorvastatin 80 milliGRAM(s) Oral at bedtime  calcium acetate 667 milliGRAM(s) Oral three times a day with meals  camphor 0.5%/menthol 0.5% Topical Lotion 1 Application(s) Topical two times a day  clobetasol 0.05% Ointment 1 Application(s) Topical two times a day  dextrose 40% Gel 15 Gram(s) Oral once  dextrose 5%. 1000 milliLiter(s) IV Continuous <Continuous>  dextrose 50% Injectable 25 Gram(s) IV Push once  doxycycline hyclate Capsule 100 milliGRAM(s) Oral every 12 hours  glucagon  Injectable 1 milliGRAM(s) IntraMuscular once  insulin lispro (ADMELOG) corrective regimen sliding scale   SubCutaneous three times a day before meals  insulin lispro (ADMELOG) corrective regimen sliding scale   SubCutaneous at bedtime  losartan 12.5 milliGRAM(s) Oral every 12 hours  midodrine 5 milliGRAM(s) Oral once  mupirocin 2% Ointment 1 Application(s) Topical two times a day  pantoprazole    Tablet 40 milliGRAM(s) Oral before breakfast        Vitals:  T(F): 98.2 (02-18), Max: 98.6 (02-17)  HR: 72 (02-18) (72 - 88)  BP: 115/72 (02-18) (106/70 - 115/72)  RR: 18 (02-18)  SpO2: 99% (02-18)  I&O's Summary    17 Feb 2021 07:01  -  18 Feb 2021 07:00  --------------------------------------------------------  IN: 960 mL / OUT: 2600 mL / NET: -1640 mL        Physical Exam:  Appearance: comfortable on room air speaking full sentences	  HEENT:   Normal oral mucosa, PERRL, EOMI	  Lymphatic: No lymphadenopathy  Cardiovascular: Normal S1 S2, to mandible, 2/6 systolic murmur radiating to axilla, edema to knee  Respiratory: Lungs clear to auscultation	  Psychiatry: A & O x 3, Mood & affect appropriate  Gastrointestinal:  Soft, Non-tender, + BS	  Skin: multiple circular lesions on primarily on right leg. Left leg improved since yesterday. No ecchymoses, No cyanosis	  Neurologic: Non-focal  Extremities: Normal range of motion, No clubbing, cyanosis or edema  Vascular: Peripheral pulses palpable 2+ bilaterally                        11.6   4.69  )-----------( 116      ( 17 Feb 2021 11:04 )             38.9     02-18    135  |  94<L>  |  48<H>  ----------------------------<  120<H>  4.8   |  21<L>  |  6.85<H>    Ca    9.5      18 Feb 2021 07:05    TPro  7.6  /  Alb  4.0  /  TBili  1.1  /  DBili  x   /  AST  23  /  ALT  13  /  AlkPhos  169<H>  02-18    PT/INR - ( 17 Feb 2021 11:04 )   PT: 14.6 sec;   INR: 1.23 ratio         PTT - ( 17 Feb 2021 11:04 )  PTT:36.3 sec      Serum Pro-Brain Natriuretic Peptide: 39086 pg/mL (02-17 @ 11:04)          New ECG(s): Personally reviewed    Echo:    Stress Testing:     Cath:    Imaging:    Interpretation of Telemetry:

## 2021-02-18 NOTE — PROGRESS NOTE ADULT - PROBLEM SELECTOR PLAN 1
Endorsing weight gain and shortness of breath  Exam continues to have elevated JVP  Etiology: unclear etiology possibly ICM given CAD  GDMT: hold losartan 12.5mg for now given frequent dialysis  Diuretics: Diuresis with dialysis  Advanced therapies: Patient undergoing transplant eval at [facility]. May need RHC to assess hemodynamics.

## 2021-02-18 NOTE — PATIENT PROFILE ADULT - NSPROHMDIABETBLDGLCTST_GEN_A_NUR
patient needs a new glucometer because insurance does not cover the strips that match his/3 times/day

## 2021-02-18 NOTE — PROGRESS NOTE ADULT - PROBLEM SELECTOR PLAN 1
Acute on Chronic systolic Heart failure  - pt does not make urine, will need renal eval for more aggressive volume removal  - tolerated HD yesterday 2/17, plan for HD again today 2/18  - c/w Lipitor  -HF rec noted: Dr. Pavon, plan for Left and Right Cath during this admission once euvolemic

## 2021-02-19 NOTE — PROGRESS NOTE ADULT - PROBLEM SELECTOR PLAN 1
Acute on Chronic systolic Heart failure  - pt makes minimal urine , will need renal eval for more aggressive volume removal  - Underwent HD  2/17, 2/18- tolerated well  - c/w Lipitor  -HF rec noted: Dr. Pavon, plan for Left and Right Cath during this admission once euvolemic Acute on Chronic systolic Heart failure  - pt makes minimal urine, renal consult appreciated   - Underwent HD  2/17, 2/18- tolerated well  - Plan for UF today 2L 2/19, Midodrine as below   - c/w Lipitor  -HF rec noted: Dr. Pavon, plan for Left and Right Cath during this admission once euvolemic

## 2021-02-19 NOTE — PROGRESS NOTE ADULT - PROBLEM SELECTOR PLAN 1
Endorsing weight gain and shortness of breath  Exam continues to have elevated JVP  Etiology: unclear etiology possibly ICM given CAD  GDMT: hold losartan 12.5mg for now given frequent dialysis  Diuretics: Diuresis with dialysis  Advanced therapies: Patient undergoing transplant eval at [facility]. Please call CT surgery for transplant evaluation. May need RHC to assess hemodynamics.

## 2021-02-19 NOTE — PROGRESS NOTE ADULT - ASSESSMENT
56 YO M CAD s/p stent in 2016, HFrEF (EF 15-20%, LEVEDD 6.6), subQ ICD, Severe MR, ESRD since 2019, IDDM, HTN, here for shortness of breath and edema.  54 YO M CAD s/p stent in 2016, HFrEF (EF 15-20%, LEVEDD 6.6), subQ ICD, Severe MR, ESRD since 2019, IDDM, HTN, here for shortness of breath and edema. Continued on dialysis with improvement in symptoms. Currently undergoing transplant evaluation while inpatient for heart kidney.  56 YO M CAD s/p stent in 2016, HFrEF (EF 15-20%, LEVEDD 6.6), subQ ICD, Severe MR, ESRD since 2019, IDDM, HTN, here for shortness of breath and edema. Continued on daily HD/UF with improvement in symptoms. Will continue optimization for plan of doing RHC/LHC once optimized for evaluation for heart/kidney transplant (currently underway at [facility]).

## 2021-02-19 NOTE — PROGRESS NOTE ADULT - SUBJECTIVE AND OBJECTIVE BOX
Patient is a 55y old  Male who presents with a chief complaint of Acute on Chronic Systolic Heart Failure, Volume overload (18 Feb 2021 10:57)      SUBJECTIVE / OVERNIGHT EVENTS: Patient seen and examined at bedside. Pt denies any acute complaints. States his rash is improving. Tolerating PO w/o difficulties Last bowel movement yesterday. Voiding w/ minimal urine output.     ROS:  All other review of systems negative    Allergies    No Known Allergies    Intolerances    MEDICATIONS  (STANDING):  atorvastatin 80 milliGRAM(s) Oral at bedtime  calcium acetate 2001 milliGRAM(s) Oral three times a day with meals  camphor 0.5%/menthol 0.5% Topical Lotion 1 Application(s) Topical two times a day  clobetasol 0.05% Ointment 1 Application(s) Topical two times a day  dextrose 40% Gel 15 Gram(s) Oral once  dextrose 5%. 1000 milliLiter(s) (50 mL/Hr) IV Continuous <Continuous>  dextrose 50% Injectable 25 Gram(s) IV Push once  doxycycline hyclate Capsule 100 milliGRAM(s) Oral every 12 hours  glucagon  Injectable 1 milliGRAM(s) IntraMuscular once  insulin lispro (ADMELOG) corrective regimen sliding scale   SubCutaneous three times a day before meals  insulin lispro (ADMELOG) corrective regimen sliding scale   SubCutaneous at bedtime  mupirocin 2% Ointment 1 Application(s) Topical two times a day  pantoprazole    Tablet 40 milliGRAM(s) Oral before breakfast    MEDICATIONS  (PRN):  acetaminophen   Tablet .. 650 milliGRAM(s) Oral every 6 hours PRN Temp greater or equal to 38C (100.4F), Mild Pain (1 - 3)        02-18 @ 07:01  -  02-19 @ 07:00  --------------------------------------------------------  IN: 660 mL / OUT: 2601 mL / NET: -1941 mL        PHYSICAL EXAM:  GENERAL: NAD, well-developed  HEAD:  Atraumatic, Normocephalic  EYES: EOMI, PERRLA, conjunctiva and sclera clear  NECK: Supple, + JVD  CHEST/LUNG: Clear to auscultation bi-laterally   HEART: Regular rate and rhythm; + systolic murmur  ABDOMEN: Soft, Nontender, Nondistended; Bowel sounds present  EXTREMITIES:  2+ Peripheral Pulses,+1 pitting edema to knees  NEUROLOGY: AAOx3, non-focal  PSYCH: calm  SKIN: diffuse excoriations and skin lesion (improving)    LABS:                        11.6   4.69  )-----------( 116      ( 17 Feb 2021 11:04 )             38.9     02-18    135  |  94<L>  |  48<H>  ----------------------------<  120<H>  4.8   |  21<L>  |  6.85<H>    Ca    9.5      18 Feb 2021 07:05    TPro  7.6  /  Alb  4.0  /  TBili  1.1  /  DBili  x   /  AST  23  /  ALT  13  /  AlkPhos  169<H>  02-18    PT/INR - ( 17 Feb 2021 11:04 )   PT: 14.6 sec;   INR: 1.23 ratio         PTT - ( 17 Feb 2021 11:04 )  PTT:36.3 sec          RADIOLOGY & ADDITIONAL TESTS:  Consultant(s) Notes Reviewed:  HF and Nephrology rec noted     Care Discussed with Consultants/Other Providers: Medicine NP  Patient is a 55y old  Male who presents with a chief complaint of Acute on Chronic Systolic Heart Failure, Volume overload (18 Feb 2021 10:57)      SUBJECTIVE / OVERNIGHT EVENTS: Patient seen and examined at bedside. Pt denies any acute complaints. States his rash is improving. Tolerating PO w/o difficulties Last bowel movement yesterday. Voiding w/ minimal urine output.     ROS:  All other review of systems negative    Allergies    No Known Allergies    Intolerances    MEDICATIONS  (STANDING):  atorvastatin 80 milliGRAM(s) Oral at bedtime  calcium acetate 2001 milliGRAM(s) Oral three times a day with meals  camphor 0.5%/menthol 0.5% Topical Lotion 1 Application(s) Topical two times a day  clobetasol 0.05% Ointment 1 Application(s) Topical two times a day  dextrose 40% Gel 15 Gram(s) Oral once  dextrose 5%. 1000 milliLiter(s) (50 mL/Hr) IV Continuous <Continuous>  dextrose 50% Injectable 25 Gram(s) IV Push once  doxycycline hyclate Capsule 100 milliGRAM(s) Oral every 12 hours  glucagon  Injectable 1 milliGRAM(s) IntraMuscular once  insulin lispro (ADMELOG) corrective regimen sliding scale   SubCutaneous three times a day before meals  insulin lispro (ADMELOG) corrective regimen sliding scale   SubCutaneous at bedtime  mupirocin 2% Ointment 1 Application(s) Topical two times a day  pantoprazole    Tablet 40 milliGRAM(s) Oral before breakfast    MEDICATIONS  (PRN):  acetaminophen   Tablet .. 650 milliGRAM(s) Oral every 6 hours PRN Temp greater or equal to 38C (100.4F), Mild Pain (1 - 3)        02-18 @ 07:01  -  02-19 @ 07:00  --------------------------------------------------------  IN: 660 mL / OUT: 2601 mL / NET: -1941 mL        PHYSICAL EXAM:  GENERAL: NAD, well-developed  HEAD:  Atraumatic, Normocephalic  EYES: EOMI, PERRLA, conjunctiva and sclera clear  NECK: Supple, + JVD  CHEST/LUNG: Clear to auscultation bi-laterally   HEART: Regular rate and rhythm; + systolic murmur  ABDOMEN: Soft, Nontender, Nondistended; Bowel sounds present  EXTREMITIES:  2+ Peripheral Pulses, no edema  NEUROLOGY: AAOx3, non-focal  PSYCH: calm  SKIN: diffuse excoriations and skin lesion (improving)    LABS:                        11.6   4.69  )-----------( 116      ( 17 Feb 2021 11:04 )             38.9     02-18    135  |  94<L>  |  48<H>  ----------------------------<  120<H>  4.8   |  21<L>  |  6.85<H>    Ca    9.5      18 Feb 2021 07:05    TPro  7.6  /  Alb  4.0  /  TBili  1.1  /  DBili  x   /  AST  23  /  ALT  13  /  AlkPhos  169<H>  02-18    PT/INR - ( 17 Feb 2021 11:04 )   PT: 14.6 sec;   INR: 1.23 ratio         PTT - ( 17 Feb 2021 11:04 )  PTT:36.3 sec          RADIOLOGY & ADDITIONAL TESTS:  Consultant(s) Notes Reviewed:  HF and Nephrology rec noted     Care Discussed with Consultants/Other Providers: Medicine NP  Patient is a 55y old  Male who presents with a chief complaint of Acute on Chronic Systolic Heart Failure, Volume overload (18 Feb 2021 10:57)      SUBJECTIVE / OVERNIGHT EVENTS: Patient seen and examined at bedside. Pt denies any acute complaints. States his rash is improving. Tolerating PO w/o difficulties Last bowel movement yesterday. Voiding w/ minimal urine output.     ROS:  All other review of systems negative    Allergies    No Known Allergies    Intolerances    MEDICATIONS  (STANDING):  atorvastatin 80 milliGRAM(s) Oral at bedtime  calcium acetate 2001 milliGRAM(s) Oral three times a day with meals  camphor 0.5%/menthol 0.5% Topical Lotion 1 Application(s) Topical two times a day  clobetasol 0.05% Ointment 1 Application(s) Topical two times a day  dextrose 40% Gel 15 Gram(s) Oral once  dextrose 5%. 1000 milliLiter(s) (50 mL/Hr) IV Continuous <Continuous>  dextrose 50% Injectable 25 Gram(s) IV Push once  doxycycline hyclate Capsule 100 milliGRAM(s) Oral every 12 hours  glucagon  Injectable 1 milliGRAM(s) IntraMuscular once  insulin lispro (ADMELOG) corrective regimen sliding scale   SubCutaneous three times a day before meals  insulin lispro (ADMELOG) corrective regimen sliding scale   SubCutaneous at bedtime  mupirocin 2% Ointment 1 Application(s) Topical two times a day  pantoprazole    Tablet 40 milliGRAM(s) Oral before breakfast    MEDICATIONS  (PRN):  acetaminophen   Tablet .. 650 milliGRAM(s) Oral every 6 hours PRN Temp greater or equal to 38C (100.4F), Mild Pain (1 - 3)        02-18 @ 07:01  -  02-19 @ 07:00  --------------------------------------------------------  IN: 660 mL / OUT: 2601 mL / NET: -1941 mL        PHYSICAL EXAM:  GENERAL: NAD, well-developed  HEAD:  Atraumatic, Normocephalic  EYES: EOMI, PERRLA, conjunctiva and sclera clear  NECK: Supple,   CHEST/LUNG: Clear to auscultation bi-laterally   HEART: Regular rate and rhythm; + systolic murmur  ABDOMEN: Soft, Nontender, Nondistended; Bowel sounds present  EXTREMITIES:  2+ Peripheral Pulses, minimal LE edema (improving)  NEUROLOGY: AAOx3, non-focal  PSYCH: calm  SKIN: diffuse excoriations and skin lesion (improving)    LABS:                        11.6   4.69  )-----------( 116      ( 17 Feb 2021 11:04 )             38.9     02-18    135  |  94<L>  |  48<H>  ----------------------------<  120<H>  4.8   |  21<L>  |  6.85<H>    Ca    9.5      18 Feb 2021 07:05    TPro  7.6  /  Alb  4.0  /  TBili  1.1  /  DBili  x   /  AST  23  /  ALT  13  /  AlkPhos  169<H>  02-18    PT/INR - ( 17 Feb 2021 11:04 )   PT: 14.6 sec;   INR: 1.23 ratio         PTT - ( 17 Feb 2021 11:04 )  PTT:36.3 sec          RADIOLOGY & ADDITIONAL TESTS:  Consultant(s) Notes Reviewed:  HF and Nephrology rec noted     Care Discussed with Consultants/Other Providers: Medicine NP

## 2021-02-19 NOTE — PROGRESS NOTE ADULT - SUBJECTIVE AND OBJECTIVE BOX
Patient seen and examined at bedside.    Overnight Events:   No overnight events  Tolerated Dialysis     Current Meds:  acetaminophen   Tablet .. 650 milliGRAM(s) Oral every 6 hours PRN  atorvastatin 80 milliGRAM(s) Oral at bedtime  calcium acetate 2001 milliGRAM(s) Oral three times a day with meals  camphor 0.5%/menthol 0.5% Topical Lotion 1 Application(s) Topical two times a day  clobetasol 0.05% Ointment 1 Application(s) Topical two times a day  dextrose 40% Gel 15 Gram(s) Oral once  dextrose 5%. 1000 milliLiter(s) IV Continuous <Continuous>  dextrose 50% Injectable 25 Gram(s) IV Push once  doxycycline hyclate Capsule 100 milliGRAM(s) Oral every 12 hours  glucagon  Injectable 1 milliGRAM(s) IntraMuscular once  insulin lispro (ADMELOG) corrective regimen sliding scale   SubCutaneous three times a day before meals  insulin lispro (ADMELOG) corrective regimen sliding scale   SubCutaneous at bedtime  mupirocin 2% Ointment 1 Application(s) Topical two times a day  pantoprazole    Tablet 40 milliGRAM(s) Oral before breakfast        Vitals:  T(F): 97.3 (02-19), Max: 98 (02-19)  HR: 93 (02-19) (70 - 93)  BP: 100/65 (02-19) (99/65 - 114/75)  RR: 18 (02-19)  SpO2: 96% (02-19)  I&O's Summary    18 Feb 2021 07:01  -  19 Feb 2021 07:00  --------------------------------------------------------  IN: 660 mL / OUT: 2601 mL / NET: -1941 mL      Appearance: comfortable on room air speaking full sentences	  HEENT:   Normal oral mucosa, PERRL, EOMI	  Lymphatic: No lymphadenopathy  Cardiovascular: Normal S1 S2, to ear, 2/6 systolic murmur radiating to axilla, edema of lower extremity improved since yesterday.  Respiratory: Lungs clear to auscultation	  Psychiatry: A & O x 3, Mood & affect appropriate  Gastrointestinal:  Soft, Non-tender, + BS	  Skin: multiple circular lesions on bilateral legs. No ecchymoses, No cyanosis	  Neurologic: Non-focal  Extremities: Normal range of motion, No clubbing, cyanosis or edema  Vascular: Peripheral pulses palpable 2+ bilaterally                            11.4   4.82  )-----------( 92       ( 19 Feb 2021 07:16 )             36.3     02-19    134<L>  |  91<L>  |  40<H>  ----------------------------<  154<H>  4.7   |  26  |  6.46<H>    Ca    9.7      19 Feb 2021 07:16    TPro  7.6  /  Alb  4.0  /  TBili  1.1  /  DBili  x   /  AST  23  /  ALT  13  /  AlkPhos  169<H>  02-18          Serum Pro-Brain Natriuretic Peptide: 96506 pg/mL (02-17 @ 11:04)          New ECG(s): Personally reviewed    Echo:    Stress Testing:     Cath:    Imaging:    Interpretation of Telemetry:

## 2021-02-19 NOTE — PROGRESS NOTE ADULT - SUBJECTIVE AND OBJECTIVE BOX
[facility] Division of Kidney Diseases & Hypertension  FOLLOW UP NOTE  [phone]--------------------------------------------------------------------------------  24 hour events/subjective:  - Pt was seen and examined this morning.  - No acute events overnight  - Tolerated HD yesterday with UF: 2.6L  - Weight 78kg. this morning     PAST HISTORY  --------------------------------------------------------------------------------  No significant changes to PMH, PSH, FHx, SHx, unless otherwise noted    ALLERGIES & MEDICATIONS  --------------------------------------------------------------------------------  Allergies    No Known Allergies    Intolerances      Standing Inpatient Medications  atorvastatin 80 milliGRAM(s) Oral at bedtime  calcium acetate 2001 milliGRAM(s) Oral three times a day with meals  camphor 0.5%/menthol 0.5% Topical Lotion 1 Application(s) Topical two times a day  clobetasol 0.05% Ointment 1 Application(s) Topical two times a day  dextrose 40% Gel 15 Gram(s) Oral once  dextrose 5%. 1000 milliLiter(s) IV Continuous <Continuous>  dextrose 50% Injectable 25 Gram(s) IV Push once  doxycycline hyclate Capsule 100 milliGRAM(s) Oral every 12 hours  glucagon  Injectable 1 milliGRAM(s) IntraMuscular once  insulin lispro (ADMELOG) corrective regimen sliding scale   SubCutaneous three times a day before meals  insulin lispro (ADMELOG) corrective regimen sliding scale   SubCutaneous at bedtime  mupirocin 2% Ointment 1 Application(s) Topical two times a day  pantoprazole    Tablet 40 milliGRAM(s) Oral before breakfast    PRN Inpatient Medications  acetaminophen   Tablet .. 650 milliGRAM(s) Oral every 6 hours PRN      REVIEW OF SYSTEMS  --------------------------------------------------------------------------------  Gen: No  fevers/chills, weakness  Skin: No rashes  Head/Eyes/Ears/Mouth: No headache; Normal hearing; Normal vision w/o blurriness;   Respiratory: + SOB on exertion  CV: No chest pain,   GI: No abdominal pain, diarrhea, constipation, nausea, vomiting  : No increased frequency, dysuria, hematuria, nocturia  MSK: + LE edema  Neuro: No dizziness/lightheadedness, weakness, seizures    All other systems were reviewed and are negative, except as noted.    VITALS/PHYSICAL EXAM  --------------------------------------------------------------------------------  T(C): 36.3 (02-19-21 @ 11:11), Max: 36.7 (02-19-21 @ 04:00)  HR: 93 (02-19-21 @ 11:11) (70 - 93)  BP: 100/65 (02-19-21 @ 11:11) (99/65 - 114/75)  RR: 18 (02-19-21 @ 11:11) (18 - 18)  SpO2: 96% (02-19-21 @ 11:11) (96% - 100%)  Wt(kg): --    02-18-21 @ 07:01  -  02-19-21 @ 07:00  --------------------------------------------------------  IN: 660 mL / OUT: 2601 mL / NET: -1941 mL    Physical Exam:  Gen: NAD  HEENT: supple neck, +JVD  Pulmonary: CTA B/L  CV: RRR, S1S2; no rub  Abd:  +BS, soft, not tender, not distended  UE:  No edema;   LE:  mild edema  Neuro: No focal deficits, No asterixes   Psych: Normal affect and mood  Vascular  Left UE AVF  + Thrill/ bruit    LABS/STUDIES  --------------------------------------------------------------------------------              11.4   4.82  >-----------<  92       [02-19-21 @ 07:16]              36.3     134  |  91  |  40  ----------------------------<  154      [02-19-21 @ 07:16]  4.7   |  26  |  6.46        Ca     9.7     [02-19-21 @ 07:16]    TPro  7.6  /  Alb  4.0  /  TBili  1.1  /  DBili  x   /  AST  23  /  ALT  13  /  AlkPhos  169  [02-18-21 @ 07:05]    Creatinine Trend:  SCr 6.46 [02-19 @ 07:16]  SCr 6.85 [02-18 @ 07:05]  SCr 5.91 [02-17 @ 11:04]

## 2021-02-19 NOTE — CONSULT NOTE ADULT - PROBLEM SELECTOR RECOMMENDATION 2
Unknown anatomy   c/w ASA 81mg daily  c/w atorvastatin 80mg qHS
HD per nephrology  T/THURS/SAT  evaluation for   Heart/Kidney Transplant

## 2021-02-19 NOTE — PROGRESS NOTE ADULT - ATTENDING COMMENTS
I have seen this patient with the fellow and agree with their assessment and plan. In addition,    # ESRD - MWF dialysis via AVF. Given volume overload, we will do dialysis on MWF and intermittent UF on TTS.     # Volume overload - UF goal ~3.5 liters today. To hold anti-hypertension prior to dialysis.    # Medication toxicity Monitoring: medication dose reviewed. Please dose medications to CrCl<10    The rest of the recommendations as per fellow's note.    Aubree Liu MD  Attending Nephrologist  [phone]

## 2021-02-19 NOTE — CONSULT NOTE ADULT - ATTENDING COMMENTS
Patient seen and exam today at bedside. Chart, labs, vitals, radiology reviewed as applicable. Note reviewed and edited where appropriate.    Agree with history and physical exam. Agree with assessment and plan.      I was physically present for the key portions of the evaluation and management (E/M) service provided. I agree with the above history, physical, and plan which I have reviewed and edited where appropriate.        Chuck Henderson MD, PharmD, MPH  Co-Director, Inpatient Dermatology Consultation Service, [facility]
In short, this is a 55 y/o gentleman with chronic heart failure and ESRD that was recently admitted for progressive heart failure symptoms. I was asked to see Mr. Lorenzo by Dr. Pavon for surgical consultation as part of his heart/kidney transplant workup. After reviewing his medical history and studies, I saw and examined the patient. He appeared to be pleasant and motivated to be successful with transplantation. Pending his completed workup, I believe he is an acceptable candidate from a surgical standpoint. However, a specific are of concern is surrounding his numerous skin lesion. This would need to be significantly more resolved prior to transplant to reduce a serious infectious risk. After discussing the risks, benefits and alternatives to transplantation as well as the expected postoperative length of stay and recovery, all of his questions and concerns were answered.     Specific Education:  1) Discussed in detail the requirement and process for the Transplant Committee to review and list him for transplantation.    2) Discussed in detail the post-transplant process, including when to call a coordinator, frequent labs, clinic visits, pharmacy and insurance issues.    3) Discussed in detail PHS increased risk and HCV+ donors. He stated the he had already decided to accept these potential donors.
I have seen this patient with the fellow and agree with their assessment and plan. In addition,  Patient was seen and examined on 2/17/21 at 4.30pm/.     # ESRD - MWF dialysis via AVF. Given volume overload, we will do dialysis on MWF and intermittent UF on TTS.     # Volume overload - UF goal ~2    # Medication toxicity Monitoring: medication dose reviewed. Please dose medications to CrCl<10    The rest of the recommendations as per fellow's note.    Aubree Liu MD  Attending Nephrologist  [phone]
Briefly, 56 y/o M w/ h/o NIDDM (A1c 6.3 ) c/b retinopathy/nephropathy, CAD (s/p prior PCI), stage D HFrEF (LVEDD 6.6 cm, EF 15%) s/p subQ ICD, severe MR, ESRD on HD (since 1/19) who was electively brought in for optimization with daily HD/UF for hypervolemia as outpatient dialysis was ineffective. Undergoing workup for heart/kidney transplant at [facility]. Feels well and denies complaints. Reports walking 1/2 block limited by fatigue. Exam with elevated JVP to ear, RRR, grade IV systolic murmur in LLSB, CTAB, nontender abdomen, trace edema b/l. Labs reviewed. Stage D HF, NYHA class IV undergoing evaluation at [facility] (ABO O).   - appreciate cardiorenal c/s; daily HD/UF  - will c/s dermatology for b/l LE lesions  - will plan for RHC/LHC when optimized to assess hemodynamics  - will ask Dr. Caputo to visit him to assess if he may be a candidate here

## 2021-02-19 NOTE — CONSULT NOTE ADULT - PROBLEM SELECTOR PROBLEM 3
Coronary artery disease with other form of angina pectoris
Hypertension
ESRD (end stage renal disease) on dialysis

## 2021-02-19 NOTE — CONSULT NOTE ADULT - PROBLEM SELECTOR RECOMMENDATION 9
Endorsing weight gain and shortness of breath  Exam with elevated JVP  Etiology: unclear etiology possibly ICM given CAD  GDMT: c/w losartan 12.5mg not on dialysis days  Diuretics: Diuresis with dialysis  Advanced therapies: Patient undergoing transplant eval at [facility]. May need RHC to assess hemodynamics
Followed by Heart failure team   EF 20-24%  Patient to be presented at Heart transplant meeting

## 2021-02-19 NOTE — PROGRESS NOTE ADULT - ASSESSMENT
Pt with ESRD on HD presents to the hospital for fluid overload    #ESRD on HD    - Patient with ESRD on HD TTS   - Outpatient HD: [facility] dialysis facility  - Nephrologist Dr. ESTHER Pavon  - Pt with last HD yesterday with UF 2.6L. Will plan for UF today and HD tomorrow to achieve goal weight 73 kg.  - Lab reviewed. Pt clinically stable  - Will plan for UF today 2L as tolerated to BP  - Losartan on hold. Give Midodrine 5mg prior to HD  - Monitor renal panel    #Fluid overload- plans as above    #Hypertension  - Pt on Losartan 12.5mg BID as home medication  - Losartan on hold  - Will plan for HD with UF 2L today as tolerated    #Anemia  Patient with anemia in the setting of ESRD. Hemoglobin within target range. Pt on retacrit 2000 units as outpatient  - Will hold FATOUMATA for now. Monitor hemoglobin.    #Bone metabolism disorder  - in the setting of ESRD  - Check phos levels  - On phoslo 2001 mg TID with meals  - continue phos binders  - low phos diet    If you have any questions, please feel free to contact me  Rich Bowser  Nephrology Fellow  Pager: [phone] / [phone]  (After 5pm or on weekends please page the on-call fellow)

## 2021-02-19 NOTE — CONSULT NOTE ADULT - SUBJECTIVE AND OBJECTIVE BOX
Surgeon: Dr Caputo    Requesting Physician: Dr Pavon    HISTORY OF PRESENT ILLNESS    This is a 56 YO M CAD s/p stent in 2016, HFrEF (EF 15-20%, LEVEDD 6.6), subQ ICD, Severe MR, ESRD since 2019, IDDM, HTN, here for increasing shortness of breath and decreased exercise tolerance. No recent fevers, chills, nausea, vomiting or diaphoresis. Pt. is currently in process of undergoing evaluation for Heart kidney transplant at [place] however patient continues to follow with our heart failure myopathy clinic for medical optimization.        PAST MEDICAL & SURGICAL HISTORY:  Congestive heart disease    ESRD (end stage renal disease) on dialysis    GIB (gastrointestinal bleeding)  was treated  with H pyelori stable since Dec 10, 2018    HFrEF (heart failure with reduced ejection fraction)  EF 20-24%    Myocardial infarction  Jan 2016    Hyperlipidemia    CAD (coronary artery disease)  2016, coronary artery stentX1    DM (diabetes mellitus)  2  not taking any medication lat4st HgA1c 5.7    HTN (hypertension)    History of implantable cardioverter-defibrillator (ICD) placement    Stented coronary artery  ? 1    Arterial stent thrombosis        MEDICATIONS  (STANDING):  atorvastatin 80 milliGRAM(s) Oral at bedtime  calcium acetate 2001 milliGRAM(s) Oral three times a day with meals  camphor 0.5%/menthol 0.5% Topical Lotion 1 Application(s) Topical two times a day  clobetasol 0.05% Ointment 1 Application(s) Topical two times a day  dextrose 40% Gel 15 Gram(s) Oral once  dextrose 5%. 1000 milliLiter(s) (50 mL/Hr) IV Continuous <Continuous>  dextrose 50% Injectable 25 Gram(s) IV Push once  doxycycline hyclate Capsule 100 milliGRAM(s) Oral every 12 hours  glucagon  Injectable 1 milliGRAM(s) IntraMuscular once  insulin lispro (ADMELOG) corrective regimen sliding scale   SubCutaneous three times a day before meals  insulin lispro (ADMELOG) corrective regimen sliding scale   SubCutaneous at bedtime  mupirocin 2% Ointment 1 Application(s) Topical two times a day  pantoprazole    Tablet 40 milliGRAM(s) Oral before breakfast    MEDICATIONS  (PRN):  acetaminophen   Tablet .. 650 milliGRAM(s) Oral every 6 hours PRN Temp greater or equal to 38C (100.4F), Mild Pain (1 - 3)      Allergies  No Known Allergies  Intolerances        SOCIAL HISTORY:  Smoker:   former smoker     PACK YEARS:                         WHEN QUIT?  ETOH use:   former                FREQUENCY / QUANTITY:  Ilicit Drug use:   NO  Occupation: unemployed :  Live with: Sister    FAMILY HISTORY:  Family history of diabetes mellitus (DM) (Sibling)    Family history of MI (myocardial infarction) (Sibling)        Review of Systems (Need 10):  CONSTITUTIONAL: Denies fevers / chills, sweats, fatigue, weight loss, weight gain                                       NEURO:  Denies parathesias, seizures, syncope, confusion                                                                                  EYES:  Denies blurry vision, discharge, pain, loss of vision                                                                                    ENMT:  Denies difficulty hearing, vertigo, dysphagia, epistaxis, recent dental work                                       CV:  Denies chest pain, palpitations, SIERRA, orthopnea                                                                                           RESPIRATORY:  Denies Wheezing, SOB, cough / sputum, hemoptysis                                                               GI:  Denies nausea, vomiting, diarrhea, constipation, melena                                                                          : HD                                                                                        MUSKULOSKELETAL:  Denies arthritis, joint swelling, muscle weakness                                                             SKIN/BREAST:  rash, itching,                                                                                              PSYCH:  Denies depression, anxiety, suicidal ideation                                                                                                HEME/LYMPH:  Denies bruises easily, enlarged lymph nodes, tender lymph nodes                                          ENDOCRINE:  Denies cold intolerance, heat intolerance, polydipsia                                                                      Vital Signs Last 24 Hrs  T(C): 36.5 (19 Feb 2021 14:00), Max: 36.7 (19 Feb 2021 04:00)  T(F): 97.7 (19 Feb 2021 14:00), Max: 98 (19 Feb 2021 04:00)  HR: 78 (19 Feb 2021 14:00) (70 - 93)  BP: 121/68 (19 Feb 2021 14:00) (99/65 - 121/68)  BP(mean): --  RR: 18 (19 Feb 2021 14:00) (18 - 18)  SpO2: 100% (19 Feb 2021 14:00) (96% - 100%)    Constitutional:  Well developed,well nourished  Eyes: EOMI,PERRL  ENMT: WDL  Neck: no bruits ,no thyromegaly  Breasts:not examined  Back: no deformities no kyphosis  Respiratory: Breath  sounds equal& clear throughout  Cardiovascular:  S1 S2 RRRregular rate and rhythm no murmurs rubs  Gastrointestinal:Soft nontender positive bowels sounds   Genitourinary: wdl   Rectal:not examined  Extremities: Equal strength warm well perfused   Vascular: Equal and normal throughout  Neurological: Alert and oriented no focal deficits  Skin: normal no rashes   Lymph Nodes: non tender   Musculoskeletal: equal  strength throughout        LABS:                        11.4   4.82  )-----------( 92       ( 19 Feb 2021 07:16 )             36.3     02-19    134<L>  |  91<L>  |  40<H>  ----------------------------<  154<H>  4.7   |  26  |  6.46<H>    Ca    9.7      19 Feb 2021 07:16    TPro  7.6  /  Alb  4.0  /  TBili  1.1  /  DBili  x   /  AST  23  /  ALT  13  /  AlkPhos  169<H>  02-18                RADIOLOGY & ADDITIONAL STUDIES:  CAROTID U/S:    CXR:    CT Scan:    EKG:    TTE / LUCIANO:    Cardiac Cath: Surgeon: Dr Caputo    Requesting Physician: Dr Pavon    HISTORY OF PRESENT ILLNESS    This is a 54 YO M CAD s/p stent in 2016, HFrEF (EF 15-20%, LEVEDD 6.6), subQ ICD, Severe MR, ESRD since 2019, IDDM, HTN, here for increasing shortness of breath and decreased exercise tolerance. No recent fevers, chills, nausea, vomiting or diaphoresis. Pt. is currently in process of undergoing evaluation for Heart kidney transplant at [place] however patient continues to follow with our heart failure myopathy clinic for medical optimization.        PAST MEDICAL & SURGICAL HISTORY:  Congestive heart disease    ESRD (end stage renal disease) on dialysis    GIB (gastrointestinal bleeding)  was treated  with H pyelori stable since Dec 10, 2018    HFrEF (heart failure with reduced ejection fraction)  EF 20-24%    Myocardial infarction  Jan 2016    Hyperlipidemia    CAD (coronary artery disease)  2016, coronary artery stentX1    DM (diabetes mellitus)  2  not taking any medication lat4st HgA1c 5.7    HTN (hypertension)    History of implantable cardioverter-defibrillator (ICD) placement    Stented coronary artery  ? 1    Arterial stent thrombosis        MEDICATIONS  (STANDING):  atorvastatin 80 milliGRAM(s) Oral at bedtime  calcium acetate 2001 milliGRAM(s) Oral three times a day with meals  camphor 0.5%/menthol 0.5% Topical Lotion 1 Application(s) Topical two times a day  clobetasol 0.05% Ointment 1 Application(s) Topical two times a day  dextrose 40% Gel 15 Gram(s) Oral once  dextrose 5%. 1000 milliLiter(s) (50 mL/Hr) IV Continuous <Continuous>  dextrose 50% Injectable 25 Gram(s) IV Push once  doxycycline hyclate Capsule 100 milliGRAM(s) Oral every 12 hours  glucagon  Injectable 1 milliGRAM(s) IntraMuscular once  insulin lispro (ADMELOG) corrective regimen sliding scale   SubCutaneous three times a day before meals  insulin lispro (ADMELOG) corrective regimen sliding scale   SubCutaneous at bedtime  mupirocin 2% Ointment 1 Application(s) Topical two times a day  pantoprazole    Tablet 40 milliGRAM(s) Oral before breakfast    MEDICATIONS  (PRN):  acetaminophen   Tablet .. 650 milliGRAM(s) Oral every 6 hours PRN Temp greater or equal to 38C (100.4F), Mild Pain (1 - 3)      Allergies  No Known Allergies  Intolerances        SOCIAL HISTORY:  Smoker:   former smoker    ETOH use:   former                FREQUENCY / QUANTITY:  Ilicit Drug use:   NO  Occupation: unemployed :  Live with: Sister    FAMILY HISTORY:  Family history of diabetes mellitus (DM) (Sibling)    Family history of MI (myocardial infarction) (Sibling)        Review of Systems (Need 10):  CONSTITUTIONAL: Denies fevers / chills, sweats, fatigue, weight loss, weight gain                                       NEURO:  Denies parathesias, seizures, syncope, confusion                                                                                  EYES:  Denies blurry vision, discharge, pain, loss of vision                                                                                    ENMT:  Denies difficulty hearing, vertigo, dysphagia, epistaxis, recent dental work                                       CV:  Denies chest pain, palpitations, SIERRA, orthopnea                                                                                           RESPIRATORY:  Denies Wheezing, SOB, cough / sputum, hemoptysis                                                               GI:  Denies nausea, vomiting, diarrhea, constipation, melena                                                                          : HD                                                                                        MUSKULOSKELETAL:  Denies arthritis, joint swelling, muscle weakness                                                             SKIN/BREAST:  rash, itching,                                                                                              PSYCH:  Denies depression, anxiety, suicidal ideation                                                                                                HEME/LYMPH:  Denies bruises easily, enlarged lymph nodes, tender lymph nodes                                          ENDOCRINE:  Denies cold intolerance, heat intolerance, polydipsia                                                                      Vital Signs Last 24 Hrs  T(C): 36.5 (19 Feb 2021 14:00), Max: 36.7 (19 Feb 2021 04:00)  T(F): 97.7 (19 Feb 2021 14:00), Max: 98 (19 Feb 2021 04:00)  HR: 78 (19 Feb 2021 14:00) (70 - 93)  BP: 121/68 (19 Feb 2021 14:00) (99/65 - 121/68)  BP(mean): --  RR: 18 (19 Feb 2021 14:00) (18 - 18)  SpO2: 100% (19 Feb 2021 14:00) (96% - 100%)    Constitutional:  Well developed, well nourished  Eyes: EOMI,PERRL  ENMT: WDL  Neck: no bruits ,no thyromegaly + JVD  Breasts:not examined  Back: no deformities no kyphosis  Respiratory: Breath  sounds equal& clear throughout  Cardiovascular:  S1 S2 RRR  regular rate and rhythm no murmurs rubs  Gastrointestinal:Soft nontender positive bowels sounds   Genitourinary: wdl   Rectal:not examined  Extremities: Equal strength warm well perfused   Vascular: Equal and normal throughout  Neurological: Alert and oriented no focal deficits  Skin: normal no rashes   Lymph Nodes: non tender   Musculoskeletal: equal  strength throughout        LABS:                        11.4   4.82  )-----------( 92       ( 19 Feb 2021 07:16 )             36.3     02-19    134<L>  |  91<L>  |  40<H>  < from: 12 Lead ECG (02.17.21 @ 10:43) >  Diagnosis Line SINUS RHYTHM [id number] DEGREE A-V BLOCK  LEFT ATRIAL ENLARGEMENT  LEFTWARD AXIS  INFERIOR INFARCT , AGE UNDETERMINED  ANTEROLATERAL INFARCT , AGE UNDETERMINED  ABNORMAL ECG  WHEN COMPARED WITH ECG OF 22-MAY-2019 08:41,  ANTEROLATERAL Q WAVES NOW PRESENT  Confirmed by MD Moreno Jeffrey ([id number]) on 2/19/2021 9:55:51 AM    < end of copied text >  ----------------------------<  154<H>  4.7   |  26  |  6.46<H>    Ca    9.7      19 Feb 2021 07:16    TPro  7.6  /  Alb  4.0  /  TBili  1.1  /  DBili  x   /  AST  23  /  ALT  13  /  AlkPhos  169<H>  02-18                RADIOLOGY & ADDITIONAL STUDIES:  CAROTID U/S:    CXR:< from: Xray Chest 1 View AP/PA (02.17.21 @ 11:56) >  FINDINGS:  The lungs are clear.  There is no pleural effusion or pneumothorax.  Cardiomegaly.  Left chest wall defibrillator.  Loop recorder.  The visualized osseous structures demonstrate no acute pathology.    IMPRESSION:  Clear lungs.    < end of copied text >      EKG:< from: 12 Lead ECG (02.17.21 @ 10:43) >  Diagnosis Line SINUS RHYTHM [id number] DEGREE A-V BLOCK  LEFT ATRIAL ENLARGEMENT  LEFTWARD AXIS  INFERIOR INFARCT , AGE UNDETERMINED  ANTEROLATERAL INFARCT , AGE UNDETERMINED  ABNORMAL ECG  WHEN COMPARED WITH ECG OF 22-MAY-2019 08:41,  ANTEROLATERAL Q WAVES NOW PRESENT  Confirmed by MD Moreno Jeffrey ([id number]) on 2/19/2021 9:55:51 AM    < end of copied text >      TTE / LUCIANO:< from: Transthoracic Echocardiogram (01.08.21 @ 10:25) >  Tethered mitral valve leaflets with normal opening.  Severe mitral regurgitation.  2. Calcified trileaflet aortic valve with normal opening.  3. Moderately dilated left atrium.  LA volume index = 43  cc/m2.  4. Severe left ventricular enlargement.  5. Severe global left ventricular systolic dysfunction.  6. A device wire is noted in the right heart.  7. Right ventricular enlargement with decreased right  ventricular systolic function.  ------------------------------------------------------------------------  Confirmed on  1/8/2021 - 18:28:03 by RUMA Crouch  ------------------------------------------------------------------------    < end of copied text >

## 2021-02-19 NOTE — CONSULT NOTE ADULT - PROBLEM SELECTOR PROBLEM 2
Anemia
ESRD (end stage renal disease) on dialysis
Coronary artery disease involving native coronary artery of native heart without angina pectoris

## 2021-02-19 NOTE — PROGRESS NOTE ADULT - PROBLEM SELECTOR PLAN 4
- hold oral hypoglycemia agents.   - Start on Insulin sliding scale coverage  - consistent carbohydrate diet.   - HbA1C: 6.3

## 2021-02-19 NOTE — CONSULT NOTE ADULT - ASSESSMENT
This is a 56 YO M CAD s/p stent in 2016, HFrEF (EF 15-20%, LEVEDD 6.6), subQ ICD, Severe MR, ESRD since 2019, on HD TTS  IDDM, HTN, here for increasing shortness of breath and decreased exercise tolerance. No recent fevers, chills, nausea, vomiting or diaphoresis. Pt. is currently in process of undergoing evaluation for Heart kidney transplant at [place] however patient continues to follow with our heart failure myopathy clinic for medical optimization. CT  surgery consulted for  heart / kidney transplant .  Patient seen and examined no acute distress.  All labs and radiographic images reviewed by Dr Caputo, patient to be presented at Heart transplant meeting.  Plan d/w Dr Caputo

## 2021-02-19 NOTE — CONSULT NOTE ADULT - PROBLEM SELECTOR PROBLEM 1
HFrEF (heart failure with reduced ejection fraction)
ESRD (end stage renal disease)
HFrEF (heart failure with reduced ejection fraction)

## 2021-02-20 NOTE — PROGRESS NOTE ADULT - ATTENDING COMMENTS
I have seen this patient with the fellow and agree with their assessment and plan. In addition,    Patient was seen and examined on intermittent ultrafiltration.     # ESRD - MWF dialysis via AVF. Given volume overload, we will do dialysis on MWF and intermittent UF on TTS.     # Volume overload -  Standing weight today is 75.9kg. UF goal ~3.5 liters today. To hold anti-hypertension prior to dialysis. We will continue to do UF to achieve goal weight of 73kg.     # Medication toxicity Monitoring: medication dose reviewed. Please dose medications to CrCl<10    The rest of the recommendations as per fellow's note.    Aubree Liu MD  Attending Nephrologist  [phone]

## 2021-02-20 NOTE — PROGRESS NOTE ADULT - SUBJECTIVE AND OBJECTIVE BOX
[facility] DIVISION OF KIDNEY DISEASES AND HYPERTENSION -- FOLLOW UP NOTE  --------------------------------------------------------------------------------  If any questions, please feel free to contact me  NS pager: [phone], [facility]: [id number]  Kodak Alex M.D.  Nephrology Fellow    (After 5 pm or on weekends please page the on-call fellow)  --------------------------------------------------------------------------------    Chief Complaint:  Patient is a 55y old  Male who presents with a chief complaint of Acute on Chronic Systolic Heart Failure, Volume overload (19 Feb 2021 15:43)    24 hour events/subjective:  Patient seen and examined at bedside this am, No acute events overnight, tolerated UF yesterday, for HD today.  Vitals/labs/imaging reviewed       PAST HISTORY  --------------------------------------------------------------------------------  No significant changes to PMH, PSH, FHx, SHx, unless otherwise noted    ALLERGIES & MEDICATIONS  --------------------------------------------------------------------------------  Allergies    No Known Allergies    Intolerances      Standing Inpatient Medications  atorvastatin 80 milliGRAM(s) Oral at bedtime  calcium acetate 2001 milliGRAM(s) Oral three times a day with meals  camphor 0.5%/menthol 0.5% Topical Lotion 1 Application(s) Topical two times a day  chlorhexidine 4% Liquid 1 Application(s) Topical daily  clobetasol 0.05% Ointment 1 Application(s) Topical two times a day  dextrose 40% Gel 15 Gram(s) Oral once  dextrose 5%. 1000 milliLiter(s) IV Continuous <Continuous>  dextrose 50% Injectable 25 Gram(s) IV Push once  doxycycline hyclate Capsule 100 milliGRAM(s) Oral every 12 hours  glucagon  Injectable 1 milliGRAM(s) IntraMuscular once  insulin lispro (ADMELOG) corrective regimen sliding scale   SubCutaneous three times a day before meals  insulin lispro (ADMELOG) corrective regimen sliding scale   SubCutaneous at bedtime  mupirocin 2% Ointment 1 Application(s) Topical two times a day  pantoprazole    Tablet 40 milliGRAM(s) Oral before breakfast    PRN Inpatient Medications  acetaminophen   Tablet .. 650 milliGRAM(s) Oral every 6 hours PRN      REVIEW OF SYSTEMS  --------------------------------------------------------------------------------  Gen: No fevers/chills  Skin: No rashes  Head/Eyes/Ears: Normal hearing,   Respiratory: No dyspnea, cough  CV: No chest pain  GI: No abdominal pain, diarrhea  : No dysuria, hematuria  MSK: No  edema  Heme: No easy bruising or bleeding  Psych: No significant depression      All other systems were reviewed and are negative, except as noted.    VITALS/PHYSICAL EXAM  --------------------------------------------------------------------------------  T(C): 36.5 (02-20-21 @ 04:31), Max: 36.5 (02-19-21 @ 09:08)  HR: 71 (02-20-21 @ 04:31) (60 - 93)  BP: 109/67 (02-20-21 @ 04:31) (99/72 - 121/68)  RR: 18 (02-20-21 @ 04:31) (18 - 20)  SpO2: 94% (02-20-21 @ 04:31) (94% - 100%)  Wt(kg): --        02-19-21 @ 07:01  -  02-20-21 @ 07:00  --------------------------------------------------------  IN: 840 mL / OUT: 2000 mL / NET: -1160 mL        Physical Exam:  Gen: NAD  HEENT: supple neck, +JVD  Pulmonary: CTA B/L  CV: RRR, S1S2; no rub  Abd:  +BS, soft, not tender, not distended  UE:  No edema;   LE:  mild edema  Neuro: No focal deficits, No asterixes   Psych: Normal affect and mood  Vascular  Left UE AVF  + Thrill/ bruit      LABS/STUDIES  --------------------------------------------------------------------------------              11.4   4.82  >-----------<  92       [02-19-21 @ 07:16]              36.3     130  |  88  |  55  ----------------------------<  117      [02-20-21 @ 07:13]  5.3   |  21  |  7.90        Ca     9.8     [02-20-21 @ 07:13]      Mg     2.3     [02-20-21 @ 07:13]            Creatinine Trend:  SCr 7.90 [02-20 @ 07:13]  SCr 6.46 [02-19 @ 07:16]  SCr 6.85 [02-18 @ 07:05]  SCr 5.91 [02-17 @ 11:04]        HbA1c 7.5      [11-19-19 @ 15:22]

## 2021-02-20 NOTE — PROGRESS NOTE ADULT - ASSESSMENT
Pt with ESRD on HD presents to the hospital for fluid overload  --------------------------------------------------------------------------------    #ESRD on HD  Patient with ESRD on HD TTS, Outpatient HD: [facility] dialysis facility. Nephrologist Dr. ESTHER Pavon  Pt with UF yesterday tolerated 2L. On alternative UF days to achieve goal weight 73 kg.  - Lab reviewed. Pt clinically stable  - Will plan for HD today 3L as tolerated to BP  - Losartan on hold. Give Midodrine 5mg prior to HD  - Monitor renal panel    #Fluid overload- plans as above    #Hypertension  - Pt on Losartan 12.5mg BID as home medication  - Losartan on hold    #Anemia  Patient with anemia in the setting of ESRD. Hemoglobin within target range. Pt on retacrit 2000 units as outpatient  - Will hold FATOUMATA for now. Monitor hemoglobin.    #Bone metabolism disorder  - in the setting of ESRD   - On phoslo 2001 mg TID with meals  - continue phos binders  - low phos diet

## 2021-02-20 NOTE — PROGRESS NOTE ADULT - PROBLEM SELECTOR PLAN 1
Acute on Chronic systolic Heart failure  - pt makes minimal urine, renal consult appreciated   - Underwent HD  2/17 - 2/19- tolerated well  - Plan for UF today 3L 2/20, Midodrine as below   - Na 130 today, nephrology aware pt aashish for HD today   - c/w Lipitor  -HF rec noted: Dr. Pavon, plan for Left and Right Cath during this admission once euvolemic  - CT sx consult rec note: Patient to be presented at Heart transplant meeting

## 2021-02-21 NOTE — PROGRESS NOTE ADULT - PROBLEM SELECTOR PLAN 1
Acute on Chronic systolic Heart failure  - pt makes minimal urine, renal consult appreciated   - Underwent HD  2/17 - 2/20- tolerated well  - likely HD tomorrow, will need midodrine 5 mg x1 30 min prior to HD  - Na 133 today  - c/w Lipitor  -HF rec noted: Dr. Pavon, plan for Left and Right Cath during this admission once euvolemic  - CT sx consult rec note: Patient to be presented at Heart transplant meeting

## 2021-02-21 NOTE — PROGRESS NOTE ADULT - SUBJECTIVE AND OBJECTIVE BOX
Patient is a 55y old  Male who presents with a chief complaint of Acute on Chronic Systolic Heart Failure, Volume overload (20 Feb 2021 11:23)      SUBJECTIVE / OVERNIGHT EVENTS: Patient seen and examined at bedside. States he feels much better since coming to the hospital, denies any CP, SOB, abd pain and n/v. States that itching is also improving. No acute events overnight     ROS:  All other review of systems negative    Allergies    No Known Allergies    Intolerances        MEDICATIONS  (STANDING):  atorvastatin 80 milliGRAM(s) Oral at bedtime  calcium acetate 2001 milliGRAM(s) Oral three times a day with meals  camphor 0.5%/menthol 0.5% Topical Lotion 1 Application(s) Topical two times a day  chlorhexidine 4% Liquid 1 Application(s) Topical daily  clobetasol 0.05% Ointment 1 Application(s) Topical two times a day  dextrose 40% Gel 15 Gram(s) Oral once  dextrose 5%. 1000 milliLiter(s) (50 mL/Hr) IV Continuous <Continuous>  dextrose 50% Injectable 25 Gram(s) IV Push once  doxycycline hyclate Capsule 100 milliGRAM(s) Oral every 12 hours  glucagon  Injectable 1 milliGRAM(s) IntraMuscular once  insulin lispro (ADMELOG) corrective regimen sliding scale   SubCutaneous three times a day before meals  insulin lispro (ADMELOG) corrective regimen sliding scale   SubCutaneous at bedtime  midodrine. 5 milliGRAM(s) Oral once  mupirocin 2% Ointment 1 Application(s) Topical two times a day  pantoprazole    Tablet 40 milliGRAM(s) Oral before breakfast    MEDICATIONS  (PRN):  acetaminophen   Tablet .. 650 milliGRAM(s) Oral every 6 hours PRN Temp greater or equal to 38C (100.4F), Mild Pain (1 - 3)      Vital Signs Last 24 Hrs  T(C): 36.5 (21 Feb 2021 08:32), Max: 36.6 (20 Feb 2021 20:15)  T(F): 97.7 (21 Feb 2021 08:32), Max: 97.9 (21 Feb 2021 04:37)  HR: 81 (21 Feb 2021 08:32) (73 - 82)  BP: 102/68 (21 Feb 2021 08:32) (92/62 - 144/74)  BP(mean): --  RR: 18 (21 Feb 2021 08:32) (18 - 18)  SpO2: 99% (21 Feb 2021 08:32) (96% - 100%)  CAPILLARY BLOOD GLUCOSE      POCT Blood Glucose.: 113 mg/dL (21 Feb 2021 08:16)  POCT Blood Glucose.: 122 mg/dL (21 Feb 2021 04:51)  POCT Blood Glucose.: 145 mg/dL (20 Feb 2021 21:19)  POCT Blood Glucose.: 144 mg/dL (20 Feb 2021 17:10)  POCT Blood Glucose.: 93 mg/dL (20 Feb 2021 12:35)    I&O's Summary    20 Feb 2021 07:01  -  21 Feb 2021 07:00  --------------------------------------------------------  IN: 1390 mL / OUT: 3900 mL / NET: -2510 mL        PHYSICAL EXAM:  GENERAL: NAD, well-developed  HEAD:  Atraumatic, Normocephalic  EYES: EOMI, PERRLA, conjunctiva and sclera clear  NECK: Supple,   CHEST/LUNG: Clear to auscultation b/l  HEART: Regular rate and rhythm; + systolic murmur  ABDOMEN: Soft, Nontender, Nondistended; Bowel sounds present  EXTREMITIES:  2+ Peripheral Pulses, No LE edema + LUE AVF+ thrill   NEUROLOGY: AAOx3, non-focal  PSYCH: calm  SKIN: diffuse excoriations and skin lesion (improving)    LABS:                        12.6   5.27  )-----------( 98       ( 21 Feb 2021 07:21 )             40.8     02-21    133<L>  |  90<L>  |  41<H>  ----------------------------<  103<H>  5.0   |  25  |  5.74<H>    Ca    10.4      21 Feb 2021 07:21  Mg     2.3     02-20                RADIOLOGY & ADDITIONAL TESTS:    Care Discussed with Consultants/Other Providers: Medicine NP

## 2021-02-22 NOTE — PROGRESS NOTE ADULT - ASSESSMENT
Pt with ESRD on HD presents to the hospital for fluid overload  --------------------------------------------------------------------------------    #ESRD on HD  Patient with ESRD on HD TTS, Outpatient HD: [facility] dialysis facility. Nephrologist Dr. ESTHER Pavon  Pt to achieve goal weight 73 kg.  - Lab reviewed. Pt clinically stable. Serum potassium elevated at 5.7 today  - Will plan for HD today 2L as tolerated to BP  - Losartan on hold. Give Midodrine 5mg prior to HD  - Monitor renal panel    #Fluid overload- plans as above    #Hypertension  - Pt on Losartan 12.5mg BID as home medication  - Losartan on hold    #Anemia  Patient with anemia in the setting of ESRD. Hemoglobin within target range. Pt on retacrit 2000 units as outpatient  - Will hold FATOUMATA for now. Monitor hemoglobin.    #Bone metabolism disorder  - in the setting of ESRD   - Phos elevated at 6.3 today  - On phoslo 2001 mg TID with meals  - continue phos binders  - low phos diet    If you have any questions, please feel free to contact me  Rich Bowser  Nephrology Fellow  Pager: [phone] / [phone]  (After 5pm or on weekends please page the on-call fellow)

## 2021-02-22 NOTE — PROGRESS NOTE ADULT - ATTENDING COMMENTS
I have seen this patient with the fellow and agree with their assessment and plan. In addition,    Patient was seen and examined on hemodialysis.     # ESRD - MWF dialysis via AVF. Given volume overload, we will do dialysis on MWF and intermittent UF on TTS.     # Volume overload -  Last standing weight was 74.9kg. To hold anti-hypertension prior to dialysis. We will continue to do UF to achieve goal weight of 73kg.     # Hyperkalemia - for a 2K bath today.     # Medication toxicity Monitoring: medication dose reviewed. Please dose medications to CrCl<10    The rest of the recommendations as per fellow's note.    Aubree Liu MD  Attending Nephrologist  [phone] I have seen this patient with the fellow and agree with their assessment and plan. In addition,    Patient was seen and examined on hemodialysis.     # ESRD - TTS dialysis via AVF. Given volume overload, we have been doing alternate IUF (MWF) and intermittent dialysis (TTS). Since potassium is elevated today, we will do HD today.     # Volume overload -  Last standing weight was 74.9kg. To hold anti-hypertension prior to dialysis. We will continue to do UF to achieve goal weight of 73kg.     # Hyperkalemia - for a 2K bath today.     # Medication toxicity Monitoring: medication dose reviewed. Please dose medications to CrCl<10    The rest of the recommendations as per fellow's note.    Aubree Liu MD  Attending Nephrologist  [phone]

## 2021-02-22 NOTE — PROGRESS NOTE ADULT - ASSESSMENT
55M PMHx of T2DM, HTN, CAD (s/p stent 2016), HFrEF (EF 15-20%) s/p ICD, ESRD on HD T/Th/Sat via LUE AV fistula, GIB 2/2 ulcer sent in by his Cardiologist for Volume overload.    He was a/w Acute on Chronic Systolic heart failure c/b a diffuse rash. 55M PMHx of T2DM, HTN, CAD (s/p stent 2016), HFrEF (EF 15-20%) s/p ICD, ESRD on HD T/Th/Sat via LUE AV fistula, GIB 2/2 ulcer sent in by his Cardiologist for Volume overload.  He was a/w Acute on Chronic Systolic heart failure c/b a diffuse rash.

## 2021-02-22 NOTE — PROGRESS NOTE ADULT - SUBJECTIVE AND OBJECTIVE BOX
Patient is a 55y old  Male who presents with a chief complaint of Acute on Chronic Systolic Heart Failure, Volume overload (21 Feb 2021 10:55)     INTERVAL HPI/OVERNIGHT EVENTS:  No acute events overnight.      MEDICATIONS  (STANDING):  atorvastatin 80 milliGRAM(s) Oral at bedtime  calcium acetate 2001 milliGRAM(s) Oral three times a day with meals  camphor 0.5%/menthol 0.5% Topical Lotion 1 Application(s) Topical two times a day  chlorhexidine 4% Liquid 1 Application(s) Topical daily  clobetasol 0.05% Ointment 1 Application(s) Topical two times a day  dextrose 40% Gel 15 Gram(s) Oral once  dextrose 5%. 1000 milliLiter(s) (50 mL/Hr) IV Continuous <Continuous>  dextrose 50% Injectable 25 Gram(s) IV Push once  doxycycline hyclate Capsule 100 milliGRAM(s) Oral every 12 hours  glucagon  Injectable 1 milliGRAM(s) IntraMuscular once  insulin lispro (ADMELOG) corrective regimen sliding scale   SubCutaneous three times a day before meals  insulin lispro (ADMELOG) corrective regimen sliding scale   SubCutaneous at bedtime  mupirocin 2% Ointment 1 Application(s) Topical two times a day  pantoprazole    Tablet 40 milliGRAM(s) Oral before breakfast    MEDICATIONS  (PRN):  acetaminophen   Tablet .. 650 milliGRAM(s) Oral every 6 hours PRN Temp greater or equal to 38C (100.4F), Mild Pain (1 - 3)    Allergies:  No Known Allergies    Intolerances:      REVIEW OF SYSTEMS:  Constitutional: Denies fever, weight loss, fatigue  Resp: Denies SOB, cough, hemoptysis  CV: Denies chest pain, palpitations, dizziness  GI: Denies abdominal pain, N/V/D/C  : Denies dysuria, increased frequency  Neuro: Denies HA, weakness, numbness  Skin: Denies rashes, lesions  Lymph Nodes: Denies enlarged glands  MSK: Denies joint pain, swelling    VITAL SIGNS:  T(C): 36.4 (02-22-21 @ 09:34), Max: 36.9 (02-22-21 @ 00:42)  HR: 84 (02-22-21 @ 09:34) (68 - 84)  BP: 115/78 (02-22-21 @ 09:34) (101/66 - 115/78)  RR: 17 (02-22-21 @ 09:34) (17 - 18)  SpO2: 99% (02-22-21 @ 09:34) (97% - 100%)    PHYSICAL EXAM:  General: NAD, well-groomed, well-developed  Eyes: Conjunctiva and sclera clear  ENMT: Moist mucous membranes  Chest: Clear to auscultation bilaterally; no rales, rhonchi, or wheezing  Heart: Regular rate and rhythm; no murmurs, rubs, or gallops  Abd: +BS, soft, nontender, nondistended  Nervous System: AAOX3  Psych: Appropriate affect and mood  Ext:  no LE edema    LABS:    22 Feb 2021 06:54    131    |  87     |  62     ----------------------------<  116    5.7     |  24     |  7.95     Ca    10.0       22 Feb 2021 06:54  Phos  6.3       22 Feb 2021 06:54  Mg     2.6       22 Feb 2021 06:54      CAPILLARY BLOOD GLUCOSE  POCT Blood Glucose.: 140 mg/dL (22 Feb 2021 08:33)  POCT Blood Glucose.: 200 mg/dL (21 Feb 2021 22:01)  POCT Blood Glucose.: 128 mg/dL (21 Feb 2021 17:25)  POCT Blood Glucose.: 123 mg/dL (21 Feb 2021 12:18)    BLOOD CULTURE    RADIOLOGY & ADDITIONAL TESTS:    Imaging Personally Reviewed:  [ ] YES     Consultant(s) Notes Reviewed:      Care Discussed with Consultants/Other Providers: Patient is a 55y old  Male who presents with a chief complaint of Acute on Chronic Systolic Heart Failure, Volume overload (21 Feb 2021 10:55)     INTERVAL HPI/OVERNIGHT EVENTS:  No acute events overnight. denies any CP, SOB, abd pain and n/v. States he feels well,.       MEDICATIONS  (STANDING):  atorvastatin 80 milliGRAM(s) Oral at bedtime  calcium acetate 2001 milliGRAM(s) Oral three times a day with meals  camphor 0.5%/menthol 0.5% Topical Lotion 1 Application(s) Topical two times a day  chlorhexidine 4% Liquid 1 Application(s) Topical daily  clobetasol 0.05% Ointment 1 Application(s) Topical two times a day  dextrose 40% Gel 15 Gram(s) Oral once  dextrose 5%. 1000 milliLiter(s) (50 mL/Hr) IV Continuous <Continuous>  dextrose 50% Injectable 25 Gram(s) IV Push once  doxycycline hyclate Capsule 100 milliGRAM(s) Oral every 12 hours  glucagon  Injectable 1 milliGRAM(s) IntraMuscular once  insulin lispro (ADMELOG) corrective regimen sliding scale   SubCutaneous three times a day before meals  insulin lispro (ADMELOG) corrective regimen sliding scale   SubCutaneous at bedtime  mupirocin 2% Ointment 1 Application(s) Topical two times a day  pantoprazole    Tablet 40 milliGRAM(s) Oral before breakfast    MEDICATIONS  (PRN):  acetaminophen   Tablet .. 650 milliGRAM(s) Oral every 6 hours PRN Temp greater or equal to 38C (100.4F), Mild Pain (1 - 3)    Allergies:  No Known Allergies    Intolerances:      REVIEW OF SYSTEMS:  Constitutional: Denies fever, weight loss, fatigue  Resp: Denies SOB, cough, hemoptysis  CV: Denies chest pain, palpitations, dizziness  GI: Denies abdominal pain, N/V/D/C  : Denies dysuria, increased frequency  Neuro: Denies HA, weakness, numbness  Skin: Denies rashes, lesions  Lymph Nodes: Denies enlarged glands  MSK: Denies joint pain, swelling    VITAL SIGNS:  T(C): 36.4 (02-22-21 @ 09:34), Max: 36.9 (02-22-21 @ 00:42)  HR: 84 (02-22-21 @ 09:34) (68 - 84)  BP: 115/78 (02-22-21 @ 09:34) (101/66 - 115/78)  RR: 17 (02-22-21 @ 09:34) (17 - 18)  SpO2: 99% (02-22-21 @ 09:34) (97% - 100%)    PHYSICAL EXAM:  GENERAL: NAD, well-developed  HEAD:  Atraumatic, Normocephalic  EYES: EOMI, PERRLA, conjunctiva and sclera clear  NECK: Supple,   CHEST/LUNG: Clear to auscultation b/l  HEART: Regular rate and rhythm; + systolic murmur  ABDOMEN: Soft, Nontender, Nondistended; Bowel sounds present  EXTREMITIES:  2+ Peripheral Pulses, No LE edema + LUE AVF+ thrill   NEUROLOGY: AAOx3, non-focal  PSYCH: calm  SKIN: diffuse excoriations and skin lesion (improving)    LABS:    22 Feb 2021 06:54    131    |  87     |  62     ----------------------------<  116    5.7     |  24     |  7.95     Ca    10.0       22 Feb 2021 06:54  Phos  6.3       22 Feb 2021 06:54  Mg     2.6       22 Feb 2021 06:54      CAPILLARY BLOOD GLUCOSE  POCT Blood Glucose.: 140 mg/dL (22 Feb 2021 08:33)  POCT Blood Glucose.: 200 mg/dL (21 Feb 2021 22:01)  POCT Blood Glucose.: 128 mg/dL (21 Feb 2021 17:25)  POCT Blood Glucose.: 123 mg/dL (21 Feb 2021 12:18)    BLOOD CULTURE    RADIOLOGY & ADDITIONAL TESTS:    Imaging Personally Reviewed:  [ ] YES       Care Discussed with Consultants/Other Providers: Medicine NP and Nephrology

## 2021-02-22 NOTE — PROGRESS NOTE ADULT - PROBLEM SELECTOR PLAN 1
Acute on Chronic systolic Heart failure  - pt makes minimal urine, renal consult appreciated  - Underwent HD  2/17 - 2/20- tolerated well  - HD today. Ordered for midodrine 5 mg x1 30 min prior to HD  - Na 131 today  - C/w Lipitor  - HF following, recs noted: Dr. Pavon, plan for Left and Right Cath during this admission once euvolemic  - CT sx consult recs note: Patient to be presented at Heart transplant meeting Acute on Chronic systolic Heart failure  - pt makes minimal urine, renal consult appreciated  - Underwent HD  2/17 - 2/21- tolerated well  - HD today. Ordered for midodrine 5 mg x1 30 min prior to HD  - K 5.7  - C/w Lipitor  - HF following, recs noted: Dr. Pavon, plan for Left and Right Cath during this admission once euvolemic  - CT sx consult recs note: Patient to be presented at Heart transplant meeting

## 2021-02-22 NOTE — PROGRESS NOTE ADULT - PROBLEM SELECTOR PLAN 4
HbA1c 6.3 on admission On oral meds at home.  - Hold oral hypoglycemia agents.   - C/w insulin sliding scale coverage. Has not required any in past 24 hours  - C/w consistent carbohydrate diet HbA1c 6.3 on admission On oral meds at home.  - Hold oral hypoglycemia agents.   - C/w insulin sliding scale coverage.   - C/w consistent carbohydrate diet

## 2021-02-22 NOTE — PROGRESS NOTE ADULT - SUBJECTIVE AND OBJECTIVE BOX
[facility] Division of Kidney Diseases & Hypertension  FOLLOW UP NOTE  [phone]--------------------------------------------------------------------------------  24 hour events/subjective:    Patient was seen and examined this morning No signs of acute distress  No acute significant events overnight  Patient denies chest pain, SOB, nausea/vomiting, abdominal pain  Labs/Meds/Vitals reviewed  Serum potassium noted to be elevated at 5.7 this morning non-hemolyzed  Will plan for HD today with UF    PAST HISTORY  --------------------------------------------------------------------------------  No significant changes to PMH, PSH, FHx, SHx, unless otherwise noted    ALLERGIES & MEDICATIONS  --------------------------------------------------------------------------------  Allergies    No Known Allergies    Intolerances      Standing Inpatient Medications  atorvastatin 80 milliGRAM(s) Oral at bedtime  calcium acetate 2001 milliGRAM(s) Oral three times a day with meals  camphor 0.5%/menthol 0.5% Topical Lotion 1 Application(s) Topical two times a day  chlorhexidine 4% Liquid 1 Application(s) Topical daily  clobetasol 0.05% Ointment 1 Application(s) Topical two times a day  dextrose 40% Gel 15 Gram(s) Oral once  dextrose 5%. 1000 milliLiter(s) IV Continuous <Continuous>  dextrose 50% Injectable 25 Gram(s) IV Push once  doxycycline hyclate Capsule 100 milliGRAM(s) Oral every 12 hours  glucagon  Injectable 1 milliGRAM(s) IntraMuscular once  insulin lispro (ADMELOG) corrective regimen sliding scale   SubCutaneous three times a day before meals  insulin lispro (ADMELOG) corrective regimen sliding scale   SubCutaneous at bedtime  mupirocin 2% Ointment 1 Application(s) Topical two times a day  pantoprazole    Tablet 40 milliGRAM(s) Oral before breakfast    PRN Inpatient Medications  acetaminophen   Tablet .. 650 milliGRAM(s) Oral every 6 hours PRN      REVIEW OF SYSTEMS  --------------------------------------------------------------------------------  Gen: No fevers/chills  Skin: No rashes  Head/Eyes/Ears: Normal hearing,   Respiratory: No dyspnea, cough  CV: No chest pain  GI: No abdominal pain, diarrhea  : No dysuria, hematuria  MSK: No  edema  Heme: No easy bruising or bleeding  Psych: No significant depression      All other systems were reviewed and are negative, except as noted.    VITALS/PHYSICAL EXAM  --------------------------------------------------------------------------------  T(C): 36.8 (02-22-21 @ 10:43), Max: 36.9 (02-22-21 @ 00:42)  HR: 77 (02-22-21 @ 10:43) (68 - 84)  BP: 112/58 (02-22-21 @ 10:43) (101/66 - 115/78)  RR: 18 (02-22-21 @ 10:43) (17 - 18)  SpO2: 98% (02-22-21 @ 10:43) (97% - 100%)  Wt(kg): --        02-21-21 @ 07:01  -  02-22-21 @ 07:00  --------------------------------------------------------  IN: 600 mL / OUT: 0 mL / NET: 600 mL    Physical Exam:  Gen: NAD  HEENT: supple neck, no JVD  Pulmonary: CTA B/L  CV: RRR, S1S2; no rub  Abd:  +BS, soft, not tender, not distended  UE:  No edema;   LE:  mild edema  Neuro: No focal deficits, No asterixes   Vascular  Left UE AVF  + Thrill/ bruit    LABS/STUDIES  --------------------------------------------------------------------------------              12.6   5.27  >-----------<  98       [02-21-21 @ 07:21]              40.8     131  |  87  |  62  ----------------------------<  116      [02-22-21 @ 06:54]  5.7   |  24  |  7.95        Ca     10.0     [02-22-21 @ 06:54]      Mg     2.6     [02-22-21 @ 06:54]      Phos  6.3     [02-22-21 @ 06:54]    Creatinine Trend:  SCr 7.95 [02-22 @ 06:54]  SCr 5.74 [02-21 @ 07:21]  SCr 7.90 [02-20 @ 07:13]  SCr 6.46 [02-19 @ 07:16]  SCr 6.85 [02-18 @ 07:05]

## 2021-02-23 NOTE — PROGRESS NOTE ADULT - PROBLEM SELECTOR PLAN 1
Acute on Chronic systolic Heart failure  - pt makes minimal urine, renal consult appreciated  - Underwent HD  2/17 - 2/21- tolerated well  - HD today. Ordered for midodrine 5 mg x1 30 min prior to HD  - K 5.5  - C/w Lipitor  - HF following, recs noted: I d/w HF team today pt still hypervolemic, will reassess tomorrow 2/24 for Left and Right Cath  - CT sx consult recs note: Patient to be presented at Heart transplant meeting

## 2021-02-23 NOTE — DISCHARGE NOTE PROVIDER - NSDCCPCAREPLAN_GEN_ALL_CORE_FT
PRINCIPAL DISCHARGE DIAGNOSIS  Diagnosis: Acute on chronic systolic heart failure  Assessment and Plan of Treatment: Weigh yourself daily.  If you gain 3lbs in 3 days, or 5lbs in a week call your Health Care Provider.  Do not eat or drink foods containing more than 2000mg of salt (sodium) in your diet every day.  Call your Health Care Provider if you have any swelling or increased swelling in your feet, ankles, and/or stomach.  Take all of your medication as directed.  If you become dizzy call your Health Care Provider.      SECONDARY DISCHARGE DIAGNOSES  Diagnosis: Coronary artery disease with other form of angina pectoris  Assessment and Plan of Treatment: Continue  with Aspirin 81mg daily and Atorvastatin 80mg at bedtime    Diagnosis: ESRD (end stage renal disease)  Assessment and Plan of Treatment: Follow up with your Kidney doctor     PRINCIPAL DISCHARGE DIAGNOSIS  Diagnosis: Acute on chronic systolic heart failure  Assessment and Plan of Treatment: Weigh yourself daily.  If you gain 3lbs in 3 days, or 5lbs in a week call your Health Care Provider.  Do not eat or drink foods containing more than 2000mg of salt (sodium) in your diet every day.  Call your Health Care Provider if you have any swelling or increased swelling in your feet, ankles, and/or stomach.  Take all of your medication as directed.  If you become dizzy call your Health Care Provider.      SECONDARY DISCHARGE DIAGNOSES  Diagnosis: Acquired perforating dermatosis  Assessment and Plan of Treatment: Acquired perforating dermatosis and prurigo nodularis, from ESRD complication. Seen by Dermatology, received Mupirocin and Sarna during hospitalization with improvement of skin eruption.    Diagnosis: Coronary artery disease with other form of angina pectoris  Assessment and Plan of Treatment: Continue  with Aspirin 81mg daily and Atorvastatin 80mg at bedtime    Diagnosis: ESRD (end stage renal disease)  Assessment and Plan of Treatment: Follow up with your Kidney doctor     PRINCIPAL DISCHARGE DIAGNOSIS  Diagnosis: Acute on chronic systolic heart failure  Assessment and Plan of Treatment: Weigh yourself daily.  If you gain 3lbs in 3 days, or 5lbs in a week call your Health Care Provider.  Do not eat or drink foods containing more than 2000mg of salt (sodium) in your diet every day.  Call your Health Care Provider if you have any swelling or increased swelling in your feet, ankles, and/or stomach.  Take all of your medication as directed.  If you become dizzy call your Health Care Provider.      SECONDARY DISCHARGE DIAGNOSES  Diagnosis: Acquired perforating dermatosis  Assessment and Plan of Treatment: Acquired perforating dermatosis and prurigo nodularis, from ESRD complication. Seen by Dermatology, received Mupirocin and Sarna during hospitalization with improvement of skin eruption.    Diagnosis: ESRD (end stage renal disease)  Assessment and Plan of Treatment: Follow up with your Kidney doctor    Diagnosis: Coronary artery disease with other form of angina pectoris  Assessment and Plan of Treatment: Continue  with Aspirin 81mg daily and Atorvastatin 80mg at bedtime

## 2021-02-23 NOTE — DISCHARGE NOTE PROVIDER - HOSPITAL COURSE
55M PMHx of T2DM, HTN, CAD (s/p stent 2016), HFrEF (EF 15-20%) s/p ICD, ESRD on HD T/Th/Sat via LUE AV fistula, GIB 2/2 ulcer sent in by his Cardiologist for Volume overload.  He was a/w Acute on Chronic Systolic heart failure c/b a diffuse rash.  Received HD with improvement   Seen by Derm for - Acquired perforating dermatosis and prurigo nodularis, likely from ESRD complication, Received mupirocin and Sarna with improvement in skin eruptions.   Pt also seen by Heart failure:    HFrEF (heart failure with reduced ejection fraction).    Shortness of breath improved,  Exam continues to have elevated JVP  unclear etiology possibly ICM given CAD.  Receives diuresis with dialysis  Patient undergoing transplant eval at [facility]. Please call CT surgery for transplant evaluation.--------------------- 55M PMHx of T2DM, HTN, CAD (s/p stent 2016), HFrEF (EF 15-20%) s/p ICD, ESRD on HD T/Th/Sat via LUE AV fistula, GIB 2/2 ulcer sent in by his Cardiologist for Volume overload.  He was a/w Acute on Chronic Systolic heart failure c/b a diffuse rash.  Received HD with improvement   Seen by Derm for - Acquired perforating dermatosis and prurigo nodularis, likely from ESRD complication, Received mupirocin and Sarna with improvement in skin eruptions.   Pt also seen by Heart failure:    HFrEF (heart failure with reduced ejection fraction).    Shortness of breath improved,  Exam continues to have elevated JVP  unclear etiology possibly ICM given CAD.  Receives diuresis with dialysis  28 s/p R/L Heart Cath: severe disease of LAD, Diag, LCX, and RCA, Patient undergoing transplant eval at [facility]. 55M PMHx of T2DM, HTN, CAD (s/p stent 2016), HFrEF (EF 15-20%) s/p ICD, ESRD on HD T/Th/Sat via LUE AV fistula, GIB 2/2 ulcer sent in by his Cardiologist for Volume overload.  He was a/w Acute on Chronic Systolic heart failure c/b a diffuse rash.    Acute systolic heart failure.  Plan: Acute on Chronic systolic Heart failure  - pt makes minimal urine, renal consult appreciated  - Underwent HD  multiple time for fluid overload now euvolemic   - HD today, hyperkalemic   - C/w Lipitor  - HF following, recs noted: s/p Left and Right Cath pending results rec f/u Dr. Pavon outpt  - CT sx consult recs note: Patient presented at Heart transplant meeting.     ESRD (end stage renal disease).  Plan: Nephrology following:  concern for possible outflow stenosis of AVF  - LUE AVF duplex: no evidence of stenosis but + radial steal phenomenon  - I d/w vascular sx: rec LUE fistulogram by IR as cannot rule out stenosis   - IR consult placed: [facility] for LUE Fistulogram Monday (pt refusing see below)  - COVID PCR ordered. negative.    Rash and nonspecific skin eruption.  Plan: Presumed to be from ESRD complication, improving   - c/w Doxy and topical antibacterial ointments per his outpatient Dermatologist  - Derm consult appreciated: c/w Sarna and clobetasol 0.05% ointment BID.     Coronary artery disease involving native heart without angina pectoris, unspecified vessel or lesion type.  Plan: - Pt not on asa 81mg because of retinal bleeding issues, this was d/c'ed ~1 year ago. Per Pt HF physician Dr. Pavon aware   - C/w lipitor 80.     Essential hypertension. Plan: - Hold losartan as BP soft and on d/c   - C/w Midodrine 5mg standing 30 minus prior to HD, to minimize the risk of hypotension.    Dispo: d/w patient at length the importance of LUE Fistulogram, However is is refusing to stay in the hospital till Monday, states he wants to leave AMA. Patient has full capacity to make medical decisions is able to verbalize risks of declining procedure. He is willing to say for HD today. 55M PMHx of T2DM, HTN, CAD (s/p stent 2016), HFrEF (EF 15-20%) s/p ICD, ESRD on HD T/Th/Sat via LUE AV fistula, GIB 2/2 ulcer sent in by his Cardiologist for Volume overload.  He was a/w Acute on Chronic Systolic heart failure c/b a diffuse rash.    Acute systolic heart failure.  Plan: Acute on Chronic systolic Heart failure  - pt makes minimal urine, renal consult appreciated  - Underwent HD  multiple time for fluid overload now euvolemic   - HD today, hyperkalemic   - C/w Lipitor  - HF following, recs noted: s/p Left and Right Cath pending results rec f/u Dr. Pavon outpt  - CT sx consult recs note: Patient presented at Heart transplant meeting.     ESRD (end stage renal disease).  Plan: Nephrology following:  concern for possible outflow stenosis of AVF  - LUE AVF duplex: no evidence of stenosis but + radial steal phenomenon  - I d/w vascular sx: rec LUE fistulogram by IR as cannot rule out stenosis   - IR consult placed: [facility] for LUE Fistulogram Monday (pt refusing see below)  - COVID PCR ordered. negative.    Rash and nonspecific skin eruption.  Plan: Presumed to be from ESRD complication, improving   - c/w Doxy and topical antibacterial ointments per his outpatient Dermatologist  - Derm consult appreciated: c/w Sarna and clobetasol 0.05% ointment BID.     Coronary artery disease involving native heart without angina pectoris, unspecified vessel or lesion type.  Plan: - Pt not on asa 81mg because of retinal bleeding issues, this was d/c'ed ~1 year ago. Per Pt HF physician Dr. Pavon aware   - C/w lipitor 80.     Essential hypertension. Plan: - Hold losartan as BP soft and on d/c   - C/w Midodrine 5mg standing 30 minus prior to HD, to minimize the risk of hypotension.    Dispo: d/w patient at length the importance of LUE Fistulogram, However is is refusing to stay in the hospital till Monday, states he wants to leave AMA. Patient has full capacity to make medical decisions is able to verbalize risks of declining procedure. Patient provided office numbers for Vascular sx and IR to f/u for concern for outflow stenosis

## 2021-02-23 NOTE — PROGRESS NOTE ADULT - ASSESSMENT
56 YO M CAD s/p stent in 2016, HFrEF (EF 15-20%, LEVEDD 6.6), subQ ICD, Severe MR, ESRD since 2019, IDDM, HTN, here for shortness of breath and edema. Continued on daily HD/UF with improvement in symptoms. Will continue optimization for plan of doing RHC/LHC once optimized for evaluation for heart/kidney transplant (currently underway at [facility]).

## 2021-02-23 NOTE — PROGRESS NOTE ADULT - ATTENDING COMMENTS
He remains fluid overloaded and is scheduled for an additional session of HD today for fluid removal.  Per patient, his dry weight is 70 kg. Today he is 74.5 kg.  Please resume ECASA 81 mg daily.  Intolerant of GDMT. Under evaluation for heart-kidney at [facility].  Will do RHC/LHC once euvolemic. Possible Thurs or Fri.

## 2021-02-23 NOTE — PROGRESS NOTE ADULT - NSHPATTENDINGPLANDISCUSS_GEN_ALL_CORE
PHYSICAL THERAPY HIP TREATMENT NOTE - INPATIENT    Room Number: 419/054-W            Presenting Problem: L LA - Posterior    Problem List  Principal Problem:    Primary osteoarthritis of left hip  Active Problems:    Essential hypertension    Hypercholest another person does the patient currently need. ..   -   Moving to and from a bed to a chair (including a wheelchair)?: A Little   -   Need to walk in hospital room?: A Little   -   Climbing 3-5 steps with a railing?: A Little     AM-PAC Score:  Raw Score: heart Failure team

## 2021-02-23 NOTE — PROGRESS NOTE ADULT - ASSESSMENT
Pt with ESRD on HD presents to the hospital for fluid overload      #ESRD on HD  Patient with ESRD on HD TTS, Outpatient HD: [facility] dialysis facility. Nephrologist Dr. ESTHER Pavon  Pt to achieve goal weight 73 kg.  - Lab reviewed. Pt clinically stable. Serum potassium elevated at 5.5 again today  - Will plan for HD today 2L as tolerated to BP. Will perform recirculation study  - Losartan on hold. Give Midodrine 5mg prior to HD  - Monitor renal panel    #Fluid overload- plans as above    #Hypertension  - Pt on Losartan 12.5mg BID as home medication  - Losartan on hold    #Anemia  Patient with anemia in the setting of ESRD. Hemoglobin within target range. Pt on retacrit 2000 units as outpatient  - Will hold FATOUMATA for now. Monitor hemoglobin.    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

## 2021-02-23 NOTE — DISCHARGE NOTE PROVIDER - NSDCFUSCHEDAPPT_GEN_ALL_CORE_FT
[facility] ; 02/23/2021 ; NPP Cardio Electro [address] ; 03/22/2021 ; [place] [facility] [place] [id number] [facility] ; 04/05/2021 ; NPP Cardio [facility] [address] ; 04/08/2021 ; NPP Cardio [facility] [address] [facility] ; 04/09/2021 ; [facility] [address] [facility] ; 04/09/2021 ; NPP [facility] [address] [facility] ; 05/14/2021 ; [facility] [address] [facility] ; 05/24/2021 ; REYNALDO [facility]al  Practice [facility] ; 03/22/2021 ; [place] [id number] [facility] ; 04/05/2021 ; NPP Cardio [facility] [address] [place] [address] ; 04/08/2021 ; NPP Cardio [facility] [address] [place] [address] ; 04/09/2021 ; [facility] [address] [facility] ; 04/09/2021 ; NPP Card[facility] [address] [place] [address] ; 05/14/2021 ; NPP Cardio [address] [facility] ; 05/24/2021 ; REYNALDO Bernal  Practice

## 2021-02-23 NOTE — DISCHARGE NOTE PROVIDER - CARE PROVIDER_API CALL
Zac Bowens)  Internal Medicine  [address] [address]  Phone: [phone]  Fax: [phone]  Follow Up Time:     Enio aPvon)  Cardiovascular Disease; Internal Medicine  [address] [place] [address]  Phone: [phone]  Fax: [phone]  Follow Up Time:    Zac Bowens)  Internal Medicine  [address] [place] [address]  Phone: [phone]  Fax: [phone]  Follow Up Time:     Enio Pavon)  Cardiovascular Disease; Internal Medicine  [address] [place] [address]  Phone: [phone]  Fax: [phone]  Follow Up Time:     Giovanny Pavon)  Nephrology  [address] [place] [address]  Phone: [phone]  Fax: [phone]  Follow Up Time:    Zac Bowens)  Internal Medicine  [address] [place] [address]  Phone: [phone]  Fax: [phone]  Follow Up Time:     nEio Pavon)  Cardiovascular Disease; Internal Medicine  [address] [place] [address]  Phone: [phone]  Fax: [phone]  Follow Up Time:     Giovanny Pavon)  Nephrology  [address] [facility]nd [place] [address]  Phone: [phone]  Fax: [phone]  Follow Up Time:     Valeriy Michaels)  Diagnostic Radiology; [facility]  [address] [place] [address]  Phone: [phone]  Fax: [phone]  Follow Up Time:     Boubacar Gonzalez)  Vascular Surgery  [address] [facility]  [place] [address]  Phone: [phone]  Fax: [phone]  Follow Up Time:

## 2021-02-23 NOTE — PROGRESS NOTE ADULT - PROBLEM SELECTOR PLAN 1
Shortness of breath improved  Exam continues to have elevated JVP  Etiology: unclear etiology possibly ICM given CAD  GDMT: holding home losartan 12.5mg for now given frequent dialysis  Diuretics: Diuresis with dialysis  Advanced therapies: Patient undergoing transplant eval at [facility]. Please call CT surgery for transplant evaluation. May need RHC to assess hemodynamics.

## 2021-02-23 NOTE — PROGRESS NOTE ADULT - ATTENDING COMMENTS
I have seen this patient with the fellow and agree with their assessment and plan. In addition,      # ESRD - TTS dialysis via AVF. Given volume overload, we have been doing alternate IUF (MWF) and intermittent dialysis (TTS). Since potassium is elevated at 5.5 today despite HD yesterday. We will do HD today.     # Volume overload -  To hold anti-hypertension prior to dialysis. We will continue to do UF to achieve goal weight of 73kg.     # Hyperkalemia - Potassium d5.5 despite dialysis yesterday. For a 2K bath today. Patient is already on a renal diet. We will do a recirculation study of his AVF.     # Medication toxicity Monitoring: medication dose reviewed. Please dose medications to CrCl<10    The rest of the recommendations as per fellow's note.    Aubree Liu MD  Attending Nephrologist  [phone]

## 2021-02-23 NOTE — PROGRESS NOTE ADULT - SUBJECTIVE AND OBJECTIVE BOX
Patient seen and examined at bedside.    Overnight Events:   Yesterday had dialysis 2L removed    Current Meds:  acetaminophen   Tablet .. 650 milliGRAM(s) Oral every 6 hours PRN  atorvastatin 80 milliGRAM(s) Oral at bedtime  calcium acetate 2001 milliGRAM(s) Oral three times a day with meals  camphor 0.5%/menthol 0.5% Topical Lotion 1 Application(s) Topical two times a day  chlorhexidine 4% Liquid 1 Application(s) Topical daily  clobetasol 0.05% Ointment 1 Application(s) Topical two times a day  dextrose 40% Gel 15 Gram(s) Oral once  dextrose 5%. 1000 milliLiter(s) IV Continuous <Continuous>  dextrose 50% Injectable 25 Gram(s) IV Push once  doxycycline hyclate Capsule 100 milliGRAM(s) Oral every 12 hours  glucagon  Injectable 1 milliGRAM(s) IntraMuscular once  insulin lispro (ADMELOG) corrective regimen sliding scale   SubCutaneous three times a day before meals  insulin lispro (ADMELOG) corrective regimen sliding scale   SubCutaneous at bedtime  midodrine. 5 milliGRAM(s) Oral once  mupirocin 2% Ointment 1 Application(s) Topical two times a day  pantoprazole    Tablet 40 milliGRAM(s) Oral before breakfast        Vitals:  T(F): 97.6 (02-23), Max: 97.9 (02-22)  HR: 81 (02-23) (71 - 89)  BP: 113/77 (02-23) (100/66 - 123/85)  RR: 18 (02-23)  SpO2: 97% (02-23)  I&O's Summary    22 Feb 2021 07:01  -  23 Feb 2021 07:00  --------------------------------------------------------  IN: 240 mL / OUT: 2000 mL / NET: -1760 mL    23 Feb 2021 07:01  -  23 Feb 2021 13:01  --------------------------------------------------------  IN: 240 mL / OUT: 0 mL / NET: 240 mL        Appearance: comfortable on room air speaking full sentences	  HEENT:   Normal oral mucosa, PERRL, EOMI	  Lymphatic: No lymphadenopathy  Cardiovascular: Normal S1 S2, JVP to ear, 2/6 systolic murmur radiating to axilla, edema of lower extremity resolved  Respiratory: Lungs clear to auscultation	  Psychiatry: A & O x 3, Mood & affect appropriate  Gastrointestinal:  Soft, Non-tender, + BS	  Skin: multiple circular lesions on bilateral legs. No ecchymoses, No cyanosis	  Neurologic: Non-focal  Extremities: Normal range of motion, No clubbing, cyanosis or edema  Vascular: Peripheral pulses palpable 2+ bilaterally                            11.7   4.80  )-----------( 108      ( 23 Feb 2021 07:28 )             38.0     02-23    133<L>  |  89<L>  |  63<H>  ----------------------------<  101<H>  5.5<H>   |  24  |  7.04<H>    Ca    9.9      23 Feb 2021 07:24  Phos  5.4     02-23  Mg     2.3     02-23            Serum Pro-Brain Natriuretic Peptide: 84125 pg/mL (02-17 @ 11:04)          New ECG(s): Personally reviewed    Echo:    Stress Testing:     Cath:    Imaging:    Interpretation of Telemetry:

## 2021-02-23 NOTE — DISCHARGE NOTE PROVIDER - NSDCFUADDAPPT_GEN_ALL_CORE_FT
Follow up with your Primary care doctor  Follow up with Heart Failure  Follow up with your Kidney doctor

## 2021-02-23 NOTE — DISCHARGE NOTE PROVIDER - NSDCMRMEDTOKEN_GEN_ALL_CORE_FT
atorvastatin 80 mg oral tablet: 1 tab(s) orally once a day (at bedtime)  calcium acetate 667 mg oral tablet: 1 tab(s) orally 3 times a day (with meals)  clindamycin 1% topical lotion: Apply topically to affected area 2 times a day  doxycycline hyclate 50 mg oral capsule: 2 tab(s) orally 2 times a day  losartan:   pantoprazole 40 mg oral delayed release tablet: 1 tab(s) orally once a day  repaglinide 1 mg oral tablet: 1 milligram(s) orally 2 times a day   atorvastatin 80 mg oral tablet: 1 tab(s) orally once a day (at bedtime)  calcium acetate 667 mg oral tablet: 1 tab(s) orally 3 times a day (with meals)  clobetasol 0.05% topical ointment: 1 application topically 2 times a day  doxycycline hyclate 50 mg oral capsule: 2 tab(s) orally 2 times a day  pantoprazole 40 mg oral delayed release tablet: 1 tab(s) orally once a day  repaglinide 1 mg oral tablet: 1 milligram(s) orally 2 times a day

## 2021-02-23 NOTE — DISCHARGE NOTE PROVIDER - CARE PROVIDERS DIRECT ADDRESSES
,[email or web address] ,[email or web address] [facility] Long IslandBit Stew SystemsJohn [email or web address] [email or web address] Long IslandBit Stew SystemsJohn C. [email or web address] [facility] [email or web address] Long IslandBit Stew SystemsJohn C. [email or web address] [facility] [email or web address] — Long [email or web address] Stew SystemsJohn C. [email or web address] Memorial [email or web address]

## 2021-02-23 NOTE — DISCHARGE NOTE PROVIDER - PROVIDER TOKENS
PROVIDER:[TOKEN:[[id number]]],PROVIDER:[TOKEN:[[id number]]] PROVIDER:[TOKEN:[[id number]]],PROVIDER:[TOKEN:[[id number]]],PROVIDER:[TOKEN:[[id number]]] PROVIDER:[TOKEN:[[id number]]],PROVIDER:[TOKEN:[[id number]]],PROVIDER:[TOKEN:[[id number]]],PROVIDER:[TOKEN:[[id number]]],PROVIDER:[TOKEN:[[id number]]]

## 2021-02-23 NOTE — PROGRESS NOTE ADULT - SUBJECTIVE AND OBJECTIVE BOX
Patient is a 55y old  Male who presents with a chief complaint of Acute on Chronic Systolic Heart Failure, Volume overload (23 Feb 2021 13:01)      SUBJECTIVE / OVERNIGHT EVENTS: Patient seen and examined at bedside. States that he feels well, denies any CP, SOB, abd pain and n/v. No acute events overnight.     ROS:  All other review of systems negative    Allergies    No Known Allergies    Intolerances        MEDICATIONS  (STANDING):  atorvastatin 80 milliGRAM(s) Oral at bedtime  calcium acetate 2001 milliGRAM(s) Oral three times a day with meals  camphor 0.5%/menthol 0.5% Topical Lotion 1 Application(s) Topical two times a day  chlorhexidine 4% Liquid 1 Application(s) Topical daily  clobetasol 0.05% Ointment 1 Application(s) Topical two times a day  dextrose 40% Gel 15 Gram(s) Oral once  dextrose 5%. 1000 milliLiter(s) (50 mL/Hr) IV Continuous <Continuous>  dextrose 50% Injectable 25 Gram(s) IV Push once  doxycycline hyclate Capsule 100 milliGRAM(s) Oral every 12 hours  glucagon  Injectable 1 milliGRAM(s) IntraMuscular once  insulin lispro (ADMELOG) corrective regimen sliding scale   SubCutaneous three times a day before meals  insulin lispro (ADMELOG) corrective regimen sliding scale   SubCutaneous at bedtime  midodrine. 5 milliGRAM(s) Oral once  mupirocin 2% Ointment 1 Application(s) Topical two times a day  pantoprazole    Tablet 40 milliGRAM(s) Oral before breakfast    MEDICATIONS  (PRN):  acetaminophen   Tablet .. 650 milliGRAM(s) Oral every 6 hours PRN Temp greater or equal to 38C (100.4F), Mild Pain (1 - 3)      Vital Signs Last 24 Hrs  T(C): 36.4 (23 Feb 2021 10:43), Max: 36.6 (22 Feb 2021 15:20)  T(F): 97.6 (23 Feb 2021 10:43), Max: 97.9 (22 Feb 2021 15:20)  HR: 81 (23 Feb 2021 10:43) (71 - 89)  BP: 113/77 (23 Feb 2021 10:43) (100/66 - 123/85)  BP(mean): --  RR: 18 (23 Feb 2021 10:43) (18 - 18)  SpO2: 97% (23 Feb 2021 10:43) (97% - 99%)  CAPILLARY BLOOD GLUCOSE      POCT Blood Glucose.: 200 mg/dL (23 Feb 2021 12:56)  POCT Blood Glucose.: 123 mg/dL (23 Feb 2021 08:44)  POCT Blood Glucose.: 133 mg/dL (22 Feb 2021 21:50)  POCT Blood Glucose.: 182 mg/dL (22 Feb 2021 16:53)  POCT Blood Glucose.: 123 mg/dL (22 Feb 2021 15:16)    I&O's Summary    22 Feb 2021 07:01  -  23 Feb 2021 07:00  --------------------------------------------------------  IN: 240 mL / OUT: 2000 mL / NET: -1760 mL    23 Feb 2021 07:01  -  23 Feb 2021 13:12  --------------------------------------------------------  IN: 240 mL / OUT: 0 mL / NET: 240 mL        PHYSICAL EXAM:  GENERAL: NAD, well-developed  HEAD:  Atraumatic, Normocephalic  EYES: EOMI, PERRLA, conjunctiva and sclera clear  NECK: Supple,   CHEST/LUNG: Clear to auscultation b/l  HEART: Regular rate and rhythm; + systolic murmur  ABDOMEN: Soft, Nontender, Nondistended; Bowel sounds present  EXTREMITIES:  2+ Peripheral Pulses, No LE edema + LUE AVF+ thrill   NEUROLOGY: AAOx3, non-focal  PSYCH: calm  SKIN: diffuse excoriations and skin lesion (improving)    LABS:                        11.7   4.80  )-----------( 108      ( 23 Feb 2021 07:28 )             38.0     02-23    133<L>  |  89<L>  |  63<H>  ----------------------------<  101<H>  5.5<H>   |  24  |  7.04<H>    Ca    9.9      23 Feb 2021 07:24  Phos  5.4     02-23  Mg     2.3     02-23                RADIOLOGY & ADDITIONAL TESTS:    Consultant(s) Notes Reviewed:  Nephrology rec noted     Care Discussed with Consultants/Other Providers: Medicine NP and Cardiology

## 2021-02-23 NOTE — PROGRESS NOTE ADULT - PROBLEM SELECTOR PLAN 4
HbA1c 6.3 on admission On oral meds at home.  - Hold oral hypoglycemia agents.   - C/w insulin sliding scale coverage.   - C/w consistent carbohydrate diet

## 2021-02-23 NOTE — PROGRESS NOTE ADULT - SUBJECTIVE AND OBJECTIVE BOX
[facility] Division of Kidney Diseases & Hypertension  FOLLOW UP NOTE  [phone]--------------------------------------------------------------------------------  24 hour events/subjective:    Patient was seen and examined this morning No signs of acute distress  No acute significant events overnight  Patient denies chest pain, SOB, nausea/vomiting, abdominal pain  Labs/Meds/Vitals reviewed  Serum K elevated at 5.5 today. Last HD yesterday    PAST HISTORY  --------------------------------------------------------------------------------  No significant changes to PMH, PSH, FHx, SHx, unless otherwise noted    ALLERGIES & MEDICATIONS  --------------------------------------------------------------------------------  Allergies    No Known Allergies    Intolerances      Standing Inpatient Medications  atorvastatin 80 milliGRAM(s) Oral at bedtime  calcium acetate 2001 milliGRAM(s) Oral three times a day with meals  camphor 0.5%/menthol 0.5% Topical Lotion 1 Application(s) Topical two times a day  chlorhexidine 4% Liquid 1 Application(s) Topical daily  clobetasol 0.05% Ointment 1 Application(s) Topical two times a day  dextrose 40% Gel 15 Gram(s) Oral once  dextrose 5%. 1000 milliLiter(s) IV Continuous <Continuous>  dextrose 50% Injectable 25 Gram(s) IV Push once  doxycycline hyclate Capsule 100 milliGRAM(s) Oral every 12 hours  glucagon  Injectable 1 milliGRAM(s) IntraMuscular once  insulin lispro (ADMELOG) corrective regimen sliding scale   SubCutaneous three times a day before meals  insulin lispro (ADMELOG) corrective regimen sliding scale   SubCutaneous at bedtime  midodrine. 5 milliGRAM(s) Oral once  mupirocin 2% Ointment 1 Application(s) Topical two times a day  pantoprazole    Tablet 40 milliGRAM(s) Oral before breakfast    PRN Inpatient Medications  acetaminophen   Tablet .. 650 milliGRAM(s) Oral every 6 hours PRN    REVIEW OF SYSTEMS  --------------------------------------------------------------------------------  Gen: No fevers/chills  Head/Eyes/Ears: Normal hearing,   Respiratory: No dyspnea, cough  CV: No chest pain  GI: No abdominal pain, diarrhea  Psych: No significant depression    VITALS/PHYSICAL EXAM  --------------------------------------------------------------------------------  T(C): 36.4 (02-23-21 @ 10:43), Max: 36.6 (02-22-21 @ 15:20)  HR: 81 (02-23-21 @ 10:43) (71 - 89)  BP: 113/77 (02-23-21 @ 10:43) (100/66 - 123/85)  RR: 18 (02-23-21 @ 10:43) (18 - 18)  SpO2: 97% (02-23-21 @ 10:43) (97% - 99%)  Wt(kg): --    02-22-21 @ 07:01  -  02-23-21 @ 07:00  --------------------------------------------------------  IN: 240 mL / OUT: 2000 mL / NET: -1760 mL    02-23-21 @ 07:01  -  02-23-21 @ 10:57  --------------------------------------------------------  IN: 240 mL / OUT: 0 mL / NET: 240 mL      Physical Exam:  Gen: NAD  HEENT: supple neck, no JVD  Pulmonary: CTA B/L  CV: RRR, S1S2; no rub  Abd:  +BS, soft, not tender, not distended  UE:  No edema;   LE:  mild edema  Neuro: No focal deficits, No asterixes   Vascular  Left UE AVF  + Thrill/ bruit      LABS/STUDIES  --------------------------------------------------------------------------------              11.7   4.80  >-----------<  108      [02-23-21 @ 07:28]              38.0     133  |  89  |  63  ----------------------------<  101      [02-23-21 @ 07:24]  5.5   |  24  |  7.04        Ca     9.9     [02-23-21 @ 07:24]      Mg     2.3     [02-23-21 @ 07:24]      Phos  5.4     [02-23-21 @ 07:24]    Creatinine Trend:  SCr 7.04 [02-23 @ 07:24]  SCr 7.95 [02-22 @ 06:54]  SCr 5.74 [02-21 @ 07:21]  SCr 7.90 [02-20 @ 07:13]  SCr 6.46 [02-19 @ 07:16]

## 2021-02-24 NOTE — PROGRESS NOTE ADULT - ATTENDING COMMENTS
JVP remains elevated and his post-HD weight was 72 kg last night.  Would remove additional fluid today and do routine HD Tu-Th-Sat schedule.  Aiming for 70 kg per patient's outpatient dry weight.    Will get RHC/LHC for pre-transplant evaluation on Friday. He will need HD post-cath.  Patient is off ECASA for prior retinal bleeding.  Intolerant of GDMT. Under evaluation for heart-kidney at [facility].

## 2021-02-24 NOTE — PROGRESS NOTE ADULT - PROBLEM SELECTOR PLAN 1
Shortness of breath improved  Exam continues to have elevated JVP  Discussed with renal and will continue volume removal  Etiology: unclear etiology possibly ICM given CAD  GDMT: holding home losartan 12.5mg for now given frequent dialysis  Diuretics: Diuresis with dialysis, tolerating well,   Advanced therapies: Patient undergoing transplant eval at [facility]. Please call CT surgery for transplant evaluation.   Ideally planning RHC Friday at dry weight of 70kg

## 2021-02-24 NOTE — PROGRESS NOTE ADULT - ASSESSMENT
56 YO M CAD s/p stent in 2016, HFrEF (EF 15-20%, LEVEDD 6.6), subQ ICD, Severe MR, ESRD since 2019, IDDM, HTN, here for shortness of breath and edema. Continued on daily HD/UF with improvement in symptoms. Weights continue to drop. Will continue optimization for plan of doing RHC/LHC once optimized for evaluation for heart/kidney transplant (currently underway at [facility]).

## 2021-02-24 NOTE — PROGRESS NOTE ADULT - PROBLEM SELECTOR PLAN 1
Acute on Chronic systolic Heart failure  - pt makes minimal urine, renal consult appreciated  - Underwent HD  2/17 - 2/23- tolerated well  - HD tomorrow. Ordered for midodrine 5 mg x1 30 min prior to HD  - K wnl today  - C/w Lipitor  - HF following, recs noted: plan for Left and Right Cath tomorrow or friday   - weight 72kg (dry weight 70) improving   - CT sx consult recs note: Patient to be presented at Heart transplant meeting

## 2021-02-24 NOTE — CONSULT NOTE ADULT - ASSESSMENT
55M p/w fluid overload and found to have difficult clearing electrolytes through HD with concern for recirculation and outflow stenosis.     - please obtain duplex of fistula (ordered)    Plan discussed with vascular surgery fellow ADELE PETERSEN.    Please contact Vascular Surgery (P: [phone]) with any questions.    MISHEL Chester MD, PGY-III  Surgery, Vascular Team  Pager: [phone]  [facility]

## 2021-02-24 NOTE — DIETITIAN INITIAL EVALUATION ADULT. - ORAL INTAKE PTA/DIET HISTORY
PTA pt reports good appetite and PO intake, eats well with no recent changes in intake. Pt somewhat adheres to renal diet/monitors carbohydrate intake. Does not prepare meals with salt. Pt states he has received different recommendations from different dietitians which has confused him over the years. Endorses use of multivitamin PTA. NKFA.

## 2021-02-24 NOTE — PROGRESS NOTE ADULT - SUBJECTIVE AND OBJECTIVE BOX
Patient seen and examined at bedside.    Overnight Events:   Yesterday tolerated dialysis  No complaints today    Current Meds:  acetaminophen   Tablet .. 650 milliGRAM(s) Oral every 6 hours PRN  atorvastatin 80 milliGRAM(s) Oral at bedtime  calcium acetate 2001 milliGRAM(s) Oral three times a day with meals  camphor 0.5%/menthol 0.5% Topical Lotion 1 Application(s) Topical two times a day  chlorhexidine 4% Liquid 1 Application(s) Topical daily  clobetasol 0.05% Ointment 1 Application(s) Topical two times a day  dextrose 40% Gel 15 Gram(s) Oral once  dextrose 5%. 1000 milliLiter(s) IV Continuous <Continuous>  dextrose 50% Injectable 25 Gram(s) IV Push once  doxycycline hyclate Capsule 100 milliGRAM(s) Oral every 12 hours  glucagon  Injectable 1 milliGRAM(s) IntraMuscular once  insulin lispro (ADMELOG) corrective regimen sliding scale   SubCutaneous three times a day before meals  insulin lispro (ADMELOG) corrective regimen sliding scale   SubCutaneous at bedtime  mupirocin 2% Ointment 1 Application(s) Topical two times a day  pantoprazole    Tablet 40 milliGRAM(s) Oral before breakfast        Vitals:  T(F): 98.5 (02-24), Max: 98.5 (02-24)  HR: 75 (02-24) (75 - 85)  BP: 108/70 (02-24) (108/70 - 121/77)  RR: 18 (02-24)  SpO2: 100% (02-24)  I&O's Summary    23 Feb 2021 07:01  -  24 Feb 2021 07:00  --------------------------------------------------------  IN: 960 mL / OUT: 2500 mL / NET: -1540 mL    24 Feb 2021 07:01  -  24 Feb 2021 16:36  --------------------------------------------------------  IN: 720 mL / OUT: 2 mL / NET: 718 mL    Physical exam    Appearance: comfortable on room air speaking full sentences	  HEENT:   Normal oral mucosa, PERRL, EOMI	  Lymphatic: No lymphadenopathy  Cardiovascular: Normal S1 S2, JVP to ear, 2/6 systolic murmur radiating to axilla, edema of lower extremity resolved  Respiratory: Lungs clear to auscultation	  Psychiatry: A & O x 3, Mood & affect appropriate  Gastrointestinal:  Soft, Non-tender, + BS	  Skin: multiple circular lesions on bilateral legs. No ecchymoses, No cyanosis	  Neurologic: Non-focal  Extremities: Normal range of motion, No clubbing, cyanosis or edema  Vascular: Peripheral pulses palpable 2+ bilaterally                              11.7   4.80  )-----------( 108      ( 23 Feb 2021 07:28 )             38.0     02-24    132<L>  |  90<L>  |  49<H>  ----------------------------<  97  5.2   |  23  |  5.70<H>    Ca    10.2      24 Feb 2021 07:20  Phos  5.4     02-23  Mg     2.3     02-23                    New ECG(s): Personally reviewed    Echo:    Stress Testing:     Cath:    Imaging:    Interpretation of Telemetry:

## 2021-02-24 NOTE — DIETITIAN INITIAL EVALUATION ADULT. - ADD RECOMMEND
Add Nephro-cookie if medically feasible. Reinforce diet education as needed - RD remains available. Continue to monitor PO intake, labs, weights, BM, skin, clinical course.

## 2021-02-24 NOTE — PROGRESS NOTE ADULT - ATTENDING COMMENTS
Dr. Roro Wright   Division of Hospital Medicine  [facility]   Pager: [phone]

## 2021-02-24 NOTE — DIETITIAN INITIAL EVALUATION ADULT. - PHYSCIAL ASSESSMENT
Pt appears well nourished, no overt fat or muscle depletions noted.  no noted pressure injuries as per documentation. well nourished

## 2021-02-24 NOTE — DIETITIAN INITIAL EVALUATION ADULT. - OTHER INFO
Home Pt reports UBW ~70kg. Endorses weight gain 2/2 fluid retention. Pt admitted at weight of 79.4kg in fluid overload. Pt now 72kg s/p HD 2/22. Goal is for pt to reach 70kg prior to discharge per hospitalist.     Pt with HbA1c of 6.3% on repaglinide PTA. Reports checking FS 3 x daily with readings between 107-145. Consumes juice and soda to manage hypoglycemic when needed.    Pt denies N/V, last BM 2/21 per chart. Pt with good intake since admission, consuming >75% of meal trays. Discussed dietary recommendations for HD, CHF, and DM. Provided education on foods containing carbohydrates, foods containing proteins, and portion sizes. Stressed the importance of a balanced meal to maintain blood glucose. Provided education on increased demand for kcal and protein intake to help prevent muscle/weight loss, reviewed foods high in potassium, phosphorus, and sodium, discussed foods recommended within therapeutic diet and protein portion sizing. Handouts provided, pt made aware RD remains available.

## 2021-02-24 NOTE — PROGRESS NOTE ADULT - SUBJECTIVE AND OBJECTIVE BOX
Patient is a 55y old  Male who presents with a chief complaint of Acute on Chronic Systolic Heart Failure, Volume overload (23 Feb 2021 13:12)      SUBJECTIVE / OVERNIGHT EVENTS: Patient seen and examined at bedside. states that he feels well, denies any CP, SOB, abd pain and n/v. skin lesion improving. No acute events overnight     ROS:  All other review of systems negative    Allergies    No Known Allergies    Intolerances        MEDICATIONS  (STANDING):  atorvastatin 80 milliGRAM(s) Oral at bedtime  calcium acetate 2001 milliGRAM(s) Oral three times a day with meals  camphor 0.5%/menthol 0.5% Topical Lotion 1 Application(s) Topical two times a day  chlorhexidine 4% Liquid 1 Application(s) Topical daily  clobetasol 0.05% Ointment 1 Application(s) Topical two times a day  dextrose 40% Gel 15 Gram(s) Oral once  dextrose 5%. 1000 milliLiter(s) (50 mL/Hr) IV Continuous <Continuous>  dextrose 50% Injectable 25 Gram(s) IV Push once  doxycycline hyclate Capsule 100 milliGRAM(s) Oral every 12 hours  glucagon  Injectable 1 milliGRAM(s) IntraMuscular once  insulin lispro (ADMELOG) corrective regimen sliding scale   SubCutaneous three times a day before meals  insulin lispro (ADMELOG) corrective regimen sliding scale   SubCutaneous at bedtime  mupirocin 2% Ointment 1 Application(s) Topical two times a day  pantoprazole    Tablet 40 milliGRAM(s) Oral before breakfast    MEDICATIONS  (PRN):  acetaminophen   Tablet .. 650 milliGRAM(s) Oral every 6 hours PRN Temp greater or equal to 38C (100.4F), Mild Pain (1 - 3)      Vital Signs Last 24 Hrs  T(C): 36.9 (24 Feb 2021 09:33), Max: 36.9 (23 Feb 2021 19:44)  T(F): 98.5 (24 Feb 2021 09:33), Max: 98.5 (24 Feb 2021 09:33)  HR: 75 (24 Feb 2021 09:33) (75 - 85)  BP: 108/70 (24 Feb 2021 09:33) (108/70 - 121/77)  BP(mean): --  RR: 18 (24 Feb 2021 09:33) (18 - 18)  SpO2: 100% (24 Feb 2021 09:33) (98% - 100%)  CAPILLARY BLOOD GLUCOSE      POCT Blood Glucose.: 127 mg/dL (24 Feb 2021 09:05)  POCT Blood Glucose.: 179 mg/dL (23 Feb 2021 21:57)  POCT Blood Glucose.: 123 mg/dL (23 Feb 2021 18:14)  POCT Blood Glucose.: 200 mg/dL (23 Feb 2021 12:56)    I&O's Summary    23 Feb 2021 07:01  -  24 Feb 2021 07:00  --------------------------------------------------------  IN: 960 mL / OUT: 0 mL / NET: 960 mL    24 Feb 2021 07:01  -  24 Feb 2021 11:24  --------------------------------------------------------  IN: 480 mL / OUT: 0 mL / NET: 480 mL        PHYSICAL EXAM:  GENERAL: NAD, well-developed  HEAD:  Atraumatic, Normocephalic  EYES: EOMI, PERRLA, conjunctiva and sclera clear  NECK: Supple,   CHEST/LUNG: Clear to auscultation b/l  HEART: Regular rate and rhythm; + systolic murmur  ABDOMEN: Soft, Nontender, Nondistended; Bowel sounds present  EXTREMITIES:  2+ Peripheral Pulses, No LE edema + LUE AVF+ thrill   NEUROLOGY: AAOx3, non-focal  PSYCH: calm  SKIN: diffuse excoriations and skin lesion (improving)    LABS:                        11.7   4.80  )-----------( 108      ( 23 Feb 2021 07:28 )             38.0     02-24    132<L>  |  90<L>  |  49<H>  ----------------------------<  97  5.2   |  23  |  5.70<H>    Ca    10.2      24 Feb 2021 07:20  Phos  5.4     02-23  Mg     2.3     02-23                RADIOLOGY & ADDITIONAL TESTS:    Consultant(s) Notes Reviewed:  HF and Nephrology rec noted     Care Discussed with Consultants/Other Providers: Medicine NP

## 2021-02-24 NOTE — CONSULT NOTE ADULT - SUBJECTIVE AND OBJECTIVE BOX
VASCULAR SURGERY CONSULT NOTE  DEMETRIO VILLALTA  |  [id number]  |  02-24-21 @ 17:00    Patient was seen and examined at: 15:30    CC: Patient is a 55y old  Male who presents with a chief complaint of Acute on Chronic Systolic Heart Failure, Volume overload (24 Feb 2021 16:35)    HPI:  55M PMHx of T2DM, HTN, CAD (s/p stent 2016), HFrEF (EF 15-20%) s/p ICD, ESRD on HD T/Th/Sat via LUE AV fistula, GIB 2/2 ulcer sent in by his Cardiologist for Volume overload.  Patient reports that he has been in his usual state of health. Pt states that he can walk half a block before he gets winded and short of breath. Per his cardiologist the patient clinically has increasing volume overload requiring the patient more aggressive volume removal via daily HD.  (17 Feb 2021 14:25)    INTERVAL EVENTS:  Vascular surgery was consulted for assessment of fistula.   Per discussion with nephrology, patient with persistently elevated potassium and evidence of HD for recirculation prompting concern for outflow stenosis.  Patient seen and examined at bedside. Denies LUE swelling. No parasthesia or weakness. Full motor and sensory in tact.     PAST MEDICAL & SURGICAL HISTORY:  Congestive heart disease  ESRD (end stage renal disease) on dialysis  GIB (gastrointestinal bleeding) was treated  with H pyelori stable since Dec 10, 2018  HFrEF (heart failure with reduced ejection fraction) EF 20-24%  Myocardial infarction Jan 2016  Hyperlipidemia   CAD (coronary artery disease) 2016, coronary artery stentX1  DM (diabetes mellitus) 2  not taking any medication lat4st HgA1c 5.7  HTN (hypertension)  History of implantable cardioverter-defibrillator (ICD) placement  Stented coronary artery  ? 1  Arterial stent thrombosis    MEDICATIONS  (STANDING):  atorvastatin 80 milliGRAM(s) Oral at bedtime  calcium acetate 2001 milliGRAM(s) Oral three times a day with meals  camphor 0.5%/menthol 0.5% Topical Lotion 1 Application(s) Topical two times a day  chlorhexidine 4% Liquid 1 Application(s) Topical daily  clobetasol 0.05% Ointment 1 Application(s) Topical two times a day  dextrose 40% Gel 15 Gram(s) Oral once  dextrose 5%. 1000 milliLiter(s) (50 mL/Hr) IV Continuous <Continuous>  dextrose 50% Injectable 25 Gram(s) IV Push once  doxycycline hyclate Capsule 100 milliGRAM(s) Oral every 12 hours  glucagon  Injectable 1 milliGRAM(s) IntraMuscular once  insulin lispro (ADMELOG) corrective regimen sliding scale   SubCutaneous three times a day before meals  insulin lispro (ADMELOG) corrective regimen sliding scale   SubCutaneous at bedtime  mupirocin 2% Ointment 1 Application(s) Topical two times a day  pantoprazole    Tablet 40 milliGRAM(s) Oral before breakfast    MEDICATIONS  (PRN):  acetaminophen   Tablet .. 650 milliGRAM(s) Oral every 6 hours PRN Temp greater or equal to 38C (100.4F), Mild Pain (1 - 3)    ALLERGIES:  No Known Allergies or Intolerances    SOCIAL HISTORY:  Pt lives at home with sister. Not working 2/2 disability.   Previous etoh and tobacco use. No current tobacco, drug, etoh use. (17 Feb 2021 14:25)    FAMILY HISTORY:  Family history of diabetes mellitus (DM) (Sibling)  Family history of MI (myocardial infarction) (Sibling)    Objective:   Vital Signs Last 24 Hrs  T(C): 36.9 (24 Feb 2021 09:33), Max: 36.9 (23 Feb 2021 19:44)  T(F): 98.5 (24 Feb 2021 09:33), Max: 98.5 (24 Feb 2021 09:33)  HR: 75 (24 Feb 2021 09:33) (75 - 85)  BP: 108/70 (24 Feb 2021 09:33) (108/70 - 121/77)  RR: 18 (24 Feb 2021 09:33) (18 - 18)  SpO2: 100% (24 Feb 2021 09:33) (98% - 100%)    CAPILLARY BLOOD GLUCOSE  POCT Blood Glucose.: 164 mg/dL (24 Feb 2021 12:29)    Physical Exam:  General: Well developed, well nourished, alert and cooperative, and appears to be in no acute distress.  LUE: +radial pulse. hand warm. full motor and sensory in tact. +thrill.     LABS:                        11.7   4.80  )-----------( 108      ( 23 Feb 2021 07:28 )             38.0     02-24    132<L>  |  90<L>  |  49<H>  ----------------------------<  97  5.2   |  23  |  5.70<H>    Ca    10.2      24 Feb 2021 07:20  Phos  5.4     02-23  Mg     2.3     02-23    RADIOLOGY & ADDITIONAL STUDIES:    No new radiograph imaging for review.

## 2021-02-25 NOTE — PROGRESS NOTE ADULT - PROBLEM SELECTOR PLAN 1
Acute on Chronic systolic Heart failure  - pt makes minimal urine, renal consult appreciated  - Underwent HD  2/17 - 2/23- tolerated well  - HD today. Ordered for midodrine 5 mg x1 30 min prior to HD  - K wnl today  - C/w Lipitor  - HF following, recs noted: plan for Left and Right Cath r friday   - weight 69.5 (dry weight 70) euvolemic   - CT sx consult recs note: Patient to be presented at Heart transplant meeting

## 2021-02-25 NOTE — PROGRESS NOTE ADULT - ASSESSMENT
Pt with ESRD on HD presents to the hospital for fluid overload      #ESRD on HD  Patient with ESRD on HD TTS, Outpatient HD: [facility] dialysis facility. Nephrologist Dr. ESTHER Pavon  Pt to achieve goal weight 70 kg.  - Lab reviewed. Pt clinically stable.   - Will plan for HD today 3L as tolerated to BP.   - Losartan on hold. Give Midodrine 5mg prior to HD  - Monitor renal panel    #Fluid overload- plans as above    #Hypertension  - Pt on Losartan 12.5mg BID as home medication  - Losartan on hold    #Anemia  Patient with anemia in the setting of ESRD. Hemoglobin within target range. Pt on retacrit 2000 units as outpatient  - Will hold FATOUMATA for now. Monitor hemoglobin.    #Bone metabolism disorder  - in the setting of ESRD   - On phoslo 2001 mg TID with meals  - continue phos binders  - low phos diet    #Hyperkalemia  - continue low potassium diet  - Evidence of recirculation on labs done during last HD on 2/23  - Possible  concern for outflow stenosis of AVF  - Vascular notes reviewed  - Planned for LUE AVF duplex      If you have any questions, please feel free to contact me  Rich Bowser  Nephrology Fellow  Pager: [phone] / [phone]  (After 5pm or on weekends please page the on-call fellow)

## 2021-02-25 NOTE — PROGRESS NOTE ADULT - PROBLEM SELECTOR PLAN 4
- Pt not on asa 81mg because of retinal bleeding issues, this was d/c'ed ~1 year ago. Per Pt HF physician Dr. Pavon aware   - C/w lipitor 80

## 2021-02-25 NOTE — PROGRESS NOTE ADULT - SUBJECTIVE AND OBJECTIVE BOX
Patient is a 56y old  Male who presents with a chief complaint of Acute on Chronic Systolic Heart Failure, Volume overload (25 Feb 2021 07:46)      SUBJECTIVE / OVERNIGHT EVENTS: Patient seen and examined at bedside. states that he feels ok, denies any CP, SOB, abd pain and n/v. No acute events overnight.     ROS:  All other review of systems negative    Allergies    No Known Allergies    Intolerances        MEDICATIONS  (STANDING):  atorvastatin 80 milliGRAM(s) Oral at bedtime  calcium acetate 2001 milliGRAM(s) Oral three times a day with meals  camphor 0.5%/menthol 0.5% Topical Lotion 1 Application(s) Topical two times a day  chlorhexidine 4% Liquid 1 Application(s) Topical daily  clobetasol 0.05% Ointment 1 Application(s) Topical two times a day  dextrose 40% Gel 15 Gram(s) Oral once  dextrose 5%. 1000 milliLiter(s) (50 mL/Hr) IV Continuous <Continuous>  dextrose 50% Injectable 25 Gram(s) IV Push once  doxycycline hyclate Capsule 100 milliGRAM(s) Oral every 12 hours  glucagon  Injectable 1 milliGRAM(s) IntraMuscular once  insulin lispro (ADMELOG) corrective regimen sliding scale   SubCutaneous three times a day before meals  insulin lispro (ADMELOG) corrective regimen sliding scale   SubCutaneous at bedtime  mupirocin 2% Ointment 1 Application(s) Topical two times a day  pantoprazole    Tablet 40 milliGRAM(s) Oral before breakfast    MEDICATIONS  (PRN):  acetaminophen   Tablet .. 650 milliGRAM(s) Oral every 6 hours PRN Temp greater or equal to 38C (100.4F), Mild Pain (1 - 3)      Vital Signs Last 24 Hrs  T(C): 36.4 (25 Feb 2021 11:40), Max: 36.7 (24 Feb 2021 18:03)  T(F): 97.6 (25 Feb 2021 11:40), Max: 98 (24 Feb 2021 18:03)  HR: 83 (25 Feb 2021 11:40) (74 - 94)  BP: 120/76 (25 Feb 2021 11:40) (107/74 - 120/76)  BP(mean): --  RR: 18 (25 Feb 2021 11:40) (18 - 18)  SpO2: 96% (25 Feb 2021 11:40) (96% - 100%)  CAPILLARY BLOOD GLUCOSE      POCT Blood Glucose.: 117 mg/dL (25 Feb 2021 12:49)  POCT Blood Glucose.: 156 mg/dL (25 Feb 2021 07:36)  POCT Blood Glucose.: 141 mg/dL (24 Feb 2021 20:54)  POCT Blood Glucose.: 127 mg/dL (24 Feb 2021 18:38)    I&O's Summary    24 Feb 2021 07:01  -  25 Feb 2021 07:00  --------------------------------------------------------  IN: 960 mL / OUT: 2002 mL / NET: -1042 mL    25 Feb 2021 07:01  -  25 Feb 2021 13:09  --------------------------------------------------------  IN: 240 mL / OUT: 2000 mL / NET: -1760 mL        PHYSICAL EXAM:  GENERAL: NAD, well-developed  HEAD:  Atraumatic, Normocephalic  EYES: EOMI, PERRLA, conjunctiva and sclera clear  NECK: Supple,   CHEST/LUNG: Clear to auscultation b/l  HEART: Regular rate and rhythm; + systolic murmur  ABDOMEN: Soft, Nontender, Nondistended; Bowel sounds present  EXTREMITIES:  2+ Peripheral Pulses, No LE edema + LUE AVF+ thrill   NEUROLOGY: AAOx3, non-focal  PSYCH: calm  SKIN: diffuse excoriations and skin lesion (improving)    LABS:                        13.3   5.53  )-----------( 141      ( 25 Feb 2021 10:18 )             42.0     02-25    126<L>  |  83<L>  |  70<H>  ----------------------------<  133<H>  5.6<H>   |  19<L>  |  7.30<H>    Ca    10.7<H>      25 Feb 2021 10:18  Phos  6.2     02-25  Mg     2.5     02-25                RADIOLOGY & ADDITIONAL TESTS:  Consultant(s) Notes Reviewed:  Vascular surgery, Nephology and HF rec noted    Care Discussed with Consultants/Other Providers: Medicine NP

## 2021-02-25 NOTE — PROGRESS NOTE ADULT - SUBJECTIVE AND OBJECTIVE BOX
[facility] Division of Kidney Diseases & Hypertension  FOLLOW UP NOTE  [phone]--------------------------------------------------------------------------------  24 hour events/subjective:    Patient was seen and examined this morning No signs of acute distress  No acute significant events overnight  Patient denies chest pain, SOB, nausea/vomiting, abdominal pain  Labs/Meds/Vitals reviewed  Tolerated UF yesterday with 2L fluid removed  Planned for HD today    PAST HISTORY  --------------------------------------------------------------------------------  No significant changes to PMH, PSH, FHx, SHx, unless otherwise noted    ALLERGIES & MEDICATIONS  --------------------------------------------------------------------------------  Allergies    No Known Allergies    Intolerances      Standing Inpatient Medications  atorvastatin 80 milliGRAM(s) Oral at bedtime  calcium acetate 2001 milliGRAM(s) Oral three times a day with meals  camphor 0.5%/menthol 0.5% Topical Lotion 1 Application(s) Topical two times a day  chlorhexidine 4% Liquid 1 Application(s) Topical daily  clobetasol 0.05% Ointment 1 Application(s) Topical two times a day  dextrose 40% Gel 15 Gram(s) Oral once  dextrose 5%. 1000 milliLiter(s) IV Continuous <Continuous>  dextrose 50% Injectable 25 Gram(s) IV Push once  doxycycline hyclate Capsule 100 milliGRAM(s) Oral every 12 hours  glucagon  Injectable 1 milliGRAM(s) IntraMuscular once  insulin lispro (ADMELOG) corrective regimen sliding scale   SubCutaneous three times a day before meals  insulin lispro (ADMELOG) corrective regimen sliding scale   SubCutaneous at bedtime  mupirocin 2% Ointment 1 Application(s) Topical two times a day  pantoprazole    Tablet 40 milliGRAM(s) Oral before breakfast    PRN Inpatient Medications  acetaminophen   Tablet .. 650 milliGRAM(s) Oral every 6 hours PRN    REVIEW OF SYSTEMS  --------------------------------------------------------------------------------  Gen: No fevers/chills  Respiratory: No dyspnea, cough  CV: No chest pain  GI: No abdominal pain, diarrhea  Psych: No significant depression      VITALS/PHYSICAL EXAM  --------------------------------------------------------------------------------  T(C): 36.6 (02-25-21 @ 03:58), Max: 36.9 (02-24-21 @ 09:33)  HR: 83 (02-25-21 @ 03:58) (75 - 94)  BP: 119/65 (02-25-21 @ 03:58) (107/74 - 119/65)  RR: 18 (02-25-21 @ 03:58) (18 - 18)  SpO2: 99% (02-25-21 @ 03:58) (98% - 100%)  Wt(kg): --        02-24-21 @ 07:01  -  02-25-21 @ 07:00  --------------------------------------------------------  IN: 960 mL / OUT: 2002 mL / NET: -1042 mL    Physical Exam:  Gen: NAD  HEENT: supple neck, no JVD  Pulmonary: CTA B/L  CV: RRR, S1S2; no rub  Abd:  +BS, soft, not tender, not distended  UE:  No edema;   LE:  mild edema  Neuro: No focal deficits, No asterixes   Vascular  Left UE AVF  + Thrill/ bruit    LABS/STUDIES  --------------------------------------------------------------------------------    132  |  90  |  49  ----------------------------<  97      [02-24-21 @ 07:20]  5.2   |  23  |  5.70        Ca     10.2     [02-24-21 @ 07:20]            Creatinine Trend:  SCr 5.70 [02-24 @ 07:20]  SCr 7.04 [02-23 @ 07:24]  SCr 7.95 [02-22 @ 06:54]  SCr 5.74 [02-21 @ 07:21]  SCr 7.90 [02-20 @ 07:13]

## 2021-02-25 NOTE — PROGRESS NOTE ADULT - ATTENDING COMMENTS
I have seen this patient with the fellow and agree with their assessment and plan. In addition,    Patient seen and examined on dialysis. Tolerating HD.   # ESRD - TTS dialysis via AVF. HD today.     # Volume overload -  To hold anti-hypertension prior to dialysis. Still clinically volume overload despite achieving weight of 73kg. Per HF team, we will aim for a dry weight of 70kg. UF to 70kg.     # Medication toxicity Monitoring: medication dose reviewed. Please dose medications to CrCl<10    The rest of the recommendations as per fellow's note.    Aubree Liu MD  Attending Nephrologist  [phone]

## 2021-02-26 NOTE — CHART NOTE - NSCHARTNOTEFT_GEN_A_CORE
Duplex results reviewed with Dr. Haji.   Recommend IR consult for LUE fistulogram for possible treatment of outflow stenosis.     Vascular surgery   [id number] Duplex results reviewed.   Recommend IR consult for LUE fistulogram for possible treatment of outflow stenosis when clinically appropriate.     Vascular surgery   [id number]

## 2021-02-26 NOTE — PROGRESS NOTE ADULT - ATTENDING COMMENTS
He is now euvolemic with weight last night after HD of 69.5 kg.  Had LHC/RHC today: /77/94, RA 11, RV 54/13, PA 51/27/38, PCW 25, PA 60.5%, CO/CI 3.58/2.05, SVR 1854 dsc, PVR 3.63. Nipride was not given. LHC with diffuse, severe disease with known prior extensive scarring on nuclear stress.    Currently looks well and is ok for discharge. He reports failing prior trials of losartan and does not want to try again (hypotension, feeling poorly) despite the RHC data suggests it would help.  Patient is off ECASA for prior retinal bleeding.  Under evaluation for heart-kidney at [facility].  He will follow-up with Dr. Enio Pavon in HF as outpatient.

## 2021-02-26 NOTE — PROGRESS NOTE ADULT - SUBJECTIVE AND OBJECTIVE BOX
Patient is a 56y old  Male who presents with a chief complaint of Acute on Chronic Systolic Heart Failure, Volume overload (25 Feb 2021 13:09)      SUBJECTIVE / OVERNIGHT EVENTS: Patient seen and examined at bedside, State that he feels ok, denies any CP, SOb, abd pain and n/v. NO acute events overnight     ROS:  All other review of systems negative    Allergies    No Known Allergies    Intolerances        MEDICATIONS  (STANDING):  atorvastatin 80 milliGRAM(s) Oral at bedtime  calcium acetate 2001 milliGRAM(s) Oral three times a day with meals  camphor 0.5%/menthol 0.5% Topical Lotion 1 Application(s) Topical two times a day  chlorhexidine 4% Liquid 1 Application(s) Topical daily  clobetasol 0.05% Ointment 1 Application(s) Topical two times a day  dextrose 40% Gel 15 Gram(s) Oral once  dextrose 5%. 1000 milliLiter(s) (50 mL/Hr) IV Continuous <Continuous>  dextrose 50% Injectable 25 Gram(s) IV Push once  doxycycline hyclate Capsule 100 milliGRAM(s) Oral every 12 hours  glucagon  Injectable 1 milliGRAM(s) IntraMuscular once  insulin lispro (ADMELOG) corrective regimen sliding scale   SubCutaneous three times a day before meals  insulin lispro (ADMELOG) corrective regimen sliding scale   SubCutaneous at bedtime  mupirocin 2% Ointment 1 Application(s) Topical two times a day  pantoprazole    Tablet 40 milliGRAM(s) Oral before breakfast    MEDICATIONS  (PRN):  acetaminophen   Tablet .. 650 milliGRAM(s) Oral every 6 hours PRN Temp greater or equal to 38C (100.4F), Mild Pain (1 - 3)      Vital Signs Last 24 Hrs  T(C): 36.3 (26 Feb 2021 13:56), Max: 36.8 (26 Feb 2021 09:00)  T(F): 97.4 (26 Feb 2021 13:56), Max: 98.2 (26 Feb 2021 09:00)  HR: 86 (26 Feb 2021 13:56) (81 - 88)  BP: 111/80 (26 Feb 2021 13:56) (105/72 - 118/81)  BP(mean): --  RR: 18 (26 Feb 2021 13:56) (16 - 18)  SpO2: 98% (26 Feb 2021 13:56) (98% - 100%)  CAPILLARY BLOOD GLUCOSE      POCT Blood Glucose.: 156 mg/dL (26 Feb 2021 12:02)  POCT Blood Glucose.: 136 mg/dL (26 Feb 2021 10:49)  POCT Blood Glucose.: 124 mg/dL (26 Feb 2021 08:58)  POCT Blood Glucose.: 199 mg/dL (25 Feb 2021 22:08)  POCT Blood Glucose.: 246 mg/dL (25 Feb 2021 17:24)    I&O's Summary    25 Feb 2021 07:01  -  26 Feb 2021 07:00  --------------------------------------------------------  IN: 960 mL / OUT: 2001 mL / NET: -1041 mL    26 Feb 2021 07:01  -  26 Feb 2021 14:04  --------------------------------------------------------  IN: 0 mL / OUT: 0 mL / NET: 0 mL        PHYSICAL EXAM:  GENERAL: NAD, well-developed  HEAD:  Atraumatic, Normocephalic  EYES: EOMI, PERRLA, conjunctiva and sclera clear  NECK: Supple,   CHEST/LUNG: Clear to auscultation b/l  HEART: Regular rate and rhythm; + systolic murmur  ABDOMEN: Soft, Nontender, Nondistended; Bowel sounds present  EXTREMITIES:  2+ Peripheral Pulses, No LE edema + LUE AVF+ thrill   NEUROLOGY: AAOx3, non-focal  PSYCH: calm  SKIN: diffuse excoriations and skin lesion (improving)    LABS:                        12.7   5.22  )-----------( 112      ( 26 Feb 2021 07:33 )             42.2     02-26    134<L>  |  90<L>  |  61<H>  ----------------------------<  136<H>  4.3   |  23  |  6.20<H>    Ca    10.0      26 Feb 2021 09:13  Phos  6.2     02-25  Mg     2.5     02-25                RADIOLOGY & ADDITIONAL TESTS:    Imaging Personally Reviewed:  BHARAT Duplex   IMPRESSION: Patency of left radiocephalic AV fistula.  No duplex evidence of stenosis of the outflow vein.  Biphasic flow in the radial artery distal to the fistula suggests steal phenomenon.    Care Discussed with Consultants/Other Providers: Medicine NP and vascular

## 2021-02-26 NOTE — PROGRESS NOTE ADULT - ASSESSMENT
56 YO M CAD s/p stent in 2016, HFrEF (EF 15-20%, LEVEDD 6.6), subQ ICD, Severe MR, ESRD since 2019, IDDM, HTN, here for shortness of breath and edema. Continued on daily HD/UF with improvement in symptoms. Weights continue to drop. Will continue optimization for plan of doing RHC/LHC once optimized for evaluation for heart/kidney transplant (currently underway at [facility]).

## 2021-02-26 NOTE — CONSULT NOTE ADULT - CONSULT REQUESTED DATE/TIME
17-Feb-2021 15:47
17-Feb-2021 16:25
17-Feb-2021 14:49
19-Feb-2021 15:43
26-Feb-2021 20:04
24-Feb-2021 16:59

## 2021-02-26 NOTE — PROGRESS NOTE ADULT - PROBLEM SELECTOR PLAN 1
Acute on Chronic systolic Heart failure  - pt makes minimal urine, renal consult appreciated  - Underwent HD  2/17 - 2/23- tolerated well  - HD tomorrow. Order for midodrine 5 mg x1 30 min prior to HD  - K wnl today  - C/w Lipitor  - HF following, recs noted: s/p Left and Right Cath pending results   - weight 69.5 (dry weight 70) euvolemic   - CT sx consult recs note: Patient to be presented at Heart transplant meeting

## 2021-02-26 NOTE — CONSULT NOTE ADULT - REASON FOR ADMISSION
Acute on Chronic Systolic Heart Failure, Volume overload
Acute on Chronic Systolic Heart Failure, Volume overload
Work up for heart/kidney transplant
Acute on Chronic Systolic Heart Failure, Volume overload

## 2021-02-26 NOTE — CONSULT NOTE ADULT - SUBJECTIVE AND OBJECTIVE BOX
----------------------------------------------------------  Interventional Radiology Brief Consult Note  -----------------------------------------------------------    Reason for Referral: LUE Fistulogram    Clinical Summary: 56y Male PMHx of T2DM, HTN, CAD (s/p stent 2016), HFrEF (EF 15-20%) s/p ICD, ESRD on HD T/Th/Sat via LUE AV fistula, GIB 2/2 ulcer sent in by his Cardiologist for volume overload found to have difficulty clearing electrolytes through HD with concern for recirculation and outflow stenosis.       Vitals:  T(C): 36.6 (02-26-21 @ 19:52), Max: 36.8 (02-26-21 @ 09:00)  HR: 89 (02-26-21 @ 19:52) (81 - 89)  BP: 123/81 (02-26-21 @ 19:52) (105/72 - 123/81)  RR: 18 (02-26-21 @ 19:52) (16 - 18)  SpO2: 99% (02-26-21 @ 19:52) (98% - 100%)    Labs:           12.7  5.22)-----(112     (02-26-21 @ 07:33)         42.2     134 | 90 | 61  --------------------< 136     (02-26-21 @ 09:13)  4.3 | 23 | 6.20         Assessment: 56y Male PMHx of T2DM, HTN, CAD (s/p stent 2016), HFrEF (EF 15-20%) s/p ICD, ESRD on HD T/Th/Sat via LUE AV fistula, GIB 2/2 ulcer sent in by his Cardiologist for volume overload.  Found to have difficulty clearing electrolytes through HD with concern for recirculation and outflow stenosis.  LUE AVF doppler shows concern for radial steal phenomenon  IR consulted for LUE fistulogram.    Recommendations:  - Will plan for LUE fistulogram on Monday 3/1.  - Please place IR procedure order under attending Dr. Michaels.  - NPO after midnight on 2/28.  - CBC, BMP, PT/INR and PTT in AM of 3/1.  - Repeat COVID test before Monday 3/1.  - Dr. Michaels discussed with vascular.       ----------------------------------------------------------  Interventional Radiology Brief Consult Note  -----------------------------------------------------------    Reason for Referral: LUE Fistulogram    Clinical Summary: 56y Male PMHx of T2DM, HTN, CAD (s/p stent 2016), HFrEF (EF 15-20%) s/p ICD, ESRD on HD T/Th/Sat via LUE AV fistula, GIB 2/2 ulcer sent in by his Cardiologist for volume overload found to have difficulty clearing electrolytes through HD with concern for recirculation and outflow stenosis.       Vitals:  T(C): 36.6 (02-26-21 @ 19:52), Max: 36.8 (02-26-21 @ 09:00)  HR: 89 (02-26-21 @ 19:52) (81 - 89)  BP: 123/81 (02-26-21 @ 19:52) (105/72 - 123/81)  RR: 18 (02-26-21 @ 19:52) (16 - 18)  SpO2: 99% (02-26-21 @ 19:52) (98% - 100%)    Labs:           12.7  5.22)-----(112     (02-26-21 @ 07:33)         42.2     134 | 90 | 61  --------------------< 136     (02-26-21 @ 09:13)  4.3 | 23 | 6.20         Assessment: 56y Male PMHx of T2DM, HTN, CAD (s/p stent 2016), HFrEF (EF 15-20%) s/p ICD, ESRD on HD T/Th/Sat via LUE AV fistula, GIB 2/2 ulcer sent in by his Cardiologist for volume overload.  Found to have difficulty clearing electrolytes through HD with concern for recirculation and outflow stenosis. LUE AVF doppler shows concern for radial steal phenomenon. IR consulted for LUE fistulogram.    Recommendations:  - Will plan for LUE fistulogram on Monday 3/1.  - Please place IR procedure order under attending Dr. Michaels.  - NPO after midnight on 2/28.  - CBC, BMP, PT/INR and PTT in AM of 3/1.  - Repeat COVID test before Monday 3/1.  - Dr. Michaels discussed with vascular.

## 2021-02-26 NOTE — PROGRESS NOTE ADULT - PROBLEM SELECTOR PLAN 1
- Pt looks to be euvolemic.   - volume removal optimization per renal   Etiology:  ICM given CAD  GDMT: holding home losartan 12.5mg for now given frequent dialysis  Diuretics: Diuresis with dialysis, tolerating well,   Advanced therapies: Patient undergoing transplant eval at [facility]. Please call CT surgery for transplant evaluation.   - Based on RHC, would start the pt on losartan 25 mg on non-HD days.   - Otherwise no contraindication for discharge from HF stand point. - Pt looks to be euvolemic.   - volume removal optimization per renal   Etiology:  ICM given CAD  GDMT: holding home losartan 12.5mg for now given frequent dialysis  Diuretics: Diuresis with dialysis, tolerating well,   Advanced therapies: Patient undergoing transplant eval at [facility]. Please call CT surgery for transplant evaluation.   - Otherwise no contraindication for discharge from HF stand point.

## 2021-02-26 NOTE — PROGRESS NOTE ADULT - SUBJECTIVE AND OBJECTIVE BOX
Patient seen and examined at bedside.    Overnight Events: Pt feeling well. No acute events overnight.      REVIEW OF SYSTEMS:  Constitutional:     [ ] negative [ ] fevers [ ] chills [ ] weight loss [ ] weight gain  HEENT:                  [ ] negative [ ] dry eyes [ ] eye irritation [ ] postnasal drip [ ] nasal congestion  CV:                         [ ] negative  [ ] chest pain [ ] orthopnea [ ] palpitations [ ] murmur  Resp:                     [ ] negative [ ] cough [ ] shortness of breath [ ] dyspnea [ ] wheezing [ ] sputum [ ]hemoptysis  GI:                          [ ] negative [ ] nausea [ ] vomiting [ ] diarrhea [ ] constipation [ ] abd pain [ ] dysphagia   :                        [ ] negative [ ] dysuria [ ] nocturia [ ] hematuria [ ] increased urinary frequency  Musculoskeletal: [ ] negative [ ] back pain [ ] myalgias [ ] arthralgias [ ] fracture  Skin:                       [ ] negative [ ] rash [ ] itch  Neurological:        [ ] negative [ ] headache [ ] dizziness [ ] syncope [ ] weakness [ ] numbness  Psychiatric:           [ ] negative [ ] anxiety [ ] depression  Endocrine:            [ ] negative [ ] diabetes [ ] thyroid problem  Heme/Lymph:      [ ] negative [ ] anemia [ ] bleeding problem  Allergic/Immune: [ ] negative [ ] itchy eyes [ ] nasal discharge [ ] hives [ ] angioedema    [ x] All other systems negative  [ ] Unable to assess ROS due to    Current Meds:  acetaminophen   Tablet .. 650 milliGRAM(s) Oral every 6 hours PRN  atorvastatin 80 milliGRAM(s) Oral at bedtime  calcium acetate 2001 milliGRAM(s) Oral three times a day with meals  camphor 0.5%/menthol 0.5% Topical Lotion 1 Application(s) Topical two times a day  chlorhexidine 4% Liquid 1 Application(s) Topical daily  clobetasol 0.05% Ointment 1 Application(s) Topical two times a day  dextrose 40% Gel 15 Gram(s) Oral once  dextrose 5%. 1000 milliLiter(s) IV Continuous <Continuous>  dextrose 50% Injectable 25 Gram(s) IV Push once  doxycycline hyclate Capsule 100 milliGRAM(s) Oral every 12 hours  glucagon  Injectable 1 milliGRAM(s) IntraMuscular once  insulin lispro (ADMELOG) corrective regimen sliding scale   SubCutaneous three times a day before meals  insulin lispro (ADMELOG) corrective regimen sliding scale   SubCutaneous at bedtime  mupirocin 2% Ointment 1 Application(s) Topical two times a day  pantoprazole    Tablet 40 milliGRAM(s) Oral before breakfast      PAST MEDICAL & SURGICAL HISTORY:  Congestive heart disease    ESRD (end stage renal disease) on dialysis    GIB (gastrointestinal bleeding)  was treated  with H pyelori stable since Dec 10, 2018    HFrEF (heart failure with reduced ejection fraction)  EF 20-24%    Myocardial infarction  Jan 2016    Hyperlipidemia    CAD (coronary artery disease)  2016, coronary artery stentX1    DM (diabetes mellitus)  2  not taking any medication lat4st HgA1c 5.7    HTN (hypertension)    History of implantable cardioverter-defibrillator (ICD) placement    Stented coronary artery  ? 1    Arterial stent thrombosis        Vitals:  T(F): 97.4 (02-26), Max: 98.2 (02-26)  HR: 85 (02-26) (81 - 88)  BP: 118/79 (02-26) (105/72 - 118/81)  RR: 18 (02-26)  SpO2: 99% (02-26)  I&O's Summary    25 Feb 2021 07:01  -  26 Feb 2021 07:00  --------------------------------------------------------  IN: 960 mL / OUT: 2001 mL / NET: -1041 mL    26 Feb 2021 07:01  -  26 Feb 2021 18:44  --------------------------------------------------------  IN: 360 mL / OUT: 0 mL / NET: 360 mL        Physical Exam:  Appearance: No acute distress; well appearing  Eyes: PERRL, EOMI, pink conjunctiva  HENT: Normal oral mucosa  Cardiovascular: RRR, S1, S2, no murmurs, rubs, or gallops; no edema; no JVD  Respiratory: Clear to auscultation bilaterally  Gastrointestinal: soft, non-tender, non-distended with normal bowel sounds  Musculoskeletal: No clubbing; no joint deformity   Neurologic: Non-focal  Lymphatic: No lymphadenopathy  Psychiatry: AAOx3, mood & affect appropriate  Skin: No rashes, ecchymoses, or cyanosis                          12.7   5.22  )-----------( 112      ( 26 Feb 2021 07:33 )             42.2     02-26    134<L>  |  90<L>  |  61<H>  ----------------------------<  136<H>  4.3   |  23  |  6.20<H>    Ca    10.0      26 Feb 2021 09:13  Phos  6.2     02-25  Mg     2.5     02-25                    New ECG(s): Personally reviewed    Echo:  ec< from: Transthoracic Echocardiogram (01.08.21 @ 10:25) >  1. Tethered mitral valve leaflets with normal opening.  Severe mitral regurgitation.  2. Calcified trileaflet aortic valve with normal opening.  3. Moderately dilated left atrium.  LA volume index = 43  cc/m2.  4. Severe left ventricular enlargement.  5. Severe global left ventricular systolic dysfunction.  6. A device wire is noted in the right heart.  7. Right ventricular enlargement with decreased right  ventricular systolic function.    < end of copied text >      Interpretation of Telemetry: sinus 70 - 80

## 2021-02-27 NOTE — PROGRESS NOTE ADULT - PROBLEM SELECTOR PLAN 3
continue with dialysis  Please call CT surgery for transplant  currently being evaluated for heart kidney transplant at [facility]
continue with dialysis  currently being evaluated for heart kidney transplant.
continue with dialysis t/th/sat.   euvolemic now  currently being evaluated for heart kidney transplant at [facility]
- Pt not on asa 81mg because of retinal bleeding issues, this was d/c'ed ~1 year ago. Per Pt HF physician Dr. Pavon aware   - C/w lipitor 80
- pt not on asa 81mg because of retinal bleeding issues.  - c/w lipitor 80
- pt not on asa 81mg because of retinal bleeding issues.  - c/w lipitor 80
Presumed to be from ESRD complication, improving   - c/w Doxy and topical antibacterial ointments per his outpatient Dermatologist  - Derm consult appreciated: c/w Sarna and clobetasol 0.05% ointment BID
continue with dialysis t/th/sat.   Remains volume overload  Discussed with renal, will attempt 2L removal today and 3L tomorrow  currently being evaluated for heart kidney transplant at [facility]
continue with dialysis t/th/sat.   Please call CT surgery for transplant  currently being evaluated for heart kidney transplant at [facility]
- Pt not on asa 81mg because of retinal bleeding issues.  - C/w lipitor 80
Presumed to be from ESRD complication, improving   - c/w Doxy and topical antibacterial ointments per his outpatient Dermatologist  - Derm consult appreciated: c/w Sarna and clobetasol 0.05% ointment BID
Presumed to be from ESRD complication, improving   - c/w Doxy and topical antibacterial ointments per his outpatient Dermatologist  - Derm consult appreciated: c/w Sarna and clobetasol 0.05% ointment BID

## 2021-02-27 NOTE — PROGRESS NOTE ADULT - PROBLEM SELECTOR PLAN 7
DVT: SCD  Diet: CC, DASH, 1000cc fluid restriction  Dispo: likely home
- C/w Atorvastatin 80mg
DVT: SCD  Diet: CC, DASH, 1000cc fluid restriction  Dispo: likely home
- C/w Atorvastatin 80mg
DVT: SCD  Diet: CC, DASH, 1000cc fluid restriction  Dispo: likely home
- C/w Atorvastatin 80mg

## 2021-02-27 NOTE — PROGRESS NOTE ADULT - ASSESSMENT
Pt with ESRD on HD presents to the hospital for fluid overload      #ESRD on HD  Patient with ESRD on HD TTS, Outpatient HD: [facility] dialysis facility. Nephrologist Dr. ESTHER Pavon  Pt to achieve goal weight 70 kg.  - Lab reviewed. Pt clinically stable.   - Will plan for HD today 2L as tolerated to BP.   - Losartan on hold. Give Midodrine 5mg prior to HD  - Monitor renal panel    #Hypertension  - Pt on Losartan 12.5mg BID as home medication  - Losartan on hold    #Anemia  Patient with anemia in the setting of ESRD. Hemoglobin within target range. Pt on retacrit 2000 units as outpatient  - Will hold FATOUMATA for now. Monitor hemoglobin.    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

## 2021-02-27 NOTE — PROGRESS NOTE ADULT - PROBLEM SELECTOR PROBLEM 5
Essential hypertension
Type 2 diabetes mellitus with other specified complication, unspecified whether long term insulin use
Essential hypertension
Type 2 diabetes mellitus with other specified complication, unspecified whether long term insulin use
Essential hypertension
Type 2 diabetes mellitus with other specified complication, unspecified whether long term insulin use

## 2021-02-27 NOTE — PROGRESS NOTE ADULT - PROBLEM SELECTOR PLAN 1
Acute on Chronic systolic Heart failure  - pt makes minimal urine, renal consult appreciated  - Underwent HD  multiple time for fluid overload now euvolemic   - HD today, hyperkalemic   - C/w Lipitor  - HF following, recs noted: s/p Left and Right Cath pending results rec f/u Dr. Pavon outpt  - CT sx consult recs note: Patient presented at Heart transplant meeting

## 2021-02-27 NOTE — PROGRESS NOTE ADULT - PROBLEM SELECTOR PLAN 2
presumed to be from ESRD complication, improving   - c/w Doxy and topical antibacterial ointments per his outpatient Dermatologist  - Derm consult appreciated: c/w Sarna and clobetasol 0.05% ointment BID
Unknown anatomy   c/w ASA 81mg daily  c/w atorvastatin 80mg qHS.
Unknown anatomy   c/w ASA 81mg daily  c/w atorvastatin 80mg qHS.
triple vessel disease.   c/w ASA 81mg daily  c/w atorvastatin 80mg qHS.
Nephrology following:  concern for possible outflow stenosis of AVF  - LUE AVF duplex: no evidence of stenosis but + radial steal phenomenon  - I d/w vascular sx: rec LUE fistulogram by IR as cannot rule out stenosis   - IR consult placed: aashish for LUE Fistulogram Monday (pt refusing see below)  - COVID PCR ordered
Presumed to be from ESRD complication, improving   - c/w Doxy and topical antibacterial ointments per his outpatient Dermatologist  - Derm consult appreciated: c/w Sarna and clobetasol 0.05% ointment BID
presumed to be from ESRD complication, ? self induced (improving)  - c/w Doxy and topical antibacterial ointments per his outpatient Dermatologist  - Derm consult appreciated: c/w Sarna and clobetasol 0.05% ointment BID
presumed to be from ESRD complication, improving   - c/w Doxy and topical antibacterial ointments per his outpatient Dermatologist  - Derm consult appreciated: c/w Sarna and clobetasol 0.05% ointment BID
Unknown anatomy   please restart ASA 81mg daily  c/w atorvastatin 80mg qHS.
Presumed to be from ESRD complication, improving   - c/w Doxy and topical antibacterial ointments per his outpatient Dermatologist  - Derm consult appreciated: c/w Sarna and clobetasol 0.05% ointment BID
Unknown anatomy   please restart ASA 81mg daily  c/w atorvastatin 80mg qHS.
Presumed to be from ESRD complication, improving   - c/w Doxy and topical antibacterial ointments per his outpatient Dermatologist  - Derm consult appreciated: c/w Sarna and clobetasol 0.05% ointment BID
Nephrology following:  concern for possible outflow stenosis of AVF  - Vascular consulted rec BHARAT AVF duplex (ordered)
Nephrology following:  concern for possible outflow stenosis of AVF  - LUE AVF duplex: no evidence of stenosis but + radial steal phenomenon  - I d/w vascular sx: rec LUE fistulogram by IR as cannot rule out stenosis   - IR consult placed
presumed to be from ESRD complication, improving   - c/w Doxy and topical antibacterial ointments per his outpatient Dermatologist  - Derm consult appreciated: c/w Sarna and clobetasol 0.05% ointment BID

## 2021-02-27 NOTE — PROGRESS NOTE ADULT - PROBLEM SELECTOR PROBLEM 7
Prophylactic measure
Prophylactic measure
Hyperlipidemia, unspecified hyperlipidemia type
Hyperlipidemia, unspecified hyperlipidemia type
Prophylactic measure
Prophylactic measure
Hyperlipidemia, unspecified hyperlipidemia type
Prophylactic measure

## 2021-02-27 NOTE — PROGRESS NOTE ADULT - PROBLEM SELECTOR PROBLEM 4
Bone metabolism disorder
Rash
Bone metabolism disorder
Rash
Type 2 diabetes mellitus with other specified complication, unspecified whether long term insulin use
Coronary artery disease involving native heart without angina pectoris, unspecified vessel or lesion type
Type 2 diabetes mellitus with other specified complication, unspecified whether long term insulin use
Coronary artery disease involving native heart without angina pectoris, unspecified vessel or lesion type
Coronary artery disease involving native heart without angina pectoris, unspecified vessel or lesion type

## 2021-02-27 NOTE — PROGRESS NOTE ADULT - PROBLEM SELECTOR PLAN 6
- C/w Atorvastatin 80mg
Atorvastatin 80mg
- C/w Atorvastatin 80mg
- Hold losartan as BP soft and on d/c   - C/w Midodrine 5mg standing 30 minus prior to HD, to minimize the risk of hypotension
Atorvastatin 80mg
- Hold losartan as BP soft and will be getting multiple HD session for volume overload   - C/w Midodrine 5mg standing 30 minus prior to HD, to minimize the risk of hypotension
- C/w Atorvastatin 80mg
- Hold losartan as BP soft and will be getting multiple HD session for volume overload   - C/w Midodrine 5mg standing 30 minus prior to HD, to minimize the risk of hypotension

## 2021-02-27 NOTE — PROGRESS NOTE ADULT - PROBLEM SELECTOR PROBLEM 3
Rash and nonspecific skin eruption
Hypertension
Coronary artery disease involving native heart without angina pectoris, unspecified vessel or lesion type
Hypertension
ESRD (end stage renal disease) on dialysis
Coronary artery disease involving native heart without angina pectoris, unspecified vessel or lesion type
Coronary artery disease involving native heart without angina pectoris, unspecified vessel or lesion type
Rash and nonspecific skin eruption
Coronary artery disease involving native heart without angina pectoris, unspecified vessel or lesion type
Rash and nonspecific skin eruption
Coronary artery disease involving native heart without angina pectoris, unspecified vessel or lesion type

## 2021-02-27 NOTE — CHART NOTE - NSCHARTNOTEFT_GEN_A_CORE
Called by RN, as patient wishes to leave AMA. Patient seen at bedside to further discuss plan of care. Discussed risks of leaving against medical advice, which includes worsening of current medical condition, up to and including death. Patient understands risks and still wishes to leave. AMA paperwork signed and placed in chart. Dr. Wright made aware and also spoke with patient.

## 2021-02-27 NOTE — PROGRESS NOTE ADULT - ATTENDING COMMENTS
I have seen this patient with the fellow and agree with their assessment and plan. Plan for HD today    Dana Mckeon MD  Cell   Pager   Office

## 2021-02-27 NOTE — PROGRESS NOTE ADULT - SUBJECTIVE AND OBJECTIVE BOX
[facility] DIVISION OF KIDNEY DISEASES AND HYPERTENSION -- FOLLOW UP NOTE  --------------------------------------------------------------------------------  24 hour events/subjective:    Patient was seen and examined this morning No signs of acute distress  No acute significant events overnight  Patient denies chest pain, SOB, nausea/vomiting, abdominal pain  Labs/Meds/Vitals reviewed  Planned for HD today      PAST HISTORY  --------------------------------------------------------------------------------  No significant changes to PMH, PSH, FHx, SHx, unless otherwise noted    ALLERGIES & MEDICATIONS  --------------------------------------------------------------------------------  Allergies    No Known Allergies    Intolerances      Standing Inpatient Medications  atorvastatin 80 milliGRAM(s) Oral at bedtime  calcium acetate 2001 milliGRAM(s) Oral three times a day with meals  camphor 0.5%/menthol 0.5% Topical Lotion 1 Application(s) Topical two times a day  chlorhexidine 4% Liquid 1 Application(s) Topical daily  clobetasol 0.05% Ointment 1 Application(s) Topical two times a day  doxycycline hyclate Capsule 100 milliGRAM(s) Oral every 12 hours  glucagon  Injectable 1 milliGRAM(s) IntraMuscular once  insulin lispro (ADMELOG) corrective regimen sliding scale   SubCutaneous three times a day before meals  insulin lispro (ADMELOG) corrective regimen sliding scale   SubCutaneous at bedtime  mupirocin 2% Ointment 1 Application(s) Topical two times a day  pantoprazole    Tablet 40 milliGRAM(s) Oral before breakfast    REVIEW OF SYSTEMS  --------------------------------------------------------------------------------  Gen: No fevers/chills  Respiratory: No dyspnea, cough  CV: No chest pain  GI: No abdominal pain, diarrhea  Psych: No significant depression    All other systems were reviewed and are negative, except as noted.    VITALS/PHYSICAL EXAM  --------------------------------------------------------------------------------  T(C): 36 (02-27-21 @ 05:48), Max: 36.6 (02-26-21 @ 19:52)  HR: 82 (02-27-21 @ 05:48) (82 - 89)  BP: 123/79 (02-27-21 @ 05:48) (111/80 - 124/75)  RR: 18 (02-27-21 @ 05:48) (18 - 18)  SpO2: 98% (02-27-21 @ 05:48) (98% - 99%)  Wt(kg): --  Height (cm): 162.6 (02-26-21 @ 07:40)  Weight (kg): 69.5 (02-26-21 @ 07:40)  BMI (kg/m2): 26.3 (02-26-21 @ 07:40)  BSA (m2): 1.75 (02-26-21 @ 07:40)    02-26-21 @ 07:01  -  02-27-21 @ 07:00  --------------------------------------------------------  IN: 610 mL / OUT: 0 mL / NET: 610 mL    02-27-21 @ 07:01  -  02-27-21 @ 12:44  --------------------------------------------------------  IN: 360 mL / OUT: 0 mL / NET: 360 mL    Physical Exam:  	Gen: NAD  	HEENT: MMM  	Pulm: CTA B/L  	CV: S1S2  	Abd: Soft, +BS   	Ext: No LE edema B/L  	Neuro: Awake  	Skin: Warm and dry  	Vascular access: LUE AVF +, bruit/thrill    LABS/STUDIES  --------------------------------------------------------------------------------              13.2   5.75  >-----------<  135      [02-27-21 @ 07:40]              43.0     128  |  84  |  81  ----------------------------<  115      [02-27-21 @ 07:40]  6.2   |  23  |  7.88        Ca     10.7     [02-27-21 @ 07:40]    Creatinine Trend:  SCr 7.88 [02-27 @ 07:40]  SCr 6.20 [02-26 @ 09:13]  SCr 6.12 [02-26 @ 07:27]  SCr 7.30 [02-25 @ 10:18]  SCr 5.70 [02-24 @ 07:20]

## 2021-02-27 NOTE — PROGRESS NOTE ADULT - PROBLEM SELECTOR PROBLEM 2
Anemia
Coronary artery disease involving native coronary artery of native heart without angina pectoris
Anemia
ESRD (end stage renal disease)
Anemia
Anemia
Rash and nonspecific skin eruption
Coronary artery disease involving native coronary artery of native heart without angina pectoris
Rash and nonspecific skin eruption
Coronary artery disease involving native coronary artery of native heart without angina pectoris
Rash and nonspecific skin eruption
Rash and nonspecific skin eruption
ESRD (end stage renal disease)
Rash and nonspecific skin eruption
ESRD (end stage renal disease)

## 2021-02-27 NOTE — DISCHARGE NOTE NURSING/CASE MANAGEMENT/SOCIAL WORK - PATIENT PORTAL LINK FT
You can access the FollowMyHealth Patient Portal offered by [facility] by registering at the following website: [email or web address]. By joining [employer]’s FollowMyHealth portal, you will also be able to view your health information using other applications (apps) compatible with our system.

## 2021-02-27 NOTE — PROGRESS NOTE ADULT - PROBLEM SELECTOR PROBLEM 6
Hyperlipidemia, unspecified hyperlipidemia type
Essential hypertension
Hyperlipidemia, unspecified hyperlipidemia type
Hyperlipidemia, unspecified hyperlipidemia type
Essential hypertension
Essential hypertension
Hyperlipidemia, unspecified hyperlipidemia type

## 2021-02-27 NOTE — PROGRESS NOTE ADULT - PROBLEM SELECTOR PLAN 5
- Hold losartan as BP soft and will be getting multiple HD session for volume overload   - C/w Midodrine 5mg standing 30 minus prior to HD, to minimize the risk of hypotension
holding losartan as BP soft and will be getting multiple HD session for volume overload   -Midodrine 5mg standing 30 minus prior to HD, to minimize the risk of hypotension
HbA1c 6.3 on admission On oral meds at home.  - Hold oral hypoglycemia agents.   - C/w insulin sliding scale coverage.   - C/w consistent carbohydrate diet
HbA1c 6.3 on admission On oral meds at home.  - Hold oral hypoglycemia agents.   - C/w insulin sliding scale coverage.   - C/w consistent carbohydrate diet
- Hold losartan as BP soft and will be getting multiple HD session for volume overload   - C/w Midodrine 5mg standing 30 minus prior to HD, to minimize the risk of hypotension
- Hold losartan as BP soft and will be getting multiple HD session for volume overload   - C/w Midodrine 5mg standing 30 minus prior to HD, to minimize the risk of hypotension
HbA1c 6.3 on admission On oral meds at home.  - Hold oral hypoglycemia agents.   - C/w insulin sliding scale coverage.   - C/w consistent carbohydrate diet
holding losartan as BP soft and will be getting multiple HD session for volume overload   -Midodrine 5mg x1 prior to HD today 2/18
holding losartan as BP soft and will be getting multiple HD session for volume overload   -Midodrine 5mg standing 30 minus prior to HD, to minimize the risk of hypotension
holding losartan as BP soft and will be getting multiple HD session for volume overload   -Midodrine 5mg standing 30 minus prior to HD, to minimize the risk of hypotension

## 2021-02-27 NOTE — PROGRESS NOTE ADULT - REASON FOR ADMISSION
Acute on Chronic Systolic Heart Failure, Volume overload

## 2021-02-27 NOTE — PROGRESS NOTE ADULT - SUBJECTIVE AND OBJECTIVE BOX
Patient is a 56y old  Male who presents with a chief complaint of Acute on Chronic Systolic Heart Failure, Volume overload (26 Feb 2021 20:02)      SUBJECTIVE / OVERNIGHT EVENTS: Patient seen and examined at bedside. States that he wants to leave today, he wants to leave AMA does not want to wait for the Fistulogram Monday. He denies any CP, SOB, abd pain and n/v.      ROS:  All other review of systems negative    Allergies    No Known Allergies    Intolerances        MEDICATIONS  (STANDING):  atorvastatin 80 milliGRAM(s) Oral at bedtime  calcium acetate 2001 milliGRAM(s) Oral three times a day with meals  camphor 0.5%/menthol 0.5% Topical Lotion 1 Application(s) Topical two times a day  chlorhexidine 4% Liquid 1 Application(s) Topical daily  clobetasol 0.05% Ointment 1 Application(s) Topical two times a day  dextrose 40% Gel 15 Gram(s) Oral once  dextrose 5%. 1000 milliLiter(s) (50 mL/Hr) IV Continuous <Continuous>  dextrose 50% Injectable 25 Gram(s) IV Push once  doxycycline hyclate Capsule 100 milliGRAM(s) Oral every 12 hours  glucagon  Injectable 1 milliGRAM(s) IntraMuscular once  insulin lispro (ADMELOG) corrective regimen sliding scale   SubCutaneous three times a day before meals  insulin lispro (ADMELOG) corrective regimen sliding scale   SubCutaneous at bedtime  mupirocin 2% Ointment 1 Application(s) Topical two times a day  pantoprazole    Tablet 40 milliGRAM(s) Oral before breakfast    MEDICATIONS  (PRN):  acetaminophen   Tablet .. 650 milliGRAM(s) Oral every 6 hours PRN Temp greater or equal to 38C (100.4F), Mild Pain (1 - 3)      Vital Signs Last 24 Hrs  T(C): 36 (27 Feb 2021 05:48), Max: 36.6 (26 Feb 2021 10:56)  T(F): 96.8 (27 Feb 2021 05:48), Max: 97.9 (26 Feb 2021 10:56)  HR: 82 (27 Feb 2021 05:48) (82 - 89)  BP: 123/79 (27 Feb 2021 05:48) (111/80 - 124/75)  BP(mean): --  RR: 18 (27 Feb 2021 05:48) (16 - 18)  SpO2: 98% (27 Feb 2021 05:48) (98% - 99%)  CAPILLARY BLOOD GLUCOSE      POCT Blood Glucose.: 156 mg/dL (27 Feb 2021 08:55)  POCT Blood Glucose.: 233 mg/dL (26 Feb 2021 21:12)  POCT Blood Glucose.: 135 mg/dL (26 Feb 2021 17:20)  POCT Blood Glucose.: 156 mg/dL (26 Feb 2021 12:02)  POCT Blood Glucose.: 136 mg/dL (26 Feb 2021 10:49)    I&O's Summary    26 Feb 2021 07:01  -  27 Feb 2021 07:00  --------------------------------------------------------  IN: 610 mL / OUT: 0 mL / NET: 610 mL        PHYSICAL EXAM:  GENERAL: NAD, well-developed  HEAD:  Atraumatic, Normocephalic  EYES: EOMI, PERRLA, conjunctiva and sclera clear  NECK: Supple, No JVD  CHEST/LUNG: Clear to auscultation bilaterally; No wheeze  HEART: Regular rate and rhythm; No murmurs, rubs, or gallops  ABDOMEN: Soft, Nontender, Nondistended; Bowel sounds present  EXTREMITIES:  2+ Peripheral Pulses, No clubbing, cyanosis, or edema  NEUROLOGY: AAOx3, non-focal  PSYCH: calm  SKIN: No rashes or lesions    LABS:                        13.2   5.75  )-----------( 135      ( 27 Feb 2021 07:40 )             43.0     02-27    128<L>  |  84<L>  |  81<H>  ----------------------------<  115<H>  6.2<HH>   |  23  |  7.88<H>    Ca    10.7<H>      27 Feb 2021 07:40  Phos  6.2     02-25  Mg     2.5     02-25                RADIOLOGY & ADDITIONAL TESTS:    Consultant(s) Notes Reviewed:  HF, Vascular and IR rec noted     Care Discussed with Consultants/Other Providers: Medicine NP

## 2021-02-27 NOTE — PROGRESS NOTE ADULT - PROBLEM SELECTOR PLAN 8
DVT: SCD  Diet: CC, DASH, 1000cc fluid restriction    Dispo: likely home Monday, d/w patient at length the importance of LUE Fistulogram, However is is refusing to stay in the hospital till Monday, states he wants to leave AMA. Patient has full capacity to make medical decisions is able to verbalize risks of declining procedure. He is willing to say for HD today.
DVT: SCD  Diet: CC, DASH, 1000cc fluid restriction  Dispo: likely home Monday
DVT: SCD  Diet: CC, DASH, 1000cc fluid restriction  Dispo: likely home

## 2021-03-03 PROBLEM — T82.898A INADEQUATE FLOW OF DIALYSIS ARTERIOVENOUS FISTULA, INITIAL ENCOUNTER: Status: RESOLVED | Noted: 2021-01-01 | Resolved: 2021-01-01

## 2021-03-03 PROBLEM — Z87.448 HISTORY OF END STAGE RENAL DISEASE: Status: RESOLVED | Noted: 2021-01-01 | Resolved: 2021-01-01

## 2021-03-03 NOTE — HISTORY OF PRESENT ILLNESS
[] : left radiocephalic fistula [FreeTextEntry1] : 1/14/2019 Dr. Paredes [FreeTextEntry4] : yesterday [FreeTextEntry5] : yesterday at 10 pm [FreeTextEntry6] : Dr. Oh

## 2021-03-03 NOTE — PROCEDURE
[D/C IV on discharge] : D/C IV on discharge [Site check for bleeding/hematoma/thrill/bruit] : Site check for bleeding/hematoma/thrill/bruit [Resume diet] : resume diet [Vital signs on admission the q 15 mins x2] : Vital signs on admission the q 15 mins x2 [FreeTextEntry1] : left arm fistula fistulogram [FreeTextEntry3] : TPA 2mg/3ml sterile water instilled into left arm fistula via 10cm speedlyser by Dr. Gonzalez at 720am

## 2021-03-05 NOTE — PROCEDURE
[D/C IV on discharge] : D/C IV on discharge [Resume diet] : resume diet [Site check for bleeding/hematoma/thrill/bruit] : Site check for bleeding/hematoma/thrill/bruit [Vital signs on admission the q 15 mins x2] : Vital signs on admission the q 15 mins x2 [FreeTextEntry1] : left arm fistula fistulogram/angioplasty

## 2021-03-10 NOTE — DISCUSSION/SUMMARY
[FreeTextEntry1] : Pt admitted on 2/17/21 and discharged on 2/27/21 @ [facility]. Pt came in with fluid restriction. No medical records available. Spoke with pt and stated feeling good and leave me alone. Note sent to residents.

## 2021-03-17 NOTE — PATIENT PROFILE ADULT - LAST BOWEL MOVEMENT DATE
[FreeTextEntry1] : 62 yr old F with Hx of Graves Disease s/p ROMANO hypothyroidism and osteoporosis\par 1) Graves Disease s/p ROMANO:\par Continue LT4 88 mcg daily\par Counselled on how to take medication appropriately\par Check TSI and TSHrAb\par \par 2) Osteoporosis:\par Will obtain previous records\par Check repeat BMD\par Discussed potential treatment options\par Check CMP and Vitamin D 25 OH level\par \par 3) HCM:\par Will screen for DM-check A1C\par Check Lipid profile
26-Mar-2019

## 2021-03-24 PROBLEM — I95.9 HYPOTENSION: Status: ACTIVE | Noted: 2021-01-01

## 2021-03-24 PROBLEM — E78.5 HYPERLIPIDEMIA, UNSPECIFIED HYPERLIPIDEMIA TYPE: Status: ACTIVE | Noted: 2018-03-14

## 2021-03-24 PROBLEM — E83.39 HYPERPHOSPHATEMIA: Status: ACTIVE | Noted: 2020-04-09

## 2021-03-24 PROBLEM — E11.9 DIABETES MELLITUS: Status: ACTIVE | Noted: 2018-03-14

## 2021-03-25 NOTE — HISTORY OF PRESENT ILLNESS
[de-identified] : 55 year old M w/ h/o IDDM (A1c 7.4 8/20) c/b possible retinopathy/nephropathy, HTN, CAD (s/p stent in [place] in 2016, unknown coronary anatomy), HFrEF (EF 15-20%, LVEDD 6.6 cm) s/p subQ ICD, severe MR (functional), ESRD on HD (since 1/19; T/Th/Sat; AV fistula), prior GIB 2/2 ulcer (H. pylori positive) who is being seen for follow-up. Is undergoing evaluation for heart/kidney transplant at [facility]. Patient was recently hospitalized in 02/2021 for acute on chronic heart failure exacerbation.  \par \par Patient main complaint is hypotension during dialysis session.  Patient's SBP was 87/89/109.  Patient is not taking any anti-hypertensives.  Patient's losartan 12.5mg was stopped during the hospitalization. Patient states because of low blood pressures his dialysis is interrupted and they give him a "medication" to bring his blood pressure up.  During hospitalization, patient received midodrine 5mg prior to HD.  Patient denies any lightheadedness, dizziness, chest pain, SOB.  Patient undergoing heart/kidney transplant workup in [facility].  \par \par Patient needs refills on all of his medications.  \par \par

## 2021-03-25 NOTE — PHYSICAL EXAM
[Normal] : normal rate, regular rhythm, normal S1 and S2 and no murmur heard [de-identified] : +ICD  [de-identified] : LUE fistula +bruit  [de-identified] : +hyperkeratotic lesions on R shin

## 2021-03-25 NOTE — ASSESSMENT
[FreeTextEntry1] : #Brandyn care maintenance \par - Pneumovax and Tdap received on 4/18\par - had colonoscopy in 10/20 and removed sessile polyps; per GI will need repeat colonoscopy prior to transplant \par \par D/W Dr. Bowens. \par \par RTC in 10 weeks.\par \par Jay Ford MD\par Internal Medicine PGY-3

## 2021-04-09 PROBLEM — I50.22 CHRONIC SYSTOLIC CHF (CONGESTIVE HEART FAILURE): Status: ACTIVE | Noted: 2019-03-12

## 2021-04-09 PROBLEM — Z87.898 HISTORY OF BRADYCARDIA: Status: RESOLVED | Noted: 2019-12-30 | Resolved: 2021-01-01

## 2021-04-13 NOTE — PHYSICAL EXAM
[General Appearance - Well Developed] : well developed [Normal Appearance] : normal appearance [General Appearance - Well Nourished] : well nourished [Normal Conjunctiva] : the conjunctiva exhibited no abnormalities [Normal Oral Mucosa] : normal oral mucosa [Normal Oropharynx] : normal oropharynx [] : no respiratory distress [Respiration, Rhythm And Depth] : normal respiratory rhythm and effort [Auscultation Breath Sounds / Voice Sounds] : lungs were clear to auscultation bilaterally [Heart Rate And Rhythm] : heart rate and rhythm were normal [Heart Sounds] : normal S1 and S2 [Murmurs] : no murmurs present [Abdomen Soft] : soft [Abdomen Tenderness] : non-tender [Abnormal Walk] : normal gait [Nail Clubbing] : no clubbing of the fingernails [Cyanosis, Localized] : no localized cyanosis [Skin Color & Pigmentation] : normal skin color and pigmentation [Skin Turgor] : normal skin turgor [Oriented To Time, Place, And Person] : oriented to person, place, and time [Impaired Insight] : insight and judgment were intact [Affect] : the affect was normal [FreeTextEntry1] : quarter sized lesions on legs healing w/o drainage

## 2021-04-13 NOTE — HISTORY OF PRESENT ILLNESS
[FreeTextEntry1] : 56 year old M w/ h/o IDDM (A1c 6.4 3/21) c/b possible retinopathy/nephropathy, HTN, CAD (s/p stent in [place] in 2016), HFrEF (EF 15-20%, LVEDD 6.6 cm) s/p subQ ICD, severe MR (functional), ESRD on HD (since 1/19; now M/T/Th/Sat; AV fistula), prior GIB 2/2 ulcer (H. pylori positive) who is being seen for follow-up. Is undergoing evaluation for heart/kidney transplant at [facility].   \laura pittman Since last visit, was interval admission (2/17-2/27) for volume removal with daily HD/UF with reduction in weight to 71-72 kg. Has been seen at [facility] for evaluation of heart/kidney with Dr. Vini Trevizo. \par \par Due to prior intolerance to BB, discussion since has been about possibly extracting subQ and converting to a CRT device but this will be pending decision by [facility] as placing a transvenous system will expose him to risk of infection. \laura pittman Pt is here for a follow up today. Reports can walk 1-2 block limited by fatigue. Previously was tolerating losartan 12.5 mg twice/day on non-HD days but has been stopped since last admission. He drinks less than 48 oz per day and is following a low salt diet. Denies orthopnea (2 pillows),  PND, chest pain, palpitations, syncope, ICD discharge, fever, chills or nausea. \laura pittman Was seen by hepatology and had fibroscan 1/29/20 which showed F4 fibrosis and an abdominal US was done subsequently on 2/9/20 which showed no cirrhosis. Has repeat liver studies scheduled at [facility]. \laura \laura He was seen by hematology for workup of thrombocytopenia and underwent BM Bx on 2/5/20 which showed normal trilineage hematopoiesis. \laura pittman Has developed lesions throughout his body that have are circular, raised, keratotic with occasional purulence, more prominent on lower extremities which have improved since last visit. Has been on doxycycline 100mg twice/day and some topical creams. Had seen a dermatologist (Dr. Maximiliano Villanueva) and had a biopsy (results unknown). Denies fevers/chills/NS. \par \par Received Covid vaccine (Pf[employer]) w/o adverse effect.

## 2021-04-13 NOTE — ASSESSMENT
[FreeTextEntry1] : 56 year old M w/ h/o IDDM (A1c 6.4 3/21) c/b possible retinopathy/nephropathy, HTN, CAD (s/p stent in [place] in 2016), HFrEF (EF 15-20%, LVEDD 6.6 cm) s/p subQ ICD, severe MR (functional), ESRD on HD (since 1/19; now M/T/Th/Sat; AV fistula), prior GIB 2/2 ulcer (H. pylori positive) who is being seen for follow-up. Is undergoing evaluation for heart/kidney transplant at [facility].  Currently endorses class III symptoms and is overloaded but low normotensive with inability to tolerate meds. Awaiting eval at [facility] for heart/kidney transplant. Of note, ABO O. \par \par 1. HFrEF - unclear etiology; prior known CAD; possibly HTN \par - previously on metoprolol 50 mg bid which has since been discontinued likely due to bradycardia at time of dialysis (likely vagal mediated); if not a candidate for transplant, will plan on extracting subQ and placing BiV dual chamber device despite risk of infection \par - He has been off of hydralazine (didn't feel well on it)\par - prev on losartan 12.5 mg twice/day which has since been on hold\par - while DM w/ complications is a relative contraindication, he will likely benefit from heart/kidney transplant as his DM is better controlled now (HgA1C 6.4%), he will continue to follow up with his endocrinologist\par - ECG obtained and reviewed\par \par 2. ESRD on HD; left AVF with + thrill and bruit; dry weight 71 kg\par - instructed to follow low potassium diet\par - avoid intake of salty foods which lead to volume overload, limit to less than 2000 mg per day\par - limit fluid to less than 48 oz per day\par \par 3. CAD - prior PCI- no active chest pain or EKG changes; recent LHC with poor targets and unlikely to benefit especially in light of significant scarring\par - previously on ASA 81 mg daily. Pt states he had bleeding behind the eye with last opth appt approx 6 months ago and was told to stop ASA 81 mg daily, no longer taking.\par - on Lipitor 80 mg qhs, 7/31/20 lipid profile at goal\par \par 4. Prior Claudication - likely 2/2 low flow with exertion \par - LAURI and arterial Dopplers with no evidence of significant stenoses\par \par 5. Dermatology - lesions of unclear etiology; possible manifestation from dialysis\par - improved; continue f/u \par \par RTC 3 months

## 2021-04-19 NOTE — PROGRESS NOTE ADULT - PROBLEM SELECTOR PROBLEM 4
Dr. Ortiz July, pt was in today to see you and before he left pt forgot to ask you for a refill on his viagra this was filled when you saw him 2019 I copied and pasted part of that note below. If you agree please sign med, thank you.      3.  Continue Viagra 50 mg
DM (diabetes mellitus)

## 2021-04-26 NOTE — PROGRESS NOTE ADULT - SUBJECTIVE AND OBJECTIVE BOX
Authored by Leslie Upton MD, PGY1  PATIENT:  DEMETRIO VILLALTA  [id number]    CHIEF COMPLAINT:  Patient is a 56y old  Male who presents with a chief complaint of symptomatic bradycaria (26 Apr 2021 17:07)      INTERVAL HISTORY OVERNIGHT EVENTS: Patient transferred.       REVIEW OF SYSTEMS:    Constitutional:     [ ] negative [ ] fevers [ ] chills [ ] weight loss [ ] weight gain  HEENT:                  [ ] negative [ ] dry eyes [ ] eye irritation [ ] postnasal drip [ ] nasal congestion  CV:                         [ ] negative  [ ] chest pain [ ] orthopnea [ ] palpitations [ ] murmur  Resp:                     [ ] negative [ ] cough [ ] shortness of breath [ ] dyspnea [ ] wheezing [ ] sputum [ ] hemoptysis  GI:                          [ ] negative [ ] nausea [ ] vomiting [ ] diarrhea [ ] constipation [ ] abd pain [ ] dysphagia   :                        [ ] negative [ ] dysuria [ ] nocturia [ ] hematuria [ ] increased urinary frequency  Musculoskeletal: [ ] negative [ ] back pain [ ] myalgias [ ] arthralgias [ ] fracture  Skin:                       [ ] negative [ ] rash [ ] itch  Neurological:        [ ] negative [ ] headache [ ] dizziness [ ] syncope [ ] weakness [ ] numbness  Psychiatric:           [ ] negative [ ] anxiety [ ] depression  Endocrine:            [ ] negative [ ] diabetes [ ] thyroid problem  Heme/Lymph:      [ ] negative [ ] anemia [ ] bleeding problem  Allergic/Immune: [ ] negative [ ] itchy eyes [ ] nasal discharge [ ] hives [ ] angioedema    [ ] All other systems negative  [ ] Unable to assess ROS because ________.    MEDICATIONS:  MEDICATIONS  (STANDING):  atorvastatin 80 milliGRAM(s) Oral at bedtime  calcium acetate 667 milliGRAM(s) Oral three times a day with meals  dextrose 40% Gel 15 Gram(s) Oral once  dextrose 5%. 1000 milliLiter(s) (50 mL/Hr) IV Continuous <Continuous>  dextrose 5%. 1000 milliLiter(s) (100 mL/Hr) IV Continuous <Continuous>  dextrose 50% Injectable 25 Gram(s) IV Push once  dextrose 50% Injectable 12.5 Gram(s) IV Push once  dextrose 50% Injectable 25 Gram(s) IV Push once  glucagon  Injectable 1 milliGRAM(s) IntraMuscular once  insulin lispro (ADMELOG) corrective regimen sliding scale   SubCutaneous three times a day before meals  insulin lispro (ADMELOG) corrective regimen sliding scale   SubCutaneous at bedtime  midodrine. 10 milliGRAM(s) Oral every 8 hours    MEDICATIONS  (PRN):      ALLERGIES:  Allergies    No Known Allergies    Intolerances        OBJECTIVE:  ICU Vital Signs Last 24 Hrs  T(C): 36.8 (26 Apr 2021 17:35), Max: 36.8 (26 Apr 2021 09:10)  T(F): 98.3 (26 Apr 2021 17:35), Max: 98.3 (26 Apr 2021 17:35)  HR: 53 (26 Apr 2021 17:35) (53 - 63)  BP: 79/48 (26 Apr 2021 17:35) (75/49 - 123/94)  BP(mean): 57 (26 Apr 2021 17:35) (57 - 68)  ABP: --  ABP(mean): --  RR: 18 (26 Apr 2021 17:35) (16 - 18)  SpO2: 100% (26 Apr 2021 17:35) (98% - 100%)      Adult Advanced Hemodynamics Last 24 Hrs  CVP(mm Hg): --  CVP(cm H2O): --  CO: --  CI: --  PA: --  PA(mean): --  PCWP: --  SVR: --  SVRI: --  PVR: --  PVRI: --  CAPILLARY BLOOD GLUCOSE      POCT Blood Glucose.: 110 mg/dL (26 Apr 2021 09:14)    CAPILLARY BLOOD GLUCOSE      POCT Blood Glucose.: 110 mg/dL (26 Apr 2021 09:14)    I&O's Summary    Daily Height in cm: 162.56 (26 Apr 2021 17:07)    Daily     PHYSICAL EXAMINATION:  General: WN/WD NAD  HEENT: PERRLA, EOMI, moist mucous membranes  Neurology: A&Ox3, nonfocal, WOODS x 4  Respiratory: CTA B/L, normal respiratory effort, no wheezes, crackles, rales  CV: RRR, S1S2, no murmurs, rubs or gallops  Abdominal: Soft, NT, ND +BS, Last BM  Extremities: No edema, + peripheral pulses  Incisions:   Tubes:    LABS:                          12.2   6.29  )-----------( 124      ( 26 Apr 2021 10:00 )             41.5     04-26    x   |  x   |  x   ----------------------------<  x   3.8   |  x   |  x     Ca    9.5      26 Apr 2021 11:01  Phos  5.6     04-26  Mg     2.5     04-26    TPro  7.6  /  Alb  4.0  /  TBili  0.5  /  DBili  x   /  AST  26  /  ALT  20  /  AlkPhos  127<H>  04-26    LIVER FUNCTIONS - ( 26 Apr 2021 10:00 )  Alb: 4.0 g/dL / Pro: 7.6 g/dL / ALK PHOS: 127 U/L / ALT: 20 U/L / AST: 26 U/L / GGT: x                       TELEMETRY:     EKG:     IMAGING:       Authored by Leslie Upton MD, PGY1  PATIENT:  DEMETRIO VILLALTA  [id number]    CHIEF COMPLAINT:  Patient is a 56y old  Male who presents with a chief complaint of symptomatic bradycaria (26 Apr 2021 17:07)      INTERVAL HISTORY OVERNIGHT EVENTS: Patient accepted to CCU.       REVIEW OF SYSTEMS:    Constitutional:     [ ] negative [ ] fevers [ ] chills [ ] weight loss [ ] weight gain  HEENT:                  [ ] negative [ ] dry eyes [ ] eye irritation [ ] postnasal drip [ ] nasal congestion  CV:                         [ ] negative  [ ] chest pain [ ] orthopnea [ ] palpitations [ ] murmur  Resp:                     [ ] negative [ ] cough [ ] shortness of breath [ ] dyspnea [ ] wheezing [ ] sputum [ ] hemoptysis  GI:                          [ ] negative [ ] nausea [ ] vomiting [ ] diarrhea [ ] constipation [ ] abd pain [ ] dysphagia   :                        [ ] negative [ ] dysuria [ ] nocturia [ ] hematuria [ ] increased urinary frequency  Musculoskeletal: [ ] negative [ ] back pain [ ] myalgias [ ] arthralgias [ ] fracture  Skin:                       [ ] negative [ ] rash [ ] itch  Neurological:        [ ] negative [ ] headache [ ] dizziness [ ] syncope [ ] weakness [ ] numbness  Psychiatric:           [ ] negative [ ] anxiety [ ] depression  Endocrine:            [ ] negative [ ] diabetes [ ] thyroid problem  Heme/Lymph:      [ ] negative [ ] anemia [ ] bleeding problem  Allergic/Immune: [ ] negative [ ] itchy eyes [ ] nasal discharge [ ] hives [ ] angioedema    [ ] All other systems negative  [ ] Unable to assess ROS because ________.    MEDICATIONS:  MEDICATIONS  (STANDING):  atorvastatin 80 milliGRAM(s) Oral at bedtime  calcium acetate 667 milliGRAM(s) Oral three times a day with meals  dextrose 40% Gel 15 Gram(s) Oral once  dextrose 5%. 1000 milliLiter(s) (50 mL/Hr) IV Continuous <Continuous>  dextrose 5%. 1000 milliLiter(s) (100 mL/Hr) IV Continuous <Continuous>  dextrose 50% Injectable 25 Gram(s) IV Push once  dextrose 50% Injectable 12.5 Gram(s) IV Push once  dextrose 50% Injectable 25 Gram(s) IV Push once  glucagon  Injectable 1 milliGRAM(s) IntraMuscular once  insulin lispro (ADMELOG) corrective regimen sliding scale   SubCutaneous three times a day before meals  insulin lispro (ADMELOG) corrective regimen sliding scale   SubCutaneous at bedtime  midodrine. 10 milliGRAM(s) Oral every 8 hours    MEDICATIONS  (PRN):      ALLERGIES:  Allergies    No Known Allergies    Intolerances        OBJECTIVE:  ICU Vital Signs Last 24 Hrs  T(C): 36.8 (26 Apr 2021 17:35), Max: 36.8 (26 Apr 2021 09:10)  T(F): 98.3 (26 Apr 2021 17:35), Max: 98.3 (26 Apr 2021 17:35)  HR: 53 (26 Apr 2021 17:35) (53 - 63)  BP: 79/48 (26 Apr 2021 17:35) (75/49 - 123/94)  BP(mean): 57 (26 Apr 2021 17:35) (57 - 68)  ABP: --  ABP(mean): --  RR: 18 (26 Apr 2021 17:35) (16 - 18)  SpO2: 100% (26 Apr 2021 17:35) (98% - 100%)      Adult Advanced Hemodynamics Last 24 Hrs  CVP(mm Hg): --  CVP(cm H2O): --  CO: --  CI: --  PA: --  PA(mean): --  PCWP: --  SVR: --  SVRI: --  PVR: --  PVRI: --  CAPILLARY BLOOD GLUCOSE      POCT Blood Glucose.: 110 mg/dL (26 Apr 2021 09:14)    CAPILLARY BLOOD GLUCOSE      POCT Blood Glucose.: 110 mg/dL (26 Apr 2021 09:14)    I&O's Summary    Daily Height in cm: 162.56 (26 Apr 2021 17:07)    Daily     PHYSICAL EXAMINATION:  Appearance: NAD	  HEENT:   Normal oral mucosa, PERRL, EOMI	  Lymphatic: No lymphadenopathy  Cardiovascular: Normal S1 S2, No JVD, No murmurs, No edema  Respiratory: Lungs clear to auscultation	  Psychiatry: A & O x 3, Mood & affect appropriate  Gastrointestinal:  Soft, Non-tender, + BS	  Skin: No rashes, No ecchymoses, No cyanosis	  Neurologic: Non-focal  Extremities: Normal range of motion, No clubbing, cyanosis or edema  Vascular: Peripheral pulses palpable 2+ bilaterally    LABS:                          12.2   6.29  )-----------( 124      ( 26 Apr 2021 10:00 )             41.5     04-26    x   |  x   |  x   ----------------------------<  x   3.8   |  x   |  x     Ca    9.5      26 Apr 2021 11:01  Phos  5.6     04-26  Mg     2.5     04-26    TPro  7.6  /  Alb  4.0  /  TBili  0.5  /  DBili  x   /  AST  26  /  ALT  20  /  AlkPhos  127<H>  04-26    LIVER FUNCTIONS - ( 26 Apr 2021 10:00 )  Alb: 4.0 g/dL / Pro: 7.6 g/dL / ALK PHOS: 127 U/L / ALT: 20 U/L / AST: 26 U/L / GGT: x                       TELEMETRY:     EKG:     IMAGING:       Authored by Leslie Upton MD, PGY1  PATIENT:  DEMETRIO VILLALTA  [id number]    CHIEF COMPLAINT:  Patient is a 56y old  Male who presents with a chief complaint of symptomatic bradycaria (26 Apr 2021 17:07)      INTERVAL HISTORY OVERNIGHT EVENTS: Patient accepted to CCU. Started on dobutamine for inotropic support.       REVIEW OF SYSTEMS:    Constitutional:     [ ] negative [ ] fevers [ ] chills [ ] weight loss [ ] weight gain  HEENT:                  [ ] negative [ ] dry eyes [ ] eye irritation [ ] postnasal drip [ ] nasal congestion  CV:                         [ ] negative  [ ] chest pain [ ] orthopnea [ ] palpitations [ ] murmur  Resp:                     [ ] negative [ ] cough [ ] shortness of breath [ ] dyspnea [ ] wheezing [ ] sputum [ ] hemoptysis  GI:                          [ ] negative [ ] nausea [ ] vomiting [ ] diarrhea [ ] constipation [ ] abd pain [ ] dysphagia   :                        [ ] negative [ ] dysuria [ ] nocturia [ ] hematuria [ ] increased urinary frequency  Musculoskeletal: [ ] negative [ ] back pain [ ] myalgias [ ] arthralgias [ ] fracture  Skin:                       [ ] negative [ ] rash [ ] itch  Neurological:        [ ] negative [ ] headache [ ] dizziness [ ] syncope [ ] weakness [ ] numbness  Psychiatric:           [ ] negative [ ] anxiety [ ] depression  Endocrine:            [ ] negative [ ] diabetes [ ] thyroid problem  Heme/Lymph:      [ ] negative [ ] anemia [ ] bleeding problem  Allergic/Immune: [ ] negative [ ] itchy eyes [ ] nasal discharge [ ] hives [ ] angioedema    [ ] All other systems negative  [ ] Unable to assess ROS because ________.    MEDICATIONS:  MEDICATIONS  (STANDING):  atorvastatin 80 milliGRAM(s) Oral at bedtime  calcium acetate 667 milliGRAM(s) Oral three times a day with meals  dextrose 40% Gel 15 Gram(s) Oral once  dextrose 5%. 1000 milliLiter(s) (50 mL/Hr) IV Continuous <Continuous>  dextrose 5%. 1000 milliLiter(s) (100 mL/Hr) IV Continuous <Continuous>  dextrose 50% Injectable 25 Gram(s) IV Push once  dextrose 50% Injectable 12.5 Gram(s) IV Push once  dextrose 50% Injectable 25 Gram(s) IV Push once  glucagon  Injectable 1 milliGRAM(s) IntraMuscular once  insulin lispro (ADMELOG) corrective regimen sliding scale   SubCutaneous three times a day before meals  insulin lispro (ADMELOG) corrective regimen sliding scale   SubCutaneous at bedtime  midodrine. 10 milliGRAM(s) Oral every 8 hours    MEDICATIONS  (PRN):      ALLERGIES:  Allergies    No Known Allergies    Intolerances        OBJECTIVE:  ICU Vital Signs Last 24 Hrs  T(C): 36.8 (26 Apr 2021 17:35), Max: 36.8 (26 Apr 2021 09:10)  T(F): 98.3 (26 Apr 2021 17:35), Max: 98.3 (26 Apr 2021 17:35)  HR: 53 (26 Apr 2021 17:35) (53 - 63)  BP: 79/48 (26 Apr 2021 17:35) (75/49 - 123/94)  BP(mean): 57 (26 Apr 2021 17:35) (57 - 68)  ABP: --  ABP(mean): --  RR: 18 (26 Apr 2021 17:35) (16 - 18)  SpO2: 100% (26 Apr 2021 17:35) (98% - 100%)      Adult Advanced Hemodynamics Last 24 Hrs  CVP(mm Hg): --  CVP(cm H2O): --  CO: --  CI: --  PA: --  PA(mean): --  PCWP: --  SVR: --  SVRI: --  PVR: --  PVRI: --  CAPILLARY BLOOD GLUCOSE      POCT Blood Glucose.: 110 mg/dL (26 Apr 2021 09:14)    CAPILLARY BLOOD GLUCOSE      POCT Blood Glucose.: 110 mg/dL (26 Apr 2021 09:14)    I&O's Summary    Daily Height in cm: 162.56 (26 Apr 2021 17:07)    Daily     PHYSICAL EXAMINATION:  Appearance: NAD	  HEENT:   Normal oral mucosa, PERRL, EOMI	  Lymphatic: No lymphadenopathy  Cardiovascular: Normal S1 S2, No JVD, No murmurs, No edema  Respiratory: Lungs clear to auscultation	  Psychiatry: A & O x 3, Mood & affect appropriate  Gastrointestinal:  Soft, Non-tender, + BS	  Skin: No rashes, No ecchymoses, No cyanosis	  Neurologic: Non-focal  Extremities: Normal range of motion, No clubbing, cyanosis or edema  Vascular: Peripheral pulses palpable 2+ bilaterally    LABS:                          12.2   6.29  )-----------( 124      ( 26 Apr 2021 10:00 )             41.5     04-26    x   |  x   |  x   ----------------------------<  x   3.8   |  x   |  x     Ca    9.5      26 Apr 2021 11:01  Phos  5.6     04-26  Mg     2.5     04-26    TPro  7.6  /  Alb  4.0  /  TBili  0.5  /  DBili  x   /  AST  26  /  ALT  20  /  AlkPhos  127<H>  04-26    LIVER FUNCTIONS - ( 26 Apr 2021 10:00 )  Alb: 4.0 g/dL / Pro: 7.6 g/dL / ALK PHOS: 127 U/L / ALT: 20 U/L / AST: 26 U/L / GGT: x                         TELEMETRY:     EKG:     IMAGING:      PREVIOUS DIAGNOSTIC TESTING:    Echocardiogram:  < from: Transthoracic Echocardiogram (01.08.21 @ 10:25) >  ------------------------------------------------------------------------  DIMENSIONS:  Dimensions:     Normal Values:  LA:     4.6 cm    2.0 - 4.0 cm  Ao:     2.9 cm    2.0 - 3.8 cm  SEPTUM: 1.0 cm    0.6 - 1.2 cm  PWT:    1.0 cm    0.6 - 1.1 cm  LVIDd:  6.3 cm    3.0 - 5.6 cm  LVIDs:  5.1 cm    1.8 - 4.0 cm  Derived Variables:  LVMI: 146 g/m2  RWT: 0.31  Fractional short: 19 %  Ejection Fraction (Visual Estimate): 15-20 %  ------------------------------------------------------------------------  OBSERVATIONS:  Mitral Valve: Tethered mitral valve leaflets with normal  opening. Severe mitral regurgitation.  Aortic Root: Normal aortic root.  Aortic Valve: Calcified trileaflet aortic valve with normal  opening.  Left Atrium: Moderately dilated left atrium.  LA volume  index = 43 cc/m2.  Left Ventricle: Severe global left ventricular systolic  dysfunction. Severe left ventricular enlargement.  Right Heart: A device wire is noted in the right heart.  Right ventricular enlargement with decreased right  ventricular systolic function. Normal tricuspid valve.  Moderate-severe tricuspid regurgitation. Normal pulmonic  valve.  Pericardium/PleuraNormal pericardium with no pericardial  effusion.  Hemodynamic: Estimated right ventricular systolic pressure  equals 62 mm Hg, assuming right atrial pressure equals 10  mm Hg, consistent with severe pulmonary hypertension.  ------------------------------------------------------------------------  CONCLUSIONS:  1. Tethered mitral valve leaflets with normal opening.  Severe mitral regurgitation.  2. Calcified trileaflet aortic valve with normal opening.  3. Moderately dilated left atrium.  LA volume index = 43  cc/m2.  4. Severe left ventricular enlargement.  5. Severe global left ventricular systolic dysfunction.  6. A device wire is noted in the right heart.  7. Right ventricular enlargement with decreased right  ventricular systolic function.      Catheterization:  from: Cardiac Cath Lab - Adult (02.26.21 @ 08:03)   VENTRICLES: No LV gram was performed during this study; however, a prior LV  gram demonstrated an EF of 20 %.  CORONARY VESSELS: The coronary circulation is right dominant.  LM:   --  LM: Angiography showed minor luminal irregularities with no flow  limiting lesions.  LAD:   --  Mid LAD: There was a diffuse 80 % stenosis.  --  D1: There was a discrete 90 % stenosis in the proximal third of the  vessel segment.  CX:   --  Circumflex: Angiography showed severe atherosclerosis. There was  a diffuse 70 % stenosis.  RCA:   --  Proximal RCA: There was a 80 % stenosis.  --  RPDA: Angiography showed severe atherosclerosis. There was a 75 %  stenosis.  COMPLICATIONS: There were no complications.  DIAGNOSTIC RECOMMENDATIONS: Elevated filling pressures with marginal CI in  setting of diffuse, severe multivessel disease which correlates with known  extensive myocardial scarring. Reccomend continued optimization as per  heart failure team and on going heart transplant evaluation.

## 2021-04-26 NOTE — ED ADULT NURSE NOTE - OBJECTIVE STATEMENT
Pt presenting to room 4 AxOx4 ambulatory at baseline s/p near syncope this AM. PMH ESRD on dialysis M/T/T/S, CHF, HTN, pacemaker. Pt states he was sitting and felt faint and dizzy, denies LOC or hitting head. Denies N/V, H/A, CP. No use of blood thinners. RR even and unlabored. Palor/diaphoresis noted. Pt arriving with 18g to RAC- flushing well with good blood return. Pt received 0.5mg of Atropine en route. Pt sinus yara at 55 on cardiac monitor. BP 80s/50s with MD at bedside aware. Labs drawn and sent. MD at bedside, will continue to monitor.

## 2021-04-26 NOTE — ED PROVIDER NOTE - ATTENDING CONTRIBUTION TO CARE
56 year old with chf aicd esrd presents with syncopal episode. concern for arrythmia vs hypotension vs infection.  will hold fluids 2/2 esrd and chf. currently in sinus rhythm. will get aicd interrogated. labs, nephro cx, ep cx. cultures. admit. doubt sepsis as leading cause of symptoms

## 2021-04-26 NOTE — H&P ADULT - PROBLEM SELECTOR PLAN 4
tele monitor  serial EKGs  2G Sodium 1800 ADA diet with 1200 cc Fluid restriction  Strict I&Os plus Daily weights.

## 2021-04-26 NOTE — CONSULT NOTE ADULT - PROBLEM SELECTOR RECOMMENDATION 2
Pt with low BP today. Lactate elevated at 3.9. Pt received 1L IV LR with serum lactate improved to 2.6. Recommend Midodrine 10mg TID. Monitor BP.    If you have any questions, please feel free to contact me  Rich Bowser  Nephrology Fellow  Pager: [phone] / [facility]: [phone]  (After 5pm or on weekends please page the on-call fellow)

## 2021-04-26 NOTE — CONSULT NOTE ADULT - SUBJECTIVE AND OBJECTIVE BOX
[facility] Division of Kidney Diseases & Hypertension  INITIAL CONSULT NOTE  [phone]  --------------------------------------------------------------------------------  HPI: 55yo M hx ESRD (dialysis M, T, TH, Sat), HF (EF 15-20%), IDDM, HTN, CAD s/p stent, s/p ICD presents to [facility] for near syncope and hypotension. Nephrology team consulted for history of ESRD/HD management.     Pt was seen and evaluated in the ED> Pt noted to be hypotensive at SBP ~70mmHg. Pt reports he was sitting in chair when he "suddenly felt dizzy and weak" and fell to ground without LOC.  EMS found pt bradycardic and 0.5mg atropine was administered. Pt currently denies chest pain, headache, nausea/vomiting, SOB, abdominal pain, dizziness. Pt with history of ESRD on HD MTTS  (Nephrologist: Dr. Pavon, Center: [facility]) via LUE AVF. Pt with last HD on 4/24/21. Pt noted to be hypotensive in the beginning and 1 hour into HD with BP 82/62mmHg. Bp improved to 115/70 at the end of HD treatment. UF was 2.9L. Post HD Weight 75.3kg.  Pt with Target DW 71kg.     PAST HISTORY  --------------------------------------------------------------------------------  PAST MEDICAL & SURGICAL HISTORY:  HTN (hypertension)  DM (diabetes mellitus)2  CAD (coronary artery disease)  2016, coronary artery stentX1  Hyperlipidemia  Myocardial infarctionJan 2016  HFrEF (heart failure with reduced ejection fraction)EF 20-24%  GIB (gastrointestinal bleeding)  was treated  with H pyelori stable since Dec 10, 2018  ESRD (end stage renal disease) on dialysis  Congestive heart disease  Arterial stent thrombosis  Stented coronary artery? 1  History of implantable cardioverter-defibrillator (ICD) placement  FAMILY HISTORY:  Family history of MI (myocardial infarction) (Sibling)  Family history of diabetes mellitus (DM) (Sibling)    PAST SOCIAL HISTORY:  ALLERGIES & MEDICATIONS  --------------------------------------------------------------------------------  Allergies  No Known Allergies  Intolerances    Standing Inpatient Medications    PRN Inpatient Medications    REVIEW OF SYSTEMS  --------------------------------------------------------------------------------  Gen: No  fevers/chills, +weakness  Skin: No rashes  Respiratory: No dyspnea, cough  CV: No chest pain,   GI: No abdominal pain, diarrhea, nausea, vomiting  MSK: No joint pain/swelling;   Neuro: see HPI    All other systems were reviewed and are negative, except as noted.    VITALS/PHYSICAL EXAM  --------------------------------------------------------------------------------  T(C): 36.8 (04-26-21 @ 09:10), Max: 36.8 (04-26-21 @ 09:10)  HR: 60 (04-26-21 @ 13:44) (55 - 60)  BP: 76/52 (04-26-21 @ 13:44) (76/52 - 123/94)  RR: 16 (04-26-21 @ 13:44) (16 - 16)  SpO2: 100% (04-26-21 @ 13:44) (98% - 100%)  Wt(kg): --  Height (cm): 162.6 (04-26-21 @ 09:10)    Physical Exam:  Gen: NAD  HEENT: supple neck,   Pulmonary: CTA B/L  CV: RRR, S1S2; no rub  Abd:  +BS, soft, not tender, not distended  LE:  mild edema  Neuro: No focal deficits,    Vascular  Left UE AVF  + Thrill/ bruit    LABS/STUDIES  --------------------------------------------------------------------------------              12.2   6.29  >-----------<  124      [04-26-21 @ 10:00]              41.5     x   |  x   |  x   ----------------------------<  x       [04-26-21 @ 12:37]  3.8   |  x   |  x         Ca     9.5     [04-26-21 @ 11:01]      Mg     2.5     [04-26-21 @ 10:00]      Phos  5.6     [04-26-21 @ 10:00]    TPro  7.6  /  Alb  4.0  /  TBili  0.5  /  DBili  x   /  AST  26  /  ALT  20  /  AlkPhos  127  [04-26-21 @ 10:00]    Creatinine Trend:  SCr 6.21 [04-26 @ 11:01]  SCr 6.06 [04-26 @ 10:00]

## 2021-04-26 NOTE — CHART NOTE - NSCHARTNOTEFT_GEN_A_CORE
Thoroughly reviewed risks and benefits of RRT with patient. Patient's agrees to dialytic therapy if needed. All questions answered. Will plan on initiating CRRT today 4/26/21. Thoroughly reviewed risks and benefits of RRT with patient over the phone. Patient agrees to dialytic therapy if needed. All questions answered. Plan on initiating urgent CRRT tonight for fluid overload.  Will need urgent non-tunneled dialysis catheter placement for access to initiate CRRT.     If any questions, please feel free to contact me     Denise Vargas  Nephrology Fellow  [facility] Pager: [phone]  [facility] Pager: [phone]

## 2021-04-26 NOTE — H&P ADULT - NSHPLABSRESULTS_GEN_ALL_CORE
EKG: Sinus Rhythm @ 67bpm  Trops: 291-->266  COVID: neg  CXR: cardiomegaly w/ clear lungs.                        12.2   6.29  )-----------( 124      ( 26 Apr 2021 10:00 )             41.5     04-26    x   |  x   |  x   ----------------------------<  x   3.8   |  x   |  x     Ca    9.5      26 Apr 2021 11:01  Phos  5.6     04-26  Mg     2.5     04-26    TPro  7.6  /  Alb  4.0  /  TBili  0.5  /  DBili  x   /  AST  26  /  ALT  20  /  AlkPhos  127<H>  04-26          LIVER FUNCTIONS - ( 26 Apr 2021 10:00 )  Alb: 4.0 g/dL / Pro: 7.6 g/dL / ALK PHOS: 127 U/L / ALT: 20 U/L / AST: 26 U/L / GGT: x           CAPILLARY BLOOD GLUCOSE      POCT Blood Glucose.: 110 mg/dL (26 Apr 2021 09:14)

## 2021-04-26 NOTE — ED PROVIDER NOTE - PHYSICAL EXAMINATION
General:  NAD  HEENT: pupils equal and reactive, normal external ears bilaterally   Cardiac: RRR  Resp: lungs clear to auscultation bilaterally, symmetric chest wall rise  Abd: soft, nontender, nondistended,   : no CVA tenderness  Neuro: Moving all extremities  Skin:  normal color for race  Lower ext: No LE edema BL

## 2021-04-26 NOTE — H&P ADULT - NSHPPHYSICALEXAM_GEN_ALL_CORE
T(C): 36.8 (04-26-21 @ 17:35), Max: 36.8 (04-26-21 @ 09:10)  HR: 53 (04-26-21 @ 17:35) (53 - 63)  BP: 79/48 (04-26-21 @ 17:35) (75/49 - 123/94)  RR: 18 (04-26-21 @ 17:35) (16 - 18)  SpO2: 100% (04-26-21 @ 17:35) (98% - 100%)

## 2021-04-26 NOTE — H&P ADULT - PROBLEM SELECTOR PLAN 2
tele monitor   cardiac enzymes x 2  Serial EKGs  STAT TTE  -concern for cardiogenic shock -transferred to CCU service

## 2021-04-26 NOTE — CONSULT NOTE ADULT - SUBJECTIVE AND OBJECTIVE BOX
CHIEF COMPLAINT:  Called to interrogate and evaluate patient with ILR and SQ ICD presented with dizziness this morning    HISTORY OF PRESENT ILLNESS:  57yo M with hx of ESRD (dialysis M, T, TH, Sat), HF (EF 15-20%), IDDM, HTN, CAD s/p stent, s/p ICD here w/ cc of episode of weakness, dizziness and fall this morning. He states that he  was sitting in chair this am around 7 am, when he "suddenly felt dizzy and weak" and fell to the ground. He denies any LOC. His son who was present there, called 911. EMS found patient bradycardic and .5mg atropine was administered and patient reported feeling better. Patient was scheduled for HD today and he didn't take any home medications this morning. He reports medication compliance and eating/drinking normal without issues. Telemetry reveals junctional rhythm with HR 60s. ILR interrogation reveals two episodes of bradycardia this morning around the time patient had symptoms of dizziness. SQ ICD interrogation reveals no events. Patient denies any chest pain, SOB, palpitations or dizziness at this time.    PAST MEDICAL & SURGICAL HISTORY:  HTN (hypertension)  DM (diabetes mellitus) 2 not taking any medication lat4st HgA1c 5.7  CAD (coronary artery disease)  2016, coronary artery stentX1  Hyperlipidemia  Myocardial infarction  Jan 2016  HFrEF (heart failure with reduced ejection fraction)  EF 20-24%  GIB (gastrointestinal bleeding)  was treated  with H pyelori stable since Dec 10, 2018  ESRD (end stage renal disease) on dialysis  Congestive heart disease  Arterial stent thrombosis  Stented coronary artery? 1  History of implantable cardioverter-defibrillator (ICD) placement    PREVIOUS DIAGNOSTIC TESTING:    Echocardiogram:  < from: Transthoracic Echocardiogram (01.08.21 @ 10:25) >  ------------------------------------------------------------------------  DIMENSIONS:  Dimensions:     Normal Values:  LA:     4.6 cm    2.0 - 4.0 cm  Ao:     2.9 cm    2.0 - 3.8 cm  SEPTUM: 1.0 cm    0.6 - 1.2 cm  PWT:    1.0 cm    0.6 - 1.1 cm  LVIDd:  6.3 cm    3.0 - 5.6 cm  LVIDs:  5.1 cm    1.8 - 4.0 cm  Derived Variables:  LVMI: 146 g/m2  RWT: 0.31  Fractional short: 19 %  Ejection Fraction (Visual Estimate): 15-20 %  ------------------------------------------------------------------------  OBSERVATIONS:  Mitral Valve: Tethered mitral valve leaflets with normal  opening. Severe mitral regurgitation.  Aortic Root: Normal aortic root.  Aortic Valve: Calcified trileaflet aortic valve with normal  opening.  Left Atrium: Moderately dilated left atrium.  LA volume  index = 43 cc/m2.  Left Ventricle: Severe global left ventricular systolic  dysfunction. Severe left ventricular enlargement.  Right Heart: A device wire is noted in the right heart.  Right ventricular enlargement with decreased right  ventricular systolic function. Normal tricuspid valve.  Moderate-severe tricuspid regurgitation. Normal pulmonic  valve.  Pericardium/PleuraNormal pericardium with no pericardial  effusion.  Hemodynamic: Estimated right ventricular systolic pressure  equals 62 mm Hg, assuming right atrial pressure equals 10  mm Hg, consistent with severe pulmonary hypertension.  ------------------------------------------------------------------------  CONCLUSIONS:  1. Tethered mitral valve leaflets with normal opening.  Severe mitral regurgitation.  2. Calcified trileaflet aortic valve with normal opening.  3. Moderately dilated left atrium.  LA volume index = 43  cc/m2.  4. Severe left ventricular enlargement.  5. Severe global left ventricular systolic dysfunction.  6. A device wire is noted in the right heart.  7. Right ventricular enlargement with decreased right  ventricular systolic function.      Catheterization:  from: Cardiac Cath Lab - Adult (02.26.21 @ 08:03)   VENTRICLES: No LV gram was performed during this study; however, a prior LV  gram demonstrated an EF of 20 %.  CORONARY VESSELS: The coronary circulation is right dominant.  LM:   --  LM: Angiography showed minor luminal irregularities with no flow  limiting lesions.  LAD:   --  Mid LAD: There was a diffuse 80 % stenosis.  --  D1: There was a discrete 90 % stenosis in the proximal third of the  vessel segment.  CX:   --  Circumflex: Angiography showed severe atherosclerosis. There was  a diffuse 70 % stenosis.  RCA:   --  Proximal RCA: There was a 80 % stenosis.  --  RPDA: Angiography showed severe atherosclerosis. There was a 75 %  stenosis.  COMPLICATIONS: There were no complications.  DIAGNOSTIC RECOMMENDATIONS: Elevated filling pressures with marginal CI in  setting of diffuse, severe multivessel disease which correlates with known  extensive myocardial scarring. Reccomend continued optimization as per  heart failure team and on going heart transplant evaluation.      MEDICATIONS:  midodrine. 10 milliGRAM(s) Oral once      FAMILY HISTORY:  Family history of MI (myocardial infarction) (Sibling)    Family history of diabetes mellitus (DM) (Sibling)    SOCIAL HISTORY:    Lives with brother  [X] Smoker  [X] Alcohol  [X] Drugs    Allergies    No Known Allergies    Intolerances    	    REVIEW OF SYSTEMS:  CONSTITUTIONAL: No fever, weight loss, or fatigue  EYES: No eye pain, visual disturbances, or discharge  ENMT:  No difficulty hearing, tinnitus, vertigo; No sinus or throat pain  NECK: No pain or stiffness  RESPIRATORY: No cough, wheezing, chills or hemoptysis; No Shortness of Breath  CARDIOVASCULAR: No chest pain, palpitations, passing out, or leg swelling, + dizziness  GASTROINTESTINAL: No abdominal or epigastric pain. No nausea, vomiting, or hematemesis; No diarrhea or constipation. No melena or hematochezia.  GENITOURINARY: No dysuria, frequency, hematuria, or incontinence  NEUROLOGICAL: No headaches, memory loss, loss of strength, numbness, or tremors  SKIN: No itching, burning, rashes, or lesions   LYMPH Nodes: No enlarged glands  ENDOCRINE: No heat or cold intolerance; No hair loss  MUSCULOSKELETAL: No joint pain or swelling; No muscle, back, or extremity pain  PSYCHIATRIC: No depression, anxiety, mood swings, or difficulty sleeping  HEME/LYMPH: No easy bruising, or bleeding gums  ALLERY AND IMMUNOLOGIC: No hives or eczema	    [X] All others negative	  [ ] Unable to obtain    PHYSICAL EXAM:  T(C): 36.8 (04-26-21 @ 09:10), Max: 36.8 (04-26-21 @ 09:10)  HR: 58 (04-26-21 @ 10:55) (55 - 60)  BP: 89/62 (04-26-21 @ 10:55) (82/58 - 123/94)  RR: 16 (04-26-21 @ 10:55) (16 - 16)  SpO2: 100% (04-26-21 @ 10:55) (98% - 100%)  Wt(kg): --  I&O's Summary      Appearance: Normal	  HEENT:   Normal oral mucosa, PERRL, EOMI	  Lymphatic: No lymphadenopathy  Cardiovascular: Normal S1 S2, No JVD, No murmurs, No edema  Respiratory: Lungs clear to auscultation	  Psychiatry: A & O x 3, Mood & affect appropriate  Gastrointestinal:  Soft, Non-tender, + BS	  Skin: No rashes, No ecchymoses, No cyanosis	  Neurologic: Non-focal  Extremities: Normal range of motion, No clubbing, cyanosis or edema  Vascular: Peripheral pulses palpable 2+ bilaterally    TELEMETRY: Junctional rhythm with HR60s    ECG:  	  RADIOLOGY:  OTHER: 	  	  LABS:	 	    CARDIAC MARKERS:                        12.2   6.29  )-----------( 124      ( 26 Apr 2021 10:00 )             41.5     04-26    134<L>  |  97<L>  |  36<H>  ----------------------------<  159<H>  5.8<H>   |  21<L>  |  6.21<H>    Ca    9.5      26 Apr 2021 11:01  Phos  5.6     04-26  Mg     2.5     04-26    TPro  7.6  /  Alb  4.0  /  TBili  0.5  /  DBili  x   /  AST  26  /  ALT  20  /  AlkPhos  127<H>  04-26      TSH: Thyroid Stimulating Hormone, Serum: 1.23 uIU/mL (04-26 @ 10:00)

## 2021-04-26 NOTE — H&P ADULT - ATTENDING COMMENTS
Patient seen and examined care d/w PA.    56M hx ESRD (dialysis M, T, TH, Sat), HF (EF 15-20%) with SC ICD in place, IDDM, HTN, multivessel CAD s/p stent 2016, p/w an episode of weakness, bradycardic and near syncope this morning, admitted to tele for symptomatic bradycardia also noted to be in cardiogenic shock.   Reports for weeks poor HD tolerance and fluid removal 2/2 hypotensive episodes at HD, reports weight gain and SIERRA.      # Cardiogenic Shock:  SBP in 70-80s, POCUS with biV failure also with plethoric IVC, no b lines, trace effusion on R; cool on exam, elevated LA, currently no dizziness or CP  - CCU c/s for inotropes, does not make urine, cannot tolerate HD with current SBP  - no further IVF  - HF team aware of admission     # Bradycardia  - EP following, yara events may correlate to sx, EP may upgrade device  - c/w tele, atropine prn    # ESRD  - HD per renal

## 2021-04-26 NOTE — PROGRESS NOTE ADULT - ASSESSMENT
56 year old man, history of ESRD (dialysis M, T, TH, Sat), HF (EF 15-20%) with SC ICD in place, IDDM, HTN, CAD s/p stent 2016, p/w an episode of weakness, bradycardic and near syncope this morning, admitted to CCU w/ c/f cardiogenic shock, pending renal and heart transplant.     Neuro:    CV:  Cardiogenic Shock  -hypotensive, EF 15-20%  -currently on midodrine 10mg [id number]  -will start dobutamine at 2.5mcg/kg/minute   -f/u TTE     Resp:    GI:    Renal:  -ESRD  -nephro onboard  -plan for HD tomorrow 4/27 if BP tolerates   56 year old man, history of ESRD (dialysis M, T, TH, Sat), HF (EF 15-20%) with SC ICD in place, IDDM, HTN, CAD s/p stent 2016, p/w an episode of weakness, bradycardic and near syncope this morning, admitted to CCU w/ c/f cardiogenic shock, pending renal and heart transplant.     Neuro:  AOx3  No acute issues    CV:  Cardiogenic Shock  -hypotensive, EF 15-20%  -s/p midodrine 10mg [id number] in the ED  -will start dobutamine at 2.5mcg/kg/minute   -f/u TTE     #Hyperlipidemia  -c/w 80mg lipitor    #Heart Failure  -TTE 1/21 with EF 15-20%  -f/u repeat TTE  -awaiting heart transplant    #CAD  -cath 2/21 reveals 80% diffuse stenosis of mid LAD, 90% stenosis in proximal third D1, 70% stenosis CX; 80% stenosis of proximal RCA, 75% stenosis of RPDA.  - on going heart transplant evaluation.      Resp:  -saturating well on room air     GI:    Renal:  #ESRD  -nephro onboard  -plan for HD tomorrow 4/27 if BP tolerates  -phoslo tid with meals    Endocrine:  #DM2  A1C 6.4% 3/24, on ripaglinide at home  -c/w ISS, FS premeal, bedtime    :    Heme:    Prophylactic Measure:    56 year old man, history of ESRD (dialysis M, T, TH, Sat), HF (EF 15-20%) with SC ICD in place, IDDM, HTN, CAD s/p stent 2016, p/w an episode of weakness, bradycardic and near syncope this morning, admitted to CCU w/ c/f cardiogenic shock, pending renal and heart transplant.     Neuro:  AOx3  No acute issues    CV:  #Cardiogenic Shock  -hypotensive, EF 15-20%  -s/p midodrine 10mg [id number] in the ED  -will start dobutamine at 2.5mcg/kg/minute   -f/u TTE     #Bradycardia  -hold AV kym blockers  -2/2 sinus node dysfunction, likely will require pacemaker iso symptomatic bradycardia      #Hyperlipidemia  -c/w 80mg lipitor    #Heart Failure  -TTE 1/21 with EF 15-20%  -f/u repeat TTE  -awaiting heart transplant    #CAD  -cath 2/21 reveals 80% diffuse stenosis of mid LAD, 90% stenosis in proximal third D1, 70% stenosis CX; 80% stenosis of proximal RCA, 75% stenosis of RPDA.  - on going heart transplant evaluation.      Resp:  -saturating well on room air     GI:    Renal:  #ESRD  -nephro onboard  -plan for HD tomorrow 4/27 if BP tolerates  -phoslo tid with meals    Endocrine:  #DM2  A1C 6.4% 3/24, on ripaglinide at home  -c/w ISS, FS premeal, bedtime    Prophylactic Measure:

## 2021-04-26 NOTE — CONSULT NOTE ADULT - ASSESSMENT
55yo M with hx of ESRD (dialysis M, T, TH, Sat), HF (EF 15-20%), IDDM, HTN, CAD s/p stent, s/p ICD here w/ cc of episode of weakness, dizziness and fall this morning. ILR interrogation revealed two episodes of bradycardia corresponding to dizziness. Telemetry reveals junctional rhythm with HR 60s.   - Continue to monitor on telemetry  - Maintain K+~4.0 and Mg++~2.0  - Continue management as per primary team  - Hold AV kym blockers  - Will d/w Dr. Ornelas 57yo M with hx of ESRD (dialysis M, T, TH, Sat), HF (EF 15-20%), IDDM, HTN, CAD s/p stent, s/p ICD here w/ cc of episode of weakness, dizziness and fall this morning. ILR interrogation revealed two episodes of bradycardia corresponding to dizziness. Telemetry reveals junctional rhythm alternating with sinus rhythm and HR 50s-60s. Patient had sinus node dysfunction and would likely require pacemaker as he had symptomatic bradycardia  - Continue to monitor on telemetry  - Maintain K+~4.0 and Mg++~2.0  - Continue management as per primary team  - Hold AV kym blockers  - Will d/w Dr. Ornelas

## 2021-04-26 NOTE — H&P ADULT - ASSESSMENT
57yo M hx ESRD (dialysis M, T, TH, Sat), HF (EF 15-20%) with SC ICD in place, IDDM, HTN, CAD s/p stent 2016, p/w an episode of weakness, bradycardic and near syncope this morning, admitted to tele for symptomatic bradycardia.  56M hx ESRD (dialysis M, T, TH, Sat), HF (EF 15-20%) with SC ICD in place, IDDM, HTN, CAD s/p stent 2016, p/w an episode of weakness, bradycardic and near syncope this morning, admitted to tele for symptomatic bradycardia.

## 2021-04-26 NOTE — ED ADULT TRIAGE NOTE - CHIEF COMPLAINT QUOTE
Pt synopsized this morning, denies falling states his son was able to break his fall. Per EMS pt was bradycardic received .5 atropine and a liter of IVF. 18G per RT AC, PMH HTN, DM2, renal failure. Dialysis M, Tues, Th, Sat. .

## 2021-04-26 NOTE — CONSULT NOTE ADULT - ATTENDING COMMENTS
55yo M with hx of ESRD (dialysis M, T, TH, Sat), HF (EF 15-20%), IDDM, HTN, CAD s/p stent, s/p ICD here w/ cc of episode of weakness, dizziness and fall this morning. ILR interrogation revealed two episodes of bradycardia corresponding to dizziness. Telemetry reveals junctional rhythm alternating with sinus rhythm and HR 50s-60s. Patient had sinus node dysfunction and would likely require pacemaker as he had symptomatic bradycardia  - Continue to monitor on telemetry  - Maintain K+~4.0 and Mg++~2.0  - Continue management as per primary team  - Hold AV kym blockers  - Agree with dobutamine for treatment of junctional rhythm to help restore sinus rhythm.  -Understand through discussion with heart failure team that patient may be transferred for heart kidney transplant.

## 2021-04-26 NOTE — ED PROVIDER NOTE - CLINICAL SUMMARY MEDICAL DECISION MAKING FREE TEXT BOX
Aram PGY-3:  55yo F w/ pmhx as described in HPI here after pre-syncopal episode, here in accelerated junctional rhythm which is different and for pt, will have EP interrogate ICD, obtain bloodwork and anticipate admission for further workup/management of pt

## 2021-04-26 NOTE — ED PROVIDER NOTE - PROGRESS NOTE DETAILS
Aram PGY-3:  EP states pt did have bradycardic episode this AM, states can admit to Tele no intervention as of now. WIll admit Brian Abarca DO PGY3 - per cards admit to CCU

## 2021-04-26 NOTE — CONSULT NOTE ADULT - PROBLEM SELECTOR RECOMMENDATION 9
Patient with ESRD on HD MTTS. Pt with last HD on 4/24/21 complicated by intradialytic hypotension. Pt with hypotension today. No signs of fluid overload on examination. Labs reviewed.  Pt with serum K 5.8 moderately hemolyzed. Recommend repeating BMP. No plans for HD today since BP noted to be low. Will plan for HD tomorrow. Consent obtained and placed on chart.

## 2021-04-26 NOTE — ED PROVIDER NOTE - NS ED ROS FT
CONSTITUTIONAL: No fevers, no chills  Cardiovascular: No Chest pain  Gastrointestinal: No n/v/d, no abd pain  Genitourinary: Does not urinate  NEURO: negative  PSYCHIATRIC: no known mental health issues.  ROS otherwise negative unless indicated in HPI

## 2021-04-26 NOTE — ED ADULT NURSE NOTE - ED STAT RN HANDOFF DETAILS
Patient Akiko&Ox4, aware of plan of care, accepted to CCU , and has CCU room available.  Report given to nurse on floor via phone.  PT transported with CCU Fellow, RN, EDT on cardiac monitor with [id number].

## 2021-04-26 NOTE — ED PROVIDER NOTE - OBJECTIVE STATEMENT
55yo M hx ESRD (dialysis M, T, TH, Sat), HF (EF 15-20%), IDDM, HTN, CAD s/p stent, s/p ICD here w/ cc of episode of weakness    States this AM was sitting in chair when he "suddenly felt dizzy and weak" and fell to ground, no LOC. Son was there and called 911. EMS found pt bradycardic and .5mg atropine was administered, pt states he felt better. No F/C, no cough, no N/V/D. No CP.   Last full session of dialysis was saturday, had session scheduled for today. Eating/drinking normal PO without issue. Taking medications as prescribed.     Cardiologist Dr Enio Pavon  PCP Dr Zac Bowens

## 2021-04-26 NOTE — H&P ADULT - HISTORY OF PRESENT ILLNESS
55yo M hx ESRD (dialysis M, T, TH, Sat), HF (EF 15-20%) with SC ICD in place, IDDM, HTN, CAD s/p stent 2016, p/w an episode of weakness and near syncope this morning. This AM pt was sitting in chair when he suddenly felt dizzy, lightheaded, generalized weakness and felt like passing out. He fell to ground, no LOC. Son was present who called 911. EMS found pt bradycardic and 0.5mg atropine was administered, where pt's symptoms improved. He denies fever, chills, diaphoresis, chest pain, sob, palpitations, nausea, vomiting or abdominal pain. Pt doesn't make any urine. Admits to weight gain of 3kg in 3 days. Last full session of dialysis was Saturday, had session scheduled for today.   In ED pt became hypotensive with 75/49 with lactate 3.9. He was given NS 250cc x1 dose with Midodrine 5mg x2 doses. Pt was found to be in Accelerated Junctional Rhythm.   56 M hx ESRD (dialysis M, T, TH, Sat), HF (EF 15-20%) with SC ICD in place, IDDM, HTN, CAD s/p stent 2016, p/w an episode of weakness and near syncope this morning. This AM pt was sitting in chair when he suddenly felt dizzy, lightheaded, generalized weakness and felt like passing out. He fell to ground, no LOC. Son was present who called 911. EMS found pt bradycardic and 0.5mg atropine was administered, where pt's symptoms improved. He denies fever, chills, diaphoresis, chest pain, sob, palpitations, nausea, vomiting or abdominal pain. Pt doesn't make any urine. Admits to weight gain of 3kg in 3 days. Last full session of dialysis was Saturday, had session scheduled for today.   In ED pt became hypotensive with 75/49 with lactate 3.9. He was given NS 250cc x1 dose with Midodrine 5mg x2 doses. Pt was found to be in Accelerated Junctional Rhythm.

## 2021-04-26 NOTE — PROGRESS NOTE ADULT - SUBJECTIVE AND OBJECTIVE BOX
ELECTROPHYSIOLOGY  Device Interrogation Performed                                  Date/Time: 4/26/21 12 noon  :   Ken[employer] sci   Model:  SQ ICD               Implanting physician: Dr. Ornelas    Implanting facility: [facility]        Battery Status:       X              Good                CONG                     EOL    Underlying Rhythm:     Junctional rhythm 60s    Events/Observation:  No events noted   Impression/Plan:  Normal SQ ICD function.   Good battery status. No events corresponding tot his admission. No reprogramming.

## 2021-04-26 NOTE — H&P ADULT - PROBLEM SELECTOR PLAN 1
tele monitor   cardiac enzymes x 2  Serial EKGs  STAT TTE  -House EP consult appreciated  -concern for cardiogenic shock -transferred to CCU service

## 2021-04-27 NOTE — CONSULT NOTE ADULT - SUBJECTIVE AND OBJECTIVE BOX
Patient is a 56y old  Male who presents with a chief complaint of symptomatic bradycardia (2021 07:48)      HPI:    PAST MEDICAL & SURGICAL HISTORY:  HTN (hypertension)    DM (diabetes mellitus)  2  not taking any medication lat4st HgA1c 5.7    CAD (coronary artery disease)  , coronary artery stentX1    Hyperlipidemia    Myocardial infarction  2016    HFrEF (heart failure with reduced ejection fraction)  EF 20-24%    GIB (gastrointestinal bleeding)  was treated  with H pyelori stable since Dec 10, 2018    ESRD (end stage renal disease) on dialysis    Congestive heart disease    Arterial stent thrombosis    Stented coronary artery  ? 1    History of implantable cardioverter-defibrillator (ICD) placement        FAMILY HISTORY:  Family history of MI (myocardial infarction) (Sibling)    Family history of diabetes mellitus (DM) (Sibling)        Home Medications:  midodrine 5 mg oral tablet: 1 tab(s) orally on HD days , , Sat (2021 17:32)  repaglinide 1 mg oral tablet: 1 milligram(s) orally 3 times a day (2021 17:32)      Medications:  atorvastatin 80 milliGRAM(s) Oral at bedtime  calcium acetate 667 milliGRAM(s) Oral three times a day with meals  chlorhexidine 4% Liquid 1 Application(s) Topical <User Schedule>  dextrose 40% Gel 15 Gram(s) Oral once  dextrose 5%. 1000 milliLiter(s) IV Continuous <Continuous>  dextrose 5%. 1000 milliLiter(s) IV Continuous <Continuous>  dextrose 50% Injectable 25 Gram(s) IV Push once  dextrose 50% Injectable 12.5 Gram(s) IV Push once  dextrose 50% Injectable 25 Gram(s) IV Push once  DOBUTamine Infusion 2.5 MICROgram(s)/kG/Min IV Continuous <Continuous>  glucagon  Injectable 1 milliGRAM(s) IntraMuscular once  heparin   Injectable 5000 Unit(s) SubCutaneous every 8 hours  insulin lispro (ADMELOG) corrective regimen sliding scale   SubCutaneous three times a day before meals  insulin lispro (ADMELOG) corrective regimen sliding scale   SubCutaneous at bedtime  norepinephrine Infusion 0.05 MICROgram(s)/kG/Min IV Continuous <Continuous>      Review of systems:  10 point review of systems completed and are negative unless noted in HPI    Vitals:  T(C): 36.6 (21 @ 07:36), Max: 36.8 (21 @ 17:35)  HR: 70 (21 @ 10:30) (53 - 91)  BP: 89/78 (21 @ 21:15) (75/49 - 93/46)  BP(mean): 83 (21 @ 21:15) (57 - 83)  RR: 16 (21 @ 10:30) (11 - 26)  SpO2: 100% (21 @ 10:30) (93% - 100%)    Daily Height in cm: 162.56 (2021 17:07)    Daily Weight in k.8 (2021 18:15)    Weight (kg): 76.8 ( @ 18:15)    I&O's Summary    2021 07:  -  2021 07:00  --------------------------------------------------------  IN: 931.4 mL / OUT: 0 mL / NET: 931.4 mL    2021 07:  -  2021 10:40  --------------------------------------------------------  IN: 235.4 mL / OUT: 0 mL / NET: 235.4 mL        Physical Exam:  Appearance: No Acute Distress  Neck: unable to assess due to line placement  Cardiovascular: Normal S1 S2, No murmurs/rubs/gallops  Respiratory: Clear to auscultation bilaterally  Gastrointestinal: Soft, Non-tender	  Skin: No cyanosis	  Neurologic: Non-focal  Extremities: No LE edema  Psychiatry: A & O x 3, Mood & affect appropriate    Labs:                        10.4   8.41  )-----------( 126      ( 2021 04:51 )             34.7         129<L>  |  95<L>  |  42<H>  ----------------------------<  175<H>  4.5   |  17<L>  |  6.58<H>    Ca    8.9      2021 04:51  Phos  5.7       Mg     2.3         TPro  6.8  /  Alb  3.5  /  TBili  0.9  /  DBili  x   /  AST  29  /  ALT  15  /  AlkPhos  94      PT/INR - ( 2021 23:20 )   PT: 14.9 sec;   INR: 1.32 ratio         PTT - ( 2021 23:20 )  PTT:34.3 sec    TELEMETRY: Sinus Rhythm      Echocardiogram:     TTE with Doppler (w/Cont) (21 @ 20:28)   -----------------------------------------------------------------------  DIMENSIONS:  Dimensions:     Normal Values:  LA:     4.2 cm    2.0 - 4.0 cm  Ao:     2.8 cm    2.0 - 3.8 cm  SEPTUM: 0.9 cm    0.6 - 1.2 cm  PWT:    1.1 cm    0.6 - 1.1 cm  LVIDd:6.3 cm    3.0 - 5.6 cm  LVIDs:  5.6 cm    1.8 - 4.0 cm  Derived Variables:  LVMI: 147 g/m2  RWT: 0.34  Fractional short: 11 %  Ejection Fraction (Visual Estimate): 10-15 %  Ejection Fraction (Teicholtz): 24 %  ------------------------------------------------------------------------  OBSERVATIONS:  Mitral Valve: Normal mitral valve. Moderate mitral  regurgitation.Vena contracta 0.45 cm.Eccentric posteriorly  directed jet may predispose to underestimation of severity  of MR. Peak mitral valve gradient equals 8 mm Hg, mean  transmitral valve gradient equals 2 mm Hg, consistent with  mild mitral stenosis.HR around 70/min.  Aortic Root: Normal aortic root.  Aortic Valve: Thickened aortic valve. Minimal aortic  regurgitation.  Left Atrium: Severelydilated left atrium.  LA volume index  = 59 cc/m2.  Left Ventricle: Endocardial visualization enhanced with  intravenous injection of echo contrast (Definity).Severe  segmental left ventricular systolic dysfunction. No left  ventricular thrombus.Estimated EF of 10-15%. Moderate left  ventricular enlargement. Severe irreversible diastolic  dysfunction (Stage IV).  Right Heart: Normal right atrium. Normal right ventricular  size with decreased right ventricular systolic  function.TAPSE measured at 1.2 cm. Normal tricuspid valve.  Moderate tricuspid regurgitation. Pulmonic valve not well  visualized.  Pericardium/PleuraNormal pericardium with no pericardial  effusion.  Hemodynamic: Estimated right ventricular systolic pressure  equals 42 mm Hg, assuming right atrial pressure equals 10  mm Hg, consistent with mild pulmonary hypertension.  ------------------------------------------------------------------------  CONCLUSIONS:  1. Normal mitral valve. Moderate mitral regurgitation.Vena  contracta 0.45 cm.Eccentric posteriorly directed jet may  predispose to underestimation of severity of MR. Peak  mitral valve gradient equals 8 mm Hg, mean transmitral  valve gradient equals 2 mm Hg, consistent with mild mitral  stenosis.HR around 70/min.  2.Severely dilated left atrium.  LA volume index = 59  cc/m2.  3. Moderate left ventricular enlargement.  4. Endocardial visualization enhanced with intravenous  injection of echo contrast (Definity).Severe segmental left  ventricular systolic dysfunction. No left ventricular  thrombus.Estimated EF of 10-15%.  5. Normal right ventricular size with decreased right  ventricular systolic function.TAPSE measured at 1.2 cm.  *** Compared with echocardiogram of 2021, no  significant changes noted.      EK2021  Accelerated Junctional Rhythm with PVCs Anterolateral infarct  Patient is a 56y old  Male who presents with a chief complaint of symptomatic bradycardia (2021 07:48)      HPI:56 M hx ESRD (dialysis M, , , Sat), HF (EF 15-20%) with SC ICD in place, IDDM, HTN, CAD s/p stent , p/w an episode of weakness and near syncope this morning. This AM pt was sitting in chair when he suddenly felt dizzy, lightheaded, generalized weakness and felt like passing out. He fell to ground, no LOC. Son was present who called 911. EMS found pt bradycardic and 0.5mg atropine was administered, where pt's symptoms improved. He denies fever, chills, diaphoresis, chest pain, sob, palpitations, nausea, vomiting or abdominal pain. Pt doesn't make any urine. Admits to weight gain of 3kg in 3 days. Last full session of dialysis was Saturday, had session scheduled for today. In ED pt became hypotensive with 75/49 with lactate 3.9. He was given NS 250cc x1 dose with Midodrine 5mg x2 doses. Pt was found to be in Accelerated Junctional Rhythm.      PAST MEDICAL & SURGICAL HISTORY:  HTN (hypertension)    DM (diabetes mellitus)  2  not taking any medication lat4st HgA1c 5.7    CAD (coronary artery disease)  , coronary artery stentX1    Hyperlipidemia    Myocardial infarction  2016    HFrEF (heart failure with reduced ejection fraction)  EF 20-24%    GIB (gastrointestinal bleeding)  was treated  with H pyelori stable since Dec 10, 2018    ESRD (end stage renal disease) on dialysis    Congestive heart disease    Arterial stent thrombosis    Stented coronary artery  ? 1    History of implantable cardioverter-defibrillator (ICD) placement        FAMILY HISTORY:  Family history of MI (myocardial infarction) (Sibling)    Family history of diabetes mellitus (DM) (Sibling)        Home Medications:  midodrine 5 mg oral tablet: 1 tab(s) orally on HD days , , Sat (2021 17:32)  repaglinide 1 mg oral tablet: 1 milligram(s) orally 3 times a day (2021 17:32)      Medications:  atorvastatin 80 milliGRAM(s) Oral at bedtime  calcium acetate 667 milliGRAM(s) Oral three times a day with meals  chlorhexidine 4% Liquid 1 Application(s) Topical <User Schedule>  dextrose 40% Gel 15 Gram(s) Oral once  dextrose 5%. 1000 milliLiter(s) IV Continuous <Continuous>  dextrose 5%. 1000 milliLiter(s) IV Continuous <Continuous>  dextrose 50% Injectable 25 Gram(s) IV Push once  dextrose 50% Injectable 12.5 Gram(s) IV Push once  dextrose 50% Injectable 25 Gram(s) IV Push once  DOBUTamine Infusion 2.5 MICROgram(s)/kG/Min IV Continuous <Continuous>  glucagon  Injectable 1 milliGRAM(s) IntraMuscular once  heparin   Injectable 5000 Unit(s) SubCutaneous every 8 hours  insulin lispro (ADMELOG) corrective regimen sliding scale   SubCutaneous three times a day before meals  insulin lispro (ADMELOG) corrective regimen sliding scale   SubCutaneous at bedtime  norepinephrine Infusion 0.05 MICROgram(s)/kG/Min IV Continuous <Continuous>      Review of systems:  10 point review of systems completed and are negative unless noted in HPI    Vitals:  T(C): 36.6 (21 @ 07:36), Max: 36.8 (21 @ 17:35)  HR: 70 (21 @ 10:30) (53 - 91)  BP: 89/78 (21 @ 21:15) (75/49 - 93/46)  BP(mean): 83 (21 @ 21:15) (57 - 83)  RR: 16 (21 @ 10:30) (11 - 26)  SpO2: 100% (21 @ 10:30) (93% - 100%)    Daily Height in cm: 162.56 (2021 17:07)    Daily Weight in k.8 (2021 18:15)    Weight (kg): 76.8 ( @ 18:15)    I&O's Summary    2021 07:  -  2021 07:00  --------------------------------------------------------  IN: 931.4 mL / OUT: 0 mL / NET: 931.4 mL    2021 07:  -  2021 10:40  --------------------------------------------------------  IN: 235.4 mL / OUT: 0 mL / NET: 235.4 mL        Physical Exam:  Appearance: No Acute Distress  Neck: unable to assess due to line placement  Cardiovascular: Normal S1 S2, No murmurs/rubs/gallops  Respiratory: Clear to auscultation bilaterally  Gastrointestinal: Soft, Non-tender	  Skin: No cyanosis	  Neurologic: Non-focal  Extremities: No LE edema  Psychiatry: A & O x 3, Mood & affect appropriate    Labs:                        10.4   8.41  )-----------( 126      ( 2021 04:51 )             34.7     04    129<L>  |  95<L>  |  42<H>  ----------------------------<  175<H>  4.5   |  17<L>  |  6.58<H>    Ca    8.9      2021 04:51  Phos  5.7       Mg     2.3         TPro  6.8  /  Alb  3.5  /  TBili  0.9  /  DBili  x   /  AST  29  /  ALT  15  /  AlkPhos  94      PT/INR - ( 2021 23:20 )   PT: 14.9 sec;   INR: 1.32 ratio         PTT - ( 2021 23:20 )  PTT:34.3 sec    TELEMETRY: Sinus Rhythm      Echocardiogram:     TTE with Doppler (w/Cont) (21 @ 20:28)   -----------------------------------------------------------------------  DIMENSIONS:  Dimensions:     Normal Values:  LA:     4.2 cm    2.0 - 4.0 cm  Ao:     2.8 cm    2.0 - 3.8 cm  SEPTUM: 0.9 cm    0.6 - 1.2 cm  PWT:    1.1 cm    0.6 - 1.1 cm  LVIDd:6.3 cm    3.0 - 5.6 cm  LVIDs:  5.6 cm    1.8 - 4.0 cm  Derived Variables:  LVMI: 147 g/m2  RWT: 0.34  Fractional short: 11 %  Ejection Fraction (Visual Estimate): 10-15 %  Ejection Fraction (Teicholtz): 24 %  ------------------------------------------------------------------------  OBSERVATIONS:  Mitral Valve: Normal mitral valve. Moderate mitral  regurgitation.Vena contracta 0.45 cm.Eccentric posteriorly  directed jet may predispose to underestimation of severity  of MR. Peak mitral valve gradient equals 8 mm Hg, mean  transmitral valve gradient equals 2 mm Hg, consistent with  mild mitral stenosis.HR around 70/min.  Aortic Root: Normal aortic root.  Aortic Valve: Thickened aortic valve. Minimal aortic  regurgitation.  Left Atrium: Severelydilated left atrium.  LA volume index  = 59 cc/m2.  Left Ventricle: Endocardial visualization enhanced with  intravenous injection of echo contrast (Definity).Severe  segmental left ventricular systolic dysfunction. No left  ventricular thrombus.Estimated EF of 10-15%. Moderate left  ventricular enlargement. Severe irreversible diastolic  dysfunction (Stage IV).  Right Heart: Normal right atrium. Normal right ventricular  size with decreased right ventricular systolic  function.TAPSE measured at 1.2 cm. Normal tricuspid valve.  Moderate tricuspid regurgitation. Pulmonic valve not well  visualized.  Pericardium/PleuraNormal pericardium with no pericardial  effusion.  Hemodynamic: Estimated right ventricular systolic pressure  equals 42 mm Hg, assuming right atrial pressure equals 10  mm Hg, consistent with mild pulmonary hypertension.  ------------------------------------------------------------------------  CONCLUSIONS:  1. Normal mitral valve. Moderate mitral regurgitation.Vena  contracta 0.45 cm.Eccentric posteriorly directed jet may  predispose to underestimation of severity of MR. Peak  mitral valve gradient equals 8 mm Hg, mean transmitral  valve gradient equals 2 mm Hg, consistent with mild mitral  stenosis.HR around 70/min.  2.Severely dilated left atrium.  LA volume index = 59  cc/m2.  3. Moderate left ventricular enlargement.  4. Endocardial visualization enhanced with intravenous  injection of echo contrast (Definity).Severe segmental left  ventricular systolic dysfunction. No left ventricular  thrombus.Estimated EF of 10-15%.  5. Normal right ventricular size with decreased right  ventricular systolic function.TAPSE measured at 1.2 cm.  *** Compared with echocardiogram of 2021, no  significant changes noted.      EK2021  Accelerated Junctional Rhythm with PVCs Anterolateral infarct  Patient is a 56y old  Male who presents with a chief complaint of symptomatic bradycardia (2021 07:48)      HPI:56 M hx ESRD (dialysis , , , Sat), HF (EF 15-20%) with SC ICD in place, IDDM, HTN, CAD s/p stent , p/w an episode of weakness and near syncope this morning. This AM pt was sitting in chair when he suddenly felt dizzy, lightheaded, generalized weakness and felt like passing out. He fell to ground, no LOC. Son was present who called 911. EMS found pt bradycardic and 0.5mg atropine was administered, where pt's symptoms improved. He denies fever, chills, diaphoresis, chest pain, sob, palpitations, nausea, vomiting or abdominal pain. Pt doesn't make any urine. Admits to weight gain of 3kg in 3 days. Last full session of dialysis was Saturday, had session scheduled for today. In ED pt became hypotensive with 75/49 with lactate 3.9. He was given NS 250cc x1 dose with Midodrine 5mg x2 doses. Pt was found to be in Accelerated Junctional Rhythm.  Patient followed by HF Team possible heart/kidney transplant        PAST MEDICAL & SURGICAL HISTORY:  HTN (hypertension)    DM (diabetes mellitus)  2  not taking any medication lat4st HgA1c 5.7    CAD (coronary artery disease)  , coronary artery stentX1    Hyperlipidemia    Myocardial infarction  2016    HFrEF (heart failure with reduced ejection fraction)  EF 20-24%    GIB (gastrointestinal bleeding)  was treated  with H pyelori stable since Dec 10, 2018    ESRD (end stage renal disease) on dialysis    Congestive heart disease    Arterial stent thrombosis    Stented coronary artery  ? 1    History of implantable cardioverter-defibrillator (ICD) placement        FAMILY HISTORY:  Family history of MI (myocardial infarction) (Sibling)    Family history of diabetes mellitus (DM) (Sibling)        Home Medications:  midodrine 5 mg oral tablet: 1 tab(s) orally on HD days , , Sat (2021 17:32)  repaglinide 1 mg oral tablet: 1 milligram(s) orally 3 times a day (2021 17:32)      Medications:  atorvastatin 80 milliGRAM(s) Oral at bedtime  calcium acetate 667 milliGRAM(s) Oral three times a day with meals  chlorhexidine 4% Liquid 1 Application(s) Topical <User Schedule>  dextrose 40% Gel 15 Gram(s) Oral once  dextrose 5%. 1000 milliLiter(s) IV Continuous <Continuous>  dextrose 5%. 1000 milliLiter(s) IV Continuous <Continuous>  dextrose 50% Injectable 25 Gram(s) IV Push once  dextrose 50% Injectable 12.5 Gram(s) IV Push once  dextrose 50% Injectable 25 Gram(s) IV Push once  DOBUTamine Infusion 2.5 MICROgram(s)/kG/Min IV Continuous <Continuous>  glucagon  Injectable 1 milliGRAM(s) IntraMuscular once  heparin   Injectable 5000 Unit(s) SubCutaneous every 8 hours  insulin lispro (ADMELOG) corrective regimen sliding scale   SubCutaneous three times a day before meals  insulin lispro (ADMELOG) corrective regimen sliding scale   SubCutaneous at bedtime  norepinephrine Infusion 0.05 MICROgram(s)/kG/Min IV Continuous <Continuous>      Review of systems:  10 point review of systems completed and are negative unless noted in HPI    Vitals:  T(C): 36.6 (21 @ 07:36), Max: 36.8 (21 @ 17:35)  HR: 70 (21 @ 10:30) (53 - 91)  BP: 89/78 (21 @ 21:15) (75/49 - 93/46)  BP(mean): 83 (21 @ 21:15) (57 - 83)  RR: 16 (21 @ 10:30) (11 - 26)  SpO2: 100% (21 @ 10:30) (93% - 100%)    Daily Height in cm: 162.56 (2021 17:07)    Daily Weight in k.8 (2021 18:15)    Weight (kg): 76.8 ( @ 18:15)    I&O's Summary    2021 07:  -  2021 07:00  --------------------------------------------------------  IN: 931.4 mL / OUT: 0 mL / NET: 931.4 mL    2021 07:01  -  2021 10:40  --------------------------------------------------------  IN: 235.4 mL / OUT: 0 mL / NET: 235.4 mL        Physical Exam:  Appearance: No Acute Distress  Neck: unable to assess due to line placement  Cardiovascular: Normal S1 S2, No murmurs/rubs/gallops  Respiratory: Clear to auscultation bilaterally  Gastrointestinal: Soft, Non-tender	  Skin: No cyanosis	  Neurologic: Non-focal  Extremities: No LE edema  Psychiatry: A & O x 3, Mood & affect appropriate    Labs:                        10.4   8.41  )-----------( 126      ( 2021 04:51 )             34.7         129<L>  |  95<L>  |  42<H>  ----------------------------<  175<H>  4.5   |  17<L>  |  6.58<H>    Ca    8.9      2021 04:51  Phos  5.7       Mg     2.3         TPro  6.8  /  Alb  3.5  /  TBili  0.9  /  DBili  x   /  AST  29  /  ALT  15  /  AlkPhos  94      PT/INR - ( 2021 23:20 )   PT: 14.9 sec;   INR: 1.32 ratio         PTT - ( 2021 23:20 )  PTT:34.3 sec    TELEMETRY: Sinus Rhythm      Echocardiogram:     TTE with Doppler (w/Cont) (21 @ 20:28)   -----------------------------------------------------------------------  DIMENSIONS:  Dimensions:     Normal Values:  LA:     4.2 cm    2.0 - 4.0 cm  Ao:     2.8 cm    2.0 - 3.8 cm  SEPTUM: 0.9 cm    0.6 - 1.2 cm  PWT:    1.1 cm    0.6 - 1.1 cm  LVIDd:6.3 cm    3.0 - 5.6 cm  LVIDs:  5.6 cm    1.8 - 4.0 cm  Derived Variables:  LVMI: 147 g/m2  RWT: 0.34  Fractional short: 11 %  Ejection Fraction (Visual Estimate): 10-15 %  Ejection Fraction (Teicholtz): 24 %  ------------------------------------------------------------------------  OBSERVATIONS:  Mitral Valve: Normal mitral valve. Moderate mitral  regurgitation.Vena contracta 0.45 cm.Eccentric posteriorly  directed jet may predispose to underestimation of severity  of MR. Peak mitral valve gradient equals 8 mm Hg, mean  transmitral valve gradient equals 2 mm Hg, consistent with  mild mitral stenosis.HR around 70/min.  Aortic Root: Normal aortic root.  Aortic Valve: Thickened aortic valve. Minimal aortic  regurgitation.  Left Atrium: Severelydilated left atrium.  LA volume index  = 59 cc/m2.  Left Ventricle: Endocardial visualization enhanced with  intravenous injection of echo contrast (Definity).Severe  segmental left ventricular systolic dysfunction. No left  ventricular thrombus.Estimated EF of 10-15%. Moderate left  ventricular enlargement. Severe irreversible diastolic  dysfunction (Stage IV).  Right Heart: Normal right atrium. Normal right ventricular  size with decreased right ventricular systolic  function.TAPSE measured at 1.2 cm. Normal tricuspid valve.  Moderate tricuspid regurgitation. Pulmonic valve not well  visualized.  Pericardium/PleuraNormal pericardium with no pericardial  effusion.  Hemodynamic: Estimated right ventricular systolic pressure  equals 42 mm Hg, assuming right atrial pressure equals 10  mm Hg, consistent with mild pulmonary hypertension.  ------------------------------------------------------------------------  CONCLUSIONS:  1. Normal mitral valve. Moderate mitral regurgitation.Vena  contracta 0.45 cm.Eccentric posteriorly directed jet may  predispose to underestimation of severity of MR. Peak  mitral valve gradient equals 8 mm Hg, mean transmitral  valve gradient equals 2 mm Hg, consistent with mild mitral  stenosis.HR around 70/min.  2.Severely dilated left atrium.  LA volume index = 59  cc/m2.  3. Moderate left ventricular enlargement.  4. Endocardial visualization enhanced with intravenous  injection of echo contrast (Definity).Severe segmental left  ventricular systolic dysfunction. No left ventricular  thrombus.Estimated EF of 10-15%.  5. Normal right ventricular size with decreased right  ventricular systolic function.TAPSE measured at 1.2 cm.  *** Compared with echocardiogram of 2021, no  significant changes noted.      EK2021  Accelerated Junctional Rhythm with PVCs Anterolateral infarct  Patient is a 56y old  Male who presents with a chief complaint of symptomatic bradycardia (2021 07:48)      HPI:56 M hx ESRD (dialysis , , , Sat), HF (EF 15-20%) with SC ICD in place, IDDM, HTN, CAD s/p stent , p/w an episode of weakness and near syncope this morning. This AM pt was sitting in chair when he suddenly felt dizzy, lightheaded, generalized weakness and felt like passing out. He fell to ground, no LOC. Son was present who called 911. EMS found pt bradycardic and 0.5mg atropine was administered, where pt's symptoms improved. He denies fever, chills, diaphoresis, chest pain, sob, palpitations, nausea, vomiting or abdominal pain. Pt doesn't make any urine. Admits to weight gain of 3kg in 3 days. Last full session of dialysis was Saturday, had session scheduled for today. In ED pt became hypotensive with 75/49 with lactate 3.9. He was given NS 250cc x1 dose with Midodrine 5mg x2 doses. Pt was found to be in Accelerated Junctional Rhythm.  Started on Dobutamine and Levo. Patient followed by HF Team possible heart/kidney transplant candidate (followed at [facility])      PAST MEDICAL & SURGICAL HISTORY:  HTN (hypertension)    DM (diabetes mellitus)  2  not taking any medication lat4st HgA1c 5.7    CAD (coronary artery disease)  , coronary artery stentX1    Hyperlipidemia    Myocardial infarction  2016    HFrEF (heart failure with reduced ejection fraction)  EF 20-24%    GIB (gastrointestinal bleeding)  was treated  with FOREIGN haas stable since Dec 10, 2018    ESRD (end stage renal disease) on dialysis    Congestive heart disease    Arterial stent thrombosis    Stented coronary artery  ? 1    History of implantable cardioverter-defibrillator (ICD) placement        FAMILY HISTORY:  Family history of MI (myocardial infarction) (Sibling)    Family history of diabetes mellitus (DM) (Sibling)        Home Medications:  midodrine 5 mg oral tablet: 1 tab(s) orally on HD days , , Sat (2021 17:32)  repaglinide 1 mg oral tablet: 1 milligram(s) orally 3 times a day (2021 17:32)      Medications:  atorvastatin 80 milliGRAM(s) Oral at bedtime  calcium acetate 667 milliGRAM(s) Oral three times a day with meals  chlorhexidine 4% Liquid 1 Application(s) Topical <User Schedule>  dextrose 40% Gel 15 Gram(s) Oral once  dextrose 5%. 1000 milliLiter(s) IV Continuous <Continuous>  dextrose 5%. 1000 milliLiter(s) IV Continuous <Continuous>  dextrose 50% Injectable 25 Gram(s) IV Push once  dextrose 50% Injectable 12.5 Gram(s) IV Push once  dextrose 50% Injectable 25 Gram(s) IV Push once  DOBUTamine Infusion 2.5 MICROgram(s)/kG/Min IV Continuous <Continuous>  glucagon  Injectable 1 milliGRAM(s) IntraMuscular once  heparin   Injectable 5000 Unit(s) SubCutaneous every 8 hours  insulin lispro (ADMELOG) corrective regimen sliding scale   SubCutaneous three times a day before meals  insulin lispro (ADMELOG) corrective regimen sliding scale   SubCutaneous at bedtime  norepinephrine Infusion 0.05 MICROgram(s)/kG/Min IV Continuous <Continuous>      Review of systems:  10 point review of systems completed and are negative unless noted in HPI    Vitals:  T(C): 36.6 (21 @ 07:36), Max: 36.8 (21 @ 17:35)  HR: 70 (21 @ 10:30) (53 - 91)  BP: 89/78 (21 @ 21:15) (75/49 - 93/46)  BP(mean): 83 (21 @ 21:15) (57 - 83)  RR: 16 (21 @ 10:30) (11 - 26)  SpO2: 100% (21 @ 10:30) (93% - 100%)    Daily Height in cm: 162.56 (2021 17:07)    Daily Weight in k.8 (2021 18:15)    Weight (kg): 76.8 ( @ 18:15)    I&O's Summary    2021 07:01  -  2021 07:00  --------------------------------------------------------  IN: 931.4 mL / OUT: 0 mL / NET: 931.4 mL    2021 07:01  -  2021 10:40  --------------------------------------------------------  IN: 235.4 mL / OUT: 0 mL / NET: 235.4 mL        Physical Exam:  Appearance: No Acute Distress  Neck: unable to assess due to line placement  Cardiovascular: Normal S1 S2  Respiratory: Clear to auscultation bilaterally  Gastrointestinal: Soft, Non-tender	  Skin: No cyanosis	  Neurologic: Non-focal  Extremities: Trace/1+ BLE edema  Psychiatry: A & O x 3, Mood & affect appropriate    Labs:                        10.4   8.41  )-----------( 126      ( 2021 04:51 )             34.7         129<L>  |  95<L>  |  42<H>  ----------------------------<  175<H>  4.5   |  17<L>  |  6.58<H>    Ca    8.9      2021 04:51  Phos  5.7       Mg     2.3         TPro  6.8  /  Alb  3.5  /  TBili  0.9  /  DBili  x   /  AST  29  /  ALT  15  /  AlkPhos  94      PT/INR - ( 2021 23:20 )   PT: 14.9 sec;   INR: 1.32 ratio         PTT - ( 2021 23:20 )  PTT:34.3 sec    TELEMETRY: Sinus Rhythm      Echocardiogram:     TTE with Doppler (w/Cont) (21 @ 20:28)   -----------------------------------------------------------------------  DIMENSIONS:  Dimensions:     Normal Values:  LA:     4.2 cm    2.0 - 4.0 cm  Ao:     2.8 cm    2.0 - 3.8 cm  SEPTUM: 0.9 cm    0.6 - 1.2 cm  PWT:    1.1 cm    0.6 - 1.1 cm  LVIDd:6.3 cm    3.0 - 5.6 cm  LVIDs:  5.6 cm    1.8 - 4.0 cm  Derived Variables:  LVMI: 147 g/m2  RWT: 0.34  Fractional short: 11 %  Ejection Fraction (Visual Estimate): 10-15 %  Ejection Fraction (Teicholtz): 24 %  ------------------------------------------------------------------------  OBSERVATIONS:  Mitral Valve: Normal mitral valve. Moderate mitral  regurgitation.Vena contracta 0.45 cm.Eccentric posteriorly  directed jet may predispose to underestimation of severity  of MR. Peak mitral valve gradient equals 8 mm Hg, mean  transmitral valve gradient equals 2 mm Hg, consistent with  mild mitral stenosis.HR around 70/min.  Aortic Root: Normal aortic root.  Aortic Valve: Thickened aortic valve. Minimal aortic  regurgitation.  Left Atrium: Severelydilated left atrium.  LA volume index  = 59 cc/m2.  Left Ventricle: Endocardial visualization enhanced with  intravenous injection of echo contrast (Definity).Severe  segmental left ventricular systolic dysfunction. No left  ventricular thrombus.Estimated EF of 10-15%. Moderate left  ventricular enlargement. Severe irreversible diastolic  dysfunction (Stage IV).  Right Heart: Normal right atrium. Normal right ventricular  size with decreased right ventricular systolic  function.TAPSE measured at 1.2 cm. Normal tricuspid valve.  Moderate tricuspid regurgitation. Pulmonic valve not well  visualized.  Pericardium/PleuraNormal pericardium with no pericardial  effusion.  Hemodynamic: Estimated right ventricular systolic pressure  equals 42 mm Hg, assuming right atrial pressure equals 10  mm Hg, consistent with mild pulmonary hypertension.  ------------------------------------------------------------------------  CONCLUSIONS:  1. Normal mitral valve. Moderate mitral regurgitation.Vena  contracta 0.45 cm.Eccentric posteriorly directed jet may  predispose to underestimation of severity of MR. Peak  mitral valve gradient equals 8 mm Hg, mean transmitral  valve gradient equals 2 mm Hg, consistent with mild mitral  stenosis.HR around 70/min.  2.Severely dilated left atrium.  LA volume index = 59  cc/m2.  3. Moderate left ventricular enlargement.  4. Endocardial visualization enhanced with intravenous  injection of echo contrast (Definity).Severe segmental left  ventricular systolic dysfunction. No left ventricular  thrombus.Estimated EF of 10-15%.  5. Normal right ventricular size with decreased right  ventricular systolic function.TAPSE measured at 1.2 cm.  *** Compared with echocardiogram of 2021, no  significant changes noted.      [id number]  Accelerated Junctional Rhythm with PVCs Anterolateral infarct

## 2021-04-27 NOTE — PROGRESS NOTE ADULT - ATTENDING COMMENTS
56 year old man with history of CAD sp PCI in 2016, ESRD on HD, DM, ICD known chronic systolic heart failure was admitted with symptomatic bradycardia and found to be in cardiogenic shock. Overnight he was transiently placed on CVVHD after shiley placed but could not tolerate. He is a long standing patient of HF service (Dr. Pavon) and plan to transfer to [place] for evaluation of heart/lung transplant.  #Neuro- Awake and alert, can become agitated but no acute distress.  #Pulm- No active issues  #CV  Acute on chronic systolic heart failure with hypotension and cardiogenic shock  TTE overnight showed severe LV dysfunction with moderate MR  He is currently on Levophed for BP support and Dobutamine 2.5 mcg/kg/min for inotropic support  This morning: Hb 10.4/ CVO2 67.4/CVP 15 CI 2.75/CO5/  Discussed with HF team  CAD- Continue ASA and statin  Bradycardia- improved- not on beta blocker at home  #Renal  ESRD with transient use of CVVHD overnight  Nephrology input appreciated- plan to change from CVVHD to longer intermittent HD sessions to see if he can tolerate  Na 129-will monitor  #ID- no active issues  DC abx  #Heme- no active issues  #Endo- history of DM- oin sliding scale for now and will evaluate for need for basal insulin and pre meal coverage  #DVT ppx- subq heparin

## 2021-04-27 NOTE — DISCHARGE NOTE PROVIDER - NSDCMRMEDTOKEN_GEN_ALL_CORE_FT
atorvastatin 80 mg oral tablet: 1 tab(s) orally once a day (at bedtime)  calcium acetate 667 mg oral tablet: 1 tab(s) orally 3 times a day (with meals)  midodrine 5 mg oral tablet: 1 tab(s) orally on HD days T, Th, Sat  repaglinide 1 mg oral tablet: 1 milligram(s) orally 3 times a day   atorvastatin 80 mg oral tablet: 1 tab(s) orally once a day (at bedtime)  calcium acetate 667 mg oral tablet: 1 tab(s) orally 3 times a day (with meals)

## 2021-04-27 NOTE — DISCHARGE NOTE PROVIDER - NSDCCPCAREPLAN_GEN_ALL_CORE_FT
PRINCIPAL DISCHARGE DIAGNOSIS  Diagnosis: Cardiogenic shock  Assessment and Plan of Treatment: When you came to the hospital you were found to have a slow heart rate and low blood pressure. You were found to be in a shock state where your heart was not functioning adequately, causing your symptoms. You were started on medications to help your heart and blood pressure which improved your symptoms. You received intermittent hemodialysis during your stay which you were able to tolerate. You were eventually transferred to [facility] where you will undergo further evaluation for transplant.

## 2021-04-27 NOTE — PROGRESS NOTE ADULT - SUBJECTIVE AND OBJECTIVE BOX
Patient is a 56y old  Male who presents with a chief complaint of symptomatic bradycaria (2021 06:36)    HPI:    56 M hx ESRD (dialysis M, T, TH, Sat), HF (EF 15-20%) with SC ICD in place, IDDM, HTN, CAD s/p stent 2016, p/w an episode of weakness and near syncope this morning. This AM pt was sitting in chair when he suddenly felt dizzy, lightheaded, generalized weakness and felt like passing out. He fell to ground, no LOC. Son was present who called 911. EMS found pt bradycardic and 0.5mg atropine was administered, where pt's symptoms improved. He denies fever, chills, diaphoresis, chest pain, sob, palpitations, nausea, vomiting or abdominal pain. Pt doesn't make any urine. Admits to weight gain of 3kg in 3 days. Last full session of dialysis was Saturday, had session scheduled for today.   In ED pt became hypotensive with 75/49 with lactate 3.9. He was given NS 250cc x1 dose with Midodrine 5mg x2 doses. Pt was found to be in Accelerated Junctional Rhythm. (2021 17:07)       INTERVAL HPI/OVERNIGHT EVENTS:   Awais placed and attempted to do CVVH but machine malfunctioned, will re-attempt this AM  TTE done overnight  Afebrile, hemodynamically stable     Subjective:    ICU Vital Signs Last 24 Hrs  T(C): 36.6 (2021 07:36), Max: 36.8 (2021 09:10)  T(F): 97.8 (2021 07:36), Max: 98.3 (2021 17:35)  HR: 73 (2021 07:45) (53 - 91)  BP: 89/78 (2021 21:15) (75/49 - 123/94)  BP(mean): 83 (2021 21:15) (57 - 83)  ABP: 102/56 (2021 07:45) (71/52 - 112/72)  ABP(mean): 72 (2021 07:45) (55 - 86)  RR: 14 (2021 07:45) (11 - 26)  SpO2: 100% (2021 07:45) (93% - 100%)    I&O's Summary    2021 07:01  -  2021 07:00  --------------------------------------------------------  IN: 931.4 mL / OUT: 0 mL / NET: 931.4 mL          Daily Height in cm: 162.56 (2021 17:07)    Daily Weight in k.8 (2021 18:15)    Adult Advanced Hemodynamics Last 24 Hrs  CVP(mm Hg): 20 (2021 07:45) (14 - 60)  CVP(cm H2O): --  CO: --  CI: --  PA: --  PA(mean): --  PCWP: --  SVR: --  SVRI: --  PVR: --  PVRI: --    EKG/Telemetry Events:    MEDICATIONS  (STANDING):  atorvastatin 80 milliGRAM(s) Oral at bedtime  calcium acetate 667 milliGRAM(s) Oral three times a day with meals  chlorhexidine 4% Liquid 1 Application(s) Topical <User Schedule>  CRRT Treatment    <Continuous>  dextrose 40% Gel 15 Gram(s) Oral once  dextrose 5%. 1000 milliLiter(s) (50 mL/Hr) IV Continuous <Continuous>  dextrose 5%. 1000 milliLiter(s) (100 mL/Hr) IV Continuous <Continuous>  dextrose 50% Injectable 25 Gram(s) IV Push once  dextrose 50% Injectable 12.5 Gram(s) IV Push once  dextrose 50% Injectable 25 Gram(s) IV Push once  DOBUTamine Infusion 2.5 MICROgram(s)/kG/Min (5.7 mL/Hr) IV Continuous <Continuous>  glucagon  Injectable 1 milliGRAM(s) IntraMuscular once  insulin lispro (ADMELOG) corrective regimen sliding scale   SubCutaneous three times a day before meals  insulin lispro (ADMELOG) corrective regimen sliding scale   SubCutaneous at bedtime  norepinephrine Infusion 0.05 MICROgram(s)/kG/Min (7.2 mL/Hr) IV Continuous <Continuous>  piperacillin/tazobactam IVPB.. 3.375 Gram(s) IV Intermittent every 8 hours  PrismaSATE Dialysate BK 0 / 3.5 5000 milliLiter(s) (1000 mL/Hr) CRRT <Continuous>  PrismaSOL Filtration BGK 0 / 2.5 5000 milliLiter(s) (1000 mL/Hr) CRRT <Continuous>  PrismaSOL Filtration BGK 0 / 2.5 5000 milliLiter(s) (200 mL/Hr) CRRT <Continuous>  vancomycin  IVPB      vancomycin  IVPB 500 milliGRAM(s) IV Intermittent every 12 hours    MEDICATIONS  (PRN):      PHYSICAL EXAM:  GENERAL:   HEAD:  Atraumatic, Normocephalic  EYES: EOMI, PERRLA, conjunctiva and sclera clear  NECK: Supple, No JVD, Normal thyroid, no enlarged nodes  NERVOUS SYSTEM:  Alert & Awake.   CHEST/LUNG: B/L good air entry; No rales, rhonchi, or wheezing  HEART: S1S2 normal, no S3, Regular rate and rhythm; No murmurs  ABDOMEN: Soft, Nontender, Nondistended; Bowel sounds present  EXTREMITIES:  2+ Peripheral Pulses, No clubbing, cyanosis, or edema  LYMPH: No lymphadenopathy noted  SKIN: No rashes or lesions    LABS:                        10.4   8.41  )-----------( 126      ( 2021 04:51 )             34.7     04    129<L>  |  95<L>  |  42<H>  ----------------------------<  175<H>  4.5   |  17<L>  |  6.58<H>    Ca    8.9      2021 04:51  Phos  5.7       Mg     2.3         TPro  6.8  /  Alb  3.5  /  TBili  0.9  /  DBili  x   /  AST  29  /  ALT  15  /  AlkPhos  94  -27    LIVER FUNCTIONS - ( 2021 04:51 )  Alb: 3.5 g/dL / Pro: 6.8 g/dL / ALK PHOS: 94 U/L / ALT: 15 U/L / AST: 29 U/L / GGT: x           PT/INR - ( 2021 23:20 )   PT: 14.9 sec;   INR: 1.32 ratio         PTT - ( 2021 23:20 )  PTT:34.3 sec  CAPILLARY BLOOD GLUCOSE      POCT Blood Glucose.: 104 mg/dL (2021 22:10)  POCT Blood Glucose.: 110 mg/dL (2021 09:14)    ABG - ( 2021 04:51 )  pH, Arterial: 7.46  pH, Blood: x     /  pCO2: 25    /  pO2: 165   / HCO3: 21    / Base Excess: -5.4  /  SaO2: 98.8          RADIOLOGY & ADDITIONAL TESTS:  CXR:        Care Discussed with Consultants/Other Providers [ x] YES  [ ] NO           Patient is a 56y old  Male who presents with a chief complaint of symptomatic bradycaria (2021 06:36)    HPI:    56 M hx ESRD (dialysis M, T, TH, Sat), HF (EF 15-20%) with SC ICD in place, IDDM, HTN, CAD s/p stent 2016, p/w an episode of weakness and near syncope this morning. This AM pt was sitting in chair when he suddenly felt dizzy, lightheaded, generalized weakness and felt like passing out. He fell to ground, no LOC. Son was present who called 911. EMS found pt bradycardic and 0.5mg atropine was administered, where pt's symptoms improved. He denies fever, chills, diaphoresis, chest pain, sob, palpitations, nausea, vomiting or abdominal pain. Pt doesn't make any urine. Admits to weight gain of 3kg in 3 days. Last full session of dialysis was Saturday, had session scheduled for today.   In ED pt became hypotensive with 75/49 with lactate 3.9. He was given NS 250cc x1 dose with Midodrine 5mg x2 doses. Pt was found to be in Accelerated Junctional Rhythm. (2021 17:07)       INTERVAL HPI/OVERNIGHT EVENTS:   Awais placed and attempted to do CVVH but machine malfunctioned, will re-attempt this AM  TTE done overnight  Afebrile, hemodynamically stable     Subjective:  Patient upset about dialysis  Denies chest pain, palpitations, lightheadedness, abdominal pain    ICU Vital Signs Last 24 Hrs  T(C): 36.6 (2021 07:36), Max: 36.8 (2021 09:10)  T(F): 97.8 (2021 07:36), Max: 98.3 (2021 17:35)  HR: 73 (2021 07:45) (53 - 91)  BP: 89/78 (2021 21:15) (75/49 - 123/94)  BP(mean): 83 (2021 21:15) (57 - 83)  ABP: 102/56 (2021 07:45) (71/52 - 112/72)  ABP(mean): 72 (2021 07:45) (55 - 86)  RR: 14 (2021 07:45) (11 - 26)  SpO2: 100% (2021 07:45) (93% - 100%)    I&O's Summary    2021 07:01  -  2021 07:00  --------------------------------------------------------  IN: 931.4 mL / OUT: 0 mL / NET: 931.4 mL      Daily Height in cm: 162.56 (2021 17:07)    Daily Weight in k.8 (2021 18:15)    Adult Advanced Hemodynamics Last 24 Hrs  CVP(mm Hg): 15 (2021 07:45) (14 - 60)  CVP(cm H2O): --  CO: 5  CI: 2.75  PA: --  PA(mean): --  PCWP: --  SVR: 784  SVRI: --  PVR: --  PVRI: --    EKG/Telemetry Events: Sinus 60-70s    MEDICATIONS  (STANDING):  atorvastatin 80 milliGRAM(s) Oral at bedtime  calcium acetate 667 milliGRAM(s) Oral three times a day with meals  chlorhexidine 4% Liquid 1 Application(s) Topical <User Schedule>  CRRT Treatment    <Continuous>  dextrose 40% Gel 15 Gram(s) Oral once  dextrose 5%. 1000 milliLiter(s) (50 mL/Hr) IV Continuous <Continuous>  dextrose 5%. 1000 milliLiter(s) (100 mL/Hr) IV Continuous <Continuous>  dextrose 50% Injectable 25 Gram(s) IV Push once  dextrose 50% Injectable 12.5 Gram(s) IV Push once  dextrose 50% Injectable 25 Gram(s) IV Push once  DOBUTamine Infusion 2.5 MICROgram(s)/kG/Min (5.7 mL/Hr) IV Continuous <Continuous>  glucagon  Injectable 1 milliGRAM(s) IntraMuscular once  insulin lispro (ADMELOG) corrective regimen sliding scale   SubCutaneous three times a day before meals  insulin lispro (ADMELOG) corrective regimen sliding scale   SubCutaneous at bedtime  norepinephrine Infusion 0.05 MICROgram(s)/kG/Min (7.2 mL/Hr) IV Continuous <Continuous>  piperacillin/tazobactam IVPB.. 3.375 Gram(s) IV Intermittent every 8 hours  PrismaSATE Dialysate BK 0 / 3.5 5000 milliLiter(s) (1000 mL/Hr) CRRT <Continuous>  PrismaSOL Filtration BGK 0 / 2.5 5000 milliLiter(s) (1000 mL/Hr) CRRT <Continuous>  PrismaSOL Filtration BGK 0 / 2.5 5000 milliLiter(s) (200 mL/Hr) CRRT <Continuous>  vancomycin  IVPB      vancomycin  IVPB 500 milliGRAM(s) IV Intermittent every 12 hours    MEDICATIONS  (PRN):      PHYSICAL EXAM:  GENERAL: NAD  HEAD:  Atraumatic, Normocephalic  EYES: EOMI, PERRLA, conjunctiva and sclera clear  NECK: Supple, No JVD, Normal thyroid, no enlarged nodes  NERVOUS SYSTEM:  Alert & Awake.   CHEST/LUNG: B/L good air entry; No rales, rhonchi, or wheezing  HEART: S1S2 normal, no S3, Regular rate and rhythm; No murmurs  ABDOMEN: Soft, Nontender, Nondistended; Bowel sounds present  EXTREMITIES:  2+ Peripheral Pulses, No clubbing, cyanosis, or edema  LYMPH: No lymphadenopathy noted  SKIN: No rashes or lesions    LABS:                        10.4   8.41  )-----------( 126      ( 2021 04:51 )             34.7     -    129<L>  |  95<L>  |  42<H>  ----------------------------<  175<H>  4.5   |  17<L>  |  6.58<H>    Ca    8.9      2021 04:51  Phos  5.7       Mg     2.3         TPro  6.8  /  Alb  3.5  /  TBili  0.9  /  DBili  x   /  AST  29  /  ALT  15  /  AlkPhos  94      LIVER FUNCTIONS - ( 2021 04:51 )  Alb: 3.5 g/dL / Pro: 6.8 g/dL / ALK PHOS: 94 U/L / ALT: 15 U/L / AST: 29 U/L / GGT: x           PT/INR - ( 2021 23:20 )   PT: 14.9 sec;   INR: 1.32 ratio         PTT - ( 2021 23:20 )  PTT:34.3 sec  CAPILLARY BLOOD GLUCOSE      POCT Blood Glucose.: 104 mg/dL (2021 22:10)  POCT Blood Glucose.: 110 mg/dL (2021 09:14)    ABG - ( 2021 04:51 )  pH, Arterial: 7.46  pH, Blood: x     /  pCO2: 25    /  pO2: 165   / HCO3: 21    / Base Excess: -5.4  /  SaO2: 98.8          RADIOLOGY & ADDITIONAL TESTS:  CXR:        Care Discussed with Consultants/Other Providers [ x] YES  [ ] NO

## 2021-04-27 NOTE — PROGRESS NOTE ADULT - SUBJECTIVE AND OBJECTIVE BOX
[facility] Division of Kidney Diseases & Hypertension  FOLLOW UP NOTE  --------------------------------------------------------------------------------  HPI: 57yo M hx ESRD (dialysis M, T, TH, Sat), HF (EF 15-20%), IDDM, HTN, CAD s/p stent, s/p ICD presents to [facility] for near syncope and hypotension. Nephrology team consulted for history of ESRD/HD management.   Pt was seen and evaluated in the ED> Pt noted to be hypotensive at SBP ~70mmHg. Pt reports he was sitting in chair when he "suddenly felt dizzy and weak" and fell to ground without LOC.  EMS found pt bradycardic and 0.5mg atropine was administered. Pt currently denies chest pain, headache, nausea/vomiting, SOB, abdominal pain, dizziness. Pt with history of ESRD on HD MTTS  (Nephrologist: Dr. Pavon, Center: [facility]) via LUE AVF. Pt with last HD on 4/24/21. Pt noted to be hypotensive in the beginning and 1 hour into HD with BP 82/62mmHg. Bp improved to 115/70 at the end of HD treatment. UF was 2.9L. Post HD Weight 75.3kg.  Pt with Target DW 71kg.     Patient seen and evaluated at bedside this morning.  Awake alert.  Frustrated about the CRRT machine.  Reports feeling better overall than yesterday.  Less weak.        PAST HISTORY  --------------------------------------------------------------------------------  No significant changes to PMH, PSH, FHx, SHx, unless otherwise noted    ALLERGIES & MEDICATIONS  --------------------------------------------------------------------------------  Allergies    No Known Allergies    Intolerances      Standing Inpatient Medications  atorvastatin 80 milliGRAM(s) Oral at bedtime  calcium acetate 667 milliGRAM(s) Oral three times a day with meals  chlorhexidine 4% Liquid 1 Application(s) Topical <User Schedule>  dextrose 40% Gel 15 Gram(s) Oral once  dextrose 5%. 1000 milliLiter(s) IV Continuous <Continuous>  dextrose 5%. 1000 milliLiter(s) IV Continuous <Continuous>  dextrose 50% Injectable 25 Gram(s) IV Push once  dextrose 50% Injectable 12.5 Gram(s) IV Push once  dextrose 50% Injectable 25 Gram(s) IV Push once  DOBUTamine Infusion 2.5 MICROgram(s)/kG/Min IV Continuous <Continuous>  glucagon  Injectable 1 milliGRAM(s) IntraMuscular once  heparin   Injectable 5000 Unit(s) SubCutaneous every 8 hours  insulin lispro (ADMELOG) corrective regimen sliding scale   SubCutaneous three times a day before meals  insulin lispro (ADMELOG) corrective regimen sliding scale   SubCutaneous at bedtime  norepinephrine Infusion 0.05 MICROgram(s)/kG/Min IV Continuous <Continuous>    PRN Inpatient Medications      REVIEW OF SYSTEMS  --------------------------------------------------------------------------------  Gen: no fever   Respiratory: sob  CV: no cp  GI: no abdominal pain    VITALS/PHYSICAL EXAM  --------------------------------------------------------------------------------  T(C): 36.6 (04-27-21 @ 07:36), Max: 36.8 (04-26-21 @ 17:35)  HR: 64 (04-27-21 @ 11:00) (53 - 91)  BP: 89/78 (04-26-21 @ 21:15) (75/49 - 93/46)  ABP: 81/42 (04-27-21 @ 11:00) (71/52 - 112/72)  ABP(mean): 55 (04-27-21 @ 11:00) (55 - 86)  RR: 18 (04-27-21 @ 11:00) (11 - 26)  SpO2: 99% (04-27-21 @ 11:00) (93% - 100%)  CVP(mm Hg): 12 (04-27-21 @ 11:00) (12 - 60)  Height (cm): 162.6 (04-26-21 @ 17:07)  Weight (kg): 76.8 (04-26-21 @ 18:15)  BMI (kg/m2): 29 (04-26-21 @ 18:15)  BSA (m2): 1.82 (04-26-21 @ 18:15)      04-26-21 @ 07:01  -  04-27-21 @ 07:00  --------------------------------------------------------  IN: 931.4 mL / OUT: 0 mL / NET: 931.4 mL    04-27-21 @ 07:01  -  04-27-21 @ 11:18  --------------------------------------------------------  IN: 235.4 mL / OUT: 0 mL / NET: 235.4 mL      Physical Exam:  Gen: NAD  HEENT: supple neck,   Pulmonary: CTA B/L  CV: RRR, S1S2; no rub  Abd:  +BS, soft, not tender, not distended  LE:  mild edema  Neuro: No focal deficits,    Vascular  Left UE AVF  + Thrill/ bruit [facility]      LABS/STUDIES  --------------------------------------------------------------------------------              10.4   8.41  >-----------<  126      [04-27-21 @ 04:51]              34.7     129  |  95  |  42  ----------------------------<  175      [04-27-21 @ 04:51]  4.5   |  17  |  6.58        Ca     8.9     [04-27-21 @ 04:51]      Mg     2.3     [04-27-21 @ 04:51]      Phos  5.7     [04-27-21 @ 04:51]    TPro  6.8  /  Alb  3.5  /  TBili  0.9  /  DBili  x   /  AST  29  /  ALT  15  /  AlkPhos  94  [04-27-21 @ 04:51]    PT/INR: PT 14.9 , INR 1.32       [04-26-21 @ 23:20]  PTT: 34.3       [04-26-21 @ 23:20]      Creatinine Trend:  SCr 6.58 [04-27 @ 04:51]  SCr 6.92 [04-27 @ 00:40]  SCr 6.77 [04-26 @ 20:48]  SCr 6.21 [04-26 @ 11:01]  SCr 6.06 [04-26 @ 10:00]        TSH 1.23      [04-26-21 @ 10:00]    HBsAg Nonreact      [04-26-21 @ 15:23]

## 2021-04-27 NOTE — DISCHARGE NOTE PROVIDER - NSDCFUSCHEDAPPT_GEN_ALL_CORE_FT
[facility] ; 05/03/2021 ; NPP Cardio Electro [id number]  [facility] ; 05/24/2021 ; NPJYOTI [facility]  [facility] ; 06/02/2021 ; NPP Intmed OP [id number] [address]  [facility] ; 06/07/2021 ; [facility] Surg Vasc [address] [facility] ; NPP Surg Vasc [address]  [facility] 07/09/[facility] ; NPP Cardio Electro [id number]  [facility] ; 07/09/2021 ; NPP Cardio [id number]

## 2021-04-27 NOTE — PROGRESS NOTE ADULT - PROBLEM SELECTOR PLAN 1
Patient with ESRD on cardiomyopathy on four times per week dialysis.  BP has been too low to tolerated ultrafiltration at outpatient intermittent hemodialysis.  Plan was to start CRRT however machine clotted.  Patient this morning was very frustrated about being continuously hooked up to machine.  Discussed with heart failure attending and ccu team that we will attempt 4 hours of intermittent hemodialysis with low dialysate temperature and sodium modelling and attempt to do UF.  We will do this daily.  If we are not meeting goals or he is unable to tolerate we will need to reconsider crrt.  Would keep josé miguel in for today.

## 2021-04-27 NOTE — DISCHARGE NOTE PROVIDER - NSDCCAREPROVSEEN_GEN_ALL_CORE_FT
[facility] -[phone]    [id number] [place] [address]     Patient is transferring to [facility] for possible heart/kidney transplant.

## 2021-04-27 NOTE — PROGRESS NOTE ADULT - ASSESSMENT
56 year old man, history of ESRD (dialysis M, T, TH, Sat), HF (EF 15-20%) with SC ICD in place, IDDM, HTN, CAD s/p stent 2016, p/w symptomatic bradycardia and admitted to CCU w/ c/f cardiogenic shock, pending renal and heart transplant.     Neuro:  AOx3  No acute issues    Pulm:  Satting well on 2L NC; wean as tolerated     CV:  #Cardiogenic Shock  -s/p midodrine 10mg [id number] in the ED  -will start dobutamine at 2.5mcg/kg/minute   - TTE showing estimated EF 10-15%, mod MR, severe dilated LV, mod LV enlargement and decreased RV function    #Bradycardia  -hold AV kym blockers  -2/2 sinus node dysfunction, likely will require pacemaker iso symptomatic bradycardia      #Hyperlipidemia  -c/w 80mg lipitor    #acute on chronic systolic heart failure  -TTE 1/21 with EF 15-20%  -f/u repeat TTE  -awaiting heart transplant    #CAD  -cath 2/21 reveals 80% diffuse stenosis of mid LAD, 90% stenosis in proximal third D1, 70% stenosis CX; 80% stenosis of proximal RCA, 75% stenosis of RPDA.  - on going heart transplant evaluation.    GI:  No active issues     Renal:  #ESRD  -nephro onboard  -plan for CVVH this AM if BP tolerates  -phoslo tid with meals    Endocrine:  #DM2  A1C 6.4% 3/24, on ripaglinide at home  -c/w ISS, FS premeal, bedtime    Prophylactic Measure:   DVT ppx with heparin subq 5000 BID   56 year old man, history of ESRD (dialysis M, T, TH, Sat), HF (EF 15-20%) with SC ICD in place, IDDM, HTN, CAD s/p stent 2016, p/w symptomatic bradycardia and admitted to CCU w/ c/f cardiogenic shock, pending renal and heart transplant.     Neuro:  AOx3  No acute issues    Pulm:  Satting well on 2L NC; wean as tolerated     CV:  #Cardiogenic Shock  -s/p midodrine 10mg [id number] in the ED  -c/w dobutamine at 2.5mcg/kg/minute   - c/w levophed   - TTE showing estimated EF 10-15%, mod MR, severe dilated LV, mod LV enlargement and decreased RV function    #Bradycardia  -hold AV kym blockers  -2/2 sinus node dysfunction, likely will require pacemaker iso symptomatic bradycardia    #acute on chronic systolic heart failure  -TTE with EF 10-15%  -awaiting heart transplant    #CAD  -cath 2/21 reveals 80% diffuse stenosis of mid LAD, 90% stenosis in proximal third D1, 70% stenosis CX; 80% stenosis of proximal RCA, 75% stenosis of RPDA.  - c/w atorvastatin 80mg  - on going heart transplant evaluation.    GI:  No active issues     Renal:  #ESRD  -nephro onboard  -plan for daily IHD if BP tolerates  -phoslo tid with meals    Endocrine:  #DM2  A1C 6.4% 3/24, on ripaglinide at home  -c/w ISS, FS premeal, bedtime    ID:  Getting empiric abx with vanc and zosyn    Prophylactic Measure:   DVT ppx with heparin subq 5000 BID   56 year old man, history of ESRD (dialysis M, T, TH, Sat), HF (EF 15-20%) with SC ICD in place, IDDM, HTN, CAD s/p stent 2016, p/w symptomatic bradycardia and admitted to CCU w/ c/f cardiogenic shock, pending renal and heart transplant.     Neuro:  AOx3  No acute issues    Pulm:  Satting well on 2L NC; wean as tolerated     CV:  #Cardiogenic Shock  -s/p midodrine 10mg [id number] in the ED  -c/w dobutamine at 2.5mcg/kg/minute   - c/w levophed   - TTE showing estimated EF 10-15%, mod MR, severe dilated LV, mod LV enlargement and decreased RV function    #Bradycardia  -hold AV kym blockers  -2/2 sinus node dysfunction, likely will require pacemaker iso symptomatic bradycardia    #acute on chronic systolic heart failure  -TTE with EF 10-15%  -awaiting heart transplant    #CAD  -cath 2/21 reveals 80% diffuse stenosis of mid LAD, 90% stenosis in proximal third D1, 70% stenosis CX; 80% stenosis of proximal RCA, 75% stenosis of RPDA.  - c/w atorvastatin 80mg  - on going heart transplant evaluation.    GI:  No active issues     Renal:  #ESRD  -nephro onboard  -plan for daily IHD if BP tolerates  -phoslo tid with meals    Endocrine:  #DM2  A1C 6.4% 3/24, on ripaglinide at home  -c/w ISS  - monitor FS    ID:  - D/davin empiric abx: vanc and zosyn since no indication. Hypotension likely from cardiogenic shock rather than infection/sepsis    Prophylactic Measure:   DVT ppx with heparin subq 5000 TID

## 2021-04-27 NOTE — PROGRESS NOTE ADULT - ASSESSMENT
ASSESSMENT:  56 M hx ESRD on HD, HF (EF 15-20%), admitted to CCU for cardiogenic shock, requiring CRRT for slow volume removal, now s/p JONH Ernandez placement by vascular surgery    PLAN:  - Awais placed at bedside. Okay to use  - please page team with questions    Vascular surgery  [id number]

## 2021-04-27 NOTE — PROGRESS NOTE ADULT - SUBJECTIVE AND OBJECTIVE BOX
VASCULAR SURGERY DAILY PROGRESS NOTE:    Interval:  s/p LIJ Shiley placement, tolerating CRRT x 2 hrs before machine malfunction    Subjective:  Patient seen and examined. No active complaints this AM    Vital Signs Last 24 Hrs  T(C): 36.1 (2021 06:00), Max: 36.8 (2021 09:10)  T(F): 97 (2021 06:00), Max: 98.3 (2021 17:35)  HR: 67 (2021 06:30) (53 - 91)  BP: 89/78 (2021 21:15) (75/49 - 123/94)  BP(mean): 83 (2021 21:15) (57 - 83)  RR: 20 (2021 06:30) (11 - 26)  SpO2: 100% (2021 06:30) (93% - 100%)    Exam:  Gen: NAD, resting in bed, alert and responding appropriately  Neck: LIJ in place, L neck soft, no bleeding or hematoma  Resp: Airway patent, non-labored respirations  Neuro: AAOx3, no focal deficits    I&O's Detail    2021 07:01  -  2021 06:36  --------------------------------------------------------  IN:    DOBUTamine: 69.6 mL    IV PiggyBack: 200 mL    IV PiggyBack: 500 mL    Norepinephrine: 111.8 mL  Total IN: 881.4 mL    OUT:  Total OUT: 0 mL    Total NET: 881.4 mL          Daily Height in cm: 162.56 (2021 17:07)    Daily Weight in k.8 (2021 18:15)    MEDICATIONS  (STANDING):  atorvastatin 80 milliGRAM(s) Oral at bedtime  calcium acetate 667 milliGRAM(s) Oral three times a day with meals  chlorhexidine 4% Liquid 1 Application(s) Topical <User Schedule>  CRRT Treatment    <Continuous>  dextrose 40% Gel 15 Gram(s) Oral once  dextrose 5%. 1000 milliLiter(s) (50 mL/Hr) IV Continuous <Continuous>  dextrose 5%. 1000 milliLiter(s) (100 mL/Hr) IV Continuous <Continuous>  dextrose 50% Injectable 25 Gram(s) IV Push once  dextrose 50% Injectable 12.5 Gram(s) IV Push once  dextrose 50% Injectable 25 Gram(s) IV Push once  DOBUTamine Infusion 2.5 MICROgram(s)/kG/Min (5.7 mL/Hr) IV Continuous <Continuous>  glucagon  Injectable 1 milliGRAM(s) IntraMuscular once  insulin lispro (ADMELOG) corrective regimen sliding scale   SubCutaneous three times a day before meals  insulin lispro (ADMELOG) corrective regimen sliding scale   SubCutaneous at bedtime  norepinephrine Infusion 0.05 MICROgram(s)/kG/Min (7.2 mL/Hr) IV Continuous <Continuous>  piperacillin/tazobactam IVPB.. 3.375 Gram(s) IV Intermittent every 8 hours  PrismaSATE Dialysate BK 0 / 3.5 5000 milliLiter(s) (1000 mL/Hr) CRRT <Continuous>  PrismaSOL Filtration BGK 0 / 2.5 5000 milliLiter(s) (1000 mL/Hr) CRRT <Continuous>  PrismaSOL Filtration BGK 0 / 2.5 5000 milliLiter(s) (200 mL/Hr) CRRT <Continuous>  vancomycin  IVPB      vancomycin  IVPB 500 milliGRAM(s) IV Intermittent every 12 hours    MEDICATIONS  (PRN):      LABS:                        10.4   8.41  )-----------( 126      ( 2021 04:51 )             34.7     04-27    129<L>  |  95<L>  |  42<H>  ----------------------------<  175<H>  4.5   |  17<L>  |  6.58<H>    Ca    8.9      2021 04:51  Phos  5.7     04-27  Mg     2.3     27    TPro  6.8  /  Alb  3.5  /  TBili  0.9  /  DBili  x   /  AST  29  /  ALT  15  /  AlkPhos  94  04-27    PT/INR - ( 2021 23:20 )   PT: 14.9 sec;   INR: 1.32 ratio         PTT - ( 2021 23:20 )  PTT:34.3 sec

## 2021-04-27 NOTE — CONSULT NOTE ADULT - ASSESSMENT
ASSESSMENT:  56 M hx ESRD on HD, HF (EF 15-20%), admitted to CCU for cardiogenic shock, requiring CRRT for slow volume removal, for which vascular surgery was consulted to place a shiley.     PLAN:  - Shiley placed at bedside  - Post-placement CXR pending  - Discussed with vascular fellow    Sean Bishop, PGY 3  Surgery [id number]

## 2021-04-27 NOTE — PROGRESS NOTE ADULT - ATTENDING COMMENTS
55yo M with hx of ESRD (dialysis M, T, TH, Sat), HF (EF 15-20%), IDDM, HTN, CAD s/p stent, s/p ICD here w/ cc of episode of weakness, dizziness and fall this morning. ILR interrogation revealed two episodes of bradycardia corresponding to dizziness.  In ED pt became hypotensive with 75/49 with lactate 3.9. He was given NS 250cc x1 dose with Midodrine 5mg x2 doses. Pt was found to be in Accelerated Junctional Rhythm alternating with sinus rhythm. Started on Dobutamine and Levo. Patient followed by HF Team possible heart/kidney transplant candidate (followed at [facility]/[place]). Patient has sinus node dysfunction and would likely require pacemaker as he had symptomatic bradycardia unless transplanted.  - Continue to monitor on telemetry  - Maintain K+~4.0 and Mg++~2.0  - Hold AV kym blockers  - Continue management as per primary/HF team  - No plan for PPM at this time. PPM would be obviated by transplant.

## 2021-04-27 NOTE — PROGRESS NOTE ADULT - SUBJECTIVE AND OBJECTIVE BOX
Patient is seen and examined. Denies any chest pain, SOB, palpitations or dizziness.     PAST MEDICAL & SURGICAL HISTORY:  HTN (hypertension)  DM (diabetes mellitus) 2  not taking any medication lat4st HgA1c 5.7  CAD (coronary artery disease)  2016, coronary artery stentX1  Hyperlipidemia  Myocardial infarction  Jan 2016  Congestive heart failure (CHF)  HFrEF (heart failure with reduced ejection fraction)  EF 20-24%  CKD (chronic kidney disease), stage V  Abnormal echocardiogram  06/2018severe MR  Severe global left ventricular systolic dysfunction.  6. Right ventricular enlargement with decreased right  ventricular systolic function, moderate RV dysfunction  GIB (gastrointestinal bleeding)  was treated  with H pyelori stable since Dec 10, 2018  ESRD (end stage renal disease) on dialysis  CHF (congestive heart failure)  Congestive heart disease  Arterial stent thrombosis  Stented coronary artery  ? 1  History of implantable cardioverter-defibrillator (ICD) placement    MEDICATIONS  (STANDING):  atorvastatin 80 milliGRAM(s) Oral at bedtime  calcium acetate 667 milliGRAM(s) Oral three times a day with meals  chlorhexidine 4% Liquid 1 Application(s) Topical <User Schedule>  dextrose 40% Gel 15 Gram(s) Oral once  dextrose 5%. 1000 milliLiter(s) (50 mL/Hr) IV Continuous <Continuous>  dextrose 5%. 1000 milliLiter(s) (100 mL/Hr) IV Continuous <Continuous>  dextrose 50% Injectable 25 Gram(s) IV Push once  dextrose 50% Injectable 12.5 Gram(s) IV Push once  dextrose 50% Injectable 25 Gram(s) IV Push once  DOBUTamine Infusion 2.5 MICROgram(s)/kG/Min (5.7 mL/Hr) IV Continuous <Continuous>  glucagon  Injectable 1 milliGRAM(s) IntraMuscular once  heparin   Injectable 5000 Unit(s) SubCutaneous every 8 hours  insulin lispro (ADMELOG) corrective regimen sliding scale   SubCutaneous three times a day before meals  insulin lispro (ADMELOG) corrective regimen sliding scale   SubCutaneous at bedtime  norepinephrine Infusion 0.05 MICROgram(s)/kG/Min (7.2 mL/Hr) IV Continuous <Continuous>    MEDICATIONS  (PRN):    Vital Signs Last 24 Hrs  T(C): 36.3 (27 Apr 2021 12:50), Max: 36.8 (26 Apr 2021 17:35)  T(F): 97.4 (27 Apr 2021 12:50), Max: 98.3 (26 Apr 2021 17:35)  HR: 77 (27 Apr 2021 13:45) (53 - 91)  BP: 89/78 (26 Apr 2021 21:15) (75/49 - 93/46)  BP(mean): 83 (26 Apr 2021 21:15) (57 - 83)  RR: 14 (27 Apr 2021 13:45) (11 - 26)  SpO2: 99% (27 Apr 2021 13:45) (93% - 100%)    INTERPRETATION OF TELEMETRY: Sinus rhythm alternating with junctional rhythm; PVCs, couplets; HR 60s-70s    LABS:                        10.4   8.41  )-----------( 126      ( 27 Apr 2021 04:51 )             34.7     04-27    129<L>  |  95<L>  |  42<H>  ----------------------------<  175<H>  4.5   |  17<L>  |  6.58<H>    Ca    8.9      27 Apr 2021 04:51  Phos  5.7     04-27  Mg     2.3     04-27    TPro  6.8  /  Alb  3.5  /  TBili  0.9  /  DBili  x   /  AST  29  /  ALT  15  /  AlkPhos  94  04-27  PT/INR - ( 26 Apr 2021 23:20 )   PT: 14.9 sec;   INR: 1.32 ratio       PTT - ( 26 Apr 2021 23:20 )  PTT:34.3 sec    I&O's Summary    26 Apr 2021 07:01  -  27 Apr 2021 07:00  --------------------------------------------------------  IN: 931.4 mL / OUT: 0 mL / NET: 931.4 mL    27 Apr 2021 07:01  -  27 Apr 2021 14:09  --------------------------------------------------------  IN: 235.4 mL / OUT: 0 mL / NET: 235.4 mL    PHYSICAL EXAM:    GENERAL: In no apparent distress, well nourished, and hydrated.  HEART: Regular rate and rhythm; No murmurs, rubs, or gallops.  PULMONARY: Clear to auscultation and percussion.  No rales, wheezing, or rhonchi bilaterally.  ABDOMEN: Soft, Nontender, Nondistended; Bowel sounds present  EXTREMITIES:  2+ Peripheral Pulses, No clubbing, cyanosis, or edema

## 2021-04-27 NOTE — DISCHARGE NOTE PROVIDER - HOSPITAL COURSE
56 M hx ESRD (dialysis M, T, TH, Sat), HF (EF 10-15%) with SC ICD in place, IDDM, HTN, CAD s/p stent 2016, p/w an episode of weakness and near syncope. Son called 911 and EMS found pt bradycardic so 0.5mg atropine was administered, where pt's symptoms improved. He denies fever, chills, diaphoresis, chest pain, sob, palpitations, nausea, vomiting or abdominal pain. Pt doesn't make any urine. Admits to weight gain of 3kg in 3 days. Last full session of dialysis was Saturday.      In ED pt became hypotensive with 75/49 with lactate 3.9. He was given NS 250cc x1 dose with Midodrine 5mg x2 doses. Pt was found to be in Accelerated Junctional Rhythm and noted to be in cardiogenic shock with biV failure and plethoric IVC noted on POCUS. Patient was started on dobutamine and levophed gtt was subsequently admitted to the CCU. Shiley was placed by vascular surgery with plans for CVVHD, however due to complications with dialysis machine, patient only received several hours of treatment. IHD was performed through patient's AV fistula in the AM instead per nephrology recommendations. Patient tolerated nad completed dialysis session.    56 M hx ESRD (dialysis M, T, TH, Sat), HF (EF 10-15%) with SC ICD in place, IDDM, HTN, CAD s/p stent 2016, p/w an episode of weakness and near syncope. Son called 911 and EMS found pt bradycardic so 0.5mg atropine was administered, where pt's symptoms improved. He denies fever, chills, diaphoresis, chest pain, sob, palpitations, nausea, vomiting or abdominal pain. Pt doesn't make any urine. Admits to weight gain of 3kg in 3 days. Last full session of dialysis was Saturday.      In ED pt became hypotensive with 75/49 with lactate 3.9. He was given NS 250cc x1 dose with Midodrine 5mg x2 doses. Pt was found to be in Accelerated Junctional Rhythm and noted to be in cardiogenic shock with biV failure and plethoric IVC noted on POCUS. Patient was started on dobutamine and levophed gtt was subsequently admitted to the CCU. Shiley was placed by vascular surgery with plans for CVVHD, however due to complications with dialysis machine, patient only received several hours of treatment. IHD was performed daily with low diasylate temperature and sodium modeling through patient's AV fistula instead per nephrology recommendations.   56 M hx ESRD (dialysis M, T, TH, Sat), HF (EF 10-15%) with SC ICD in place, IDDM, HTN, CAD s/p stent 2016, p/w an episode of weakness and near syncope. Son called 911 and EMS found pt bradycardic so 0.5mg atropine was administered, where pt's symptoms improved. He denies fever, chills, diaphoresis, chest pain, sob, palpitations, nausea, vomiting or abdominal pain. Pt doesn't make any urine. Admits to weight gain of 3kg in 3 days. Last full session of dialysis was Saturday.      In ED pt became hypotensive with 75/49 with lactate 3.9. He was given NS 250cc x1 dose with Midodrine 5mg x2 doses. Pt was found to be in Accelerated Junctional Rhythm and noted to be in cardiogenic shock with biV failure and plethoric IVC noted on POCUS. Patient was started on dobutamine and levophed gtt was subsequently admitted to the CCU. Shiley was placed by vascular surgery with plans for CVVHD, however due to complications with dialysis machine, patient only received several hours of treatment. IHD was performed daily with low diasylate temperature and sodium modeling through patient's AV fistula instead per nephrology recommendations.     Report given to [facility] CCU provider/physician 4/29/21 and awaiting transporter.

## 2021-04-27 NOTE — CONSULT NOTE ADULT - CONSULT REASON
Awais for urgent CRRT
ESRD/HD Management
Detail Level: Detailed
Dizziness/ Bradycardia
presyncopal/junctional bradycardia/ADHF

## 2021-04-27 NOTE — CONSULT NOTE ADULT - SUBJECTIVE AND OBJECTIVE BOX
HPI:  56 M hx ESRD (dialysis M, T, TH, Sat), HF (EF 15-20%) with SC ICD in place, IDDM, HTN, CAD s/p stent 2016, p/w an episode of weakness and near syncope this morning. This AM pt was sitting in chair when he suddenly felt dizzy, lightheaded, generalized weakness and felt like  passing out. He fell to ground, no LOC. Son was present who called 911. EMS found pt bradycardic and 0.5mg atropine was administered, where pt's symptoms improved. He denies fever, chills, diaphoresis, chest pain, sob, palpitations, nausea, vomiting or abdominal pain. Pt  doesn't make any urine. Admits to weight gain of 3kg in 3 days. Last full session of dialysis was Saturday, had session scheduled for today.  In ED pt became hypotensive with 75/49 with lactate 3.9. He was given NS 250cc x1 dose with Midodrine 5mg x2 doses. Pt was found to be in Accelerated Junctional Rhythm. Brought to CCU for cardiogenic shock and placed on dobutamine gtt. Nephrology consulted and believe  he needs urgent HD but would not tolerate HD at this time given hypotension, and will require CRRT for slow volume removal, for which vascular surgery was consulted to place a shiley.   Pt currently has RIJ introducer in place and CCU requesting LIJ Shiley due to anticipated need for balloon pump via groin access and would prefer to have both groins available for access should the need arise..      PMHx: HTN (hypertension)    DM (diabetes mellitus)    CAD (coronary artery disease)    Hyperlipidemia    Myocardial infarction    Congestive heart failure (CHF)    HFrEF (heart failure with reduced ejection fraction)    CKD (chronic kidney disease), stage V    Abnormal echocardiogram    GIB (gastrointestinal bleeding)    ESRD (end stage renal disease) on dialysis    CHF (congestive heart failure)    Congestive heart disease      PSHx: Arterial stent thrombosis    Stented coronary artery    History of implantable cardioverter-defibrillator (ICD) placement      Medications (inpatient): atorvastatin 80 milliGRAM(s) Oral at bedtime  calcium acetate 667 milliGRAM(s) Oral three times a day with meals  chlorhexidine 4% Liquid 1 Application(s) Topical <User Schedule>  dextrose 40% Gel 15 Gram(s) Oral once  dextrose 5%. 1000 milliLiter(s) IV Continuous <Continuous>  dextrose 5%. 1000 milliLiter(s) IV Continuous <Continuous>  dextrose 50% Injectable 25 Gram(s) IV Push once  dextrose 50% Injectable 12.5 Gram(s) IV Push once  dextrose 50% Injectable 25 Gram(s) IV Push once  DOBUTamine Infusion 2.5 MICROgram(s)/kG/Min IV Continuous <Continuous>  glucagon  Injectable 1 milliGRAM(s) IntraMuscular once  insulin lispro (ADMELOG) corrective regimen sliding scale   SubCutaneous three times a day before meals  insulin lispro (ADMELOG) corrective regimen sliding scale   SubCutaneous at bedtime  norepinephrine Infusion 0.05 MICROgram(s)/kG/Min IV Continuous <Continuous>  piperacillin/tazobactam IVPB.. 3.375 Gram(s) IV Intermittent every 8 hours  vancomycin  IVPB      vancomycin  IVPB 500 milliGRAM(s) IV Intermittent every 12 hours    Medications (PRN):  Allergies: No Known Allergies  (Intolerances: )   Family Hx: Family history of MI (myocardial infarction) (Sibling)    Family history of diabetes mellitus (DM) (Sibling)        T(C): 36.7  HR: 81 (53 - 83)  BP: 89/78 (75/49 - 123/94)  RR: 16 (11 - 26)  SpO2: 100% (93% - 100%)  Tmax: T(C): , Max: 36.8 (04-26-21 @ 09:10)    04-26-21  -  04-27-21  --------------------------------------------------------  IN:    DOBUTamine: 34.8 mL    IV PiggyBack: 200 mL    Norepinephrine: 28.6 mL  Total IN: 263.4 mL    OUT:  Total OUT: 0 mL    Total NET: 263.4 mL          Physical Exam:  General: Well-developed, in no acute distress   Neurologic: Awake, alert, GCS 15, No focal Deficits   Respiratory: Normal respiratory effort  CVS: RRR, perfusing adequately  Abdomen: Abdomen soft, NT  Ext: Grossly symmetric, Moving all extremities  Vascular: L AVF with +thrill  Skin: Warm, dry, intact, no erythema     Labs:                        11.2   8.82  )-----------( 111      ( 26 Apr 2021 20:48 )             37.3     PT/INR - ( 26 Apr 2021 23:20 )   PT: 14.9 sec;   INR: 1.32 ratio         PTT - ( 26 Apr 2021 23:20 )  PTT:34.3 sec  04-26    135  |  97<L>  |  40<H>  ----------------------------<  104<H>  5.7<H>   |  20<L>  |  6.77<H>    Ca    9.3      26 Apr 2021 20:48  Phos  6.5     04-26  Mg     2.4     04-26    TPro  6.9  /  Alb  4.0  /  TBili  1.0  /  DBili  x   /  AST  15  /  ALT  13  /  AlkPhos  105  04-26      ABG - ( 26 Apr 2021 23:10 )  pH, Arterial: 7.37  pH, Blood: x     /  pCO2: 35    /  pO2: 193   / HCO3: 21    / Base Excess: -4.5  /  SaO2: 98.7                  Imaging and other studies:

## 2021-04-27 NOTE — CONSULT NOTE ADULT - ASSESSMENT
56 M hx ESRD (dialysis M, T, TH, Sat), HF (EF 15-20%) with SC ICD in place, IDDM, HTN, CAD s/p stent 2016, p/w an episode of weakness and near syncope this morning. This AM pt was sitting in chair when he suddenly felt dizzy, lightheaded, generalized weakness and felt like passing out. He fell to ground, no LOC. Son was present who called 911. EMS found pt bradycardic and 0.5mg atropine was administered, where pt's symptoms improved. He denies fever, chills, diaphoresis, chest pain, sob, palpitations, nausea, vomiting or abdominal pain. Pt doesn't make any urine. Admits to weight gain of 3kg in 3 days. Last full session of dialysis was Saturday, had session scheduled for today. In ED pt became hypotensive with 75/49 with lactate 3.9. He was given NS 250cc x1 dose with Midodrine 5mg x2 doses. Pt was found to be in Accelerated Junctional Rhythm.  56 M hx ESRD (dialysis M, T, TH, Sat), HF (EF 15-20%) with SC ICD in place, IDDM, HTN, CAD s/p stent 2016, p/w an episode of weakness and near syncope this morning. This AM pt was sitting in chair when he suddenly felt dizzy, lightheaded, generalized weakness and felt like passing out. He fell to ground, no LOC. Son was present who called 911. EMS found pt bradycardic and 0.5mg atropine was administered, where pt's symptoms improved. He denies fever, chills, diaphoresis, chest pain, sob, palpitations, nausea, vomiting or abdominal pain. Pt doesn't make any urine. Admits to weight gain of 3kg in 3 days. Last full session of dialysis was Saturday, had session scheduled for today. In ED pt became hypotensive with 75/49 with lactate 3.9. He was given NS 250cc x1 dose with Midodrine 5mg x2 doses. Pt was found to be in Accelerated Junctional Rhythm.  Patient followed by HF Team possible heart/kidney transplant      56 M hx ESRD (dialysis M, T, TH, Sat), HF (EF 15-20%) with SC ICD in place, IDDM, HTN, CAD s/p stent 2016, p/w an episode of weakness and near syncope this morning. This AM pt was sitting in chair when he suddenly felt dizzy, lightheaded, generalized weakness and felt like passing out. He fell to ground, no LOC. Son was present who called 911. EMS found pt bradycardic and 0.5mg atropine was administered, where pt's symptoms improved. He denies fever, chills, diaphoresis, chest pain, sob, palpitations, nausea, vomiting or abdominal pain. Pt doesn't make any urine. Admits to weight gain of 3kg in 3 days. Last full session of dialysis was Saturday, had session scheduled for today. In ED pt became hypotensive with 75/49 with lactate 3.9. He was given NS 250cc x1 dose with Midodrine 5mg x2 doses. Pt was found to be in Accelerated Junctional Rhythm. Started on Dobutamine and Levo. Patient followed by HF Team possible heart/kidney transplant candidate (followed at [facility]/[place])

## 2021-04-27 NOTE — CONSULT NOTE ADULT - ATTENDING COMMENTS
Chart reviewed, pt examined, case d/w CCU team, nephrologist, and Dr. Pavon.  56M ESRD, stage D HF, p/w bradycardia, hypotension, and near syncope.  Was started on dobutamine and norepinephrine for hemodynamic support.  Did not tolerate CVVHD. Will be transitioned to iHD today via AV fistula.  He is to be evaluated for a OHT/renal transplant at [facility]. Will be transferred when bed is available.  Continue supportive care. Chart reviewed, pt examined, case d/w CCU team, nephrologist, and Dr. Pavon.  56M ESRD, stage D HF, p/w bradycardia, hypotension, and near syncope.  Was started on dobutamine and norepinephrine for hemodynamic support.  CV sat 67%, though it may be artificially elevated in the presence of AV fistula.  Did not tolerate CVVHD. Will be transitioned to iHD today via AV fistula.  He is to be evaluated for a OHT/renal transplant at [place]. Will be transferred when bed is available.  Continue supportive care. Chart reviewed, pt examined, case d/w CCU team, nephrologist, and Dr. Pavon.  56M ESRD, stage D HF, p/w bradycardia, hypotension, and near syncope.  Was started on dobutamine and norepinephrine for hemodynamic support.  CV sat 67%, though it may be artificially elevated in the presence of AV fistula.  CV tracing shows steep Y descent c/w RV failure.  Did not tolerate CVVHD. Will be transitioned to iHD today via AV fistula. BP support with norepinephrine.  He is to be evaluated for a OHT/renal transplant at [facility]. Will be transferred when bed is available.

## 2021-04-27 NOTE — PROGRESS NOTE ADULT - ASSESSMENT
55yo M with hx of ESRD (dialysis M, T, TH, Sat), HF (EF 15-20%), IDDM, HTN, CAD s/p stent, s/p ICD here w/ cc of episode of weakness, dizziness and fall this morning. ILR interrogation revealed two episodes of bradycardia corresponding to dizziness.  In ED pt became hypotensive with 75/49 with lactate 3.9. He was given NS 250cc x1 dose with Midodrine 5mg x2 doses. Pt was found to be in Accelerated Junctional Rhythm alternating with sinus rhythm. Started on Dobutamine and Levo. Patient followed by HF Team possible heart/kidney transplant candidate (followed at [facility]/[place]). Patient has sinus node dysfunction and would likely require pacemaker as he had symptomatic bradycardia.  - Continue to monitor on telemetry  - Maintain K+~4.0 and Mg++~2.0  - Hold AV kym blockers  - Continue management as per primary/HF team  - No plan for PPM at this time as patient follows up at [place] would likely need to follow up at [place] for heart/kidney transplant  - Will follow up   55yo M with hx of ESRD (dialysis M, T, TH, Sat), HF (EF 15-20%), IDDM, HTN, CAD s/p stent, s/p ICD here w/ cc of episode of weakness, dizziness and fall this morning. ILR interrogation revealed two episodes of bradycardia corresponding to dizziness.  In ED pt became hypotensive with 75/49 with lactate 3.9. He was given NS 250cc x1 dose with Midodrine 5mg x2 doses. Pt was found to be in Accelerated Junctional Rhythm alternating with sinus rhythm. Started on Dobutamine and Levo. Patient followed by HF Team possible heart/kidney transplant candidate (followed at [facility]/[place]). Patient has sinus node dysfunction and would likely require pacemaker as he had symptomatic bradycardia.  - Continue to monitor on telemetry  - Maintain K+~4.0 and Mg++~2.0  - Hold AV kym blockers  - Continue management as per primary/HF team  - No plan for PPM at this time as patient follows up at [facility] and would likely need to follow up at [facility] for heart/kidney transplant  - Will follow up

## 2021-04-28 NOTE — PROGRESS NOTE ADULT - ASSESSMENT
57yo M with hx of ESRD (dialysis M, T, TH, Sat), HF (EF 15-20%), IDDM, HTN, CAD s/p stent, s/p ICD here w/ cc of episode of weakness, dizziness and fall this morning. ILR interrogation revealed two episodes of bradycardia corresponding to dizziness.  In ED pt became hypotensive with 75/49 with lactate 3.9. He was given NS 250cc x1 dose with Midodrine 5mg x2 doses. Pt was found to be in Accelerated Junctional Rhythm alternating with sinus rhythm. Started on Dobutamine and Levo. Patient followed by HF Team possible heart/kidney transplant candidate (followed at [facility]/[place]). Patient has sinus node dysfunction and would likely require pacemaker as he had symptomatic bradycardia.  - Continue to monitor on telemetry  - Maintain K+~4.0 and Mg++~2.0  - Hold AV kym blockers  - Continue management as per primary/HF team  - No plan for PPM at this time as patient follows up at [place] and would likely need to follow up at [place] for heart/kidney transplant and for possible PPM  - Will follow up

## 2021-04-28 NOTE — PROGRESS NOTE ADULT - ASSESSMENT
56 year old man, history of ESRD (dialysis M, T, TH, Sat), HF (EF 15-20%) with SC ICD in place, IDDM, HTN, CAD s/p stent 2016, p/w symptomatic bradycardia and admitted to CCU w/ c/f cardiogenic shock, pending renal and heart transplant.     Neuro:  AOx3  No acute issues    Pulm:  Satting well on 2L NC; wean as tolerated     CV:  #Cardiogenic Shock  -s/p midodrine 10mg [id number] in the ED  -c/w dobutamine at 2.5mcg/kg/minute   - c/w levophed at 0.25 right now   - TTE showing estimated EF 10-15%, mod MR, severe dilated LV, mod LV enlargement and decreased RV function    #Bradycardia  -hold AV kym blockers  -2/2 sinus node dysfunction, no plan for PPM at this time since pt being evaluated for transplant    #acute on chronic systolic heart failure  -TTE with EF 10-15%  -awaiting heart transplant    #CAD  -cath 2/21 reveals 80% diffuse stenosis of mid LAD, 90% stenosis in proximal third D1, 70% stenosis CX; 80% stenosis of proximal RCA, 75% stenosis of RPDA.  - c/w atorvastatin 80mg  - on going heart transplant evaluation.    GI:  No active issues     Renal:  #ESRD  -nephro onboard  -plan for daily IHD per nephrology  -phoslo tid with meals  -will remove shiley today    Endocrine:  #DM2  A1C 6.4% 3/24, on ripaglinide at home  -c/w ISS  - monitor FS    ID:  - D/davin empiric abx: vanc and zosyn since no indication. Hypotension likely from cardiogenic shock rather than infection/sepsis    Prophylactic Measure:   DVT ppx with heparin subq 5000 TID   56 year old man, history of ESRD (dialysis M, T, TH, Sat), HF (EF 15-20%) with SC ICD in place, IDDM, HTN, CAD s/p stent 2016, p/w symptomatic bradycardia and admitted to CCU w/ c/f cardiogenic shock, pending renal and heart transplant.     Neuro:  AOx3  No acute issues    Pulm:  Satting well on 2L NC; wean as tolerated     CV:  #Cardiogenic Shock  -s/p midodrine 10mg [id number] in the ED  -c/w dobutamine at 2.5mcg/kg/minute   - c/w levophed at 0.25 right now   - TTE showing estimated EF 10-15%, mod MR, severe dilated LV, mod LV enlargement and decreased RV function    #Bradycardia  -hold AV kym blockers  -2/2 sinus node dysfunction, no plan for PPM at this time since pt being evaluated for transplant    #acute on chronic systolic heart failure  -TTE with EF 10-15%  -awaiting heart transplant    #CAD  -cath 2/21 reveals 80% diffuse stenosis of mid LAD, 90% stenosis in proximal third D1, 70% stenosis CX; 80% stenosis of proximal RCA, 75% stenosis of RPDA.  - c/w atorvastatin 80mg  - on going heart transplant evaluation.    GI:  No active issues     Renal:  #ESRD  -nephro onboard  -plan for daily IHD per nephrology  -phoslo tid with meals  -will remove shiley today    Endocrine:  #DM2  A1C 6.4% 3/24, on ripaglinide at home  -c/w ISS  - monitor FS    ID:  - D/davin empiric abx: vanc and zosyn since no indication. Hypotension likely from cardiogenic shock rather than infection/sepsis    Prophylactic Measure:   DVT ppx with heparin subq 5000 TID    General:  - Awaiting transfer to [facility] once bed available

## 2021-04-28 NOTE — PROGRESS NOTE ADULT - SUBJECTIVE AND OBJECTIVE BOX
[facility] Division of Kidney Diseases & Hypertension  FOLLOW UP NOTE  [phone]  --------------------------------------------------------------------------------  HPI: 57yo M hx ESRD (dialysis M, T, TH, Sat), HF (EF 15-20%), IDDM, HTN, CAD s/p stent, s/p ICD presents to [facility] for near syncope and hypotension. Nephrology team following for  ESRD/HD management.   Pt found to be bradycardic and hypotensive. Pt transferred to CCU and started on Dobutamine and IV vasopressor support. Pt with last HD yesterday (4/27) tolerated well without issues.    Patient seen and evaluated at bedside this morning.  Awake alert and offers no subjective complains. Denies chest pain, SOB, dizziness.      PAST HISTORY  --------------------------------------------------------------------------------  No significant changes to PMH, PSH, FHx, SHx, unless otherwise noted    ALLERGIES & MEDICATIONS  --------------------------------------------------------------------------------  Allergies  No Known Allergies  Intolerances    Standing Inpatient Medications  atorvastatin 80 milliGRAM(s) Oral at bedtime  calcium acetate 667 milliGRAM(s) Oral three times a day with meals  chlorhexidine 4% Liquid 1 Application(s) Topical <User Schedule>  dextrose 40% Gel 15 Gram(s) Oral once  dextrose 5%. 1000 milliLiter(s) IV Continuous <Continuous>  dextrose 5%. 1000 milliLiter(s) IV Continuous <Continuous>  dextrose 50% Injectable 25 Gram(s) IV Push once  dextrose 50% Injectable 12.5 Gram(s) IV Push once  dextrose 50% Injectable 25 Gram(s) IV Push once  DOBUTamine Infusion 2.5 MICROgram(s)/kG/Min IV Continuous <Continuous>  glucagon  Injectable 1 milliGRAM(s) IntraMuscular once  heparin   Injectable 5000 Unit(s) SubCutaneous every 8 hours  insulin lispro (ADMELOG) corrective regimen sliding scale   SubCutaneous three times a day before meals  insulin lispro (ADMELOG) corrective regimen sliding scale   SubCutaneous at bedtime  norepinephrine Infusion 0.05 MICROgram(s)/kG/Min IV Continuous <Continuous>    PRN Inpatient Medications    REVIEW OF SYSTEMS  --------------------------------------------------------------------------------  Gen: No  fevers/chills  Respiratory: No dyspnea  CV: No chest pain, PND  GI: No abdominal pain  MSK: No joint pain/swelling    All other systems were reviewed and are negative, except as noted.    VITALS/PHYSICAL EXAM  --------------------------------------------------------------------------------  T(C): 36.6 (04-28-21 @ 08:47), Max: 37.2 (04-27-21 @ 20:00)  HR: 77 (04-28-21 @ 10:00) (64 - 92)  BP: --  RR: 20 (04-28-21 @ 10:00) (12 - 25)  SpO2: 99% (04-28-21 @ 10:00) (90% - 100%)  Wt(kg): --  Height (cm): 162.6 (04-26-21 @ 17:07)  Weight (kg): 76.8 (04-26-21 @ 18:15)  BMI (kg/m2): 29 (04-26-21 @ 18:15)  BSA (m2): 1.82 (04-26-21 @ 18:15)      04-27-21 @ 07:01  -  04-28-21 @ 07:00  --------------------------------------------------------  IN: 635.4 mL / OUT: 2000 mL / NET: -1364.6 mL    Physical Exam:  Gen: NAD  HEENT: supple neck,   Pulmonary: CTA B/L  CV: RRR, S1S2; no rub  Abd:  +BS, soft, not tender, not distended  LE:  mild edema  Neuro: No focal deficits,    Vascular  Left UE AVF, Left IJ non-tunneled HD catheter    LABS/STUDIES  --------------------------------------------------------------------------------              10.4   7.80  >-----------<  151      [04-28-21 @ 04:14]              33.6     135  |  96  |  22  ----------------------------<  210      [04-28-21 @ 04:14]  3.9   |  24  |  4.17        Ca     9.1     [04-28-21 @ 04:14]      Mg     2.2     [04-28-21 @ 04:14]      Phos  3.9     [04-28-21 @ 04:14]    TPro  6.9  /  Alb  3.5  /  TBili  1.1  /  DBili  x   /  AST  65  /  ALT  25  /  AlkPhos  96  [04-28-21 @ 04:14]    PT/INR: PT 14.9 , INR 1.32       [04-26-21 @ 23:20]  PTT: 34.3       [04-26-21 @ 23:20]    Creatinine Trend:  SCr 4.17 [04-28 @ 04:14]  SCr 6.58 [04-27 @ 04:51]  SCr 6.92 [04-27 @ 00:40]  SCr 6.77 [04-26 @ 20:48]  SCr 6.21 [04-26 @ 11:01]

## 2021-04-28 NOTE — CHART NOTE - NSCHARTNOTEFT_GEN_A_CORE
CCU fellow note    Called [facility]. CCU to CCU transfer initiated. Pending insurance approval per [facility] [place]. Likely to be approved by the end of today. Transfer will also depend on CCU bed availability at [facility] per [facility].    Signed by  Dia Paniagua MD  PGY4 Cardiology

## 2021-04-28 NOTE — DIETITIAN INITIAL EVALUATION ADULT. - PERTINENT LABORATORY DATA
04-28    135  |  96<L>  |  22  ----------------------------<  210<H>  3.9   |  24  |  4.17<H>    Ca    9.1      28 Apr 2021 04:14  Phos  3.9     04-28  Mg     2.2     04-28    TPro  6.9  /  Alb  3.5  /  TBili  1.1  /  DBili  x   /  AST  65<H>  /  ALT  25  /  AlkPhos  96  04-28  HbA1c 6.4%    CAPILLARY BLOOD GLUCOSE      POCT Blood Glucose.: 138 mg/dL (28 Apr 2021 11:55)  POCT Blood Glucose.: 139 mg/dL (28 Apr 2021 08:41)  POCT Blood Glucose.: 204 mg/dL (27 Apr 2021 21:08)  POCT Blood Glucose.: 130 mg/dL (27 Apr 2021 16:35)

## 2021-04-28 NOTE — DIETITIAN INITIAL EVALUATION ADULT. - OTHER INFO
Pt. w PMH ESRD on HD, HTN, HF, ICD, IDDM, CAD s/p stent.  Currently presents with weakness and bradycardia.  Pt. is also pending renal and heart transplant... may possibly be transferred to [facility].    Pt. with poor PO intake @ this time.  He denies food allergies, nausea/vomiting/diarrhea/constipation, or issues with chewing/swallowing.      Self monitors blood glucose values at home in the morning (fasting) with values of ~107-110mg/dL, as well as 2 hours post prandial during the day with values of ~145-170mg/dL.      RDN  Pt. demonstrates prior knowledge and understanding of renal, DM & cardiac therapeutic diet modifications. Pt. w PMH ESRD on HD, HTN, HF, ICD, IDDM, CAD s/p stent.  Currently presents with weakness with near syncope, and symptomatic  bradycardia & hypotension.  Also with hypervolemia.  Of note Pt. pending renal and heart transplant... may possibly be transferred to [facility].  Pt. observed receiving bedside HD at time of RDN encounter.    CVVHD changed 2/2 to intolerance, to longer intermittent HD sessions.       Pt. with poor PO intake since admission (x 2 days) attributed to frequent dialysis & having shiley catheter in place. Inadequate PO also noted in nursing flow sheets.  He denies food allergies, nausea/vomiting/diarrhea/constipation, or issues with chewing/swallowing.      Self monitors blood glucose values at home in the morning (fasting) with values of ~107-110mg/dL, as well as 2 hours post prandial during the day with values of ~145-170mg/dL.      RDN reviewed  renal, DM & cardiac therapeutic diet modifications, which Pt. demonstrates prior knowledge and understanding of.    Usual dry weight 71kg.

## 2021-04-28 NOTE — PROGRESS NOTE ADULT - PROBLEM SELECTOR PLAN 1
Cardiogenic shock on Dobutamine gtt 2.5 mcg/kg/min and norepinephrine.  Wean norepinephrine per CCU team.   HD per renal team.   Central sat 77.4, CVP 15.   Awaiting transfer to [facility].

## 2021-04-28 NOTE — PROGRESS NOTE ADULT - ASSESSMENT
56 M hx ESRD (dialysis M, T, TH, Sat), HF (EF 15-20%) with SC ICD in place, IDDM, HTN, CAD s/p stent 2016, p/w an episode of weakness and near syncope this morning. This AM pt was sitting in chair when he suddenly felt dizzy, lightheaded, generalized weakness and felt like passing out. He fell to ground, no LOC. Son was present who called 911. EMS found pt bradycardic and 0.5mg atropine was administered, where pt's symptoms improved. He denies fever, chills, diaphoresis, chest pain, sob, palpitations, nausea, vomiting or abdominal pain. Pt doesn't make any urine. Admits to weight gain of 3kg in 3 days. Last full session of dialysis was Saturday, had session scheduled for today. In ED pt became hypotensive with 75/49 with lactate 3.9. He was given NS 250cc x1 dose with Midodrine 5mg x2 doses. Pt was found to be in Accelerated Junctional Rhythm. Started on Dobutamine and Levo. Patient followed by HF Team possible heart/kidney transplant candidate (followed at [facility]/[place])    Cardiogenic shock on Dobutamine and norepinephrine. Awaiting transfer to [facility].

## 2021-04-28 NOTE — PROGRESS NOTE ADULT - SUBJECTIVE AND OBJECTIVE BOX
Patient seen and examined. States he feels "great"  No acute SOB, CP, palpitations.     Medications:  atorvastatin 80 milliGRAM(s) Oral at bedtime  calcium acetate 667 milliGRAM(s) Oral three times a day with meals  chlorhexidine 4% Liquid 1 Application(s) Topical <User Schedule>  dextrose 40% Gel 15 Gram(s) Oral once  dextrose 5%. 1000 milliLiter(s) IV Continuous <Continuous>  dextrose 5%. 1000 milliLiter(s) IV Continuous <Continuous>  dextrose 50% Injectable 25 Gram(s) IV Push once  dextrose 50% Injectable 12.5 Gram(s) IV Push once  dextrose 50% Injectable 25 Gram(s) IV Push once  DOBUTamine Infusion 2.5 MICROgram(s)/kG/Min IV Continuous <Continuous>  glucagon  Injectable 1 milliGRAM(s) IntraMuscular once  heparin   Injectable 5000 Unit(s) SubCutaneous every 8 hours  insulin lispro (ADMELOG) corrective regimen sliding scale   SubCutaneous three times a day before meals  insulin lispro (ADMELOG) corrective regimen sliding scale   SubCutaneous at bedtime  norepinephrine Infusion 0.05 MICROgram(s)/kG/Min IV Continuous <Continuous>      Vitals:  Vital Signs Last 24 Hrs  T(C): 36.6 (2021 11:20), Max: 37.2 (2021 20:00)  T(F): 97.9 (2021 11:20), Max: 99 (2021 20:00)  HR: 80 (2021 12:00) (71 - 92)  BP: --  BP(mean): --  RR: 18 (2021 12:00) (12 - 25)  SpO2: 100% (2021 12:00) (90% - 100%)    Daily     Daily Weight in k.6 (2021 11:20)    I&O's Detail    2021 07:01  -  2021 07:00  --------------------------------------------------------  IN:    DOBUTamine: 17.4 mL    IV PiggyBack: 100 mL    Norepinephrine: 58 mL    Oral Fluid: 60 mL    Other (mL): 400 mL  Total IN: 635.4 mL    OUT:    Other (mL): 2000 mL  Total OUT: 2000 mL    Total NET: -1364.6 mL      2021 07:01  -  2021 13:24  --------------------------------------------------------  IN:    DOBUTamine: 5.8 mL    Norepinephrine: 20 mL    Other (mL): 400 mL  Total IN: 425.8 mL    OUT:    Other (mL): 2900 mL  Total OUT: 2900 mL    Total NET: -2474.2 mL          Physical Exam:     General: No distress. Comfortable.  HEENT: EOM intact.  Neck: Neck supple. JVP difficult to assess with lines in place. No masses  Chest: Clear to auscultation bilaterally  CV: Normal S1 and S2. No murmurs, rub, or gallops. Radial pulses normal. No LE edema b/l. Warm b/l.   Abdomen: Soft, non-distended, non-tender  Skin: No rashes or skin breakdown  Neurology: Alert and oriented times three. Sensation intact  Psych: Affect normal    Labs:                        10.4   7.80  )-----------( 151      ( 2021 04:14 )             33.6         135  |  96<L>  |  22  ----------------------------<  210<H>  3.9   |  24  |  4.17<H>    Ca    9.1      2021 04:14  Phos  3.9       Mg     2.2         TPro  6.9  /  Alb  3.5  /  TBili  1.1  /  DBili  x   /  AST  65<H>  /  ALT  25  /  AlkPhos  96      PT/INR - ( 2021 23:20 )   PT: 14.9 sec;   INR: 1.32 ratio         PTT - ( 2021 23:20 )  PTT:34.3 sec

## 2021-04-28 NOTE — DIETITIAN INITIAL EVALUATION ADULT. - ADD RECOMMEND
-Add Nepro 8oz PO 2x daily to renal, consistent carbohydrate diet.                                                                          -Order Nephrovite supplement for micronutrient coverage.

## 2021-04-28 NOTE — PROGRESS NOTE ADULT - ATTENDING COMMENTS
57yo M with hx of ESRD (dialysis M, T, TH, Sat), HF (EF 15-20%), IDDM, HTN, CAD s/p stent, s/p ICD here w/ cc of episode of weakness, dizziness and fall this morning. ILR interrogation revealed two episodes of bradycardia corresponding to dizziness.  In ED pt became hypotensive with 75/49 with lactate 3.9. He was given NS 250cc x1 dose with Midodrine 5mg x2 doses. Pt was found to be in Accelerated Junctional Rhythm alternating with sinus rhythm. Started on Dobutamine and Levo. Patient followed by HF Team possible heart/kidney transplant candidate (followed at [facility]/[place]). Patient has sinus node dysfunction and would likely require pacemaker as he had symptomatic bradycardia.  - Continue to monitor on telemetry  - Maintain K+~4.0 and Mg++~2.0  - Hold AV kym blockers  - Continue management as per primary/HF team  - No plan for PPM at this time as patient follows up at [facility] and would likely need to follow up at [facility] for heart/kidney transplant and for possible PPM  - Will follow up    Patient has been in junctional rhythm with periods of sinus rhythm. If patient does not get transplant will need at least an AAI pacemaker.

## 2021-04-28 NOTE — PROGRESS NOTE ADULT - ATTENDING COMMENTS
56 year old man with history of CAD sp PCI in 2016, ESRD on HD, DM, ICD known chronic systolic heart failure was admitted with symptomatic bradycardia and found to be in cardiogenic shock. Overnight he was transiently placed on CVVHD after shiley placed but could not tolerate. He is a long standing patient of HF service (Dr. Pavon) and plan to transfer to [place] for evaluation of heart/lung transplant.  #Neuro- Awake and alert, can become agitated but no acute distress.  #Pulm- No active issues  #CV  Acute on chronic systolic heart failure with hypotension and cardiogenic shock  TTE overnight showed severe LV dysfunction with moderate MR  He is currently on Levophed for BP support and Dobutamine 2.5 mcg/kg/min for inotropic support  This morning: Hb 13.4/ CVO2 77.4/CVP 15 CI 3.9/CO7/  Discussed with HF team  CAD- Continue ASA and statin  Bradycardia- improved- not on beta blocker at home  #Renal  ESRD with transient use of CVVHD overnight  Nephrology input appreciated- plan to change from CVVHD to longer intermittent HD sessions to see if he can tolerate  Na 135-will monitor  #ID- no active issues  DC abx  #Heme- no active issues  #Endo- history of DM- on sliding scale for now and will evaluate for need for basal insulin and pre meal coverage  #DVT ppx- subq heparin

## 2021-04-28 NOTE — PROGRESS NOTE ADULT - SUBJECTIVE AND OBJECTIVE BOX
Patient is a 56y old  Male who presents with a chief complaint of symptomatic bradycardia (2021 17:55)    HPI:    56 M hx ESRD (dialysis M, T, TH, Sat), HF (EF 15-20%) with SC ICD in place, IDDM, HTN, CAD s/p stent 2016, p/w an episode of weakness and near syncope this morning. This AM pt was sitting in chair when he suddenly felt dizzy, lightheaded, generalized weakness and felt like passing out. He fell to ground, no LOC. Son was present who called 911. EMS found pt bradycardic and 0.5mg atropine was administered, where pt's symptoms improved. He denies fever, chills, diaphoresis, chest pain, sob, palpitations, nausea, vomiting or abdominal pain. Pt doesn't make any urine. Admits to weight gain of 3kg in 3 days. Last full session of dialysis was Saturday, had session scheduled for today.   In ED pt became hypotensive with 75/49 with lactate 3.9. He was given NS 250cc x1 dose with Midodrine 5mg x2 doses. Pt was found to be in Accelerated Junctional Rhythm. (2021 17:07)       INTERVAL HPI/OVERNIGHT EVENTS:   No overnight events   Afebrile, hemodynamically stable   Pt received dialysis early this AM     Subjective:  No events overnight     ICU Vital Signs Last 24 Hrs  T(C): 36.8 (2021 07:00), Max: 37.2 (2021 20:00)  T(F): 98.2 (2021 07:00), Max: 99 (2021 20:00)  HR: 71 (2021 07:00) (64 - 92)  BP: --  BP(mean): --  ABP: 103/55 (2021 07:00) (81/42 - 114/66)  ABP(mean): 74 (2021 06:00) (55 - 82)  RR: 18 (2021 07:00) (12 - 25)  SpO2: 100% (2021 07:00) (90% - 100%)    I&O's Summary    2021 07:01  -  2021 07:00  --------------------------------------------------------  IN: 635.4 mL / OUT: 2000 mL / NET: -1364.6 mL      Daily     Daily Weight in k.1 (2021 07:00)    Adult Advanced Hemodynamics Last 24 Hrs  CVP(mm Hg): 27 (2021 06:00) (9 - 27)  CVP(cm H2O): --  CO: --  CI: --  PA: --  PA(mean): --  PCWP: --  SVR: --  SVRI: --  PVR: --  PVRI: --    EKG/Telemetry Events: Sinus 70s    MEDICATIONS  (STANDING):  atorvastatin 80 milliGRAM(s) Oral at bedtime  calcium acetate 667 milliGRAM(s) Oral three times a day with meals  chlorhexidine 4% Liquid 1 Application(s) Topical <User Schedule>  dextrose 40% Gel 15 Gram(s) Oral once  dextrose 5%. 1000 milliLiter(s) (50 mL/Hr) IV Continuous <Continuous>  dextrose 5%. 1000 milliLiter(s) (100 mL/Hr) IV Continuous <Continuous>  dextrose 50% Injectable 25 Gram(s) IV Push once  dextrose 50% Injectable 12.5 Gram(s) IV Push once  dextrose 50% Injectable 25 Gram(s) IV Push once  DOBUTamine Infusion 2.5 MICROgram(s)/kG/Min (5.7 mL/Hr) IV Continuous <Continuous>  glucagon  Injectable 1 milliGRAM(s) IntraMuscular once  heparin   Injectable 5000 Unit(s) SubCutaneous every 8 hours  insulin lispro (ADMELOG) corrective regimen sliding scale   SubCutaneous three times a day before meals  insulin lispro (ADMELOG) corrective regimen sliding scale   SubCutaneous at bedtime  norepinephrine Infusion 0.05 MICROgram(s)/kG/Min (7.2 mL/Hr) IV Continuous <Continuous>    MEDICATIONS  (PRN):      PHYSICAL EXAM:  GENERAL: NAD  HEAD:  Atraumatic, Normocephalic  EYES: EOMI, PERRLA, conjunctiva and sclera clear  NECK: Supple, No JVD, Normal thyroid, no enlarged nodes  NERVOUS SYSTEM:  Alert & Awake.   CHEST/LUNG: B/L good air entry; No rales, rhonchi, or wheezing  HEART: S1S2 normal, no S3, Regular rate and rhythm; No murmurs  ABDOMEN: Soft, Nontender, Nondistended; Bowel sounds present  EXTREMITIES:  2+ Peripheral Pulses, No clubbing, cyanosis, or edema  LYMPH: No lymphadenopathy noted  SKIN: No rashes or lesions    LABS:                        10.4   7.80  )-----------( 151      ( 2021 04:14 )             33.6         135  |  96<L>  |  22  ----------------------------<  210<H>  3.9   |  24  |  4.17<H>    Ca    9.1      2021 04:14  Phos  3.9       Mg     2.2         TPro  6.9  /  Alb  3.5  /  TBili  1.1  /  DBili  x   /  AST  65<H>  /  ALT  25  /  AlkPhos  96      LIVER FUNCTIONS - ( 2021 04:14 )  Alb: 3.5 g/dL / Pro: 6.9 g/dL / ALK PHOS: 96 U/L / ALT: 25 U/L / AST: 65 U/L / GGT: x           PT/INR - ( 2021 23:20 )   PT: 14.9 sec;   INR: 1.32 ratio         PTT - ( 2021 23:20 )  PTT:34.3 sec  CAPILLARY BLOOD GLUCOSE      POCT Blood Glucose.: 204 mg/dL (2021 21:08)  POCT Blood Glucose.: 130 mg/dL (2021 16:35)  POCT Blood Glucose.: 176 mg/dL (2021 11:47)  POCT Blood Glucose.: 161 mg/dL (2021 08:05)    ABG - ( 2021 04:51 )  pH, Arterial: 7.46  pH, Blood: x     /  pCO2: 25    /  pO2: 165   / HCO3: 21    / Base Excess: -5.4  /  SaO2: 98.8                          RADIOLOGY & ADDITIONAL TESTS:  CXR:        Care Discussed with Consultants/Other Providers [ x] YES  [ ] NO           Patient is a 56y old  Male who presents with a chief complaint of symptomatic bradycardia (2021 17:55)    HPI:    56 M hx ESRD (dialysis M, T, TH, Sat), HF (EF 15-20%) with SC ICD in place, IDDM, HTN, CAD s/p stent 2016, p/w an episode of weakness and near syncope this morning. This AM pt was sitting in chair when he suddenly felt dizzy, lightheaded, generalized weakness and felt like passing out. He fell to ground, no LOC. Son was present who called 911. EMS found pt bradycardic and 0.5mg atropine was administered, where pt's symptoms improved. He denies fever, chills, diaphoresis, chest pain, sob, palpitations, nausea, vomiting or abdominal pain. Pt doesn't make any urine. Admits to weight gain of 3kg in 3 days. Last full session of dialysis was Saturday, had session scheduled for today.   In ED pt became hypotensive with 75/49 with lactate 3.9. He was given NS 250cc x1 dose with Midodrine 5mg x2 doses. Pt was found to be in Accelerated Junctional Rhythm. (2021 17:07)       INTERVAL HPI/OVERNIGHT EVENTS:   No overnight events   Afebrile, hemodynamically stable   Pt received dialysis early this AM     Subjective:  No complaints this Am  Denies chest pain, lightheadedness, abdominal pain    ICU Vital Signs Last 24 Hrs  T(C): 36.8 (2021 07:00), Max: 37.2 (2021 20:00)  T(F): 98.2 (2021 07:00), Max: 99 (2021 20:00)  HR: 71 (2021 07:00) (64 - 92)  BP: --  BP(mean): --  ABP: 103/55 (2021 07:00) (81/42 - 114/66)  ABP(mean): 74 (2021 06:00) (55 - 82)  RR: 18 (2021 07:00) (12 - 25)  SpO2: 100% (2021 07:00) (90% - 100%)    I&O's Summary    2021 07:01  -  2021 07:00  --------------------------------------------------------  IN: 635.4 mL / OUT: 2000 mL / NET: -1364.6 mL      Daily     Daily Weight in k.1 (2021 07:00)    Adult Advanced Hemodynamics Last 24 Hrs  CVP(mm Hg): 16  CVP(cm H2O): --  CO: 7.23  CI: 3.97  PA: --  PA(mean): --  PCWP: --  SVR: 708  SVRI: --  PVR: --  PVRI: --    EKG/Telemetry Events: alternating between Sinus 70s and Junctional rhythm, PVCs    MEDICATIONS  (STANDING):  atorvastatin 80 milliGRAM(s) Oral at bedtime  calcium acetate 667 milliGRAM(s) Oral three times a day with meals  chlorhexidine 4% Liquid 1 Application(s) Topical <User Schedule>  dextrose 40% Gel 15 Gram(s) Oral once  dextrose 5%. 1000 milliLiter(s) (50 mL/Hr) IV Continuous <Continuous>  dextrose 5%. 1000 milliLiter(s) (100 mL/Hr) IV Continuous <Continuous>  dextrose 50% Injectable 25 Gram(s) IV Push once  dextrose 50% Injectable 12.5 Gram(s) IV Push once  dextrose 50% Injectable 25 Gram(s) IV Push once  DOBUTamine Infusion 2.5 MICROgram(s)/kG/Min (5.7 mL/Hr) IV Continuous <Continuous>  glucagon  Injectable 1 milliGRAM(s) IntraMuscular once  heparin   Injectable 5000 Unit(s) SubCutaneous every 8 hours  insulin lispro (ADMELOG) corrective regimen sliding scale   SubCutaneous three times a day before meals  insulin lispro (ADMELOG) corrective regimen sliding scale   SubCutaneous at bedtime  norepinephrine Infusion 0.05 MICROgram(s)/kG/Min (7.2 mL/Hr) IV Continuous <Continuous>    MEDICATIONS  (PRN):      PHYSICAL EXAM:  GENERAL: NAD  HEAD:  Atraumatic, Normocephalic  EYES: EOMI, PERRLA, conjunctiva and sclera clear  NECK: Supple, No JVD, Normal thyroid, no enlarged nodes  NERVOUS SYSTEM:  Alert & Awake.   CHEST/LUNG: B/L good air entry; No rales, rhonchi, or wheezing  HEART: S1S2 normal, no S3, Regular rate and rhythm; No murmurs  ABDOMEN: Soft, Nontender, Nondistended; Bowel sounds present  EXTREMITIES:  2+ Peripheral Pulses, No clubbing, cyanosis, or edema  LYMPH: No lymphadenopathy noted  SKIN: Chronic venous stasis in LE    LABS:                        10.4   7.80  )-----------( 151      ( 2021 04:14 )             33.6         135  |  96<L>  |  22  ----------------------------<  210<H>  3.9   |  24  |  4.17<H>    Ca    9.1      2021 04:14  Phos  3.9       Mg     2.2         TPro  6.9  /  Alb  3.5  /  TBili  1.1  /  DBili  x   /  AST  65<H>  /  ALT  25  /  AlkPhos  96      LIVER FUNCTIONS - ( 2021 04:14 )  Alb: 3.5 g/dL / Pro: 6.9 g/dL / ALK PHOS: 96 U/L / ALT: 25 U/L / AST: 65 U/L / GGT: x           PT/INR - ( 2021 23:20 )   PT: 14.9 sec;   INR: 1.32 ratio         PTT - ( 2021 23:20 )  PTT:34.3 sec  CAPILLARY BLOOD GLUCOSE      POCT Blood Glucose.: 204 mg/dL (2021 21:08)  POCT Blood Glucose.: 130 mg/dL (2021 16:35)  POCT Blood Glucose.: 176 mg/dL (2021 11:47)  POCT Blood Glucose.: 161 mg/dL (2021 08:05)    ABG - ( 2021 04:51 )  pH, Arterial: 7.46  pH, Blood: x     /  pCO2: 25    /  pO2: 165   / HCO3: 21    / Base Excess: -5.4  /  SaO2: 98.8        RADIOLOGY & ADDITIONAL TESTS:  CXR:        Care Discussed with Consultants/Other Providers [ x] YES  [ ] NO

## 2021-04-28 NOTE — PROGRESS NOTE ADULT - ATTENDING COMMENTS
ESRD on HD -- tolerating IHD with sodium modelling  Hypervolemia -- seen on HD today and increased UF goal

## 2021-04-28 NOTE — PROGRESS NOTE ADULT - PROBLEM SELECTOR PLAN 1
Patient with ESRD with cardiomyopathy on four times per week dialysis is admitted for cardiogenic shock. Pt initially started on CRRT however was discontinued due to CRRT machine issues. Pt had HD yesterday tolerated well with UF 1.5L. BP mainatined on IV vasopressor support. Will plan for HD today with UF goal 2.5L as tolerated BP with low dialysate temperature and sodium modelling.   We will do this daily.  If we are not meeting goals or he is unable to tolerate we will need to reconsider CRRT. Monitor labs.    If you have any questions, please feel free to contact me  Rich Bowser  Nephrology Fellow  Pager: [phone] / [facility]: [phone]  (After 5pm or on weekends please page the on-call fellow) Patient with ESRD with cardiomyopathy on four times per week dialysis is admitted for cardiogenic shock. Pt initially started on CRRT however was discontinued due to CRRT machine issues. Pt had HD yesterday tolerated well with UF 1.5L. BP mainatined on IV vasopressor support. Will plan for HD today with UF goal 2.5L as tolerated BP with low dialysate temperature and sodium modelling.   We will do this daily.  If we are not meeting goals or he is unable to tolerate we will need to reconsider CRRT however would recommend removal temporary dialysis catheter. Monitor labs.    If you have any questions, please feel free to contact me  Rich Bowser  Nephrology Fellow  Pager: [phone] / [facility]: [phone]  (After 5pm or on weekends please page the on-call fellow)

## 2021-04-28 NOTE — PROGRESS NOTE ADULT - SUBJECTIVE AND OBJECTIVE BOX
Patient is seen and examined. Denies any chest pain, SOB, palpitations or dizziness. Feels well. On hemodialysis    PAST MEDICAL & SURGICAL HISTORY:  HTN (hypertension)    DM (diabetes mellitus)  2  not taking any medication lat4st HgA1c 5.7    CAD (coronary artery disease)  2016, coronary artery stentX1    Hyperlipidemia    Myocardial infarction  Jan 2016    Congestive heart failure (CHF)    HFrEF (heart failure with reduced ejection fraction)  EF 20-24%    CKD (chronic kidney disease), stage V    Abnormal echocardiogram  06/2018severe MR  Severe global left ventricular systolic dysfunction.  6. Right ventricular enlargement with decreased right  ventricular systolic function, moderate RV dysfunction    GIB (gastrointestinal bleeding)  was treated  with H pyelori stable since Dec 10, 2018    ESRD (end stage renal disease) on dialysis    CHF (congestive heart failure)    Congestive heart disease    Arterial stent thrombosis    Stented coronary artery  ? 1    History of implantable cardioverter-defibrillator (ICD) placement        MEDICATIONS  (STANDING):  atorvastatin 80 milliGRAM(s) Oral at bedtime  calcium acetate 667 milliGRAM(s) Oral three times a day with meals  chlorhexidine 4% Liquid 1 Application(s) Topical <User Schedule>  dextrose 40% Gel 15 Gram(s) Oral once  dextrose 5%. 1000 milliLiter(s) (50 mL/Hr) IV Continuous <Continuous>  dextrose 5%. 1000 milliLiter(s) (100 mL/Hr) IV Continuous <Continuous>  dextrose 50% Injectable 25 Gram(s) IV Push once  dextrose 50% Injectable 12.5 Gram(s) IV Push once  dextrose 50% Injectable 25 Gram(s) IV Push once  DOBUTamine Infusion 2.5 MICROgram(s)/kG/Min (5.7 mL/Hr) IV Continuous <Continuous>  glucagon  Injectable 1 milliGRAM(s) IntraMuscular once  heparin   Injectable 5000 Unit(s) SubCutaneous every 8 hours  insulin lispro (ADMELOG) corrective regimen sliding scale   SubCutaneous three times a day before meals  insulin lispro (ADMELOG) corrective regimen sliding scale   SubCutaneous at bedtime  norepinephrine Infusion 0.05 MICROgram(s)/kG/Min (7.2 mL/Hr) IV Continuous <Continuous>    MEDICATIONS  (PRN):    Vital Signs Last 24 Hrs  T(C): 36.6 (28 Apr 2021 11:20), Max: 37.2 (27 Apr 2021 20:00)  T(F): 97.9 (28 Apr 2021 11:20), Max: 99 (27 Apr 2021 20:00)  HR: 80 (28 Apr 2021 12:00) (69 - 92)  BP: --  BP(mean): --  RR: 18 (28 Apr 2021 12:00) (12 - 25)  SpO2: 100% (28 Apr 2021 12:00) (90% - 100%)    INTERPRETATION OF TELEMETRY: Sinus rhythm/junctional rhythm with HR 70s-80s; PVCs    LABS:                        10.4   7.80  )-----------( 151      ( 28 Apr 2021 04:14 )             33.6     04-28    135  |  96<L>  |  22  ----------------------------<  210<H>  3.9   |  24  |  4.17<H>    Ca    9.1      28 Apr 2021 04:14  Phos  3.9     04-28  Mg     2.2     04-28    TPro  6.9  /  Alb  3.5  /  TBili  1.1  /  DBili  x   /  AST  65<H>  /  ALT  25  /  AlkPhos  96  04-28        PT/INR - ( 26 Apr 2021 23:20 )   PT: 14.9 sec;   INR: 1.32 ratio         PTT - ( 26 Apr 2021 23:20 )  PTT:34.3 sec    I&O's Summary    27 Apr 2021 07:01  -  28 Apr 2021 07:00  --------------------------------------------------------  IN: 635.4 mL / OUT: 2000 mL / NET: -1364.6 mL    28 Apr 2021 07:01  -  28 Apr 2021 12:26  --------------------------------------------------------  IN: 425.8 mL / OUT: 2900 mL / NET: -2474.2 mL    PHYSICAL EXAM:    GENERAL: In no apparent distress, well nourished, and hydrated.  HEART: Regular rate and rhythm; No murmurs, rubs, or gallops.  PULMONARY: Clear to auscultation and percussion.  No rales, wheezing, or rhonchi bilaterally.  ABDOMEN: Soft, Nontender, Nondistended; Bowel sounds present  EXTREMITIES:  2+ Peripheral Pulses, No clubbing, cyanosis, or edema

## 2021-04-29 NOTE — PROGRESS NOTE ADULT - SUBJECTIVE AND OBJECTIVE BOX
Patient is a 56y old  Male who presents with a chief complaint of symptomatic bradycaria (2021 13:24)    HPI:    56 M hx ESRD (dialysis M, T, TH, Sat), HF (EF 15-20%) with SC ICD in place, IDDM, HTN, CAD s/p stent 2016, p/w an episode of weakness and near syncope this morning. This AM pt was sitting in chair when he suddenly felt dizzy, lightheaded, generalized weakness and felt like passing out. He fell to ground, no LOC. Son was present who called 911. EMS found pt bradycardic and 0.5mg atropine was administered, where pt's symptoms improved. He denies fever, chills, diaphoresis, chest pain, sob, palpitations, nausea, vomiting or abdominal pain. Pt doesn't make any urine. Admits to weight gain of 3kg in 3 days. Last full session of dialysis was Saturday, had session scheduled for today.   In ED pt became hypotensive with 75/49 with lactate 3.9. He was given NS 250cc x1 dose with Midodrine 5mg x2 doses. Pt was found to be in Accelerated Junctional Rhythm. (2021 17:07)       INTERVAL HPI/OVERNIGHT EVENTS:   No overnight events   Afebrile, hemodynamically stable     Subjective:    ICU Vital Signs Last 24 Hrs  T(C): 36.9 (2021 04:00), Max: 37 (2021 00:00)  T(F): 98.4 (2021 04:00), Max: 98.6 (2021 00:00)  HR: 67 (2021 06:00) (67 - 81)  BP: --  BP(mean): --  ABP: 102/59 (2021 06:00) (85/54 - 120/67)  ABP(mean): 74 (2021 06:00) (63 - 81)  RR: 11 (2021 06:00) (11 - 24)  SpO2: 99% (2021 06:00) (94% - 100%)    I&O's Summary    2021 07:01  -  2021 07:00  --------------------------------------------------------  IN: 683.8 mL / OUT: 2900 mL / NET: -2216.2 mL      Daily     Daily Weight in k.6 (2021 11:20)    Adult Advanced Hemodynamics Last 24 Hrs  CVP(mm Hg): 16 (2021 04:00) (5 - 285)  CVP(cm H2O): --  CO: 5.34  CI: 2.93  PA: --  PA(mean): --  PCWP: --  SVR: 869  SVRI: --  PVR: --  PVRI: --    EKG/Telemetry Events: Alternating Sinus 60s and junctional rhythm    MEDICATIONS  (STANDING):  atorvastatin 80 milliGRAM(s) Oral at bedtime  calcium acetate 667 milliGRAM(s) Oral three times a day with meals  chlorhexidine 4% Liquid 1 Application(s) Topical <User Schedule>  dextrose 40% Gel 15 Gram(s) Oral once  dextrose 5%. 1000 milliLiter(s) (50 mL/Hr) IV Continuous <Continuous>  dextrose 5%. 1000 milliLiter(s) (100 mL/Hr) IV Continuous <Continuous>  dextrose 50% Injectable 25 Gram(s) IV Push once  dextrose 50% Injectable 12.5 Gram(s) IV Push once  dextrose 50% Injectable 25 Gram(s) IV Push once  DOBUTamine Infusion 2.5 MICROgram(s)/kG/Min (5.7 mL/Hr) IV Continuous <Continuous>  glucagon  Injectable 1 milliGRAM(s) IntraMuscular once  heparin   Injectable 5000 Unit(s) SubCutaneous every 8 hours  insulin lispro (ADMELOG) corrective regimen sliding scale   SubCutaneous three times a day before meals  insulin lispro (ADMELOG) corrective regimen sliding scale   SubCutaneous at bedtime  norepinephrine Infusion 0.05 MICROgram(s)/kG/Min (7.2 mL/Hr) IV Continuous <Continuous>  potassium chloride    Tablet ER 20 milliEquivalent(s) Oral once    MEDICATIONS  (PRN):  lidocaine/prilocaine Cream 1 Application(s) Topical once PRN pain      PHYSICAL EXAM:  GENERAL: NAD   HEAD:  Atraumatic, Normocephalic  EYES: EOMI, PERRLA, conjunctiva and sclera clear  NECK: Supple, No JVD, Normal thyroid, no enlarged nodes  NERVOUS SYSTEM:  Alert & Awake.   CHEST/LUNG: B/L good air entry; No rales, rhonchi, or wheezing  HEART: S1S2 normal, no S3, Regular rate and rhythm; No murmurs  ABDOMEN: Soft, Nontender, Nondistended; Bowel sounds present  EXTREMITIES:  2+ Peripheral Pulses, No clubbing, cyanosis, or edema  LYMPH: No lymphadenopathy noted  SKIN: No rashes or lesions    LABS:                        9.3    4.67  )-----------( 118      ( 2021 06:15 )             31.0         136  |  96<L>  |  15  ----------------------------<  141<H>  3.4<L>   |  27  |  3.71<H>    Ca    8.9      2021 06:15  Phos  2.7       Mg     2.1         TPro  6.3  /  Alb  3.3  /  TBili  1.2  /  DBili  x   /  AST  35  /  ALT  23  /  AlkPhos  90      LIVER FUNCTIONS - ( 2021 06:15 )  Alb: 3.3 g/dL / Pro: 6.3 g/dL / ALK PHOS: 90 U/L / ALT: 23 U/L / AST: 35 U/L / GGT: x             CAPILLARY BLOOD GLUCOSE      POCT Blood Glucose.: 150 mg/dL (2021 22:21)  POCT Blood Glucose.: 313 mg/dL (2021 17:51)  POCT Blood Glucose.: 138 mg/dL (2021 11:55)  POCT Blood Glucose.: 139 mg/dL (2021 08:41)                  RADIOLOGY & ADDITIONAL TESTS:  CXR:        Care Discussed with Consultants/Other Providers [ x] YES  [ ] NO           Patient is a 56y old  Male who presents with a chief complaint of symptomatic bradycaria (2021 13:24)    HPI:    56 M hx ESRD (dialysis M, T, TH, Sat), HF (EF 15-20%) with SC ICD in place, IDDM, HTN, CAD s/p stent 2016, p/w an episode of weakness and near syncope this morning. This AM pt was sitting in chair when he suddenly felt dizzy, lightheaded, generalized weakness and felt like passing out. He fell to ground, no LOC. Son was present who called 911. EMS found pt bradycardic and 0.5mg atropine was administered, where pt's symptoms improved. He denies fever, chills, diaphoresis, chest pain, sob, palpitations, nausea, vomiting or abdominal pain. Pt doesn't make any urine. Admits to weight gain of 3kg in 3 days. Last full session of dialysis was Saturday, had session scheduled for today.   In ED pt became hypotensive with 75/49 with lactate 3.9. He was given NS 250cc x1 dose with Midodrine 5mg x2 doses. Pt was found to be in Accelerated Junctional Rhythm. (2021 17:07)       INTERVAL HPI/OVERNIGHT EVENTS:   No overnight events   Afebrile, hemodynamically stable     Subjective:  No complaints. Sleeping comfortably during dialysis this AM    ICU Vital Signs Last 24 Hrs  T(C): 36.9 (2021 04:00), Max: 37 (2021 00:00)  T(F): 98.4 (2021 04:00), Max: 98.6 (2021 00:00)  HR: 67 (2021 06:00) (67 - 81)  BP: --  BP(mean): --  ABP: 102/59 (2021 06:00) (85/54 - 120/67)  ABP(mean): 74 (2021 06:00) (63 - 81)  RR: 11 (2021 06:00) (11 - 24)  SpO2: 99% (2021 06:00) (94% - 100%)    I&O's Summary    2021 07:01  -  2021 07:00  --------------------------------------------------------  IN: 683.8 mL / OUT: 2900 mL / NET: -2216.2 mL      Daily     Daily Weight in k.6 (2021 11:20)    Adult Advanced Hemodynamics Last 24 Hrs  CVP(mm Hg): 16 (2021 04:00) (5 - 285)  CVP(cm H2O): --  CO: 5.34  CI: 2.93  PA: --  PA(mean): --  PCWP: --  SVR: 869  SVRI: --  PVR: --  PVRI: --    EKG/Telemetry Events: Alternating Sinus 60s and junctional rhythm    MEDICATIONS  (STANDING):  atorvastatin 80 milliGRAM(s) Oral at bedtime  calcium acetate 667 milliGRAM(s) Oral three times a day with meals  chlorhexidine 4% Liquid 1 Application(s) Topical <User Schedule>  dextrose 40% Gel 15 Gram(s) Oral once  dextrose 5%. 1000 milliLiter(s) (50 mL/Hr) IV Continuous <Continuous>  dextrose 5%. 1000 milliLiter(s) (100 mL/Hr) IV Continuous <Continuous>  dextrose 50% Injectable 25 Gram(s) IV Push once  dextrose 50% Injectable 12.5 Gram(s) IV Push once  dextrose 50% Injectable 25 Gram(s) IV Push once  DOBUTamine Infusion 2.5 MICROgram(s)/kG/Min (5.7 mL/Hr) IV Continuous <Continuous>  glucagon  Injectable 1 milliGRAM(s) IntraMuscular once  heparin   Injectable 5000 Unit(s) SubCutaneous every 8 hours  insulin lispro (ADMELOG) corrective regimen sliding scale   SubCutaneous three times a day before meals  insulin lispro (ADMELOG) corrective regimen sliding scale   SubCutaneous at bedtime  norepinephrine Infusion 0.05 MICROgram(s)/kG/Min (7.2 mL/Hr) IV Continuous <Continuous>  potassium chloride    Tablet ER 20 milliEquivalent(s) Oral once    MEDICATIONS  (PRN):  lidocaine/prilocaine Cream 1 Application(s) Topical once PRN pain      PHYSICAL EXAM:  GENERAL: NAD   HEAD:  Atraumatic, Normocephalic  EYES: EOMI, PERRLA, conjunctiva and sclera clear  NECK: Supple, No JVD, Normal thyroid, no enlarged nodes  NERVOUS SYSTEM:  Alert & Awake.   CHEST/LUNG: B/L good air entry; No rales, rhonchi, or wheezing  HEART: S1S2 normal, no S3, Regular rate and rhythm; No murmurs  ABDOMEN: Soft, Nontender, Nondistended; Bowel sounds present  EXTREMITIES:  2+ Peripheral Pulses, No clubbing, cyanosis, or edema  LYMPH: No lymphadenopathy noted  SKIN: No rashes or lesions    LABS:                        9.3    4.67  )-----------( 118      ( 2021 06:15 )             31.0         136  |  96<L>  |  15  ----------------------------<  141<H>  3.4<L>   |  27  |  3.71<H>    Ca    8.9      2021 06:15  Phos  2.7       Mg     2.1         TPro  6.3  /  Alb  3.3  /  TBili  1.2  /  DBili  x   /  AST  35  /  ALT  23  /  AlkPhos  90      LIVER FUNCTIONS - ( 2021 06:15 )  Alb: 3.3 g/dL / Pro: 6.3 g/dL / ALK PHOS: 90 U/L / ALT: 23 U/L / AST: 35 U/L / GGT: x             CAPILLARY BLOOD GLUCOSE      POCT Blood Glucose.: 150 mg/dL (2021 22:21)  POCT Blood Glucose.: 313 mg/dL (2021 17:51)  POCT Blood Glucose.: 138 mg/dL (2021 11:55)  POCT Blood Glucose.: 139 mg/dL (2021 08:41)                  RADIOLOGY & ADDITIONAL TESTS:  CXR:        Care Discussed with Consultants/Other Providers [ x] YES  [ ] NO

## 2021-04-29 NOTE — PROGRESS NOTE ADULT - PROBLEM SELECTOR PLAN 1
Patient with ESRD with cardiomyopathy on four times per week dialysis is admitted for cardiogenic shock. Pt initially started on CRRT however was discontinued due to CRRT machine issues. Pt had HD yesterday tolerated well with UF 2.5L. BP maintained on IV vasopressor support. Pt having HD today, tolerating well with UF goal 3L as tolerated BP with low dialysate temperature and sodium modelling.   We will do this daily.  If we are not meeting goals or he is unable to tolerate we will need to reconsider CRRT. Labs reviewed. Hgb low at 9.3 today. Will give FATOUMATA. Monitor labs.    If you have any questions, please feel free to contact me  Rich Bowser  Nephrology Fellow  Pager: [phone] / [facility]: [phone]  (After 5pm or on weekends please page the on-call fellow)

## 2021-04-29 NOTE — CHART NOTE - NSCHARTNOTEFT_GEN_A_CORE
CCU fellow note    Called [facility]. Insurance approval was obtained for transfer. Currently CCU is full at [place] and is pending bed availability for transfer.     Signed by  Dia Paniagua MD  PGY4 Cardiology.

## 2021-04-29 NOTE — PROGRESS NOTE ADULT - ATTENDING COMMENTS
56 year old man with history of CAD sp PCI in 2016, ESRD on HD, DM, ICD known chronic systolic heart failure was admitted with symptomatic bradycardia and found to be in cardiogenic shock. Receiving daily HD sessions and tolerating with Levo and Dobutamine. He is a long standing patient of HF service (Dr. Pavon) and plan to transfer to [place] for evaluation of heart/lung transplant.  #Neuro- Awake and alert, can become agitated but no acute distress.  #Pulm- No active issues  #CV  Acute on chronic systolic heart failure with hypotension and cardiogenic shock  TTE overnight showed severe LV dysfunction with moderate MR  He is currently on Levophed for BP support and Dobutamine 2.5 mcg/kg/min for inotropic support  This morning: Hb 9.3/ CVO2 65.8/CVP 16 CI 3.9/CO7/SVR   Discussed with HF team  CAD- Continue ASA and statin  Bradycardia- improved- not on beta blocker at home  #Renal  ESRD with transient use of CVVHD overnight  Nephrology input appreciated- plan to change from CVVHD to longer intermittent HD sessions to see if he can tolerate  Na 135-will monitor  #ID- no active issues  DC abx  #Heme- no active issues  #Endo- history of DM- on sliding scale for now and will evaluate for need for basal insulin and pre meal coverage  #DVT ppx- subq heparin 56 year old man with history of CAD sp PCI in 2016, ESRD on HD, DM, ICD known chronic systolic heart failure was admitted with symptomatic bradycardia and found to be in cardiogenic shock. Receiving daily HD sessions and tolerating with Levo and Dobutamine. He is a long standing patient of HF service (Dr. Pavon) and plan to transfer to [place] for evaluation of heart/lung transplant.  #Neuro- Awake and alert, can become agitated but no acute distress.  #Pulm- No active issues  #CV  Acute on chronic systolic heart failure with hypotension and cardiogenic shock  TTE overnight showed severe LV dysfunction with moderate MR  He is currently on Levophed for BP support and Dobutamine 2.5 mcg/kg/min for inotropic support  This morning: Hb 9.3/ CVO2 65.8/CVP 16 CI 3.1/CO5.5/  Discussed with HF team  CAD- Continue ASA and statin  Bradycardia- improved- not on beta blocker at home  #Renal  ESRD with transient use of CVVHD overnight  Nephrology input appreciated- plan to change from CVVHD to longer intermittent HD sessions to see if he can tolerate  Na 136-will monitor  #ID- no active issues  DC abx  #Heme- no active issues  #Endo- history of DM- on sliding scale for now and will evaluate for need for basal insulin and pre meal coverage  #DVT ppx- subq heparin

## 2021-04-29 NOTE — PROGRESS NOTE ADULT - SUBJECTIVE AND OBJECTIVE BOX
Interval History:  No acute events overnight     MEDICATIONS  (STANDING):  atorvastatin 80 milliGRAM(s) Oral at bedtime  calcium acetate 667 milliGRAM(s) Oral three times a day with meals  chlorhexidine 4% Liquid 1 Application(s) Topical <User Schedule>  dextrose 40% Gel 15 Gram(s) Oral once  dextrose 5%. 1000 milliLiter(s) (50 mL/Hr) IV Continuous <Continuous>  dextrose 5%. 1000 milliLiter(s) (100 mL/Hr) IV Continuous <Continuous>  dextrose 50% Injectable 25 Gram(s) IV Push once  dextrose 50% Injectable 12.5 Gram(s) IV Push once  dextrose 50% Injectable 25 Gram(s) IV Push once  DOBUTamine Infusion 2.5 MICROgram(s)/kG/Min (5.7 mL/Hr) IV Continuous <Continuous>  glucagon  Injectable 1 milliGRAM(s) IntraMuscular once  heparin   Injectable 5000 Unit(s) SubCutaneous every 8 hours  insulin lispro (ADMELOG) corrective regimen sliding scale   SubCutaneous three times a day before meals  insulin lispro (ADMELOG) corrective regimen sliding scale   SubCutaneous at bedtime  norepinephrine Infusion 0.05 MICROgram(s)/kG/Min (7.2 mL/Hr) IV Continuous <Continuous>  potassium chloride    Tablet ER 20 milliEquivalent(s) Oral once    MEDICATIONS  (PRN):  lidocaine/prilocaine Cream 1 Application(s) Topical once PRN pain    ICU Vital Signs Last 24 Hrs  T(C): 36.6 (29 Apr 2021 07:25), Max: 37 (29 Apr 2021 00:00)  T(F): 97.8 (29 Apr 2021 07:25), Max: 98.6 (29 Apr 2021 00:00)  HR: 69 (29 Apr 2021 09:00) (63 - 81)  BP: --  BP(mean): --  ABP: 95/53 (29 Apr 2021 09:00) (85/54 - 107/64)  ABP(mean): 69 (29 Apr 2021 09:00) (63 - 81)  RR: 13 (29 Apr 2021 09:00) (10 - 38)  SpO2: 98% (29 Apr 2021 09:00) (94% - 100%)      Appearance: Normal	  HEENT:   Normal oral mucosa, PERRL, EOMI	  Lymphatic: No lymphadenopathy  Cardiovascular: Normal S1 S2, No JVD, No murmurs, No edema  Respiratory: Lungs clear to auscultation	  Psychiatry: A & O x 3, Mood & affect appropriate  Gastrointestinal:  Soft, Non-tender, + BS	  Skin: No rashes, No ecchymoses, No cyanosis	  Neurologic: Non-focal  Extremities: Normal range of motion, No clubbing, cyanosis or edema  Vascular: Peripheral pulses palpable 2+ bilaterally    LABS:	 	    CBC Full  -  ( 29 Apr 2021 06:15 )  WBC Count : 4.67 K/uL  Hemoglobin : 9.3 g/dL  Hematocrit : 31.0 %  Platelet Count - Automated : 118 K/uL  Mean Cell Volume : 85.6 fL  Mean Cell Hemoglobin : 25.7 pg  Mean Cell Hemoglobin Concentration : 30.0 gm/dL  Auto Neutrophil # : x  Auto Lymphocyte # : x  Auto Monocyte # : x  Auto Eosinophil # : x  Auto Basophil # : x  Auto Neutrophil % : x  Auto Lymphocyte % : x  Auto Monocyte % : x  Auto Eosinophil % : x  Auto Basophil % : x    04-29    136  |  96<L>  |  15  ----------------------------<  141<H>  3.4<L>   |  27  |  3.71<H>  04-28    135  |  96<L>  |  22  ----------------------------<  210<H>  3.9   |  24  |  4.17<H>    Ca    8.9      29 Apr 2021 06:15  Ca    9.1      28 Apr 2021 04:14  Phos  2.7     04-29  Phos  3.9     04-28  Mg     2.1     04-29  Mg     2.2     04-28    TPro  6.3  /  Alb  3.3  /  TBili  1.2  /  DBili  x   /  AST  35  /  ALT  23  /  AlkPhos  90  04-29  TPro  6.9  /  Alb  3.5  /  TBili  1.1  /  DBili  x   /  AST  65<H>  /  ALT  25  /  AlkPhos  96  04-28      LIVER FUNCTIONS - ( 29 Apr 2021 06:15 )  Alb: 3.3 g/dL / Pro: 6.3 g/dL / ALK PHOS: 90 U/L / ALT: 23 U/L / AST: 35 U/L / GGT: x

## 2021-04-29 NOTE — PROGRESS NOTE ADULT - SUBJECTIVE AND OBJECTIVE BOX
[facility] Division of Kidney Diseases & Hypertension  FOLLOW UP NOTE  [phone]  --------------------------------------------------------------------------------  HPI: 57yo M hx ESRD (dialysis M, T, TH, Sat), HF (EF 15-20%), IDDM, HTN, CAD s/p stent, s/p ICD presents to [facility] for near syncope and hypotension. Nephrology team following for  ESRD/HD management.   Pt found to be bradycardic and hypotensive. Pt transferred to CCU and started on Dobutamine and IV vasopressor support. Pt with last HD yesterday (4/28) with UF 2.5L tolerated well without issues.    Patient seen and evaluated at bedside this morning. Pt tolerating HD this morning without issues. BP maintained on Levo and Dobu. Pt offers no subjective complains except that the left CVC is bothering him. Denies chest pain, SOB, dizziness.      PAST HISTORY  --------------------------------------------------------------------------------  No significant changes to PMH, PSH, FHx, SHx, unless otherwise noted    ALLERGIES & MEDICATIONS  --------------------------------------------------------------------------------  Allergies  No Known Allergies  Intolerances    Standing Inpatient Medications  atorvastatin 80 milliGRAM(s) Oral at bedtime  calcium acetate 667 milliGRAM(s) Oral three times a day with meals  chlorhexidine 4% Liquid 1 Application(s) Topical <User Schedule>  dextrose 40% Gel 15 Gram(s) Oral once  dextrose 5%. 1000 milliLiter(s) IV Continuous <Continuous>  dextrose 5%. 1000 milliLiter(s) IV Continuous <Continuous>  dextrose 50% Injectable 25 Gram(s) IV Push once  dextrose 50% Injectable 12.5 Gram(s) IV Push once  dextrose 50% Injectable 25 Gram(s) IV Push once  DOBUTamine Infusion 2.5 MICROgram(s)/kG/Min IV Continuous <Continuous>  glucagon  Injectable 1 milliGRAM(s) IntraMuscular once  heparin   Injectable 5000 Unit(s) SubCutaneous every 8 hours  insulin lispro (ADMELOG) corrective regimen sliding scale   SubCutaneous three times a day before meals  insulin lispro (ADMELOG) corrective regimen sliding scale   SubCutaneous at bedtime  norepinephrine Infusion 0.05 MICROgram(s)/kG/Min IV Continuous <Continuous>  potassium chloride    Tablet ER 20 milliEquivalent(s) Oral once    PRN Inpatient Medications  lidocaine/prilocaine Cream 1 Application(s) Topical once PRN    REVIEW OF SYSTEMS  --------------------------------------------------------------------------------  Gen: No  fevers/chills  Respiratory: No dyspnea  CV: No chest pain, PND  GI: No abdominal pain  MSK: No joint pain/swelling    All other systems were reviewed and are negative, except as noted.  VITALS/PHYSICAL EXAM  --------------------------------------------------------------------------------  T(C): 36.6 (04-29-21 @ 07:25), Max: 37 (04-29-21 @ 00:00)  HR: 69 (04-29-21 @ 09:00) (63 - 81)  BP: --  RR: 13 (04-29-21 @ 09:00) (10 - 24)  SpO2: 98% (04-29-21 @ 09:00) (94% - 100%)  Wt(kg): --    04-28-21 @ 07:01  -  04-29-21 @ 07:00  --------------------------------------------------------  IN: 683.8 mL / OUT: 2900 mL / NET: -2216.2 mL    Physical Exam:  Gen: NAD  HEENT: supple neck,   Pulmonary: CTA B/L  CV: RRR, S1S2; no rub  Abd:  +BS, soft, not tender, not distended  LE:  mild edema  Neuro: No focal deficits,    Vascular  Left UE AVF    LABS/STUDIES  --------------------------------------------------------------------------------              9.3    4.67  >-----------<  118      [04-29-21 @ 06:15]              31.0     136  |  96  |  15  ----------------------------<  141      [04-29-21 @ 06:15]  3.4   |  27  |  3.71        Ca     8.9     [04-29-21 @ 06:15]      Mg     2.1     [04-29-21 @ 06:15]      Phos  2.7     [04-29-21 @ 06:15]    TPro  6.3  /  Alb  3.3  /  TBili  1.2  /  DBili  x   /  AST  35  /  ALT  23  /  AlkPhos  90  [04-29-21 @ 06:15]    Creatinine Trend:  SCr 3.71 [04-29 @ 06:15]  SCr 4.17 [04-28 @ 04:14]  SCr 6.58 [04-27 @ 04:51]  SCr 6.92 [04-27 @ 00:40]  SCr 6.77 [04-26 @ 20:48]    HBsAb 37.6      [04-28-21 @ 01:35]  HBsAg Nonreact      [04-26-21 @ 15:23]  HBcAb Reactive      [04-28-21 @ 01:35]  HCV 0.12, Nonreact      [04-28-21 @ 01:35]

## 2021-04-29 NOTE — PROGRESS NOTE ADULT - NSICDXPILOT_GEN_ALL_CORE
[place]

## 2021-04-29 NOTE — PROGRESS NOTE ADULT - ATTENDING COMMENTS
Seen and evaluated discussed with CCU staff.    #esrd -  HD daily while in the hospital.  #hypervolemia - tolerating UF 3 kg when seen on HD this morning.  sodium modelling low dialysate temp

## 2021-04-29 NOTE — PROGRESS NOTE ADULT - PROVIDER SPECIALTY LIST ADULT
Electrophysiology
Electrophysiology
CCU
CCU
Heart Failure
Vascular Surgery
CCU
Electrophysiology
Electrophysiology
Nephrology
CCU
Electrophysiology
Nephrology
Nephrology

## 2021-04-29 NOTE — PROGRESS NOTE ADULT - PROBLEM SELECTOR PROBLEM 1
ESRD (end stage renal disease) on dialysis
Cardiogenic shock

## 2021-04-29 NOTE — PROGRESS NOTE ADULT - ATTENDING COMMENTS
55yo M with hx of ESRD (dialysis M, T, TH, Sat), HF (EF 15-20%), IDDM, HTN, CAD s/p stent, s/p ICD here w/ cc of episode of weakness, dizziness and fall this morning. ILR interrogation revealed two episodes of bradycardia corresponding to dizziness.  In ED pt became hypotensive with 75/49 with lactate 3.9. He was given NS 250cc x1 dose with Midodrine 5mg x2 doses. Pt was found to be in Accelerated Junctional Rhythm alternating with sinus rhythm. Started on Dobutamine and Levo. Patient followed by HF Team possible heart/kidney transplant candidate (followed at [facility]). Patient has sinus node dysfunction and would likely require pacemaker as he had symptomatic bradycardia.  - Continue to monitor on telemetry  - Maintain K+~4.0 and Mg++~2.0  - Hold AV kym blockers  - Continue management as per primary/HF team  - Patient planned for transfer to [facility] for heart/kidney transplant and for possible PPM

## 2021-04-29 NOTE — PROGRESS NOTE ADULT - ASSESSMENT
57yo M with hx of ESRD (dialysis M, T, TH, Sat), HF (EF 15-20%), IDDM, HTN, CAD s/p stent, s/p ICD here w/ cc of episode of weakness, dizziness and fall this morning. ILR interrogation revealed two episodes of bradycardia corresponding to dizziness.  In ED pt became hypotensive with 75/49 with lactate 3.9. He was given NS 250cc x1 dose with Midodrine 5mg x2 doses. Pt was found to be in Accelerated Junctional Rhythm alternating with sinus rhythm. Started on Dobutamine and Levo. Patient followed by HF Team possible heart/kidney transplant candidate (followed at [facility]). Patient has sinus node dysfunction and would likely require pacemaker as he had symptomatic bradycardia.  - Continue to monitor on telemetry  - Maintain K+~4.0 and Mg++~2.0  - Hold AV kym blockers  - Continue management as per primary/HF team  - Patient planned for transfer to [place] for heart/kidney transplant and for possible PPM

## 2021-04-29 NOTE — PROGRESS NOTE ADULT - ASSESSMENT
56 year old man, history of ESRD (dialysis M, T, TH, Sat), HF (EF 15-20%) with SC ICD in place, IDDM, HTN, CAD s/p stent 2016, p/w symptomatic bradycardia and admitted to CCU w/ c/f cardiogenic shock, pending renal and heart transplant.     Neuro:  AOx3  No acute issues    Pulm:  Satting well on 2L NC; wean as tolerated     CV:  #Cardiogenic Shock  -c/w dobutamine at 2.5mcg/kg/minute   - c/w levophed at 0.25 right now   - TTE showing estimated EF 10-15%, mod MR, severe dilated LV, mod LV enlargement and decreased RV function    #Bradycardia  -hold AV kym blockers  -2/2 sinus node dysfunction with alternating junctional and sinus rhythm; no plan for PPM at this time since pt being evaluated for transplant    #acute on chronic systolic heart failure  -TTE with EF 10-15%  -awaiting heart transplant    #CAD  -cath 2/21 reveals 80% diffuse stenosis of mid LAD, 90% stenosis in proximal third D1, 70% stenosis CX; 80% stenosis of proximal RCA, 75% stenosis of RPDA.  - c/w atorvastatin 80mg  - on going heart transplant evaluation.    GI:  No active issues     Renal:  #ESRD  -nephro onboard  -plan for daily IHD per nephrology  -phoslo tid with meals    Endocrine:  #DM2  A1C 6.4% 3/24, on ripaglinide at home  -c/w ISS  - monitor FS    ID:  - D/davin empiric abx: vanc and zosyn since no indication. Hypotension likely from cardiogenic shock rather than infection/sepsis    Prophylactic Measure:   DVT ppx with heparin subq 5000 TID    General:  - Awaiting transfer to [facility] once bed available   56 year old man, history of ESRD (dialysis M, T, TH, Sat), HF (EF 15-20%) with SC ICD in place, IDDM, HTN, CAD s/p stent 2016, p/w symptomatic bradycardia and admitted to CCU w/ c/f cardiogenic shock, pending renal and heart transplant.     Neuro:  AOx3  No acute issues    Pulm:  Satting well on 2L NC; wean as tolerated     CV:  #Cardiogenic Shock  -c/w dobutamine at 2.5mcg/kg/minute   - c/w levophed at 0.111 right now   - TTE showing estimated EF 10-15%, mod MR, severe dilated LV, mod LV enlargement and decreased RV function    #Bradycardia  -hold AV kym blockers  -2/2 sinus node dysfunction with alternating junctional and sinus rhythm; no plan for PPM at this time since pt being evaluated for transplant    #acute on chronic systolic heart failure  -TTE with EF 10-15%  -awaiting heart transplant    #CAD  -cath 2/21 reveals 80% diffuse stenosis of mid LAD, 90% stenosis in proximal third D1, 70% stenosis CX; 80% stenosis of proximal RCA, 75% stenosis of RPDA.  - c/w atorvastatin 80mg  - on going heart transplant evaluation.    GI:  No active issues     Renal:  #ESRD  -nephro onboard  -plan for daily IHD per nephrology  -phoslo tid with meals    Endocrine:  #DM2  A1C 6.4% 3/24, on ripaglinide at home  -c/w ISS  - monitor FS    ID:  - D/davin empiric abx: vanc and zosyn since no indication. Hypotension likely from cardiogenic shock rather than infection/sepsis    Prophylactic Measure:   DVT ppx with heparin subq 5000 TID    General:  - Awaiting transfer to [facility] once bed available

## 2021-05-24 ENCOUNTER — APPOINTMENT (OUTPATIENT)
Dept: HEMATOLOGY ONCOLOGY | Facility: CLINIC | Age: 56
End: 2021-05-24

## 2021-05-24 ENCOUNTER — NON-APPOINTMENT (OUTPATIENT)
Age: 56
End: 2021-05-24

## 2021-05-25 NOTE — PROGRESS NOTE ADULT - ASSESSMENT
Is This A New Presentation, Or A Follow-Up?: Wart How Severe Are Your Warts?: mild Mr. Lorenzo is a 54 yo M with hx of chronic systolic heart failure/HFrEF (28%), CAD c/b prior MI w/ ischemic cardiomyopathy s/p PCI, severe MR, CKD stage V, DM2, HTN, and HLD admitted for pre-op CHF optimization for upcoming AVF placement, but found to have progressive anemia with concern for upper GI bleed given reports of black stools.  Hb/Hct now stable with no signs of ongoing bleeding currently. EGD revealed non-bleeding gastric ulcer and non-bleeding gastric erosions.

## 2021-05-31 NOTE — ED ADULT NURSE NOTE - CHIEF COMPLAINT QUOTE
Dr Sherine Vasquez notified on the patients status. New orders received. sent in by MD for fluid retention  Hx ESRD- HD Tu/Th/S

## 2021-06-02 ENCOUNTER — APPOINTMENT (OUTPATIENT)
Dept: INTERNAL MEDICINE | Facility: CLINIC | Age: 56
End: 2021-06-02

## 2021-06-07 ENCOUNTER — APPOINTMENT (OUTPATIENT)
Dept: VASCULAR SURGERY | Facility: CLINIC | Age: 56
End: 2021-06-07

## 2021-06-30 NOTE — PATIENT PROFILE ADULT - INFORMATION PROVIDED TO:
Occupational Therapy                              [address] [place] [address] OCCUPATIONAL THERAPY EVALUATION    Chart Review:  Past Medical History:   Diagnosis Date    Arthritis     Cerebral artery occlusion [facility] infarction ([facility] [address]     Diabetes mellitus ([facility] [address]     Hyperlipidemia     Hypertension      Past Surgical History:   Procedure Laterality Date    BLADDER SUSPENSION      COLONOSCOPY  3/23/15    polyps, int hem    DILATION AND CURETTAGE OF UTERUS      HYSTERECTOMY       Social History:  Social/Functional History  Lives With: Significant other (Duane)  Type of Home: Apartment  Home Layout: One level  Home Access: Ramped entrance, Elevator  Entrance Stairs - Number of Steps: 3rd floor  Bathroom Shower/Tub: Tub only  Bathroom Toilet: Handicap height  Bathroom Equipment: Grab bars in shower, Tub transfer bench  Bathroom Accessibility: Accessible  Home Equipment: 4 wheeled walker, [address] Help From: Family  ADL Assistance: Independent  Homemaking Assistance: Needs assistance  Homemaking Responsibilities: No  Ambulation Assistance:  (was using w/c for mobility at baseline)  Transfer Assistance: Needs assistance  Active : No  Patient's  Info: Pt's significant other Duane  Mode of Transportation: Car  Occupation: On disability  Additional Comments: Pt appears to be a poor historian and inconsistent with some information provided; sleeps in regular, flat bed; 1 recent fall requring this hospitalization    Restrictions:  Restrictions/Precautions  Restrictions/Precautions: General Precautions, Fall Risk        Position Activity Restriction  Other position/activity restrictions: LIZBET, IV access LUE         Pain Level: 0 (premedicate for therapy)       Objective:  Observation/Palpation  Observation: Per pt report, \"I just want to be here to make sure I don't fall when I get home\" Throughout evaluation pt required Max encouragement to participate in [address] [place] [address] activities.  Pt consistently reported, \"I just need to be in bed, we can do this next week. \"    Orientation  Overall Orientation Status: Within Functional Limits (AXO x4)        Vision  Vision: Impaired  Vision Exceptions: Wears glasses for reading  Vision - Basic Assessment  Patient Visual Report: No visual complaint reported. Oculo Motor Control: WNL  Vision Comments: Will further assess when possible  Hearing  Hearing: Within functional limits    ROM:      LUE AROM (degrees)  LUE AROM : WFL     Left Hand AROM (degrees)  Left Hand AROM: WFL     RUE AROM (degrees)  RUE AROM : WFL     Right Hand AROM (degrees)  Right Hand AROM: WFL    Strength:    LUE Strength  Gross LUE Strength: Exceptions to [facility]  L Shoulder Flex: 4-/5  L Shoulder Ext: 4-/5  L Elbow Flex: 4-/5  L Elbow Ext: 4-/5  L Hand General: 4-/5  RUE Strength  Gross RUE Strength: Exceptions to [facility]  R Shoulder Flex: 4-/5  R Shoulder Ext: 4-/5  R Elbow Flex: 4-/5  R Elbow Ext: 4-/5  R Hand General: 4-/5    Quality of Movement: Tone RUE  RUE Tone: Normotonic  Tone LUE  LUE Tone: Normotonic  Coordination  Movements Are Fluid And Coordinated: Yes       Sensation:    Sensation  Overall Sensation Status: WFL     ADLs:  Eating: Eating  Discharge Goal: Independent       Oral Hygiene: Oral Hygiene  Assistance Needed: Independent  Comment: Pt demo application of toothpaste on toothbrush. Pt able to complete oral care seated in w/c.   CARE Score: 6  Discharge Goal: Independent    UB/LB Bathing: Shower/Bathe Self  Assistance Needed: Partial/moderate assistance  Comment: Min MILO c LB washing/drying  CARE Score: 3  Discharge Goal: Supervision or touching assistance    UB Dressing: Upper Body Dressing  Assistance Needed: Partial/moderate assistance  Comment: Min MILO c threading shirt down back  CARE Score: 3  Discharge Goal: Supervision or touching assistance         LB Dressing:   Lower Body Dressing  Assistance Needed: Partial/moderate assistance  Comment: Min MILO naranjo donning depends over hips, pt demo threading BLE in seated position. Required Min A to maintain stance when donning over hips. CARE Score: 3  Discharge Goal: Supervision or touching assistance    Donning and [place] Footwear: Putting On/Taking Off Footwear  Assistance Needed: Supervision or touching assistance  Comment: SUP for safety as pt completed task seated in w/c, required v/c for figure four position  CARE Score: 4  Discharge Goal: Independent      Toileting: Toileting Hygiene  Assistance Needed: Partial/Moderate assistance  Comment: Per PT report, required Min A to manage LB clothing in partial standing (pt had poor safety awareness and was not receptive to VCs to readjust posture and balance during LB clothing management); pt completed perineal hygiene at seated level following episode of urination  CARE Score:3  Discharge Goal: Supervision or touching assistance      Bed Mobility:    Bed mobility  Supine to Sit: Stand by assistance (Mod v/c for sequencing, required high fowlers position and use of bed rails)  Scooting: Contact guard assistance    Transfers:    Transfers  Sit to stand: Minimal assistance  Stand to sit: Minimal assistance           Toilet Transfer  Assistance Needed: Partial/moderate assistance  Comment: Min A using grab bars  CARE Score: 3  Discharge Goal: Supervision or touching assistance    Functional Mobility:    Balance  Sitting Balance: Stand by assistance     Functional Mobility  Functional - Mobility Device: Wheelchair  Activity: To/from bathroom  Assist Level: Maximum assistance     Cognition:  Cognition  Overall Cognitive Status: Exceptions  Arousal/Alertness: Appropriate responses to stimuli  Following Commands:  Follows one step commands with repetition, Follows multistep commands with increased time  Attention Span: Appears intact  Memory: Decreased long term memory  Safety Judgement: Decreased awareness of need for safety, Decreased awareness of need for assistance  Problem Solving: Decreased awareness of errors, Assistance required to identify errors made  Insights: Decreased awareness of deficits  Initiation: Requires cues for some  Sequencing: Requires cues for some    Perception:  Perception  Overall Perceptual Status: WFL      Assessment:     Performance deficits / Impairments: Decreased functional mobility , Decreased strength, Decreased ADL status, Decreased safe awareness, Decreased balance, Decreased posture, Decreased endurance    The patient is a 72year old female admitted onto ARU after hospitalization for CVA . Pt with a past medical history of Arthritis, Cerebral artery occlusion with cerebral infarction ([facility] [address] Diabetes mellitus ([facility] [address] Hyperlipidemia, and Hypertension. Pt admitted to ED with fatigue, confusion, and slurred speech, as well as report of visual hallucinations. Pt diagnosed with acute vs subacute CVA (Rt temporal lobe) and acute metabolic encephalopathy likely secondary to CVA. Pt reports performing ADL IND and denied completion IADL at [facility] as pt's s/o Amedeo Land completes all IADL tasks. Pt appeared to provide inconsistent responses during interview portion this date, questioning information provided. Today pt required SBA-Min A for completion of ADLs as a result of above performance deficits/impairments. Pt required max encouragement for completion and participation in assessment as pt appeared to perseverate on return to bed as well as fear of falling. Pt demo poor safety awareness and decreased awareness to deficits, which are cognitive deficits that impact pt's safety. Anticipating pt may require supervision assistance upon d/c for increased safety. Pt does verbalize desire for increasing independence as well as importance of increasing strength for reduction of falls. The QI, MMT, and ROM standardized assessments were used this date to determine the above performance deficits, which compromise pt's ability to safely complete ADLs/IADLs/mobility.   Pt will benefit from ARU OT services to increase functional performance, safety, and achieve the highest level of independence with ADLs. Decision Making: Medium Complexity  Clinical Presentation:  Evolving c changing characteristics  Patient education:   ARU procotol, Role of O.T., O.T. plan of care,   [x]   Patient goal was established and reviewed in Rehabtracker with patient and/or family this date. REQUIRES OT FOLLOW UP: Yes  Discharge Recommendations:  Anticipating home c SUP upon d/c and [facility] OT  Equipment Recommendations:  TBD    Goals:  Patient Goals   Patient goals : Return home and increase strength     Long term goals  Time Frame for Long term goals : 7-10 days or until d/c  Long term goal 1: Pt will complete feeding/grooming/oral care task c MOD I by d/c  Long term goal 2: Pt will complete total body bathing c CGA c use of SBA by d/c  Long term goal 3: Pt will complete UB/LB dressing c SBA by d/c  Long term goal 4: Pt will complete toileting c SBA by d/c  Long term goal 5: Pt will complete functional transfer (toilet, tub, shower) c SBA by d/c. Long term goals 6: Pt will perform therex/therax to facilitate increased strength/endurance/ax tolerance (c emphasis on dynamic standing balance/tolerance >8 mins and BUE endurance) c SBA in order to complete ADLs.   Long term goal 7: Pt will complete footwear dressing c MOD I by d/c    Plan:    Pt will be seen at least 60 minutes per day for a minimum of 5 days per week, plus group therapy as appropriate  Current Treatment Recommendations: Strengthening, Functional Mobility Training, Patient/Caregiver Education & Training, Endurance Training, Equipment Evaluation, Education, & procurement, Balance Training, Safety Education & Training, Self-Care / ADL    OT Individual Minutes  Time In: 6814  Time Out: 7510  Minutes: 75                Number of Minutes/Billable Intervention      OT Evaluation 20   Therapeutic Exercise    ADL Self-care 55   Neuro Re-Ed    Therapeutic Activity    Group    Other:    TOTAL 75 Electronically signed by:    William Woodall OT,    6/30/2021, 3:34 PM patient

## 2021-07-09 ENCOUNTER — APPOINTMENT (OUTPATIENT)
Dept: ELECTROPHYSIOLOGY | Facility: CLINIC | Age: 56
End: 2021-07-09

## 2021-07-09 ENCOUNTER — APPOINTMENT (OUTPATIENT)
Dept: CARDIOLOGY | Facility: CLINIC | Age: 56
End: 2021-07-09

## 2021-07-12 ENCOUNTER — APPOINTMENT (OUTPATIENT)
Dept: ELECTROPHYSIOLOGY | Facility: CLINIC | Age: 56
End: 2021-07-12

## 2021-08-19 NOTE — H&P ADULT - NSHPPOADEEPVENOUSTHROMB_GEN_A_CORE
Chandan Fonseca  27 y.o. male  Chief Complaint   Patient presents with   • Ear Pain       Patient he had a pimple to the back of ear 2 days ago that popped on its own. He had cleaned it out with alcohol and the pinna of his ear started to have increased redness and swelling.     Vitals:    08/19/21 0004   BP: 155/94   Pulse: 96   Resp: 16   Temp: (!) 35.6 °C (96 °F)   SpO2: 100%        no

## 2021-09-13 NOTE — PATIENT PROFILE ADULT - NSPROIMPLANTSMEDDEV_GEN_A_NUR
----- Message from Jules Vogel MD sent at 9/11/2021 11:28 AM CDT -----  Please notify patient of result(s). Is on macrobid which should be appropriate.  
Per Dr. Vogel-left message for pt. That her urine culture came back and Macrobid is sensitive. Told to call back if she is not feeling better.   
None

## 2021-10-06 NOTE — DISCHARGE NOTE PROVIDER - NSDCFUADDINST_GEN_ALL_CORE_FT
. Incidental Superficial Basal Cell Carcinoma Histology Text: Numerous aggregates of basaloid cells with stromal retraction and peripheral palisading

## 2021-10-21 NOTE — PROGRESS NOTE ADULT - PROBLEM/PLAN-2
DISPLAY PLAN FREE TEXT
patient declined/yes

## 2022-02-07 NOTE — ED ADULT NURSE NOTE - CAS EDN DISCHARGE INTERVENTIONS
Preventive Health Recommendations  Male Ages 40 to 49    Yearly exam:             See your health care provider every year in order to  o   Review health changes.   o   Discuss preventive care.    o   Review your medicines if your doctor has prescribed any.    You should be tested each year for STDs (sexually transmitted diseases) if you re at risk.     Have a cholesterol test every 5 years.     Have a colonoscopy (test for colon cancer) if someone in your family has had colon cancer or polyps before age 50.     After age 45, have a diabetes test (fasting glucose). If you are at risk for diabetes, you should have this test every 3 years.      Talk with your health care provider about whether or not a prostate cancer screening test (PSA) is right for you.    Shots: Get a flu shot each year. Get a tetanus shot every 10 years.     Nutrition:    Eat at least 5 servings of fruits and vegetables daily.     Eat whole-grain bread, whole-wheat pasta and brown rice instead of white grains and rice.     Get adequate Calcium and Vitamin D.     Lifestyle    Exercise for at least 150 minutes a week (30 minutes a day, 5 days a week). This will help you control your weight and prevent disease.     Limit alcohol to one drink per day.     No smoking.     Wear sunscreen to prevent skin cancer.     See your dentist every six months for an exam and cleaning.      
IV intact

## 2022-04-21 NOTE — PROGRESS NOTE ADULT - PROBLEM SELECTOR PROBLEM 4
Patient is establishing care with new PCP on 4/27 - please address at this visit. She has a 1 year prescription in effect until 6/22/22.
HTN (hypertension)
CAD (coronary artery disease)
HTN (hypertension)

## 2022-05-29 NOTE — PROGRESS NOTE ADULT - PROBLEM/PLAN-3
b/l upper and lower extremities./yes
DISPLAY PLAN FREE TEXT

## 2022-09-10 NOTE — PRE-ANESTHESIA EVALUATION ADULT - NSANTHDIETYNSD_GEN_ALL_CORE
After obtaining consent, and per orders of Shauna MARCOS, injection of Depo(LG) was given . by Shabana Carrera. Patient tolerated vaccinations well and left office without any issues, and was advised  to report any adverse reaction to me immediately.
Yes
Patient/Caregiver provided printed discharge information.

## 2022-11-03 NOTE — PATIENT PROFILE ADULT - DO YOU FEEL UNSAFE AT HOME, WORK, OR SCHOOL?
Subjective   Patient ID: Cinthya is a 43 year old female.    Chief Complaint   Patient presents with   • Office Visit   • Annual Exam   • Breast Pain     HPI     She is having menopausal symptoms and tenderness in the breasts.  She has had 2 months of ongoing pain.   She has had discharge from the nipples since she nursed.    She is also having crying spells at night.  Crying spells all day out of nowhere.     She is having hot flashes every night for the past year or two.  Drenching sweats.   Has to sleep in very cold temps.     She would like to see a different gynecologist.   Cinthya has a past medical history of Allergy, Diverticulitis, Gastroesophageal reflux disease, History of abnormal cervical Papanicolaou smear, History of cervical dysplasia, History of HPV infection, Kidney stone,  (normal spontaneous vaginal delivery), and Pregnancy.  Cinthya has Acute diverticulitis; HPV (human papilloma virus) infection; Diarrhea in adult patient; Pelvic pain in female; and Increased urinary frequency on their problem list.  Cinthya has a past surgical history that includes Gallbladder surgery (2011) and Gynecologic cryosurgery.  Her family history includes Cancer, Breast in her maternal grandmother; Diabetes in her father; Heart disease in her father; Hypertension in her maternal grandmother and mother.  Cinthya reports that she has quit smoking. She started smoking about 10 months ago. She has a 26.00 pack-year smoking history. She has never used smokeless tobacco. She reports previous alcohol use. She reports current drug use. Frequency: 3.00 times per week. Drug: Marijuana.  Cinthya has a current medication list which includes the following prescription(s): esomeprazole magnesium, levocetirizine dihydrochloride, methylprednisolone, diazepam, loratadine, and omeprazole.  Cinthya   Current Outpatient Medications   Medication Sig Dispense Refill   • Esomeprazole Magnesium (NEXIUM PO)      • Levocetirizine Dihydrochloride (XYZAL  PO)      • methylPREDNISolone (MEDROL DOSEPAK) 4 MG tablet Take 1 tablet by mouth as directed. follow package directions 21 tablet 0   • diazePAM (VALIUM) 5 MG tablet Take 1 tablet by mouth every 8 hours as needed for Muscle spasms. 9 tablet 0   • Loratadine (CLARITIN PO)      • omeprazole (PrilOSEC) 20 MG capsule Take 20 mg by mouth daily.       No current facility-administered medications for this visit.     Cinthya is allergic to hydrocodone-acetaminophen.    Review of Systems  Objective   Physical Exam    Labs:    No results for input(s): CHOLESTEROL, HDL, TRIGLYCERIDE, CALCLDL, LDLDIR, NONHDL in the last 8765 hours.    No results found for: HGBA1C    No results for input(s): SODIUM, CHLORIDE, BUN, GFRA, BCRAT, POTASSIUM, C02, GLUCOSE, CREATININE, GFRNA, CALCIUM, BNP, TSH in the last 8765 hours.    Invalid input(s): AGAP, FST1    Radiology:  XR THORACIC SPINE 3 VIEWS  Narrative: EXAM: XR THORACIC SPINE 3 VIEWS    CLINICAL INDICATION: Back pain    COMPARISON: None.  Impression: FINDINGS/IMPRESSION:    No subluxation or compression fracture.  Small anterior spurring is seen  throughout the thoracic spine.  Overall the intervertebral disc spaces are  maintained in height.    Electronically Signed by: MIHAELA AMAYA M.D.   Signed on: 10/8/2021 11:30 AM        Assessment   Diagnoses and all orders for this visit:  Breast pain in female  -     SERVICE TO OB GYN  Hot flashes  -     SERVICE TO OB GYN      Discussed the use of paroxetine.   Electing to discuss with gyne first as to options for care.    Side effects of the SSRIs are not desired.     Refer to Gyne.      no

## 2023-02-05 NOTE — PROGRESS NOTE ADULT - PROBLEM SELECTOR PROBLEM 4
Coronary artery disease involving native heart without angina pectoris, unspecified vessel or lesion type
Coronary artery disease involving native heart without angina pectoris, unspecified vessel or lesion type
Patient/Collateral...

## 2023-03-04 NOTE — H&P PST ADULT - PROBLEM SELECTOR PLAN 1
show Left radio cephalic Av fistula   PSt instruction given with 3 days antibacterial soap for post op prevention of post op infection   Has recent blood test done Jan 2, 2019 on [facility]   Repeat k level on DOS

## 2023-03-09 NOTE — ED ADULT TRIAGE NOTE - NS ED NURSE AMBULANCES
[facility]
[Symptom and Test Evaluation] : symptom and test evaluation

## 2023-05-02 NOTE — PROVIDER CONTACT NOTE (OTHER) - DATE AND TIME:
10-Kali-2019 23:50 [Right] : right knee [NL (0)] : extension 0 degrees [5___] : hamstring 5[unfilled]/5 [] : no erythema [[id number]] : flexion 130 degrees

## 2023-05-02 NOTE — ED PROVIDER NOTE - CROS ED RESP ALL NEG
- - - [Sneezing] : sneezing [Seasonal Allergies] : seasonal allergies [Ear Pain] : ear pain [Ear Itch] : ear itch [Recurrent Ear Infections] : recurrent ear infections [Lightheadedness] : lightheadedness [Ear Noises] : ear noises [Nasal Congestion] : nasal congestion [Recurrent Sinus Infections] : recurrent sinus infections [Sinus Pain] : sinus pain [Sinus Pressure] : sinus pressure [Hoarseness] : hoarseness [Throat Clearing] : throat clearing [Throat Pain] : throat pain [Throat Dryness] : throat dryness [Throat Itching] : throat itching [Eye Pain] : eye pain [Chest Pain] : chest pain [Palpitations] : palpitations [Shortness Of Breath] : shortness of breath [Wheezing] : wheezing [Easy Bruising] : a tendency for easy bruising [Negative] : Endocrine [FreeTextEntry1] : headaches, weight loss/pain

## 2023-05-08 NOTE — DISCHARGE NOTE ADULT - MEDICATION SUMMARY - MEDICATIONS TO TAKE
No I will START or STAY ON the medications listed below when I get home from the hospital:    metroNIDAZOLE 500 mg oral tablet  -- 1 tab(s) by mouth every 8 hours  -- Indication: For Helicobacter pylori gastritis    aspirin 81 mg oral delayed release tablet  -- 1 tab(s) by mouth once a day  -- Indication: For CAD (coronary artery disease)    isosorbide dinitrate 10 mg oral tablet  -- 1 tab(s) by mouth 3 times a day  -- Indication: For HFrEF (heart failure with reduced ejection fraction)    atorvastatin 80 mg oral tablet  -- 1 tab(s) by mouth once a day (at bedtime)  -- Indication: For Hyperlipidemia    carvedilol 6.25 mg oral tablet  -- 1 tab(s) by mouth every 12 hours  -- Indication: For HTN (hypertension)    bumetanide 2 mg oral tablet  -- 3 tab(s) by mouth 2 times a day  -- Indication: For HFrEF (heart failure with reduced ejection fraction)    ferrous sulfate 325 mg (65 mg elemental iron) oral tablet  -- 1 tab(s) by mouth once a day   -- Indication: For Anemia    polyethylene glycol 3350 oral powder for reconstitution  -- 17 gram(s) by mouth once a day  -- Indication: For Constipation    clarithromycin 500 mg oral tablet  -- 1 tab(s) by mouth 2 times a day  -- Indication: For Helicobacter pylori gastritis    calcium acetate 667 mg oral tablet  -- 1 tab(s) by mouth 3 times a day (with meals)  -- Indication: For ESRD (end stage renal disease)    pantoprazole 40 mg oral delayed release tablet  -- 1 tab(s) by mouth 2 times a day  -- Indication: For Helicobacter pylori gastritis    hydrALAZINE 100 mg oral tablet  -- 1 tab(s) by mouth every 8 hours  -- Indication: For HTN (hypertension)

## 2023-06-13 NOTE — HISTORY OF PRESENT ILLNESS
[FreeTextEntry1] : 54 year old M w/ h/o IDDM (A1c 7.1 9/19) c/b possible retinopathy/nephropathy, HTN, CAD (s/p stent in [place] in 2016, unknown coronary anatomy), HFrEF (EF 25%, LVEDD 6.6 cm) s/p subQ ICD, prior severe MR (functional; now improved), ESRD on HD via permacath (T/Th/Sat; AV fistula now patent), prior GIB 2/2 ulcer (H. pylori positive) who is here for f/u.\par \par Since last visit 11/29/19, pt was found to be overloaded and normotensive. HD center was contacted and asked to dialyze to dry weight of 83 kg. Pt was admitted to [facility] from dialysis from 12/12-12/14 secondary to bradycardia with HR 40's 20 minutes into dialysis treatment with a drop in B/P. He had no symptoms of dizziness with the episode and states that a similar episode happened twice before.\par He was seen by EP Dr. Cantu and had an ILR implanted on 12/13/19. His beta blocker was changed from coreg 25 mg bid to metoprolol 50 mg bid and he continued with bumex 2 mg- 2 tabs bid. He was d/c on hydral and iso but has since stopped both and was put on midodrine 5 mg tid.\par Currently, the patient states he is short of breath walking less than 50 feet. He can not climb a flight of stairs. He sleeps with 2 pillows and denies orthopnea.  Dry weight reportedly 87 kg (per dialysis center),  however his is 89 kg or 197 lbs today. Adherent to low sodium and fluid restriction. Denies chest pain, palpitations and syncope. PT has a SubQ ICD and an ILR. Of note, pt will call endo Dr. Patton for an appt.\par \par Prior ET approx 2-3 blocks limited by pain in calves (prior vascular workup negative). Had a CPET which was consistent with Virk Class D HF and he's interested in dual organ transplant.  No

## 2023-06-28 NOTE — H&P ADULT - HISTORY OF PRESENT ILLNESS
HIV is negative  Hepatitis B infection is negative  Hepatitis C infection is negative  Syphilis is negative 64M hx of ESRD recently started on HD (T/Th/S, via tunneled permacath 1/10), HFrEF (EF 28%), CAD s/p stent, Type 2 Diabetes (A1C 6.5%), recent H. pylori gastritis (01/2018, s/p triple therapy), HTN, HLD, who presents with hypoglycemia to 37. Pt. had episode of confusion and diaphoresis, FS at home was 37, after which he had a peanut butter jelly/juice and feels better. Of note, pt. was started on glyburide yesterday. He denies fevers, chills, cp, sob, abdominal pain, n/v/d, dysuria, sick contacts, recent travel.     In the ED - T 98, HR 65, /65, RR 16, 100% on RA  Labs notable for Hb 8.8, platelets 81, blood glucose 160, lactate 2.1 64M hx of ESRD recently started on HD (T/Th/S, via tunneled catheter 1/10), HFrEF (EF 28%), CAD s/p stent, Type 2 Diabetes (A1C 6.5%), recent H. pylori gastritis (01/2018, s/p triple therapy), HTN, HLD, who presents with hypoglycemia to 37. Pt. had episode of confusion and diaphoresis, FS at home was 37, after which he had a peanut butter jelly/juice and feels better. Of note, pt. went to see a new PCP, who started him on glyburide 5mg daily which he started to take since yesterday. He denies fevers, chills, cp, sob, abdominal pain, n/v/d, dysuria, sick contacts, recent travel.     In the ED - T 98, HR 65, /65, RR 16, 100% on RA  Labs notable for Hb 8.8, platelets 81, blood glucose 160, lactate 2.1 64M hx of ESRD recently started on HD (T/Th/S, via tunneled catheter 1/10, LUE fistula maturing), HFrEF (EF 28%), CAD s/p PCI in 2016, Type 2 Diabetes (A1C 6.5%), recent H. pylori gastritis (01/2018, s/p triple therapy), HTN, HLD, who presents with hypoglycemia to 37. Pt. had episode of confusion and diaphoresis, FS at home was 37, after which he had a peanut butter jelly/juice and feels better. Of note, pt. went to see a new PCP, who started him on glyburide 5mg daily which he started to take since yesterday. He denies fevers, chills, cp, sob, abdominal pain, n/v/d, dysuria, sick contacts, recent travel.     In the ED - T 98, HR 65, /65, RR 16, 100% on RA  Labs notable for Hb 8.8, platelets 81, blood glucose 160, lactate 2.1 64M hx of ESRD recently started on HD (T/Th/S, via tunneled catheter 1/10, LUE fistula maturing), HFrEF (EF 28%), CAD s/p PCI in 2016, Type 2 Diabetes (A1C 6.5%), recent H. pylori gastritis (completed triple therapy 1/23/18), HTN, HLD, who presents with hypoglycemia to 37. Pt. had episode of confusion and diaphoresis, FS at home was 37, after which he had a peanut butter jelly/juice and felt better. Of note, pt. went to see a new PCP, who started him on glyburide 5mg daily which he started to take since yesterday. He denies fevers, chills, cp, sob, abdominal pain, n/v/d, dysuria, sick contacts, recent travel.     In the ED - T 98, HR 65, /65, RR 16, 100% on RA  Labs notable for Hb 8.8, platelets 81, blood glucose 160, lactate 2.1 64M hx of ESRD recently started on HD (T/Th/S, via tunneled catheter 1/10, LUE fistula maturing), HFrEF (EF 28%), CAD s/p PCI in 2016, Type 2 Diabetes (A1C 6.5% on 1/11/19), recent H. pylori gastritis (completed triple therapy 1/23/18), HTN, HLD, who presents with hypoglycemia to 37. Pt. had episode of confusion and diaphoresis, FS at home was 37, after which he had a peanut butter jelly/juice and felt better. Of note, pt. went to see a new PCP, who started him on glyburide 5mg daily which he started to take since yesterday. He denies fevers, chills, cp, sob, abdominal pain, n/v/d, dysuria, sick contacts, recent travel.     In the ED - T 98, HR 65, /65, RR 16, 100% on RA  Labs notable for Hb 8.8, platelets 81, blood glucose 160, lactate 2.1

## 2023-11-04 NOTE — PROGRESS NOTE ADULT - ATTENDING COMMENTS
Dr. Roro Wright   Division of Hospital Medicine  [facility]   Pager: [phone] oral

## 2024-05-10 NOTE — CONSULT NOTE ADULT - NSHPATTENDINGPLANDISCUSS_GEN_ALL_CORE
Problem: Cardiac Surgery  Goal: Hemodynamic stability achieved/maintained  Description: Hemodynamic instability may include VS or rhythm changes or s/s FVE.  AHA guidelines: Keep BP>90 mm Hg.  Outcome: Monitoring/Evaluating progress  Goal: Neurological status returned to baseline  Outcome: Monitoring/Evaluating progress  Goal: Elimination status is maintained/returned to baseline  Outcome: Monitoring/Evaluating progress  Goal: Activity level is maintained/returned to level needed for d/c  Outcome: Monitoring/Evaluating progress  Goal: Verbalizes/demonstrates understanding of heart disease, risk factors, treatment plan, and d/c instructions  Description: Document on Patient Education Activity   Outcome: Monitoring/Evaluating progress     Problem: Postoperative Care  Goal: Vital signs are maintained within parameters  Outcome: Monitoring/Evaluating progress  Goal: Elimination status is maintained/returned to baseline  Outcome: Monitoring/Evaluating progress  Goal: Oral intake is resumed and tolerated  Outcome: Monitoring/Evaluating progress  Goal: Activity level is resumed to level needed for d/c  Outcome: Monitoring/Evaluating progress  Goal: Verbalizes understanding of postoperative care in the hospital and after d/c  Description: Document on Patient Education Activity  Outcome: Monitoring/Evaluating progress      Medicine

## 2024-07-12 NOTE — ED ADULT NURSE NOTE - NSIMPLEMENTINTERV_GEN_ALL_ED
no
Implemented All Universal Safety Interventions:  [place] to call system. Call bell, personal items and telephone within reach. Instruct patient to call for assistance. Room bathroom lighting operational. Non-slip footwear when patient is off stretcher. Physically safe environment: no spills, clutter or unnecessary equipment. Stretcher in lowest position, wheels locked, appropriate side rails in place.

## 2024-10-13 NOTE — PROGRESS NOTE ADULT - SUBJECTIVE AND OBJECTIVE BOX
ELECTROPHYSIOLOGY  Device Interrogation Performed                                  Date/Time: 4/26/21 12noon  : PRESLEY       Model:     [id number]                 Implanting physician: Dr. Cantu    Implanting facility: [facility]        Events/Observation:  Two episodes of bradycardia corresponding to dizziness     Impression/Plan:  Normal ILR function.    Good battery status. No reprogramming.   Two episodes of bradycardia corresponding to patient's c/o dizziness this morning ukn

## 2024-10-22 NOTE — H&P PST ADULT - FALLEN IN THE PAST
no What Type Of Note Output Would You Prefer (Optional)?: Bullet Format Hpi Title: Evaluation of Skin Lesions Location: In situ, back Year Removed: 2019

## 2025-01-02 NOTE — PROGRESS NOTE ADULT - PROBLEM SELECTOR PLAN 8
DVT PPx: Improve score of 0, lower risk for DVT - - no pharmacologic ppx     Rosalind Gimenez   PGY2  [phone] DVT PPx: Improve score of 0, lower risk for DVT - - no pharmacologic ppx Yes

## 2025-01-07 NOTE — ED PROVIDER NOTE - MEDICAL DECISION MAKING DETAILS
PDMP appropriate, will fill today, will need appt with me prior to further fills.    52yo M extensive PMHx p/w CC bleeding from shiley catheter, catheter was placed by IR on 1/10/19, will send cbc check coags, confirm placement of shiley with cxr, likely need admission for IR eval
